# Patient Record
Sex: FEMALE | Race: WHITE | NOT HISPANIC OR LATINO | Employment: OTHER | URBAN - METROPOLITAN AREA
[De-identification: names, ages, dates, MRNs, and addresses within clinical notes are randomized per-mention and may not be internally consistent; named-entity substitution may affect disease eponyms.]

---

## 2017-09-14 ENCOUNTER — APPOINTMENT (OUTPATIENT)
Dept: WOMENS IMAGING | Facility: HOSPITAL | Age: 72
End: 2017-09-14

## 2017-09-14 PROCEDURE — G0202 SCR MAMMO BI INCL CAD: HCPCS | Performed by: RADIOLOGY

## 2017-09-14 PROCEDURE — 77063 BREAST TOMOSYNTHESIS BI: CPT | Performed by: RADIOLOGY

## 2019-01-15 ENCOUNTER — OFFICE VISIT (OUTPATIENT)
Dept: GASTROENTEROLOGY | Facility: CLINIC | Age: 74
End: 2019-01-15

## 2019-01-15 VITALS
BODY MASS INDEX: 22.49 KG/M2 | SYSTOLIC BLOOD PRESSURE: 148 MMHG | DIASTOLIC BLOOD PRESSURE: 82 MMHG | HEIGHT: 65 IN | WEIGHT: 135 LBS

## 2019-01-15 DIAGNOSIS — K50.90 CROHN'S DISEASE WITHOUT COMPLICATION, UNSPECIFIED GASTROINTESTINAL TRACT LOCATION (HCC): Primary | ICD-10-CM

## 2019-01-15 DIAGNOSIS — K50.018 CROHN'S DISEASE OF SMALL INTESTINE WITH OTHER COMPLICATION (HCC): Primary | ICD-10-CM

## 2019-01-15 PROCEDURE — 99204 OFFICE O/P NEW MOD 45 MIN: CPT | Performed by: INTERNAL MEDICINE

## 2019-01-15 RX ORDER — PREDNISONE 20 MG/1
60 TABLET ORAL DAILY
Qty: 90 TABLET | Refills: 1 | Status: SHIPPED | OUTPATIENT
Start: 2019-01-15 | End: 2019-04-02

## 2019-01-15 RX ORDER — UBIDECARENONE 75 MG
50 CAPSULE ORAL DAILY
Status: ON HOLD | COMMUNITY
End: 2020-01-27

## 2019-01-15 RX ORDER — UBIDECARENONE 100 MG
100 CAPSULE ORAL DAILY
Status: ON HOLD | COMMUNITY
End: 2020-01-27

## 2019-01-15 RX ORDER — PREDNISONE 10 MG/1
TABLET ORAL
Refills: 0 | COMMUNITY
Start: 2018-12-19 | End: 2019-04-02

## 2019-01-15 RX ORDER — SIMVASTATIN 20 MG
20 TABLET ORAL DAILY
Refills: 2 | COMMUNITY
Start: 2018-12-18 | End: 2020-02-27 | Stop reason: HOSPADM

## 2019-01-15 NOTE — PROGRESS NOTES
PATIENT INFORMATION  She López       - 1945    CHIEF COMPLAINT  Chief Complaint   Patient presents with   • GI Problem     Chron's dx   small bowel obstruction. See media. CT done.    HISTORY OF PRESENT ILLNESS  HPI    72 yo with history of Crohns disease diagnosed in . She had ileocolecty in . She has been previously on mesalamine based products with some intolerance. She was also on imuran with intolerance mentioned in   Previous GI notes. She had been on entocort. Humira and Remicade discussed but could not afford this. She has been off all medications since around .   She has had increasing issues with abdominal pain nausea and intermittent vomiting and diarrhea sincearo.  She was seen by her primary care doctor who did put her on prednisone with fast taper.  She notes a bulge in the right lower quadrant of her abdomen.  She has been following a fairly low fiber low residue diet but is not able to tolerate much more than liquids.  She is having a formed bowel movement daily.  She denies any fevers or chills.  She did have a CT for abdomen pelvis done at [facility] in December.    1. CT abdomen and pelvis without intravenous contrast.    DATE: 2018        HISTORY: Crohn's disease. Abdominal pain and nausea.     TECHNIQUE: Multislice helical abdomen and pelvis protocol without intravenous contrast per referring physician request. There are no prior studies.      Dose reduction techniques were employed per ALARA protocol.      FINDINGS: There has been a previous right hemicolectomy with ileocolic anastomosis. There is abnormal wall thickening involving the poorly visualized distal few centimeters of the neoterminal ileum with stranding of the surrounding fat. There is   associated dilation of mid and distal ileum. There is increased fluid within the remaining colon. There is no pneumatosis or free intraperitoneal air.    Liver, spleen, pancreas, and adrenal  glands are unremarkable. The kidneys and ureters are normal in appearance as is the bladder. The uterus and ovaries are unremarkable. The gallbladder is absent.    There is no adenopathy. There is trace ascites. The aorta is normal caliber. The lung bases are clear. There are no acute bone abnormalities. There is degenerative change at the thoracolumbar junction.    IMPRESSION:  1. Suspected active Crohn's disease at the junction of the neoterminal ileum and remaining colon. No abscess or fistula.  2. Dilation of small bowel to the level of the abnormal segment suggesting partial small bowel obstruction.  3. Increased fluid in the remnant colon suggesting the presence of diarrhea.  4. Trace ascites.  5. Retained contrast in the esophagus may be evidence of gastroesophageal reflux or decreased motility.      REVIEW OF SYSTEMS  Review of Systems   Constitutional: Positive for activity change and unexpected weight change.   Gastrointestinal: Positive for abdominal distention, abdominal pain, diarrhea, nausea and vomiting.   All other systems reviewed and are negative.        ACTIVE PROBLEMS  There are no active problems to display for this patient.        PAST MEDICAL HISTORY  Past Medical History:   Diagnosis Date   • Crohn's disease (CMS/HCC)    • Hyperlipidemia          SURGICAL HISTORY  Past Surgical History:   Procedure Laterality Date   • CHOLECYSTECTOMY  04/2018   • COLON SURGERY     • SPINE SURGERY           FAMILY HISTORY  Family History   Problem Relation Age of Onset   • Breast cancer Sister    • Breast cancer Maternal Grandmother    • Colon cancer Neg Hx    • Colon polyps Neg Hx          SOCIAL HISTORY  Social History     Occupational History   • Not on file   Tobacco Use   • Smoking status: Never Smoker   • Smokeless tobacco: Never Used   Substance and Sexual Activity   • Alcohol use: Yes   • Drug use: Not on file   • Sexual activity: Not on file       Debilities/Disabilities Identified: None    Emotional  "Behavior: Appropriate    CURRENT MEDICATIONS    Current Outpatient Medications:   •  Cholecalciferol (VITAMIN D3) 5000 units capsule capsule, Take 5,000 Units by mouth Daily., Disp: , Rfl:   •  coenzyme Q10 100 MG capsule, Take 100 mg by mouth Daily., Disp: , Rfl:   •  Multiple Vitamin (MULTI VITAMIN DAILY PO), Take  by mouth., Disp: , Rfl:   •  vitamin B-12 (CYANOCOBALAMIN) 100 MCG tablet, Take 50 mcg by mouth Daily., Disp: , Rfl:   •  predniSONE (DELTASONE) 10 MG tablet, , Disp: , Rfl: 0  •  predniSONE (DELTASONE) 20 MG tablet, Take 3 tablets by mouth Daily., Disp: 90 tablet, Rfl: 1  •  simvastatin (ZOCOR) 20 MG tablet, Take  by mouth Daily., Disp: , Rfl: 2    ALLERGIES  Asacol [mesalamine] and Keflex [cephalexin]    VITALS  Vitals:    01/15/19 1305   BP: 148/82   Weight: 61.2 kg (135 lb)   Height: 165.1 cm (65\")       LAST RESULTS   No results found for any previous visit.     No results found.    PHYSICAL EXAM  Physical Exam   Constitutional: She is oriented to person, place, and time. She appears well-developed and well-nourished. No distress.   HENT:   Head: Normocephalic and atraumatic.   Mouth/Throat: Oropharynx is clear and moist.   Eyes: EOM are normal. Pupils are equal, round, and reactive to light.   Neck: Normal range of motion. No tracheal deviation present.   Cardiovascular: Normal rate, regular rhythm, normal heart sounds and intact distal pulses. Exam reveals no gallop and no friction rub.   No murmur heard.  Pulmonary/Chest: Effort normal and breath sounds normal. No stridor. No respiratory distress. She has no wheezes. She has no rales. She exhibits no tenderness.   Abdominal: Soft. Bowel sounds are normal. She exhibits no distension. There is no tenderness. There is no rebound and no guarding.   Bulge noted in the right mid quadrant, periumbilical area. No pain. Bowel sounds noted.   Musculoskeletal: She exhibits no edema.   Lymphadenopathy:     She has no cervical adenopathy.   Neurological: She " is alert and oriented to person, place, and time.   Skin: Skin is warm. She is not diaphoretic.   Psychiatric: She has a normal mood and affect. Her behavior is normal. Judgment and thought content normal.   Nursing note and vitals reviewed.      ASSESSMENT  Diagnoses and all orders for this visit:    Crohn's disease of small intestine with other complication (CMS/HCC)    Other orders  -     predniSONE (DELTASONE) 10 MG tablet;   -     simvastatin (ZOCOR) 20 MG tablet; Take  by mouth Daily.  -     vitamin B-12 (CYANOCOBALAMIN) 100 MCG tablet; Take 50 mcg by mouth Daily.  -     coenzyme Q10 100 MG capsule; Take 100 mg by mouth Daily.  -     Cholecalciferol (VITAMIN D3) 5000 units capsule capsule; Take 5,000 Units by mouth Daily.  -     Multiple Vitamin (MULTI VITAMIN DAILY PO); Take  by mouth.  -     predniSONE (DELTASONE) 20 MG tablet; Take 3 tablets by mouth Daily.          PLAN  Return in about 4 weeks (around 2/12/2019).  Start high dose prednisone now. Taper discussed.  Risk related to prednisone discussed with patient.  CT Enterography  Low residue diet  Will figure out cost again for remicade/humira /entivyo    Records from previous gi reviewed and ct from [place] reviewed.

## 2019-01-24 ENCOUNTER — HOSPITAL ENCOUNTER (OUTPATIENT)
Dept: CT IMAGING | Facility: HOSPITAL | Age: 74
Discharge: HOME | End: 2019-01-24
Attending: INTERNAL MEDICINE | Admitting: INTERNAL MEDICINE

## 2019-01-24 PROCEDURE — 0 IOPAMIDOL PER 1 ML: Performed by: INTERNAL MEDICINE

## 2019-01-24 PROCEDURE — 0 DIATRIZOATE MEGLUMINE & SODIUM PER 1 ML: Performed by: INTERNAL MEDICINE

## 2019-01-24 PROCEDURE — 74177 CT ABD & PELVIS W/CONTRAST: CPT

## 2019-01-24 RX ADMIN — IOPAMIDOL 100 ML: 755 INJECTION, SOLUTION INTRAVENOUS at 09:46

## 2019-01-24 NOTE — PROGRESS NOTES
Let her know ct shows improving inflammation. She should be down to prednisone 40mg and continue here. Let her know we are working on remicade infusions

## 2019-01-28 NOTE — PROGRESS NOTES
Talk to patient about results. She would like to know if she could be put on Entocort instead of Remicade. thanks

## 2019-01-30 PROBLEM — K50.018 CROHN'S DISEASE OF SMALL INTESTINE WITH OTHER COMPLICATION (HCC): Status: ACTIVE | Noted: 2019-01-30

## 2019-02-06 ENCOUNTER — TELEPHONE (OUTPATIENT)
Dept: SURGERY | Facility: CLINIC | Age: 74
End: 2019-02-06

## 2019-02-06 ENCOUNTER — TRANSCRIBE ORDERS (OUTPATIENT)
Dept: ADMINISTRATIVE | Facility: HOSPITAL | Age: 74
End: 2019-02-06

## 2019-02-06 DIAGNOSIS — M79.601 RIGHT ARM PAIN: Primary | ICD-10-CM

## 2019-02-06 NOTE — TELEPHONE ENCOUNTER
PATIENT CALLED IN STATING THAT THAT SHE WAS HAVING A WEAKNESS & SWELLING IN HER RIGHT ARM. I EXPLAINED THAT SHE NEEDED HAVE THAT LOOKED AT ASAP. SHE WAS GOING TO CALL PCP TO IF SHE CAN GET IN WITH THEM, IF NOT I TOLD HER TO GO TO THE ER.

## 2019-02-07 DIAGNOSIS — K50.012 CROHN'S DISEASE OF SMALL INTESTINE WITH INTESTINAL OBSTRUCTION (HCC): Primary | ICD-10-CM

## 2019-02-07 DIAGNOSIS — Z11.59 ENCOUNTER FOR SCREENING FOR OTHER VIRAL DISEASES: ICD-10-CM

## 2019-02-07 NOTE — TELEPHONE ENCOUNTER
Called patient, no answer, left message. If she calls back please ask how much prednisone she is tapered down to

## 2019-02-07 NOTE — TELEPHONE ENCOUNTER
Labs put in, have her come down to prednisone 40mg now, 30mg next week and 20mg following week. If her symptoms worsen let us know

## 2019-02-08 ENCOUNTER — APPOINTMENT (OUTPATIENT)
Dept: LAB | Facility: HOSPITAL | Age: 74
End: 2019-02-08

## 2019-02-08 LAB — HBV SURFACE AG SERPL QL IA: NORMAL

## 2019-02-08 PROCEDURE — 86481 TB AG RESPONSE T-CELL SUSP: CPT | Performed by: INTERNAL MEDICINE

## 2019-02-08 PROCEDURE — 87340 HEPATITIS B SURFACE AG IA: CPT | Performed by: INTERNAL MEDICINE

## 2019-02-08 PROCEDURE — 86704 HEP B CORE ANTIBODY TOTAL: CPT | Performed by: INTERNAL MEDICINE

## 2019-02-08 PROCEDURE — 36415 COLL VENOUS BLD VENIPUNCTURE: CPT | Performed by: INTERNAL MEDICINE

## 2019-02-09 LAB — HBV CORE AB SER DONR QL IA: NEGATIVE

## 2019-02-10 LAB
TSPOT INTERPRETATION: NEGATIVE
TSPOT NIL CONTROL INTERPRETATION: NORMAL
TSPOT PANEL A: 0
TSPOT PANEL B: 0
TSPOT POS CONTROL INTERPRETATION: NORMAL

## 2019-02-11 ENCOUNTER — TELEPHONE (OUTPATIENT)
Dept: GASTROENTEROLOGY | Facility: CLINIC | Age: 74
End: 2019-02-11

## 2019-02-12 ENCOUNTER — TELEPHONE (OUTPATIENT)
Dept: SURGERY | Facility: CLINIC | Age: 74
End: 2019-02-12

## 2019-02-21 ENCOUNTER — OFFICE VISIT (OUTPATIENT)
Dept: GASTROENTEROLOGY | Facility: CLINIC | Age: 74
End: 2019-02-21

## 2019-02-21 DIAGNOSIS — K50.018 CROHN'S DISEASE OF SMALL INTESTINE WITH OTHER COMPLICATION (HCC): Primary | ICD-10-CM

## 2019-02-21 PROCEDURE — 99213 OFFICE O/P EST LOW 20 MIN: CPT | Performed by: INTERNAL MEDICINE

## 2019-02-21 RX ORDER — ADALIMUMAB 80MG/0.8ML
KIT SUBCUTANEOUS
COMMUNITY
Start: 2019-02-14 | End: 2019-07-23

## 2019-02-21 NOTE — PROGRESS NOTES
PATIENT INFORMATION  She López       - 1945    CHIEF COMPLAINT  Chief Complaint   Patient presents with   • Follow-up     4 week follow up on crohn's       HISTORY OF PRESENT ILLNESS  HPI    She is here today in follow up and is doing well. She is down to prednisone 20 mg. Her arm pain is better. She states that she has underlying  Neck pain and nerve issues.  bm are combination firm to liquid. She is eating more, adding back more protein.    REVIEW OF SYSTEMS  Review of Systems   All other systems reviewed and are negative.        ACTIVE PROBLEMS  Patient Active Problem List    Diagnosis   • Crohn's disease of small intestine with other complication (CMS/HCC) [K50.018]         PAST MEDICAL HISTORY  Past Medical History:   Diagnosis Date   • Cancer (CMS/HCC)     kerato acanthona   • Crohn's disease (CMS/HCC)    • Hyperlipidemia          SURGICAL HISTORY  Past Surgical History:   Procedure Laterality Date   • CHOLECYSTECTOMY  2018   • COLON SURGERY     • SPINE SURGERY           FAMILY HISTORY  Family History   Problem Relation Age of Onset   • Breast cancer Sister    • Breast cancer Maternal Grandmother    • Colon cancer Neg Hx    • Colon polyps Neg Hx          SOCIAL HISTORY  Social History     Occupational History   • Not on file   Tobacco Use   • Smoking status: Never Smoker   • Smokeless tobacco: Never Used   Substance and Sexual Activity   • Alcohol use: Yes   • Drug use: Not on file   • Sexual activity: Not on file       Debilities/Disabilities Identified: None    Emotional Behavior: Appropriate    CURRENT MEDICATIONS    Current Outpatient Medications:   •  Cholecalciferol (VITAMIN D3) 5000 units capsule capsule, Take 5,000 Units by mouth Daily., Disp: , Rfl:   •  coenzyme Q10 100 MG capsule, Take 100 mg by mouth Daily., Disp: , Rfl:   •  HUMIRA PEN-CD/UC/HS STARTER 80 MG/0.8ML Pen-injector Kit, , Disp: , Rfl:   •  Multiple Vitamin (MULTI VITAMIN DAILY PO), Take  by mouth., Disp: , Rfl:    •  predniSONE (DELTASONE) 10 MG tablet, , Disp: , Rfl: 0  •  predniSONE (DELTASONE) 20 MG tablet, Take 3 tablets by mouth Daily., Disp: 90 tablet, Rfl: 1  •  simvastatin (ZOCOR) 20 MG tablet, Take  by mouth Daily., Disp: , Rfl: 2  •  vitamin B-12 (CYANOCOBALAMIN) 100 MCG tablet, Take 50 mcg by mouth Daily., Disp: , Rfl:     ALLERGIES  Asacol [mesalamine]; Azathioprine; Cephalosporins; Ciprofloxacin; Gabapentin; Influenza vaccines; Keflex [cephalexin]; Other; and Potassium    VITALS  There were no vitals filed for this visit.    LAST RESULTS   Orders Only on 02/07/2019   Component Date Value Ref Range Status   • TSPOTTB 02/08/2019 Negative  Negative Final   • T-SPOT Panel A 02/08/2019 0   Final   • T-SPOT Panel B 02/08/2019 0   Final   • TSPOT NIL CONTROL INTERP 02/08/2019 Passed   Final   • TSPOT POS CONTROL INTERP 02/08/2019 Passed   Final   • Hep B Core Total Ab 02/08/2019 Negative  Negative Final   • Hepatitis B Surface Ag 02/08/2019 Non-Reactive  Non-Reactive Final     Ct Abdomen Pelvis With Contrast Enterography    Result Date: 1/24/2019  Narrative: CT OF THE ABDOMEN AND PELVIS WITH CONTRAST  HISTORY: Long history of Crohn's disease. Several week history of nausea and mild abdominal pain  COMPARISON: 12/18/2018  TECHNIQUE: CT of the abdomen and pelvis. Radiation dose reduction techniques included automated exposure control or exposure modulation based on body size. Radiation audit for CT and nuclear cardiology exams in the last 12 months: 1.  ABDOMEN FINDINGS: Today's exam redemonstrates evidence of a prior right hemicolectomy with an ileocolic anastomosis. The previously described inflammatory changes involving the neoterminal ileum appear to have improved on the current study. The small bowel is fluid-filled but is less distended than the prior exam. There is no evidence of pneumatosis. No free air is identified. No free fluid is seen.  The liver shows normal enhancement. No focal hepatic lesions. The  gallbladder is surgically absent. Both kidneys show normal enhancement. No hydronephrosis. No focal renal masses. The adrenal glands and pancreas are within normal limits. The spleen appears unremarkable.  PELVIS FINDINGS: The bladder outline is smooth. No abnormal pelvic masses. No threshold of abdominal or pelvic adenopathy. No inguinal adenopathy is identified.  Regional osseous structures show no acute or aggressive bone lesions.      Impression: 1.  Prior right hemicolectomy with ileocolic anastomosis. The previously described inflammatory changes involving the surgical anastomosis appeared to have improved on the current exam. The small bowel is fluid-filled but less distended on the current study. 2.  No evidence of pneumatosis or free air.  This report was finalized on 1/24/2019 1:52 PM by Dr. Jose De Jesus Bae MD.        PHYSICAL EXAM  Physical Exam   Constitutional: She is oriented to person, place, and time. She appears well-developed and well-nourished. No distress.   HENT:   Head: Normocephalic and atraumatic.   Mouth/Throat: Oropharynx is clear and moist.   Eyes: EOM are normal. Pupils are equal, round, and reactive to light.   Neck: Normal range of motion. No tracheal deviation present.   Cardiovascular: Normal rate, regular rhythm, normal heart sounds and intact distal pulses. Exam reveals no gallop and no friction rub.   No murmur heard.  Pulmonary/Chest: Effort normal and breath sounds normal. No stridor. No respiratory distress. She has no wheezes. She has no rales. She exhibits no tenderness.   Abdominal: Soft. Bowel sounds are normal. She exhibits no distension. There is no tenderness. There is no rebound and no guarding.   Non tender, bs noted,    Musculoskeletal: She exhibits no edema.   Lymphadenopathy:     She has no cervical adenopathy.   Neurological: She is alert and oriented to person, place, and time.   Skin: Skin is warm. She is not diaphoretic.   Psychiatric: She has a normal mood and  affect. Her behavior is normal. Judgment and thought content normal.   Nursing note and vitals reviewed.      ASSESSMENT  Diagnoses and all orders for this visit:    Crohn's disease of small intestine with other complication (CMS/HCC)    Other orders  -     HUMIRA PEN-CD/UC/HS STARTER 80 MG/0.8ML Pen-injector Kit;           PLAN  Return in about 8 weeks (around 4/18/2019).    Get labs from pcp office baseline  Liver and cbc  To start humira today      humira reaction discussed, risks, side effects  Risk of malignancy and infections.

## 2019-04-01 ENCOUNTER — LAB (OUTPATIENT)
Dept: LAB | Facility: HOSPITAL | Age: 74
End: 2019-04-01

## 2019-04-01 ENCOUNTER — HOSPITAL ENCOUNTER (OUTPATIENT)
Dept: GENERAL RADIOLOGY | Facility: HOSPITAL | Age: 74
Discharge: HOME | End: 2019-04-01
Admitting: INTERNAL MEDICINE

## 2019-04-01 ENCOUNTER — TELEPHONE (OUTPATIENT)
Dept: GASTROENTEROLOGY | Facility: CLINIC | Age: 74
End: 2019-04-01

## 2019-04-01 DIAGNOSIS — D84.9 IMMUNODEFICIENCY (HCC): ICD-10-CM

## 2019-04-01 DIAGNOSIS — R10.10 PAIN OF UPPER ABDOMEN: ICD-10-CM

## 2019-04-01 DIAGNOSIS — K50.018 CROHN'S DISEASE OF SMALL INTESTINE WITH OTHER COMPLICATION (HCC): Primary | ICD-10-CM

## 2019-04-01 DIAGNOSIS — R19.7 DIARRHEA, UNSPECIFIED TYPE: ICD-10-CM

## 2019-04-01 DIAGNOSIS — K50.018 CROHN'S DISEASE OF SMALL INTESTINE WITH OTHER COMPLICATION (HCC): ICD-10-CM

## 2019-04-01 LAB
ADV 40+41 DNA STL QL NAA+NON-PROBE: NOT DETECTED
ASTRO TYP 1-8 RNA STL QL NAA+NON-PROBE: NOT DETECTED
C CAYETANENSIS DNA STL QL NAA+NON-PROBE: NOT DETECTED
CAMPY SP DNA.DIARRHEA STL QL NAA+PROBE: NOT DETECTED
CRYPTOSP STL CULT: NOT DETECTED
E COLI DNA SPEC QL NAA+PROBE: NOT DETECTED
E HISTOLYT AG STL-ACNC: NOT DETECTED
EAEC PAA PLAS AGGR+AATA ST NAA+NON-PRB: NOT DETECTED
EC STX1 + STX2 GENES STL NAA+PROBE: NOT DETECTED
EPEC EAE GENE STL QL NAA+NON-PROBE: NOT DETECTED
ERYTHROCYTE [SEDIMENTATION RATE] IN BLOOD: 43 MM/HR (ref 0–30)
ETEC LTA+ST1A+ST1B TOX ST NAA+NON-PROBE: NOT DETECTED
G LAMBLIA DNA SPEC QL NAA+PROBE: NOT DETECTED
NOROVIRUS GI+II RNA STL QL NAA+NON-PROBE: NOT DETECTED
P SHIGELLOIDES DNA STL QL NAA+NON-PROBE: NOT DETECTED
RV RNA STL NAA+PROBE: NOT DETECTED
SALMONELLA DNA SPEC QL NAA+PROBE: NOT DETECTED
SAPO I+II+IV+V RNA STL QL NAA+NON-PROBE: NOT DETECTED
SHIGELLA SP+EIEC IPAH STL QL NAA+PROBE: NOT DETECTED
V CHOLERAE DNA SPEC QL NAA+PROBE: NOT DETECTED
VIBRIO DNA SPEC NAA+PROBE: NOT DETECTED
YERSINIA STL CULT: NOT DETECTED

## 2019-04-01 PROCEDURE — 36415 COLL VENOUS BLD VENIPUNCTURE: CPT | Performed by: INTERNAL MEDICINE

## 2019-04-01 PROCEDURE — 74019 RADEX ABDOMEN 2 VIEWS: CPT

## 2019-04-01 PROCEDURE — 85652 RBC SED RATE AUTOMATED: CPT | Performed by: INTERNAL MEDICINE

## 2019-04-01 PROCEDURE — 87999 UNLISTED MICROBIOLOGY PX: CPT

## 2019-04-01 NOTE — TELEPHONE ENCOUNTER
If it is that bad, she needs to go to the ed. Otherwise, will get abd xray, labs, and stool studies and she can follow up in office

## 2019-04-02 RX ORDER — BUDESONIDE 3 MG/1
9 CAPSULE, COATED PELLETS ORAL DAILY
Qty: 90 CAPSULE | Refills: 3 | Status: SHIPPED | OUTPATIENT
Start: 2019-04-02 | End: 2019-07-01

## 2019-04-02 NOTE — PROGRESS NOTES
Spoke to patient about x-ray results and stool studies.  Also discussed sed rate results.  She states that her symptoms are a little bit better today.  She denies any fevers or chills.  No blood in her stool.  Will check labs for Humira antibodies.  I have put in the order for Entocort for her to start.  She will see me back in the office in follow-up.  She is advised to go to the ER if her symptoms worsen.  Can you get her labs ready for the Humira antibodies.  She is going to pick them up this week.

## 2019-04-25 ENCOUNTER — OFFICE VISIT (OUTPATIENT)
Dept: GASTROENTEROLOGY | Facility: CLINIC | Age: 74
End: 2019-04-25

## 2019-04-25 VITALS
SYSTOLIC BLOOD PRESSURE: 146 MMHG | HEIGHT: 65 IN | BODY MASS INDEX: 23.22 KG/M2 | DIASTOLIC BLOOD PRESSURE: 80 MMHG | WEIGHT: 139.4 LBS

## 2019-04-25 DIAGNOSIS — K50.018 CROHN'S DISEASE OF SMALL INTESTINE WITH OTHER COMPLICATION (HCC): Primary | ICD-10-CM

## 2019-04-25 PROCEDURE — 99212 OFFICE O/P EST SF 10 MIN: CPT | Performed by: INTERNAL MEDICINE

## 2019-04-25 NOTE — PROGRESS NOTES
PATIENT INFORMATION  She López       - 1945    CHIEF COMPLAINT  Chief Complaint   Patient presents with   • Follow-up     2 mo follow up Crohn's       HISTORY OF PRESENT ILLNESS  HPI    She is here in follow up. We had to take her off Humira due to antibody levels. She is currently maintained on entocort. She is about the same. bm are about 3 times daily with some firmness.  Weight has been stable or up slightly.  No nausea or vomiting. Eating regular food.    REVIEW OF SYSTEMS  Review of Systems   Constitutional: Positive for chills and diaphoresis.   Gastrointestinal: Positive for abdominal pain, diarrhea and nausea.   Musculoskeletal: Positive for arthralgias and back pain.   Neurological: Positive for light-headedness.   All other systems reviewed and are negative.        ACTIVE PROBLEMS  Patient Active Problem List    Diagnosis   • Crohn's disease of small intestine with other complication (CMS/HCC) [K50.018]         PAST MEDICAL HISTORY  Past Medical History:   Diagnosis Date   • Cancer (CMS/HCC)     kerato acanthona   • Crohn's disease (CMS/HCC)    • Hyperlipidemia          SURGICAL HISTORY  Past Surgical History:   Procedure Laterality Date   • CHOLECYSTECTOMY  2018   • COLON SURGERY     • SPINE SURGERY           FAMILY HISTORY  Family History   Problem Relation Age of Onset   • Breast cancer Sister    • Breast cancer Maternal Grandmother    • Colon cancer Neg Hx    • Colon polyps Neg Hx          SOCIAL HISTORY  Social History     Occupational History   • Not on file   Tobacco Use   • Smoking status: Never Smoker   • Smokeless tobacco: Never Used   Substance and Sexual Activity   • Alcohol use: Yes   • Drug use: Not on file   • Sexual activity: Not on file       Debilities/Disabilities Identified: None    Emotional Behavior: Appropriate    CURRENT MEDICATIONS    Current Outpatient Medications:   •  Budesonide (ENTOCORT EC) 3 MG 24 hr capsule, Take 3 capsules by mouth Daily for 90  "days., Disp: 90 capsule, Rfl: 3  •  Cholecalciferol (VITAMIN D3) 5000 units capsule capsule, Take 5,000 Units by mouth Daily., Disp: , Rfl:   •  coenzyme Q10 100 MG capsule, Take 100 mg by mouth Daily., Disp: , Rfl:   •  HUMIRA PEN-CD/UC/HS STARTER 80 MG/0.8ML Pen-injector Kit, , Disp: , Rfl:   •  Multiple Vitamin (MULTI VITAMIN DAILY PO), Take  by mouth., Disp: , Rfl:   •  simvastatin (ZOCOR) 20 MG tablet, Take  by mouth Daily., Disp: , Rfl: 2  •  vitamin B-12 (CYANOCOBALAMIN) 100 MCG tablet, Take 50 mcg by mouth Daily., Disp: , Rfl:     ALLERGIES  Asacol [mesalamine]; Azathioprine; Cephalosporins; Ciprofloxacin; Gabapentin; Influenza vaccines; Keflex [cephalexin]; Other; and Potassium    VITALS  Vitals:    04/25/19 1102   BP: 146/80   Weight: 63.2 kg (139 lb 6.4 oz)   Height: 165.1 cm (65\")       LAST RESULTS   Lab on 04/01/2019   Component Date Value Ref Range Status   • Campylobacter 04/01/2019 Not Detected  Not Detected, Invalid Final   • Plesiomonas shigelloides 04/01/2019 Not Detected  Not Detected Final   • Salmonella 04/01/2019 Not Detected  Not Detected Final   • Vibrio 04/01/2019 Not Detected  Not Detected Final   • Vibrio cholerae 04/01/2019 Not Detected  Not Detected Final   • Yersinia enterocolitica 04/01/2019 Not Detected  Not Detected Final   • Enteroaggregative E. coli (EAEC) 04/01/2019 Not Detected  Not Detected Final   • Enteropathogenic E. coli (EPEC) 04/01/2019 Not Detected  Not Detected Final   • Enterotoxigenic E. coli (ETEC) lt/* 04/01/2019 Not Detected  Not Detected Final   • Shiga-like toxin-producing E. coli* 04/01/2019 Not Detected  Not Detected Final   • E. coli O157 04/01/2019 Not Detected  Not Detected Final   • Shigella/Enteroinvasive E. coli (E* 04/01/2019 Not Detected  Not Detected Final   • Cryptosporidium 04/01/2019 Not Detected  Not Detected, Invalid Final   • Cyclospora cayetanensis 04/01/2019 Not Detected  Not Detected Final   • Entamoeba histolytica 04/01/2019 Not Detected  " Not Detected Final   • Giardia lamblia 04/01/2019 Not Detected  Not Detected Final   • Adenovirus F40/41 04/01/2019 Not Detected  Not Detected Final   • Astrovirus 04/01/2019 Not Detected  Not Detected Final   • Norovirus GI/GII 04/01/2019 Not Detected  Not Detected Final   • Rotavirus A 04/01/2019 Not Detected  Not Detected Final   • Sapovirus (I, II, IV or V) 04/01/2019 Not Detected  Not Detected Final     Xr Abdomen Flat & Upright    Result Date: 4/1/2019  Narrative: ABDOMEN 2 VIEWS 04/01/2019  HISTORY: Crohn's disease with Crohn's flareup including abdominal pain with nausea and diarrhea since 5 days ago.  FINDINGS: 2 views of the abdomen demonstrate mild gaseous distention of a few loops of small bowel in the midabdomen with associated air-fluid levels and a normal colonic gas pattern. Findings may reflect ileus or enteritis. No free air is identified. Surgical clips right upper quadrant suggest prior cholecystectomy. Surgical chain sutures right mid abdomen suggest prior bowel resection.      Impression: Mild gaseous distention of a few loops of small bowel in the mid abdomen with associated air-fluid levels possibly reflecting ileus or enteritis. No evidence of free air.  This report was finalized on 4/1/2019 1:37 PM by Dr. Fuad Skinner MD.        PHYSICAL EXAM  Physical Exam   Constitutional: She is oriented to person, place, and time. She appears well-developed and well-nourished. No distress.   HENT:   Head: Normocephalic and atraumatic.   Mouth/Throat: Oropharynx is clear and moist.   Eyes: EOM are normal. Pupils are equal, round, and reactive to light.   Neck: Normal range of motion. No tracheal deviation present.   Cardiovascular: Normal rate, regular rhythm, normal heart sounds and intact distal pulses. Exam reveals no gallop and no friction rub.   No murmur heard.  Pulmonary/Chest: Effort normal and breath sounds normal. No stridor. No respiratory distress. She has no wheezes. She has no rales. She  exhibits no tenderness.   Abdominal: Soft. Bowel sounds are normal. She exhibits no distension. There is no tenderness. There is no rebound and no guarding.   Musculoskeletal: She exhibits no edema.   Lymphadenopathy:     She has no cervical adenopathy.   Neurological: She is alert and oriented to person, place, and time.   Skin: Skin is warm. She is not diaphoretic.   Psychiatric: She has a normal mood and affect. Her behavior is normal. Judgment and thought content normal.   Nursing note and vitals reviewed.      ASSESSMENT  Diagnoses and all orders for this visit:    Crohn's disease of small intestine with other complication (CMS/[facility])          PLAN  No Follow-up on file.    Options discussed. She is concerned about remicade as she has had ab to Humira. Advised that this is a different medication.  Not interested in trying this. She is concerned of cost  Entyvio discussed. She is interested in looking into this. Will figure out if cost is feasible.  Continue on entocort for now.

## 2019-05-07 ENCOUNTER — TELEPHONE (OUTPATIENT)
Dept: GASTROENTEROLOGY | Facility: CLINIC | Age: 74
End: 2019-05-07

## 2019-05-15 RX ORDER — PREDNISONE 10 MG/1
20 TABLET ORAL DAILY
Qty: 30 TABLET | Refills: 1 | Status: SHIPPED | OUTPATIENT
Start: 2019-05-15 | End: 2019-12-28 | Stop reason: HOSPADM

## 2019-05-15 NOTE — TELEPHONE ENCOUNTER
PATIENT CALLED IN TODAY STATING THAT ENTOCORT IS NOT WORKING AND WOULD LIKE TO GO BACK ON PREDNISONE. THANKS

## 2019-05-15 NOTE — TELEPHONE ENCOUNTER
Called in 20 mg daily. Entocort is safer for her. Less systemic effects. She has been on prednisone a lot this year. Increase risk of fracture/hyperglycemia/etc

## 2019-05-24 ENCOUNTER — TELEPHONE (OUTPATIENT)
Dept: GASTROENTEROLOGY | Facility: CLINIC | Age: 74
End: 2019-05-24

## 2019-07-08 ENCOUNTER — TELEPHONE (OUTPATIENT)
Dept: GASTROENTEROLOGY | Facility: CLINIC | Age: 74
End: 2019-07-08

## 2019-07-16 ENCOUNTER — TELEPHONE (OUTPATIENT)
Dept: GASTROENTEROLOGY | Facility: CLINIC | Age: 74
End: 2019-07-16

## 2019-07-17 NOTE — TELEPHONE ENCOUNTER
Spoke with  yesterday. She had infusion on Monday and developed abdominal pain hours afterwards. No diarrhea, some vomiting. He stated this happened after first infusion and lasted 2 days. She is not wanting further infusions. I asked him to bring her back into the office to discuss. If symptoms don't improve or worsen, go to ed. He was agreeable.

## 2019-07-24 ENCOUNTER — OFFICE VISIT (OUTPATIENT)
Dept: GASTROENTEROLOGY | Facility: CLINIC | Age: 74
End: 2019-07-24

## 2019-07-24 VITALS — WEIGHT: 138.8 LBS | HEIGHT: 65 IN | BODY MASS INDEX: 23.13 KG/M2

## 2019-07-24 DIAGNOSIS — K50.018 CROHN'S DISEASE OF SMALL INTESTINE WITH OTHER COMPLICATION (HCC): Primary | ICD-10-CM

## 2019-07-24 PROCEDURE — 99214 OFFICE O/P EST MOD 30 MIN: CPT | Performed by: INTERNAL MEDICINE

## 2019-07-24 RX ORDER — SODIUM, POTASSIUM,MAG SULFATES 17.5-3.13G
1 SOLUTION, RECONSTITUTED, ORAL ORAL EVERY 12 HOURS
Qty: 2 BOTTLE | Refills: 0 | Status: ON HOLD | OUTPATIENT
Start: 2019-07-24 | End: 2019-08-16

## 2019-07-24 RX ORDER — PREDNISONE 1 MG/1
5 TABLET ORAL 3 TIMES DAILY
Qty: 90 TABLET | Refills: 2 | Status: SHIPPED | OUTPATIENT
Start: 2019-07-24 | End: 2019-12-03

## 2019-07-24 NOTE — PROGRESS NOTES
PATIENT INFORMATION  She López       - 1945    CHIEF COMPLAINT  Chief Complaint   Patient presents with   • Abdominal Pain   • Diarrhea       HISTORY OF PRESENT ILLNESS  HPI    She is here with increasing abdominal pain, nausea, vomiting, some diarrhea after each infusions. She is feeling better.      REVIEW OF SYSTEMS  Review of Systems   Gastrointestinal: Positive for abdominal distention, abdominal pain, diarrhea, nausea and vomiting.   Musculoskeletal: Positive for arthralgias and back pain.   Neurological: Positive for light-headedness.         ACTIVE PROBLEMS  Patient Active Problem List    Diagnosis   • Crohn's disease of small intestine with other complication (CMS/HCC) [K50.018]         PAST MEDICAL HISTORY  Past Medical History:   Diagnosis Date   • Cancer (CMS/HCC)     kerato acanthona   • Crohn's disease (CMS/HCC)    • Hyperlipidemia          SURGICAL HISTORY  Past Surgical History:   Procedure Laterality Date   • CHOLECYSTECTOMY  2018   • COLON SURGERY     • SPINE SURGERY           FAMILY HISTORY  Family History   Problem Relation Age of Onset   • Breast cancer Sister    • Breast cancer Maternal Grandmother    • Colon cancer Neg Hx    • Colon polyps Neg Hx          SOCIAL HISTORY  Social History     Occupational History   • Not on file   Tobacco Use   • Smoking status: Never Smoker   • Smokeless tobacco: Never Used   Substance and Sexual Activity   • Alcohol use: Yes   • Drug use: Not on file   • Sexual activity: Not on file       Debilities/Disabilities Identified: None    Emotional Behavior: Appropriate    CURRENT MEDICATIONS    Current Outpatient Medications:   •  Cholecalciferol (VITAMIN D3) 5000 units capsule capsule, Take 5,000 Units by mouth Daily., Disp: , Rfl:   •  coenzyme Q10 100 MG capsule, Take 100 mg by mouth Daily., Disp: , Rfl:   •  Multiple Vitamin (MULTI VITAMIN DAILY PO), Take  by mouth., Disp: , Rfl:   •  predniSONE (DELTASONE) 10 MG tablet, Take 2 tablets by  "mouth Daily., Disp: 30 tablet, Rfl: 1  •  simvastatin (ZOCOR) 20 MG tablet, Take  by mouth Daily., Disp: , Rfl: 2  •  vitamin B-12 (CYANOCOBALAMIN) 100 MCG tablet, Take 50 mcg by mouth Daily., Disp: , Rfl:     ALLERGIES  Asacol [mesalamine]; Azathioprine; Cephalosporins; Ciprofloxacin; Gabapentin; Influenza vaccines; Keflex [cephalexin]; Other; and Potassium    VITALS  Vitals:    07/24/19 1313   Weight: 63 kg (138 lb 12.8 oz)   Height: 165.1 cm (65\")       LAST RESULTS   Lab on 04/01/2019   Component Date Value Ref Range Status   • Campylobacter 04/01/2019 Not Detected  Not Detected, Invalid Final   • Plesiomonas shigelloides 04/01/2019 Not Detected  Not Detected Final   • Salmonella 04/01/2019 Not Detected  Not Detected Final   • Vibrio 04/01/2019 Not Detected  Not Detected Final   • Vibrio cholerae 04/01/2019 Not Detected  Not Detected Final   • Yersinia enterocolitica 04/01/2019 Not Detected  Not Detected Final   • Enteroaggregative E. coli (EAEC) 04/01/2019 Not Detected  Not Detected Final   • Enteropathogenic E. coli (EPEC) 04/01/2019 Not Detected  Not Detected Final   • Enterotoxigenic E. coli (ETEC) lt/* 04/01/2019 Not Detected  Not Detected Final   • Shiga-like toxin-producing E. coli* 04/01/2019 Not Detected  Not Detected Final   • E. coli O157 04/01/2019 Not Detected  Not Detected Final   • Shigella/Enteroinvasive E. coli (E* 04/01/2019 Not Detected  Not Detected Final   • Cryptosporidium 04/01/2019 Not Detected  Not Detected, Invalid Final   • Cyclospora cayetanensis 04/01/2019 Not Detected  Not Detected Final   • Entamoeba histolytica 04/01/2019 Not Detected  Not Detected Final   • Giardia lamblia 04/01/2019 Not Detected  Not Detected Final   • Adenovirus F40/41 04/01/2019 Not Detected  Not Detected Final   • Astrovirus 04/01/2019 Not Detected  Not Detected Final   • Norovirus GI/GII 04/01/2019 Not Detected  Not Detected Final   • Rotavirus A 04/01/2019 Not Detected  Not Detected Final   • Sapovirus (I, " II, IV or V) 04/01/2019 Not Detected  Not Detected Final     No results found.    PHYSICAL EXAM  Physical Exam   Constitutional: She is oriented to person, place, and time. She appears well-developed and well-nourished. No distress.   HENT:   Head: Normocephalic and atraumatic.   Mouth/Throat: Oropharynx is clear and moist.   Eyes: EOM are normal. Pupils are equal, round, and reactive to light.   Neck: Normal range of motion. No tracheal deviation present.   Cardiovascular: Normal rate, regular rhythm, normal heart sounds and intact distal pulses. Exam reveals no gallop and no friction rub.   No murmur heard.  Pulmonary/Chest: Effort normal and breath sounds normal. No stridor. No respiratory distress. She has no wheezes. She has no rales. She exhibits no tenderness.   Abdominal: Soft. Bowel sounds are normal. She exhibits no distension. There is no tenderness. There is no rebound and no guarding.   Bowel sounds noted. Some fullness on the right side, compared to the left   Musculoskeletal: She exhibits no edema.   Lymphadenopathy:     She has no cervical adenopathy.   Neurological: She is alert and oriented to person, place, and time.   Skin: Skin is warm. She is not diaphoretic.   Psychiatric: She has a normal mood and affect. Her behavior is normal. Judgment and thought content normal.   Nursing note and vitals reviewed.      ASSESSMENT  Diagnoses and all orders for this visit:    Crohn's disease of small intestine with other complication (CMS/[facility])  -     Case Request; Standing  -     Case Request    Other orders  -     Follow Anesthesia Guidelines / Standing Orders; Future  -     Implement Anesthesia orders day of procedure.; Standing  -     Obtain informed consent; Standing          PLAN  No Follow-up on file.    Indications, risks and procedure were discussed with the patient, including but not limited to, bleeding, infection, possibility of perforation and possible polypectomy. All of the patient's questions  were answered, and signed informed consent was obtained and placed on the chart.

## 2019-08-15 ENCOUNTER — ANESTHESIA EVENT (OUTPATIENT)
Dept: PERIOP | Facility: HOSPITAL | Age: 74
End: 2019-08-15

## 2019-08-16 ENCOUNTER — ANESTHESIA (OUTPATIENT)
Dept: PERIOP | Facility: HOSPITAL | Age: 74
End: 2019-08-16

## 2019-08-16 ENCOUNTER — HOSPITAL ENCOUNTER (OUTPATIENT)
Facility: HOSPITAL | Age: 74
Setting detail: HOSPITAL OUTPATIENT SURGERY
Discharge: HOME | End: 2019-08-16
Attending: INTERNAL MEDICINE | Admitting: INTERNAL MEDICINE

## 2019-08-16 VITALS
RESPIRATION RATE: 16 BRPM | OXYGEN SATURATION: 94 % | WEIGHT: 131.4 LBS | TEMPERATURE: 98.4 F | DIASTOLIC BLOOD PRESSURE: 75 MMHG | HEART RATE: 78 BPM | SYSTOLIC BLOOD PRESSURE: 114 MMHG | HEIGHT: 65 IN | BODY MASS INDEX: 21.89 KG/M2

## 2019-08-16 DIAGNOSIS — K50.018 CROHN'S DISEASE OF SMALL INTESTINE WITH OTHER COMPLICATION (HCC): ICD-10-CM

## 2019-08-16 PROCEDURE — 43239 EGD BIOPSY SINGLE/MULTIPLE: CPT | Performed by: INTERNAL MEDICINE

## 2019-08-16 PROCEDURE — 25010000002 PROPOFOL 10 MG/ML EMULSION: Performed by: NURSE ANESTHETIST, CERTIFIED REGISTERED

## 2019-08-16 PROCEDURE — 88305 TISSUE EXAM BY PATHOLOGIST: CPT | Performed by: INTERNAL MEDICINE

## 2019-08-16 PROCEDURE — 88313 SPECIAL STAINS GROUP 2: CPT | Performed by: INTERNAL MEDICINE

## 2019-08-16 PROCEDURE — 45331 SIGMOIDOSCOPY AND BIOPSY: CPT | Performed by: INTERNAL MEDICINE

## 2019-08-16 RX ORDER — MEPERIDINE HYDROCHLORIDE 25 MG/ML
12.5 INJECTION INTRAMUSCULAR; INTRAVENOUS; SUBCUTANEOUS
Status: DISCONTINUED | OUTPATIENT
Start: 2019-08-16 | End: 2019-08-16 | Stop reason: HOSPADM

## 2019-08-16 RX ORDER — PROPOFOL 10 MG/ML
VIAL (ML) INTRAVENOUS AS NEEDED
Status: DISCONTINUED | OUTPATIENT
Start: 2019-08-16 | End: 2019-08-16 | Stop reason: SURG

## 2019-08-16 RX ORDER — SODIUM CHLORIDE, SODIUM LACTATE, POTASSIUM CHLORIDE, CALCIUM CHLORIDE 600; 310; 30; 20 MG/100ML; MG/100ML; MG/100ML; MG/100ML
100 INJECTION, SOLUTION INTRAVENOUS CONTINUOUS
Status: DISCONTINUED | OUTPATIENT
Start: 2019-08-16 | End: 2019-08-16 | Stop reason: HOSPADM

## 2019-08-16 RX ORDER — SODIUM CHLORIDE 0.9 % (FLUSH) 0.9 %
1-10 SYRINGE (ML) INJECTION AS NEEDED
Status: DISCONTINUED | OUTPATIENT
Start: 2019-08-16 | End: 2019-08-16 | Stop reason: HOSPADM

## 2019-08-16 RX ORDER — SODIUM CHLORIDE 9 MG/ML
40 INJECTION, SOLUTION INTRAVENOUS AS NEEDED
Status: DISCONTINUED | OUTPATIENT
Start: 2019-08-16 | End: 2019-08-16 | Stop reason: HOSPADM

## 2019-08-16 RX ORDER — MAGNESIUM HYDROXIDE 1200 MG/15ML
LIQUID ORAL AS NEEDED
Status: DISCONTINUED | OUTPATIENT
Start: 2019-08-16 | End: 2019-08-16 | Stop reason: HOSPADM

## 2019-08-16 RX ORDER — LIDOCAINE HYDROCHLORIDE 10 MG/ML
0.5 INJECTION, SOLUTION EPIDURAL; INFILTRATION; INTRACAUDAL; PERINEURAL ONCE AS NEEDED
Status: COMPLETED | OUTPATIENT
Start: 2019-08-16 | End: 2019-08-16

## 2019-08-16 RX ORDER — LIDOCAINE HYDROCHLORIDE 20 MG/ML
INJECTION, SOLUTION INFILTRATION; PERINEURAL AS NEEDED
Status: DISCONTINUED | OUTPATIENT
Start: 2019-08-16 | End: 2019-08-16 | Stop reason: SURG

## 2019-08-16 RX ORDER — SODIUM CHLORIDE, SODIUM LACTATE, POTASSIUM CHLORIDE, CALCIUM CHLORIDE 600; 310; 30; 20 MG/100ML; MG/100ML; MG/100ML; MG/100ML
9 INJECTION, SOLUTION INTRAVENOUS CONTINUOUS
Status: DISCONTINUED | OUTPATIENT
Start: 2019-08-16 | End: 2019-08-16 | Stop reason: HOSPADM

## 2019-08-16 RX ORDER — ONDANSETRON 2 MG/ML
4 INJECTION INTRAMUSCULAR; INTRAVENOUS ONCE AS NEEDED
Status: DISCONTINUED | OUTPATIENT
Start: 2019-08-16 | End: 2019-08-16 | Stop reason: HOSPADM

## 2019-08-16 RX ADMIN — PROPOFOL 50 MG: 10 INJECTION, EMULSION INTRAVENOUS at 12:01

## 2019-08-16 RX ADMIN — PROPOFOL 50 MG: 10 INJECTION, EMULSION INTRAVENOUS at 11:35

## 2019-08-16 RX ADMIN — SODIUM CHLORIDE, POTASSIUM CHLORIDE, SODIUM LACTATE AND CALCIUM CHLORIDE 9 ML/HR: 600; 310; 30; 20 INJECTION, SOLUTION INTRAVENOUS at 10:47

## 2019-08-16 RX ADMIN — PROPOFOL 25 MG: 10 INJECTION, EMULSION INTRAVENOUS at 12:04

## 2019-08-16 RX ADMIN — PROPOFOL 50 MG: 10 INJECTION, EMULSION INTRAVENOUS at 11:56

## 2019-08-16 RX ADMIN — PROPOFOL 50 MG: 10 INJECTION, EMULSION INTRAVENOUS at 11:42

## 2019-08-16 RX ADMIN — LIDOCAINE HYDROCHLORIDE 0.1 ML: 10 INJECTION, SOLUTION EPIDURAL; INFILTRATION; INTRACAUDAL; PERINEURAL at 10:47

## 2019-08-16 RX ADMIN — PROPOFOL 50 MG: 10 INJECTION, EMULSION INTRAVENOUS at 11:50

## 2019-08-16 RX ADMIN — PROPOFOL 100 MG: 10 INJECTION, EMULSION INTRAVENOUS at 11:32

## 2019-08-16 RX ADMIN — PROPOFOL 50 MG: 10 INJECTION, EMULSION INTRAVENOUS at 11:38

## 2019-08-16 RX ADMIN — PROPOFOL 50 MG: 10 INJECTION, EMULSION INTRAVENOUS at 11:46

## 2019-08-16 RX ADMIN — LIDOCAINE HYDROCHLORIDE 100 MG: 20 INJECTION, SOLUTION INFILTRATION; PERINEURAL at 11:29

## 2019-08-16 NOTE — H&P
PATIENT INFORMATION  She López         - 1945     CHIEF COMPLAINT      Chief Complaint   Patient presents with   • Abdominal Pain   • Diarrhea         HISTORY OF PRESENT ILLNESS  HPI     She is here with increasing abdominal pain, nausea, vomiting, some diarrhea after each infusions. She is feeling better.        REVIEW OF SYSTEMS  Review of Systems   Gastrointestinal: Positive for abdominal distention, abdominal pain, diarrhea, nausea and vomiting.   Musculoskeletal: Positive for arthralgias and back pain.   Neurological: Positive for light-headedness.            ACTIVE PROBLEMS      Patient Active Problem List     Diagnosis   • Crohn's disease of small intestine with other complication (CMS/HCC) [K50.018]            PAST MEDICAL HISTORY  Medical History        Past Medical History:   Diagnosis Date   • Cancer (CMS/HCC)       kerato acanthona   • Crohn's disease (CMS/HCC)     • Hyperlipidemia                 SURGICAL HISTORY  Surgical History         Past Surgical History:   Procedure Laterality Date   • CHOLECYSTECTOMY   2018   • COLON SURGERY       • SPINE SURGERY                   FAMILY HISTORY        Family History   Problem Relation Age of Onset   • Breast cancer Sister     • Breast cancer Maternal Grandmother     • Colon cancer Neg Hx     • Colon polyps Neg Hx              SOCIAL HISTORY  Social History           Occupational History   • Not on file   Tobacco Use   • Smoking status: Never Smoker   • Smokeless tobacco: Never Used   Substance and Sexual Activity   • Alcohol use: Yes   • Drug use: Not on file   • Sexual activity: Not on file         Debilities/Disabilities Identified: None     Emotional Behavior: Appropriate     CURRENT MEDICATIONS     Current Outpatient Medications:   •  Cholecalciferol (VITAMIN D3) 5000 units capsule capsule, Take 5,000 Units by mouth Daily., Disp: , Rfl:   •  coenzyme Q10 100 MG capsule, Take 100 mg by mouth Daily., Disp: , Rfl:   •  Multiple Vitamin  "(MULTI VITAMIN DAILY PO), Take  by mouth., Disp: , Rfl:   •  predniSONE (DELTASONE) 10 MG tablet, Take 2 tablets by mouth Daily., Disp: 30 tablet, Rfl: 1  •  simvastatin (ZOCOR) 20 MG tablet, Take  by mouth Daily., Disp: , Rfl: 2  •  vitamin B-12 (CYANOCOBALAMIN) 100 MCG tablet, Take 50 mcg by mouth Daily., Disp: , Rfl:      ALLERGIES  Asacol [mesalamine]; Azathioprine; Cephalosporins; Ciprofloxacin; Gabapentin; Influenza vaccines; Keflex [cephalexin]; Other; and Potassium     VITALS  /87 (BP Location: Left arm, Patient Position: Lying)   Pulse 89   Temp 98.4 °F (36.9 °C) (Oral)   Resp 15   Ht 165.1 cm (65\")   Wt 59.6 kg (131 lb 6.4 oz)   SpO2 96%   BMI 21.87 kg/m²               LAST RESULTS                   Lab on 04/01/2019   Component Date Value Ref Range Status   • Campylobacter 04/01/2019 Not Detected  Not Detected, Invalid Final   • Plesiomonas shigelloides 04/01/2019 Not Detected  Not Detected Final   • Salmonella 04/01/2019 Not Detected  Not Detected Final   • Vibrio 04/01/2019 Not Detected  Not Detected Final   • Vibrio cholerae 04/01/2019 Not Detected  Not Detected Final   • Yersinia enterocolitica 04/01/2019 Not Detected  Not Detected Final   • Enteroaggregative E. coli (EAEC) 04/01/2019 Not Detected  Not Detected Final   • Enteropathogenic E. coli (EPEC) 04/01/2019 Not Detected  Not Detected Final   • Enterotoxigenic E. coli (ETEC) lt/* 04/01/2019 Not Detected  Not Detected Final   • Shiga-like toxin-producing E. coli* 04/01/2019 Not Detected  Not Detected Final   • E. coli O157 04/01/2019 Not Detected  Not Detected Final   • Shigella/Enteroinvasive E. coli (E* 04/01/2019 Not Detected  Not Detected Final   • Cryptosporidium 04/01/2019 Not Detected  Not Detected, Invalid Final   • Cyclospora cayetanensis 04/01/2019 Not Detected  Not Detected Final   • Entamoeba histolytica 04/01/2019 Not Detected  Not Detected Final   • Giardia lamblia 04/01/2019 Not Detected  Not Detected Final   • " Adenovirus F40/41 04/01/2019 Not Detected  Not Detected Final   • Astrovirus 04/01/2019 Not Detected  Not Detected Final   • Norovirus GI/GII 04/01/2019 Not Detected  Not Detected Final   • Rotavirus A 04/01/2019 Not Detected  Not Detected Final   • Sapovirus (I, II, IV or V) 04/01/2019 Not Detected  Not Detected Final      No results found.     PHYSICAL EXAM  Physical Exam   Constitutional: She is oriented to person, place, and time. She appears well-developed and well-nourished. No distress.   HENT:   Head: Normocephalic and atraumatic.   Mouth/Throat: Oropharynx is clear and moist.   Eyes: EOM are normal. Pupils are equal, round, and reactive to light.   Neck: Normal range of motion. No tracheal deviation present.   Cardiovascular: Normal rate, regular rhythm, normal heart sounds and intact distal pulses. Exam reveals no gallop and no friction rub.   No murmur heard.  Pulmonary/Chest: Effort normal and breath sounds normal. No stridor. No respiratory distress. She has no wheezes. She has no rales. She exhibits no tenderness.   Abdominal: Soft. Bowel sounds are normal. She exhibits no distension. There is no tenderness. There is no rebound and no guarding.   Bowel sounds noted. Some fullness on the right side, compared to the left   Musculoskeletal: She exhibits no edema.   Lymphadenopathy:     She has no cervical adenopathy.   Neurological: She is alert and oriented to person, place, and time.   Skin: Skin is warm. She is not diaphoretic.   Psychiatric: She has a normal mood and affect. Her behavior is normal. Judgment and thought content normal.   Nursing note and vitals reviewed.        ASSESSMENT  Diagnoses and all orders for this visit:     Crohn's disease of small intestine with other complication (CMS/[facility])  -     Case Request; Standing  -     Case Request     Other orders  -     Follow Anesthesia Guidelines / Standing Orders; Future  -     Implement Anesthesia orders day of procedure.; Standing  -      Obtain informed consent; Standing              PLAN  No Follow-up on file.     Indications, risks and procedure were discussed with the patient, including but not limited to, bleeding, infection, possibility of perforation and possible polypectomy. All of the patient's questions were answered, and signed informed consent was obtained and placed on the chart.

## 2019-08-16 NOTE — ANESTHESIA POSTPROCEDURE EVALUATION
Patient: She López    Procedure Summary     Date:  08/16/19 Room / Location:   [facility] /  [facility]    Anesthesia Start:  1127 Anesthesia Stop:  1214    Procedures:       ESOPHAGOGASTRODUODENOSCOPY w/ biopsies (N/A Esophagus)      COLONOSCOPY w/ biopsies (N/A ) Diagnosis:       Crohn's disease of small intestine with other complication (CMS/HCC)      (Crohn's disease of small intestine with other complication (CMS/HCC) [K50.018])    Surgeon:  Rita Goodman MD Provider:  Stella Paz CRNA    Anesthesia Type:  MAC ASA Status:  2          Anesthesia Type: MAC  Last vitals  BP   107/59 (08/16/19 1227)   Temp   98.4 °F (36.9 °C) (08/16/19 1022)   Pulse   78 (08/16/19 1237)   Resp   16 (08/16/19 1237)     SpO2   94 % (08/16/19 1237)     Post Anesthesia Care and Evaluation    Patient location during evaluation: bedside  Patient participation: complete - patient participated  Level of consciousness: awake  Pain score: 0  Pain management: adequate  Airway patency: patent  Anesthetic complications: No anesthetic complications  PONV Status: none  Cardiovascular status: acceptable  Respiratory status: acceptable  Hydration status: acceptable

## 2019-08-16 NOTE — ANESTHESIA PREPROCEDURE EVALUATION
Anesthesia Evaluation     Patient summary reviewed and Nursing notes reviewed   history of anesthetic complications: PONV  NPO Solid Status: > 8 hours  NPO Liquid Status: > 8 hours           Airway   Mallampati: II  TM distance: >3 FB  Neck ROM: limited  Possible difficult intubation  Dental    (+) partials    Pulmonary - negative pulmonary ROS    breath sounds clear to auscultation  Cardiovascular   Exercise tolerance: good (4-7 METS)    Rhythm: regular  Rate: normal    (+) hyperlipidemia,       Neuro/Psych- negative ROS  GI/Hepatic/Renal/Endo - negative ROS     Musculoskeletal     (+) neck pain (  C5-6-7 fusion. very limited rotation and extension), neck stiffness,   Abdominal    Substance History   (+) alcohol use,      OB/GYN          Other            Phys Exam Other: Top partial      Very limited neck extension and rotation due to cervical fusion                Anesthesia Plan    ASA 2     MAC     intravenous induction   Anesthetic plan, all risks, benefits, and alternatives have been provided, discussed and informed consent has been obtained with: patient.  Use of blood products discussed with patient  Consented to blood products.

## 2019-08-16 NOTE — OP NOTE
Patient Name:  She López  YOB: 1945    Date of Procedure:  8/16/2019    Procedure Performed:  EGD and Colonoscopy     Indications: Crohn's disease    Pre-op Diagnosis:   Crohn's disease of small intestine with other complication (CMS/HCC) [K50.018]    Post-Op Diagnosis Codes:     * Crohn's disease of small intestine with other complication (CMS/HCC) [K50.018]         Staff:  Surgeon(s):  Rita Goodman MD         Consent: Procedure EGD and colonoscopy indications, risks and procedure were discussed with the patient, including but not limited to, bleeding, infection, possibility of perforation and possible polypectomy. All of the patient's questions were answered, and signed informed consent was obtained and placed on the chart.      Sedation: Sedation was provided by anesthesia.      Estimated Blood Loss: minimal    Specimens:   ID Type Source Tests Collected by Time   A : gastric biopsy Tissue Stomach TISSUE PATHOLOGY EXAM Rita Goodman MD 8/16/2019 1138   B : distal esophagus biopsy Tissue Esophagus, Distal TISSUE PATHOLOGY EXAM Rita Goodman MD 8/16/2019 1139   C : biopsy of anastamosis Tissue Large Intestine TISSUE PATHOLOGY EXAM Rita Goodman MD 8/16/2019 1155   D : random colon biopsies Tissue Large Intestine TISSUE PATHOLOGY EXAM Rita Goodman MD 8/16/2019 1200         Description of Procedure:   After excellent sedation a flexible endoscope was passed into the oropharynx into the distal esophagus.  There is evidence of grade a esophagitis and a sliding hiatal hernia noted.  Z line was irregular biopsies are obtained using forceps.  The scope was easily traversed of the stomach.  There is evidence of erosive gastritis and some bile noted in the stomach.  Much time was used to wash and suction out these areas.  Gastric biopsies are obtained using forceps.  The scope was retroflexed here straightened and passed into the duodenal bulb although into the second portion with ease.   The entire examined small bowel mucosa overall appeared normal.  The scope was then slowly withdrawn outpatient with no immediate complications.  She was then turned around to the appropriate position and a digital rectal examination was performed.  There is some evidence of anal stenosis.  She does have external skin tags noted.  The scope was easily passed into the rectum and carefully to the level of the anastomosis.  The anastomosis was ulcerated indurated and nodular.  Could not traverse the scope into the small bowel mucosa.  Multiple biopsies of this area are obtained using forceps.  The colon just distal to this had some luminal narrowing also.  The scope was then slowly withdrawn out of the patient with no immediate complications.  Random biopsies are obtained using forceps.  The scope was easily traversed and the rectum and retroflexed were internal hemorrhoids are noted.  The scope was straightened and withdrawn out the patient with no immediate complications and she tolerated the procedure well.     Withdrawal time:20    Quality of bowel preparation: Good    Findings:   Stricturing of the anastomosis unable to traverse this area.  Stricture of the colon just distal to the anastomosis.  Anal stenosis  Esophagitis  Irregular Z line  Hiatal hernia  Erosive gastritis  We will await biopsy results.    Complications: None        Rita Goodman MD     Date: 8/16/2019  Time: 12:14 PM

## 2019-08-21 LAB
LAB AP CASE REPORT: NORMAL
LAB AP DIAGNOSIS COMMENT: NORMAL
PATH REPORT.ADDENDUM SPEC: NORMAL
PATH REPORT.FINAL DX SPEC: NORMAL
PATH REPORT.GROSS SPEC: NORMAL

## 2019-08-22 DIAGNOSIS — K50.018 CROHN'S DISEASE OF SMALL INTESTINE WITH OTHER COMPLICATION (HCC): Primary | ICD-10-CM

## 2019-08-22 DIAGNOSIS — K50.012 CROHN'S DISEASE OF SMALL INTESTINE WITH INTESTINAL OBSTRUCTION (HCC): ICD-10-CM

## 2019-08-22 RX ORDER — OMEPRAZOLE 20 MG/1
20 CAPSULE, DELAYED RELEASE ORAL DAILY
Qty: 30 CAPSULE | Refills: 5 | Status: SHIPPED | OUTPATIENT
Start: 2019-08-22 | End: 2020-09-22

## 2019-08-22 NOTE — PROGRESS NOTES
Path with gastritis. Esophageal biopsies with barretts esophagus. No dysplasia. Have her start ppi therapy.   Colon biopsies with active inflammation, crypt distortion. Random biopsies are normal. Have her start stelara

## 2019-08-27 ENCOUNTER — HOSPITAL ENCOUNTER (OUTPATIENT)
Dept: CT IMAGING | Facility: HOSPITAL | Age: 74
Discharge: HOME | End: 2019-08-27
Admitting: INTERNAL MEDICINE

## 2019-08-27 DIAGNOSIS — K50.012 CROHN'S DISEASE OF SMALL INTESTINE WITH INTESTINAL OBSTRUCTION (HCC): ICD-10-CM

## 2019-08-27 LAB — CREAT BLDA-MCNC: 0.8 MG/DL (ref 0.6–1.3)

## 2019-08-27 PROCEDURE — 74177 CT ABD & PELVIS W/CONTRAST: CPT

## 2019-08-27 PROCEDURE — 0 IOPAMIDOL PER 1 ML: Performed by: INTERNAL MEDICINE

## 2019-08-27 PROCEDURE — 82565 ASSAY OF CREATININE: CPT

## 2019-08-27 PROCEDURE — 0 DIATRIZOATE MEGLUMINE & SODIUM PER 1 ML: Performed by: INTERNAL MEDICINE

## 2019-08-27 RX ORDER — DIPHENHYDRAMINE HCL 25 MG
25 CAPSULE ORAL ONCE
OUTPATIENT
Start: 2019-09-02

## 2019-08-27 RX ORDER — ACETAMINOPHEN 325 MG/1
650 TABLET ORAL ONCE
OUTPATIENT
Start: 2019-09-02

## 2019-08-27 RX ADMIN — DIATRIZOATE MEGLUMINE AND DIATRIZOATE SODIUM 30 ML: 600; 100 SOLUTION ORAL; RECTAL at 10:00

## 2019-08-27 RX ADMIN — IOPAMIDOL 100 ML: 755 INJECTION, SOLUTION INTRAVENOUS at 11:24

## 2019-11-07 ENCOUNTER — OFFICE VISIT (OUTPATIENT)
Dept: GASTROENTEROLOGY | Facility: CLINIC | Age: 74
End: 2019-11-07

## 2019-11-07 VITALS — HEIGHT: 65 IN | BODY MASS INDEX: 21.89 KG/M2 | WEIGHT: 131.39 LBS

## 2019-11-07 DIAGNOSIS — K50.018 CROHN'S DISEASE OF SMALL INTESTINE WITH OTHER COMPLICATION (HCC): Primary | ICD-10-CM

## 2019-11-07 PROCEDURE — 99213 OFFICE O/P EST LOW 20 MIN: CPT | Performed by: INTERNAL MEDICINE

## 2019-11-07 NOTE — PROGRESS NOTES
PATIENT INFORMATION  She López       - 1945    CHIEF COMPLAINT  Chief Complaint   Patient presents with   • Follow-up     4 mo follow up on Crohn's       HISTORY OF PRESENT ILLNESS  HPI  She is here in follow up for Crohns. She took first dose of Stelara and was notified by her insurance that her maintenance dose would be over one thousand dollars. She is maintained on prednisone 10mg. She has ongoing intermittent episodes of constipation, abdominal pain, which leads to vomiting and then diarrhea. Weight has been stable but she is eating very minimal  Last episode was 2019    REVIEW OF SYSTEMS  Review of Systems   All other systems reviewed and are negative.        ACTIVE PROBLEMS  Patient Active Problem List    Diagnosis   • Crohn's disease of small intestine with other complication (CMS/HCC) [K50.018]         PAST MEDICAL HISTORY  Past Medical History:   Diagnosis Date   • Cancer (CMS/HCC)     kerato acanthona   • Crohn's disease (CMS/HCC)    • Hyperlipidemia    • PONV (postoperative nausea and vomiting)          SURGICAL HISTORY  Past Surgical History:   Procedure Laterality Date   • CARPAL TUNNEL RELEASE     • CHOLECYSTECTOMY  2018   • COLON SURGERY     • COLONOSCOPY N/A 2019    Procedure: COLONOSCOPY w/ biopsies;  Surgeon: Rita Goodman MD;  Location: [facility] OR;  Service: Gastroenterology   • ENDOSCOPY N/A 2019    Procedure: ESOPHAGOGASTRODUODENOSCOPY w/ biopsies;  Surgeon: Rita Goodman MD;  Location: [facility] OR;  Service: Gastroenterology   • EYE SURGERY     • SPINE SURGERY      C- 5,6,7 fused         FAMILY HISTORY  Family History   Problem Relation Age of Onset   • Breast cancer Sister    • Breast cancer Maternal Grandmother    • Colon cancer Neg Hx    • Colon polyps Neg Hx          SOCIAL HISTORY  Social History     Occupational History   • Not on file   Tobacco Use   • Smoking status: Never Smoker   • Smokeless tobacco: Never Used   Substance and Sexual  "Activity   • Alcohol use: Yes   • Drug use: No   • Sexual activity: Defer       Debilities/Disabilities Identified: None    Emotional Behavior: Appropriate    CURRENT MEDICATIONS    Current Outpatient Medications:   •  Cholecalciferol (VITAMIN D3) 5000 units capsule capsule, Take 5,000 Units by mouth Daily., Disp: , Rfl:   •  coenzyme Q10 100 MG capsule, Take 100 mg by mouth Daily., Disp: , Rfl:   •  Multiple Vitamin (MULTI VITAMIN DAILY PO), Take  by mouth., Disp: , Rfl:   •  omeprazole (priLOSEC) 20 MG capsule, Take 1 capsule by mouth Daily., Disp: 30 capsule, Rfl: 5  •  predniSONE (DELTASONE) 10 MG tablet, Take 2 tablets by mouth Daily., Disp: 30 tablet, Rfl: 1  •  predniSONE (DELTASONE) 5 MG tablet, Take 1 tablet by mouth 3 (Three) Times a Day., Disp: 90 tablet, Rfl: 2  •  simvastatin (ZOCOR) 20 MG tablet, Take  by mouth Daily., Disp: , Rfl: 2  •  Ustekinumab (STELARA) 90 MG/ML solution prefilled syringe Injection, Inject 90 mg under the skin into the appropriate area as directed Every 2 (Two) Months. EVERY 8 WEEKS, Disp: 1 syringe, Rfl: 6  •  vitamin B-12 (CYANOCOBALAMIN) 100 MCG tablet, Take 50 mcg by mouth Daily., Disp: , Rfl:     ALLERGIES  Asacol [mesalamine]; Azathioprine; Cephalosporins; Ciprofloxacin; Gabapentin; Influenza vaccines; Keflex [cephalexin]; and Other    VITALS  Vitals:    11/07/19 1128   Weight: 59.6 kg (131 lb 6.3 oz)   Height: 165.1 cm (65\")       LAST RESULTS   Hospital Outpatient Visit on 08/27/2019   Component Date Value Ref Range Status   • Creatinine 08/27/2019 0.80  0.60 - 1.30 mg/dL Final    Serial Number: [id number]:  [id number]     No results found.    PHYSICAL EXAM  Physical Exam   Constitutional: She is oriented to person, place, and time. She appears well-developed and well-nourished. No distress.   HENT:   Head: Normocephalic and atraumatic.   Mouth/Throat: Oropharynx is clear and moist.   Eyes: EOM are normal. Pupils are equal, round, and reactive to light.   Neck: Normal " range of motion. No tracheal deviation present.   Cardiovascular: Normal rate, regular rhythm, normal heart sounds and intact distal pulses. Exam reveals no gallop and no friction rub.   No murmur heard.  Pulmonary/Chest: Effort normal and breath sounds normal. No stridor. No respiratory distress. She has no wheezes. She has no rales. She exhibits no tenderness.   Abdominal: Soft. Bowel sounds are normal. She exhibits no distension. There is no tenderness. There is no rebound and no guarding.   bs noted, mild periumbilical pain, no rebound or guarding   Musculoskeletal: She exhibits no edema.   Lymphadenopathy:     She has no cervical adenopathy.   Neurological: She is alert and oriented to person, place, and time.   Skin: Skin is warm. She is not diaphoretic.   Psychiatric: She has a normal mood and affect. Her behavior is normal. Judgment and thought content normal.   Nursing note and vitals reviewed.      ASSESSMENT  Diagnoses and all orders for this visit:    Crohn's disease of small intestine with other complication (CMS/[facility])          PLAN  No Follow-up on file.    Will call Stelara and see if there are any other options for her. She does not qualify for patient assistance. She is on prednisone. She is having what sounds like intermittent obstructive symptoms. I am concerned that she is going to end up in surgery.  Will get IBD crohns renan/stelara labs  Will reach out to Dr. Albrecht and review her case and possible need for consultation

## 2019-11-12 ENCOUNTER — TELEPHONE (OUTPATIENT)
Dept: GASTROENTEROLOGY | Facility: CLINIC | Age: 74
End: 2019-11-12

## 2019-11-27 PROBLEM — E78.5 HYPERLIPIDEMIA: Status: ACTIVE | Noted: 2018-09-18

## 2019-11-27 PROBLEM — E53.8 VITAMIN B 12 DEFICIENCY: Status: ACTIVE | Noted: 2018-09-18

## 2019-11-27 PROBLEM — E11.9 TYPE 2 DIABETES MELLITUS WITHOUT COMPLICATION (HCC): Status: ACTIVE | Noted: 2018-09-18

## 2019-12-03 ENCOUNTER — OFFICE VISIT (OUTPATIENT)
Dept: SURGERY | Facility: CLINIC | Age: 74
End: 2019-12-03

## 2019-12-03 VITALS
HEART RATE: 77 BPM | DIASTOLIC BLOOD PRESSURE: 82 MMHG | OXYGEN SATURATION: 96 % | BODY MASS INDEX: 23.91 KG/M2 | HEIGHT: 65 IN | SYSTOLIC BLOOD PRESSURE: 156 MMHG | TEMPERATURE: 97.9 F | WEIGHT: 143.5 LBS

## 2019-12-03 DIAGNOSIS — K50.812 CROHN'S DISEASE OF BOTH SMALL AND LARGE INTESTINE WITH INTESTINAL OBSTRUCTION (HCC): ICD-10-CM

## 2019-12-03 DIAGNOSIS — K56.699 SMALL BOWEL STRICTURE (HCC): Primary | ICD-10-CM

## 2019-12-03 PROBLEM — K91.30 SMALL BOWEL ANASTOMOTIC STRICTURE: Status: ACTIVE | Noted: 2019-12-03

## 2019-12-03 PROBLEM — K91.89 SMALL BOWEL ANASTOMOTIC STRICTURE: Status: ACTIVE | Noted: 2019-12-03

## 2019-12-03 PROCEDURE — 99204 OFFICE O/P NEW MOD 45 MIN: CPT | Performed by: COLON & RECTAL SURGERY

## 2019-12-03 RX ORDER — METRONIDAZOLE 500 MG/1
TABLET ORAL
Qty: 3 TABLET | Refills: 0 | Status: SHIPPED | OUTPATIENT
Start: 2019-12-03 | End: 2019-12-04

## 2019-12-03 RX ORDER — ALVIMOPAN 12 MG/1
12 CAPSULE ORAL ONCE
Status: CANCELLED | OUTPATIENT
Start: 2019-12-09 | End: 2019-12-10

## 2019-12-03 RX ORDER — CLINDAMYCIN PHOSPHATE 900 MG/50ML
900 INJECTION INTRAVENOUS
Status: CANCELLED | OUTPATIENT
Start: 2019-12-09

## 2019-12-03 RX ORDER — ACETAMINOPHEN 500 MG
1000 TABLET ORAL EVERY 6 HOURS
Status: CANCELLED | OUTPATIENT
Start: 2019-12-09

## 2019-12-03 RX ORDER — CHLORHEXIDINE GLUCONATE 4 G/100ML
SOLUTION TOPICAL 2 TIMES DAILY
Qty: 236 ML | Refills: 0 | Status: SHIPPED | OUTPATIENT
Start: 2019-12-03 | End: 2019-12-04

## 2019-12-03 RX ORDER — CELECOXIB 100 MG/1
200 CAPSULE ORAL ONCE
Status: CANCELLED | OUTPATIENT
Start: 2019-12-09

## 2019-12-03 RX ORDER — NEOMYCIN SULFATE 500 MG/1
TABLET ORAL
Qty: 6 TABLET | Refills: 0 | Status: SHIPPED | OUTPATIENT
Start: 2019-12-03 | End: 2019-12-04

## 2019-12-03 NOTE — PROGRESS NOTES
She López is a 74 y.o. female who is seen as a consult at the request of Dr. Yang Goodman for abdominal pain    HPI:    Pt diagnosed with Crohn's in 2000  S/p open R hemicolectomy 2001 by Dr. Mack at [place]    If she has a flare-up, she will go a day without a BM, and then she has nausea and vomiting.  If she does not keep to a low-residue diet, her symptoms are worse    Usually she has 2 BMs per day  No blood per rectum    She had 1 dose Stelara in September.  Made her vomit  Previously, Entyvio made her her sick  Entocort did not help  She had antibodies to Humira  She had an allergy to asacol    She has been on and off of Prednisone since February, which has been helpful.  Current episode for past 8 weeks, currently 10mg/day    Christina Delacruz April 2018    FamHx: no known hx colon polyps or colon cancer, or IBD    Past Medical History:   Diagnosis Date   • Arthritis    • Wynn esophagus    • Cancer (CMS/HCC)     KERATOACANTHOMA TYPE OF SCC   • Cervical myelopathy (CMS/HCC) 08/15/2014   • Crohn's disease (CMS/HCC) 2000    FOLLOWED BY DR. 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 MISAEL NORTH AT [place]   • CHOLECYSTECTOMY N/A 04/12/2018    LAPAROSCOPIC, DR. VENICE DELACRUZ AT [place]   • COLONOSCOPY N/A 8/16/2019    ANAL SKIN TAGS, ANASTAMOSIS ULCERATED, INDURATED, AND NODULAR, INTERNAL HEMORRHOIDS, ANASTAMOSIS STRICTURE, DR. YANG GOODMAN AT [facility]   • COLONOSCOPY N/A 04/20/2010    PROCTITIS, RECTAL STENOSIS, ACTIVE CROHNS DISEASE AT ANASTAMOSIS, RIGHT COLON,  DR.GERARD AGRAWAL AT [place]   • ENDOSCOPY N/A 8/16/2019    GRADE A ESOPHAGITIS, SLIDING HIATAL HERNIA, EROSIVE GASTRITIS, Z LINE IRREGULAR, DR. YANG BIRD AT [facility]   • EYE SURGERY     • SMALL INTESTINE SURGERY N/A 10/01/2001    ILEOCOLECTOMY       Social History:   reports that she has never smoked. She has never used smokeless tobacco. She reports that she drinks alcohol. She reports that she does not use drugs.      Marriage status: Unknown    Family History   Problem Relation Age of Onset   • Breast cancer Sister    • Cancer Sister    • Breast cancer Maternal Grandmother    • Parkinsonism Mother    • Heart disease Father    • Colon cancer Neg Hx    • Colon polyps Neg Hx    • Malig Hyperthermia Neg Hx          Current Outpatient Medications:   •  omeprazole (priLOSEC) 20 MG capsule, Take 1 capsule by mouth Daily., Disp: 30 capsule, Rfl: 5  •  predniSONE (DELTASONE) 10 MG tablet, Take 2 tablets by mouth Daily. (Patient taking differently: Take 5 mg by mouth Daily.), Disp: 30 tablet, Rfl: 1  •  simvastatin (ZOCOR) 20 MG tablet, Take 20 mg by mouth Daily., Disp: , Rfl: 2  •  Chlorhexidine Gluconate Cloth 2 % pads, Apply  topically Take As Directed., Disp: , Rfl:   •  Cholecalciferol (VITAMIN D3) 5000 units capsule capsule, Take 5,000 Units by mouth Daily., Disp: , Rfl:   •  coenzyme Q10 100 MG capsule, Take 100 mg by mouth Daily., Disp: , Rfl:   •  Multiple Vitamin (MULTI VITAMIN DAILY PO), Take 1 tablet by mouth Daily., Disp: , Rfl:   •  vitamin B-12 (CYANOCOBALAMIN) 100 MCG tablet, Take 50 mcg by mouth Daily., Disp: , Rfl:     Allergy  Asacol [mesalamine]; Influenza vaccines; Keflex [cephalexin]; and Other    Review of Systems   Constitution: Negative for decreased appetite, weakness and weight gain.   HENT: Negative for congestion, hearing loss and hoarse voice.    Eyes: Negative for blurred vision, discharge and visual disturbance.   Cardiovascular: Negative for chest pain, cyanosis and leg swelling.    Respiratory: Negative for cough, shortness of breath, sleep disturbances due to breathing and snoring.    Endocrine: Negative for cold intolerance and heat intolerance.   Hematologic/Lymphatic: Does not bruise/bleed easily.   Skin: Negative for itching, poor wound healing and skin cancer.   Musculoskeletal: Positive for arthritis and joint pain. Negative for back pain and joint swelling.   Gastrointestinal: Positive for abdominal pain and change in bowel habit. Negative for bowel incontinence and constipation.   Genitourinary: Negative for bladder incontinence, dysuria and hematuria.   Neurological: Negative for brief paralysis, excessive daytime sleepiness, dizziness, focal weakness, headaches and light-headedness.   Psychiatric/Behavioral: Negative for altered mental status and hallucinations. The patient does not have insomnia.    Allergic/Immunologic: Negative for HIV exposure and persistent infections.   All other systems reviewed and are negative.      Vitals:    12/03/19 1039   BP: 156/82   Pulse: 77   Temp: 97.9 °F (36.6 °C)   SpO2: 96%     Body mass index is 23.88 kg/m².    Physical Exam   Constitutional: She is oriented to person, place, and time. She appears well-developed and well-nourished. No distress.   HENT:   Head: Normocephalic and atraumatic.   Nose: Nose normal.   Mouth/Throat: Oropharynx is clear and moist.   Eyes: Conjunctivae and EOM are normal. Pupils are equal, round, and reactive to light.   Neck: No tracheal deviation present.   Pulmonary/Chest: Effort normal and breath sounds normal. No respiratory distress.   Abdominal:   -s/nt/nd  -well-healed midline vertical incision   Musculoskeletal: Normal range of motion. She exhibits no edema or deformity.   Neurological: She is alert and oriented to person, place, and time. No cranial nerve deficit. Coordination and gait normal.   Skin: Skin is warm and dry.   Psychiatric: She has a normal mood and affect. Her behavior is normal. Judgment  normal.       Review of Medical Record:  I reviewed records CT a/p Aug 2019:small bowel luminal stricture    I reviewed colonoscopy records Dr. Goodman Aug 2019: unable to traverse anastomosis.    Discussed case with Dr. Goodman    Assessment:  1. Small bowel stricture (CMS/HCC)    2. Crohn's disease of both small and large intestine with intestinal obstruction (CMS/HCC)        Plan:    I recommended to patient a laparoscopic, possible open right hemicolectomy.  I discussed with them typical surgical time, hospital recovery, and home recovery.   I described to the patient risks, benefits, and alternatives. I discussed risks of surgery being heart attack, stroke, exacerbation of chronic medical illness, pneumonia, DVT, PTE, infection, bleeding, and injury to other organs during surgery. Patient expresses understanding and wishes to proceed.    Discussed she may require stress-dose of steroids perioperatively.    In the meantime, discussed she can eat what she wants, as long as she takes miralax.  She should prioritize proteins preop to improve postop healing.    Pt states she does not have a hx adverse rxn to gabapentin.      Scribed for Dorcas Albrecht MD by Dorcas Albrecht MD  12/3/2019     This patient was evaluated by me, recommendations made, documentation reviewed, edited, and revised by me, Dorcas Albrecht MD

## 2019-12-04 ENCOUNTER — APPOINTMENT (OUTPATIENT)
Dept: PREADMISSION TESTING | Facility: HOSPITAL | Age: 74
End: 2019-12-04

## 2019-12-04 VITALS
BODY MASS INDEX: 23.66 KG/M2 | OXYGEN SATURATION: 96 % | SYSTOLIC BLOOD PRESSURE: 142 MMHG | HEIGHT: 65 IN | HEART RATE: 74 BPM | TEMPERATURE: 98.3 F | RESPIRATION RATE: 18 BRPM | DIASTOLIC BLOOD PRESSURE: 78 MMHG | WEIGHT: 142 LBS

## 2019-12-04 DIAGNOSIS — K91.89 SMALL BOWEL ANASTOMOTIC STRICTURE: ICD-10-CM

## 2019-12-04 DIAGNOSIS — K91.30 SMALL BOWEL ANASTOMOTIC STRICTURE: ICD-10-CM

## 2019-12-04 LAB
ABO GROUP BLD: NORMAL
ANION GAP SERPL CALCULATED.3IONS-SCNC: 14.2 MMOL/L (ref 5–15)
BLD GP AB SCN SERPL QL: NEGATIVE
BUN BLD-MCNC: 9 MG/DL (ref 8–23)
BUN/CREAT SERPL: 11.1 (ref 7–25)
CALCIUM SPEC-SCNC: 8.9 MG/DL (ref 8.6–10.5)
CHLORIDE SERPL-SCNC: 102 MMOL/L (ref 98–107)
CO2 SERPL-SCNC: 24.8 MMOL/L (ref 22–29)
CREAT BLD-MCNC: 0.81 MG/DL (ref 0.57–1)
DEPRECATED RDW RBC AUTO: 43 FL (ref 37–54)
ERYTHROCYTE [DISTWIDTH] IN BLOOD BY AUTOMATED COUNT: 13.5 % (ref 12.3–15.4)
GFR SERPL CREATININE-BSD FRML MDRD: 70 ML/MIN/1.73
GLUCOSE BLD-MCNC: 117 MG/DL (ref 65–99)
HCT VFR BLD AUTO: 35.2 % (ref 34–46.6)
HGB BLD-MCNC: 11.5 G/DL (ref 12–15.9)
MCH RBC QN AUTO: 28.4 PG (ref 26.6–33)
MCHC RBC AUTO-ENTMCNC: 32.7 G/DL (ref 31.5–35.7)
MCV RBC AUTO: 86.9 FL (ref 79–97)
PLATELET # BLD AUTO: 389 10*3/MM3 (ref 140–450)
PMV BLD AUTO: 9.1 FL (ref 6–12)
POTASSIUM BLD-SCNC: 3.9 MMOL/L (ref 3.5–5.2)
RBC # BLD AUTO: 4.05 10*6/MM3 (ref 3.77–5.28)
RH BLD: POSITIVE
SODIUM BLD-SCNC: 141 MMOL/L (ref 136–145)
T&S EXPIRATION DATE: NORMAL
WBC NRBC COR # BLD: 8.95 10*3/MM3 (ref 3.4–10.8)

## 2019-12-04 PROCEDURE — 80048 BASIC METABOLIC PNL TOTAL CA: CPT | Performed by: COLON & RECTAL SURGERY

## 2019-12-04 PROCEDURE — 85027 COMPLETE CBC AUTOMATED: CPT | Performed by: COLON & RECTAL SURGERY

## 2019-12-04 PROCEDURE — 36415 COLL VENOUS BLD VENIPUNCTURE: CPT

## 2019-12-04 PROCEDURE — 86923 COMPATIBILITY TEST ELECTRIC: CPT

## 2019-12-04 PROCEDURE — 93010 ELECTROCARDIOGRAM REPORT: CPT | Performed by: INTERNAL MEDICINE

## 2019-12-04 PROCEDURE — 86900 BLOOD TYPING SEROLOGIC ABO: CPT | Performed by: PHYSICIAN ASSISTANT

## 2019-12-04 PROCEDURE — 86901 BLOOD TYPING SEROLOGIC RH(D): CPT | Performed by: PHYSICIAN ASSISTANT

## 2019-12-04 PROCEDURE — 86850 RBC ANTIBODY SCREEN: CPT | Performed by: PHYSICIAN ASSISTANT

## 2019-12-04 PROCEDURE — 93005 ELECTROCARDIOGRAM TRACING: CPT

## 2019-12-04 RX ORDER — CHLORHEXIDINE GLUCONATE 500 MG/1
CLOTH TOPICAL TAKE AS DIRECTED
Status: ON HOLD | COMMUNITY
End: 2019-12-09

## 2019-12-04 NOTE — DISCHARGE INSTRUCTIONS
Take the following medications the morning of surgery with a small sip of water: PRILOSEC    ARRIVE AT 1230         General Instructions:  • Do not eat solid food after midnight the night before surgery.  • You may drink clear liquids day of surgery but must stop at least one hour before your hospital arrival time.  • It is beneficial for you to have a clear drink that contains carbohydrates the day of surgery.  We suggest a 12 to 20 ounce bottle of Gatorade or Powerade for non-diabetic patients or a 12 to 20 ounce bottle of G2 or Powerade Zero for diabetic patients. (Pediatric patients, are not advised to drink a 12 to 20 ounce carbohydrate drink)    Clear liquids are liquids you can see through.  Nothing red in color.     Plain water                               Sports drinks  Sodas                                   Gelatin (Jell-O)  Fruit juices without pulp such as white grape juice and apple juice  Popsicles that contain no fruit or yogurt  Tea or coffee (no cream or milk added)  Gatorade / Powerade  G2 / Powerade Zero    • Infants may have breast milk up to four hours before surgery.  • Infants drinking formula may drink formula up to six hours before surgery.   • Patients who avoid smoking, chewing tobacco and alcohol for 4 weeks prior to surgery have a reduced risk of post-operative complications.  Quit smoking as many days before surgery as you can.  • Do not smoke, use chewing tobacco or drink alcohol the day of surgery.   • If applicable bring your C-PAP/ BI-PAP machine.  • Bring any papers given to you in the doctor’s office.  • Wear clean comfortable clothes.  • Do not wear contact lenses, false eyelashes or make-up.  Bring a case for your glasses.   • Bring crutches or walker if applicable.  • Remove all piercings.  Leave jewelry and any other valuables at home.  • Hair extensions with metal clips must be removed prior to surgery.  • The Pre-Admission Testing nurse will instruct you to bring  medications if unable to obtain an accurate list in Pre-Admission Testing.        If you were given a blood bank ID arm band remember to bring it with you the day of surgery.    Preventing a Surgical Site Infection:  • For 2 to 3 days before surgery, avoid shaving with a razor because the razor can irritate skin and make it easier to develop an infection.    • Any areas of open skin can increase the risk of a post-operative wound infection by allowing bacteria to enter and travel throughout the body.  Notify your surgeon if you have any skin wounds / rashes even if it is not near the expected surgical site.  The area will need assessed to determine if surgery should be delayed until it is healed.  • The night prior to surgery sleep in a clean bed with clean clothing.  Do not allow pets to sleep with you.  • Shower on the morning of surgery using a fresh bar of anti-bacterial soap (such as Dial) and clean washcloth.  Dry with a clean towel and dress in clean clothing.  • Ask your surgeon if you will be receiving antibiotics prior to surgery.  • Make sure you, your family, and all healthcare providers clean their hands with soap and water or an alcohol based hand  before caring for you or your wound.    Day of surgery:  Your arrival time is approximately two hours before your scheduled surgery time.  Upon arrival, a Pre-op nurse and Anesthesiologist will review your health history, obtain vital signs, and answer questions you may have.  The only belongings needed at this time will be a list of your home medications and if applicable your C-PAP/BI-PAP machine.  If you are staying overnight your family can leave the rest of your belongings in the car and bring them to your room later.  A Pre-op nurse will start an IV and you may receive medication in preparation for surgery, including something to help you relax.  Your family will be able to see you in the Pre-op area.  Two visitors at a time will be allowed in  the Pre-op room.  While you are in surgery your family should notify the waiting room  if they leave the waiting room area and provide a contact phone number.    Please be aware that surgery does come with discomfort.  We want to make every effort to control your discomfort so please discuss any uncontrolled symptoms with your nurse.   Your doctor will most likely have prescribed pain medications.      If you are going home after surgery you will receive individualized written care instructions before being discharged.  A responsible adult must drive you to and from the hospital on the day of your surgery and stay with you for 24 hours.    If you are staying overnight following surgery, you will be transported to your hospital room following the recovery period.  [facility] has all private rooms.    You have received a list of surgical assistants for your reference.  If you have any questions please call Pre-Admission Testing at [phone].  Deductibles and co-payments are collected on the day of service. Please be prepared to pay the required co-pay, deductible or deposit on the day of service as defined by your plan.  2% CHLORAHEXIDINE GLUCONATE* CLOTH  Preparing or “prepping” skin before surgery can reduce the risk of infection at the surgical site. To make the process easier, [facility] has chosen disposable cloths moistened with a rinse-free, 2% Chlorhexidine Gluconate (CHG) antiseptic solution. The steps below outline the prepping process and should be carefully followed.        Use the prep cloth on the area that is circled in the diagram             Directions Night before Surgery  1) Shower using a fresh bar of anti-bacterial soap (such as Dial) and clean washcloth.  Use a clean towel to completely dry your skin.  2) Do not use any lotions, oils or creams on your skin.  3) Open the package and remove 1 cloth, wipe your skin for 30 seconds in a circular motion.  Allow  to dry for 3 minutes.  4) Repeat #3 with second cloth.  5) Do not touch your eyes, ears, or mouth with the prep cloth.  6) Allow the wet prep solution to air dry.  7) Discard the prep cloth and wash your hands with soap and water.   8) Dress in clean bed clothes and sleep on fresh clean bed sheets.   9) You may experience some temporary itching after the prep.    Directions Day of Surgery  1) Repeat steps 1,2,3,4,5,6,7, and 9.   2) Dress in clean clothes before coming to the hospital.

## 2019-12-09 ENCOUNTER — ANESTHESIA EVENT (OUTPATIENT)
Dept: PERIOP | Facility: HOSPITAL | Age: 74
End: 2019-12-09

## 2019-12-09 ENCOUNTER — HOSPITAL ENCOUNTER (INPATIENT)
Facility: HOSPITAL | Age: 74
LOS: 19 days | Discharge: HOME HEALTH | End: 2019-12-28
Attending: COLON & RECTAL SURGERY | Admitting: COLON & RECTAL SURGERY

## 2019-12-09 ENCOUNTER — ANESTHESIA (OUTPATIENT)
Dept: PERIOP | Facility: HOSPITAL | Age: 74
End: 2019-12-09

## 2019-12-09 DIAGNOSIS — R19.8: ICD-10-CM

## 2019-12-09 DIAGNOSIS — K56.699 SMALL BOWEL STRICTURE (HCC): ICD-10-CM

## 2019-12-09 DIAGNOSIS — K91.30 SMALL BOWEL ANASTOMOTIC STRICTURE: ICD-10-CM

## 2019-12-09 DIAGNOSIS — K91.89 SMALL BOWEL ANASTOMOTIC STRICTURE: ICD-10-CM

## 2019-12-09 PROCEDURE — P9041 ALBUMIN (HUMAN),5%, 50ML: HCPCS | Performed by: NURSE ANESTHETIST, CERTIFIED REGISTERED

## 2019-12-09 PROCEDURE — 25010000002 ALBUMIN HUMAN 5% PER 50 ML: Performed by: COLON & RECTAL SURGERY

## 2019-12-09 PROCEDURE — 25010000002 ONDANSETRON PER 1 MG: Performed by: NURSE ANESTHETIST, CERTIFIED REGISTERED

## 2019-12-09 PROCEDURE — P9041 ALBUMIN (HUMAN),5%, 50ML: HCPCS | Performed by: COLON & RECTAL SURGERY

## 2019-12-09 PROCEDURE — 44160 REMOVAL OF COLON: CPT | Performed by: PHYSICIAN ASSISTANT

## 2019-12-09 PROCEDURE — 25010000002 LIDOCAINE PER 10 MG: Performed by: REGISTERED NURSE

## 2019-12-09 PROCEDURE — 25010000002 ALBUMIN HUMAN 5% PER 50 ML: Performed by: NURSE ANESTHETIST, CERTIFIED REGISTERED

## 2019-12-09 PROCEDURE — 88307 TISSUE EXAM BY PATHOLOGIST: CPT | Performed by: COLON & RECTAL SURGERY

## 2019-12-09 PROCEDURE — 25010000002 PROPOFOL 10 MG/ML EMULSION: Performed by: REGISTERED NURSE

## 2019-12-09 PROCEDURE — 0DJD4ZZ INSPECTION OF LOWER INTESTINAL TRACT, PERCUTANEOUS ENDOSCOPIC APPROACH: ICD-10-PCS | Performed by: COLON & RECTAL SURGERY

## 2019-12-09 PROCEDURE — 44160 REMOVAL OF COLON: CPT | Performed by: COLON & RECTAL SURGERY

## 2019-12-09 PROCEDURE — 25010000002 PHENYLEPHRINE PER 1 ML: Performed by: ANESTHESIOLOGY

## 2019-12-09 PROCEDURE — 25010000002 DEXAMETHASONE PER 1 MG: Performed by: REGISTERED NURSE

## 2019-12-09 PROCEDURE — 25010000002 MAGNESIUM SULFATE PER 500 MG OF MAGNESIUM: Performed by: REGISTERED NURSE

## 2019-12-09 PROCEDURE — 25010000002 FENTANYL CITRATE (PF) 100 MCG/2ML SOLUTION: Performed by: ANESTHESIOLOGY

## 2019-12-09 PROCEDURE — 25010000002 NEOSTIGMINE PER 0.5 MG: Performed by: NURSE ANESTHETIST, CERTIFIED REGISTERED

## 2019-12-09 PROCEDURE — 0DN80ZZ RELEASE SMALL INTESTINE, OPEN APPROACH: ICD-10-PCS | Performed by: COLON & RECTAL SURGERY

## 2019-12-09 PROCEDURE — 25010000003 BUPIVACAINE LIPOSOME 1.3 % SUSPENSION: Performed by: ANESTHESIOLOGY

## 2019-12-09 PROCEDURE — C1765 ADHESION BARRIER: HCPCS | Performed by: COLON & RECTAL SURGERY

## 2019-12-09 PROCEDURE — C9290 INJ, BUPIVACAINE LIPOSOME: HCPCS | Performed by: ANESTHESIOLOGY

## 2019-12-09 PROCEDURE — 0DTF0ZZ RESECTION OF RIGHT LARGE INTESTINE, OPEN APPROACH: ICD-10-PCS | Performed by: COLON & RECTAL SURGERY

## 2019-12-09 DEVICE — PROXIMATE LINEAR CUTTER RELOADS (STANDARD)-100MM
Type: IMPLANTABLE DEVICE | Site: ABDOMEN | Status: FUNCTIONAL
Brand: PROXIMATE

## 2019-12-09 DEVICE — PROXIMATE RELOADABLE LINEAR CUTTER WITH SAFETY LOCK-OUT, 100MM
Type: IMPLANTABLE DEVICE | Site: ABDOMEN | Status: FUNCTIONAL
Brand: PROXIMATE

## 2019-12-09 RX ORDER — MAGNESIUM HYDROXIDE 1200 MG/15ML
LIQUID ORAL AS NEEDED
Status: DISCONTINUED | OUTPATIENT
Start: 2019-12-09 | End: 2019-12-09 | Stop reason: HOSPADM

## 2019-12-09 RX ORDER — FAMOTIDINE 10 MG/ML
20 INJECTION, SOLUTION INTRAVENOUS EVERY 12 HOURS SCHEDULED
Status: DISCONTINUED | OUTPATIENT
Start: 2019-12-10 | End: 2019-12-12

## 2019-12-09 RX ORDER — SODIUM CHLORIDE, SODIUM LACTATE, POTASSIUM CHLORIDE, CALCIUM CHLORIDE 600; 310; 30; 20 MG/100ML; MG/100ML; MG/100ML; MG/100ML
9 INJECTION, SOLUTION INTRAVENOUS CONTINUOUS
Status: DISCONTINUED | OUTPATIENT
Start: 2019-12-09 | End: 2019-12-11

## 2019-12-09 RX ORDER — DIPHENHYDRAMINE HYDROCHLORIDE 50 MG/ML
25 INJECTION INTRAMUSCULAR; INTRAVENOUS EVERY 4 HOURS PRN
Status: DISCONTINUED | OUTPATIENT
Start: 2019-12-09 | End: 2019-12-09 | Stop reason: HOSPADM

## 2019-12-09 RX ORDER — CELECOXIB 100 MG/1
200 CAPSULE ORAL ONCE
Status: COMPLETED | OUTPATIENT
Start: 2019-12-09 | End: 2019-12-09

## 2019-12-09 RX ORDER — ONDANSETRON 2 MG/ML
INJECTION INTRAMUSCULAR; INTRAVENOUS AS NEEDED
Status: DISCONTINUED | OUTPATIENT
Start: 2019-12-09 | End: 2019-12-09 | Stop reason: SURG

## 2019-12-09 RX ORDER — HYDROMORPHONE HYDROCHLORIDE 1 MG/ML
0.25 INJECTION, SOLUTION INTRAMUSCULAR; INTRAVENOUS; SUBCUTANEOUS
Status: DISCONTINUED | OUTPATIENT
Start: 2019-12-09 | End: 2019-12-12

## 2019-12-09 RX ORDER — MIDAZOLAM HYDROCHLORIDE 1 MG/ML
2 INJECTION INTRAMUSCULAR; INTRAVENOUS
Status: DISCONTINUED | OUTPATIENT
Start: 2019-12-09 | End: 2019-12-09 | Stop reason: HOSPADM

## 2019-12-09 RX ORDER — EPHEDRINE SULFATE 50 MG/ML
INJECTION, SOLUTION INTRAVENOUS AS NEEDED
Status: DISCONTINUED | OUTPATIENT
Start: 2019-12-09 | End: 2019-12-09 | Stop reason: SURG

## 2019-12-09 RX ORDER — SODIUM CHLORIDE 0.9 % (FLUSH) 0.9 %
3-10 SYRINGE (ML) INJECTION AS NEEDED
Status: DISCONTINUED | OUTPATIENT
Start: 2019-12-09 | End: 2019-12-09 | Stop reason: HOSPADM

## 2019-12-09 RX ORDER — ALBUMIN, HUMAN INJ 5% 5 %
250 SOLUTION INTRAVENOUS ONCE
Status: COMPLETED | OUTPATIENT
Start: 2019-12-09 | End: 2019-12-09

## 2019-12-09 RX ORDER — OXYCODONE HYDROCHLORIDE 5 MG/1
5 TABLET ORAL ONCE AS NEEDED
Status: DISCONTINUED | OUTPATIENT
Start: 2019-12-09 | End: 2019-12-09 | Stop reason: HOSPADM

## 2019-12-09 RX ORDER — LIDOCAINE HYDROCHLORIDE 20 MG/ML
INJECTION, SOLUTION INFILTRATION; PERINEURAL AS NEEDED
Status: DISCONTINUED | OUTPATIENT
Start: 2019-12-09 | End: 2019-12-09 | Stop reason: SURG

## 2019-12-09 RX ORDER — MAGNESIUM SULFATE HEPTAHYDRATE 500 MG/ML
INJECTION, SOLUTION INTRAMUSCULAR; INTRAVENOUS AS NEEDED
Status: DISCONTINUED | OUTPATIENT
Start: 2019-12-09 | End: 2019-12-09 | Stop reason: SURG

## 2019-12-09 RX ORDER — PREDNISONE 1 MG/1
5 TABLET ORAL
Status: DISCONTINUED | OUTPATIENT
Start: 2019-12-10 | End: 2019-12-12

## 2019-12-09 RX ORDER — BUPIVACAINE HYDROCHLORIDE AND EPINEPHRINE 2.5; 5 MG/ML; UG/ML
INJECTION, SOLUTION EPIDURAL; INFILTRATION; INTRACAUDAL; PERINEURAL
Status: COMPLETED | OUTPATIENT
Start: 2019-12-09 | End: 2019-12-09

## 2019-12-09 RX ORDER — KETAMINE HYDROCHLORIDE 50 MG/ML
INJECTION, SOLUTION, CONCENTRATE INTRAMUSCULAR; INTRAVENOUS AS NEEDED
Status: DISCONTINUED | OUTPATIENT
Start: 2019-12-09 | End: 2019-12-09 | Stop reason: SURG

## 2019-12-09 RX ORDER — FENTANYL CITRATE 50 UG/ML
50 INJECTION, SOLUTION INTRAMUSCULAR; INTRAVENOUS
Status: DISCONTINUED | OUTPATIENT
Start: 2019-12-09 | End: 2019-12-09 | Stop reason: HOSPADM

## 2019-12-09 RX ORDER — LIDOCAINE HYDROCHLORIDE 10 MG/ML
0.5 INJECTION, SOLUTION EPIDURAL; INFILTRATION; INTRACAUDAL; PERINEURAL ONCE AS NEEDED
Status: DISCONTINUED | OUTPATIENT
Start: 2019-12-09 | End: 2019-12-09 | Stop reason: HOSPADM

## 2019-12-09 RX ORDER — DIPHENHYDRAMINE HCL 25 MG
25 CAPSULE ORAL EVERY 4 HOURS PRN
Status: DISCONTINUED | OUTPATIENT
Start: 2019-12-09 | End: 2019-12-09 | Stop reason: HOSPADM

## 2019-12-09 RX ORDER — HYDROMORPHONE HYDROCHLORIDE 1 MG/ML
0.25 INJECTION, SOLUTION INTRAMUSCULAR; INTRAVENOUS; SUBCUTANEOUS
Status: DISCONTINUED | OUTPATIENT
Start: 2019-12-09 | End: 2019-12-09 | Stop reason: HOSPADM

## 2019-12-09 RX ORDER — GLYCOPYRROLATE 0.2 MG/ML
INJECTION INTRAMUSCULAR; INTRAVENOUS AS NEEDED
Status: DISCONTINUED | OUTPATIENT
Start: 2019-12-09 | End: 2019-12-09 | Stop reason: SURG

## 2019-12-09 RX ORDER — METOCLOPRAMIDE HYDROCHLORIDE 5 MG/ML
10 INJECTION INTRAMUSCULAR; INTRAVENOUS ONCE AS NEEDED
Status: DISCONTINUED | OUTPATIENT
Start: 2019-12-09 | End: 2019-12-09 | Stop reason: HOSPADM

## 2019-12-09 RX ORDER — NALOXONE HCL 0.4 MG/ML
0.4 VIAL (ML) INJECTION
Status: DISCONTINUED | OUTPATIENT
Start: 2019-12-09 | End: 2019-12-09 | Stop reason: HOSPADM

## 2019-12-09 RX ORDER — FAMOTIDINE 10 MG/ML
20 INJECTION, SOLUTION INTRAVENOUS ONCE
Status: COMPLETED | OUTPATIENT
Start: 2019-12-09 | End: 2019-12-09

## 2019-12-09 RX ORDER — NITROGLYCERIN 0.4 MG/1
0.4 TABLET SUBLINGUAL
Status: DISCONTINUED | OUTPATIENT
Start: 2019-12-09 | End: 2019-12-28 | Stop reason: HOSPADM

## 2019-12-09 RX ORDER — ACETAMINOPHEN 500 MG
1000 TABLET ORAL EVERY 6 HOURS
Status: DISCONTINUED | OUTPATIENT
Start: 2019-12-09 | End: 2019-12-09 | Stop reason: HOSPADM

## 2019-12-09 RX ORDER — ALVIMOPAN 12 MG/1
12 CAPSULE ORAL 2 TIMES DAILY
Status: DISCONTINUED | OUTPATIENT
Start: 2019-12-10 | End: 2019-12-11

## 2019-12-09 RX ORDER — NALOXONE HCL 0.4 MG/ML
0.4 VIAL (ML) INJECTION
Status: DISCONTINUED | OUTPATIENT
Start: 2019-12-09 | End: 2019-12-28 | Stop reason: HOSPADM

## 2019-12-09 RX ORDER — ACETAMINOPHEN 10 MG/ML
1000 INJECTION, SOLUTION INTRAVENOUS ONCE
Status: COMPLETED | OUTPATIENT
Start: 2019-12-09 | End: 2019-12-09

## 2019-12-09 RX ORDER — CLINDAMYCIN PHOSPHATE 900 MG/50ML
900 INJECTION INTRAVENOUS
Status: COMPLETED | OUTPATIENT
Start: 2019-12-09 | End: 2019-12-09

## 2019-12-09 RX ORDER — SODIUM CHLORIDE, SODIUM LACTATE, POTASSIUM CHLORIDE, CALCIUM CHLORIDE 600; 310; 30; 20 MG/100ML; MG/100ML; MG/100ML; MG/100ML
INJECTION, SOLUTION INTRAVENOUS CONTINUOUS PRN
Status: DISCONTINUED | OUTPATIENT
Start: 2019-12-09 | End: 2019-12-09 | Stop reason: SURG

## 2019-12-09 RX ORDER — ONDANSETRON 2 MG/ML
4 INJECTION INTRAMUSCULAR; INTRAVENOUS ONCE AS NEEDED
Status: DISCONTINUED | OUTPATIENT
Start: 2019-12-09 | End: 2019-12-09 | Stop reason: HOSPADM

## 2019-12-09 RX ORDER — ALVIMOPAN 12 MG/1
12 CAPSULE ORAL ONCE
Status: COMPLETED | OUTPATIENT
Start: 2019-12-09 | End: 2019-12-09

## 2019-12-09 RX ORDER — CELECOXIB 200 MG/1
200 CAPSULE ORAL EVERY 12 HOURS SCHEDULED
Status: DISCONTINUED | OUTPATIENT
Start: 2019-12-10 | End: 2019-12-12

## 2019-12-09 RX ORDER — ALBUMIN, HUMAN INJ 5% 5 %
SOLUTION INTRAVENOUS CONTINUOUS PRN
Status: DISCONTINUED | OUTPATIENT
Start: 2019-12-09 | End: 2019-12-09 | Stop reason: SURG

## 2019-12-09 RX ORDER — BUPIVACAINE HYDROCHLORIDE AND EPINEPHRINE 2.5; 5 MG/ML; UG/ML
INJECTION, SOLUTION INFILTRATION; PERINEURAL AS NEEDED
Status: DISCONTINUED | OUTPATIENT
Start: 2019-12-09 | End: 2019-12-09 | Stop reason: HOSPADM

## 2019-12-09 RX ORDER — LIDOCAINE HYDROCHLORIDE ANHYDROUS AND DEXTROSE MONOHYDRATE 5; 400 G/100ML; MG/100ML
INJECTION, SOLUTION INTRAVENOUS CONTINUOUS PRN
Status: DISCONTINUED | OUTPATIENT
Start: 2019-12-09 | End: 2019-12-09 | Stop reason: SURG

## 2019-12-09 RX ORDER — GABAPENTIN 300 MG/1
300 CAPSULE ORAL 2 TIMES DAILY
Status: COMPLETED | OUTPATIENT
Start: 2019-12-10 | End: 2019-12-12

## 2019-12-09 RX ORDER — PROPOFOL 10 MG/ML
VIAL (ML) INTRAVENOUS AS NEEDED
Status: DISCONTINUED | OUTPATIENT
Start: 2019-12-09 | End: 2019-12-09 | Stop reason: SURG

## 2019-12-09 RX ORDER — SCOLOPAMINE TRANSDERMAL SYSTEM 1 MG/1
1 PATCH, EXTENDED RELEASE TRANSDERMAL
Status: DISCONTINUED | OUTPATIENT
Start: 2019-12-09 | End: 2019-12-12

## 2019-12-09 RX ORDER — DEXAMETHASONE SODIUM PHOSPHATE 4 MG/ML
INJECTION, SOLUTION INTRA-ARTICULAR; INTRALESIONAL; INTRAMUSCULAR; INTRAVENOUS; SOFT TISSUE AS NEEDED
Status: DISCONTINUED | OUTPATIENT
Start: 2019-12-09 | End: 2019-12-09 | Stop reason: SURG

## 2019-12-09 RX ORDER — ROCURONIUM BROMIDE 10 MG/ML
INJECTION, SOLUTION INTRAVENOUS AS NEEDED
Status: DISCONTINUED | OUTPATIENT
Start: 2019-12-09 | End: 2019-12-09 | Stop reason: SURG

## 2019-12-09 RX ORDER — MIDAZOLAM HYDROCHLORIDE 1 MG/ML
1 INJECTION INTRAMUSCULAR; INTRAVENOUS
Status: DISCONTINUED | OUTPATIENT
Start: 2019-12-09 | End: 2019-12-09 | Stop reason: HOSPADM

## 2019-12-09 RX ORDER — ACETAMINOPHEN 500 MG
1000 TABLET ORAL EVERY 6 HOURS
Status: DISCONTINUED | OUTPATIENT
Start: 2019-12-10 | End: 2019-12-12

## 2019-12-09 RX ORDER — SODIUM CHLORIDE, SODIUM LACTATE, POTASSIUM CHLORIDE, CALCIUM CHLORIDE 600; 310; 30; 20 MG/100ML; MG/100ML; MG/100ML; MG/100ML
100 INJECTION, SOLUTION INTRAVENOUS CONTINUOUS
Status: DISCONTINUED | OUTPATIENT
Start: 2019-12-09 | End: 2019-12-10

## 2019-12-09 RX ORDER — SODIUM CHLORIDE 0.9 % (FLUSH) 0.9 %
3 SYRINGE (ML) INJECTION EVERY 12 HOURS SCHEDULED
Status: DISCONTINUED | OUTPATIENT
Start: 2019-12-09 | End: 2019-12-09 | Stop reason: HOSPADM

## 2019-12-09 RX ORDER — ONDANSETRON 2 MG/ML
4 INJECTION INTRAMUSCULAR; INTRAVENOUS EVERY 6 HOURS PRN
Status: DISCONTINUED | OUTPATIENT
Start: 2019-12-09 | End: 2019-12-14

## 2019-12-09 RX ORDER — GABAPENTIN 300 MG/1
300 CAPSULE ORAL ONCE
Status: COMPLETED | OUTPATIENT
Start: 2019-12-09 | End: 2019-12-09

## 2019-12-09 RX ADMIN — PROPOFOL 120 MG: 10 INJECTION, EMULSION INTRAVENOUS at 14:43

## 2019-12-09 RX ADMIN — EPHEDRINE SULFATE 10 MG: 50 INJECTION INTRAVENOUS at 17:24

## 2019-12-09 RX ADMIN — ALVIMOPAN 12 MG: 12 CAPSULE ORAL at 13:42

## 2019-12-09 RX ADMIN — AZTREONAM 2 G: 2 INJECTION, POWDER, LYOPHILIZED, FOR SOLUTION INTRAMUSCULAR; INTRAVENOUS at 14:29

## 2019-12-09 RX ADMIN — ALBUMIN HUMAN: 0.05 INJECTION, SOLUTION INTRAVENOUS at 17:29

## 2019-12-09 RX ADMIN — BUPIVACAINE 20 ML: 13.3 INJECTION, SUSPENSION, LIPOSOMAL INFILTRATION at 14:47

## 2019-12-09 RX ADMIN — LIDOCAINE HYDROCHLORIDE ANHYDROUS AND DEXTROSE MONOHYDRATE 2 MG/MIN: .4; 5 INJECTION, SOLUTION INTRAVENOUS at 14:50

## 2019-12-09 RX ADMIN — KETAMINE HYDROCHLORIDE 10 MG: 50 INJECTION, SOLUTION INTRAMUSCULAR; INTRAVENOUS at 15:52

## 2019-12-09 RX ADMIN — CELECOXIB 200 MG: 200 CAPSULE ORAL at 14:12

## 2019-12-09 RX ADMIN — NEOSTIGMINE METHYLSULFATE 3 MG: 1 INJECTION INTRAMUSCULAR; INTRAVENOUS; SUBCUTANEOUS at 18:20

## 2019-12-09 RX ADMIN — SODIUM CHLORIDE, POTASSIUM CHLORIDE, SODIUM LACTATE AND CALCIUM CHLORIDE 50 ML/HR: 600; 310; 30; 20 INJECTION, SOLUTION INTRAVENOUS at 20:54

## 2019-12-09 RX ADMIN — PHENYLEPHRINE HYDROCHLORIDE 100 MCG: 10 INJECTION INTRAVENOUS at 17:43

## 2019-12-09 RX ADMIN — ACETAMINOPHEN 1000 MG: 500 TABLET, FILM COATED ORAL at 13:42

## 2019-12-09 RX ADMIN — PHENYLEPHRINE HYDROCHLORIDE 100 MCG: 10 INJECTION INTRAVENOUS at 15:18

## 2019-12-09 RX ADMIN — PHENYLEPHRINE HYDROCHLORIDE 100 MCG: 10 INJECTION INTRAVENOUS at 16:54

## 2019-12-09 RX ADMIN — ALBUMIN HUMAN 250 ML: 0.05 INJECTION, SOLUTION INTRAVENOUS at 19:04

## 2019-12-09 RX ADMIN — ACETAMINOPHEN 1000 MG: 10 INJECTION, SOLUTION INTRAVENOUS at 19:24

## 2019-12-09 RX ADMIN — SODIUM CHLORIDE, PRESERVATIVE FREE 10 ML: 5 INJECTION INTRAVENOUS at 14:22

## 2019-12-09 RX ADMIN — FENTANYL CITRATE 50 MCG: 50 INJECTION INTRAMUSCULAR; INTRAVENOUS at 18:22

## 2019-12-09 RX ADMIN — ROCURONIUM BROMIDE 50 MG: 10 INJECTION INTRAVENOUS at 14:43

## 2019-12-09 RX ADMIN — SODIUM CHLORIDE, POTASSIUM CHLORIDE, SODIUM LACTATE AND CALCIUM CHLORIDE 9 ML/HR: 600; 310; 30; 20 INJECTION, SOLUTION INTRAVENOUS at 14:24

## 2019-12-09 RX ADMIN — PHENYLEPHRINE HYDROCHLORIDE 100 MCG: 10 INJECTION INTRAVENOUS at 16:28

## 2019-12-09 RX ADMIN — LIDOCAINE HYDROCHLORIDE 60 MG: 20 INJECTION, SOLUTION INFILTRATION; PERINEURAL at 14:43

## 2019-12-09 RX ADMIN — PHENYLEPHRINE HYDROCHLORIDE 100 MCG: 10 INJECTION INTRAVENOUS at 15:25

## 2019-12-09 RX ADMIN — ROCURONIUM BROMIDE 10 MG: 10 INJECTION INTRAVENOUS at 16:00

## 2019-12-09 RX ADMIN — KETAMINE HYDROCHLORIDE 10 MG: 50 INJECTION, SOLUTION INTRAMUSCULAR; INTRAVENOUS at 17:43

## 2019-12-09 RX ADMIN — CLINDAMYCIN PHOSPHATE 900 MG: 18 INJECTION, SOLUTION INTRAMUSCULAR; INTRAVENOUS at 14:45

## 2019-12-09 RX ADMIN — PHENYLEPHRINE HYDROCHLORIDE 100 MCG: 10 INJECTION INTRAVENOUS at 16:18

## 2019-12-09 RX ADMIN — PHENYLEPHRINE HYDROCHLORIDE 100 MCG: 10 INJECTION INTRAVENOUS at 15:07

## 2019-12-09 RX ADMIN — PHENYLEPHRINE HYDROCHLORIDE 100 MCG: 10 INJECTION INTRAVENOUS at 15:39

## 2019-12-09 RX ADMIN — PHENYLEPHRINE HYDROCHLORIDE 100 MCG: 10 INJECTION INTRAVENOUS at 15:14

## 2019-12-09 RX ADMIN — SODIUM CHLORIDE, POTASSIUM CHLORIDE, SODIUM LACTATE AND CALCIUM CHLORIDE 9 ML/HR: 600; 310; 30; 20 INJECTION, SOLUTION INTRAVENOUS at 20:07

## 2019-12-09 RX ADMIN — KETAMINE HYDROCHLORIDE 30 MG: 50 INJECTION, SOLUTION INTRAMUSCULAR; INTRAVENOUS at 14:50

## 2019-12-09 RX ADMIN — FENTANYL CITRATE 50 MCG: 50 INJECTION INTRAMUSCULAR; INTRAVENOUS at 14:43

## 2019-12-09 RX ADMIN — PHENYLEPHRINE HYDROCHLORIDE 100 MCG: 10 INJECTION INTRAVENOUS at 17:24

## 2019-12-09 RX ADMIN — BUPIVACAINE HYDROCHLORIDE AND EPINEPHRINE 10 ML: 2.5; 5 INJECTION, SOLUTION EPIDURAL; INFILTRATION; INTRACAUDAL; PERINEURAL at 14:47

## 2019-12-09 RX ADMIN — KETAMINE HYDROCHLORIDE 10 MG: 50 INJECTION, SOLUTION INTRAMUSCULAR; INTRAVENOUS at 16:48

## 2019-12-09 RX ADMIN — PHENYLEPHRINE HYDROCHLORIDE 100 MCG: 10 INJECTION INTRAVENOUS at 16:04

## 2019-12-09 RX ADMIN — ONDANSETRON HYDROCHLORIDE 4 MG: 2 SOLUTION INTRAMUSCULAR; INTRAVENOUS at 18:17

## 2019-12-09 RX ADMIN — PHENYLEPHRINE HYDROCHLORIDE 100 MCG: 10 INJECTION INTRAVENOUS at 17:07

## 2019-12-09 RX ADMIN — FAMOTIDINE 20 MG: 10 INJECTION INTRAVENOUS at 14:23

## 2019-12-09 RX ADMIN — MAGNESIUM SULFATE HEPTAHYDRATE 2 G: 500 INJECTION, SOLUTION INTRAMUSCULAR; INTRAVENOUS at 14:58

## 2019-12-09 RX ADMIN — DEXAMETHASONE SODIUM PHOSPHATE 8 MG: 4 INJECTION INTRA-ARTICULAR; INTRALESIONAL; INTRAMUSCULAR; INTRAVENOUS; SOFT TISSUE at 14:58

## 2019-12-09 RX ADMIN — PHENYLEPHRINE HYDROCHLORIDE 100 MCG: 10 INJECTION INTRAVENOUS at 15:02

## 2019-12-09 RX ADMIN — GLYCOPYRROLATE 0.6 MG: 0.2 INJECTION INTRAMUSCULAR; INTRAVENOUS at 18:20

## 2019-12-09 RX ADMIN — GABAPENTIN 300 MG: 300 CAPSULE ORAL at 13:42

## 2019-12-09 RX ADMIN — EPHEDRINE SULFATE 10 MG: 50 INJECTION INTRAVENOUS at 16:28

## 2019-12-09 RX ADMIN — ROCURONIUM BROMIDE 10 MG: 10 INJECTION INTRAVENOUS at 17:43

## 2019-12-09 RX ADMIN — SODIUM CHLORIDE, POTASSIUM CHLORIDE, SODIUM LACTATE AND CALCIUM CHLORIDE: 600; 310; 30; 20 INJECTION, SOLUTION INTRAVENOUS at 14:43

## 2019-12-09 RX ADMIN — EPHEDRINE SULFATE 10 MG: 50 INJECTION INTRAVENOUS at 16:04

## 2019-12-09 RX ADMIN — PHENYLEPHRINE HYDROCHLORIDE 100 MCG: 10 INJECTION INTRAVENOUS at 16:44

## 2019-12-09 NOTE — ANESTHESIA PREPROCEDURE EVALUATION
Anesthesia Evaluation     Patient summary reviewed and Nursing notes reviewed   history of anesthetic complications: PONV               Airway   Mallampati: II  Neck ROM: limited  Dental      Pulmonary - negative pulmonary ROS   Cardiovascular     ECG reviewed  Rhythm: regular  Rate: normal    (+) hyperlipidemia,       Neuro/Psych  (+) numbness,     GI/Hepatic/Renal/Endo    (+)  hiatal hernia,  diabetes mellitus,     Musculoskeletal     Abdominal    Substance History - negative use     OB/GYN negative ob/gyn ROS         Other   arthritis,    history of cancer                    Anesthesia Plan    ASA 3     general with block   (Bilateral TAP blocks PSR for POPC with exparel    Patient requests extra cervical support secondary to chronic neck discomfort)  intravenous induction     Anesthetic plan, all risks, benefits, and alternatives have been provided, discussed and informed consent has been obtained with: patient.

## 2019-12-09 NOTE — ANESTHESIA PROCEDURE NOTES
Airway  Urgency: elective    Date/Time: 12/9/2019 2:45 PM  Airway not difficult    General Information and Staff    Patient location during procedure: OR  CRNA: Aicha Baires CRNA    Indications and Patient Condition  Indications for airway management: airway protection    Preoxygenated: yes  MILS maintained throughout  Mask difficulty assessment: 1 - vent by mask    Final Airway Details  Final airway type: endotracheal airway      Successful airway: ETT  Cuffed: yes   Successful intubation technique: direct laryngoscopy  Endotracheal tube insertion site: oral  Blade: Mcallister  Blade size: 2  ETT size (mm): 7.0  Cormack-Lehane Classification: grade I - full view of glottis  Placement verified by: chest auscultation and capnometry   Measured from: lips  ETT/EBT  to lips (cm): 20  Number of attempts at approach: 1  Assessment: lips, teeth, and gum same as pre-op and atraumatic intubation    Additional Comments  Atraumatic insertion

## 2019-12-09 NOTE — ANESTHESIA PROCEDURE NOTES
Peripheral Block    Pre-sedation assessment completed: 12/9/2019 2:47 PM    Patient reassessed immediately prior to procedure    Patient location during procedure: OR  Start time: 12/9/2019 2:47 PM  Stop time: 12/9/2019 2:53 PM  Reason for block: at surgeon's request and post-op pain management  Performed by  Anesthesiologist: Luan Mcdaniel MD  Preanesthetic Checklist  Completed: patient identified, site marked, surgical consent, pre-op evaluation, timeout performed, IV checked, risks and benefits discussed and monitors and equipment checked  Prep:  Sterile barriers:cap, gloves, gown, mask and sterile barriers  Prep: ChloraPrep  Patient monitoring: blood pressure monitoring, continuous pulse oximetry and EKG  Procedure  Sedation:yes    Guidance:ultrasound guided  ULTRASOUND INTERPRETATION. Using ultrasound guidance a 21 G gauge needle was placed in close proximity to the nerve, at which point, under ultrasound guidance anesthetic was injected in the area of the nerve and spread of the anesthesia was seen on ultrasound in close proximity thereto.  There were no abnormalities seen on ultrasound; a digital image was taken; and the patient tolerated the procedure with no complications. Images:still images obtained    Laterality:Bilateral  Block Type:TAP  Injection Technique:single-shot  Needle Type:echogenic  Needle Gauge:21 G      Medications Used: bupivacaine liposome (EXPAREL) 1.3 % injection, 20 mL  bupivacaine-EPINEPHrine PF (MARCAINE w/EPI) 0.25% -[id number] injection, 10 mL      Post Assessment  Injection Assessment: negative aspiration for heme, no paresthesia on injection and incremental injection  Patient Tolerance:comfortable throughout block  Complications:no

## 2019-12-10 LAB
ANION GAP SERPL CALCULATED.3IONS-SCNC: 13.5 MMOL/L (ref 5–15)
APTT PPP: 29.1 SECONDS (ref 22.7–35.4)
BASOPHILS # BLD AUTO: 0.02 10*3/MM3 (ref 0–0.2)
BASOPHILS NFR BLD AUTO: 0.3 % (ref 0–1.5)
BUN BLD-MCNC: 7 MG/DL (ref 8–23)
BUN/CREAT SERPL: 9 (ref 7–25)
CALCIUM SPEC-SCNC: 7.9 MG/DL (ref 8.6–10.5)
CHLORIDE SERPL-SCNC: 101 MMOL/L (ref 98–107)
CO2 SERPL-SCNC: 22.5 MMOL/L (ref 22–29)
CREAT BLD-MCNC: 0.78 MG/DL (ref 0.57–1)
DEPRECATED RDW RBC AUTO: 42.9 FL (ref 37–54)
EOSINOPHIL # BLD AUTO: 0 10*3/MM3 (ref 0–0.4)
EOSINOPHIL NFR BLD AUTO: 0 % (ref 0.3–6.2)
ERYTHROCYTE [DISTWIDTH] IN BLOOD BY AUTOMATED COUNT: 13.2 % (ref 12.3–15.4)
GFR SERPL CREATININE-BSD FRML MDRD: 73 ML/MIN/1.73
GLUCOSE BLD-MCNC: 167 MG/DL (ref 65–99)
HCT VFR BLD AUTO: 29 % (ref 34–46.6)
HGB BLD-MCNC: 9.4 G/DL (ref 12–15.9)
IMM GRANULOCYTES # BLD AUTO: 0.01 10*3/MM3 (ref 0–0.05)
IMM GRANULOCYTES NFR BLD AUTO: 0.2 % (ref 0–0.5)
INR PPP: 1.07 (ref 0.9–1.1)
LYMPHOCYTES # BLD AUTO: 0.27 10*3/MM3 (ref 0.7–3.1)
LYMPHOCYTES NFR BLD AUTO: 4.7 % (ref 19.6–45.3)
MAGNESIUM SERPL-MCNC: 1.8 MG/DL (ref 1.6–2.4)
MCH RBC QN AUTO: 28.7 PG (ref 26.6–33)
MCHC RBC AUTO-ENTMCNC: 32.4 G/DL (ref 31.5–35.7)
MCV RBC AUTO: 88.7 FL (ref 79–97)
MONOCYTES # BLD AUTO: 0.28 10*3/MM3 (ref 0.1–0.9)
MONOCYTES NFR BLD AUTO: 4.9 % (ref 5–12)
NEUTROPHILS # BLD AUTO: 5.18 10*3/MM3 (ref 1.7–7)
NEUTROPHILS NFR BLD AUTO: 89.9 % (ref 42.7–76)
NRBC BLD AUTO-RTO: 0 /100 WBC (ref 0–0.2)
PLATELET # BLD AUTO: 271 10*3/MM3 (ref 140–450)
PMV BLD AUTO: 9.3 FL (ref 6–12)
POTASSIUM BLD-SCNC: 4.2 MMOL/L (ref 3.5–5.2)
PROTHROMBIN TIME: 13.7 SECONDS (ref 11.7–14.2)
RBC # BLD AUTO: 3.27 10*6/MM3 (ref 3.77–5.28)
SODIUM BLD-SCNC: 137 MMOL/L (ref 136–145)
WBC NRBC COR # BLD: 5.76 10*3/MM3 (ref 3.4–10.8)

## 2019-12-10 PROCEDURE — 83735 ASSAY OF MAGNESIUM: CPT | Performed by: COLON & RECTAL SURGERY

## 2019-12-10 PROCEDURE — 97162 PT EVAL MOD COMPLEX 30 MIN: CPT

## 2019-12-10 PROCEDURE — 36430 TRANSFUSION BLD/BLD COMPNT: CPT

## 2019-12-10 PROCEDURE — 86900 BLOOD TYPING SEROLOGIC ABO: CPT

## 2019-12-10 PROCEDURE — P9016 RBC LEUKOCYTES REDUCED: HCPCS

## 2019-12-10 PROCEDURE — 63710000001 PREDNISONE PER 5 MG: Performed by: COLON & RECTAL SURGERY

## 2019-12-10 PROCEDURE — 85730 THROMBOPLASTIN TIME PARTIAL: CPT | Performed by: COLON & RECTAL SURGERY

## 2019-12-10 PROCEDURE — 25010000002 HYDROMORPHONE PER 4 MG: Performed by: COLON & RECTAL SURGERY

## 2019-12-10 PROCEDURE — 85610 PROTHROMBIN TIME: CPT | Performed by: COLON & RECTAL SURGERY

## 2019-12-10 PROCEDURE — 25010000002 ALBUMIN HUMAN 5% PER 50 ML: Performed by: COLON & RECTAL SURGERY

## 2019-12-10 PROCEDURE — 25010000002 ONDANSETRON PER 1 MG: Performed by: COLON & RECTAL SURGERY

## 2019-12-10 PROCEDURE — 80048 BASIC METABOLIC PNL TOTAL CA: CPT | Performed by: COLON & RECTAL SURGERY

## 2019-12-10 PROCEDURE — 97110 THERAPEUTIC EXERCISES: CPT

## 2019-12-10 PROCEDURE — P9041 ALBUMIN (HUMAN),5%, 50ML: HCPCS | Performed by: COLON & RECTAL SURGERY

## 2019-12-10 PROCEDURE — 86901 BLOOD TYPING SEROLOGIC RH(D): CPT

## 2019-12-10 PROCEDURE — 85025 COMPLETE CBC W/AUTO DIFF WBC: CPT | Performed by: COLON & RECTAL SURGERY

## 2019-12-10 RX ORDER — MAGNESIUM SULFATE HEPTAHYDRATE 40 MG/ML
2 INJECTION, SOLUTION INTRAVENOUS AS NEEDED
Status: DISCONTINUED | OUTPATIENT
Start: 2019-12-10 | End: 2019-12-28 | Stop reason: HOSPADM

## 2019-12-10 RX ORDER — MAGNESIUM SULFATE HEPTAHYDRATE 40 MG/ML
4 INJECTION, SOLUTION INTRAVENOUS AS NEEDED
Status: DISCONTINUED | OUTPATIENT
Start: 2019-12-10 | End: 2019-12-28 | Stop reason: HOSPADM

## 2019-12-10 RX ORDER — ALBUMIN, HUMAN INJ 5% 5 %
500 SOLUTION INTRAVENOUS ONCE
Status: DISCONTINUED | OUTPATIENT
Start: 2019-12-10 | End: 2019-12-10

## 2019-12-10 RX ORDER — SODIUM CHLORIDE, SODIUM LACTATE, POTASSIUM CHLORIDE, CALCIUM CHLORIDE 600; 310; 30; 20 MG/100ML; MG/100ML; MG/100ML; MG/100ML
50 INJECTION, SOLUTION INTRAVENOUS CONTINUOUS
Status: DISCONTINUED | OUTPATIENT
Start: 2019-12-10 | End: 2019-12-11

## 2019-12-10 RX ORDER — ALBUMIN, HUMAN INJ 5% 5 %
500 SOLUTION INTRAVENOUS ONCE
Status: COMPLETED | OUTPATIENT
Start: 2019-12-10 | End: 2019-12-10

## 2019-12-10 RX ADMIN — FAMOTIDINE 20 MG: 10 INJECTION INTRAVENOUS at 01:06

## 2019-12-10 RX ADMIN — ACETAMINOPHEN 1000 MG: 500 TABLET, FILM COATED ORAL at 19:28

## 2019-12-10 RX ADMIN — ALBUMIN HUMAN 500 ML: 0.05 INJECTION, SOLUTION INTRAVENOUS at 09:12

## 2019-12-10 RX ADMIN — ALVIMOPAN 12 MG: 12 CAPSULE ORAL at 20:30

## 2019-12-10 RX ADMIN — FAMOTIDINE 20 MG: 10 INJECTION INTRAVENOUS at 16:03

## 2019-12-10 RX ADMIN — ACETAMINOPHEN 1000 MG: 500 TABLET, FILM COATED ORAL at 01:05

## 2019-12-10 RX ADMIN — SODIUM CHLORIDE, POTASSIUM CHLORIDE, SODIUM LACTATE AND CALCIUM CHLORIDE 50 ML/HR: 600; 310; 30; 20 INJECTION, SOLUTION INTRAVENOUS at 21:56

## 2019-12-10 RX ADMIN — ALVIMOPAN 12 MG: 12 CAPSULE ORAL at 09:12

## 2019-12-10 RX ADMIN — HYDROMORPHONE HYDROCHLORIDE 0.25 MG: 1 INJECTION, SOLUTION INTRAMUSCULAR; INTRAVENOUS; SUBCUTANEOUS at 09:40

## 2019-12-10 RX ADMIN — CELECOXIB 200 MG: 200 CAPSULE ORAL at 20:30

## 2019-12-10 RX ADMIN — MAGNESIUM SULFATE 4 G: 4 INJECTION INTRAVENOUS at 09:26

## 2019-12-10 RX ADMIN — CELECOXIB 200 MG: 200 CAPSULE ORAL at 09:12

## 2019-12-10 RX ADMIN — GABAPENTIN 300 MG: 300 CAPSULE ORAL at 01:06

## 2019-12-10 RX ADMIN — ACETAMINOPHEN 1000 MG: 500 TABLET, FILM COATED ORAL at 12:21

## 2019-12-10 RX ADMIN — ACETAMINOPHEN 1000 MG: 500 TABLET, FILM COATED ORAL at 06:36

## 2019-12-10 RX ADMIN — ONDANSETRON 4 MG: 2 INJECTION INTRAMUSCULAR; INTRAVENOUS at 06:41

## 2019-12-10 RX ADMIN — PREDNISONE 5 MG: 5 TABLET ORAL at 09:12

## 2019-12-10 RX ADMIN — GABAPENTIN 300 MG: 300 CAPSULE ORAL at 20:30

## 2019-12-10 RX ADMIN — GABAPENTIN 300 MG: 300 CAPSULE ORAL at 09:54

## 2019-12-10 NOTE — ANESTHESIA POSTPROCEDURE EVALUATION
"Patient: She López    Procedure Summary     Date:  12/09/19 Room / Location:  [facility] OR  / [facility] MAIN OR    Anesthesia Start:  1435 Anesthesia Stop:  1837    Procedure:  laparoscopic to open right hemicolectomy (N/A Abdomen) Diagnosis:       Small bowel anastomotic stricture      (Small bowel anastomotic stricture [K91.89])    Surgeon:  Dorcas Albrecht MD Provider:  Jersey Dalal MD    Anesthesia Type:  general with block ASA Status:  3          Anesthesia Type: general with block    Vitals  Vitals Value Taken Time   /53 12/9/2019  8:00 PM   Temp 36.6 °C (97.9 °F) 12/9/2019  7:30 PM   Pulse 66 12/9/2019  8:02 PM   Resp 20 12/9/2019  7:30 PM   SpO2 98 % 12/9/2019  8:02 PM   Vitals shown include unvalidated device data.        Post Anesthesia Care and Evaluation    Patient location during evaluation: PACU  Patient participation: complete - patient participated  Level of consciousness: awake  Pain management: adequate  Airway patency: patent  Anesthetic complications: No anesthetic complications    Cardiovascular status: acceptable  Respiratory status: acceptable  Hydration status: acceptable    Comments: BP (!) 87/53   Pulse 67   Temp 36.6 °C (97.9 °F) (Oral)   Resp 20   Ht 165.1 cm (65\")   Wt 62.6 kg (138 lb)   LMP  (LMP Unknown)   SpO2 98%   BMI 22.96 kg/m²         "

## 2019-12-11 LAB
ABO + RH BLD: NORMAL
ALBUMIN SERPL-MCNC: 2.7 G/DL (ref 3.5–5.2)
ANION GAP SERPL CALCULATED.3IONS-SCNC: 9.2 MMOL/L (ref 5–15)
BASOPHILS # BLD MANUAL: 0.12 10*3/MM3 (ref 0–0.2)
BASOPHILS NFR BLD AUTO: 1 % (ref 0–1.5)
BH BB BLOOD EXPIRATION DATE: NORMAL
BH BB BLOOD TYPE BARCODE: 6200
BH BB DISPENSE STATUS: NORMAL
BH BB PRODUCT CODE: NORMAL
BH BB UNIT NUMBER: NORMAL
BUN BLD-MCNC: 8 MG/DL (ref 8–23)
BUN/CREAT SERPL: 10.5 (ref 7–25)
CALCIUM SPEC-SCNC: 8.1 MG/DL (ref 8.6–10.5)
CHLORIDE SERPL-SCNC: 100 MMOL/L (ref 98–107)
CO2 SERPL-SCNC: 24.8 MMOL/L (ref 22–29)
CREAT BLD-MCNC: 0.76 MG/DL (ref 0.57–1)
CYTO UR: NORMAL
DEPRECATED RDW RBC AUTO: 42.8 FL (ref 37–54)
ERYTHROCYTE [DISTWIDTH] IN BLOOD BY AUTOMATED COUNT: 13.6 % (ref 12.3–15.4)
GFR SERPL CREATININE-BSD FRML MDRD: 75 ML/MIN/1.73
GLUCOSE BLD-MCNC: 117 MG/DL (ref 65–99)
HCT VFR BLD AUTO: 27.9 % (ref 34–46.6)
HGB BLD-MCNC: 9.3 G/DL (ref 12–15.9)
LAB AP CASE REPORT: NORMAL
LYMPHOCYTES # BLD MANUAL: 0.5 10*3/MM3 (ref 0.7–3.1)
LYMPHOCYTES NFR BLD MANUAL: 2 % (ref 5–12)
LYMPHOCYTES NFR BLD MANUAL: 4 % (ref 19.6–45.3)
MAGNESIUM SERPL-MCNC: 2.4 MG/DL (ref 1.6–2.4)
MCH RBC QN AUTO: 28.7 PG (ref 26.6–33)
MCHC RBC AUTO-ENTMCNC: 33.3 G/DL (ref 31.5–35.7)
MCV RBC AUTO: 86.1 FL (ref 79–97)
MONOCYTES # BLD AUTO: 0.25 10*3/MM3 (ref 0.1–0.9)
NEUTROPHILS # BLD AUTO: 11.53 10*3/MM3 (ref 1.7–7)
NEUTROPHILS NFR BLD MANUAL: 93 % (ref 42.7–76)
PATH REPORT.FINAL DX SPEC: NORMAL
PATH REPORT.GROSS SPEC: NORMAL
PHOSPHATE SERPL-MCNC: 2.4 MG/DL (ref 2.5–4.5)
PLAT MORPH BLD: NORMAL
PLATELET # BLD AUTO: 284 10*3/MM3 (ref 140–450)
PMV BLD AUTO: 9.3 FL (ref 6–12)
POTASSIUM BLD-SCNC: 4.1 MMOL/L (ref 3.5–5.2)
RBC # BLD AUTO: 3.24 10*6/MM3 (ref 3.77–5.28)
RBC MORPH BLD: NORMAL
SODIUM BLD-SCNC: 134 MMOL/L (ref 136–145)
UNIT  ABO: NORMAL
UNIT  RH: NORMAL
WBC MORPH BLD: NORMAL
WBC NRBC COR # BLD: 12.4 10*3/MM3 (ref 3.4–10.8)

## 2019-12-11 PROCEDURE — 97110 THERAPEUTIC EXERCISES: CPT

## 2019-12-11 PROCEDURE — 63710000001 PREDNISONE PER 5 MG: Performed by: COLON & RECTAL SURGERY

## 2019-12-11 PROCEDURE — 83735 ASSAY OF MAGNESIUM: CPT | Performed by: PHYSICIAN ASSISTANT

## 2019-12-11 PROCEDURE — 85025 COMPLETE CBC W/AUTO DIFF WBC: CPT | Performed by: PHYSICIAN ASSISTANT

## 2019-12-11 PROCEDURE — 80069 RENAL FUNCTION PANEL: CPT | Performed by: PHYSICIAN ASSISTANT

## 2019-12-11 PROCEDURE — 85007 BL SMEAR W/DIFF WBC COUNT: CPT | Performed by: PHYSICIAN ASSISTANT

## 2019-12-11 PROCEDURE — 25010000002 ONDANSETRON PER 1 MG: Performed by: COLON & RECTAL SURGERY

## 2019-12-11 RX ORDER — DEXTROSE, SODIUM CHLORIDE, AND POTASSIUM CHLORIDE 5; .45; .15 G/100ML; G/100ML; G/100ML
50 INJECTION INTRAVENOUS CONTINUOUS
Status: DISCONTINUED | OUTPATIENT
Start: 2019-12-11 | End: 2019-12-12

## 2019-12-11 RX ADMIN — SODIUM PHOSPHATE, MONOBASIC, MONOHYDRATE 20 MMOL: 276; 142 INJECTION, SOLUTION INTRAVENOUS at 10:44

## 2019-12-11 RX ADMIN — GABAPENTIN 300 MG: 300 CAPSULE ORAL at 20:23

## 2019-12-11 RX ADMIN — ALVIMOPAN 12 MG: 12 CAPSULE ORAL at 08:56

## 2019-12-11 RX ADMIN — GABAPENTIN 300 MG: 300 CAPSULE ORAL at 08:56

## 2019-12-11 RX ADMIN — ACETAMINOPHEN 1000 MG: 500 TABLET, FILM COATED ORAL at 12:13

## 2019-12-11 RX ADMIN — FAMOTIDINE 20 MG: 10 INJECTION INTRAVENOUS at 14:51

## 2019-12-11 RX ADMIN — ONDANSETRON 4 MG: 2 INJECTION INTRAMUSCULAR; INTRAVENOUS at 08:49

## 2019-12-11 RX ADMIN — PREDNISONE 5 MG: 5 TABLET ORAL at 08:56

## 2019-12-11 RX ADMIN — ACETAMINOPHEN 1000 MG: 500 TABLET, FILM COATED ORAL at 01:33

## 2019-12-11 RX ADMIN — ACETAMINOPHEN 1000 MG: 500 TABLET, FILM COATED ORAL at 08:55

## 2019-12-11 RX ADMIN — CELECOXIB 200 MG: 200 CAPSULE ORAL at 20:23

## 2019-12-11 RX ADMIN — CELECOXIB 200 MG: 200 CAPSULE ORAL at 08:56

## 2019-12-11 RX ADMIN — POTASSIUM CHLORIDE, DEXTROSE MONOHYDRATE AND SODIUM CHLORIDE 50 ML/HR: 150; 5; 450 INJECTION, SOLUTION INTRAVENOUS at 09:01

## 2019-12-11 RX ADMIN — ACETAMINOPHEN 1000 MG: 500 TABLET, FILM COATED ORAL at 18:41

## 2019-12-11 RX ADMIN — FAMOTIDINE 20 MG: 10 INJECTION INTRAVENOUS at 01:33

## 2019-12-12 LAB
ALBUMIN SERPL-MCNC: 3 G/DL (ref 3.5–5.2)
ANION GAP SERPL CALCULATED.3IONS-SCNC: 10.6 MMOL/L (ref 5–15)
BASOPHILS # BLD MANUAL: 0.14 10*3/MM3 (ref 0–0.2)
BASOPHILS NFR BLD AUTO: 2 % (ref 0–1.5)
BUN BLD-MCNC: 10 MG/DL (ref 8–23)
BUN/CREAT SERPL: 13.9 (ref 7–25)
CALCIUM SPEC-SCNC: 9 MG/DL (ref 8.6–10.5)
CHLORIDE SERPL-SCNC: 100 MMOL/L (ref 98–107)
CO2 SERPL-SCNC: 23.4 MMOL/L (ref 22–29)
CREAT BLD-MCNC: 0.72 MG/DL (ref 0.57–1)
DEPRECATED RDW RBC AUTO: 41.6 FL (ref 37–54)
EOSINOPHIL # BLD MANUAL: 0.07 10*3/MM3 (ref 0–0.4)
EOSINOPHIL NFR BLD MANUAL: 1 % (ref 0.3–6.2)
ERYTHROCYTE [DISTWIDTH] IN BLOOD BY AUTOMATED COUNT: 13.3 % (ref 12.3–15.4)
GFR SERPL CREATININE-BSD FRML MDRD: 80 ML/MIN/1.73
GLUCOSE BLD-MCNC: 140 MG/DL (ref 65–99)
HCT VFR BLD AUTO: 28.3 % (ref 34–46.6)
HGB BLD-MCNC: 9.4 G/DL (ref 12–15.9)
LYMPHOCYTES # BLD MANUAL: 0.42 10*3/MM3 (ref 0.7–3.1)
LYMPHOCYTES NFR BLD MANUAL: 6 % (ref 19.6–45.3)
MCH RBC QN AUTO: 28.9 PG (ref 26.6–33)
MCHC RBC AUTO-ENTMCNC: 33.2 G/DL (ref 31.5–35.7)
MCV RBC AUTO: 87.1 FL (ref 79–97)
NEUTROPHILS # BLD AUTO: 6.33 10*3/MM3 (ref 1.7–7)
NEUTROPHILS NFR BLD MANUAL: 91 % (ref 42.7–76)
PHOSPHATE SERPL-MCNC: 2.3 MG/DL (ref 2.5–4.5)
PLAT MORPH BLD: NORMAL
PLATELET # BLD AUTO: 287 10*3/MM3 (ref 140–450)
PMV BLD AUTO: 9.3 FL (ref 6–12)
POTASSIUM BLD-SCNC: 3.8 MMOL/L (ref 3.5–5.2)
RBC # BLD AUTO: 3.25 10*6/MM3 (ref 3.77–5.28)
RBC MORPH BLD: NORMAL
SODIUM BLD-SCNC: 134 MMOL/L (ref 136–145)
WBC MORPH BLD: NORMAL
WBC NRBC COR # BLD: 6.96 10*3/MM3 (ref 3.4–10.8)

## 2019-12-12 PROCEDURE — 85007 BL SMEAR W/DIFF WBC COUNT: CPT | Performed by: PHYSICIAN ASSISTANT

## 2019-12-12 PROCEDURE — 25010000002 ONDANSETRON PER 1 MG: Performed by: COLON & RECTAL SURGERY

## 2019-12-12 PROCEDURE — 25010000002 HYDROMORPHONE PER 4 MG: Performed by: COLON & RECTAL SURGERY

## 2019-12-12 PROCEDURE — 80069 RENAL FUNCTION PANEL: CPT | Performed by: PHYSICIAN ASSISTANT

## 2019-12-12 PROCEDURE — 63710000001 PREDNISONE PER 5 MG: Performed by: COLON & RECTAL SURGERY

## 2019-12-12 PROCEDURE — 85025 COMPLETE CBC W/AUTO DIFF WBC: CPT | Performed by: PHYSICIAN ASSISTANT

## 2019-12-12 PROCEDURE — 93010 ELECTROCARDIOGRAM REPORT: CPT | Performed by: INTERNAL MEDICINE

## 2019-12-12 PROCEDURE — 93005 ELECTROCARDIOGRAM TRACING: CPT | Performed by: PHYSICIAN ASSISTANT

## 2019-12-12 RX ORDER — OXYCODONE HYDROCHLORIDE AND ACETAMINOPHEN 5; 325 MG/1; MG/1
1 TABLET ORAL EVERY 6 HOURS PRN
Status: DISCONTINUED | OUTPATIENT
Start: 2019-12-12 | End: 2019-12-12

## 2019-12-12 RX ORDER — HYDROMORPHONE HYDROCHLORIDE 1 MG/ML
0.2 INJECTION, SOLUTION INTRAMUSCULAR; INTRAVENOUS; SUBCUTANEOUS EVERY 4 HOURS PRN
Status: DISCONTINUED | OUTPATIENT
Start: 2019-12-12 | End: 2019-12-15

## 2019-12-12 RX ORDER — ACETAMINOPHEN 10 MG/ML
1000 INJECTION, SOLUTION INTRAVENOUS EVERY 6 HOURS SCHEDULED
Status: DISCONTINUED | OUTPATIENT
Start: 2019-12-12 | End: 2019-12-27

## 2019-12-12 RX ORDER — PANTOPRAZOLE SODIUM 40 MG/1
40 TABLET, DELAYED RELEASE ORAL EVERY MORNING
Status: DISCONTINUED | OUTPATIENT
Start: 2019-12-12 | End: 2019-12-12

## 2019-12-12 RX ORDER — OXYCODONE HYDROCHLORIDE AND ACETAMINOPHEN 5; 325 MG/1; MG/1
2 TABLET ORAL EVERY 6 HOURS PRN
Status: DISCONTINUED | OUTPATIENT
Start: 2019-12-12 | End: 2019-12-12

## 2019-12-12 RX ORDER — FAMOTIDINE 10 MG/ML
20 INJECTION, SOLUTION INTRAVENOUS 2 TIMES DAILY
Status: DISCONTINUED | OUTPATIENT
Start: 2019-12-12 | End: 2019-12-16 | Stop reason: ALTCHOICE

## 2019-12-12 RX ORDER — GABAPENTIN 300 MG/1
300 CAPSULE ORAL 2 TIMES DAILY
Status: DISCONTINUED | OUTPATIENT
Start: 2019-12-12 | End: 2019-12-12

## 2019-12-12 RX ORDER — ACETAMINOPHEN 500 MG
1000 TABLET ORAL EVERY 6 HOURS SCHEDULED
Status: DISCONTINUED | OUTPATIENT
Start: 2019-12-12 | End: 2019-12-12

## 2019-12-12 RX ORDER — LIDOCAINE HYDROCHLORIDE 20 MG/ML
10 SOLUTION OROPHARYNGEAL ONCE
Status: COMPLETED | OUTPATIENT
Start: 2019-12-12 | End: 2019-12-12

## 2019-12-12 RX ORDER — ACETAMINOPHEN 325 MG/1
650 TABLET ORAL EVERY 6 HOURS PRN
Status: DISCONTINUED | OUTPATIENT
Start: 2019-12-12 | End: 2019-12-12

## 2019-12-12 RX ORDER — DEXAMETHASONE SODIUM PHOSPHATE 4 MG/ML
1 INJECTION, SOLUTION INTRA-ARTICULAR; INTRALESIONAL; INTRAMUSCULAR; INTRAVENOUS; SOFT TISSUE
Status: DISCONTINUED | OUTPATIENT
Start: 2019-12-13 | End: 2019-12-15

## 2019-12-12 RX ORDER — OXYCODONE HCL 5 MG/5 ML
5 SOLUTION, ORAL ORAL EVERY 6 HOURS PRN
Status: DISCONTINUED | OUTPATIENT
Start: 2019-12-12 | End: 2019-12-12

## 2019-12-12 RX ORDER — DEXTROSE, SODIUM CHLORIDE, AND POTASSIUM CHLORIDE 5; .45; .15 G/100ML; G/100ML; G/100ML
125 INJECTION INTRAVENOUS CONTINUOUS
Status: DISCONTINUED | OUTPATIENT
Start: 2019-12-12 | End: 2019-12-15

## 2019-12-12 RX ADMIN — PANTOPRAZOLE SODIUM 40 MG: 40 TABLET, DELAYED RELEASE ORAL at 11:58

## 2019-12-12 RX ADMIN — LIDOCAINE HYDROCHLORIDE 10 ML: 20 SOLUTION ORAL; TOPICAL at 13:34

## 2019-12-12 RX ADMIN — ONDANSETRON 4 MG: 2 INJECTION INTRAMUSCULAR; INTRAVENOUS at 10:25

## 2019-12-12 RX ADMIN — HYDROMORPHONE HYDROCHLORIDE 0.25 MG: 1 INJECTION, SOLUTION INTRAMUSCULAR; INTRAVENOUS; SUBCUTANEOUS at 03:44

## 2019-12-12 RX ADMIN — PREDNISONE 5 MG: 5 TABLET ORAL at 08:33

## 2019-12-12 RX ADMIN — ACETAMINOPHEN 1000 MG: 10 INJECTION, SOLUTION INTRAVENOUS at 17:14

## 2019-12-12 RX ADMIN — FAMOTIDINE 20 MG: 10 INJECTION INTRAVENOUS at 20:12

## 2019-12-12 RX ADMIN — CELECOXIB 200 MG: 200 CAPSULE ORAL at 08:33

## 2019-12-12 RX ADMIN — FAMOTIDINE 20 MG: 10 INJECTION INTRAVENOUS at 01:09

## 2019-12-12 RX ADMIN — POTASSIUM CHLORIDE, DEXTROSE MONOHYDRATE AND SODIUM CHLORIDE 125 ML/HR: 150; 5; 450 INJECTION, SOLUTION INTRAVENOUS at 17:17

## 2019-12-12 RX ADMIN — POTASSIUM PHOSPHATE, MONOBASIC AND POTASSIUM PHOSPHATE, DIBASIC 15 MMOL: 224; 236 INJECTION, SOLUTION INTRAVENOUS at 11:58

## 2019-12-12 RX ADMIN — OXYCODONE HYDROCHLORIDE 5 MG: 5 SOLUTION ORAL at 12:37

## 2019-12-12 RX ADMIN — POTASSIUM CHLORIDE, DEXTROSE MONOHYDRATE AND SODIUM CHLORIDE 50 ML/HR: 150; 5; 450 INJECTION, SOLUTION INTRAVENOUS at 03:44

## 2019-12-12 RX ADMIN — GABAPENTIN 300 MG: 300 CAPSULE ORAL at 08:33

## 2019-12-12 RX ADMIN — ACETAMINOPHEN 1000 MG: 500 TABLET, FILM COATED ORAL at 01:09

## 2019-12-12 RX ADMIN — ACETAMINOPHEN 1000 MG: 500 TABLET, FILM COATED ORAL at 11:58

## 2019-12-12 RX ADMIN — ACETAMINOPHEN 1000 MG: 500 TABLET, FILM COATED ORAL at 06:32

## 2019-12-13 LAB
ALBUMIN SERPL-MCNC: 2.1 G/DL (ref 3.5–5.2)
ANION GAP SERPL CALCULATED.3IONS-SCNC: 9.9 MMOL/L (ref 5–15)
BASOPHILS # BLD MANUAL: 0.04 10*3/MM3 (ref 0–0.2)
BASOPHILS NFR BLD AUTO: 1 % (ref 0–1.5)
BUN BLD-MCNC: 19 MG/DL (ref 8–23)
BUN/CREAT SERPL: 14.4 (ref 7–25)
CALCIUM SPEC-SCNC: 8.4 MG/DL (ref 8.6–10.5)
CHLORIDE SERPL-SCNC: 101 MMOL/L (ref 98–107)
CO2 SERPL-SCNC: 19.1 MMOL/L (ref 22–29)
CREAT BLD-MCNC: 1.32 MG/DL (ref 0.57–1)
DEPRECATED RDW RBC AUTO: 38.5 FL (ref 37–54)
EOSINOPHIL # BLD MANUAL: 0.12 10*3/MM3 (ref 0–0.4)
EOSINOPHIL NFR BLD MANUAL: 3 % (ref 0.3–6.2)
ERYTHROCYTE [DISTWIDTH] IN BLOOD BY AUTOMATED COUNT: 12.8 % (ref 12.3–15.4)
GFR SERPL CREATININE-BSD FRML MDRD: 40 ML/MIN/1.73
GLUCOSE BLD-MCNC: 161 MG/DL (ref 65–99)
HCT VFR BLD AUTO: 28.8 % (ref 34–46.6)
HGB BLD-MCNC: 10 G/DL (ref 12–15.9)
LYMPHOCYTES # BLD MANUAL: 0.33 10*3/MM3 (ref 0.7–3.1)
LYMPHOCYTES NFR BLD MANUAL: 4 % (ref 5–12)
LYMPHOCYTES NFR BLD MANUAL: 8 % (ref 19.6–45.3)
MAGNESIUM SERPL-MCNC: 1.8 MG/DL (ref 1.6–2.4)
MCH RBC QN AUTO: 28.5 PG (ref 26.6–33)
MCHC RBC AUTO-ENTMCNC: 34.7 G/DL (ref 31.5–35.7)
MCV RBC AUTO: 82.1 FL (ref 79–97)
MONOCYTES # BLD AUTO: 0.17 10*3/MM3 (ref 0.1–0.9)
NEUTROPHILS # BLD AUTO: 3.49 10*3/MM3 (ref 1.7–7)
NEUTROPHILS NFR BLD MANUAL: 84 % (ref 42.7–76)
PHOSPHATE SERPL-MCNC: 3.5 MG/DL (ref 2.5–4.5)
PLAT MORPH BLD: NORMAL
PLATELET # BLD AUTO: 339 10*3/MM3 (ref 140–450)
PMV BLD AUTO: 9.3 FL (ref 6–12)
POTASSIUM BLD-SCNC: 4.7 MMOL/L (ref 3.5–5.2)
RBC # BLD AUTO: 3.51 10*6/MM3 (ref 3.77–5.28)
RBC MORPH BLD: NORMAL
SODIUM BLD-SCNC: 130 MMOL/L (ref 136–145)
WBC MORPH BLD: NORMAL
WBC NRBC COR # BLD: 4.15 10*3/MM3 (ref 3.4–10.8)

## 2019-12-13 PROCEDURE — 25010000002 DEXAMETHASONE PER 1 MG: Performed by: PHYSICIAN ASSISTANT

## 2019-12-13 PROCEDURE — 85025 COMPLETE CBC W/AUTO DIFF WBC: CPT | Performed by: PHYSICIAN ASSISTANT

## 2019-12-13 PROCEDURE — 25010000002 ONDANSETRON PER 1 MG: Performed by: COLON & RECTAL SURGERY

## 2019-12-13 PROCEDURE — 80069 RENAL FUNCTION PANEL: CPT | Performed by: PHYSICIAN ASSISTANT

## 2019-12-13 PROCEDURE — 83735 ASSAY OF MAGNESIUM: CPT | Performed by: PHYSICIAN ASSISTANT

## 2019-12-13 PROCEDURE — 85007 BL SMEAR W/DIFF WBC COUNT: CPT | Performed by: PHYSICIAN ASSISTANT

## 2019-12-13 RX ADMIN — SODIUM CHLORIDE, POTASSIUM CHLORIDE, SODIUM LACTATE AND CALCIUM CHLORIDE 500 ML: 600; 310; 30; 20 INJECTION, SOLUTION INTRAVENOUS at 10:00

## 2019-12-13 RX ADMIN — SODIUM CHLORIDE, POTASSIUM CHLORIDE, SODIUM LACTATE AND CALCIUM CHLORIDE 500 ML: 600; 310; 30; 20 INJECTION, SOLUTION INTRAVENOUS at 17:58

## 2019-12-13 RX ADMIN — ONDANSETRON 4 MG: 2 INJECTION INTRAMUSCULAR; INTRAVENOUS at 13:57

## 2019-12-13 RX ADMIN — DEXAMETHASONE SODIUM PHOSPHATE 1 MG: 4 INJECTION, SOLUTION INTRAMUSCULAR; INTRAVENOUS at 07:57

## 2019-12-13 RX ADMIN — ACETAMINOPHEN 1000 MG: 10 INJECTION, SOLUTION INTRAVENOUS at 13:57

## 2019-12-13 RX ADMIN — POTASSIUM CHLORIDE, DEXTROSE MONOHYDRATE AND SODIUM CHLORIDE 125 ML/HR: 150; 5; 450 INJECTION, SOLUTION INTRAVENOUS at 15:16

## 2019-12-13 RX ADMIN — ACETAMINOPHEN 1000 MG: 10 INJECTION, SOLUTION INTRAVENOUS at 07:57

## 2019-12-13 RX ADMIN — POTASSIUM CHLORIDE, DEXTROSE MONOHYDRATE AND SODIUM CHLORIDE 125 ML/HR: 150; 5; 450 INJECTION, SOLUTION INTRAVENOUS at 05:58

## 2019-12-13 RX ADMIN — FAMOTIDINE 20 MG: 10 INJECTION INTRAVENOUS at 20:21

## 2019-12-13 RX ADMIN — ACETAMINOPHEN 1000 MG: 10 INJECTION, SOLUTION INTRAVENOUS at 00:02

## 2019-12-13 RX ADMIN — ONDANSETRON 4 MG: 2 INJECTION INTRAMUSCULAR; INTRAVENOUS at 08:06

## 2019-12-13 RX ADMIN — ACETAMINOPHEN 1000 MG: 10 INJECTION, SOLUTION INTRAVENOUS at 20:07

## 2019-12-13 RX ADMIN — FAMOTIDINE 20 MG: 10 INJECTION INTRAVENOUS at 08:08

## 2019-12-13 RX ADMIN — POTASSIUM CHLORIDE, DEXTROSE MONOHYDRATE AND SODIUM CHLORIDE 125 ML/HR: 150; 5; 450 INJECTION, SOLUTION INTRAVENOUS at 23:43

## 2019-12-13 RX ADMIN — MAGNESIUM SULFATE 4 G: 4 INJECTION INTRAVENOUS at 15:17

## 2019-12-13 RX ADMIN — POTASSIUM CHLORIDE, DEXTROSE MONOHYDRATE AND SODIUM CHLORIDE 125 ML/HR: 150; 5; 450 INJECTION, SOLUTION INTRAVENOUS at 00:02

## 2019-12-14 LAB
ACANTHOCYTES BLD QL SMEAR: ABNORMAL
ALBUMIN SERPL-MCNC: 2 G/DL (ref 3.5–5.2)
ANION GAP SERPL CALCULATED.3IONS-SCNC: 8.5 MMOL/L (ref 5–15)
BUN BLD-MCNC: 19 MG/DL (ref 8–23)
BUN/CREAT SERPL: 18.8 (ref 7–25)
CALCIUM SPEC-SCNC: 8.6 MG/DL (ref 8.6–10.5)
CHLORIDE SERPL-SCNC: 105 MMOL/L (ref 98–107)
CO2 SERPL-SCNC: 19.5 MMOL/L (ref 22–29)
CREAT BLD-MCNC: 1.01 MG/DL (ref 0.57–1)
DEPRECATED RDW RBC AUTO: 42 FL (ref 37–54)
EOSINOPHIL # BLD MANUAL: 0.45 10*3/MM3 (ref 0–0.4)
EOSINOPHIL NFR BLD MANUAL: 6 % (ref 0.3–6.2)
ERYTHROCYTE [DISTWIDTH] IN BLOOD BY AUTOMATED COUNT: 13.5 % (ref 12.3–15.4)
GFR SERPL CREATININE-BSD FRML MDRD: 54 ML/MIN/1.73
GLUCOSE BLD-MCNC: 135 MG/DL (ref 65–99)
HCT VFR BLD AUTO: 27.8 % (ref 34–46.6)
HGB BLD-MCNC: 9.4 G/DL (ref 12–15.9)
LYMPHOCYTES # BLD MANUAL: 0.74 10*3/MM3 (ref 0.7–3.1)
LYMPHOCYTES NFR BLD MANUAL: 10 % (ref 19.6–45.3)
LYMPHOCYTES NFR BLD MANUAL: 2 % (ref 5–12)
MAGNESIUM SERPL-MCNC: 2.7 MG/DL (ref 1.6–2.4)
MCH RBC QN AUTO: 29.2 PG (ref 26.6–33)
MCHC RBC AUTO-ENTMCNC: 33.8 G/DL (ref 31.5–35.7)
MCV RBC AUTO: 86.3 FL (ref 79–97)
METAMYELOCYTES NFR BLD MANUAL: 1 % (ref 0–0)
MONOCYTES # BLD AUTO: 0.15 10*3/MM3 (ref 0.1–0.9)
NEUTROPHILS # BLD AUTO: 6.01 10*3/MM3 (ref 1.7–7)
NEUTROPHILS NFR BLD MANUAL: 81 % (ref 42.7–76)
NRBC SPEC MANUAL: 2 /100 WBC (ref 0–0.2)
PHOSPHATE SERPL-MCNC: 2.8 MG/DL (ref 2.5–4.5)
PLAT MORPH BLD: NORMAL
PLATELET # BLD AUTO: 386 10*3/MM3 (ref 140–450)
PMV BLD AUTO: 9.3 FL (ref 6–12)
POIKILOCYTOSIS BLD QL SMEAR: ABNORMAL
POLYCHROMASIA BLD QL SMEAR: ABNORMAL
POTASSIUM BLD-SCNC: 5.1 MMOL/L (ref 3.5–5.2)
RBC # BLD AUTO: 3.22 10*6/MM3 (ref 3.77–5.28)
SODIUM BLD-SCNC: 133 MMOL/L (ref 136–145)
WBC MORPH BLD: NORMAL
WBC NRBC COR # BLD: 7.42 10*3/MM3 (ref 3.4–10.8)

## 2019-12-14 PROCEDURE — 83735 ASSAY OF MAGNESIUM: CPT | Performed by: COLON & RECTAL SURGERY

## 2019-12-14 PROCEDURE — 80069 RENAL FUNCTION PANEL: CPT | Performed by: PHYSICIAN ASSISTANT

## 2019-12-14 PROCEDURE — 25010000002 METHOCARBAMOL 1000 MG/10ML SOLUTION: Performed by: COLON & RECTAL SURGERY

## 2019-12-14 PROCEDURE — 25010000002 METOCLOPRAMIDE PER 10 MG: Performed by: COLON & RECTAL SURGERY

## 2019-12-14 PROCEDURE — 85007 BL SMEAR W/DIFF WBC COUNT: CPT | Performed by: PHYSICIAN ASSISTANT

## 2019-12-14 PROCEDURE — 85025 COMPLETE CBC W/AUTO DIFF WBC: CPT | Performed by: PHYSICIAN ASSISTANT

## 2019-12-14 PROCEDURE — 25010000002 ONDANSETRON PER 1 MG: Performed by: COLON & RECTAL SURGERY

## 2019-12-14 PROCEDURE — 25010000002 DEXAMETHASONE PER 1 MG: Performed by: PHYSICIAN ASSISTANT

## 2019-12-14 RX ORDER — METOCLOPRAMIDE HYDROCHLORIDE 5 MG/ML
5 INJECTION INTRAMUSCULAR; INTRAVENOUS
Status: DISCONTINUED | OUTPATIENT
Start: 2019-12-14 | End: 2019-12-15

## 2019-12-14 RX ORDER — METHOCARBAMOL 100 MG/ML
500 INJECTION, SOLUTION INTRAMUSCULAR; INTRAVENOUS EVERY 8 HOURS PRN
Status: DISCONTINUED | OUTPATIENT
Start: 2019-12-14 | End: 2019-12-27

## 2019-12-14 RX ADMIN — ONDANSETRON 8 MG: 2 INJECTION INTRAMUSCULAR; INTRAVENOUS at 16:29

## 2019-12-14 RX ADMIN — ONDANSETRON 4 MG: 2 INJECTION INTRAMUSCULAR; INTRAVENOUS at 01:50

## 2019-12-14 RX ADMIN — ACETAMINOPHEN 1000 MG: 10 INJECTION, SOLUTION INTRAVENOUS at 10:32

## 2019-12-14 RX ADMIN — METHOCARBAMOL 500 MG: 100 INJECTION, SOLUTION INTRAMUSCULAR; INTRAVENOUS at 18:14

## 2019-12-14 RX ADMIN — ONDANSETRON 8 MG: 2 INJECTION INTRAMUSCULAR; INTRAVENOUS at 22:24

## 2019-12-14 RX ADMIN — FAMOTIDINE 20 MG: 10 INJECTION INTRAVENOUS at 20:03

## 2019-12-14 RX ADMIN — ONDANSETRON 4 MG: 2 INJECTION INTRAMUSCULAR; INTRAVENOUS at 10:03

## 2019-12-14 RX ADMIN — ACETAMINOPHEN 1000 MG: 10 INJECTION, SOLUTION INTRAVENOUS at 20:02

## 2019-12-14 RX ADMIN — METOCLOPRAMIDE 5 MG: 5 INJECTION, SOLUTION INTRAMUSCULAR; INTRAVENOUS at 18:02

## 2019-12-14 RX ADMIN — METOCLOPRAMIDE 5 MG: 5 INJECTION, SOLUTION INTRAMUSCULAR; INTRAVENOUS at 11:49

## 2019-12-14 RX ADMIN — ACETAMINOPHEN 1000 MG: 10 INJECTION, SOLUTION INTRAVENOUS at 01:46

## 2019-12-14 RX ADMIN — FAMOTIDINE 20 MG: 10 INJECTION INTRAVENOUS at 10:02

## 2019-12-14 RX ADMIN — ACETAMINOPHEN 1000 MG: 10 INJECTION, SOLUTION INTRAVENOUS at 15:08

## 2019-12-14 RX ADMIN — POTASSIUM CHLORIDE, DEXTROSE MONOHYDRATE AND SODIUM CHLORIDE 125 ML/HR: 150; 5; 450 INJECTION, SOLUTION INTRAVENOUS at 20:03

## 2019-12-14 RX ADMIN — SODIUM CHLORIDE, POTASSIUM CHLORIDE, SODIUM LACTATE AND CALCIUM CHLORIDE 500 ML: 600; 310; 30; 20 INJECTION, SOLUTION INTRAVENOUS at 10:03

## 2019-12-14 RX ADMIN — POTASSIUM CHLORIDE, DEXTROSE MONOHYDRATE AND SODIUM CHLORIDE 125 ML/HR: 150; 5; 450 INJECTION, SOLUTION INTRAVENOUS at 11:50

## 2019-12-14 RX ADMIN — DEXAMETHASONE SODIUM PHOSPHATE 1 MG: 4 INJECTION, SOLUTION INTRAMUSCULAR; INTRAVENOUS at 10:03

## 2019-12-15 ENCOUNTER — ANESTHESIA (OUTPATIENT)
Dept: PERIOP | Facility: HOSPITAL | Age: 74
End: 2019-12-15

## 2019-12-15 ENCOUNTER — ANESTHESIA EVENT (OUTPATIENT)
Dept: PERIOP | Facility: HOSPITAL | Age: 74
End: 2019-12-15

## 2019-12-15 LAB
ABO GROUP BLD: NORMAL
ALBUMIN SERPL-MCNC: 2.2 G/DL (ref 3.5–5.2)
ANION GAP SERPL CALCULATED.3IONS-SCNC: 9.9 MMOL/L (ref 5–15)
BASOPHILS # BLD AUTO: 0.04 10*3/MM3 (ref 0–0.2)
BASOPHILS NFR BLD AUTO: 0.5 % (ref 0–1.5)
BLD GP AB SCN SERPL QL: NEGATIVE
BUN BLD-MCNC: 15 MG/DL (ref 8–23)
BUN/CREAT SERPL: 19 (ref 7–25)
CALCIUM SPEC-SCNC: 8.5 MG/DL (ref 8.6–10.5)
CHLORIDE SERPL-SCNC: 107 MMOL/L (ref 98–107)
CO2 SERPL-SCNC: 20.1 MMOL/L (ref 22–29)
CREAT BLD-MCNC: 0.79 MG/DL (ref 0.57–1)
DEPRECATED RDW RBC AUTO: 44.3 FL (ref 37–54)
EOSINOPHIL # BLD AUTO: 0.06 10*3/MM3 (ref 0–0.4)
EOSINOPHIL NFR BLD AUTO: 0.7 % (ref 0.3–6.2)
ERYTHROCYTE [DISTWIDTH] IN BLOOD BY AUTOMATED COUNT: 13.9 % (ref 12.3–15.4)
GFR SERPL CREATININE-BSD FRML MDRD: 72 ML/MIN/1.73
GLUCOSE BLD-MCNC: 125 MG/DL (ref 65–99)
GLUCOSE BLDC GLUCOMTR-MCNC: 146 MG/DL (ref 70–130)
GLUCOSE BLDC GLUCOMTR-MCNC: 174 MG/DL (ref 70–130)
HCT VFR BLD AUTO: 27.5 % (ref 34–46.6)
HGB BLD-MCNC: 9.2 G/DL (ref 12–15.9)
LYMPHOCYTES # BLD AUTO: 0.49 10*3/MM3 (ref 0.7–3.1)
LYMPHOCYTES NFR BLD AUTO: 5.7 % (ref 19.6–45.3)
MCH RBC QN AUTO: 28.9 PG (ref 26.6–33)
MCHC RBC AUTO-ENTMCNC: 33.5 G/DL (ref 31.5–35.7)
MCV RBC AUTO: 86.5 FL (ref 79–97)
MONOCYTES # BLD AUTO: 0.45 10*3/MM3 (ref 0.1–0.9)
MONOCYTES NFR BLD AUTO: 5.2 % (ref 5–12)
NEUTROPHILS # BLD AUTO: 7.2 10*3/MM3 (ref 1.7–7)
NEUTROPHILS NFR BLD AUTO: 83.2 % (ref 42.7–76)
PHOSPHATE SERPL-MCNC: 3.1 MG/DL (ref 2.5–4.5)
PLATELET # BLD AUTO: 387 10*3/MM3 (ref 140–450)
PMV BLD AUTO: 9.4 FL (ref 6–12)
POTASSIUM BLD-SCNC: 5.4 MMOL/L (ref 3.5–5.2)
RBC # BLD AUTO: 3.18 10*6/MM3 (ref 3.77–5.28)
RH BLD: POSITIVE
SODIUM BLD-SCNC: 137 MMOL/L (ref 136–145)
T&S EXPIRATION DATE: NORMAL
WBC NRBC COR # BLD: 8.65 10*3/MM3 (ref 3.4–10.8)

## 2019-12-15 PROCEDURE — 44310 ILEOSTOMY/JEJUNOSTOMY: CPT | Performed by: COLON & RECTAL SURGERY

## 2019-12-15 PROCEDURE — 87070 CULTURE OTHR SPECIMN AEROBIC: CPT | Performed by: COLON & RECTAL SURGERY

## 2019-12-15 PROCEDURE — 85025 COMPLETE CBC W/AUTO DIFF WBC: CPT | Performed by: COLON & RECTAL SURGERY

## 2019-12-15 PROCEDURE — 82962 GLUCOSE BLOOD TEST: CPT

## 2019-12-15 PROCEDURE — 87075 CULTR BACTERIA EXCEPT BLOOD: CPT | Performed by: COLON & RECTAL SURGERY

## 2019-12-15 PROCEDURE — 87077 CULTURE AEROBIC IDENTIFY: CPT | Performed by: COLON & RECTAL SURGERY

## 2019-12-15 PROCEDURE — 44140 PARTIAL REMOVAL OF COLON: CPT | Performed by: COLON & RECTAL SURGERY

## 2019-12-15 PROCEDURE — 80069 RENAL FUNCTION PANEL: CPT | Performed by: COLON & RECTAL SURGERY

## 2019-12-15 PROCEDURE — 0 DIATRIZOATE MEGLUMINE & SODIUM PER 1 ML: Performed by: COLON & RECTAL SURGERY

## 2019-12-15 PROCEDURE — 25010000002 KETOROLAC TROMETHAMINE PER 15 MG: Performed by: COLON & RECTAL SURGERY

## 2019-12-15 PROCEDURE — 87076 CULTURE ANAEROBE IDENT EACH: CPT | Performed by: COLON & RECTAL SURGERY

## 2019-12-15 PROCEDURE — 99222 1ST HOSP IP/OBS MODERATE 55: CPT | Performed by: SURGERY

## 2019-12-15 PROCEDURE — 25010000002 HYDROMORPHONE PER 4 MG: Performed by: COLON & RECTAL SURGERY

## 2019-12-15 PROCEDURE — 44310 ILEOSTOMY/JEJUNOSTOMY: CPT | Performed by: PHYSICIAN ASSISTANT

## 2019-12-15 PROCEDURE — 25010000002 PIPERACILLIN SOD-TAZOBACTAM PER 1 G: Performed by: COLON & RECTAL SURGERY

## 2019-12-15 PROCEDURE — 0DBE0ZZ EXCISION OF LARGE INTESTINE, OPEN APPROACH: ICD-10-PCS | Performed by: COLON & RECTAL SURGERY

## 2019-12-15 PROCEDURE — C9290 INJ, BUPIVACAINE LIPOSOME: HCPCS | Performed by: ANESTHESIOLOGY

## 2019-12-15 PROCEDURE — 86900 BLOOD TYPING SEROLOGIC ABO: CPT | Performed by: COLON & RECTAL SURGERY

## 2019-12-15 PROCEDURE — 0DB80ZZ EXCISION OF SMALL INTESTINE, OPEN APPROACH: ICD-10-PCS | Performed by: COLON & RECTAL SURGERY

## 2019-12-15 PROCEDURE — 25010000002 ONDANSETRON PER 1 MG: Performed by: COLON & RECTAL SURGERY

## 2019-12-15 PROCEDURE — 25010000002 DEXAMETHASONE PER 1 MG: Performed by: PHYSICIAN ASSISTANT

## 2019-12-15 PROCEDURE — 97607 NEG PRS WND THR NDME<=50SQCM: CPT | Performed by: COLON & RECTAL SURGERY

## 2019-12-15 PROCEDURE — 25010000002 METOCLOPRAMIDE PER 10 MG: Performed by: COLON & RECTAL SURGERY

## 2019-12-15 PROCEDURE — 88307 TISSUE EXAM BY PATHOLOGIST: CPT | Performed by: COLON & RECTAL SURGERY

## 2019-12-15 PROCEDURE — 25010000002 FENTANYL CITRATE (PF) 100 MCG/2ML SOLUTION: Performed by: ANESTHESIOLOGY

## 2019-12-15 PROCEDURE — 0DBU0ZZ EXCISION OF OMENTUM, OPEN APPROACH: ICD-10-PCS | Performed by: COLON & RECTAL SURGERY

## 2019-12-15 PROCEDURE — 25010000003 MEPIVACAINE PER 10 ML: Performed by: ANESTHESIOLOGY

## 2019-12-15 PROCEDURE — 86901 BLOOD TYPING SEROLOGIC RH(D): CPT | Performed by: COLON & RECTAL SURGERY

## 2019-12-15 PROCEDURE — 25010000003 BUPIVACAINE LIPOSOME 1.3 % SUSPENSION: Performed by: ANESTHESIOLOGY

## 2019-12-15 PROCEDURE — 0D1B0Z4 BYPASS ILEUM TO CUTANEOUS, OPEN APPROACH: ICD-10-PCS | Performed by: COLON & RECTAL SURGERY

## 2019-12-15 PROCEDURE — 87186 SC STD MICRODIL/AGAR DIL: CPT | Performed by: COLON & RECTAL SURGERY

## 2019-12-15 PROCEDURE — 86850 RBC ANTIBODY SCREEN: CPT | Performed by: COLON & RECTAL SURGERY

## 2019-12-15 PROCEDURE — 44140 PARTIAL REMOVAL OF COLON: CPT | Performed by: PHYSICIAN ASSISTANT

## 2019-12-15 PROCEDURE — 86923 COMPATIBILITY TEST ELECTRIC: CPT

## 2019-12-15 PROCEDURE — 87205 SMEAR GRAM STAIN: CPT | Performed by: COLON & RECTAL SURGERY

## 2019-12-15 PROCEDURE — 25010000002 PROPOFOL 10 MG/ML EMULSION: Performed by: ANESTHESIOLOGY

## 2019-12-15 RX ORDER — PROMETHAZINE HYDROCHLORIDE 25 MG/ML
12.5 INJECTION, SOLUTION INTRAMUSCULAR; INTRAVENOUS ONCE AS NEEDED
Status: DISCONTINUED | OUTPATIENT
Start: 2019-12-15 | End: 2019-12-15 | Stop reason: HOSPADM

## 2019-12-15 RX ORDER — LIDOCAINE HYDROCHLORIDE 20 MG/ML
INJECTION, SOLUTION INFILTRATION; PERINEURAL AS NEEDED
Status: DISCONTINUED | OUTPATIENT
Start: 2019-12-15 | End: 2019-12-15 | Stop reason: SURG

## 2019-12-15 RX ORDER — HYDROMORPHONE HYDROCHLORIDE 1 MG/ML
0.5 INJECTION, SOLUTION INTRAMUSCULAR; INTRAVENOUS; SUBCUTANEOUS
Status: DISCONTINUED | OUTPATIENT
Start: 2019-12-15 | End: 2019-12-15 | Stop reason: HOSPADM

## 2019-12-15 RX ORDER — KETAMINE HYDROCHLORIDE 10 MG/ML
INJECTION INTRAMUSCULAR; INTRAVENOUS AS NEEDED
Status: DISCONTINUED | OUTPATIENT
Start: 2019-12-15 | End: 2019-12-15 | Stop reason: SURG

## 2019-12-15 RX ORDER — NALOXONE HCL 0.4 MG/ML
0.2 VIAL (ML) INJECTION AS NEEDED
Status: DISCONTINUED | OUTPATIENT
Start: 2019-12-15 | End: 2019-12-15 | Stop reason: HOSPADM

## 2019-12-15 RX ORDER — FAMOTIDINE 10 MG/ML
20 INJECTION, SOLUTION INTRAVENOUS ONCE
Status: DISCONTINUED | OUTPATIENT
Start: 2019-12-15 | End: 2019-12-15

## 2019-12-15 RX ORDER — ROCURONIUM BROMIDE 10 MG/ML
INJECTION, SOLUTION INTRAVENOUS AS NEEDED
Status: DISCONTINUED | OUTPATIENT
Start: 2019-12-15 | End: 2019-12-15 | Stop reason: SURG

## 2019-12-15 RX ORDER — PROPOFOL 10 MG/ML
VIAL (ML) INTRAVENOUS AS NEEDED
Status: DISCONTINUED | OUTPATIENT
Start: 2019-12-15 | End: 2019-12-15 | Stop reason: SURG

## 2019-12-15 RX ORDER — FLUMAZENIL 0.1 MG/ML
0.2 INJECTION INTRAVENOUS AS NEEDED
Status: DISCONTINUED | OUTPATIENT
Start: 2019-12-15 | End: 2019-12-15 | Stop reason: HOSPADM

## 2019-12-15 RX ORDER — SODIUM CHLORIDE 0.9 % (FLUSH) 0.9 %
3 SYRINGE (ML) INJECTION EVERY 12 HOURS SCHEDULED
Status: DISCONTINUED | OUTPATIENT
Start: 2019-12-15 | End: 2019-12-15 | Stop reason: HOSPADM

## 2019-12-15 RX ORDER — PROMETHAZINE HYDROCHLORIDE 25 MG/1
25 TABLET ORAL ONCE AS NEEDED
Status: DISCONTINUED | OUTPATIENT
Start: 2019-12-15 | End: 2019-12-15 | Stop reason: HOSPADM

## 2019-12-15 RX ORDER — HYDROMORPHONE HYDROCHLORIDE 1 MG/ML
0.2 INJECTION, SOLUTION INTRAMUSCULAR; INTRAVENOUS; SUBCUTANEOUS
Status: DISCONTINUED | OUTPATIENT
Start: 2019-12-15 | End: 2019-12-15 | Stop reason: HOSPADM

## 2019-12-15 RX ORDER — SODIUM CHLORIDE 0.9 % (FLUSH) 0.9 %
3-10 SYRINGE (ML) INJECTION AS NEEDED
Status: DISCONTINUED | OUTPATIENT
Start: 2019-12-15 | End: 2019-12-15 | Stop reason: HOSPADM

## 2019-12-15 RX ORDER — FENTANYL CITRATE 50 UG/ML
50 INJECTION, SOLUTION INTRAMUSCULAR; INTRAVENOUS
Status: DISCONTINUED | OUTPATIENT
Start: 2019-12-15 | End: 2019-12-15

## 2019-12-15 RX ORDER — HYDROMORPHONE HYDROCHLORIDE 1 MG/ML
0.25 INJECTION, SOLUTION INTRAMUSCULAR; INTRAVENOUS; SUBCUTANEOUS
Status: DISCONTINUED | OUTPATIENT
Start: 2019-12-15 | End: 2019-12-15 | Stop reason: HOSPADM

## 2019-12-15 RX ORDER — SODIUM CHLORIDE 9 MG/ML
100 INJECTION, SOLUTION INTRAVENOUS CONTINUOUS
Status: DISCONTINUED | OUTPATIENT
Start: 2019-12-15 | End: 2019-12-16

## 2019-12-15 RX ORDER — EPHEDRINE SULFATE 50 MG/ML
5 INJECTION, SOLUTION INTRAVENOUS ONCE AS NEEDED
Status: DISCONTINUED | OUTPATIENT
Start: 2019-12-15 | End: 2019-12-15 | Stop reason: HOSPADM

## 2019-12-15 RX ORDER — KETOROLAC TROMETHAMINE 30 MG/ML
15 INJECTION, SOLUTION INTRAMUSCULAR; INTRAVENOUS EVERY 8 HOURS PRN
Status: DISPENSED | OUTPATIENT
Start: 2019-12-15 | End: 2019-12-20

## 2019-12-15 RX ORDER — PROMETHAZINE HYDROCHLORIDE 25 MG/ML
6.25 INJECTION, SOLUTION INTRAMUSCULAR; INTRAVENOUS
Status: DISCONTINUED | OUTPATIENT
Start: 2019-12-15 | End: 2019-12-15 | Stop reason: HOSPADM

## 2019-12-15 RX ORDER — KETAMINE HYDROCHLORIDE 50 MG/ML
INJECTION, SOLUTION, CONCENTRATE INTRAMUSCULAR; INTRAVENOUS
Status: DISPENSED
Start: 2019-12-15 | End: 2019-12-16

## 2019-12-15 RX ORDER — ACETAMINOPHEN 325 MG/1
650 TABLET ORAL ONCE AS NEEDED
Status: DISCONTINUED | OUTPATIENT
Start: 2019-12-15 | End: 2019-12-15

## 2019-12-15 RX ORDER — ACETAMINOPHEN 650 MG
TABLET, EXTENDED RELEASE ORAL AS NEEDED
Status: DISCONTINUED | OUTPATIENT
Start: 2019-12-15 | End: 2019-12-15 | Stop reason: HOSPADM

## 2019-12-15 RX ORDER — DIPHENHYDRAMINE HCL 25 MG
25 CAPSULE ORAL
Status: DISCONTINUED | OUTPATIENT
Start: 2019-12-15 | End: 2019-12-15

## 2019-12-15 RX ORDER — SODIUM CHLORIDE 0.9 % (FLUSH) 0.9 %
3 SYRINGE (ML) INJECTION EVERY 12 HOURS SCHEDULED
Status: DISCONTINUED | OUTPATIENT
Start: 2019-12-15 | End: 2019-12-28 | Stop reason: HOSPADM

## 2019-12-15 RX ORDER — HYDROCODONE BITARTRATE AND ACETAMINOPHEN 7.5; 325 MG/1; MG/1
1 TABLET ORAL ONCE AS NEEDED
Status: DISCONTINUED | OUTPATIENT
Start: 2019-12-15 | End: 2019-12-15 | Stop reason: HOSPADM

## 2019-12-15 RX ORDER — HYDRALAZINE HYDROCHLORIDE 20 MG/ML
5 INJECTION INTRAMUSCULAR; INTRAVENOUS
Status: DISCONTINUED | OUTPATIENT
Start: 2019-12-15 | End: 2019-12-15 | Stop reason: HOSPADM

## 2019-12-15 RX ORDER — DIPHENHYDRAMINE HYDROCHLORIDE 50 MG/ML
12.5 INJECTION INTRAMUSCULAR; INTRAVENOUS
Status: DISCONTINUED | OUTPATIENT
Start: 2019-12-15 | End: 2019-12-15

## 2019-12-15 RX ORDER — ONDANSETRON 2 MG/ML
4 INJECTION INTRAMUSCULAR; INTRAVENOUS ONCE AS NEEDED
Status: DISCONTINUED | OUTPATIENT
Start: 2019-12-15 | End: 2019-12-15 | Stop reason: HOSPADM

## 2019-12-15 RX ORDER — SODIUM CHLORIDE 9 MG/ML
INJECTION, SOLUTION INTRAVENOUS CONTINUOUS PRN
Status: DISCONTINUED | OUTPATIENT
Start: 2019-12-15 | End: 2019-12-15 | Stop reason: SURG

## 2019-12-15 RX ORDER — LABETALOL HYDROCHLORIDE 5 MG/ML
5 INJECTION, SOLUTION INTRAVENOUS
Status: DISCONTINUED | OUTPATIENT
Start: 2019-12-15 | End: 2019-12-15 | Stop reason: HOSPADM

## 2019-12-15 RX ORDER — ALBUMIN, HUMAN INJ 5% 5 %
SOLUTION INTRAVENOUS
Status: DISPENSED
Start: 2019-12-15 | End: 2019-12-16

## 2019-12-15 RX ORDER — SODIUM CHLORIDE, SODIUM LACTATE, POTASSIUM CHLORIDE, CALCIUM CHLORIDE 600; 310; 30; 20 MG/100ML; MG/100ML; MG/100ML; MG/100ML
9 INJECTION, SOLUTION INTRAVENOUS CONTINUOUS
Status: DISCONTINUED | OUTPATIENT
Start: 2019-12-15 | End: 2019-12-15

## 2019-12-15 RX ORDER — LIDOCAINE HYDROCHLORIDE 10 MG/ML
0.5 INJECTION, SOLUTION EPIDURAL; INFILTRATION; INTRACAUDAL; PERINEURAL ONCE AS NEEDED
Status: DISCONTINUED | OUTPATIENT
Start: 2019-12-15 | End: 2019-12-15 | Stop reason: HOSPADM

## 2019-12-15 RX ORDER — FENTANYL CITRATE 50 UG/ML
50 INJECTION, SOLUTION INTRAMUSCULAR; INTRAVENOUS
Status: COMPLETED | OUTPATIENT
Start: 2019-12-15 | End: 2019-12-15

## 2019-12-15 RX ORDER — FENTANYL CITRATE 50 UG/ML
INJECTION, SOLUTION INTRAMUSCULAR; INTRAVENOUS
Status: COMPLETED
Start: 2019-12-15 | End: 2019-12-15

## 2019-12-15 RX ORDER — PROMETHAZINE HYDROCHLORIDE 25 MG/1
25 SUPPOSITORY RECTAL ONCE AS NEEDED
Status: DISCONTINUED | OUTPATIENT
Start: 2019-12-15 | End: 2019-12-15 | Stop reason: HOSPADM

## 2019-12-15 RX ORDER — SODIUM CHLORIDE 0.9 % (FLUSH) 0.9 %
3-10 SYRINGE (ML) INJECTION AS NEEDED
Status: DISCONTINUED | OUTPATIENT
Start: 2019-12-15 | End: 2019-12-28 | Stop reason: HOSPADM

## 2019-12-15 RX ORDER — OXYCODONE AND ACETAMINOPHEN 7.5; 325 MG/1; MG/1
1 TABLET ORAL ONCE AS NEEDED
Status: DISCONTINUED | OUTPATIENT
Start: 2019-12-15 | End: 2019-12-15 | Stop reason: HOSPADM

## 2019-12-15 RX ORDER — MAGNESIUM HYDROXIDE 1200 MG/15ML
LIQUID ORAL AS NEEDED
Status: DISCONTINUED | OUTPATIENT
Start: 2019-12-15 | End: 2019-12-15 | Stop reason: HOSPADM

## 2019-12-15 RX ADMIN — MEPIVACAINE HYDROCHLORIDE 20 ML: 15 INJECTION, SOLUTION EPIDURAL; INFILTRATION at 14:35

## 2019-12-15 RX ADMIN — KETOROLAC TROMETHAMINE 15 MG: 30 INJECTION, SOLUTION INTRAMUSCULAR at 23:20

## 2019-12-15 RX ADMIN — ACETAMINOPHEN 1000 MG: 10 INJECTION, SOLUTION INTRAVENOUS at 20:17

## 2019-12-15 RX ADMIN — HYDROMORPHONE HYDROCHLORIDE 0.2 MG: 1 INJECTION, SOLUTION INTRAMUSCULAR; INTRAVENOUS; SUBCUTANEOUS at 08:15

## 2019-12-15 RX ADMIN — FENTANYL CITRATE 50 MCG: 50 INJECTION INTRAMUSCULAR; INTRAVENOUS at 12:12

## 2019-12-15 RX ADMIN — LIDOCAINE HYDROCHLORIDE 100 MG: 20 INJECTION, SOLUTION INFILTRATION; PERINEURAL at 11:50

## 2019-12-15 RX ADMIN — POTASSIUM CHLORIDE, DEXTROSE MONOHYDRATE AND SODIUM CHLORIDE 125 ML/HR: 150; 5; 450 INJECTION, SOLUTION INTRAVENOUS at 04:53

## 2019-12-15 RX ADMIN — SODIUM CHLORIDE, PRESERVATIVE FREE 3 ML: 5 INJECTION INTRAVENOUS at 20:20

## 2019-12-15 RX ADMIN — ACETAMINOPHEN 1000 MG: 10 INJECTION, SOLUTION INTRAVENOUS at 08:06

## 2019-12-15 RX ADMIN — ONDANSETRON 8 MG: 2 INJECTION INTRAMUSCULAR; INTRAVENOUS at 10:06

## 2019-12-15 RX ADMIN — ROCURONIUM BROMIDE 50 MG: 10 INJECTION INTRAVENOUS at 11:50

## 2019-12-15 RX ADMIN — FENTANYL CITRATE 50 MCG: 50 INJECTION INTRAMUSCULAR; INTRAVENOUS at 11:50

## 2019-12-15 RX ADMIN — ACETAMINOPHEN 1000 MG: 10 INJECTION, SOLUTION INTRAVENOUS at 15:22

## 2019-12-15 RX ADMIN — PROPOFOL 160 MG: 10 INJECTION, EMULSION INTRAVENOUS at 11:50

## 2019-12-15 RX ADMIN — METOCLOPRAMIDE 5 MG: 5 INJECTION, SOLUTION INTRAMUSCULAR; INTRAVENOUS at 08:16

## 2019-12-15 RX ADMIN — TAZOBACTAM SODIUM AND PIPERACILLIN SODIUM 3.38 G: 375; 3 INJECTION, SOLUTION INTRAVENOUS at 21:54

## 2019-12-15 RX ADMIN — DEXAMETHASONE SODIUM PHOSPHATE 1 MG: 4 INJECTION, SOLUTION INTRAMUSCULAR; INTRAVENOUS at 08:16

## 2019-12-15 RX ADMIN — SUGAMMADEX 200 MG: 100 INJECTION, SOLUTION INTRAVENOUS at 14:37

## 2019-12-15 RX ADMIN — TAZOBACTAM SODIUM AND PIPERACILLIN SODIUM 3.38 G: 375; 3 INJECTION, SOLUTION INTRAVENOUS at 11:43

## 2019-12-15 RX ADMIN — HYDROMORPHONE HYDROCHLORIDE 0.25 MG: 1 INJECTION, SOLUTION INTRAMUSCULAR; INTRAVENOUS; SUBCUTANEOUS at 15:17

## 2019-12-15 RX ADMIN — FAMOTIDINE 20 MG: 10 INJECTION INTRAVENOUS at 08:16

## 2019-12-15 RX ADMIN — KETOROLAC TROMETHAMINE 15 MG: 30 INJECTION, SOLUTION INTRAMUSCULAR at 15:17

## 2019-12-15 RX ADMIN — HYDROMORPHONE HYDROCHLORIDE 0.25 MG: 1 INJECTION, SOLUTION INTRAMUSCULAR; INTRAVENOUS; SUBCUTANEOUS at 15:44

## 2019-12-15 RX ADMIN — FAMOTIDINE 20 MG: 10 INJECTION INTRAVENOUS at 20:20

## 2019-12-15 RX ADMIN — SODIUM CHLORIDE: 9 INJECTION, SOLUTION INTRAVENOUS at 12:31

## 2019-12-15 RX ADMIN — KETAMINE HYDROCHLORIDE 25 MG: 10 INJECTION INTRAMUSCULAR; INTRAVENOUS at 12:24

## 2019-12-15 RX ADMIN — BUPIVACAINE 20 ML: 13.3 INJECTION, SUSPENSION, LIPOSOMAL INFILTRATION at 14:35

## 2019-12-15 RX ADMIN — ACETAMINOPHEN 1000 MG: 10 INJECTION, SOLUTION INTRAVENOUS at 02:00

## 2019-12-15 RX ADMIN — KETAMINE HYDROCHLORIDE 10 MG: 10 INJECTION INTRAMUSCULAR; INTRAVENOUS at 13:37

## 2019-12-15 NOTE — ANESTHESIA PREPROCEDURE EVALUATION
Anesthesia Evaluation     Patient summary reviewed and Nursing notes reviewed   NPO Solid Status: > 8 hours  NPO Liquid Status: > 8 hours           Airway   Mallampati: II  TM distance: >3 FB  Neck ROM: full  No difficulty expected  Dental - normal exam     Pulmonary - negative pulmonary ROS and normal exam   Cardiovascular - normal exam    ECG reviewed    (+) hyperlipidemia,       Neuro/Psych- negative ROS  GI/Hepatic/Renal/Endo    (+)  hiatal hernia,  diabetes mellitus type 2,     Musculoskeletal     Abdominal    Substance History - negative use     OB/GYN          Other   arthritis,    history of cancer                    Anesthesia Plan    ASA 3 - emergent     general     intravenous induction     Anesthetic plan, all risks, benefits, and alternatives have been provided, discussed and informed consent has been obtained with: patient.    Plan discussed with attending and CRNA.

## 2019-12-15 NOTE — ANESTHESIA PROCEDURE NOTES
Peripheral Block      Start time: 12/15/2019 2:25 PM  Stop time: 12/15/2019 2:35 PM  Performed by  Anesthesiologist: Sunday Bhatia MD  Assisted by: Adolfo Santiago MD  Preanesthetic Checklist  Completed: patient identified, site marked, surgical consent, pre-op evaluation, timeout performed, IV checked, risks and benefits discussed and monitors and equipment checked  Prep:  Sterile barriers:cap, gloves and sterile barriers  Prep: ChloraPrep  Patient monitoring: blood pressure monitoring, continuous pulse oximetry and EKG  Procedure  Sedation:yes  Performed under: general  Guidance:ultrasound guided  Images:still images obtained, printed/placed on chart    Laterality:Bilateral  Block Type:TAP  Injection Technique:single-shotNeedle Gauge:20 G  Resistance on Injection: none    Medications Used: bupivacaine liposome (EXPAREL) 1.3 % injection, 20 mL  mepivacaine (CARBOCAINE) 1.5 % injection, 20 mL  Med admintered at 12/15/2019 2:35 PM      Post Assessment  Patient Tolerance:comfortable throughout block  Complications:no

## 2019-12-15 NOTE — ANESTHESIA PROCEDURE NOTES
Airway  Urgency: elective    Date/Time: 12/15/2019 11:53 AM  End Time:12/15/2019 11:53 AM  Airway not difficult    General Information and Staff    Patient location during procedure: OR  Anesthesiologist: Adolfo Santiago MD    Indications and Patient Condition  Indications for airway management: airway protection    Preoxygenated: yes  MILS maintained throughout  Mask difficulty assessment: 1 - vent by mask    Final Airway Details  Final airway type: endotracheal airway      Successful airway: ETT  Cuffed: yes   Successful intubation technique: direct laryngoscopy  Facilitating devices/methods: cricoid pressure  Endotracheal tube insertion site: oral  Blade: Delma  Blade size: 3  ETT size (mm): 7.5  Cormack-Lehane Classification: grade IIa - partial view of glottis  Placement verified by: chest auscultation and capnometry   Inital cuff pressure (cm H2O): 5  Cuff volume (mL): 5  Measured from: gums  ETT/EBT to gums (cm): 22  Number of attempts at approach: 1

## 2019-12-15 NOTE — ANESTHESIA POSTPROCEDURE EVALUATION
"Patient: She López    Procedure Summary     Date:  12/15/19 Room / Location:  [facility] OR  / [facility] MAIN OR    Anesthesia Start:  1143 Anesthesia Stop:  1454    Procedure:  LAPAROTOMY EXPLORATORY AND WASHOUT, RESECTION OF ANASTOMOSIS WITH END ILEOSTOMY (N/A Abdomen) Diagnosis:      Surgeon:  Dorcas Albrecht MD Provider:  Adolfo Santiago MD    Anesthesia Type:  general ASA Status:  3 - Emergent          Anesthesia Type: general    Vitals  Vitals Value Taken Time   /75 12/15/2019  4:59 PM   Temp 36.4 °C (97.6 °F) 12/15/2019  4:50 PM   Pulse 99 12/15/2019  4:57 PM   Resp 16 12/15/2019  4:50 PM   SpO2 97 % 12/15/2019  4:56 PM   Vitals shown include unvalidated device data.        Post Anesthesia Care and Evaluation    Patient location during evaluation: bedside  Patient participation: complete - patient participated  Level of consciousness: awake and alert  Pain score: 1  Pain management: adequate  Airway patency: patent  Anesthetic complications: No anesthetic complications  PONV Status: none  Cardiovascular status: acceptable and hemodynamically stable  Respiratory status: acceptable, nasal cannula and spontaneous ventilation  Hydration status: acceptable    Comments: /70 (BP Location: Right arm, Patient Position: Lying)   Pulse 98   Temp 36.4 °C (97.6 °F) (Oral)   Resp 16   Ht 165.1 cm (65\")   Wt 63 kg (138 lb 14.2 oz)   LMP  (LMP Unknown)   SpO2 98%   BMI 23.11 kg/m²         "

## 2019-12-16 LAB
ALBUMIN SERPL-MCNC: 1.9 G/DL (ref 3.5–5.2)
ANION GAP SERPL CALCULATED.3IONS-SCNC: 11.8 MMOL/L (ref 5–15)
BUN BLD-MCNC: 20 MG/DL (ref 8–23)
BUN/CREAT SERPL: 20.8 (ref 7–25)
BURR CELLS BLD QL SMEAR: ABNORMAL
CALCIUM SPEC-SCNC: 7.8 MG/DL (ref 8.6–10.5)
CHLORIDE SERPL-SCNC: 110 MMOL/L (ref 98–107)
CO2 SERPL-SCNC: 16.2 MMOL/L (ref 22–29)
CREAT BLD-MCNC: 0.96 MG/DL (ref 0.57–1)
DEPRECATED RDW RBC AUTO: 42.3 FL (ref 37–54)
ERYTHROCYTE [DISTWIDTH] IN BLOOD BY AUTOMATED COUNT: 13.6 % (ref 12.3–15.4)
GFR SERPL CREATININE-BSD FRML MDRD: 57 ML/MIN/1.73
GLUCOSE BLD-MCNC: 102 MG/DL (ref 65–99)
HCT VFR BLD AUTO: 23.2 % (ref 34–46.6)
HGB BLD-MCNC: 7.9 G/DL (ref 12–15.9)
HYPOCHROMIA BLD QL: ABNORMAL
LYMPHOCYTES # BLD MANUAL: 0.98 10*3/MM3 (ref 0.7–3.1)
LYMPHOCYTES NFR BLD MANUAL: 1 % (ref 5–12)
LYMPHOCYTES NFR BLD MANUAL: 7 % (ref 19.6–45.3)
MAGNESIUM SERPL-MCNC: 1.9 MG/DL (ref 1.6–2.4)
MCH RBC QN AUTO: 28.9 PG (ref 26.6–33)
MCHC RBC AUTO-ENTMCNC: 34.1 G/DL (ref 31.5–35.7)
MCV RBC AUTO: 85 FL (ref 79–97)
MONOCYTES # BLD AUTO: 0.14 10*3/MM3 (ref 0.1–0.9)
MYELOCYTES NFR BLD MANUAL: 1 % (ref 0–0)
NEUTROPHILS # BLD AUTO: 12.77 10*3/MM3 (ref 1.7–7)
NEUTROPHILS NFR BLD MANUAL: 91 % (ref 42.7–76)
NRBC SPEC MANUAL: 1 /100 WBC (ref 0–0.2)
PHOSPHATE SERPL-MCNC: 5.5 MG/DL (ref 2.5–4.5)
PLAT MORPH BLD: NORMAL
PLATELET # BLD AUTO: 398 10*3/MM3 (ref 140–450)
PMV BLD AUTO: 9.3 FL (ref 6–12)
POTASSIUM BLD-SCNC: 5.4 MMOL/L (ref 3.5–5.2)
RBC # BLD AUTO: 2.73 10*6/MM3 (ref 3.77–5.28)
SODIUM BLD-SCNC: 138 MMOL/L (ref 136–145)
WBC MORPH BLD: NORMAL
WBC NRBC COR # BLD: 14.03 10*3/MM3 (ref 3.4–10.8)

## 2019-12-16 PROCEDURE — C1751 CATH, INF, PER/CENT/MIDLINE: HCPCS

## 2019-12-16 PROCEDURE — 25010000002 HYDROMORPHONE PER 4 MG: Performed by: COLON & RECTAL SURGERY

## 2019-12-16 PROCEDURE — 85007 BL SMEAR W/DIFF WBC COUNT: CPT | Performed by: COLON & RECTAL SURGERY

## 2019-12-16 PROCEDURE — 83735 ASSAY OF MAGNESIUM: CPT | Performed by: COLON & RECTAL SURGERY

## 2019-12-16 PROCEDURE — 25010000002 METHYLPREDNISOLONE PER 40 MG: Performed by: COLON & RECTAL SURGERY

## 2019-12-16 PROCEDURE — 25010000002 CALCIUM GLUCONATE PER 10 ML: Performed by: COLON & RECTAL SURGERY

## 2019-12-16 PROCEDURE — 80069 RENAL FUNCTION PANEL: CPT | Performed by: COLON & RECTAL SURGERY

## 2019-12-16 PROCEDURE — 02HV33Z INSERTION OF INFUSION DEVICE INTO SUPERIOR VENA CAVA, PERCUTANEOUS APPROACH: ICD-10-PCS | Performed by: COLON & RECTAL SURGERY

## 2019-12-16 PROCEDURE — 25010000002 KETOROLAC TROMETHAMINE PER 15 MG: Performed by: COLON & RECTAL SURGERY

## 2019-12-16 PROCEDURE — 25010000002 MAGNESIUM SULFATE PER 500 MG OF MAGNESIUM: Performed by: COLON & RECTAL SURGERY

## 2019-12-16 PROCEDURE — 85025 COMPLETE CBC W/AUTO DIFF WBC: CPT | Performed by: COLON & RECTAL SURGERY

## 2019-12-16 PROCEDURE — 25010000002 PIPERACILLIN SOD-TAZOBACTAM PER 1 G: Performed by: COLON & RECTAL SURGERY

## 2019-12-16 RX ORDER — HYDROMORPHONE HYDROCHLORIDE 1 MG/ML
0.25 INJECTION, SOLUTION INTRAMUSCULAR; INTRAVENOUS; SUBCUTANEOUS
Status: DISCONTINUED | OUTPATIENT
Start: 2019-12-16 | End: 2019-12-25

## 2019-12-16 RX ORDER — METHYLPREDNISOLONE SODIUM SUCCINATE 40 MG/ML
20 INJECTION, POWDER, LYOPHILIZED, FOR SOLUTION INTRAMUSCULAR; INTRAVENOUS EVERY 12 HOURS
Status: DISCONTINUED | OUTPATIENT
Start: 2019-12-16 | End: 2019-12-17

## 2019-12-16 RX ADMIN — HYDROMORPHONE HYDROCHLORIDE 0.25 MG: 1 INJECTION, SOLUTION INTRAMUSCULAR; INTRAVENOUS; SUBCUTANEOUS at 22:31

## 2019-12-16 RX ADMIN — METHYLPREDNISOLONE SODIUM SUCCINATE 20 MG: 40 INJECTION, POWDER, FOR SOLUTION INTRAMUSCULAR; INTRAVENOUS at 08:03

## 2019-12-16 RX ADMIN — SODIUM CHLORIDE, PRESERVATIVE FREE 3 ML: 5 INJECTION INTRAVENOUS at 08:04

## 2019-12-16 RX ADMIN — HYDROMORPHONE HYDROCHLORIDE 0.25 MG: 1 INJECTION, SOLUTION INTRAMUSCULAR; INTRAVENOUS; SUBCUTANEOUS at 17:02

## 2019-12-16 RX ADMIN — SODIUM CHLORIDE, PRESERVATIVE FREE 10 ML: 5 INJECTION INTRAVENOUS at 22:33

## 2019-12-16 RX ADMIN — TAZOBACTAM SODIUM AND PIPERACILLIN SODIUM 3.38 G: 375; 3 INJECTION, SOLUTION INTRAVENOUS at 06:47

## 2019-12-16 RX ADMIN — KETOROLAC TROMETHAMINE 15 MG: 30 INJECTION, SOLUTION INTRAMUSCULAR at 12:02

## 2019-12-16 RX ADMIN — TAZOBACTAM SODIUM AND PIPERACILLIN SODIUM 3.38 G: 375; 3 INJECTION, SOLUTION INTRAVENOUS at 21:36

## 2019-12-16 RX ADMIN — METHYLPREDNISOLONE SODIUM SUCCINATE 20 MG: 40 INJECTION, POWDER, FOR SOLUTION INTRAMUSCULAR; INTRAVENOUS at 21:36

## 2019-12-16 RX ADMIN — SODIUM CHLORIDE 100 ML/HR: 9 INJECTION, SOLUTION INTRAVENOUS at 05:20

## 2019-12-16 RX ADMIN — ACETAMINOPHEN 1000 MG: 10 INJECTION, SOLUTION INTRAVENOUS at 02:03

## 2019-12-16 RX ADMIN — ACETAMINOPHEN 1000 MG: 10 INJECTION, SOLUTION INTRAVENOUS at 14:44

## 2019-12-16 RX ADMIN — FAMOTIDINE 20 MG: 10 INJECTION INTRAVENOUS at 08:04

## 2019-12-16 RX ADMIN — ACETAMINOPHEN 1000 MG: 10 INJECTION, SOLUTION INTRAVENOUS at 08:04

## 2019-12-16 RX ADMIN — ACETAMINOPHEN 1000 MG: 10 INJECTION, SOLUTION INTRAVENOUS at 22:33

## 2019-12-16 RX ADMIN — TAZOBACTAM SODIUM AND PIPERACILLIN SODIUM 3.38 G: 375; 3 INJECTION, SOLUTION INTRAVENOUS at 14:43

## 2019-12-16 RX ADMIN — FAMOTIDINE: 10 INJECTION, SOLUTION INTRAVENOUS at 17:47

## 2019-12-17 LAB
ALBUMIN SERPL-MCNC: 1.8 G/DL (ref 3.5–5.2)
ANION GAP SERPL CALCULATED.3IONS-SCNC: 12.2 MMOL/L (ref 5–15)
BACTERIA SPEC AEROBE CULT: ABNORMAL
BACTERIA SPEC AEROBE CULT: ABNORMAL
BASOPHILS # BLD AUTO: 0.02 10*3/MM3 (ref 0–0.2)
BASOPHILS NFR BLD AUTO: 0.1 % (ref 0–1.5)
BUN BLD-MCNC: 33 MG/DL (ref 8–23)
BUN/CREAT SERPL: 34.7 (ref 7–25)
CALCIUM SPEC-SCNC: 8 MG/DL (ref 8.6–10.5)
CHLORIDE SERPL-SCNC: 106 MMOL/L (ref 98–107)
CO2 SERPL-SCNC: 17.8 MMOL/L (ref 22–29)
CREAT BLD-MCNC: 0.95 MG/DL (ref 0.57–1)
CYTO UR: NORMAL
DEPRECATED RDW RBC AUTO: 39.2 FL (ref 37–54)
EOSINOPHIL # BLD AUTO: 0 10*3/MM3 (ref 0–0.4)
EOSINOPHIL NFR BLD AUTO: 0 % (ref 0.3–6.2)
ERYTHROCYTE [DISTWIDTH] IN BLOOD BY AUTOMATED COUNT: 12.8 % (ref 12.3–15.4)
GFR SERPL CREATININE-BSD FRML MDRD: 58 ML/MIN/1.73
GLUCOSE BLD-MCNC: 258 MG/DL (ref 65–99)
GLUCOSE BLDC GLUCOMTR-MCNC: 192 MG/DL (ref 70–130)
GLUCOSE BLDC GLUCOMTR-MCNC: 199 MG/DL (ref 70–130)
GLUCOSE BLDC GLUCOMTR-MCNC: 229 MG/DL (ref 70–130)
GLUCOSE BLDC GLUCOMTR-MCNC: 254 MG/DL (ref 70–130)
GRAM STN SPEC: ABNORMAL
HCT VFR BLD AUTO: 21.1 % (ref 34–46.6)
HGB BLD-MCNC: 7.2 G/DL (ref 12–15.9)
LAB AP CASE REPORT: NORMAL
LAB AP DIAGNOSIS COMMENT: NORMAL
LYMPHOCYTES # BLD AUTO: 0.46 10*3/MM3 (ref 0.7–3.1)
LYMPHOCYTES NFR BLD AUTO: 3 % (ref 19.6–45.3)
MAGNESIUM SERPL-MCNC: 2.3 MG/DL (ref 1.6–2.4)
MCH RBC QN AUTO: 28.7 PG (ref 26.6–33)
MCHC RBC AUTO-ENTMCNC: 34.1 G/DL (ref 31.5–35.7)
MCV RBC AUTO: 84.1 FL (ref 79–97)
MONOCYTES # BLD AUTO: 0.36 10*3/MM3 (ref 0.1–0.9)
MONOCYTES NFR BLD AUTO: 2.4 % (ref 5–12)
NEUTROPHILS # BLD AUTO: 13.52 10*3/MM3 (ref 1.7–7)
NEUTROPHILS NFR BLD AUTO: 89.5 % (ref 42.7–76)
PATH REPORT.FINAL DX SPEC: NORMAL
PATH REPORT.GROSS SPEC: NORMAL
PHOSPHATE SERPL-MCNC: 3.6 MG/DL (ref 2.5–4.5)
PLATELET # BLD AUTO: 445 10*3/MM3 (ref 140–450)
PMV BLD AUTO: 9.4 FL (ref 6–12)
POTASSIUM BLD-SCNC: 5.2 MMOL/L (ref 3.5–5.2)
RBC # BLD AUTO: 2.51 10*6/MM3 (ref 3.77–5.28)
SODIUM BLD-SCNC: 136 MMOL/L (ref 136–145)
TRIGL SERPL-MCNC: 92 MG/DL (ref 0–150)
WBC NRBC COR # BLD: 15.11 10*3/MM3 (ref 3.4–10.8)

## 2019-12-17 PROCEDURE — 85025 COMPLETE CBC W/AUTO DIFF WBC: CPT | Performed by: PHYSICIAN ASSISTANT

## 2019-12-17 PROCEDURE — P9016 RBC LEUKOCYTES REDUCED: HCPCS

## 2019-12-17 PROCEDURE — 86900 BLOOD TYPING SEROLOGIC ABO: CPT

## 2019-12-17 PROCEDURE — 82962 GLUCOSE BLOOD TEST: CPT

## 2019-12-17 PROCEDURE — 80069 RENAL FUNCTION PANEL: CPT | Performed by: PHYSICIAN ASSISTANT

## 2019-12-17 PROCEDURE — 63710000001 INSULIN LISPRO (HUMAN) PER 5 UNITS: Performed by: COLON & RECTAL SURGERY

## 2019-12-17 PROCEDURE — 25010000002 PIPERACILLIN SOD-TAZOBACTAM PER 1 G: Performed by: COLON & RECTAL SURGERY

## 2019-12-17 PROCEDURE — 25010000002 HYDROMORPHONE PER 4 MG: Performed by: COLON & RECTAL SURGERY

## 2019-12-17 PROCEDURE — 25010000002 CALCIUM GLUCONATE PER 10 ML: Performed by: COLON & RECTAL SURGERY

## 2019-12-17 PROCEDURE — 25010000002 KETOROLAC TROMETHAMINE PER 15 MG: Performed by: COLON & RECTAL SURGERY

## 2019-12-17 PROCEDURE — 83735 ASSAY OF MAGNESIUM: CPT | Performed by: PHYSICIAN ASSISTANT

## 2019-12-17 PROCEDURE — 84478 ASSAY OF TRIGLYCERIDES: CPT | Performed by: COLON & RECTAL SURGERY

## 2019-12-17 PROCEDURE — 25010000002 MAGNESIUM SULFATE PER 500 MG OF MAGNESIUM: Performed by: COLON & RECTAL SURGERY

## 2019-12-17 PROCEDURE — 36430 TRANSFUSION BLD/BLD COMPNT: CPT

## 2019-12-17 RX ORDER — DEXTROSE MONOHYDRATE 25 G/50ML
25 INJECTION, SOLUTION INTRAVENOUS
Status: DISCONTINUED | OUTPATIENT
Start: 2019-12-17 | End: 2019-12-28 | Stop reason: HOSPADM

## 2019-12-17 RX ORDER — NICOTINE POLACRILEX 4 MG
15 LOZENGE BUCCAL
Status: DISCONTINUED | OUTPATIENT
Start: 2019-12-17 | End: 2019-12-28 | Stop reason: HOSPADM

## 2019-12-17 RX ORDER — METHYLPREDNISOLONE SODIUM SUCCINATE 40 MG/ML
20 INJECTION, POWDER, LYOPHILIZED, FOR SOLUTION INTRAMUSCULAR; INTRAVENOUS EVERY 24 HOURS
Status: DISCONTINUED | OUTPATIENT
Start: 2019-12-18 | End: 2019-12-25

## 2019-12-17 RX ADMIN — INSULIN LISPRO 3 UNITS: 100 INJECTION, SOLUTION INTRAVENOUS; SUBCUTANEOUS at 12:44

## 2019-12-17 RX ADMIN — ACETAMINOPHEN 1000 MG: 10 INJECTION, SOLUTION INTRAVENOUS at 14:54

## 2019-12-17 RX ADMIN — ACETAMINOPHEN 1000 MG: 10 INJECTION, SOLUTION INTRAVENOUS at 19:57

## 2019-12-17 RX ADMIN — KETOROLAC TROMETHAMINE 15 MG: 30 INJECTION, SOLUTION INTRAMUSCULAR at 18:05

## 2019-12-17 RX ADMIN — INSULIN LISPRO 2 UNITS: 100 INJECTION, SOLUTION INTRAVENOUS; SUBCUTANEOUS at 21:33

## 2019-12-17 RX ADMIN — I.V. FAT EMULSION 20 G: 20 EMULSION INTRAVENOUS at 18:06

## 2019-12-17 RX ADMIN — SODIUM CHLORIDE, PRESERVATIVE FREE 3 ML: 5 INJECTION INTRAVENOUS at 20:01

## 2019-12-17 RX ADMIN — FAMOTIDINE: 10 INJECTION, SOLUTION INTRAVENOUS at 18:06

## 2019-12-17 RX ADMIN — ACETAMINOPHEN 1000 MG: 10 INJECTION, SOLUTION INTRAVENOUS at 08:48

## 2019-12-17 RX ADMIN — TAZOBACTAM SODIUM AND PIPERACILLIN SODIUM 3.38 G: 375; 3 INJECTION, SOLUTION INTRAVENOUS at 21:33

## 2019-12-17 RX ADMIN — HYDROMORPHONE HYDROCHLORIDE 0.25 MG: 1 INJECTION, SOLUTION INTRAMUSCULAR; INTRAVENOUS; SUBCUTANEOUS at 05:27

## 2019-12-17 RX ADMIN — INSULIN LISPRO 2 UNITS: 100 INJECTION, SOLUTION INTRAVENOUS; SUBCUTANEOUS at 18:05

## 2019-12-17 RX ADMIN — SODIUM CHLORIDE, PRESERVATIVE FREE 3 ML: 5 INJECTION INTRAVENOUS at 08:48

## 2019-12-17 RX ADMIN — TAZOBACTAM SODIUM AND PIPERACILLIN SODIUM 3.38 G: 375; 3 INJECTION, SOLUTION INTRAVENOUS at 05:15

## 2019-12-17 RX ADMIN — KETOROLAC TROMETHAMINE 15 MG: 30 INJECTION, SOLUTION INTRAMUSCULAR at 09:57

## 2019-12-17 RX ADMIN — KETOROLAC TROMETHAMINE 15 MG: 30 INJECTION, SOLUTION INTRAMUSCULAR at 00:33

## 2019-12-17 RX ADMIN — TAZOBACTAM SODIUM AND PIPERACILLIN SODIUM 3.38 G: 375; 3 INJECTION, SOLUTION INTRAVENOUS at 15:21

## 2019-12-17 RX ADMIN — ACETAMINOPHEN 1000 MG: 10 INJECTION, SOLUTION INTRAVENOUS at 01:44

## 2019-12-17 RX ADMIN — INSULIN LISPRO 4 UNITS: 100 INJECTION, SOLUTION INTRAVENOUS; SUBCUTANEOUS at 09:56

## 2019-12-17 RX ADMIN — HYDROMORPHONE HYDROCHLORIDE 0.25 MG: 1 INJECTION, SOLUTION INTRAMUSCULAR; INTRAVENOUS; SUBCUTANEOUS at 11:17

## 2019-12-18 LAB
ABO + RH BLD: NORMAL
ABO + RH BLD: NORMAL
ALBUMIN SERPL-MCNC: 1.5 G/DL (ref 3.5–5.2)
ANION GAP SERPL CALCULATED.3IONS-SCNC: 9.6 MMOL/L (ref 5–15)
ANISOCYTOSIS BLD QL: ABNORMAL
BH BB BLOOD EXPIRATION DATE: NORMAL
BH BB BLOOD EXPIRATION DATE: NORMAL
BH BB BLOOD TYPE BARCODE: 6200
BH BB BLOOD TYPE BARCODE: 6200
BH BB DISPENSE STATUS: NORMAL
BH BB DISPENSE STATUS: NORMAL
BH BB PRODUCT CODE: NORMAL
BH BB PRODUCT CODE: NORMAL
BH BB UNIT NUMBER: NORMAL
BH BB UNIT NUMBER: NORMAL
BUN BLD-MCNC: 27 MG/DL (ref 8–23)
BUN/CREAT SERPL: 44.3 (ref 7–25)
BURR CELLS BLD QL SMEAR: ABNORMAL
CALCIUM SPEC-SCNC: 8.1 MG/DL (ref 8.6–10.5)
CHLORIDE SERPL-SCNC: 107 MMOL/L (ref 98–107)
CO2 SERPL-SCNC: 19.4 MMOL/L (ref 22–29)
CREAT BLD-MCNC: 0.61 MG/DL (ref 0.57–1)
DEPRECATED RDW RBC AUTO: 43 FL (ref 37–54)
EOSINOPHIL # BLD MANUAL: 0.12 10*3/MM3 (ref 0–0.4)
EOSINOPHIL NFR BLD MANUAL: 1 % (ref 0.3–6.2)
ERYTHROCYTE [DISTWIDTH] IN BLOOD BY AUTOMATED COUNT: 13.3 % (ref 12.3–15.4)
GFR SERPL CREATININE-BSD FRML MDRD: 96 ML/MIN/1.73
GLUCOSE BLD-MCNC: 185 MG/DL (ref 65–99)
GLUCOSE BLDC GLUCOMTR-MCNC: 160 MG/DL (ref 70–130)
GLUCOSE BLDC GLUCOMTR-MCNC: 182 MG/DL (ref 70–130)
GLUCOSE BLDC GLUCOMTR-MCNC: 195 MG/DL (ref 70–130)
GLUCOSE BLDC GLUCOMTR-MCNC: 247 MG/DL (ref 70–130)
HCT VFR BLD AUTO: 29.2 % (ref 34–46.6)
HGB BLD-MCNC: 9.8 G/DL (ref 12–15.9)
LYMPHOCYTES # BLD MANUAL: 0.35 10*3/MM3 (ref 0.7–3.1)
LYMPHOCYTES NFR BLD MANUAL: 3 % (ref 19.6–45.3)
LYMPHOCYTES NFR BLD MANUAL: 4 % (ref 5–12)
MAGNESIUM SERPL-MCNC: 2 MG/DL (ref 1.6–2.4)
MCH RBC QN AUTO: 29.5 PG (ref 26.6–33)
MCHC RBC AUTO-ENTMCNC: 33.6 G/DL (ref 31.5–35.7)
MCV RBC AUTO: 88 FL (ref 79–97)
MICROCYTES BLD QL: ABNORMAL
MONOCYTES # BLD AUTO: 0.47 10*3/MM3 (ref 0.1–0.9)
NEUTROPHILS # BLD AUTO: 10.72 10*3/MM3 (ref 1.7–7)
NEUTROPHILS NFR BLD MANUAL: 90.9 % (ref 42.7–76)
OVALOCYTES BLD QL SMEAR: ABNORMAL
PHOSPHATE SERPL-MCNC: 2 MG/DL (ref 2.5–4.5)
PLAT MORPH BLD: NORMAL
PLATELET # BLD AUTO: 430 10*3/MM3 (ref 140–450)
PMV BLD AUTO: 9.3 FL (ref 6–12)
POIKILOCYTOSIS BLD QL SMEAR: ABNORMAL
POLYCHROMASIA BLD QL SMEAR: ABNORMAL
POTASSIUM BLD-SCNC: 3.7 MMOL/L (ref 3.5–5.2)
RBC # BLD AUTO: 3.32 10*6/MM3 (ref 3.77–5.28)
SODIUM BLD-SCNC: 136 MMOL/L (ref 136–145)
SPHEROCYTES BLD QL SMEAR: ABNORMAL
UNIT  ABO: NORMAL
UNIT  ABO: NORMAL
UNIT  RH: NORMAL
UNIT  RH: NORMAL
VARIANT LYMPHS NFR BLD MANUAL: 1 % (ref 0–5)
WBC MORPH BLD: NORMAL
WBC NRBC COR # BLD: 11.79 10*3/MM3 (ref 3.4–10.8)

## 2019-12-18 PROCEDURE — 82962 GLUCOSE BLOOD TEST: CPT

## 2019-12-18 PROCEDURE — 25010000002 POTASSIUM CHLORIDE PER 2 MEQ OF POTASSIUM: Performed by: COLON & RECTAL SURGERY

## 2019-12-18 PROCEDURE — 85007 BL SMEAR W/DIFF WBC COUNT: CPT | Performed by: PHYSICIAN ASSISTANT

## 2019-12-18 PROCEDURE — 25010000002 PIPERACILLIN SOD-TAZOBACTAM PER 1 G: Performed by: COLON & RECTAL SURGERY

## 2019-12-18 PROCEDURE — 25010000002 CALCIUM GLUCONATE PER 10 ML: Performed by: COLON & RECTAL SURGERY

## 2019-12-18 PROCEDURE — 25010000002 HYDROMORPHONE PER 4 MG: Performed by: COLON & RECTAL SURGERY

## 2019-12-18 PROCEDURE — 25010000002 METHYLPREDNISOLONE PER 40 MG: Performed by: COLON & RECTAL SURGERY

## 2019-12-18 PROCEDURE — 80069 RENAL FUNCTION PANEL: CPT | Performed by: PHYSICIAN ASSISTANT

## 2019-12-18 PROCEDURE — 85025 COMPLETE CBC W/AUTO DIFF WBC: CPT | Performed by: PHYSICIAN ASSISTANT

## 2019-12-18 PROCEDURE — 63710000001 INSULIN LISPRO (HUMAN) PER 5 UNITS: Performed by: COLON & RECTAL SURGERY

## 2019-12-18 PROCEDURE — 25010000002 KETOROLAC TROMETHAMINE PER 15 MG: Performed by: COLON & RECTAL SURGERY

## 2019-12-18 PROCEDURE — 25010000002 METHOCARBAMOL 1000 MG/10ML SOLUTION: Performed by: COLON & RECTAL SURGERY

## 2019-12-18 PROCEDURE — 83735 ASSAY OF MAGNESIUM: CPT | Performed by: PHYSICIAN ASSISTANT

## 2019-12-18 PROCEDURE — 25010000002 MAGNESIUM SULFATE PER 500 MG OF MAGNESIUM: Performed by: COLON & RECTAL SURGERY

## 2019-12-18 RX ADMIN — KETOROLAC TROMETHAMINE 15 MG: 30 INJECTION, SOLUTION INTRAMUSCULAR at 04:00

## 2019-12-18 RX ADMIN — METHOCARBAMOL 500 MG: 100 INJECTION, SOLUTION INTRAMUSCULAR; INTRAVENOUS at 10:16

## 2019-12-18 RX ADMIN — INSULIN LISPRO 2 UNITS: 100 INJECTION, SOLUTION INTRAVENOUS; SUBCUTANEOUS at 08:50

## 2019-12-18 RX ADMIN — KETOROLAC TROMETHAMINE 15 MG: 30 INJECTION, SOLUTION INTRAMUSCULAR at 22:48

## 2019-12-18 RX ADMIN — ACETAMINOPHEN 1000 MG: 10 INJECTION, SOLUTION INTRAVENOUS at 14:54

## 2019-12-18 RX ADMIN — ACETAMINOPHEN 1000 MG: 10 INJECTION, SOLUTION INTRAVENOUS at 01:52

## 2019-12-18 RX ADMIN — SODIUM CHLORIDE, PRESERVATIVE FREE 3 ML: 5 INJECTION INTRAVENOUS at 08:51

## 2019-12-18 RX ADMIN — INSULIN LISPRO 2 UNITS: 100 INJECTION, SOLUTION INTRAVENOUS; SUBCUTANEOUS at 21:15

## 2019-12-18 RX ADMIN — HYDROMORPHONE HYDROCHLORIDE 0.25 MG: 1 INJECTION, SOLUTION INTRAMUSCULAR; INTRAVENOUS; SUBCUTANEOUS at 11:31

## 2019-12-18 RX ADMIN — TAZOBACTAM SODIUM AND PIPERACILLIN SODIUM 3.38 G: 375; 3 INJECTION, SOLUTION INTRAVENOUS at 21:15

## 2019-12-18 RX ADMIN — INSULIN LISPRO 3 UNITS: 100 INJECTION, SOLUTION INTRAVENOUS; SUBCUTANEOUS at 18:07

## 2019-12-18 RX ADMIN — TAZOBACTAM SODIUM AND PIPERACILLIN SODIUM 3.38 G: 375; 3 INJECTION, SOLUTION INTRAVENOUS at 05:56

## 2019-12-18 RX ADMIN — METHOCARBAMOL 500 MG: 100 INJECTION, SOLUTION INTRAMUSCULAR; INTRAVENOUS at 18:23

## 2019-12-18 RX ADMIN — POTASSIUM CHLORIDE: 2 INJECTION, SOLUTION, CONCENTRATE INTRAVENOUS at 18:07

## 2019-12-18 RX ADMIN — INSULIN LISPRO 2 UNITS: 100 INJECTION, SOLUTION INTRAVENOUS; SUBCUTANEOUS at 11:32

## 2019-12-18 RX ADMIN — KETOROLAC TROMETHAMINE 15 MG: 30 INJECTION, SOLUTION INTRAMUSCULAR at 14:41

## 2019-12-18 RX ADMIN — I.V. FAT EMULSION 20 G: 20 EMULSION INTRAVENOUS at 18:07

## 2019-12-18 RX ADMIN — ACETAMINOPHEN 1000 MG: 10 INJECTION, SOLUTION INTRAVENOUS at 08:50

## 2019-12-18 RX ADMIN — TAZOBACTAM SODIUM AND PIPERACILLIN SODIUM 3.38 G: 375; 3 INJECTION, SOLUTION INTRAVENOUS at 14:41

## 2019-12-18 RX ADMIN — ACETAMINOPHEN 1000 MG: 10 INJECTION, SOLUTION INTRAVENOUS at 19:59

## 2019-12-18 RX ADMIN — SODIUM CHLORIDE, PRESERVATIVE FREE 3 ML: 5 INJECTION INTRAVENOUS at 20:03

## 2019-12-18 RX ADMIN — METHYLPREDNISOLONE SODIUM SUCCINATE 20 MG: 40 INJECTION, POWDER, FOR SOLUTION INTRAMUSCULAR; INTRAVENOUS at 08:51

## 2019-12-19 LAB
ALBUMIN SERPL-MCNC: 1.8 G/DL (ref 3.5–5.2)
ANION GAP SERPL CALCULATED.3IONS-SCNC: 11.2 MMOL/L (ref 5–15)
BACTERIA UR QL AUTO: ABNORMAL /HPF
BASOPHILS # BLD AUTO: 0.02 10*3/MM3 (ref 0–0.2)
BASOPHILS NFR BLD AUTO: 0.2 % (ref 0–1.5)
BILIRUB UR QL STRIP: NEGATIVE
BUN BLD-MCNC: 19 MG/DL (ref 8–23)
BUN/CREAT SERPL: 32.2 (ref 7–25)
CALCIUM SPEC-SCNC: 8.3 MG/DL (ref 8.6–10.5)
CHLORIDE SERPL-SCNC: 107 MMOL/L (ref 98–107)
CLARITY UR: ABNORMAL
CO2 SERPL-SCNC: 19.8 MMOL/L (ref 22–29)
COLOR UR: YELLOW
CREAT BLD-MCNC: 0.59 MG/DL (ref 0.57–1)
DEPRECATED RDW RBC AUTO: 42.7 FL (ref 37–54)
EOSINOPHIL # BLD AUTO: 0.06 10*3/MM3 (ref 0–0.4)
EOSINOPHIL NFR BLD AUTO: 0.5 % (ref 0.3–6.2)
ERYTHROCYTE [DISTWIDTH] IN BLOOD BY AUTOMATED COUNT: 13.2 % (ref 12.3–15.4)
GFR SERPL CREATININE-BSD FRML MDRD: 100 ML/MIN/1.73
GLUCOSE BLD-MCNC: 164 MG/DL (ref 65–99)
GLUCOSE BLDC GLUCOMTR-MCNC: 141 MG/DL (ref 70–130)
GLUCOSE BLDC GLUCOMTR-MCNC: 152 MG/DL (ref 70–130)
GLUCOSE BLDC GLUCOMTR-MCNC: 176 MG/DL (ref 70–130)
GLUCOSE BLDC GLUCOMTR-MCNC: 217 MG/DL (ref 70–130)
GLUCOSE UR STRIP-MCNC: ABNORMAL MG/DL
HCT VFR BLD AUTO: 28.8 % (ref 34–46.6)
HGB BLD-MCNC: 9.4 G/DL (ref 12–15.9)
HGB UR QL STRIP.AUTO: ABNORMAL
HYALINE CASTS UR QL AUTO: ABNORMAL /LPF
IMM GRANULOCYTES # BLD AUTO: 0.28 10*3/MM3 (ref 0–0.05)
IMM GRANULOCYTES NFR BLD AUTO: 2.4 % (ref 0–0.5)
KETONES UR QL STRIP: NEGATIVE
LEUKOCYTE ESTERASE UR QL STRIP.AUTO: ABNORMAL
LYMPHOCYTES # BLD AUTO: 0.64 10*3/MM3 (ref 0.7–3.1)
LYMPHOCYTES NFR BLD AUTO: 5.5 % (ref 19.6–45.3)
MAGNESIUM SERPL-MCNC: 1.9 MG/DL (ref 1.6–2.4)
MCH RBC QN AUTO: 29 PG (ref 26.6–33)
MCHC RBC AUTO-ENTMCNC: 32.6 G/DL (ref 31.5–35.7)
MCV RBC AUTO: 88.9 FL (ref 79–97)
MONOCYTES # BLD AUTO: 0.6 10*3/MM3 (ref 0.1–0.9)
MONOCYTES NFR BLD AUTO: 5.2 % (ref 5–12)
NEUTROPHILS # BLD AUTO: 10.02 10*3/MM3 (ref 1.7–7)
NEUTROPHILS NFR BLD AUTO: 86.2 % (ref 42.7–76)
NITRITE UR QL STRIP: NEGATIVE
NRBC BLD AUTO-RTO: 0 /100 WBC (ref 0–0.2)
PH UR STRIP.AUTO: <=5 [PH] (ref 5–8)
PHOSPHATE SERPL-MCNC: 2.1 MG/DL (ref 2.5–4.5)
PLATELET # BLD AUTO: 491 10*3/MM3 (ref 140–450)
PMV BLD AUTO: 9.2 FL (ref 6–12)
POTASSIUM BLD-SCNC: 4 MMOL/L (ref 3.5–5.2)
PROT UR QL STRIP: ABNORMAL
RBC # BLD AUTO: 3.24 10*6/MM3 (ref 3.77–5.28)
RBC # UR: ABNORMAL /HPF
REF LAB TEST METHOD: ABNORMAL
SODIUM BLD-SCNC: 138 MMOL/L (ref 136–145)
SP GR UR STRIP: >=1.03 (ref 1–1.03)
SQUAMOUS #/AREA URNS HPF: ABNORMAL /HPF
UROBILINOGEN UR QL STRIP: ABNORMAL
WBC NRBC COR # BLD: 11.62 10*3/MM3 (ref 3.4–10.8)
WBC UR QL AUTO: ABNORMAL /HPF
YEAST URNS QL MICRO: ABNORMAL /HPF

## 2019-12-19 PROCEDURE — 25010000002 HYDROMORPHONE PER 4 MG: Performed by: COLON & RECTAL SURGERY

## 2019-12-19 PROCEDURE — 63710000001 INSULIN LISPRO (HUMAN) PER 5 UNITS: Performed by: COLON & RECTAL SURGERY

## 2019-12-19 PROCEDURE — 25010000002 MAGNESIUM SULFATE PER 500 MG OF MAGNESIUM: Performed by: COLON & RECTAL SURGERY

## 2019-12-19 PROCEDURE — 25010000002 METHOCARBAMOL 1000 MG/10ML SOLUTION: Performed by: COLON & RECTAL SURGERY

## 2019-12-19 PROCEDURE — 81001 URINALYSIS AUTO W/SCOPE: CPT | Performed by: PHYSICIAN ASSISTANT

## 2019-12-19 PROCEDURE — 25010000002 PIPERACILLIN SOD-TAZOBACTAM PER 1 G: Performed by: COLON & RECTAL SURGERY

## 2019-12-19 PROCEDURE — 25010000002 METHYLPREDNISOLONE PER 40 MG: Performed by: COLON & RECTAL SURGERY

## 2019-12-19 PROCEDURE — 82962 GLUCOSE BLOOD TEST: CPT

## 2019-12-19 PROCEDURE — 25010000002 POTASSIUM CHLORIDE PER 2 MEQ OF POTASSIUM: Performed by: COLON & RECTAL SURGERY

## 2019-12-19 PROCEDURE — 80069 RENAL FUNCTION PANEL: CPT | Performed by: PHYSICIAN ASSISTANT

## 2019-12-19 PROCEDURE — 25010000002 KETOROLAC TROMETHAMINE PER 15 MG: Performed by: COLON & RECTAL SURGERY

## 2019-12-19 PROCEDURE — 85025 COMPLETE CBC W/AUTO DIFF WBC: CPT | Performed by: PHYSICIAN ASSISTANT

## 2019-12-19 PROCEDURE — 25010000002 CALCIUM GLUCONATE PER 10 ML: Performed by: COLON & RECTAL SURGERY

## 2019-12-19 PROCEDURE — 83735 ASSAY OF MAGNESIUM: CPT | Performed by: PHYSICIAN ASSISTANT

## 2019-12-19 RX ADMIN — METHOCARBAMOL 500 MG: 100 INJECTION, SOLUTION INTRAMUSCULAR; INTRAVENOUS at 06:43

## 2019-12-19 RX ADMIN — HYDROMORPHONE HYDROCHLORIDE 0.25 MG: 1 INJECTION, SOLUTION INTRAMUSCULAR; INTRAVENOUS; SUBCUTANEOUS at 10:10

## 2019-12-19 RX ADMIN — METHOCARBAMOL 500 MG: 100 INJECTION, SOLUTION INTRAMUSCULAR; INTRAVENOUS at 14:29

## 2019-12-19 RX ADMIN — SODIUM CHLORIDE, PRESERVATIVE FREE 3 ML: 5 INJECTION INTRAVENOUS at 09:19

## 2019-12-19 RX ADMIN — TAZOBACTAM SODIUM AND PIPERACILLIN SODIUM 3.38 G: 375; 3 INJECTION, SOLUTION INTRAVENOUS at 13:21

## 2019-12-19 RX ADMIN — INSULIN LISPRO 2 UNITS: 100 INJECTION, SOLUTION INTRAVENOUS; SUBCUTANEOUS at 12:26

## 2019-12-19 RX ADMIN — KETOROLAC TROMETHAMINE 15 MG: 30 INJECTION, SOLUTION INTRAMUSCULAR at 17:29

## 2019-12-19 RX ADMIN — KETOROLAC TROMETHAMINE 15 MG: 30 INJECTION, SOLUTION INTRAMUSCULAR at 09:19

## 2019-12-19 RX ADMIN — INSULIN LISPRO 2 UNITS: 100 INJECTION, SOLUTION INTRAVENOUS; SUBCUTANEOUS at 09:19

## 2019-12-19 RX ADMIN — POTASSIUM CHLORIDE: 2 INJECTION, SOLUTION, CONCENTRATE INTRAVENOUS at 17:34

## 2019-12-19 RX ADMIN — I.V. FAT EMULSION 20 G: 20 EMULSION INTRAVENOUS at 17:33

## 2019-12-19 RX ADMIN — ACETAMINOPHEN 1000 MG: 10 INJECTION, SOLUTION INTRAVENOUS at 13:21

## 2019-12-19 RX ADMIN — TAZOBACTAM SODIUM AND PIPERACILLIN SODIUM 3.38 G: 375; 3 INJECTION, SOLUTION INTRAVENOUS at 05:54

## 2019-12-19 RX ADMIN — ACETAMINOPHEN 1000 MG: 10 INJECTION, SOLUTION INTRAVENOUS at 09:18

## 2019-12-19 RX ADMIN — TAZOBACTAM SODIUM AND PIPERACILLIN SODIUM 3.38 G: 375; 3 INJECTION, SOLUTION INTRAVENOUS at 21:59

## 2019-12-19 RX ADMIN — ACETAMINOPHEN 1000 MG: 10 INJECTION, SOLUTION INTRAVENOUS at 02:05

## 2019-12-19 RX ADMIN — INSULIN LISPRO 3 UNITS: 100 INJECTION, SOLUTION INTRAVENOUS; SUBCUTANEOUS at 17:30

## 2019-12-19 RX ADMIN — METHOCARBAMOL 500 MG: 100 INJECTION, SOLUTION INTRAMUSCULAR; INTRAVENOUS at 22:30

## 2019-12-19 RX ADMIN — ACETAMINOPHEN 1000 MG: 10 INJECTION, SOLUTION INTRAVENOUS at 19:13

## 2019-12-19 RX ADMIN — METHYLPREDNISOLONE SODIUM SUCCINATE 20 MG: 40 INJECTION, POWDER, FOR SOLUTION INTRAMUSCULAR; INTRAVENOUS at 09:19

## 2019-12-19 RX ADMIN — SODIUM CHLORIDE, PRESERVATIVE FREE 3 ML: 5 INJECTION INTRAVENOUS at 20:13

## 2019-12-20 ENCOUNTER — APPOINTMENT (OUTPATIENT)
Dept: CT IMAGING | Facility: HOSPITAL | Age: 74
End: 2019-12-20

## 2019-12-20 ENCOUNTER — ANESTHESIA EVENT (OUTPATIENT)
Dept: PERIOP | Facility: HOSPITAL | Age: 74
End: 2019-12-20

## 2019-12-20 ENCOUNTER — ANESTHESIA (OUTPATIENT)
Dept: PERIOP | Facility: HOSPITAL | Age: 74
End: 2019-12-20

## 2019-12-20 LAB
ABO GROUP BLD: NORMAL
ALBUMIN SERPL-MCNC: 2.2 G/DL (ref 3.5–5.2)
ANION GAP SERPL CALCULATED.3IONS-SCNC: 9.6 MMOL/L (ref 5–15)
ANISOCYTOSIS BLD QL: ABNORMAL
BACTERIA SPEC ANAEROBE CULT: ABNORMAL
BLD GP AB SCN SERPL QL: NEGATIVE
BUN BLD-MCNC: 18 MG/DL (ref 8–23)
BUN/CREAT SERPL: 32.1 (ref 7–25)
CALCIUM SPEC-SCNC: 8.6 MG/DL (ref 8.6–10.5)
CHLORIDE SERPL-SCNC: 106 MMOL/L (ref 98–107)
CO2 SERPL-SCNC: 17.4 MMOL/L (ref 22–29)
CREAT BLD-MCNC: 0.56 MG/DL (ref 0.57–1)
DEPRECATED RDW RBC AUTO: 59.2 FL (ref 37–54)
ERYTHROCYTE [DISTWIDTH] IN BLOOD BY AUTOMATED COUNT: 14.2 % (ref 12.3–15.4)
GFR SERPL CREATININE-BSD FRML MDRD: 106 ML/MIN/1.73
GLUCOSE BLD-MCNC: 192 MG/DL (ref 65–99)
GLUCOSE BLDC GLUCOMTR-MCNC: 164 MG/DL (ref 70–130)
GLUCOSE BLDC GLUCOMTR-MCNC: 195 MG/DL (ref 70–130)
GLUCOSE BLDC GLUCOMTR-MCNC: 213 MG/DL (ref 70–130)
GLUCOSE BLDC GLUCOMTR-MCNC: 282 MG/DL (ref 70–130)
HCT VFR BLD AUTO: 28.3 % (ref 34–46.6)
HGB BLD-MCNC: 7.5 G/DL (ref 12–15.9)
HYPOCHROMIA BLD QL: ABNORMAL
LYMPHOCYTES # BLD MANUAL: 0.34 10*3/MM3 (ref 0.7–3.1)
LYMPHOCYTES NFR BLD MANUAL: 1 % (ref 5–12)
LYMPHOCYTES NFR BLD MANUAL: 3 % (ref 19.6–45.3)
MACROCYTES BLD QL SMEAR: ABNORMAL
MAGNESIUM SERPL-MCNC: 2.6 MG/DL (ref 1.6–2.4)
MCH RBC QN AUTO: 29.8 PG (ref 26.6–33)
MCHC RBC AUTO-ENTMCNC: 26.5 G/DL (ref 31.5–35.7)
MCV RBC AUTO: 112.3 FL (ref 79–97)
MONOCYTES # BLD AUTO: 0.11 10*3/MM3 (ref 0.1–0.9)
NEUTROPHILS # BLD AUTO: 11.03 10*3/MM3 (ref 1.7–7)
NEUTROPHILS NFR BLD MANUAL: 96 % (ref 42.7–76)
PHOSPHATE SERPL-MCNC: 2.8 MG/DL (ref 2.5–4.5)
PLAT MORPH BLD: NORMAL
PLATELET # BLD AUTO: 504 10*3/MM3 (ref 140–450)
PMV BLD AUTO: 9.2 FL (ref 6–12)
POLYCHROMASIA BLD QL SMEAR: ABNORMAL
POTASSIUM BLD-SCNC: 4.6 MMOL/L (ref 3.5–5.2)
RBC # BLD AUTO: 2.52 10*6/MM3 (ref 3.77–5.28)
RH BLD: POSITIVE
SODIUM BLD-SCNC: 133 MMOL/L (ref 136–145)
T&S EXPIRATION DATE: NORMAL
WBC MORPH BLD: NORMAL
WBC NRBC COR # BLD: 11.49 10*3/MM3 (ref 3.4–10.8)

## 2019-12-20 PROCEDURE — 86923 COMPATIBILITY TEST ELECTRIC: CPT

## 2019-12-20 PROCEDURE — 44120 REMOVAL OF SMALL INTESTINE: CPT | Performed by: COLON & RECTAL SURGERY

## 2019-12-20 PROCEDURE — 25010000002 METHYLPREDNISOLONE PER 40 MG: Performed by: COLON & RECTAL SURGERY

## 2019-12-20 PROCEDURE — 25010000002 POTASSIUM CHLORIDE PER 2 MEQ OF POTASSIUM: Performed by: COLON & RECTAL SURGERY

## 2019-12-20 PROCEDURE — 25010000002 PIPERACILLIN SOD-TAZOBACTAM PER 1 G: Performed by: COLON & RECTAL SURGERY

## 2019-12-20 PROCEDURE — 25010000002 FENTANYL CITRATE (PF) 100 MCG/2ML SOLUTION: Performed by: ANESTHESIOLOGY

## 2019-12-20 PROCEDURE — 25010000002 KETOROLAC TROMETHAMINE PER 15 MG: Performed by: COLON & RECTAL SURGERY

## 2019-12-20 PROCEDURE — 25010000002 METHOCARBAMOL 1000 MG/10ML SOLUTION: Performed by: COLON & RECTAL SURGERY

## 2019-12-20 PROCEDURE — 25010000002 PROPOFOL 10 MG/ML EMULSION: Performed by: ANESTHESIOLOGY

## 2019-12-20 PROCEDURE — 74177 CT ABD & PELVIS W/CONTRAST: CPT

## 2019-12-20 PROCEDURE — 63710000001 INSULIN LISPRO (HUMAN) PER 5 UNITS: Performed by: COLON & RECTAL SURGERY

## 2019-12-20 PROCEDURE — 85007 BL SMEAR W/DIFF WBC COUNT: CPT | Performed by: PHYSICIAN ASSISTANT

## 2019-12-20 PROCEDURE — 80069 RENAL FUNCTION PANEL: CPT | Performed by: COLON & RECTAL SURGERY

## 2019-12-20 PROCEDURE — 25010000002 LIDOCAINE PER 10 MG: Performed by: ANESTHESIOLOGY

## 2019-12-20 PROCEDURE — 25010000002 DEXAMETHASONE PER 1 MG: Performed by: ANESTHESIOLOGY

## 2019-12-20 PROCEDURE — 0 DIATRIZOATE MEGLUMINE & SODIUM PER 1 ML: Performed by: COLON & RECTAL SURGERY

## 2019-12-20 PROCEDURE — 25010000002 MAGNESIUM SULFATE PER 500 MG OF MAGNESIUM: Performed by: ANESTHESIOLOGY

## 2019-12-20 PROCEDURE — 83735 ASSAY OF MAGNESIUM: CPT | Performed by: COLON & RECTAL SURGERY

## 2019-12-20 PROCEDURE — 0DB80ZZ EXCISION OF SMALL INTESTINE, OPEN APPROACH: ICD-10-PCS | Performed by: COLON & RECTAL SURGERY

## 2019-12-20 PROCEDURE — 88307 TISSUE EXAM BY PATHOLOGIST: CPT | Performed by: COLON & RECTAL SURGERY

## 2019-12-20 PROCEDURE — 86900 BLOOD TYPING SEROLOGIC ABO: CPT | Performed by: COLON & RECTAL SURGERY

## 2019-12-20 PROCEDURE — 25010000002 IOPAMIDOL 61 % SOLUTION: Performed by: COLON & RECTAL SURGERY

## 2019-12-20 PROCEDURE — 25010000002 MAGNESIUM SULFATE PER 500 MG OF MAGNESIUM: Performed by: COLON & RECTAL SURGERY

## 2019-12-20 PROCEDURE — 25010000002 MIDAZOLAM PER 1 MG: Performed by: ANESTHESIOLOGY

## 2019-12-20 PROCEDURE — 25010000002 CALCIUM GLUCONATE PER 10 ML: Performed by: COLON & RECTAL SURGERY

## 2019-12-20 PROCEDURE — 44120 REMOVAL OF SMALL INTESTINE: CPT | Performed by: SURGERY

## 2019-12-20 PROCEDURE — 86901 BLOOD TYPING SEROLOGIC RH(D): CPT | Performed by: COLON & RECTAL SURGERY

## 2019-12-20 PROCEDURE — 25010000002 NEOSTIGMINE PER 0.5 MG: Performed by: ANESTHESIOLOGY

## 2019-12-20 PROCEDURE — 25010000002 ONDANSETRON PER 1 MG: Performed by: ANESTHESIOLOGY

## 2019-12-20 PROCEDURE — C9290 INJ, BUPIVACAINE LIPOSOME: HCPCS | Performed by: ANESTHESIOLOGY

## 2019-12-20 PROCEDURE — 82962 GLUCOSE BLOOD TEST: CPT

## 2019-12-20 PROCEDURE — 86850 RBC ANTIBODY SCREEN: CPT | Performed by: COLON & RECTAL SURGERY

## 2019-12-20 PROCEDURE — 85025 COMPLETE CBC W/AUTO DIFF WBC: CPT | Performed by: PHYSICIAN ASSISTANT

## 2019-12-20 PROCEDURE — 25010000003 BUPIVACAINE LIPOSOME 1.3 % SUSPENSION: Performed by: ANESTHESIOLOGY

## 2019-12-20 DEVICE — PROXIMATE LINEAR CUTTER RELOAD, BLUE, 75MM
Type: IMPLANTABLE DEVICE | Site: ABDOMEN | Status: FUNCTIONAL
Brand: PROXIMATE

## 2019-12-20 DEVICE — PROXIMATE RELOADABLE LINEAR STAPLER
Type: IMPLANTABLE DEVICE | Site: ABDOMEN | Status: FUNCTIONAL
Brand: PROXIMATE

## 2019-12-20 DEVICE — SEALANT WND FIBRIN TISSEEL VAPOR/HEAT/PREFIL/SYR 10ML: Type: IMPLANTABLE DEVICE | Site: ABDOMEN | Status: FUNCTIONAL

## 2019-12-20 DEVICE — PROXIMATE RELOADABLE LINEAR CUTTER WITH SAFETY LOCK-OUT, 75MM
Type: IMPLANTABLE DEVICE | Site: ABDOMEN | Status: FUNCTIONAL
Brand: PROXIMATE

## 2019-12-20 RX ORDER — SODIUM CHLORIDE 0.9 % (FLUSH) 0.9 %
3-10 SYRINGE (ML) INJECTION AS NEEDED
Status: DISCONTINUED | OUTPATIENT
Start: 2019-12-20 | End: 2019-12-20 | Stop reason: HOSPADM

## 2019-12-20 RX ORDER — ONDANSETRON 2 MG/ML
INJECTION INTRAMUSCULAR; INTRAVENOUS AS NEEDED
Status: DISCONTINUED | OUTPATIENT
Start: 2019-12-20 | End: 2019-12-20 | Stop reason: SURG

## 2019-12-20 RX ORDER — HYDROMORPHONE HYDROCHLORIDE 1 MG/ML
0.25 INJECTION, SOLUTION INTRAMUSCULAR; INTRAVENOUS; SUBCUTANEOUS
Status: DISCONTINUED | OUTPATIENT
Start: 2019-12-20 | End: 2019-12-20 | Stop reason: HOSPADM

## 2019-12-20 RX ORDER — MAGNESIUM SULFATE HEPTAHYDRATE 500 MG/ML
INJECTION, SOLUTION INTRAMUSCULAR; INTRAVENOUS AS NEEDED
Status: DISCONTINUED | OUTPATIENT
Start: 2019-12-20 | End: 2019-12-20 | Stop reason: SURG

## 2019-12-20 RX ORDER — BUPIVACAINE HYDROCHLORIDE AND EPINEPHRINE 2.5; 5 MG/ML; UG/ML
INJECTION, SOLUTION INFILTRATION; PERINEURAL AS NEEDED
Status: DISCONTINUED | OUTPATIENT
Start: 2019-12-20 | End: 2019-12-20 | Stop reason: HOSPADM

## 2019-12-20 RX ORDER — NALOXONE HCL 0.4 MG/ML
0.4 VIAL (ML) INJECTION
Status: DISCONTINUED | OUTPATIENT
Start: 2019-12-20 | End: 2019-12-20 | Stop reason: HOSPADM

## 2019-12-20 RX ORDER — FAMOTIDINE 10 MG/ML
20 INJECTION, SOLUTION INTRAVENOUS ONCE
Status: COMPLETED | OUTPATIENT
Start: 2019-12-20 | End: 2019-12-20

## 2019-12-20 RX ORDER — SODIUM CHLORIDE 0.9 % (FLUSH) 0.9 %
3 SYRINGE (ML) INJECTION EVERY 12 HOURS SCHEDULED
Status: DISCONTINUED | OUTPATIENT
Start: 2019-12-20 | End: 2019-12-20 | Stop reason: HOSPADM

## 2019-12-20 RX ORDER — KETAMINE HYDROCHLORIDE 50 MG/ML
INJECTION, SOLUTION, CONCENTRATE INTRAMUSCULAR; INTRAVENOUS AS NEEDED
Status: DISCONTINUED | OUTPATIENT
Start: 2019-12-20 | End: 2019-12-20 | Stop reason: SURG

## 2019-12-20 RX ORDER — SODIUM CHLORIDE 9 MG/ML
50 INJECTION, SOLUTION INTRAVENOUS CONTINUOUS
Status: DISCONTINUED | OUTPATIENT
Start: 2019-12-20 | End: 2019-12-25

## 2019-12-20 RX ORDER — MAGNESIUM HYDROXIDE 1200 MG/15ML
LIQUID ORAL AS NEEDED
Status: DISCONTINUED | OUTPATIENT
Start: 2019-12-20 | End: 2019-12-20 | Stop reason: HOSPADM

## 2019-12-20 RX ORDER — ROCURONIUM BROMIDE 10 MG/ML
INJECTION, SOLUTION INTRAVENOUS AS NEEDED
Status: DISCONTINUED | OUTPATIENT
Start: 2019-12-20 | End: 2019-12-20 | Stop reason: SURG

## 2019-12-20 RX ORDER — LIDOCAINE HYDROCHLORIDE ANHYDROUS AND DEXTROSE MONOHYDRATE 5; 400 G/100ML; MG/100ML
INJECTION, SOLUTION INTRAVENOUS CONTINUOUS PRN
Status: DISCONTINUED | OUTPATIENT
Start: 2019-12-20 | End: 2019-12-20 | Stop reason: SURG

## 2019-12-20 RX ORDER — SODIUM HYPOCHLORITE 1.25 MG/ML
SOLUTION TOPICAL DAILY
Status: DISCONTINUED | OUTPATIENT
Start: 2019-12-21 | End: 2019-12-21

## 2019-12-20 RX ORDER — SODIUM CHLORIDE, SODIUM LACTATE, POTASSIUM CHLORIDE, CALCIUM CHLORIDE 600; 310; 30; 20 MG/100ML; MG/100ML; MG/100ML; MG/100ML
9 INJECTION, SOLUTION INTRAVENOUS CONTINUOUS
Status: DISCONTINUED | OUTPATIENT
Start: 2019-12-20 | End: 2019-12-20

## 2019-12-20 RX ORDER — DEXAMETHASONE SODIUM PHOSPHATE 4 MG/ML
INJECTION, SOLUTION INTRA-ARTICULAR; INTRALESIONAL; INTRAMUSCULAR; INTRAVENOUS; SOFT TISSUE AS NEEDED
Status: DISCONTINUED | OUTPATIENT
Start: 2019-12-20 | End: 2019-12-20 | Stop reason: SURG

## 2019-12-20 RX ORDER — LIDOCAINE HYDROCHLORIDE 10 MG/ML
0.5 INJECTION, SOLUTION EPIDURAL; INFILTRATION; INTRACAUDAL; PERINEURAL ONCE AS NEEDED
Status: DISCONTINUED | OUTPATIENT
Start: 2019-12-20 | End: 2019-12-20 | Stop reason: HOSPADM

## 2019-12-20 RX ORDER — FENTANYL CITRATE 50 UG/ML
50 INJECTION, SOLUTION INTRAMUSCULAR; INTRAVENOUS
Status: DISCONTINUED | OUTPATIENT
Start: 2019-12-20 | End: 2019-12-20 | Stop reason: HOSPADM

## 2019-12-20 RX ORDER — GLYCOPYRROLATE 0.2 MG/ML
INJECTION INTRAMUSCULAR; INTRAVENOUS AS NEEDED
Status: DISCONTINUED | OUTPATIENT
Start: 2019-12-20 | End: 2019-12-20 | Stop reason: SURG

## 2019-12-20 RX ORDER — METOCLOPRAMIDE HYDROCHLORIDE 5 MG/ML
10 INJECTION INTRAMUSCULAR; INTRAVENOUS ONCE AS NEEDED
Status: DISCONTINUED | OUTPATIENT
Start: 2019-12-20 | End: 2019-12-20 | Stop reason: HOSPADM

## 2019-12-20 RX ORDER — MIDAZOLAM HYDROCHLORIDE 1 MG/ML
1 INJECTION INTRAMUSCULAR; INTRAVENOUS
Status: DISCONTINUED | OUTPATIENT
Start: 2019-12-20 | End: 2019-12-20 | Stop reason: HOSPADM

## 2019-12-20 RX ORDER — MIDAZOLAM HYDROCHLORIDE 1 MG/ML
2 INJECTION INTRAMUSCULAR; INTRAVENOUS
Status: DISCONTINUED | OUTPATIENT
Start: 2019-12-20 | End: 2019-12-20 | Stop reason: HOSPADM

## 2019-12-20 RX ORDER — LIDOCAINE HYDROCHLORIDE 20 MG/ML
INJECTION, SOLUTION INFILTRATION; PERINEURAL AS NEEDED
Status: DISCONTINUED | OUTPATIENT
Start: 2019-12-20 | End: 2019-12-20 | Stop reason: SURG

## 2019-12-20 RX ORDER — ONDANSETRON 2 MG/ML
4 INJECTION INTRAMUSCULAR; INTRAVENOUS ONCE AS NEEDED
Status: DISCONTINUED | OUTPATIENT
Start: 2019-12-20 | End: 2019-12-20 | Stop reason: HOSPADM

## 2019-12-20 RX ORDER — PROPOFOL 10 MG/ML
VIAL (ML) INTRAVENOUS AS NEEDED
Status: DISCONTINUED | OUTPATIENT
Start: 2019-12-20 | End: 2019-12-20 | Stop reason: SURG

## 2019-12-20 RX ORDER — KETOROLAC TROMETHAMINE 15 MG/ML
15 INJECTION, SOLUTION INTRAMUSCULAR; INTRAVENOUS EVERY 8 HOURS
Status: COMPLETED | OUTPATIENT
Start: 2019-12-20 | End: 2019-12-25

## 2019-12-20 RX ORDER — BUPIVACAINE HYDROCHLORIDE 2.5 MG/ML
INJECTION, SOLUTION EPIDURAL; INFILTRATION; INTRACAUDAL
Status: DISCONTINUED | OUTPATIENT
Start: 2019-12-20 | End: 2019-12-20 | Stop reason: SURG

## 2019-12-20 RX ADMIN — SODIUM CHLORIDE, POTASSIUM CHLORIDE, SODIUM LACTATE AND CALCIUM CHLORIDE: 600; 310; 30; 20 INJECTION, SOLUTION INTRAVENOUS at 13:02

## 2019-12-20 RX ADMIN — MIDAZOLAM 1 MG: 1 INJECTION INTRAMUSCULAR; INTRAVENOUS at 12:39

## 2019-12-20 RX ADMIN — SODIUM CHLORIDE, POTASSIUM CHLORIDE, SODIUM LACTATE AND CALCIUM CHLORIDE 9 ML/HR: 600; 310; 30; 20 INJECTION, SOLUTION INTRAVENOUS at 12:39

## 2019-12-20 RX ADMIN — POTASSIUM CHLORIDE: 2 INJECTION, SOLUTION, CONCENTRATE INTRAVENOUS at 17:39

## 2019-12-20 RX ADMIN — ACETAMINOPHEN 1000 MG: 10 INJECTION, SOLUTION INTRAVENOUS at 20:19

## 2019-12-20 RX ADMIN — GLYCOPYRROLATE 0.4 MG: 0.2 INJECTION INTRAMUSCULAR; INTRAVENOUS at 14:49

## 2019-12-20 RX ADMIN — METHOCARBAMOL 500 MG: 100 INJECTION, SOLUTION INTRAMUSCULAR; INTRAVENOUS at 21:50

## 2019-12-20 RX ADMIN — DEXAMETHASONE SODIUM PHOSPHATE 8 MG: 4 INJECTION INTRA-ARTICULAR; INTRALESIONAL; INTRAMUSCULAR; INTRAVENOUS; SOFT TISSUE at 13:34

## 2019-12-20 RX ADMIN — LIDOCAINE HYDROCHLORIDE 80 MG: 20 INJECTION, SOLUTION INFILTRATION; PERINEURAL at 13:11

## 2019-12-20 RX ADMIN — IOPAMIDOL 85 ML: 612 INJECTION, SOLUTION INTRAVENOUS at 10:21

## 2019-12-20 RX ADMIN — KETAMINE HYDROCHLORIDE 20 MG: 50 INJECTION, SOLUTION INTRAMUSCULAR; INTRAVENOUS at 13:21

## 2019-12-20 RX ADMIN — FENTANYL CITRATE 100 MCG: 50 INJECTION INTRAMUSCULAR; INTRAVENOUS at 13:10

## 2019-12-20 RX ADMIN — LIDOCAINE HYDROCHLORIDE ANHYDROUS AND DEXTROSE MONOHYDRATE 2 MG/MIN: .4; 5 INJECTION, SOLUTION INTRAVENOUS at 13:12

## 2019-12-20 RX ADMIN — I.V. FAT EMULSION 20 G: 20 EMULSION INTRAVENOUS at 17:39

## 2019-12-20 RX ADMIN — TAZOBACTAM SODIUM AND PIPERACILLIN SODIUM 3.38 G: 375; 3 INJECTION, SOLUTION INTRAVENOUS at 14:12

## 2019-12-20 RX ADMIN — METHYLPREDNISOLONE SODIUM SUCCINATE 20 MG: 40 INJECTION, POWDER, FOR SOLUTION INTRAMUSCULAR; INTRAVENOUS at 08:26

## 2019-12-20 RX ADMIN — ONDANSETRON HYDROCHLORIDE 4 MG: 2 SOLUTION INTRAMUSCULAR; INTRAVENOUS at 14:27

## 2019-12-20 RX ADMIN — FAMOTIDINE 20 MG: 10 INJECTION INTRAVENOUS at 12:39

## 2019-12-20 RX ADMIN — SODIUM CHLORIDE, PRESERVATIVE FREE 3 ML: 5 INJECTION INTRAVENOUS at 20:20

## 2019-12-20 RX ADMIN — SODIUM CHLORIDE 50 ML/HR: 9 INJECTION, SOLUTION INTRAVENOUS at 17:32

## 2019-12-20 RX ADMIN — PROPOFOL 150 MG: 10 INJECTION, EMULSION INTRAVENOUS at 13:11

## 2019-12-20 RX ADMIN — TAZOBACTAM SODIUM AND PIPERACILLIN SODIUM 3.38 G: 375; 3 INJECTION, SOLUTION INTRAVENOUS at 21:05

## 2019-12-20 RX ADMIN — INSULIN LISPRO 2 UNITS: 100 INJECTION, SOLUTION INTRAVENOUS; SUBCUTANEOUS at 08:15

## 2019-12-20 RX ADMIN — ACETAMINOPHEN 1000 MG: 10 INJECTION, SOLUTION INTRAVENOUS at 01:59

## 2019-12-20 RX ADMIN — METHOCARBAMOL 500 MG: 100 INJECTION, SOLUTION INTRAMUSCULAR; INTRAVENOUS at 06:40

## 2019-12-20 RX ADMIN — NEOSTIGMINE METHYLSULFATE 2.5 MG: 1 INJECTION INTRAMUSCULAR; INTRAVENOUS; SUBCUTANEOUS at 14:49

## 2019-12-20 RX ADMIN — TAZOBACTAM SODIUM AND PIPERACILLIN SODIUM 3.38 G: 375; 3 INJECTION, SOLUTION INTRAVENOUS at 06:00

## 2019-12-20 RX ADMIN — DIATRIZOATE MEGLUMINE AND DIATRIZOATE SODIUM 30 ML: 600; 100 SOLUTION ORAL; RECTAL at 08:15

## 2019-12-20 RX ADMIN — ACETAMINOPHEN 1000 MG: 10 INJECTION, SOLUTION INTRAVENOUS at 08:15

## 2019-12-20 RX ADMIN — SODIUM CHLORIDE, PRESERVATIVE FREE 3 ML: 5 INJECTION INTRAVENOUS at 08:31

## 2019-12-20 RX ADMIN — ROCURONIUM BROMIDE 40 MG: 10 INJECTION INTRAVENOUS at 13:11

## 2019-12-20 RX ADMIN — INSULIN LISPRO 4 UNITS: 100 INJECTION, SOLUTION INTRAVENOUS; SUBCUTANEOUS at 17:33

## 2019-12-20 RX ADMIN — KETAMINE HYDROCHLORIDE 10 MG: 50 INJECTION, SOLUTION INTRAMUSCULAR; INTRAVENOUS at 14:20

## 2019-12-20 RX ADMIN — BUPIVACAINE HYDROCHLORIDE 20 ML: 2.5 INJECTION, SOLUTION EPIDURAL; INFILTRATION; INTRACAUDAL; PERINEURAL at 15:08

## 2019-12-20 RX ADMIN — BUPIVACAINE 20 ML: 13.3 INJECTION, SUSPENSION, LIPOSOMAL INFILTRATION at 15:08

## 2019-12-20 RX ADMIN — INSULIN LISPRO 3 UNITS: 100 INJECTION, SOLUTION INTRAVENOUS; SUBCUTANEOUS at 21:05

## 2019-12-20 RX ADMIN — MAGNESIUM SULFATE HEPTAHYDRATE 2 G: 500 INJECTION, SOLUTION INTRAMUSCULAR; INTRAVENOUS at 13:25

## 2019-12-20 RX ADMIN — KETOROLAC TROMETHAMINE 15 MG: 30 INJECTION, SOLUTION INTRAMUSCULAR at 04:53

## 2019-12-20 RX ADMIN — KETOROLAC TROMETHAMINE 15 MG: 15 INJECTION, SOLUTION INTRAMUSCULAR; INTRAVENOUS at 17:34

## 2019-12-20 RX ADMIN — FENTANYL CITRATE 50 MCG: 50 INJECTION INTRAMUSCULAR; INTRAVENOUS at 12:40

## 2019-12-20 NOTE — ANESTHESIA PREPROCEDURE EVALUATION
Anesthesia Evaluation     Patient summary reviewed and Nursing notes reviewed   history of anesthetic complications: PONV  NPO Solid Status: > 8 hours  NPO Liquid Status: > 8 hours           Airway   Mallampati: II  TM distance: >3 FB  Neck ROM: full  No difficulty expected  Dental - normal exam     Pulmonary - negative pulmonary ROS and normal exam   Cardiovascular - normal exam    ECG reviewed    (+) hyperlipidemia,       Neuro/Psych  (+) numbness,     GI/Hepatic/Renal/Endo    (+)  hiatal hernia,  diabetes mellitus type 2,     Musculoskeletal     Abdominal    Substance History - negative use     OB/GYN          Other   arthritis,    history of cancer                      Anesthesia Plan    ASA 3 - emergent     general     intravenous induction     Anesthetic plan, all risks, benefits, and alternatives have been provided, discussed and informed consent has been obtained with: patient.    Plan discussed with attending and CRNA.

## 2019-12-20 NOTE — ANESTHESIA PROCEDURE NOTES
Peripheral Block      Patient reassessed immediately prior to procedure    Patient location during procedure: OR  Start time: 12/20/2019 2:57 PM  Stop time: 12/20/2019 3:08 PM  Reason for block: at surgeon's request, post-op pain management and secondary anesthetic  Performed by  Anesthesiologist: Ernie Dinh MD  Preanesthetic Checklist  Completed: patient identified, surgical consent, pre-op evaluation, timeout performed, IV checked, risks and benefits discussed and monitors and equipment checked  Prep:  Pt Position: supine  Sterile barriers:gloves, mask and sterile barriers  Prep: ChloraPrep  Patient monitoring: blood pressure monitoring, continuous pulse oximetry and EKG  Procedure  Sedation:yes  Performed under: general  Guidance:ultrasound guided  ULTRASOUND INTERPRETATION. Using ultrasound guidance a 22 G gauge needle was placed in close proximity to the nerve, at which point, under ultrasound guidance anesthetic was injected in the area of the nerve and spread of the anesthesia was seen on ultrasound in close proximity thereto.  There were no abnormalities seen on ultrasound; a digital image was taken; and the patient tolerated the procedure with no complications. Images:still images obtained, printed/placed on chart    Laterality:Bilateral  Anesthesia block type: Rectus Sheath Block.  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

## 2019-12-20 NOTE — ADDENDUM NOTE
Addendum  created 12/20/19 1758 by Ernie Dinh MD    Child order released for a procedure order, Intraprocedure Blocks edited, Sign clinical note

## 2019-12-20 NOTE — ANESTHESIA PROCEDURE NOTES
Airway  Urgency: elective    Date/Time: 12/20/2019 1:14 PM    General Information and Staff    Patient location during procedure: OR  Anesthesiologist: Yordy Nunez MD    Indications and Patient Condition  Indications for airway management: airway protection    Preoxygenated: yes  Mask difficulty assessment: 1 - vent by mask    Final Airway Details  Final airway type: endotracheal airway      Successful airway: ETT  Cuffed: yes   Successful intubation technique: direct laryngoscopy  Facilitating devices/methods: cricoid pressure  Endotracheal tube insertion site: oral  Blade: Delma  ETT size (mm): 7.0  Cormack-Lehane Classification: grade I - full view of glottis  Placement verified by: chest auscultation and capnometry   Measured from: lips  ETT/EBT  to lips (cm): 21  Number of attempts at approach: 1  Assessment: lips, teeth, and gum same as pre-op and atraumatic intubation    Additional Comments  IV induction as noted, atraumatic intubation

## 2019-12-20 NOTE — ANESTHESIA POSTPROCEDURE EVALUATION
Patient: She López    Procedure Summary     Date:  12/20/19 Room / Location:  [facility] OR  / [facility] OR    Anesthesia Start:  1302 Anesthesia Stop:  1528    Procedure:  EXPLORATORY LAPAROTOMY WITH SMALL BOWEL RESECTION (N/A Abdomen) Diagnosis:       Small bowel anastomotic stricture      (Small bowel anastomotic stricture [K91.89])    Surgeon:  Dorcas Albrecht MD Provider:  Yordy Nunez MD    Anesthesia Type:  general ASA Status:  3 - Emergent          Anesthesia Type: general    Vitals  Vitals Value Taken Time   /73 12/20/2019  3:45 PM   Temp 36.4 °C (97.6 °F) 12/20/2019  3:21 PM   Pulse 80 12/20/2019  3:59 PM   Resp 16 12/20/2019  3:30 PM   SpO2 98 % 12/20/2019  3:59 PM   Vitals shown include unvalidated device data.        Post Anesthesia Care and Evaluation    Patient location during evaluation: PACU  Patient participation: complete - patient participated  Level of consciousness: awake and alert  Pain management: adequate  Airway patency: patent  Anesthetic complications: No anesthetic complications  PONV Status: none  Cardiovascular status: acceptable  Respiratory status: acceptable  Hydration status: acceptable

## 2019-12-21 LAB
ALBUMIN SERPL-MCNC: 1.8 G/DL (ref 3.5–5.2)
ANION GAP SERPL CALCULATED.3IONS-SCNC: 13.1 MMOL/L (ref 5–15)
BASOPHILS # BLD AUTO: 0.03 10*3/MM3 (ref 0–0.2)
BASOPHILS NFR BLD AUTO: 0.2 % (ref 0–1.5)
BUN BLD-MCNC: 21 MG/DL (ref 8–23)
BUN/CREAT SERPL: 35 (ref 7–25)
CALCIUM SPEC-SCNC: 7.9 MG/DL (ref 8.6–10.5)
CHLORIDE SERPL-SCNC: 105 MMOL/L (ref 98–107)
CO2 SERPL-SCNC: 16.9 MMOL/L (ref 22–29)
CREAT BLD-MCNC: 0.6 MG/DL (ref 0.57–1)
DEPRECATED RDW RBC AUTO: 42 FL (ref 37–54)
EOSINOPHIL # BLD AUTO: 0 10*3/MM3 (ref 0–0.4)
EOSINOPHIL NFR BLD AUTO: 0 % (ref 0.3–6.2)
ERYTHROCYTE [DISTWIDTH] IN BLOOD BY AUTOMATED COUNT: 12.9 % (ref 12.3–15.4)
GFR SERPL CREATININE-BSD FRML MDRD: 98 ML/MIN/1.73
GLUCOSE BLD-MCNC: 187 MG/DL (ref 65–99)
GLUCOSE BLDC GLUCOMTR-MCNC: 145 MG/DL (ref 70–130)
GLUCOSE BLDC GLUCOMTR-MCNC: 170 MG/DL (ref 70–130)
GLUCOSE BLDC GLUCOMTR-MCNC: 213 MG/DL (ref 70–130)
HCT VFR BLD AUTO: 26.6 % (ref 34–46.6)
HGB BLD-MCNC: 8.7 G/DL (ref 12–15.9)
IMM GRANULOCYTES # BLD AUTO: 0.41 10*3/MM3 (ref 0–0.05)
IMM GRANULOCYTES NFR BLD AUTO: 2.7 % (ref 0–0.5)
LYMPHOCYTES # BLD AUTO: 0.59 10*3/MM3 (ref 0.7–3.1)
LYMPHOCYTES NFR BLD AUTO: 3.8 % (ref 19.6–45.3)
MAGNESIUM SERPL-MCNC: 2.2 MG/DL (ref 1.6–2.4)
MCH RBC QN AUTO: 29.3 PG (ref 26.6–33)
MCHC RBC AUTO-ENTMCNC: 32.7 G/DL (ref 31.5–35.7)
MCV RBC AUTO: 89.6 FL (ref 79–97)
MONOCYTES # BLD AUTO: 1.04 10*3/MM3 (ref 0.1–0.9)
MONOCYTES NFR BLD AUTO: 6.7 % (ref 5–12)
NEUTROPHILS # BLD AUTO: 13.4 10*3/MM3 (ref 1.7–7)
NEUTROPHILS NFR BLD AUTO: 86.6 % (ref 42.7–76)
NRBC BLD AUTO-RTO: 0 /100 WBC (ref 0–0.2)
PHOSPHATE SERPL-MCNC: 3.3 MG/DL (ref 2.5–4.5)
PLATELET # BLD AUTO: 683 10*3/MM3 (ref 140–450)
PMV BLD AUTO: 9.5 FL (ref 6–12)
POTASSIUM BLD-SCNC: 5 MMOL/L (ref 3.5–5.2)
RBC # BLD AUTO: 2.97 10*6/MM3 (ref 3.77–5.28)
SODIUM BLD-SCNC: 135 MMOL/L (ref 136–145)
WBC NRBC COR # BLD: 15.47 10*3/MM3 (ref 3.4–10.8)

## 2019-12-21 PROCEDURE — 25010000002 PROMETHAZINE PER 50 MG: Performed by: SURGERY

## 2019-12-21 PROCEDURE — 25010000002 MAGNESIUM SULFATE PER 500 MG OF MAGNESIUM: Performed by: COLON & RECTAL SURGERY

## 2019-12-21 PROCEDURE — 25010000002 HYDROMORPHONE PER 4 MG: Performed by: COLON & RECTAL SURGERY

## 2019-12-21 PROCEDURE — 25010000002 METHOCARBAMOL 1000 MG/10ML SOLUTION: Performed by: COLON & RECTAL SURGERY

## 2019-12-21 PROCEDURE — 25010000002 PIPERACILLIN SOD-TAZOBACTAM PER 1 G: Performed by: COLON & RECTAL SURGERY

## 2019-12-21 PROCEDURE — 83735 ASSAY OF MAGNESIUM: CPT | Performed by: COLON & RECTAL SURGERY

## 2019-12-21 PROCEDURE — 25010000002 CALCIUM GLUCONATE PER 10 ML: Performed by: COLON & RECTAL SURGERY

## 2019-12-21 PROCEDURE — 99024 POSTOP FOLLOW-UP VISIT: CPT | Performed by: SURGERY

## 2019-12-21 PROCEDURE — 25010000002 KETOROLAC TROMETHAMINE PER 15 MG: Performed by: COLON & RECTAL SURGERY

## 2019-12-21 PROCEDURE — 82962 GLUCOSE BLOOD TEST: CPT

## 2019-12-21 PROCEDURE — 80069 RENAL FUNCTION PANEL: CPT | Performed by: COLON & RECTAL SURGERY

## 2019-12-21 PROCEDURE — 25010000002 ONDANSETRON PER 1 MG: Performed by: COLON & RECTAL SURGERY

## 2019-12-21 PROCEDURE — 25010000002 METHYLPREDNISOLONE PER 40 MG: Performed by: COLON & RECTAL SURGERY

## 2019-12-21 PROCEDURE — 85025 COMPLETE CBC W/AUTO DIFF WBC: CPT | Performed by: COLON & RECTAL SURGERY

## 2019-12-21 PROCEDURE — 63710000001 INSULIN LISPRO (HUMAN) PER 5 UNITS: Performed by: COLON & RECTAL SURGERY

## 2019-12-21 RX ORDER — PROMETHAZINE HYDROCHLORIDE 25 MG/ML
12.5 INJECTION, SOLUTION INTRAMUSCULAR; INTRAVENOUS EVERY 6 HOURS PRN
Status: DISCONTINUED | OUTPATIENT
Start: 2019-12-21 | End: 2019-12-27

## 2019-12-21 RX ORDER — FAMOTIDINE 10 MG/ML
20 INJECTION, SOLUTION INTRAVENOUS EVERY 12 HOURS SCHEDULED
Status: DISCONTINUED | OUTPATIENT
Start: 2019-12-21 | End: 2019-12-28 | Stop reason: HOSPADM

## 2019-12-21 RX ADMIN — FAMOTIDINE 20 MG: 10 INJECTION INTRAVENOUS at 09:46

## 2019-12-21 RX ADMIN — KETOROLAC TROMETHAMINE 15 MG: 15 INJECTION, SOLUTION INTRAMUSCULAR; INTRAVENOUS at 02:02

## 2019-12-21 RX ADMIN — INSULIN LISPRO 3 UNITS: 100 INJECTION, SOLUTION INTRAVENOUS; SUBCUTANEOUS at 11:59

## 2019-12-21 RX ADMIN — I.V. FAT EMULSION 20 G: 20 EMULSION INTRAVENOUS at 17:44

## 2019-12-21 RX ADMIN — SODIUM CHLORIDE, PRESERVATIVE FREE 3 ML: 5 INJECTION INTRAVENOUS at 08:38

## 2019-12-21 RX ADMIN — ACETAMINOPHEN 1000 MG: 10 INJECTION, SOLUTION INTRAVENOUS at 02:02

## 2019-12-21 RX ADMIN — HYDROMORPHONE HYDROCHLORIDE 0.25 MG: 1 INJECTION, SOLUTION INTRAMUSCULAR; INTRAVENOUS; SUBCUTANEOUS at 21:50

## 2019-12-21 RX ADMIN — TAZOBACTAM SODIUM AND PIPERACILLIN SODIUM 3.38 G: 375; 3 INJECTION, SOLUTION INTRAVENOUS at 14:07

## 2019-12-21 RX ADMIN — FAMOTIDINE 20 MG: 10 INJECTION INTRAVENOUS at 20:03

## 2019-12-21 RX ADMIN — INSULIN LISPRO 3 UNITS: 100 INJECTION, SOLUTION INTRAVENOUS; SUBCUTANEOUS at 17:44

## 2019-12-21 RX ADMIN — PROMETHAZINE HYDROCHLORIDE 12.5 MG: 25 INJECTION INTRAMUSCULAR; INTRAVENOUS at 15:35

## 2019-12-21 RX ADMIN — CALCIUM GLUCONATE: 94 INJECTION, SOLUTION INTRAVENOUS at 17:44

## 2019-12-21 RX ADMIN — KETOROLAC TROMETHAMINE 15 MG: 15 INJECTION, SOLUTION INTRAMUSCULAR; INTRAVENOUS at 08:30

## 2019-12-21 RX ADMIN — METHOCARBAMOL 500 MG: 100 INJECTION, SOLUTION INTRAMUSCULAR; INTRAVENOUS at 05:46

## 2019-12-21 RX ADMIN — TAZOBACTAM SODIUM AND PIPERACILLIN SODIUM 3.38 G: 375; 3 INJECTION, SOLUTION INTRAVENOUS at 21:50

## 2019-12-21 RX ADMIN — ACETAMINOPHEN 1000 MG: 10 INJECTION, SOLUTION INTRAVENOUS at 14:07

## 2019-12-21 RX ADMIN — SODIUM CHLORIDE, PRESERVATIVE FREE 3 ML: 5 INJECTION INTRAVENOUS at 20:03

## 2019-12-21 RX ADMIN — INSULIN LISPRO 2 UNITS: 100 INJECTION, SOLUTION INTRAVENOUS; SUBCUTANEOUS at 08:28

## 2019-12-21 RX ADMIN — KETOROLAC TROMETHAMINE 15 MG: 15 INJECTION, SOLUTION INTRAMUSCULAR; INTRAVENOUS at 16:47

## 2019-12-21 RX ADMIN — TAZOBACTAM SODIUM AND PIPERACILLIN SODIUM 3.38 G: 375; 3 INJECTION, SOLUTION INTRAVENOUS at 05:46

## 2019-12-21 RX ADMIN — METHOCARBAMOL 500 MG: 100 INJECTION, SOLUTION INTRAMUSCULAR; INTRAVENOUS at 14:25

## 2019-12-21 RX ADMIN — PROMETHAZINE HYDROCHLORIDE 12.5 MG: 25 INJECTION INTRAMUSCULAR; INTRAVENOUS at 21:50

## 2019-12-21 RX ADMIN — ACETAMINOPHEN 1000 MG: 10 INJECTION, SOLUTION INTRAVENOUS at 08:29

## 2019-12-21 RX ADMIN — ONDANSETRON 8 MG: 2 INJECTION INTRAMUSCULAR; INTRAVENOUS at 13:21

## 2019-12-21 RX ADMIN — SODIUM CHLORIDE 50 ML/HR: 9 INJECTION, SOLUTION INTRAVENOUS at 10:32

## 2019-12-21 RX ADMIN — METHYLPREDNISOLONE SODIUM SUCCINATE 20 MG: 40 INJECTION, POWDER, FOR SOLUTION INTRAMUSCULAR; INTRAVENOUS at 08:28

## 2019-12-21 RX ADMIN — HYDROMORPHONE HYDROCHLORIDE 0.25 MG: 1 INJECTION, SOLUTION INTRAMUSCULAR; INTRAVENOUS; SUBCUTANEOUS at 10:32

## 2019-12-21 RX ADMIN — ACETAMINOPHEN 1000 MG: 10 INJECTION, SOLUTION INTRAVENOUS at 20:03

## 2019-12-22 ENCOUNTER — APPOINTMENT (OUTPATIENT)
Dept: GENERAL RADIOLOGY | Facility: HOSPITAL | Age: 74
End: 2019-12-22

## 2019-12-22 LAB
ALBUMIN SERPL-MCNC: 1.8 G/DL (ref 3.5–5.2)
ALBUMIN/GLOB SERPL: 0.6 G/DL
ALP SERPL-CCNC: 213 U/L (ref 39–117)
ALT SERPL W P-5'-P-CCNC: 261 U/L (ref 1–33)
ANION GAP SERPL CALCULATED.3IONS-SCNC: 9.1 MMOL/L (ref 5–15)
AST SERPL-CCNC: 262 U/L (ref 1–32)
BASOPHILS # BLD AUTO: 0.04 10*3/MM3 (ref 0–0.2)
BASOPHILS NFR BLD AUTO: 0.4 % (ref 0–1.5)
BILIRUB SERPL-MCNC: 0.3 MG/DL (ref 0.2–1.2)
BUN BLD-MCNC: 16 MG/DL (ref 8–23)
BUN/CREAT SERPL: 29.1 (ref 7–25)
CALCIUM SPEC-SCNC: 8 MG/DL (ref 8.6–10.5)
CHLORIDE SERPL-SCNC: 108 MMOL/L (ref 98–107)
CO2 SERPL-SCNC: 19.9 MMOL/L (ref 22–29)
CREAT BLD-MCNC: 0.55 MG/DL (ref 0.57–1)
DEPRECATED RDW RBC AUTO: 42.3 FL (ref 37–54)
EOSINOPHIL # BLD AUTO: 0.07 10*3/MM3 (ref 0–0.4)
EOSINOPHIL NFR BLD AUTO: 0.6 % (ref 0.3–6.2)
ERYTHROCYTE [DISTWIDTH] IN BLOOD BY AUTOMATED COUNT: 13 % (ref 12.3–15.4)
GFR SERPL CREATININE-BSD FRML MDRD: 109 ML/MIN/1.73
GLOBULIN UR ELPH-MCNC: 3.1 GM/DL
GLUCOSE BLD-MCNC: 143 MG/DL (ref 65–99)
GLUCOSE BLDC GLUCOMTR-MCNC: 154 MG/DL (ref 70–130)
GLUCOSE BLDC GLUCOMTR-MCNC: 160 MG/DL (ref 70–130)
GLUCOSE BLDC GLUCOMTR-MCNC: 168 MG/DL (ref 70–130)
GLUCOSE BLDC GLUCOMTR-MCNC: 227 MG/DL (ref 70–130)
HCT VFR BLD AUTO: 23.4 % (ref 34–46.6)
HGB BLD-MCNC: 7.5 G/DL (ref 12–15.9)
IMM GRANULOCYTES # BLD AUTO: 0.55 10*3/MM3 (ref 0–0.05)
IMM GRANULOCYTES NFR BLD AUTO: 5 % (ref 0–0.5)
LYMPHOCYTES # BLD AUTO: 0.65 10*3/MM3 (ref 0.7–3.1)
LYMPHOCYTES NFR BLD AUTO: 5.9 % (ref 19.6–45.3)
MAGNESIUM SERPL-MCNC: 1.8 MG/DL (ref 1.6–2.4)
MCH RBC QN AUTO: 29.1 PG (ref 26.6–33)
MCHC RBC AUTO-ENTMCNC: 32.1 G/DL (ref 31.5–35.7)
MCV RBC AUTO: 90.7 FL (ref 79–97)
MONOCYTES # BLD AUTO: 0.73 10*3/MM3 (ref 0.1–0.9)
MONOCYTES NFR BLD AUTO: 6.6 % (ref 5–12)
NEUTROPHILS # BLD AUTO: 8.94 10*3/MM3 (ref 1.7–7)
NEUTROPHILS NFR BLD AUTO: 81.5 % (ref 42.7–76)
NRBC BLD AUTO-RTO: 0.1 /100 WBC (ref 0–0.2)
PHOSPHATE SERPL-MCNC: 3.4 MG/DL (ref 2.5–4.5)
PLATELET # BLD AUTO: 653 10*3/MM3 (ref 140–450)
PMV BLD AUTO: 9.1 FL (ref 6–12)
POTASSIUM BLD-SCNC: 4.3 MMOL/L (ref 3.5–5.2)
PROT SERPL-MCNC: 4.9 G/DL (ref 6–8.5)
RBC # BLD AUTO: 2.58 10*6/MM3 (ref 3.77–5.28)
SODIUM BLD-SCNC: 137 MMOL/L (ref 136–145)
WBC NRBC COR # BLD: 10.98 10*3/MM3 (ref 3.4–10.8)

## 2019-12-22 PROCEDURE — 25010000002 PROMETHAZINE PER 50 MG: Performed by: SURGERY

## 2019-12-22 PROCEDURE — 25010000002 PIPERACILLIN SOD-TAZOBACTAM PER 1 G: Performed by: COLON & RECTAL SURGERY

## 2019-12-22 PROCEDURE — 25010000002 METHYLPREDNISOLONE PER 40 MG: Performed by: COLON & RECTAL SURGERY

## 2019-12-22 PROCEDURE — 25010000002 MAGNESIUM SULFATE PER 500 MG OF MAGNESIUM: Performed by: COLON & RECTAL SURGERY

## 2019-12-22 PROCEDURE — 25010000002 ONDANSETRON PER 1 MG: Performed by: COLON & RECTAL SURGERY

## 2019-12-22 PROCEDURE — 25010000002 METHOCARBAMOL 1000 MG/10ML SOLUTION: Performed by: COLON & RECTAL SURGERY

## 2019-12-22 PROCEDURE — 63710000001 INSULIN LISPRO (HUMAN) PER 5 UNITS: Performed by: COLON & RECTAL SURGERY

## 2019-12-22 PROCEDURE — 85025 COMPLETE CBC W/AUTO DIFF WBC: CPT | Performed by: SURGERY

## 2019-12-22 PROCEDURE — 83735 ASSAY OF MAGNESIUM: CPT | Performed by: COLON & RECTAL SURGERY

## 2019-12-22 PROCEDURE — 82962 GLUCOSE BLOOD TEST: CPT

## 2019-12-22 PROCEDURE — 25010000002 HYDROMORPHONE PER 4 MG: Performed by: COLON & RECTAL SURGERY

## 2019-12-22 PROCEDURE — 74018 RADEX ABDOMEN 1 VIEW: CPT

## 2019-12-22 PROCEDURE — 99024 POSTOP FOLLOW-UP VISIT: CPT | Performed by: SURGERY

## 2019-12-22 PROCEDURE — 25010000002 KETOROLAC TROMETHAMINE PER 15 MG: Performed by: COLON & RECTAL SURGERY

## 2019-12-22 PROCEDURE — 84100 ASSAY OF PHOSPHORUS: CPT | Performed by: COLON & RECTAL SURGERY

## 2019-12-22 PROCEDURE — 25010000002 CALCIUM GLUCONATE PER 10 ML: Performed by: COLON & RECTAL SURGERY

## 2019-12-22 PROCEDURE — 80053 COMPREHEN METABOLIC PANEL: CPT | Performed by: COLON & RECTAL SURGERY

## 2019-12-22 RX ADMIN — INSULIN LISPRO 2 UNITS: 100 INJECTION, SOLUTION INTRAVENOUS; SUBCUTANEOUS at 08:18

## 2019-12-22 RX ADMIN — HYDROMORPHONE HYDROCHLORIDE 0.25 MG: 1 INJECTION, SOLUTION INTRAMUSCULAR; INTRAVENOUS; SUBCUTANEOUS at 08:18

## 2019-12-22 RX ADMIN — ACETAMINOPHEN 1000 MG: 10 INJECTION, SOLUTION INTRAVENOUS at 01:06

## 2019-12-22 RX ADMIN — HYDROMORPHONE HYDROCHLORIDE 0.25 MG: 1 INJECTION, SOLUTION INTRAMUSCULAR; INTRAVENOUS; SUBCUTANEOUS at 21:04

## 2019-12-22 RX ADMIN — SODIUM CHLORIDE, PRESERVATIVE FREE 3 ML: 5 INJECTION INTRAVENOUS at 20:01

## 2019-12-22 RX ADMIN — FAMOTIDINE 20 MG: 10 INJECTION INTRAVENOUS at 08:18

## 2019-12-22 RX ADMIN — SODIUM CHLORIDE, PRESERVATIVE FREE 3 ML: 5 INJECTION INTRAVENOUS at 09:58

## 2019-12-22 RX ADMIN — MAGNESIUM SULFATE 4 G: 4 INJECTION INTRAVENOUS at 11:57

## 2019-12-22 RX ADMIN — KETOROLAC TROMETHAMINE 15 MG: 15 INJECTION, SOLUTION INTRAMUSCULAR; INTRAVENOUS at 01:06

## 2019-12-22 RX ADMIN — INSULIN LISPRO 2 UNITS: 100 INJECTION, SOLUTION INTRAVENOUS; SUBCUTANEOUS at 17:10

## 2019-12-22 RX ADMIN — METHYLPREDNISOLONE SODIUM SUCCINATE 20 MG: 40 INJECTION, POWDER, FOR SOLUTION INTRAMUSCULAR; INTRAVENOUS at 08:18

## 2019-12-22 RX ADMIN — FAMOTIDINE 20 MG: 10 INJECTION INTRAVENOUS at 20:01

## 2019-12-22 RX ADMIN — CALCIUM GLUCONATE: 94 INJECTION, SOLUTION INTRAVENOUS at 17:09

## 2019-12-22 RX ADMIN — ONDANSETRON 8 MG: 2 INJECTION INTRAMUSCULAR; INTRAVENOUS at 07:27

## 2019-12-22 RX ADMIN — TAZOBACTAM SODIUM AND PIPERACILLIN SODIUM 3.38 G: 375; 3 INJECTION, SOLUTION INTRAVENOUS at 21:04

## 2019-12-22 RX ADMIN — KETOROLAC TROMETHAMINE 15 MG: 15 INJECTION, SOLUTION INTRAMUSCULAR; INTRAVENOUS at 17:09

## 2019-12-22 RX ADMIN — METHOCARBAMOL 500 MG: 100 INJECTION, SOLUTION INTRAMUSCULAR; INTRAVENOUS at 22:18

## 2019-12-22 RX ADMIN — METHOCARBAMOL 500 MG: 100 INJECTION, SOLUTION INTRAMUSCULAR; INTRAVENOUS at 12:05

## 2019-12-22 RX ADMIN — ACETAMINOPHEN 1000 MG: 10 INJECTION, SOLUTION INTRAVENOUS at 08:19

## 2019-12-22 RX ADMIN — ACETAMINOPHEN 1000 MG: 10 INJECTION, SOLUTION INTRAVENOUS at 14:11

## 2019-12-22 RX ADMIN — PROMETHAZINE HYDROCHLORIDE 12.5 MG: 25 INJECTION INTRAMUSCULAR; INTRAVENOUS at 09:57

## 2019-12-22 RX ADMIN — TAZOBACTAM SODIUM AND PIPERACILLIN SODIUM 3.38 G: 375; 3 INJECTION, SOLUTION INTRAVENOUS at 14:11

## 2019-12-22 RX ADMIN — SODIUM CHLORIDE 50 ML/HR: 9 INJECTION, SOLUTION INTRAVENOUS at 05:16

## 2019-12-22 RX ADMIN — ACETAMINOPHEN 1000 MG: 10 INJECTION, SOLUTION INTRAVENOUS at 20:02

## 2019-12-22 RX ADMIN — INSULIN LISPRO 2 UNITS: 100 INJECTION, SOLUTION INTRAVENOUS; SUBCUTANEOUS at 21:04

## 2019-12-22 RX ADMIN — KETOROLAC TROMETHAMINE 15 MG: 15 INJECTION, SOLUTION INTRAMUSCULAR; INTRAVENOUS at 09:57

## 2019-12-22 RX ADMIN — INSULIN LISPRO 2 UNITS: 100 INJECTION, SOLUTION INTRAVENOUS; SUBCUTANEOUS at 11:57

## 2019-12-22 RX ADMIN — TAZOBACTAM SODIUM AND PIPERACILLIN SODIUM 3.38 G: 375; 3 INJECTION, SOLUTION INTRAVENOUS at 05:05

## 2019-12-23 LAB
ALBUMIN SERPL-MCNC: 1.6 G/DL (ref 3.5–5.2)
ALBUMIN/GLOB SERPL: 0.5 G/DL
ALP SERPL-CCNC: 221 U/L (ref 39–117)
ALT SERPL W P-5'-P-CCNC: 211 U/L (ref 1–33)
ANION GAP SERPL CALCULATED.3IONS-SCNC: 10.5 MMOL/L (ref 5–15)
AST SERPL-CCNC: 80 U/L (ref 1–32)
BASOPHILS # BLD AUTO: 0.03 10*3/MM3 (ref 0–0.2)
BASOPHILS NFR BLD AUTO: 0.3 % (ref 0–1.5)
BILIRUB SERPL-MCNC: 0.2 MG/DL (ref 0.2–1.2)
BUN BLD-MCNC: 15 MG/DL (ref 8–23)
BUN/CREAT SERPL: 27.8 (ref 7–25)
CALCIUM SPEC-SCNC: 7.6 MG/DL (ref 8.6–10.5)
CHLORIDE SERPL-SCNC: 111 MMOL/L (ref 98–107)
CO2 SERPL-SCNC: 20.5 MMOL/L (ref 22–29)
CREAT BLD-MCNC: 0.54 MG/DL (ref 0.57–1)
DEPRECATED RDW RBC AUTO: 41.4 FL (ref 37–54)
EOSINOPHIL # BLD AUTO: 0.11 10*3/MM3 (ref 0–0.4)
EOSINOPHIL NFR BLD AUTO: 1.2 % (ref 0.3–6.2)
ERYTHROCYTE [DISTWIDTH] IN BLOOD BY AUTOMATED COUNT: 12.7 % (ref 12.3–15.4)
GFR SERPL CREATININE-BSD FRML MDRD: 111 ML/MIN/1.73
GLOBULIN UR ELPH-MCNC: 3.3 GM/DL
GLUCOSE BLD-MCNC: 148 MG/DL (ref 65–99)
GLUCOSE BLDC GLUCOMTR-MCNC: 129 MG/DL (ref 70–130)
GLUCOSE BLDC GLUCOMTR-MCNC: 156 MG/DL (ref 70–130)
GLUCOSE BLDC GLUCOMTR-MCNC: 190 MG/DL (ref 70–130)
HCT VFR BLD AUTO: 22.1 % (ref 34–46.6)
HGB BLD-MCNC: 7.1 G/DL (ref 12–15.9)
IMM GRANULOCYTES # BLD AUTO: 0.48 10*3/MM3 (ref 0–0.05)
IMM GRANULOCYTES NFR BLD AUTO: 5 % (ref 0–0.5)
LYMPHOCYTES # BLD AUTO: 0.63 10*3/MM3 (ref 0.7–3.1)
LYMPHOCYTES NFR BLD AUTO: 6.6 % (ref 19.6–45.3)
MAGNESIUM SERPL-MCNC: 2.3 MG/DL (ref 1.6–2.4)
MCH RBC QN AUTO: 28.9 PG (ref 26.6–33)
MCHC RBC AUTO-ENTMCNC: 32.1 G/DL (ref 31.5–35.7)
MCV RBC AUTO: 89.8 FL (ref 79–97)
MONOCYTES # BLD AUTO: 0.62 10*3/MM3 (ref 0.1–0.9)
MONOCYTES NFR BLD AUTO: 6.5 % (ref 5–12)
NEUTROPHILS # BLD AUTO: 7.67 10*3/MM3 (ref 1.7–7)
NEUTROPHILS NFR BLD AUTO: 80.4 % (ref 42.7–76)
NRBC BLD AUTO-RTO: 0 /100 WBC (ref 0–0.2)
PHOSPHATE SERPL-MCNC: 3.4 MG/DL (ref 2.5–4.5)
PLATELET # BLD AUTO: 831 10*3/MM3 (ref 140–450)
PMV BLD AUTO: 9.1 FL (ref 6–12)
POTASSIUM BLD-SCNC: 4.1 MMOL/L (ref 3.5–5.2)
PROT SERPL-MCNC: 4.9 G/DL (ref 6–8.5)
RBC # BLD AUTO: 2.46 10*6/MM3 (ref 3.77–5.28)
SODIUM BLD-SCNC: 142 MMOL/L (ref 136–145)
TRIGL SERPL-MCNC: 175 MG/DL (ref 0–150)
WBC NRBC COR # BLD: 9.54 10*3/MM3 (ref 3.4–10.8)

## 2019-12-23 PROCEDURE — 25010000002 HYDROMORPHONE PER 4 MG: Performed by: COLON & RECTAL SURGERY

## 2019-12-23 PROCEDURE — 80053 COMPREHEN METABOLIC PANEL: CPT | Performed by: COLON & RECTAL SURGERY

## 2019-12-23 PROCEDURE — 25010000002 KETOROLAC TROMETHAMINE PER 15 MG: Performed by: COLON & RECTAL SURGERY

## 2019-12-23 PROCEDURE — 25010000002 MAGNESIUM SULFATE PER 500 MG OF MAGNESIUM: Performed by: COLON & RECTAL SURGERY

## 2019-12-23 PROCEDURE — 25010000002 METHYLPREDNISOLONE PER 40 MG: Performed by: COLON & RECTAL SURGERY

## 2019-12-23 PROCEDURE — 25010000002 PROMETHAZINE PER 50 MG: Performed by: SURGERY

## 2019-12-23 PROCEDURE — 25010000002 ENOXAPARIN PER 10 MG: Performed by: COLON & RECTAL SURGERY

## 2019-12-23 PROCEDURE — 83735 ASSAY OF MAGNESIUM: CPT | Performed by: COLON & RECTAL SURGERY

## 2019-12-23 PROCEDURE — 84100 ASSAY OF PHOSPHORUS: CPT | Performed by: COLON & RECTAL SURGERY

## 2019-12-23 PROCEDURE — 82962 GLUCOSE BLOOD TEST: CPT

## 2019-12-23 PROCEDURE — 25010000002 PIPERACILLIN SOD-TAZOBACTAM PER 1 G: Performed by: COLON & RECTAL SURGERY

## 2019-12-23 PROCEDURE — 25010000002 CALCIUM GLUCONATE PER 10 ML: Performed by: COLON & RECTAL SURGERY

## 2019-12-23 PROCEDURE — 63710000001 INSULIN LISPRO (HUMAN) PER 5 UNITS: Performed by: COLON & RECTAL SURGERY

## 2019-12-23 PROCEDURE — 84478 ASSAY OF TRIGLYCERIDES: CPT | Performed by: COLON & RECTAL SURGERY

## 2019-12-23 PROCEDURE — 25010000002 METHOCARBAMOL 1000 MG/10ML SOLUTION: Performed by: COLON & RECTAL SURGERY

## 2019-12-23 PROCEDURE — 85025 COMPLETE CBC W/AUTO DIFF WBC: CPT | Performed by: SURGERY

## 2019-12-23 RX ADMIN — SODIUM CHLORIDE 50 ML/HR: 9 INJECTION, SOLUTION INTRAVENOUS at 02:02

## 2019-12-23 RX ADMIN — HYDROMORPHONE HYDROCHLORIDE 0.25 MG: 1 INJECTION, SOLUTION INTRAMUSCULAR; INTRAVENOUS; SUBCUTANEOUS at 16:11

## 2019-12-23 RX ADMIN — HYDROMORPHONE HYDROCHLORIDE 0.25 MG: 1 INJECTION, SOLUTION INTRAMUSCULAR; INTRAVENOUS; SUBCUTANEOUS at 21:11

## 2019-12-23 RX ADMIN — METHOCARBAMOL 500 MG: 100 INJECTION, SOLUTION INTRAMUSCULAR; INTRAVENOUS at 06:37

## 2019-12-23 RX ADMIN — KETOROLAC TROMETHAMINE 15 MG: 15 INJECTION, SOLUTION INTRAMUSCULAR; INTRAVENOUS at 01:59

## 2019-12-23 RX ADMIN — FAMOTIDINE 20 MG: 10 INJECTION INTRAVENOUS at 21:11

## 2019-12-23 RX ADMIN — SODIUM CHLORIDE, PRESERVATIVE FREE 3 ML: 5 INJECTION INTRAVENOUS at 08:22

## 2019-12-23 RX ADMIN — KETOROLAC TROMETHAMINE 15 MG: 15 INJECTION, SOLUTION INTRAMUSCULAR; INTRAVENOUS at 08:22

## 2019-12-23 RX ADMIN — ACETAMINOPHEN 1000 MG: 10 INJECTION, SOLUTION INTRAVENOUS at 13:44

## 2019-12-23 RX ADMIN — SODIUM CHLORIDE, PRESERVATIVE FREE 3 ML: 5 INJECTION INTRAVENOUS at 21:11

## 2019-12-23 RX ADMIN — KETOROLAC TROMETHAMINE 15 MG: 15 INJECTION, SOLUTION INTRAMUSCULAR; INTRAVENOUS at 17:06

## 2019-12-23 RX ADMIN — ACETAMINOPHEN 1000 MG: 10 INJECTION, SOLUTION INTRAVENOUS at 01:59

## 2019-12-23 RX ADMIN — TAZOBACTAM SODIUM AND PIPERACILLIN SODIUM 3.38 G: 375; 3 INJECTION, SOLUTION INTRAVENOUS at 21:11

## 2019-12-23 RX ADMIN — CALCIUM GLUCONATE: 94 INJECTION, SOLUTION INTRAVENOUS at 17:52

## 2019-12-23 RX ADMIN — PROMETHAZINE HYDROCHLORIDE 12.5 MG: 25 INJECTION INTRAMUSCULAR; INTRAVENOUS at 13:40

## 2019-12-23 RX ADMIN — ACETAMINOPHEN 1000 MG: 10 INJECTION, SOLUTION INTRAVENOUS at 20:03

## 2019-12-23 RX ADMIN — TAZOBACTAM SODIUM AND PIPERACILLIN SODIUM 3.38 G: 375; 3 INJECTION, SOLUTION INTRAVENOUS at 06:01

## 2019-12-23 RX ADMIN — HYDROMORPHONE HYDROCHLORIDE 0.25 MG: 1 INJECTION, SOLUTION INTRAMUSCULAR; INTRAVENOUS; SUBCUTANEOUS at 09:50

## 2019-12-23 RX ADMIN — TAZOBACTAM SODIUM AND PIPERACILLIN SODIUM 3.38 G: 375; 3 INJECTION, SOLUTION INTRAVENOUS at 14:59

## 2019-12-23 RX ADMIN — ENOXAPARIN SODIUM 30 MG: 30 INJECTION SUBCUTANEOUS at 20:03

## 2019-12-23 RX ADMIN — INSULIN LISPRO 2 UNITS: 100 INJECTION, SOLUTION INTRAVENOUS; SUBCUTANEOUS at 17:52

## 2019-12-23 RX ADMIN — ACETAMINOPHEN 1000 MG: 10 INJECTION, SOLUTION INTRAVENOUS at 08:22

## 2019-12-23 RX ADMIN — METHYLPREDNISOLONE SODIUM SUCCINATE 20 MG: 40 INJECTION, POWDER, FOR SOLUTION INTRAMUSCULAR; INTRAVENOUS at 08:22

## 2019-12-23 RX ADMIN — FAMOTIDINE 20 MG: 10 INJECTION INTRAVENOUS at 08:22

## 2019-12-24 LAB
ABO + RH BLD: NORMAL
ABO + RH BLD: NORMAL
ALBUMIN SERPL-MCNC: 1.4 G/DL (ref 3.5–5.2)
ALBUMIN/GLOB SERPL: 0.4 G/DL
ALP SERPL-CCNC: 223 U/L (ref 39–117)
ALT SERPL W P-5'-P-CCNC: 146 U/L (ref 1–33)
ANION GAP SERPL CALCULATED.3IONS-SCNC: 9.4 MMOL/L (ref 5–15)
AST SERPL-CCNC: 40 U/L (ref 1–32)
BH BB BLOOD EXPIRATION DATE: NORMAL
BH BB BLOOD EXPIRATION DATE: NORMAL
BH BB BLOOD TYPE BARCODE: 6200
BH BB BLOOD TYPE BARCODE: 6200
BH BB DISPENSE STATUS: NORMAL
BH BB DISPENSE STATUS: NORMAL
BH BB PRODUCT CODE: NORMAL
BH BB PRODUCT CODE: NORMAL
BH BB UNIT NUMBER: NORMAL
BH BB UNIT NUMBER: NORMAL
BILIRUB SERPL-MCNC: 0.2 MG/DL (ref 0.2–1.2)
BUN BLD-MCNC: 15 MG/DL (ref 8–23)
BUN/CREAT SERPL: 28.3 (ref 7–25)
CALCIUM SPEC-SCNC: 7.9 MG/DL (ref 8.6–10.5)
CHLORIDE SERPL-SCNC: 105 MMOL/L (ref 98–107)
CO2 SERPL-SCNC: 24.6 MMOL/L (ref 22–29)
CREAT BLD-MCNC: 0.53 MG/DL (ref 0.57–1)
CYTO UR: NORMAL
GFR SERPL CREATININE-BSD FRML MDRD: 113 ML/MIN/1.73
GLOBULIN UR ELPH-MCNC: 3.7 GM/DL
GLUCOSE BLD-MCNC: 161 MG/DL (ref 65–99)
GLUCOSE BLDC GLUCOMTR-MCNC: 145 MG/DL (ref 70–130)
GLUCOSE BLDC GLUCOMTR-MCNC: 157 MG/DL (ref 70–130)
GLUCOSE BLDC GLUCOMTR-MCNC: 187 MG/DL (ref 70–130)
GLUCOSE BLDC GLUCOMTR-MCNC: 233 MG/DL (ref 70–130)
LAB AP CASE REPORT: NORMAL
MAGNESIUM SERPL-MCNC: 1.9 MG/DL (ref 1.6–2.4)
PATH REPORT.FINAL DX SPEC: NORMAL
PATH REPORT.GROSS SPEC: NORMAL
PHOSPHATE SERPL-MCNC: 3.2 MG/DL (ref 2.5–4.5)
POTASSIUM BLD-SCNC: 3.8 MMOL/L (ref 3.5–5.2)
PROT SERPL-MCNC: 5.1 G/DL (ref 6–8.5)
SODIUM BLD-SCNC: 139 MMOL/L (ref 136–145)
UNIT  ABO: NORMAL
UNIT  ABO: NORMAL
UNIT  RH: NORMAL
UNIT  RH: NORMAL

## 2019-12-24 PROCEDURE — 25010000002 HYDROMORPHONE PER 4 MG: Performed by: COLON & RECTAL SURGERY

## 2019-12-24 PROCEDURE — 84100 ASSAY OF PHOSPHORUS: CPT | Performed by: COLON & RECTAL SURGERY

## 2019-12-24 PROCEDURE — 63710000001 INSULIN LISPRO (HUMAN) PER 5 UNITS: Performed by: COLON & RECTAL SURGERY

## 2019-12-24 PROCEDURE — 25010000002 KETOROLAC TROMETHAMINE PER 15 MG: Performed by: COLON & RECTAL SURGERY

## 2019-12-24 PROCEDURE — 25010000002 PIPERACILLIN SOD-TAZOBACTAM PER 1 G: Performed by: COLON & RECTAL SURGERY

## 2019-12-24 PROCEDURE — 25010000002 PROMETHAZINE PER 50 MG: Performed by: SURGERY

## 2019-12-24 PROCEDURE — 80053 COMPREHEN METABOLIC PANEL: CPT | Performed by: COLON & RECTAL SURGERY

## 2019-12-24 PROCEDURE — 82962 GLUCOSE BLOOD TEST: CPT

## 2019-12-24 PROCEDURE — 25010000002 ENOXAPARIN PER 10 MG: Performed by: COLON & RECTAL SURGERY

## 2019-12-24 PROCEDURE — 25010000002 MAGNESIUM SULFATE PER 500 MG OF MAGNESIUM: Performed by: COLON & RECTAL SURGERY

## 2019-12-24 PROCEDURE — 25010000002 METHYLPREDNISOLONE PER 40 MG: Performed by: COLON & RECTAL SURGERY

## 2019-12-24 PROCEDURE — 83735 ASSAY OF MAGNESIUM: CPT | Performed by: COLON & RECTAL SURGERY

## 2019-12-24 PROCEDURE — 25010000002 CALCIUM GLUCONATE PER 10 ML: Performed by: COLON & RECTAL SURGERY

## 2019-12-24 PROCEDURE — 25010000002 METHOCARBAMOL 1000 MG/10ML SOLUTION: Performed by: COLON & RECTAL SURGERY

## 2019-12-24 RX ADMIN — CALCIUM GLUCONATE: 94 INJECTION, SOLUTION INTRAVENOUS at 18:09

## 2019-12-24 RX ADMIN — ACETAMINOPHEN 1000 MG: 10 INJECTION, SOLUTION INTRAVENOUS at 14:12

## 2019-12-24 RX ADMIN — FAMOTIDINE 20 MG: 10 INJECTION INTRAVENOUS at 20:03

## 2019-12-24 RX ADMIN — TAZOBACTAM SODIUM AND PIPERACILLIN SODIUM 3.38 G: 375; 3 INJECTION, SOLUTION INTRAVENOUS at 06:01

## 2019-12-24 RX ADMIN — SODIUM CHLORIDE, PRESERVATIVE FREE 3 ML: 5 INJECTION INTRAVENOUS at 09:00

## 2019-12-24 RX ADMIN — FAMOTIDINE 20 MG: 10 INJECTION INTRAVENOUS at 09:00

## 2019-12-24 RX ADMIN — SODIUM CHLORIDE, PRESERVATIVE FREE 3 ML: 5 INJECTION INTRAVENOUS at 21:34

## 2019-12-24 RX ADMIN — SMOFLIPID 100 ML: 6; 6; 5; 3 INJECTION, EMULSION INTRAVENOUS at 18:09

## 2019-12-24 RX ADMIN — METHYLPREDNISOLONE SODIUM SUCCINATE 20 MG: 40 INJECTION, POWDER, FOR SOLUTION INTRAMUSCULAR; INTRAVENOUS at 08:59

## 2019-12-24 RX ADMIN — TAZOBACTAM SODIUM AND PIPERACILLIN SODIUM 3.38 G: 375; 3 INJECTION, SOLUTION INTRAVENOUS at 21:34

## 2019-12-24 RX ADMIN — PROMETHAZINE HYDROCHLORIDE 12.5 MG: 25 INJECTION INTRAMUSCULAR; INTRAVENOUS at 10:31

## 2019-12-24 RX ADMIN — INSULIN LISPRO 2 UNITS: 100 INJECTION, SOLUTION INTRAVENOUS; SUBCUTANEOUS at 18:09

## 2019-12-24 RX ADMIN — ENOXAPARIN SODIUM 30 MG: 30 INJECTION SUBCUTANEOUS at 18:09

## 2019-12-24 RX ADMIN — INSULIN LISPRO 2 UNITS: 100 INJECTION, SOLUTION INTRAVENOUS; SUBCUTANEOUS at 21:34

## 2019-12-24 RX ADMIN — INSULIN LISPRO 3 UNITS: 100 INJECTION, SOLUTION INTRAVENOUS; SUBCUTANEOUS at 14:12

## 2019-12-24 RX ADMIN — KETOROLAC TROMETHAMINE 15 MG: 15 INJECTION, SOLUTION INTRAMUSCULAR; INTRAVENOUS at 18:09

## 2019-12-24 RX ADMIN — KETOROLAC TROMETHAMINE 15 MG: 15 INJECTION, SOLUTION INTRAMUSCULAR; INTRAVENOUS at 00:32

## 2019-12-24 RX ADMIN — TAZOBACTAM SODIUM AND PIPERACILLIN SODIUM 3.38 G: 375; 3 INJECTION, SOLUTION INTRAVENOUS at 14:12

## 2019-12-24 RX ADMIN — KETOROLAC TROMETHAMINE 15 MG: 15 INJECTION, SOLUTION INTRAMUSCULAR; INTRAVENOUS at 09:00

## 2019-12-24 RX ADMIN — HYDROMORPHONE HYDROCHLORIDE 0.25 MG: 1 INJECTION, SOLUTION INTRAMUSCULAR; INTRAVENOUS; SUBCUTANEOUS at 11:24

## 2019-12-24 RX ADMIN — METHOCARBAMOL 500 MG: 100 INJECTION, SOLUTION INTRAMUSCULAR; INTRAVENOUS at 15:12

## 2019-12-24 RX ADMIN — ACETAMINOPHEN 1000 MG: 10 INJECTION, SOLUTION INTRAVENOUS at 02:02

## 2019-12-24 RX ADMIN — ACETAMINOPHEN 1000 MG: 10 INJECTION, SOLUTION INTRAVENOUS at 20:03

## 2019-12-24 RX ADMIN — ACETAMINOPHEN 1000 MG: 10 INJECTION, SOLUTION INTRAVENOUS at 09:00

## 2019-12-25 LAB
ANION GAP SERPL CALCULATED.3IONS-SCNC: 10.9 MMOL/L (ref 5–15)
BUN BLD-MCNC: 15 MG/DL (ref 8–23)
BUN/CREAT SERPL: 26.8 (ref 7–25)
CALCIUM SPEC-SCNC: 8 MG/DL (ref 8.6–10.5)
CHLORIDE SERPL-SCNC: 104 MMOL/L (ref 98–107)
CO2 SERPL-SCNC: 26.1 MMOL/L (ref 22–29)
CREAT BLD-MCNC: 0.56 MG/DL (ref 0.57–1)
GFR SERPL CREATININE-BSD FRML MDRD: 106 ML/MIN/1.73
GLUCOSE BLD-MCNC: 152 MG/DL (ref 65–99)
GLUCOSE BLDC GLUCOMTR-MCNC: 124 MG/DL (ref 70–130)
GLUCOSE BLDC GLUCOMTR-MCNC: 154 MG/DL (ref 70–130)
GLUCOSE BLDC GLUCOMTR-MCNC: 190 MG/DL (ref 70–130)
GLUCOSE BLDC GLUCOMTR-MCNC: 215 MG/DL (ref 70–130)
MAGNESIUM SERPL-MCNC: 2 MG/DL (ref 1.6–2.4)
PHOSPHATE SERPL-MCNC: 3 MG/DL (ref 2.5–4.5)
POTASSIUM BLD-SCNC: 3.5 MMOL/L (ref 3.5–5.2)
SODIUM BLD-SCNC: 141 MMOL/L (ref 136–145)

## 2019-12-25 PROCEDURE — 25010000002 METHYLPREDNISOLONE PER 40 MG: Performed by: COLON & RECTAL SURGERY

## 2019-12-25 PROCEDURE — 83735 ASSAY OF MAGNESIUM: CPT | Performed by: COLON & RECTAL SURGERY

## 2019-12-25 PROCEDURE — 25010000002 KETOROLAC TROMETHAMINE PER 15 MG: Performed by: COLON & RECTAL SURGERY

## 2019-12-25 PROCEDURE — 25010000002 MAGNESIUM SULFATE PER 500 MG OF MAGNESIUM: Performed by: COLON & RECTAL SURGERY

## 2019-12-25 PROCEDURE — 25010000002 HYDROMORPHONE PER 4 MG: Performed by: COLON & RECTAL SURGERY

## 2019-12-25 PROCEDURE — 80048 BASIC METABOLIC PNL TOTAL CA: CPT | Performed by: COLON & RECTAL SURGERY

## 2019-12-25 PROCEDURE — 82962 GLUCOSE BLOOD TEST: CPT

## 2019-12-25 PROCEDURE — 25010000002 CALCIUM GLUCONATE PER 10 ML: Performed by: COLON & RECTAL SURGERY

## 2019-12-25 PROCEDURE — 63710000001 INSULIN LISPRO (HUMAN) PER 5 UNITS: Performed by: COLON & RECTAL SURGERY

## 2019-12-25 PROCEDURE — 25010000002 ENOXAPARIN PER 10 MG: Performed by: COLON & RECTAL SURGERY

## 2019-12-25 PROCEDURE — 84100 ASSAY OF PHOSPHORUS: CPT | Performed by: COLON & RECTAL SURGERY

## 2019-12-25 PROCEDURE — 25010000002 PIPERACILLIN SOD-TAZOBACTAM PER 1 G: Performed by: COLON & RECTAL SURGERY

## 2019-12-25 RX ORDER — METHYLPREDNISOLONE SODIUM SUCCINATE 40 MG/ML
15 INJECTION, POWDER, LYOPHILIZED, FOR SOLUTION INTRAMUSCULAR; INTRAVENOUS EVERY 24 HOURS
Status: DISCONTINUED | OUTPATIENT
Start: 2019-12-26 | End: 2019-12-27

## 2019-12-25 RX ORDER — HYDROMORPHONE HYDROCHLORIDE 1 MG/ML
0.25 INJECTION, SOLUTION INTRAMUSCULAR; INTRAVENOUS; SUBCUTANEOUS
Status: DISCONTINUED | OUTPATIENT
Start: 2019-12-25 | End: 2019-12-28 | Stop reason: HOSPADM

## 2019-12-25 RX ADMIN — KETOROLAC TROMETHAMINE 15 MG: 15 INJECTION, SOLUTION INTRAMUSCULAR; INTRAVENOUS at 01:46

## 2019-12-25 RX ADMIN — CALCIUM GLUCONATE: 94 INJECTION, SOLUTION INTRAVENOUS at 17:50

## 2019-12-25 RX ADMIN — INSULIN LISPRO 2 UNITS: 100 INJECTION, SOLUTION INTRAVENOUS; SUBCUTANEOUS at 09:08

## 2019-12-25 RX ADMIN — ACETAMINOPHEN 1000 MG: 10 INJECTION, SOLUTION INTRAVENOUS at 14:50

## 2019-12-25 RX ADMIN — ACETAMINOPHEN 1000 MG: 10 INJECTION, SOLUTION INTRAVENOUS at 07:56

## 2019-12-25 RX ADMIN — INSULIN LISPRO 2 UNITS: 100 INJECTION, SOLUTION INTRAVENOUS; SUBCUTANEOUS at 12:23

## 2019-12-25 RX ADMIN — ENOXAPARIN SODIUM 30 MG: 30 INJECTION SUBCUTANEOUS at 18:00

## 2019-12-25 RX ADMIN — HYDROMORPHONE HYDROCHLORIDE 0.25 MG: 1 INJECTION, SOLUTION INTRAMUSCULAR; INTRAVENOUS; SUBCUTANEOUS at 05:46

## 2019-12-25 RX ADMIN — SODIUM CHLORIDE, PRESERVATIVE FREE 3 ML: 5 INJECTION INTRAVENOUS at 21:04

## 2019-12-25 RX ADMIN — ACETAMINOPHEN 1000 MG: 10 INJECTION, SOLUTION INTRAVENOUS at 20:16

## 2019-12-25 RX ADMIN — TAZOBACTAM SODIUM AND PIPERACILLIN SODIUM 3.38 G: 375; 3 INJECTION, SOLUTION INTRAVENOUS at 05:46

## 2019-12-25 RX ADMIN — METHYLPREDNISOLONE SODIUM SUCCINATE 20 MG: 40 INJECTION, POWDER, FOR SOLUTION INTRAMUSCULAR; INTRAVENOUS at 07:57

## 2019-12-25 RX ADMIN — ACETAMINOPHEN 1000 MG: 10 INJECTION, SOLUTION INTRAVENOUS at 01:46

## 2019-12-25 RX ADMIN — SODIUM CHLORIDE, PRESERVATIVE FREE 3 ML: 5 INJECTION INTRAVENOUS at 08:01

## 2019-12-25 RX ADMIN — FAMOTIDINE 20 MG: 10 INJECTION INTRAVENOUS at 21:04

## 2019-12-25 RX ADMIN — KETOROLAC TROMETHAMINE 15 MG: 15 INJECTION, SOLUTION INTRAMUSCULAR; INTRAVENOUS at 10:00

## 2019-12-25 RX ADMIN — INSULIN LISPRO 3 UNITS: 100 INJECTION, SOLUTION INTRAVENOUS; SUBCUTANEOUS at 17:49

## 2019-12-25 RX ADMIN — FAMOTIDINE 20 MG: 10 INJECTION INTRAVENOUS at 08:01

## 2019-12-25 RX ADMIN — TAZOBACTAM SODIUM AND PIPERACILLIN SODIUM 3.38 G: 375; 3 INJECTION, SOLUTION INTRAVENOUS at 13:52

## 2019-12-25 RX ADMIN — TAZOBACTAM SODIUM AND PIPERACILLIN SODIUM 3.38 G: 375; 3 INJECTION, SOLUTION INTRAVENOUS at 23:20

## 2019-12-26 LAB
GLUCOSE BLDC GLUCOMTR-MCNC: 133 MG/DL (ref 70–130)
GLUCOSE BLDC GLUCOMTR-MCNC: 159 MG/DL (ref 70–130)
GLUCOSE BLDC GLUCOMTR-MCNC: 195 MG/DL (ref 70–130)
GLUCOSE BLDC GLUCOMTR-MCNC: 210 MG/DL (ref 70–130)

## 2019-12-26 PROCEDURE — 25010000002 MAGNESIUM SULFATE PER 500 MG OF MAGNESIUM: Performed by: COLON & RECTAL SURGERY

## 2019-12-26 PROCEDURE — 25010000002 ONDANSETRON PER 1 MG: Performed by: COLON & RECTAL SURGERY

## 2019-12-26 PROCEDURE — 25010000002 CALCIUM GLUCONATE PER 10 ML: Performed by: COLON & RECTAL SURGERY

## 2019-12-26 PROCEDURE — 82962 GLUCOSE BLOOD TEST: CPT

## 2019-12-26 PROCEDURE — 25010000002 ENOXAPARIN PER 10 MG: Performed by: COLON & RECTAL SURGERY

## 2019-12-26 PROCEDURE — 63710000001 INSULIN LISPRO (HUMAN) PER 5 UNITS: Performed by: COLON & RECTAL SURGERY

## 2019-12-26 PROCEDURE — 25010000002 PIPERACILLIN SOD-TAZOBACTAM PER 1 G: Performed by: COLON & RECTAL SURGERY

## 2019-12-26 PROCEDURE — 25010000002 METHYLPREDNISOLONE PER 40 MG: Performed by: COLON & RECTAL SURGERY

## 2019-12-26 RX ADMIN — FAMOTIDINE 20 MG: 10 INJECTION INTRAVENOUS at 20:09

## 2019-12-26 RX ADMIN — ONDANSETRON 8 MG: 2 INJECTION INTRAMUSCULAR; INTRAVENOUS at 12:36

## 2019-12-26 RX ADMIN — ENOXAPARIN SODIUM 30 MG: 30 INJECTION SUBCUTANEOUS at 18:15

## 2019-12-26 RX ADMIN — INSULIN LISPRO 2 UNITS: 100 INJECTION, SOLUTION INTRAVENOUS; SUBCUTANEOUS at 08:46

## 2019-12-26 RX ADMIN — INSULIN LISPRO 3 UNITS: 100 INJECTION, SOLUTION INTRAVENOUS; SUBCUTANEOUS at 12:33

## 2019-12-26 RX ADMIN — ACETAMINOPHEN 1000 MG: 10 INJECTION, SOLUTION INTRAVENOUS at 20:09

## 2019-12-26 RX ADMIN — SMOFLIPID 100 ML: 6; 6; 5; 3 INJECTION, EMULSION INTRAVENOUS at 18:15

## 2019-12-26 RX ADMIN — INSULIN LISPRO 2 UNITS: 100 INJECTION, SOLUTION INTRAVENOUS; SUBCUTANEOUS at 18:15

## 2019-12-26 RX ADMIN — TAZOBACTAM SODIUM AND PIPERACILLIN SODIUM 3.38 G: 375; 3 INJECTION, SOLUTION INTRAVENOUS at 14:32

## 2019-12-26 RX ADMIN — ACETAMINOPHEN 1000 MG: 10 INJECTION, SOLUTION INTRAVENOUS at 08:46

## 2019-12-26 RX ADMIN — TAZOBACTAM SODIUM AND PIPERACILLIN SODIUM 3.38 G: 375; 3 INJECTION, SOLUTION INTRAVENOUS at 22:03

## 2019-12-26 RX ADMIN — ACETAMINOPHEN 1000 MG: 10 INJECTION, SOLUTION INTRAVENOUS at 02:17

## 2019-12-26 RX ADMIN — SODIUM CHLORIDE, PRESERVATIVE FREE 3 ML: 5 INJECTION INTRAVENOUS at 20:09

## 2019-12-26 RX ADMIN — ACETAMINOPHEN 1000 MG: 10 INJECTION, SOLUTION INTRAVENOUS at 14:32

## 2019-12-26 RX ADMIN — SODIUM CHLORIDE, PRESERVATIVE FREE 3 ML: 5 INJECTION INTRAVENOUS at 08:46

## 2019-12-26 RX ADMIN — METHYLPREDNISOLONE SODIUM SUCCINATE 15.2 MG: 40 INJECTION, POWDER, FOR SOLUTION INTRAMUSCULAR; INTRAVENOUS at 08:46

## 2019-12-26 RX ADMIN — CALCIUM GLUCONATE: 94 INJECTION, SOLUTION INTRAVENOUS at 18:16

## 2019-12-26 RX ADMIN — FAMOTIDINE 20 MG: 10 INJECTION INTRAVENOUS at 08:46

## 2019-12-26 RX ADMIN — TAZOBACTAM SODIUM AND PIPERACILLIN SODIUM 3.38 G: 375; 3 INJECTION, SOLUTION INTRAVENOUS at 06:32

## 2019-12-27 LAB
ALBUMIN SERPL-MCNC: 2 G/DL (ref 3.5–5.2)
ALBUMIN/GLOB SERPL: 0.6 G/DL
ALP SERPL-CCNC: 244 U/L (ref 39–117)
ALT SERPL W P-5'-P-CCNC: 70 U/L (ref 1–33)
ANION GAP SERPL CALCULATED.3IONS-SCNC: 10.5 MMOL/L (ref 5–15)
AST SERPL-CCNC: 22 U/L (ref 1–32)
BASOPHILS # BLD AUTO: 0.05 10*3/MM3 (ref 0–0.2)
BASOPHILS NFR BLD AUTO: 0.5 % (ref 0–1.5)
BILIRUB SERPL-MCNC: <0.2 MG/DL (ref 0.2–1.2)
BUN BLD-MCNC: 12 MG/DL (ref 8–23)
BUN/CREAT SERPL: 19 (ref 7–25)
CALCIUM SPEC-SCNC: 8.3 MG/DL (ref 8.6–10.5)
CHLORIDE SERPL-SCNC: 101 MMOL/L (ref 98–107)
CO2 SERPL-SCNC: 28.5 MMOL/L (ref 22–29)
CREAT BLD-MCNC: 0.63 MG/DL (ref 0.57–1)
DEPRECATED RDW RBC AUTO: 40.4 FL (ref 37–54)
EOSINOPHIL # BLD AUTO: 0.1 10*3/MM3 (ref 0–0.4)
EOSINOPHIL NFR BLD AUTO: 1 % (ref 0.3–6.2)
ERYTHROCYTE [DISTWIDTH] IN BLOOD BY AUTOMATED COUNT: 12.8 % (ref 12.3–15.4)
GFR SERPL CREATININE-BSD FRML MDRD: 93 ML/MIN/1.73
GLOBULIN UR ELPH-MCNC: 3.6 GM/DL
GLUCOSE BLD-MCNC: 156 MG/DL (ref 65–99)
GLUCOSE BLDC GLUCOMTR-MCNC: 148 MG/DL (ref 70–130)
GLUCOSE BLDC GLUCOMTR-MCNC: 154 MG/DL (ref 70–130)
GLUCOSE BLDC GLUCOMTR-MCNC: 189 MG/DL (ref 70–130)
GLUCOSE BLDC GLUCOMTR-MCNC: 203 MG/DL (ref 70–130)
HCT VFR BLD AUTO: 22 % (ref 34–46.6)
HGB BLD-MCNC: 7.1 G/DL (ref 12–15.9)
IMM GRANULOCYTES # BLD AUTO: 0.35 10*3/MM3 (ref 0–0.05)
IMM GRANULOCYTES NFR BLD AUTO: 3.4 % (ref 0–0.5)
LYMPHOCYTES # BLD AUTO: 0.86 10*3/MM3 (ref 0.7–3.1)
LYMPHOCYTES NFR BLD AUTO: 8.3 % (ref 19.6–45.3)
MAGNESIUM SERPL-MCNC: 2 MG/DL (ref 1.6–2.4)
MCH RBC QN AUTO: 28.1 PG (ref 26.6–33)
MCHC RBC AUTO-ENTMCNC: 32.3 G/DL (ref 31.5–35.7)
MCV RBC AUTO: 87 FL (ref 79–97)
MONOCYTES # BLD AUTO: 0.79 10*3/MM3 (ref 0.1–0.9)
MONOCYTES NFR BLD AUTO: 7.6 % (ref 5–12)
NEUTROPHILS # BLD AUTO: 8.18 10*3/MM3 (ref 1.7–7)
NEUTROPHILS NFR BLD AUTO: 79.2 % (ref 42.7–76)
NRBC BLD AUTO-RTO: 0.1 /100 WBC (ref 0–0.2)
PHOSPHATE SERPL-MCNC: 3.7 MG/DL (ref 2.5–4.5)
PLATELET # BLD AUTO: 889 10*3/MM3 (ref 140–450)
PMV BLD AUTO: 8.5 FL (ref 6–12)
POTASSIUM BLD-SCNC: 3.7 MMOL/L (ref 3.5–5.2)
PROT SERPL-MCNC: 5.6 G/DL (ref 6–8.5)
RBC # BLD AUTO: 2.53 10*6/MM3 (ref 3.77–5.28)
SODIUM BLD-SCNC: 140 MMOL/L (ref 136–145)
WBC NRBC COR # BLD: 10.33 10*3/MM3 (ref 3.4–10.8)

## 2019-12-27 PROCEDURE — 80053 COMPREHEN METABOLIC PANEL: CPT | Performed by: COLON & RECTAL SURGERY

## 2019-12-27 PROCEDURE — 25010000002 PIPERACILLIN SOD-TAZOBACTAM PER 1 G: Performed by: COLON & RECTAL SURGERY

## 2019-12-27 PROCEDURE — 25010000002 CALCIUM GLUCONATE PER 10 ML: Performed by: COLON & RECTAL SURGERY

## 2019-12-27 PROCEDURE — 25010000002 METHYLPREDNISOLONE PER 40 MG: Performed by: COLON & RECTAL SURGERY

## 2019-12-27 PROCEDURE — 25010000002 ENOXAPARIN PER 10 MG: Performed by: COLON & RECTAL SURGERY

## 2019-12-27 PROCEDURE — 63710000001 INSULIN LISPRO (HUMAN) PER 5 UNITS: Performed by: COLON & RECTAL SURGERY

## 2019-12-27 PROCEDURE — 82962 GLUCOSE BLOOD TEST: CPT

## 2019-12-27 PROCEDURE — 83735 ASSAY OF MAGNESIUM: CPT | Performed by: COLON & RECTAL SURGERY

## 2019-12-27 PROCEDURE — 25010000002 MAGNESIUM SULFATE PER 500 MG OF MAGNESIUM: Performed by: COLON & RECTAL SURGERY

## 2019-12-27 PROCEDURE — 85025 COMPLETE CBC W/AUTO DIFF WBC: CPT | Performed by: COLON & RECTAL SURGERY

## 2019-12-27 PROCEDURE — 84100 ASSAY OF PHOSPHORUS: CPT | Performed by: COLON & RECTAL SURGERY

## 2019-12-27 RX ORDER — ONDANSETRON 4 MG/1
4 TABLET, FILM COATED ORAL DAILY PRN
Qty: 30 TABLET | Refills: 1 | Status: ON HOLD | OUTPATIENT
Start: 2019-12-27 | End: 2020-01-27

## 2019-12-27 RX ORDER — ACETAMINOPHEN 325 MG/1
650 TABLET ORAL EVERY 8 HOURS
Status: DISCONTINUED | OUTPATIENT
Start: 2019-12-27 | End: 2019-12-28 | Stop reason: HOSPADM

## 2019-12-27 RX ORDER — METHYLPREDNISOLONE SODIUM SUCCINATE 40 MG/ML
10 INJECTION, POWDER, LYOPHILIZED, FOR SOLUTION INTRAMUSCULAR; INTRAVENOUS EVERY 24 HOURS
Status: DISCONTINUED | OUTPATIENT
Start: 2019-12-28 | End: 2019-12-28 | Stop reason: HOSPADM

## 2019-12-27 RX ADMIN — TAZOBACTAM SODIUM AND PIPERACILLIN SODIUM 3.38 G: 375; 3 INJECTION, SOLUTION INTRAVENOUS at 14:20

## 2019-12-27 RX ADMIN — INSULIN LISPRO 2 UNITS: 100 INJECTION, SOLUTION INTRAVENOUS; SUBCUTANEOUS at 08:06

## 2019-12-27 RX ADMIN — CALCIUM GLUCONATE: 94 INJECTION, SOLUTION INTRAVENOUS at 17:53

## 2019-12-27 RX ADMIN — TAZOBACTAM SODIUM AND PIPERACILLIN SODIUM 3.38 G: 375; 3 INJECTION, SOLUTION INTRAVENOUS at 05:57

## 2019-12-27 RX ADMIN — FAMOTIDINE 20 MG: 10 INJECTION INTRAVENOUS at 08:06

## 2019-12-27 RX ADMIN — ACETAMINOPHEN 1000 MG: 10 INJECTION, SOLUTION INTRAVENOUS at 08:06

## 2019-12-27 RX ADMIN — SODIUM CHLORIDE, PRESERVATIVE FREE 3 ML: 5 INJECTION INTRAVENOUS at 08:00

## 2019-12-27 RX ADMIN — SODIUM CHLORIDE, PRESERVATIVE FREE 3 ML: 5 INJECTION INTRAVENOUS at 20:25

## 2019-12-27 RX ADMIN — FAMOTIDINE 20 MG: 10 INJECTION INTRAVENOUS at 21:46

## 2019-12-27 RX ADMIN — TAZOBACTAM SODIUM AND PIPERACILLIN SODIUM 3.38 G: 375; 3 INJECTION, SOLUTION INTRAVENOUS at 21:46

## 2019-12-27 RX ADMIN — INSULIN LISPRO 3 UNITS: 100 INJECTION, SOLUTION INTRAVENOUS; SUBCUTANEOUS at 12:10

## 2019-12-27 RX ADMIN — ENOXAPARIN SODIUM 40 MG: 40 INJECTION SUBCUTANEOUS at 20:23

## 2019-12-27 RX ADMIN — ACETAMINOPHEN 1000 MG: 10 INJECTION, SOLUTION INTRAVENOUS at 02:03

## 2019-12-27 RX ADMIN — INSULIN LISPRO 2 UNITS: 100 INJECTION, SOLUTION INTRAVENOUS; SUBCUTANEOUS at 17:54

## 2019-12-27 RX ADMIN — ACETAMINOPHEN 650 MG: 325 TABLET, FILM COATED ORAL at 20:23

## 2019-12-27 RX ADMIN — METHYLPREDNISOLONE SODIUM SUCCINATE 15.2 MG: 40 INJECTION, POWDER, FOR SOLUTION INTRAMUSCULAR; INTRAVENOUS at 08:06

## 2019-12-27 RX ADMIN — ACETAMINOPHEN 650 MG: 325 TABLET, FILM COATED ORAL at 14:20

## 2019-12-28 VITALS
RESPIRATION RATE: 20 BRPM | BODY MASS INDEX: 23.14 KG/M2 | OXYGEN SATURATION: 95 % | SYSTOLIC BLOOD PRESSURE: 139 MMHG | DIASTOLIC BLOOD PRESSURE: 75 MMHG | WEIGHT: 138.89 LBS | HEIGHT: 65 IN | HEART RATE: 89 BPM | TEMPERATURE: 99.1 F

## 2019-12-28 PROBLEM — K56.699 SMALL BOWEL STRICTURE (HCC): Status: RESOLVED | Noted: 2019-12-09 | Resolved: 2019-12-28

## 2019-12-28 PROBLEM — K91.30 SMALL BOWEL ANASTOMOTIC STRICTURE: Status: RESOLVED | Noted: 2019-12-03 | Resolved: 2019-12-28

## 2019-12-28 PROBLEM — K91.89 SMALL BOWEL ANASTOMOTIC STRICTURE: Status: RESOLVED | Noted: 2019-12-03 | Resolved: 2019-12-28

## 2019-12-28 LAB — GLUCOSE BLDC GLUCOMTR-MCNC: 157 MG/DL (ref 70–130)

## 2019-12-28 PROCEDURE — 99024 POSTOP FOLLOW-UP VISIT: CPT | Performed by: SURGERY

## 2019-12-28 PROCEDURE — 63710000001 INSULIN LISPRO (HUMAN) PER 5 UNITS: Performed by: COLON & RECTAL SURGERY

## 2019-12-28 PROCEDURE — 82962 GLUCOSE BLOOD TEST: CPT

## 2019-12-28 PROCEDURE — 25010000002 METHYLPREDNISOLONE PER 40 MG: Performed by: COLON & RECTAL SURGERY

## 2019-12-28 RX ORDER — TRAMADOL HYDROCHLORIDE 50 MG/1
50 TABLET ORAL 2 TIMES DAILY PRN
Qty: 20 TABLET | Refills: 0 | Status: SHIPPED | OUTPATIENT
Start: 2019-12-28 | End: 2020-02-27 | Stop reason: HOSPADM

## 2019-12-28 RX ADMIN — ACETAMINOPHEN 650 MG: 325 TABLET, FILM COATED ORAL at 05:42

## 2019-12-28 RX ADMIN — INSULIN LISPRO 2 UNITS: 100 INJECTION, SOLUTION INTRAVENOUS; SUBCUTANEOUS at 08:41

## 2019-12-28 RX ADMIN — FAMOTIDINE 20 MG: 10 INJECTION INTRAVENOUS at 08:41

## 2019-12-28 RX ADMIN — SODIUM CHLORIDE, PRESERVATIVE FREE 3 ML: 5 INJECTION INTRAVENOUS at 08:41

## 2019-12-28 RX ADMIN — METHYLPREDNISOLONE SODIUM SUCCINATE 10 MG: 40 INJECTION, POWDER, FOR SOLUTION INTRAMUSCULAR; INTRAVENOUS at 08:41

## 2019-12-29 ENCOUNTER — READMISSION MANAGEMENT (OUTPATIENT)
Dept: CALL CENTER | Facility: HOSPITAL | Age: 74
End: 2019-12-29

## 2019-12-29 NOTE — OUTREACH NOTE
Prep Survey      Responses   Facility patient discharged from?  [place]   Is patient eligible?  Yes   Discharge diagnosis  EXPLORATORY LAPAROTOMY WITH SMALL BOWEL RESECTION   Does the patient have one of the following disease processes/diagnoses(primary or secondary)?  General Surgery   Does the patient have Home health ordered?  Yes   What is the Home health agency?    [facility] wound [facility]   Is there a DME ordered?  No   Prep survey completed?  Yes          Chelsey Sawyer RN

## 2020-01-02 ENCOUNTER — READMISSION MANAGEMENT (OUTPATIENT)
Dept: CALL CENTER | Facility: HOSPITAL | Age: 75
End: 2020-01-02

## 2020-01-02 NOTE — OUTREACH NOTE
General Surgery Week 1 Survey      Responses   Facility patient discharged from?  [place]   Does the patient have one of the following disease processes/diagnoses(primary or secondary)?  General Surgery   Is there a successful TCM telephone encounter documented?  No   Week 1 attempt successful?  Yes   Call start time  1009   Call end time  1014   Discharge diagnosis  EXPLORATORY LAPAROTOMY WITH SMALL BOWEL RESECTION   Person spoke with today (if not patient) and relationship  Nathan-spouse    Meds reviewed with patient/caregiver?  Yes   Is the patient having any side effects they believe may be caused by any medication additions or changes?  No   Does the patient have all medications related to this admission filled (includes all antibiotics, pain medications, etc.)  Yes   Is the patient taking all medications as directed (includes completed medication regime)?  Yes   Does the patient have a follow up appointment scheduled with their surgeon?  Yes [1/14/20]   Has the patient kept scheduled appointments due by today?  N/A   What is the Home health agency?    [facility]   Has home health visited the patient within 72 hours of discharge?  Yes   Psychosocial issues?  No   Did the patient receive a copy of their discharge instructions?  Yes   Nursing interventions  Reviewed instructions with patient   What is the patient's perception of their health status since discharge?  Improving   Nursing interventions  Nurse provided patient education   Is the patient /caregiver able to teach back basic post-op care?  Continue use of incentive spirometry at least 1 week post discharge, Lifting as instructed by MD in discharge instructions, Keep incision areas clean,dry and protected, No tub bath, swimming, or hot tub until instructed by MD, Take showers only when approved by MD-sponge bathe until then   Is the patient/caregiver able to teach back signs and symptoms of incisional infection?  Fever, Increased drainage or  bleeding   Is the patient/caregiver able to teach back steps to recovery at home?  Set small, achievable goals for return to baseline health, Rest and rebuild strength, gradually increase activity, Eat a well-balance diet, Weigh daily, Make a list of questions for surgeon's appointment   Is the patient/caregiver able to teach back the hierarchy of who to call/visit for symptoms/problems? PCP, Specialist, Home health nurse, Urgent Care, ED, 911  Yes   Week 1 call completed?  Yes          Socorro Levine RN

## 2020-01-07 NOTE — DISCHARGE SUMMARY
Date of Admission: 12/9/2019  Date of Discharge: 12/28/2019    Presenting Problem/History of Present Illness  Small bowel anastomotic stricture [K91.89]  Small bowel stricture (CMS/HCC) [K56.699]     Discharge Diagnosis:   Past Medical History:   Diagnosis Date   • Arthritis    • Wynn esophagus    • Cancer (CMS/HCC)     KERATOACANTHOMA TYPE OF SCC   • Cervical myelopathy (CMS/HCC) 08/15/2014   • Crohn's disease (CMS/HCC) 2000    FOLLOWED BY DR. YANG BIRD   • Hemorrhoids    • Hiatal hernia    • Hiatal hernia    • History of carpal tunnel syndrome    • History of transfusion    • Hyperlipidemia    • Muscle weakness     RIGHT HAND   • Pelvic abscess in female 2001   • PONV (postoperative nausea and vomiting)    • RSD upper limb 08/2014   • Vitamin B 12 deficiency    • Vitamin D deficiency        Procedures Performed    -laparoscopic to open right hemicolectomy 12/9/2019  -exploratory laparotomy, resection of anastomosis, and ileostomy, washout of abdomen 12/15/2019  -exploratory laparotomy with small bowel resection 12/20/2019    Hospital Course  Patient is a 74 y.o. female presented for planned right hemicolectomy for anastomotic stricture after previous open right hemicolectomy for Crohn's disease in 2001.  Procedure performed 12/9/2019; patient tolerated well.    Pathology: bowel with transmural inflammation and ulceration; no dysplasia    Pt had prolonged postoperative admission, requiring reoperation x2.  POD6 she was noted to have succus from abdominal wound, and returned emergently to OR for exploratory laparotomy, resection of anastomosis, and ileostomy, and abdominal washout.  No definite source of enteric contents could be found intraoperatively, as both anastomosis and previously noted duodenal diverticulum appeared intact, and small bowel was run with no evidence of enterotomy or stool.  POD1 from second surgery, pt was started on TPN; pt was NPO with NG to suction.  Hgb was 7.2 POD2, ans she was  transfused with 2 units pRBC with appropriate improvement in Hgb.  POD5 from 2nd surgery, pt was noted to have a change in her wound vac output, and stat CT a/p showed extravasation of oral contrast with free fluid/succus.  Pt was taken emergently to OR for re-exploration, assisted by general surgery Dr. Larry.  While running the bowel, a small hole was noted mid-jejunum, and this area was resected.  Abdomen was washed out and new drain was placed.  From this point, pt had slow but steady convalescence.  TPN was continued, and she was kept NPO for several days, at which point her diet was slowly advanced.  As she was afebrile, with stable vital signs, weaned off of TPN, tolerating a soft diet, with appropriate ileostomy function, no issue with bladder function, ambulating well with assistance, pain adequately managed with by mouth pain medication, she was deemed stable for discharge to home POD19 with home health for wound VAC and new ileostomy.      Consults:   Consults     No orders found from 11/10/2019 to 12/10/2019.          Discharge Disposition  Home or Self Care    Discharge Medications     Discharge Medications      New Medications      Instructions Start Date   ondansetron 4 MG tablet  Commonly known as:  ZOFRAN   4 mg, Oral, Daily PRN      traMADol 50 MG tablet  Commonly known as:  ULTRAM   50 mg, Oral, 2 Times Daily PRN         Continue These Medications      Instructions Start Date   coenzyme Q10 100 MG capsule   100 mg, Oral, Daily      MULTI VITAMIN DAILY PO   1 tablet, Oral, Daily      omeprazole 20 MG capsule  Commonly known as:  priLOSEC   20 mg, Oral, Daily      simvastatin 20 MG tablet  Commonly known as:  ZOCOR   20 mg, Oral, Daily      vitamin B-12 100 MCG tablet  Commonly known as:  CYANOCOBALAMIN   50 mcg, Oral, Daily      vitamin D3 125 MCG (5000 UT) capsule capsule   5,000 Units, Oral, Daily         Stop These Medications    predniSONE 10 MG tablet  Commonly known as:  DELTASONE           Follow-up Appointments  Future Appointments   Date Time Provider Department Center   1/14/2020 10:10 AM Dorcas Prakash MD [facility]  [facility] None     Additional Instructions for the Follow-ups that You Need to Schedule     Ambulatory Referral to Home Health   As directed      new wound vac: needs WOCN x3/week    Order Comments:  new wound vac: needs WOCN x3/week     Face to Face Visit Date:  12/15/2019    Follow-up provider for Plan of Care?:  I will be treating the patient on an ongoing basis.  Please send me the Plan of Care for signature.    Follow-up provider:  DORCAS PRAKASH [[id number]]    Reason/Clinical Findings:  new wound vac: needs WOCN x3/week    Describe mobility limitations that make leaving home difficult:  s/p major abdominal surgery    Nursing/Therapeutic Services Requested:  Skilled Nursing    Skilled nursing orders:  Wound vac application and instruction    Frequency:  1 Week 1               Discharge Diet:   As tolerated  Activity at Discharge:   As tolerated  No lifting over 15 pounds  No driving  Follow-up Appointments  As scheduled with Dr. Prakash

## 2020-01-09 ENCOUNTER — READMISSION MANAGEMENT (OUTPATIENT)
Dept: CALL CENTER | Facility: HOSPITAL | Age: 75
End: 2020-01-09

## 2020-01-09 ENCOUNTER — DOCUMENTATION (OUTPATIENT)
Dept: SURGERY | Facility: CLINIC | Age: 75
End: 2020-01-09

## 2020-01-09 NOTE — PROGRESS NOTES
"Pt's  called, very upset. Said pt has had nausea for past couple of days, ostomy out decreased yesterday and no output today. Told pt that we had an opening this afternoon with Nancy Magallanes PA-C. Pt said \" Well are we going to have to wait forever like last time?\" Told pt that so far we were running on time today. Pt stated they would come in today. Pt's  called back about 5 minutes later and told Chelsie( ) that they were not coming in, pt refused to come in and wanted to \"wait it out\" to see if she got better.  "

## 2020-01-09 NOTE — OUTREACH NOTE
General Surgery Week 2 Survey      Responses   Facility patient discharged from?  [place]   Does the patient have one of the following disease processes/diagnoses(primary or secondary)?  General Surgery   Week 2 attempt successful?  Yes   Call start time  1241   Call end time  1248   Discharge diagnosis  EXPLORATORY LAPAROTOMY WITH SMALL BOWEL RESECTION   Meds reviewed with patient/caregiver?  Yes   Is the patient having any side effects they believe may be caused by any medication additions or changes?  No   Does the patient have all medications related to this admission filled (includes all antibiotics, pain medications, etc.)  Yes   Is the patient taking all medications as directed (includes completed medication regime)?  Yes   Does the patient have a follow up appointment scheduled with their surgeon?  Yes   Has the patient kept scheduled appointments due by today?  Yes   What is the Home health agency?    [facility]   Has home health visited the patient within 72 hours of discharge?  Yes   Psychosocial issues?  No   Did the patient receive a copy of their discharge instructions?  Yes   Nursing interventions  Reviewed instructions with patient   What is the patient's perception of their health status since discharge?  Same   Nursing interventions  Nurse provided patient education   Is the patient /caregiver able to teach back basic post-op care?  Continue use of incentive spirometry at least 1 week post discharge, Lifting as instructed by MD in discharge instructions, Keep incision areas clean,dry and protected, No tub bath, swimming, or hot tub until instructed by MD, Take showers only when approved by MD-sponge bathe until then   Is the patient/caregiver able to teach back signs and symptoms of incisional infection?  Fever, Increased drainage or bleeding, Incisional warmth, Pus or odor from incision, Increased redness, swelling or pain at the incisonal site   Is the patient/caregiver able to  teach back steps to recovery at home?  Set small, achievable goals for return to baseline health, Rest and rebuild strength, gradually increase activity, Eat a well-balance diet, Weigh daily, Make a list of questions for surgeon's appointment   Is the patient/caregiver able to teach back the hierarchy of who to call/visit for symptoms/problems? PCP, Specialist, Home health nurse, Urgent Care, ED, 911  Yes   Week 2 call completed?  Yes   Wrap up additional comments   stated output has decreased from ileostomy. told him to call doctor and f/u as soon as possible.          Hailee Gibson, RN

## 2020-01-13 ENCOUNTER — TELEPHONE (OUTPATIENT)
Dept: SURGERY | Facility: CLINIC | Age: 75
End: 2020-01-13

## 2020-01-13 NOTE — TELEPHONE ENCOUNTER
Мария from [facility] #[phone], called office asking like patient has an appt tomorrow 01/14/2020 to come in and see Dr. Albrecht, she visit's pt every Mon. Wed.&  Fridays to change dressing for wound vac, if she should leave off wound vac for tomorrow's appt?    I asked our Rn Hailee Souza and she suggested to change dressing today and tomorrow we will remove & change dressing (notify pt to bring her supplies) and have Jeremy Hsieh ([facility]) visit patient on Friday this week for dressing change and like that she will be on her regular schedule with [facility] visits Mon. Wed. & Fridays.    Thank you.

## 2020-01-14 ENCOUNTER — OFFICE VISIT (OUTPATIENT)
Dept: SURGERY | Facility: CLINIC | Age: 75
End: 2020-01-14

## 2020-01-14 VITALS
SYSTOLIC BLOOD PRESSURE: 110 MMHG | DIASTOLIC BLOOD PRESSURE: 84 MMHG | HEIGHT: 65 IN | HEART RATE: 88 BPM | TEMPERATURE: 97.7 F | OXYGEN SATURATION: 94 % | WEIGHT: 122 LBS | RESPIRATION RATE: 16 BRPM | BODY MASS INDEX: 20.33 KG/M2

## 2020-01-14 DIAGNOSIS — K50.812 CROHN'S DISEASE OF BOTH SMALL AND LARGE INTESTINE WITH INTESTINAL OBSTRUCTION (HCC): ICD-10-CM

## 2020-01-14 DIAGNOSIS — K56.699 SMALL BOWEL STRICTURE (HCC): Primary | ICD-10-CM

## 2020-01-14 PROCEDURE — 99024 POSTOP FOLLOW-UP VISIT: CPT | Performed by: COLON & RECTAL SURGERY

## 2020-01-14 RX ORDER — ONDANSETRON 4 MG/1
TABLET, ORALLY DISINTEGRATING ORAL
Status: ON HOLD | COMMUNITY
End: 2020-01-27

## 2020-01-14 RX ORDER — MECLIZINE HYDROCHLORIDE 25 MG/1
25 TABLET ORAL 3 TIMES DAILY
COMMUNITY
Start: 2020-01-13 | End: 2020-02-27 | Stop reason: HOSPADM

## 2020-01-14 RX ORDER — PROMETHAZINE HYDROCHLORIDE 12.5 MG/1
TABLET ORAL
Qty: 24 TABLET | Refills: 0 | Status: SHIPPED | OUTPATIENT
Start: 2020-01-14 | End: 2020-02-27 | Stop reason: HOSPADM

## 2020-01-14 RX ORDER — SULFAMETHOXAZOLE AND TRIMETHOPRIM 800; 160 MG/1; MG/1
TABLET ORAL
COMMUNITY
End: 2020-01-14 | Stop reason: SDUPTHER

## 2020-01-14 RX ORDER — OMEPRAZOLE 20 MG/1
CAPSULE, DELAYED RELEASE ORAL
Status: ON HOLD | COMMUNITY
End: 2020-01-27

## 2020-01-14 RX ORDER — METOCLOPRAMIDE 5 MG/1
5 TABLET ORAL
Qty: 90 TABLET | Refills: 0 | Status: SHIPPED | OUTPATIENT
Start: 2020-01-14 | End: 2020-02-27 | Stop reason: HOSPADM

## 2020-01-14 NOTE — PROGRESS NOTES
"She López is a 74 y.o. female in for follow up of Small bowel stricture (CMS/HCC)    Crohn's disease of both small and large intestine with intestinal obstruction (CMS/HCC)  s/p laparoscopic to open right hemicolectomy 12/9/2019  s/p exploratory laparotomy, resection of anastomosis, and ileostomy, washout of abdomen 12/15/2019  s/p exploratory laparotomy with small bowel resection 12/20/2019      Pt states she is not eating much, as she feels too nauseated.  Phenergan makes her sleepy.  She has vomited once.    Her pain has not been severe.  She is no longer taking tramadol.    She is having dizziness.  She is taking meclizine per PCP Dr. Valentine    /84 (BP Location: Left arm, Patient Position: Sitting, Cuff Size: Small Adult)   Pulse 88   Temp 97.7 °F (36.5 °C) (Oral)   Resp 16   Ht 165.1 cm (65\")   Wt 55.3 kg (122 lb)   LMP  (LMP Unknown)   SpO2 94%   BMI 20.30 kg/m²   Body mass index is 20.3 kg/m².      PE:  Physical Exam   Constitutional: She appears well-developed. No distress.   In wheelchair   HENT:   Head: Normocephalic and atraumatic.   Abdominal:   -s/nt/nd  -midline vertical wound: opened mid-incision tunneling.  Fascial suture snipped and AgNO3 applied  Replaced wound vac   Musculoskeletal: Normal range of motion.   Neurological: She is alert.   Psychiatric: Thought content normal.         Assessment:   1. Small bowel stricture (CMS/HCC)    2. Crohn's disease of both small and large intestine with intestinal obstruction (CMS/HCC)    s/p laparoscopic to open right hemicolectomy 12/9/2019  s/p exploratory laparotomy, resection of anastomosis, and ileostomy, washout of abdomen 12/15/2019  s/p exploratory laparotomy with small bowel resection 12/20/2019       Plan:    Will trial reglan for nausea.  She can continue to take phenergan for rescue.    Midline wound pocked opened and wound vac replaced.  Continue wound vac.    She may shower    Call or come in if any questions or " concerns    RTC 1 week      Scribed for Dorcas Albrecht MD by Nancy Magallanes PA-C  1/14/2020   This patient was evaluated by me, recommendations made, documentation reviewed, edited, and revised by me, Dorcas Albrecht MD

## 2020-01-16 ENCOUNTER — TELEPHONE (OUTPATIENT)
Dept: SURGERY | Facility: CLINIC | Age: 75
End: 2020-01-16

## 2020-01-16 NOTE — TELEPHONE ENCOUNTER
Family of patient calling to let you know she was admitted to [facility] for dehydration and is being given IV fluids. Physician may contact you for advise on liquid ostomy output per family.

## 2020-01-18 ENCOUNTER — READMISSION MANAGEMENT (OUTPATIENT)
Dept: CALL CENTER | Facility: HOSPITAL | Age: 75
End: 2020-01-18

## 2020-01-18 NOTE — OUTREACH NOTE
General Surgery Week 3 Survey      Responses   Facility patient discharged from?  [place]   Does the patient have one of the following disease processes/diagnoses(primary or secondary)?  General Surgery   Week 3 attempt successful?  No   Unsuccessful attempts  Attempt 1          Marlee Richter RN

## 2020-01-20 ENCOUNTER — READMISSION MANAGEMENT (OUTPATIENT)
Dept: CALL CENTER | Facility: HOSPITAL | Age: 75
End: 2020-01-20

## 2020-01-20 NOTE — OUTREACH NOTE
General Surgery Week 3 Survey      Responses   Facility patient discharged from?  [place]   Does the patient have one of the following disease processes/diagnoses(primary or secondary)?  General Surgery   Week 3 attempt successful?  No   Unsuccessful attempts  Attempt 2          Whitney Crawley RN

## 2020-01-24 ENCOUNTER — HOSPITAL ENCOUNTER (INPATIENT)
Facility: HOSPITAL | Age: 75
LOS: 34 days | Discharge: HOME | End: 2020-02-27
Attending: PHYSICAL MEDICINE & REHABILITATION | Admitting: PHYSICAL MEDICINE & REHABILITATION

## 2020-01-24 DIAGNOSIS — M79.2 NEUROPATHIC PAIN OF HAND, UNSPECIFIED LATERALITY: ICD-10-CM

## 2020-01-24 DIAGNOSIS — R53.1 GENERAL WEAKNESS: Primary | ICD-10-CM

## 2020-01-24 RX ORDER — GABAPENTIN 300 MG/1
300 CAPSULE ORAL 4 TIMES DAILY
Status: DISCONTINUED | OUTPATIENT
Start: 2020-01-24 | End: 2020-02-27 | Stop reason: HOSPADM

## 2020-01-24 RX ORDER — LOPERAMIDE HYDROCHLORIDE 2 MG/1
2 CAPSULE ORAL 4 TIMES DAILY PRN
Status: DISCONTINUED | OUTPATIENT
Start: 2020-01-24 | End: 2020-01-26

## 2020-01-24 RX ORDER — GABAPENTIN 300 MG/1
300 CAPSULE ORAL DAILY PRN
Status: DISCONTINUED | OUTPATIENT
Start: 2020-01-24 | End: 2020-02-26

## 2020-01-24 RX ORDER — CHOLECALCIFEROL (VITAMIN D3) 125 MCG
1000 CAPSULE ORAL DAILY
Status: DISCONTINUED | OUTPATIENT
Start: 2020-01-24 | End: 2020-01-27

## 2020-01-24 RX ORDER — ATORVASTATIN CALCIUM 10 MG/1
10 TABLET, FILM COATED ORAL NIGHTLY
Status: DISCONTINUED | OUTPATIENT
Start: 2020-01-24 | End: 2020-01-27

## 2020-01-24 RX ORDER — PANTOPRAZOLE SODIUM 40 MG/1
40 TABLET, DELAYED RELEASE ORAL
Status: DISCONTINUED | OUTPATIENT
Start: 2020-01-25 | End: 2020-01-24

## 2020-01-24 RX ORDER — ALUMINA, MAGNESIA, AND SIMETHICONE 2400; 2400; 240 MG/30ML; MG/30ML; MG/30ML
30 SUSPENSION ORAL EVERY 6 HOURS PRN
Status: DISCONTINUED | OUTPATIENT
Start: 2020-01-24 | End: 2020-01-24

## 2020-01-24 RX ORDER — MELATONIN
5000 DAILY
Status: DISCONTINUED | OUTPATIENT
Start: 2020-01-25 | End: 2020-02-01

## 2020-01-24 RX ORDER — SODIUM CHLORIDE 1000 MG
1 TABLET, SOLUBLE MISCELLANEOUS 2 TIMES DAILY WITH MEALS
Status: DISCONTINUED | OUTPATIENT
Start: 2020-01-24 | End: 2020-02-08

## 2020-01-24 RX ORDER — PANTOPRAZOLE SODIUM 40 MG/1
40 TABLET, DELAYED RELEASE ORAL
Status: DISCONTINUED | OUTPATIENT
Start: 2020-01-24 | End: 2020-02-27 | Stop reason: HOSPADM

## 2020-01-24 RX ADMIN — GABAPENTIN 300 MG: 300 CAPSULE ORAL at 20:37

## 2020-01-25 LAB
ANION GAP SERPL CALCULATED.3IONS-SCNC: 16.8 MMOL/L (ref 5–15)
BASOPHILS # BLD AUTO: 0.06 10*3/MM3 (ref 0–0.2)
BASOPHILS NFR BLD AUTO: 0.6 % (ref 0–1.5)
BUN BLD-MCNC: 20 MG/DL (ref 8–23)
BUN/CREAT SERPL: 20.2 (ref 7–25)
CALCIUM SPEC-SCNC: 9.4 MG/DL (ref 8.6–10.5)
CHLORIDE SERPL-SCNC: 97 MMOL/L (ref 98–107)
CO2 SERPL-SCNC: 19.2 MMOL/L (ref 22–29)
CREAT BLD-MCNC: 0.99 MG/DL (ref 0.57–1)
DEPRECATED RDW RBC AUTO: 43.4 FL (ref 37–54)
EOSINOPHIL # BLD AUTO: 0.19 10*3/MM3 (ref 0–0.4)
EOSINOPHIL NFR BLD AUTO: 2 % (ref 0.3–6.2)
ERYTHROCYTE [DISTWIDTH] IN BLOOD BY AUTOMATED COUNT: 13.8 % (ref 12.3–15.4)
GFR SERPL CREATININE-BSD FRML MDRD: 55 ML/MIN/1.73
GLUCOSE BLD-MCNC: 139 MG/DL (ref 65–99)
HCT VFR BLD AUTO: 24.3 % (ref 34–46.6)
HGB BLD-MCNC: 7.7 G/DL (ref 12–15.9)
IMM GRANULOCYTES # BLD AUTO: 0.12 10*3/MM3 (ref 0–0.05)
IMM GRANULOCYTES NFR BLD AUTO: 1.2 % (ref 0–0.5)
LYMPHOCYTES # BLD AUTO: 1.1 10*3/MM3 (ref 0.7–3.1)
LYMPHOCYTES NFR BLD AUTO: 11.4 % (ref 19.6–45.3)
MCH RBC QN AUTO: 27.9 PG (ref 26.6–33)
MCHC RBC AUTO-ENTMCNC: 31.7 G/DL (ref 31.5–35.7)
MCV RBC AUTO: 88 FL (ref 79–97)
MONOCYTES # BLD AUTO: 0.97 10*3/MM3 (ref 0.1–0.9)
MONOCYTES NFR BLD AUTO: 10 % (ref 5–12)
NEUTROPHILS # BLD AUTO: 7.22 10*3/MM3 (ref 1.7–7)
NEUTROPHILS NFR BLD AUTO: 74.8 % (ref 42.7–76)
NRBC BLD AUTO-RTO: 0 /100 WBC (ref 0–0.2)
OSMOLALITY SERPL: 279 MOSM/KG (ref 280–301)
PLATELET # BLD AUTO: 377 10*3/MM3 (ref 140–450)
PMV BLD AUTO: 9.1 FL (ref 6–12)
POTASSIUM BLD-SCNC: 4.4 MMOL/L (ref 3.5–5.2)
RBC # BLD AUTO: 2.76 10*6/MM3 (ref 3.77–5.28)
SODIUM BLD-SCNC: 133 MMOL/L (ref 136–145)
WBC NRBC COR # BLD: 9.66 10*3/MM3 (ref 3.4–10.8)

## 2020-01-25 PROCEDURE — 97163 PT EVAL HIGH COMPLEX 45 MIN: CPT

## 2020-01-25 PROCEDURE — 97110 THERAPEUTIC EXERCISES: CPT

## 2020-01-25 PROCEDURE — 85025 COMPLETE CBC W/AUTO DIFF WBC: CPT | Performed by: PHYSICAL MEDICINE & REHABILITATION

## 2020-01-25 PROCEDURE — 97112 NEUROMUSCULAR REEDUCATION: CPT | Performed by: OCCUPATIONAL THERAPIST

## 2020-01-25 PROCEDURE — 99222 1ST HOSP IP/OBS MODERATE 55: CPT | Performed by: COLON & RECTAL SURGERY

## 2020-01-25 PROCEDURE — 97167 OT EVAL HIGH COMPLEX 60 MIN: CPT | Performed by: OCCUPATIONAL THERAPIST

## 2020-01-25 PROCEDURE — 83930 ASSAY OF BLOOD OSMOLALITY: CPT | Performed by: PHYSICAL MEDICINE & REHABILITATION

## 2020-01-25 PROCEDURE — 80048 BASIC METABOLIC PNL TOTAL CA: CPT | Performed by: PHYSICAL MEDICINE & REHABILITATION

## 2020-01-25 PROCEDURE — 97535 SELF CARE MNGMENT TRAINING: CPT | Performed by: OCCUPATIONAL THERAPIST

## 2020-01-25 RX ORDER — HEPARIN SODIUM 5000 [USP'U]/.5ML
5000 INJECTION, SOLUTION INTRAVENOUS; SUBCUTANEOUS EVERY 12 HOURS SCHEDULED
Status: ON HOLD | COMMUNITY
End: 2020-01-27

## 2020-01-25 RX ORDER — GABAPENTIN 300 MG/1
300 CAPSULE ORAL 4 TIMES DAILY
Status: ON HOLD | COMMUNITY
End: 2020-01-27

## 2020-01-25 RX ADMIN — GABAPENTIN 300 MG: 300 CAPSULE ORAL at 17:17

## 2020-01-25 RX ADMIN — GABAPENTIN 300 MG: 300 CAPSULE ORAL at 21:22

## 2020-01-25 RX ADMIN — SODIUM CHLORIDE TAB 1 GM 1 G: 1 TAB at 08:18

## 2020-01-25 RX ADMIN — PANTOPRAZOLE SODIUM 40 MG: 40 TABLET, DELAYED RELEASE ORAL at 05:24

## 2020-01-25 RX ADMIN — GABAPENTIN 300 MG: 300 CAPSULE ORAL at 11:41

## 2020-01-25 RX ADMIN — ATORVASTATIN CALCIUM 10 MG: 10 TABLET, FILM COATED ORAL at 21:22

## 2020-01-25 RX ADMIN — SODIUM CHLORIDE TAB 1 GM 1 G: 1 TAB at 17:17

## 2020-01-25 RX ADMIN — GABAPENTIN 300 MG: 300 CAPSULE ORAL at 08:22

## 2020-01-26 LAB
ANION GAP SERPL CALCULATED.3IONS-SCNC: 13.7 MMOL/L (ref 5–15)
BASOPHILS # BLD AUTO: 0.07 10*3/MM3 (ref 0–0.2)
BASOPHILS NFR BLD AUTO: 0.8 % (ref 0–1.5)
BUN BLD-MCNC: 19 MG/DL (ref 8–23)
BUN/CREAT SERPL: 19.6 (ref 7–25)
CALCIUM SPEC-SCNC: 9.3 MG/DL (ref 8.6–10.5)
CHLORIDE SERPL-SCNC: 95 MMOL/L (ref 98–107)
CO2 SERPL-SCNC: 20.3 MMOL/L (ref 22–29)
CREAT BLD-MCNC: 0.97 MG/DL (ref 0.57–1)
DEPRECATED RDW RBC AUTO: 45.1 FL (ref 37–54)
EOSINOPHIL # BLD AUTO: 0.21 10*3/MM3 (ref 0–0.4)
EOSINOPHIL NFR BLD AUTO: 2.3 % (ref 0.3–6.2)
ERYTHROCYTE [DISTWIDTH] IN BLOOD BY AUTOMATED COUNT: 14 % (ref 12.3–15.4)
GFR SERPL CREATININE-BSD FRML MDRD: 56 ML/MIN/1.73
GLUCOSE BLD-MCNC: 116 MG/DL (ref 65–99)
HCT VFR BLD AUTO: 24.4 % (ref 34–46.6)
HGB BLD-MCNC: 7.6 G/DL (ref 12–15.9)
IMM GRANULOCYTES # BLD AUTO: 0.08 10*3/MM3 (ref 0–0.05)
IMM GRANULOCYTES NFR BLD AUTO: 0.9 % (ref 0–0.5)
LYMPHOCYTES # BLD AUTO: 1.13 10*3/MM3 (ref 0.7–3.1)
LYMPHOCYTES NFR BLD AUTO: 12.1 % (ref 19.6–45.3)
MCH RBC QN AUTO: 27.7 PG (ref 26.6–33)
MCHC RBC AUTO-ENTMCNC: 31.1 G/DL (ref 31.5–35.7)
MCV RBC AUTO: 89.1 FL (ref 79–97)
MONOCYTES # BLD AUTO: 0.78 10*3/MM3 (ref 0.1–0.9)
MONOCYTES NFR BLD AUTO: 8.4 % (ref 5–12)
NEUTROPHILS # BLD AUTO: 7.05 10*3/MM3 (ref 1.7–7)
NEUTROPHILS NFR BLD AUTO: 75.5 % (ref 42.7–76)
NRBC BLD AUTO-RTO: 0 /100 WBC (ref 0–0.2)
OSMOLALITY SERPL: 274 MOSM/KG (ref 280–301)
PLATELET # BLD AUTO: 440 10*3/MM3 (ref 140–450)
PMV BLD AUTO: 9.3 FL (ref 6–12)
POTASSIUM BLD-SCNC: 4.9 MMOL/L (ref 3.5–5.2)
RBC # BLD AUTO: 2.74 10*6/MM3 (ref 3.77–5.28)
SODIUM BLD-SCNC: 129 MMOL/L (ref 136–145)
WBC NRBC COR # BLD: 9.32 10*3/MM3 (ref 3.4–10.8)

## 2020-01-26 PROCEDURE — 80048 BASIC METABOLIC PNL TOTAL CA: CPT | Performed by: PHYSICAL MEDICINE & REHABILITATION

## 2020-01-26 PROCEDURE — 85025 COMPLETE CBC W/AUTO DIFF WBC: CPT | Performed by: PHYSICAL MEDICINE & REHABILITATION

## 2020-01-26 PROCEDURE — 83935 ASSAY OF URINE OSMOLALITY: CPT | Performed by: INTERNAL MEDICINE

## 2020-01-26 PROCEDURE — 83930 ASSAY OF BLOOD OSMOLALITY: CPT | Performed by: PHYSICAL MEDICINE & REHABILITATION

## 2020-01-26 RX ORDER — LOPERAMIDE HYDROCHLORIDE 2 MG/1
2 CAPSULE ORAL
Status: DISCONTINUED | OUTPATIENT
Start: 2020-01-26 | End: 2020-02-27 | Stop reason: HOSPADM

## 2020-01-26 RX ADMIN — SODIUM CHLORIDE TAB 1 GM 1 G: 1 TAB at 09:00

## 2020-01-26 RX ADMIN — LOPERAMIDE HYDROCHLORIDE 2 MG: 2 CAPSULE ORAL at 17:38

## 2020-01-26 RX ADMIN — PANTOPRAZOLE SODIUM 40 MG: 40 TABLET, DELAYED RELEASE ORAL at 04:19

## 2020-01-26 RX ADMIN — LOPERAMIDE HYDROCHLORIDE 2 MG: 2 CAPSULE ORAL at 12:43

## 2020-01-26 RX ADMIN — GABAPENTIN 300 MG: 300 CAPSULE ORAL at 09:00

## 2020-01-26 RX ADMIN — GABAPENTIN 300 MG: 300 CAPSULE ORAL at 23:25

## 2020-01-26 RX ADMIN — GABAPENTIN 300 MG: 300 CAPSULE ORAL at 17:38

## 2020-01-26 RX ADMIN — GABAPENTIN 300 MG: 300 CAPSULE ORAL at 12:43

## 2020-01-26 RX ADMIN — GABAPENTIN 300 MG: 300 CAPSULE ORAL at 04:18

## 2020-01-26 RX ADMIN — SODIUM CHLORIDE TAB 1 GM 1 G: 1 TAB at 17:38

## 2020-01-27 ENCOUNTER — TELEPHONE (OUTPATIENT)
Dept: SURGERY | Facility: CLINIC | Age: 75
End: 2020-01-27

## 2020-01-27 PROBLEM — G61.0 GUILLAIN BARRÉ SYNDROME (HCC): Status: ACTIVE | Noted: 2020-01-27

## 2020-01-27 LAB
ANION GAP SERPL CALCULATED.3IONS-SCNC: 14 MMOL/L (ref 5–15)
BASOPHILS # BLD AUTO: 0.07 10*3/MM3 (ref 0–0.2)
BASOPHILS NFR BLD AUTO: 0.9 % (ref 0–1.5)
BUN BLD-MCNC: 23 MG/DL (ref 8–23)
BUN/CREAT SERPL: 21.9 (ref 7–25)
CALCIUM SPEC-SCNC: 9.6 MG/DL (ref 8.6–10.5)
CHLORIDE SERPL-SCNC: 97 MMOL/L (ref 98–107)
CO2 SERPL-SCNC: 19 MMOL/L (ref 22–29)
CREAT BLD-MCNC: 1.05 MG/DL (ref 0.57–1)
DEPRECATED RDW RBC AUTO: 44.3 FL (ref 37–54)
EOSINOPHIL # BLD AUTO: 0.25 10*3/MM3 (ref 0–0.4)
EOSINOPHIL NFR BLD AUTO: 3.1 % (ref 0.3–6.2)
ERYTHROCYTE [DISTWIDTH] IN BLOOD BY AUTOMATED COUNT: 13.8 % (ref 12.3–15.4)
GFR SERPL CREATININE-BSD FRML MDRD: 52 ML/MIN/1.73
GLUCOSE BLD-MCNC: 128 MG/DL (ref 65–99)
HCT VFR BLD AUTO: 24.3 % (ref 34–46.6)
HEMOCCULT STL QL: POSITIVE
HGB BLD-MCNC: 7.6 G/DL (ref 12–15.9)
IMM GRANULOCYTES # BLD AUTO: 0.1 10*3/MM3 (ref 0–0.05)
IMM GRANULOCYTES NFR BLD AUTO: 1.2 % (ref 0–0.5)
LYMPHOCYTES # BLD AUTO: 1.28 10*3/MM3 (ref 0.7–3.1)
LYMPHOCYTES NFR BLD AUTO: 15.9 % (ref 19.6–45.3)
MCH RBC QN AUTO: 27.8 PG (ref 26.6–33)
MCHC RBC AUTO-ENTMCNC: 31.3 G/DL (ref 31.5–35.7)
MCV RBC AUTO: 89 FL (ref 79–97)
MONOCYTES # BLD AUTO: 0.7 10*3/MM3 (ref 0.1–0.9)
MONOCYTES NFR BLD AUTO: 8.7 % (ref 5–12)
NEUTROPHILS # BLD AUTO: 5.63 10*3/MM3 (ref 1.7–7)
NEUTROPHILS NFR BLD AUTO: 70.2 % (ref 42.7–76)
NRBC BLD AUTO-RTO: 0 /100 WBC (ref 0–0.2)
OSMOLALITY SERPL: 281 MOSM/KG (ref 280–301)
OSMOLALITY UR: 563 MOSM/KG
PLATELET # BLD AUTO: 499 10*3/MM3 (ref 140–450)
PMV BLD AUTO: 9.1 FL (ref 6–12)
POTASSIUM BLD-SCNC: 4.8 MMOL/L (ref 3.5–5.2)
RBC # BLD AUTO: 2.73 10*6/MM3 (ref 3.77–5.28)
RETICS # AUTO: 0.13 10*6/MM3 (ref 0.02–0.13)
RETICS/RBC NFR AUTO: 4.56 % (ref 0.7–1.9)
SODIUM BLD-SCNC: 130 MMOL/L (ref 136–145)
WBC NRBC COR # BLD: 8.03 10*3/MM3 (ref 3.4–10.8)

## 2020-01-27 PROCEDURE — 97535 SELF CARE MNGMENT TRAINING: CPT

## 2020-01-27 PROCEDURE — 97530 THERAPEUTIC ACTIVITIES: CPT

## 2020-01-27 PROCEDURE — 83930 ASSAY OF BLOOD OSMOLALITY: CPT | Performed by: PHYSICAL MEDICINE & REHABILITATION

## 2020-01-27 PROCEDURE — 80048 BASIC METABOLIC PNL TOTAL CA: CPT | Performed by: PHYSICAL MEDICINE & REHABILITATION

## 2020-01-27 PROCEDURE — 82272 OCCULT BLD FECES 1-3 TESTS: CPT | Performed by: PHYSICAL MEDICINE & REHABILITATION

## 2020-01-27 PROCEDURE — 97110 THERAPEUTIC EXERCISES: CPT

## 2020-01-27 PROCEDURE — 85045 AUTOMATED RETICULOCYTE COUNT: CPT | Performed by: PHYSICAL MEDICINE & REHABILITATION

## 2020-01-27 PROCEDURE — 85025 COMPLETE CBC W/AUTO DIFF WBC: CPT | Performed by: PHYSICAL MEDICINE & REHABILITATION

## 2020-01-27 RX ORDER — MAGNESIUM 200 MG
1000 TABLET ORAL DAILY
COMMUNITY
End: 2020-02-27 | Stop reason: HOSPADM

## 2020-01-27 RX ORDER — MAGNESIUM 200 MG
1000 TABLET ORAL DAILY
Status: DISCONTINUED | OUTPATIENT
Start: 2020-01-28 | End: 2020-01-29

## 2020-01-27 RX ORDER — ONDANSETRON 4 MG/1
4 TABLET, FILM COATED ORAL DAILY
COMMUNITY
End: 2020-02-27 | Stop reason: HOSPADM

## 2020-01-27 RX ADMIN — GABAPENTIN 300 MG: 300 CAPSULE ORAL at 10:41

## 2020-01-27 RX ADMIN — PANTOPRAZOLE SODIUM 40 MG: 40 TABLET, DELAYED RELEASE ORAL at 06:03

## 2020-01-27 RX ADMIN — LOPERAMIDE HYDROCHLORIDE 2 MG: 2 CAPSULE ORAL at 06:03

## 2020-01-27 RX ADMIN — LOPERAMIDE HYDROCHLORIDE 2 MG: 2 CAPSULE ORAL at 17:48

## 2020-01-27 RX ADMIN — SODIUM CHLORIDE TAB 1 GM 1 G: 1 TAB at 17:45

## 2020-01-27 RX ADMIN — GABAPENTIN 300 MG: 300 CAPSULE ORAL at 20:02

## 2020-01-27 RX ADMIN — LOPERAMIDE HYDROCHLORIDE 2 MG: 2 CAPSULE ORAL at 12:21

## 2020-01-27 RX ADMIN — GABAPENTIN 300 MG: 300 CAPSULE ORAL at 23:50

## 2020-01-27 RX ADMIN — Medication 1000 MCG: at 08:09

## 2020-01-27 RX ADMIN — VITAMIN D, TAB 1000IU (100/BT) 5000 UNITS: 25 TAB at 08:08

## 2020-01-27 RX ADMIN — GABAPENTIN 300 MG: 300 CAPSULE ORAL at 15:22

## 2020-01-27 RX ADMIN — GABAPENTIN 300 MG: 300 CAPSULE ORAL at 06:04

## 2020-01-27 RX ADMIN — SODIUM CHLORIDE TAB 1 GM 1 G: 1 TAB at 08:08

## 2020-01-27 NOTE — TELEPHONE ENCOUNTER
Pt's  called, said pt wasn't using wound vac. Wanted to know if ok to send back to wound vac company.

## 2020-01-28 LAB
ANION GAP SERPL CALCULATED.3IONS-SCNC: 15.1 MMOL/L (ref 5–15)
BUN BLD-MCNC: 25 MG/DL (ref 8–23)
BUN/CREAT SERPL: 25.5 (ref 7–25)
CALCIUM SPEC-SCNC: 9.4 MG/DL (ref 8.6–10.5)
CHLORIDE SERPL-SCNC: 99 MMOL/L (ref 98–107)
CO2 SERPL-SCNC: 17.9 MMOL/L (ref 22–29)
CREAT BLD-MCNC: 0.98 MG/DL (ref 0.57–1)
GFR SERPL CREATININE-BSD FRML MDRD: 56 ML/MIN/1.73
GLUCOSE BLD-MCNC: 122 MG/DL (ref 65–99)
OSMOLALITY SERPL: 281 MOSM/KG (ref 280–301)
POTASSIUM BLD-SCNC: 5.1 MMOL/L (ref 3.5–5.2)
SODIUM BLD-SCNC: 132 MMOL/L (ref 136–145)

## 2020-01-28 PROCEDURE — 97535 SELF CARE MNGMENT TRAINING: CPT | Performed by: OCCUPATIONAL THERAPIST

## 2020-01-28 PROCEDURE — 97112 NEUROMUSCULAR REEDUCATION: CPT | Performed by: OCCUPATIONAL THERAPIST

## 2020-01-28 PROCEDURE — 97112 NEUROMUSCULAR REEDUCATION: CPT

## 2020-01-28 PROCEDURE — 83930 ASSAY OF BLOOD OSMOLALITY: CPT | Performed by: PHYSICAL MEDICINE & REHABILITATION

## 2020-01-28 PROCEDURE — 97116 GAIT TRAINING THERAPY: CPT

## 2020-01-28 PROCEDURE — 97530 THERAPEUTIC ACTIVITIES: CPT | Performed by: OCCUPATIONAL THERAPIST

## 2020-01-28 PROCEDURE — 80048 BASIC METABOLIC PNL TOTAL CA: CPT | Performed by: PHYSICAL MEDICINE & REHABILITATION

## 2020-01-28 PROCEDURE — 97530 THERAPEUTIC ACTIVITIES: CPT

## 2020-01-28 PROCEDURE — 97110 THERAPEUTIC EXERCISES: CPT | Performed by: OCCUPATIONAL THERAPIST

## 2020-01-28 RX ORDER — SODIUM BICARBONATE 650 MG/1
650 TABLET ORAL 2 TIMES DAILY
Status: DISCONTINUED | OUTPATIENT
Start: 2020-01-28 | End: 2020-02-07

## 2020-01-28 RX ORDER — CALCIUM ACETATE MONOHYDRATE AND ALUMINUM SULFATE TETRADECAHYDRATE 952; 1347 MG/2299MG; MG/2299MG
1 POWDER, FOR SOLUTION TOPICAL AS NEEDED
Status: DISCONTINUED | OUTPATIENT
Start: 2020-01-28 | End: 2020-02-27 | Stop reason: HOSPADM

## 2020-01-28 RX ADMIN — LOPERAMIDE HYDROCHLORIDE 2 MG: 2 CAPSULE ORAL at 17:00

## 2020-01-28 RX ADMIN — GABAPENTIN 300 MG: 300 CAPSULE ORAL at 06:21

## 2020-01-28 RX ADMIN — LOPERAMIDE HYDROCHLORIDE 2 MG: 2 CAPSULE ORAL at 11:24

## 2020-01-28 RX ADMIN — SODIUM CHLORIDE TAB 1 GM 1 G: 1 TAB at 11:24

## 2020-01-28 RX ADMIN — GABAPENTIN 300 MG: 300 CAPSULE ORAL at 22:48

## 2020-01-28 RX ADMIN — SODIUM CHLORIDE TAB 1 GM 1 G: 1 TAB at 17:06

## 2020-01-28 RX ADMIN — SODIUM BICARBONATE 650 MG: 650 TABLET ORAL at 17:01

## 2020-01-28 RX ADMIN — GABAPENTIN 300 MG: 300 CAPSULE ORAL at 11:24

## 2020-01-28 RX ADMIN — GABAPENTIN 300 MG: 300 CAPSULE ORAL at 17:01

## 2020-01-28 RX ADMIN — SODIUM BICARBONATE 650 MG: 650 TABLET ORAL at 13:34

## 2020-01-28 RX ADMIN — LOPERAMIDE HYDROCHLORIDE 2 MG: 2 CAPSULE ORAL at 06:21

## 2020-01-28 RX ADMIN — PANTOPRAZOLE SODIUM 40 MG: 40 TABLET, DELAYED RELEASE ORAL at 06:21

## 2020-01-29 LAB
25(OH)D3 SERPL-MCNC: 22.4 NG/ML (ref 30–100)
ANION GAP SERPL CALCULATED.3IONS-SCNC: 15.5 MMOL/L (ref 5–15)
BASOPHILS # BLD AUTO: 0.07 10*3/MM3 (ref 0–0.2)
BASOPHILS NFR BLD AUTO: 1 % (ref 0–1.5)
BUN BLD-MCNC: 23 MG/DL (ref 8–23)
BUN/CREAT SERPL: 23.5 (ref 7–25)
CALCIUM SPEC-SCNC: 9.2 MG/DL (ref 8.6–10.5)
CHLORIDE SERPL-SCNC: 97 MMOL/L (ref 98–107)
CO2 SERPL-SCNC: 18.5 MMOL/L (ref 22–29)
CREAT BLD-MCNC: 0.98 MG/DL (ref 0.57–1)
DEPRECATED RDW RBC AUTO: 46.4 FL (ref 37–54)
EOSINOPHIL # BLD AUTO: 0.28 10*3/MM3 (ref 0–0.4)
EOSINOPHIL NFR BLD AUTO: 3.9 % (ref 0.3–6.2)
ERYTHROCYTE [DISTWIDTH] IN BLOOD BY AUTOMATED COUNT: 14.1 % (ref 12.3–15.4)
GFR SERPL CREATININE-BSD FRML MDRD: 56 ML/MIN/1.73
GLUCOSE BLD-MCNC: 114 MG/DL (ref 65–99)
HCT VFR BLD AUTO: 24.2 % (ref 34–46.6)
HGB BLD-MCNC: 7.5 G/DL (ref 12–15.9)
IMM GRANULOCYTES # BLD AUTO: 0.08 10*3/MM3 (ref 0–0.05)
IMM GRANULOCYTES NFR BLD AUTO: 1.1 % (ref 0–0.5)
LYMPHOCYTES # BLD AUTO: 0.98 10*3/MM3 (ref 0.7–3.1)
LYMPHOCYTES NFR BLD AUTO: 13.8 % (ref 19.6–45.3)
MCH RBC QN AUTO: 28 PG (ref 26.6–33)
MCHC RBC AUTO-ENTMCNC: 31 G/DL (ref 31.5–35.7)
MCV RBC AUTO: 90.3 FL (ref 79–97)
MONOCYTES # BLD AUTO: 0.59 10*3/MM3 (ref 0.1–0.9)
MONOCYTES NFR BLD AUTO: 8.3 % (ref 5–12)
NEUTROPHILS # BLD AUTO: 5.09 10*3/MM3 (ref 1.7–7)
NEUTROPHILS NFR BLD AUTO: 71.9 % (ref 42.7–76)
NRBC BLD AUTO-RTO: 0 /100 WBC (ref 0–0.2)
OSMOLALITY SERPL: 283 MOSM/KG (ref 280–301)
PLATELET # BLD AUTO: 593 10*3/MM3 (ref 140–450)
PMV BLD AUTO: 8.7 FL (ref 6–12)
POTASSIUM BLD-SCNC: 5 MMOL/L (ref 3.5–5.2)
RBC # BLD AUTO: 2.68 10*6/MM3 (ref 3.77–5.28)
SODIUM BLD-SCNC: 131 MMOL/L (ref 136–145)
WBC NRBC COR # BLD: 7.09 10*3/MM3 (ref 3.4–10.8)

## 2020-01-29 PROCEDURE — 85025 COMPLETE CBC W/AUTO DIFF WBC: CPT | Performed by: PHYSICAL MEDICINE & REHABILITATION

## 2020-01-29 PROCEDURE — 97110 THERAPEUTIC EXERCISES: CPT

## 2020-01-29 PROCEDURE — 97112 NEUROMUSCULAR REEDUCATION: CPT

## 2020-01-29 PROCEDURE — 82306 VITAMIN D 25 HYDROXY: CPT | Performed by: PHYSICAL MEDICINE & REHABILITATION

## 2020-01-29 PROCEDURE — 83930 ASSAY OF BLOOD OSMOLALITY: CPT | Performed by: PHYSICAL MEDICINE & REHABILITATION

## 2020-01-29 PROCEDURE — 97535 SELF CARE MNGMENT TRAINING: CPT

## 2020-01-29 PROCEDURE — 80048 BASIC METABOLIC PNL TOTAL CA: CPT | Performed by: PHYSICAL MEDICINE & REHABILITATION

## 2020-01-29 RX ORDER — ELECTROLYTES/DEXTROSE
1 SOLUTION, ORAL ORAL DAILY
Status: DISCONTINUED | OUTPATIENT
Start: 2020-01-30 | End: 2020-02-27 | Stop reason: HOSPADM

## 2020-01-29 RX ADMIN — SODIUM BICARBONATE 650 MG: 650 TABLET ORAL at 08:01

## 2020-01-29 RX ADMIN — CALCIUM ACETATE MONOHYDRATE AND ALUMINUM SULFATE TETRADECAHYDRATE 1 PACKET: 952; 1347 POWDER, FOR SOLUTION TOPICAL at 15:27

## 2020-01-29 RX ADMIN — SODIUM BICARBONATE 650 MG: 650 TABLET ORAL at 21:59

## 2020-01-29 RX ADMIN — GABAPENTIN 300 MG: 300 CAPSULE ORAL at 03:29

## 2020-01-29 RX ADMIN — Medication 1 TABLET: at 17:07

## 2020-01-29 RX ADMIN — SODIUM CHLORIDE TAB 1 GM 1 G: 1 TAB at 08:01

## 2020-01-29 RX ADMIN — GABAPENTIN 300 MG: 300 CAPSULE ORAL at 21:59

## 2020-01-29 RX ADMIN — GABAPENTIN 300 MG: 300 CAPSULE ORAL at 08:01

## 2020-01-29 RX ADMIN — LOPERAMIDE HYDROCHLORIDE 2 MG: 2 CAPSULE ORAL at 17:00

## 2020-01-29 RX ADMIN — SODIUM CHLORIDE TAB 1 GM 1 G: 1 TAB at 16:59

## 2020-01-29 RX ADMIN — GABAPENTIN 300 MG: 300 CAPSULE ORAL at 12:04

## 2020-01-29 RX ADMIN — Medication 2500 MCG: at 17:06

## 2020-01-29 RX ADMIN — PANTOPRAZOLE SODIUM 40 MG: 40 TABLET, DELAYED RELEASE ORAL at 06:09

## 2020-01-29 RX ADMIN — SODIUM BICARBONATE 650 MG: 650 TABLET ORAL at 00:35

## 2020-01-29 RX ADMIN — LOPERAMIDE HYDROCHLORIDE 2 MG: 2 CAPSULE ORAL at 06:10

## 2020-01-29 RX ADMIN — LOPERAMIDE HYDROCHLORIDE 2 MG: 2 CAPSULE ORAL at 12:04

## 2020-01-29 RX ADMIN — GABAPENTIN 300 MG: 300 CAPSULE ORAL at 16:59

## 2020-01-30 LAB
ANION GAP SERPL CALCULATED.3IONS-SCNC: 13.8 MMOL/L (ref 5–15)
BUN BLD-MCNC: 20 MG/DL (ref 8–23)
BUN/CREAT SERPL: 22.2 (ref 7–25)
CALCIUM SPEC-SCNC: 9 MG/DL (ref 8.6–10.5)
CHLORIDE SERPL-SCNC: 100 MMOL/L (ref 98–107)
CO2 SERPL-SCNC: 19.2 MMOL/L (ref 22–29)
CREAT BLD-MCNC: 0.9 MG/DL (ref 0.57–1)
GFR SERPL CREATININE-BSD FRML MDRD: 62 ML/MIN/1.73
GLUCOSE BLD-MCNC: 105 MG/DL (ref 65–99)
OSMOLALITY SERPL: 282 MOSM/KG (ref 280–301)
POTASSIUM BLD-SCNC: 4.5 MMOL/L (ref 3.5–5.2)
SODIUM BLD-SCNC: 133 MMOL/L (ref 136–145)

## 2020-01-30 PROCEDURE — 97110 THERAPEUTIC EXERCISES: CPT

## 2020-01-30 PROCEDURE — 97112 NEUROMUSCULAR REEDUCATION: CPT

## 2020-01-30 PROCEDURE — 80048 BASIC METABOLIC PNL TOTAL CA: CPT | Performed by: PHYSICAL MEDICINE & REHABILITATION

## 2020-01-30 PROCEDURE — 97116 GAIT TRAINING THERAPY: CPT

## 2020-01-30 PROCEDURE — 97535 SELF CARE MNGMENT TRAINING: CPT

## 2020-01-30 PROCEDURE — 97530 THERAPEUTIC ACTIVITIES: CPT

## 2020-01-30 PROCEDURE — 83930 ASSAY OF BLOOD OSMOLALITY: CPT | Performed by: PHYSICAL MEDICINE & REHABILITATION

## 2020-01-30 RX ADMIN — GABAPENTIN 300 MG: 300 CAPSULE ORAL at 17:04

## 2020-01-30 RX ADMIN — LOPERAMIDE HYDROCHLORIDE 2 MG: 2 CAPSULE ORAL at 17:04

## 2020-01-30 RX ADMIN — GABAPENTIN 300 MG: 300 CAPSULE ORAL at 08:28

## 2020-01-30 RX ADMIN — PANTOPRAZOLE SODIUM 40 MG: 40 TABLET, DELAYED RELEASE ORAL at 06:16

## 2020-01-30 RX ADMIN — GABAPENTIN 300 MG: 300 CAPSULE ORAL at 12:07

## 2020-01-30 RX ADMIN — Medication 1 TABLET: at 08:27

## 2020-01-30 RX ADMIN — SODIUM BICARBONATE 650 MG: 650 TABLET ORAL at 08:28

## 2020-01-30 RX ADMIN — LOPERAMIDE HYDROCHLORIDE 2 MG: 2 CAPSULE ORAL at 12:07

## 2020-01-30 RX ADMIN — SODIUM BICARBONATE 650 MG: 650 TABLET ORAL at 22:00

## 2020-01-30 RX ADMIN — Medication 2500 MCG: at 17:04

## 2020-01-30 RX ADMIN — SODIUM CHLORIDE TAB 1 GM 1 G: 1 TAB at 17:04

## 2020-01-30 RX ADMIN — LOPERAMIDE HYDROCHLORIDE 2 MG: 2 CAPSULE ORAL at 06:16

## 2020-01-30 RX ADMIN — GABAPENTIN 300 MG: 300 CAPSULE ORAL at 22:00

## 2020-01-30 RX ADMIN — SODIUM CHLORIDE TAB 1 GM 1 G: 1 TAB at 08:28

## 2020-01-30 RX ADMIN — GABAPENTIN 300 MG: 300 CAPSULE ORAL at 03:19

## 2020-01-31 LAB
ABO GROUP BLD: NORMAL
ANION GAP SERPL CALCULATED.3IONS-SCNC: 12.6 MMOL/L (ref 5–15)
BASOPHILS # BLD AUTO: 0.07 10*3/MM3 (ref 0–0.2)
BASOPHILS NFR BLD AUTO: 1.3 % (ref 0–1.5)
BLD GP AB SCN SERPL QL: NEGATIVE
BUN BLD-MCNC: 17 MG/DL (ref 8–23)
BUN/CREAT SERPL: 21 (ref 7–25)
CALCIUM SPEC-SCNC: 9 MG/DL (ref 8.6–10.5)
CHLORIDE SERPL-SCNC: 101 MMOL/L (ref 98–107)
CO2 SERPL-SCNC: 19.4 MMOL/L (ref 22–29)
CREAT BLD-MCNC: 0.81 MG/DL (ref 0.57–1)
DEPRECATED RDW RBC AUTO: 47.3 FL (ref 37–54)
EOSINOPHIL # BLD AUTO: 0.31 10*3/MM3 (ref 0–0.4)
EOSINOPHIL NFR BLD AUTO: 5.6 % (ref 0.3–6.2)
ERYTHROCYTE [DISTWIDTH] IN BLOOD BY AUTOMATED COUNT: 14.2 % (ref 12.3–15.4)
FERRITIN SERPL-MCNC: 142 NG/ML (ref 13–150)
FOLATE SERPL-MCNC: 12.4 NG/ML (ref 4.78–24.2)
GFR SERPL CREATININE-BSD FRML MDRD: 70 ML/MIN/1.73
GLUCOSE BLD-MCNC: 109 MG/DL (ref 65–99)
HCT VFR BLD AUTO: 23.1 % (ref 34–46.6)
HGB BLD-MCNC: 7.2 G/DL (ref 12–15.9)
IMM GRANULOCYTES # BLD AUTO: 0.07 10*3/MM3 (ref 0–0.05)
IMM GRANULOCYTES NFR BLD AUTO: 1.3 % (ref 0–0.5)
IRON 24H UR-MRATE: 26 MCG/DL (ref 37–145)
IRON SATN MFR SERPL: 9 % (ref 20–50)
LYMPHOCYTES # BLD AUTO: 1.01 10*3/MM3 (ref 0.7–3.1)
LYMPHOCYTES NFR BLD AUTO: 18.2 % (ref 19.6–45.3)
MCH RBC QN AUTO: 28.5 PG (ref 26.6–33)
MCHC RBC AUTO-ENTMCNC: 31.2 G/DL (ref 31.5–35.7)
MCV RBC AUTO: 91.3 FL (ref 79–97)
MONOCYTES # BLD AUTO: 0.49 10*3/MM3 (ref 0.1–0.9)
MONOCYTES NFR BLD AUTO: 8.8 % (ref 5–12)
NEUTROPHILS # BLD AUTO: 3.61 10*3/MM3 (ref 1.7–7)
NEUTROPHILS NFR BLD AUTO: 64.8 % (ref 42.7–76)
NRBC BLD AUTO-RTO: 0 /100 WBC (ref 0–0.2)
OSMOLALITY SERPL: 286 MOSM/KG (ref 280–301)
PLATELET # BLD AUTO: 613 10*3/MM3 (ref 140–450)
PMV BLD AUTO: 8.6 FL (ref 6–12)
POTASSIUM BLD-SCNC: 4.9 MMOL/L (ref 3.5–5.2)
RBC # BLD AUTO: 2.53 10*6/MM3 (ref 3.77–5.28)
RH BLD: POSITIVE
SODIUM BLD-SCNC: 133 MMOL/L (ref 136–145)
T&S EXPIRATION DATE: NORMAL
TIBC SERPL-MCNC: 299 MCG/DL (ref 298–536)
TRANSFERRIN SERPL-MCNC: 201 MG/DL (ref 200–360)
VIT B12 BLD-MCNC: 729 PG/ML (ref 211–946)
WBC NRBC COR # BLD: 5.56 10*3/MM3 (ref 3.4–10.8)

## 2020-01-31 PROCEDURE — 80048 BASIC METABOLIC PNL TOTAL CA: CPT | Performed by: PHYSICAL MEDICINE & REHABILITATION

## 2020-01-31 PROCEDURE — 97110 THERAPEUTIC EXERCISES: CPT

## 2020-01-31 PROCEDURE — P9016 RBC LEUKOCYTES REDUCED: HCPCS

## 2020-01-31 PROCEDURE — 84466 ASSAY OF TRANSFERRIN: CPT | Performed by: STUDENT IN AN ORGANIZED HEALTH CARE EDUCATION/TRAINING PROGRAM

## 2020-01-31 PROCEDURE — 97535 SELF CARE MNGMENT TRAINING: CPT

## 2020-01-31 PROCEDURE — 82746 ASSAY OF FOLIC ACID SERUM: CPT | Performed by: STUDENT IN AN ORGANIZED HEALTH CARE EDUCATION/TRAINING PROGRAM

## 2020-01-31 PROCEDURE — 82607 VITAMIN B-12: CPT | Performed by: STUDENT IN AN ORGANIZED HEALTH CARE EDUCATION/TRAINING PROGRAM

## 2020-01-31 PROCEDURE — 85025 COMPLETE CBC W/AUTO DIFF WBC: CPT | Performed by: PHYSICAL MEDICINE & REHABILITATION

## 2020-01-31 PROCEDURE — 97530 THERAPEUTIC ACTIVITIES: CPT

## 2020-01-31 PROCEDURE — 83930 ASSAY OF BLOOD OSMOLALITY: CPT | Performed by: PHYSICAL MEDICINE & REHABILITATION

## 2020-01-31 PROCEDURE — 86923 COMPATIBILITY TEST ELECTRIC: CPT

## 2020-01-31 PROCEDURE — 86900 BLOOD TYPING SEROLOGIC ABO: CPT

## 2020-01-31 PROCEDURE — 36430 TRANSFUSION BLD/BLD COMPNT: CPT

## 2020-01-31 PROCEDURE — 86900 BLOOD TYPING SEROLOGIC ABO: CPT | Performed by: PHYSICAL MEDICINE & REHABILITATION

## 2020-01-31 PROCEDURE — 63710000001 DIPHENHYDRAMINE PER 50 MG: Performed by: PHYSICAL MEDICINE & REHABILITATION

## 2020-01-31 PROCEDURE — 86850 RBC ANTIBODY SCREEN: CPT | Performed by: PHYSICAL MEDICINE & REHABILITATION

## 2020-01-31 PROCEDURE — 97116 GAIT TRAINING THERAPY: CPT

## 2020-01-31 PROCEDURE — 97112 NEUROMUSCULAR REEDUCATION: CPT

## 2020-01-31 PROCEDURE — 86901 BLOOD TYPING SEROLOGIC RH(D): CPT | Performed by: PHYSICAL MEDICINE & REHABILITATION

## 2020-01-31 PROCEDURE — 82728 ASSAY OF FERRITIN: CPT | Performed by: STUDENT IN AN ORGANIZED HEALTH CARE EDUCATION/TRAINING PROGRAM

## 2020-01-31 PROCEDURE — 83540 ASSAY OF IRON: CPT | Performed by: STUDENT IN AN ORGANIZED HEALTH CARE EDUCATION/TRAINING PROGRAM

## 2020-01-31 RX ORDER — ACETAMINOPHEN 325 MG/1
650 TABLET ORAL ONCE
Status: COMPLETED | OUTPATIENT
Start: 2020-01-31 | End: 2020-01-31

## 2020-01-31 RX ORDER — DIPHENHYDRAMINE HCL 25 MG
25 CAPSULE ORAL ONCE
Status: COMPLETED | OUTPATIENT
Start: 2020-01-31 | End: 2020-01-31

## 2020-01-31 RX ADMIN — LOPERAMIDE HYDROCHLORIDE 2 MG: 2 CAPSULE ORAL at 13:02

## 2020-01-31 RX ADMIN — LOPERAMIDE HYDROCHLORIDE 2 MG: 2 CAPSULE ORAL at 17:20

## 2020-01-31 RX ADMIN — SODIUM BICARBONATE 650 MG: 650 TABLET ORAL at 08:15

## 2020-01-31 RX ADMIN — ACETAMINOPHEN 650 MG: 325 TABLET, FILM COATED ORAL at 14:51

## 2020-01-31 RX ADMIN — SODIUM CHLORIDE TAB 1 GM 1 G: 1 TAB at 08:15

## 2020-01-31 RX ADMIN — GABAPENTIN 300 MG: 300 CAPSULE ORAL at 13:02

## 2020-01-31 RX ADMIN — GABAPENTIN 300 MG: 300 CAPSULE ORAL at 21:58

## 2020-01-31 RX ADMIN — GABAPENTIN 300 MG: 300 CAPSULE ORAL at 08:15

## 2020-01-31 RX ADMIN — SODIUM BICARBONATE 650 MG: 650 TABLET ORAL at 21:57

## 2020-01-31 RX ADMIN — GABAPENTIN 300 MG: 300 CAPSULE ORAL at 03:09

## 2020-01-31 RX ADMIN — GABAPENTIN 300 MG: 300 CAPSULE ORAL at 17:21

## 2020-01-31 RX ADMIN — Medication 2500 MCG: at 08:15

## 2020-01-31 RX ADMIN — SODIUM CHLORIDE TAB 1 GM 1 G: 1 TAB at 17:20

## 2020-01-31 RX ADMIN — LOPERAMIDE HYDROCHLORIDE 2 MG: 2 CAPSULE ORAL at 08:15

## 2020-01-31 RX ADMIN — DIPHENHYDRAMINE HYDROCHLORIDE 25 MG: 25 CAPSULE ORAL at 14:51

## 2020-01-31 RX ADMIN — PANTOPRAZOLE SODIUM 40 MG: 40 TABLET, DELAYED RELEASE ORAL at 05:20

## 2020-01-31 RX ADMIN — Medication 1 TABLET: at 08:15

## 2020-02-01 LAB
ABO + RH BLD: NORMAL
BASOPHILS # BLD AUTO: 0.11 10*3/MM3 (ref 0–0.2)
BASOPHILS NFR BLD AUTO: 2 % (ref 0–1.5)
BH BB BLOOD EXPIRATION DATE: NORMAL
BH BB BLOOD TYPE BARCODE: 6200
BH BB DISPENSE STATUS: NORMAL
BH BB PRODUCT CODE: NORMAL
BH BB UNIT NUMBER: NORMAL
DEPRECATED RDW RBC AUTO: 45.3 FL (ref 37–54)
EOSINOPHIL # BLD AUTO: 0.31 10*3/MM3 (ref 0–0.4)
EOSINOPHIL NFR BLD AUTO: 5.5 % (ref 0.3–6.2)
ERYTHROCYTE [DISTWIDTH] IN BLOOD BY AUTOMATED COUNT: 14.4 % (ref 12.3–15.4)
HCT VFR BLD AUTO: 27.5 % (ref 34–46.6)
HGB BLD-MCNC: 8.8 G/DL (ref 12–15.9)
IMM GRANULOCYTES # BLD AUTO: 0.06 10*3/MM3 (ref 0–0.05)
IMM GRANULOCYTES NFR BLD AUTO: 1.1 % (ref 0–0.5)
LYMPHOCYTES # BLD AUTO: 0.92 10*3/MM3 (ref 0.7–3.1)
LYMPHOCYTES NFR BLD AUTO: 16.3 % (ref 19.6–45.3)
MCH RBC QN AUTO: 28.2 PG (ref 26.6–33)
MCHC RBC AUTO-ENTMCNC: 32 G/DL (ref 31.5–35.7)
MCV RBC AUTO: 88.1 FL (ref 79–97)
MONOCYTES # BLD AUTO: 0.54 10*3/MM3 (ref 0.1–0.9)
MONOCYTES NFR BLD AUTO: 9.6 % (ref 5–12)
NEUTROPHILS # BLD AUTO: 3.7 10*3/MM3 (ref 1.7–7)
NEUTROPHILS NFR BLD AUTO: 65.5 % (ref 42.7–76)
NRBC BLD AUTO-RTO: 0 /100 WBC (ref 0–0.2)
PLATELET # BLD AUTO: 647 10*3/MM3 (ref 140–450)
PMV BLD AUTO: 8.5 FL (ref 6–12)
RBC # BLD AUTO: 3.12 10*6/MM3 (ref 3.77–5.28)
UNIT  ABO: NORMAL
UNIT  RH: NORMAL
WBC NRBC COR # BLD: 5.64 10*3/MM3 (ref 3.4–10.8)

## 2020-02-01 PROCEDURE — 25010000002 IRON SUCROSE PER 1 MG: Performed by: INTERNAL MEDICINE

## 2020-02-01 PROCEDURE — 85025 COMPLETE CBC W/AUTO DIFF WBC: CPT | Performed by: PHYSICAL MEDICINE & REHABILITATION

## 2020-02-01 PROCEDURE — 97110 THERAPEUTIC EXERCISES: CPT | Performed by: PHYSICAL THERAPIST

## 2020-02-01 RX ADMIN — SODIUM BICARBONATE 650 MG: 650 TABLET ORAL at 21:58

## 2020-02-01 RX ADMIN — LOPERAMIDE HYDROCHLORIDE 2 MG: 2 CAPSULE ORAL at 11:29

## 2020-02-01 RX ADMIN — SODIUM CHLORIDE TAB 1 GM 1 G: 1 TAB at 08:12

## 2020-02-01 RX ADMIN — GABAPENTIN 300 MG: 300 CAPSULE ORAL at 21:58

## 2020-02-01 RX ADMIN — Medication 1 TABLET: at 08:12

## 2020-02-01 RX ADMIN — GABAPENTIN 300 MG: 300 CAPSULE ORAL at 17:11

## 2020-02-01 RX ADMIN — PANTOPRAZOLE SODIUM 40 MG: 40 TABLET, DELAYED RELEASE ORAL at 05:20

## 2020-02-01 RX ADMIN — GABAPENTIN 300 MG: 300 CAPSULE ORAL at 03:03

## 2020-02-01 RX ADMIN — GABAPENTIN 300 MG: 300 CAPSULE ORAL at 11:59

## 2020-02-01 RX ADMIN — SODIUM CHLORIDE TAB 1 GM 1 G: 1 TAB at 17:10

## 2020-02-01 RX ADMIN — SODIUM BICARBONATE 650 MG: 650 TABLET ORAL at 08:12

## 2020-02-01 RX ADMIN — LOPERAMIDE HYDROCHLORIDE 2 MG: 2 CAPSULE ORAL at 07:21

## 2020-02-01 RX ADMIN — GABAPENTIN 300 MG: 300 CAPSULE ORAL at 08:15

## 2020-02-01 RX ADMIN — LOPERAMIDE HYDROCHLORIDE 2 MG: 2 CAPSULE ORAL at 16:42

## 2020-02-01 RX ADMIN — IRON SUCROSE 200 MG: 20 INJECTION, SOLUTION INTRAVENOUS at 20:07

## 2020-02-01 NOTE — PLAN OF CARE
Problem: Patient Care Overview  Goal: Plan of Care Review  Flowsheets  Taken 2/1/2020 1529 by Jyoti Parry, RN  Outcome Summary: Mrs. López is alert, flat affect. She has generall weakness with limited use of extremities. She transfers mod-max of 1. She is anxious at times. her ileostomy was emptied frequently liquid light brown stool with yellow/brown particles.  Plan of Care Reviewed With: patient;spouse  Taken 1/28/2020 1525 by Andree Casey, RN  Progress, Functional Goals: demonstrating adequate progress  IRF Plan of Care Review: progress ongoing, continue

## 2020-02-01 NOTE — PROGRESS NOTES
Inpatient Rehabilitation Plan of Care Note    Plan of Care  Care Plan Reviewed - No updates at this time.    Psychosocial    Performed Intervention(s)  Verbalize needs and concerns      Safety    Performed Intervention(s)  Bed alarm, wc alarm  Items within reach  Safety rounds      Sphincter Control    Performed Intervention(s)  Monitor intake and output  Encourage appropriate diet      Body Systems    Performed Intervention(s)  Daily skin inspection  Dressing change as ordered    Signed by: Grace Moe RN

## 2020-02-01 NOTE — PROGRESS NOTES
Inpatient Rehabilitation Plan of Care Note    Plan of Care  Care Plan Reviewed - No updates at this time.    Psychosocial    Performed Intervention(s)  Verbalize needs and concerns      Safety    Performed Intervention(s)  Bed alarm, wc alarm  Items within reach  Safety rounds      Sphincter Control    Performed Intervention(s)  Monitor intake and output  Encourage appropriate diet      Body Systems    Performed Intervention(s)  Daily skin inspection  Dressing change as ordered    Signed by: Jyoti Parry RN

## 2020-02-01 NOTE — PROGRESS NOTES
Occupational Therapy: Branch    Physical Therapy: Individual: 20 minutes.    Speech Language Pathology:  Branch    Signed by: Elizabeth Haas PT

## 2020-02-01 NOTE — PROGRESS NOTES
LOS: 8 days   Patient Care Team:  Armando Valentine MD as PCP - General (Internal Medicine)  Lisa Arriaza MD as Consulting Physician (Obstetrics and Gynecology)    Chief Complaint: GBS    Subjective     History of Present Illness     Patient refused to work on ambulation with physical therapy today.  She had the IV site at the right antecubital fossa and it was uncomfortable to bend her arm to utilize the walker.    Hemoglobin improved 8.8.  She was not up with ambulation today to assess for any improvement but at bedside she looks noticeably more comfortable, not short of air/fatigue.  Reviewed looking at IV iron infusion and nephrology has ordered.    History taken from: patient chart family    Objective     Vital Signs  Temp:  [97.9 °F (36.6 °C)-98.8 °F (37.1 °C)] 97.9 °F (36.6 °C)  Heart Rate:  [75-97] 90  Resp:  [18] 18  BP: ()/(52-68) 102/68    Physical Exam:  General: Awake, alert, NAD  HEENT: normocepahlic, atraumatic, EOMI bilaterally   Heart: RRR, no m/r/g  Lungs: CTAB, no wheezing, rales, or rhonchi  Abdomen: soft, non-tender, non-distended, colostomy , incision dressed  Strength: BUE: 4/5 with bilateral elbow flexion, elbow extension, wrist extension, and finger flexion, right greater than left weak hand intrinsics       BLE: 3+/5 HF, 4/5 with knee extension and ankle dorsiflexion    Results Review:     I reviewed the patient's new clinical results.  Results from last 7 days   Lab Units 02/01/20  0509 01/31/20  0618 01/29/20  0732   WBC 10*3/mm3 5.64 5.56 7.09   HEMOGLOBIN g/dL 8.8* 7.2* 7.5*   HEMATOCRIT % 27.5* 23.1* 24.2*   PLATELETS 10*3/mm3 647* 613* 593*     Results from last 7 days   Lab Units 01/31/20  0618 01/30/20  0554 01/29/20  0732   SODIUM mmol/L 133* 133* 131*   POTASSIUM mmol/L 4.9 4.5 5.0   CHLORIDE mmol/L 101 100 97*   CO2 mmol/L 19.4* 19.2* 18.5*   BUN mg/dL 17 20 23   CREATININE mg/dL 0.81 0.90 0.98   CALCIUM mg/dL 9.0 9.0 9.2   GLUCOSE mg/dL 109* 105* 114*        Medication Review:   Scheduled Meds:    cholecalciferol 5,000 Units Oral Daily   gabapentin 300 mg Oral 4x Daily   loperamide 2 mg Oral TID AC   MULTIVITAMIN ADULT 1 tablet Sublingual Daily   pantoprazole 40 mg Oral Q AM   sodium bicarbonate 650 mg Oral BID   sodium chloride 1 g Oral BID With Meals   Cyanocobalamin 2,500 mcg Sublingual Weekly     Continuous Infusions:   PRN Meds:.•  aluminum sulfate-calcium acetate  •  gabapentin **AND** gabapentin  •  miconazole    Assessment/Plan   75 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     GBS  -Completed her plasmapharesis and has gotten good return.   - Will begin PT/OT for ADLS, strength, mobility, txs, gait.   -January 27: On gabapentin 300mg QID. Will continue to monitor and will titrate up as needed.  -Jan 29 -  Feels strength is somewhat better.  Worked on gait with rolling walker yesterday 15 feet. Today utilized ankle weights to decrease ataxic movements with gait. Transfers mod max assist. Bed mobility CTG.    Crohn's  -s/p recent colostomy- wound nurse to see and education for home management.   -If more than 1 liter output a day, needs Immodium-per Dr. Albrecth colorectal surgeon.     Hyponatremia  -On salt tabs and fluid restriction. Renal following  -January 27: Na 130. Continue salt tabs and fluid restriction per renal. Will continue to monitor  -January 30: Na 133. Continue sodium bicarbonate and fluid restriction per renal.    Anemia  -January 27: Hgb/Hct 7.6/24.3- stable. No signs of active bleeding. Will order fecal occult and reticulocyte count. Will continue to monitor.  -Jan 28 - hemoccult positive  -Jan 29 - Stool heme +.  HGB stable at 7.5. She had hypotension and tachycardia yesterday 87/64 and 110) , better today (99/63 and 88).  Reviewed option of transfusion PRBCs. She wished to hold on transfusion unless absolutely necessary. Discussed if HGB < 7, would recommend definitely transfuse.  -January 30: Repeat CBC scheduled for  tomorrow. Will continue to monitor.    -January 31: Hgb 7.2. Will transfuse 1 unit of PRBCs today. Ordered anemia studies. Spoke with Dr. Albrecht and if iron supplementation is required she recommends IV iron for better absorption.  -February 1: Hemoglobin improved 8.8.  IV iron infusion ordered by nephrology    DVT prophylaxis  -SCDS for now.   -January 27: Heparin has been on hold due to HGB trending down. Following results of fecal occult and reticulocyte count will consider restarting Heparin for DVT prophylaxis.   -January 28 - hemoccult positive.     Vitamin D deficiency-vitamin D 22.4  on January 29.    Feb 1 - Patient does not wish to take vitamin D p.o. through the hospital supply.  Wishes to have her vitamin D brought in from home.    Vitamin B12 replacement-sublingual from home      TEAM CONF - JAN 28 - FLAT AFFECT. SUPINE SIT MIN ASSIST. TRANSFERS MAX 2. NONAMBULATORY. UBB MIN. LBB MAX. UBD MOD.  LBD DEP. ABD WOUND CARE. STOOL HEMOCCULT POSITIVE.  ELOS - 5 WEEKS.     Adolfo Valle MD  02/01/20  2:53 PM

## 2020-02-01 NOTE — PLAN OF CARE
Patient is cooperative. Anxious at times. Using the call light for assistance. Continent of bladder. Ileostomy bag is patent with liquid light brown bowel. Emptied bag frequently during the night. Abdominal dressing is clean, dry and intact. No safety issues observed.

## 2020-02-01 NOTE — THERAPY TREATMENT NOTE
Inpatient Rehabilitation - Physical Therapy Treatment Note  [facility]     Patient Name: She López  : 1945  MRN: [id number]    Today's Date: 2020  Onset of Illness/Injury or Date of Surgery: 20              Admit Date: 2020      Visit Dx:      ICD-10-CM ICD-9-CM   1. General weakness R53.1 780.79       Patient Active Problem List   Diagnosis   • Crohn's disease of small intestine with other complication (CMS/HCC)   • Type 2 diabetes mellitus without complication (CMS/HCC)   • RSD upper limb   • Neuropathic pain of hand   • Hyperlipidemia   • Cervical myelopathy (CMS/HCC)   • Central pain syndrome   • Carpal tunnel syndrome   • Vitamin B 12 deficiency   • Guillain Barré syndrome (CMS/HCC)       Therapy Treatment    IRF Treatment Summary     Row Name 20 1120             Evaluation/Treatment Time and Intent    Subjective Information  complains of IV needle in R arm for transfusion and doesnt want to use it  -JK      Existing Precautions/Restrictions  fall  -JK      Document Type  therapy note (daily note)  -JK      Mode of Treatment  physical therapy  -JK      Patient/Family Observations  pt seated in WC  -JK      Recorded by [JK] Elizabeth Haas PT      Row Name 20 1120             Cognition/Psychosocial- PT/OT    Affect/Mental Status (Cognitive)  flat/blunted affect  -JK      Orientation Status (Cognition)  oriented x 4  -JK      Follows Commands (Cognition)  WFL  -JK      Personal Safety Interventions  fall prevention program maintained;gait belt  -JK      Safety Deficit (Cognitive)  insight into deficits/self awareness  -JK      Recorded by [JK] Elizabeth Haas PT      Row Name 20 1120             Bed Mobility Assessment/Treatment    Sit-Supine [place] (Bed Mobility)  not tested  -JK      Recorded by [JK] Elizabeth Haas PT      Row Name 20 1120             Sit-Stand Transfer    Sit-Stand [place] (Transfers)  not tested pt refused  -JK       Recorded by [JK] Elizabeth Haas, PT      Row Name 02/01/20 1120             Gait/Stairs Assessment/Training    Comment (Gait/Stairs)  pt refused to attempt ambulation   -JK      Recorded by [JK] Elizabeth Haas, PT      Row Name 02/01/20 1120             Wheelchair Mobility/Management    Method of Wheelchair Locomotion (Mobility)  bipedal (lower extremity) propulsion  -JK      Mobility Activities (Wheelchair)  forward propulsion;steering  -JK      Forward Propulsion [place] (Wheelchair)  stand by assist  -JK      Steering [place] (Wheelchair)  stand by assist  -JK      Distance Propelled in Feet (Wheelchair)  60'; 20'  -JK      Recorded by [JK] Elizabeth Haas, PT      Row Name 02/01/20 1120             Safety Issues, Functional Mobility    Impairments Affecting Function (Mobility)  strength;endurance/activity tolerance  -JK      Recorded by [JK] Elizabeth Haas, PT      Row Name 02/01/20 1120             Pain Scale: Numbers Pre/Post-Treatment    Pain Scale: Numbers, Pretreatment  4/10  -JK      Pain Scale: Numbers, Post-Treatment  4/10  -JK      Pain Location - Side  Bilateral  -JK      Pain Location - Orientation  generalized  -JK      Pain Location  hand B feet; L buttocks  -JK      Pain Intervention(s)  Repositioned  -JK      Recorded by [JK] Elizabeth Haas, PT      Row Name 02/01/20 1120             Lower Extremity Seated Therapeutic Exercise    Performed, Seated Lower Extremity (Therapeutic Exercise)  hip flexion/extension;hip abduction/adduction;knee flexion/extension;ankle dorsiflexion/plantarflexion  -JK      Exercise Type, Seated Lower Extremity (Therapeutic Exercise)  AROM (active range of motion);resistive exercise yellow and red t band  -JK      Sets/Reps Detail, Seated Lower Extremity (Therapeutic Exercise)  2/10  -JK      Recorded by [JK] Elizabeth Haas, PT      Row Name 02/01/20 1120             Positioning and Restraints    Pre-Treatment Position  sitting in chair/recliner  -JK       Post Treatment Position  wheelchair  -JK      In Wheelchair  sitting;encouraged to call for assist;with family/caregiver;heels elevated  -JK      Recorded by [CHRISTOPHE] Elizabeth Haas PT        User Key  (r) = Recorded By, (t) = Taken By, (c) = Cosigned By    Initials Name Effective Dates    Elizabeth Moon PT 04/03/18 -         Wound 12/09/19 1510 abdomen Incision (Active)   Dressing Appearance dry;intact 2/1/2020  8:00 AM   Closure USJEY 2/1/2020  8:00 AM   Base dressing in place, unable to visualize 1/31/2020  7:40 PM   Dressing Care, Wound gauze 1/31/2020  7:40 PM           PT Recommendation and Plan                        Time Calculation:     PT Charges     Row Name 02/01/20 1221             Time Calculation    Start Time  1100  -JK      Stop Time  1120  -JK      Time Calculation (min)  20 min  -JK      PT Received On  02/01/20  -JK      PT - Next Appointment  02/03/20  -JK        User Key  (r) = Recorded By, (t) = Taken By, (c) = Cosigned By    Initials Name Provider Type    Elizabeth Moon, PT Physical Therapist          Therapy Charges for Today     Code Description Service Date Service Provider Modifiers Qty    95842577955 HC PT THER PROC EA 15 MIN 2/1/2020 Elizabeth Haas, PT GP 1                   Elizabeth Haas PT  2/1/2020

## 2020-02-01 NOTE — PROGRESS NOTES
"                                                                                        [facility] Follow up Note        PATIENT IDENTIFICATION     Name: She López  Age: 74 y.o.  Sex: female  :  1945  MRN: [id number]       CHIEF COMPLIANTS / REASON FOR FOLLOW UP          Hyponatremia,metabolic acidosis.      Subjective:          Pt is a 74 yrs old white female s/p colostomy previously,recent admission to [facility] for gbs treated with 5 sessions of plasmapheresis,our group followed for plasmaperesis treatment that was completed uneventfully.Pt also noted with hyponatremia,controlled with oral salt.we will follow for ongoing hyponatremia.    20:improving with pt.walked 4 steps today.  2020: Motor strength continue to improve.  20:seen in pt.       Review of Systems:          Constitutional: No fever, no chills, no lethargy, no weakness.  HEENT:  No headache, otalgia, itchy eyes, nasal discharge or sore throat.  Cardiac:  No chest pain, dyspnea, orthopnea or PND.  Chest:  No cough, phlegm or wheezing.  Abdomen:  No abdominal pain, nausea or vomiting.  Neuro:  No focal weakness, abnormal movements or seizure-like activity.  :   No hematuria, no pyuria, no dysuria, no flank pain.  Extremities:  No  joint pains.  ROS was otherwise negative except as mentioned in the [place].        OBJECTIVE                                                                        Exam:  /68 (BP Location: Left arm, Patient Position: Sitting)   Pulse 90   Temp 97.9 °F (36.6 °C) (Oral)   Resp 18   Ht 165.1 cm (65\")   Wt 58.1 kg (128 lb)   LMP  (LMP Unknown)   SpO2 97%   BMI 21.30 kg/m²   Intake/Output last 3 shifts:  I/O last 3 completed shifts:  In: 1377 [P.O.:1090; Blood:287]  Out: 0 [Urine:125; Stool:]  Intake/Output this shift:  I/O this shift:  In: 890 [P.O.:890]  Out: 1025 [Stool:1025]    General Appearance:  Alert, cooperative, no distress, appears stated " age  Head:  Normocephalic, without obvious abnormality, atraumatic  Eyes:  Sclerae anicteric, EOM's intact     Neck:  Supple,  no adenopathy;      Lungs:   Clear to auscultation bilaterally, respirations unlabored  Heart:  Regular rate and rhythm, S1 and S2 normal, no  rub   or gallop  Abdomen:  Soft, nontender,    no masses, no hepatomegaly, no splenomegaly  Extremities:  Extremities normal, trace edema  Neurologic:   Alert and oriented, no focal deficits    Scheduled Meds:    cholecalciferol 5,000 Units Oral Daily   gabapentin 300 mg Oral 4x Daily   loperamide 2 mg Oral TID AC   MULTIVITAMIN ADULT 1 tablet Sublingual Daily   pantoprazole 40 mg Oral Q AM   sodium bicarbonate 650 mg Oral BID   sodium chloride 1 g Oral BID With Meals   Cyanocobalamin 2,500 mcg Sublingual Weekly     Continuous Infusions:   PRN Meds:•  aluminum sulfate-calcium acetate  •  gabapentin **AND** gabapentin  •  miconazole         Data Review:                                                                           CBC:   Results from last 7 days   Lab Units 02/01/20  0509   WBC 10*3/mm3 5.64   RBC 10*6/mm3 3.12*     BMP:   Results from last 7 days   Lab Units 01/31/20  0618   GLUCOSE mg/dL 109*   CO2 mmol/L 19.4*   BUN mg/dL 17   CREATININE mg/dL 0.81   CALCIUM mg/dL 9.0     ABGs:       Invalid input(s): PO2       Imaging:                                                                                         ASSESSMENT:                                                                                Guillain Barré syndrome (CMS/HCC)       Hyponatremia, secondary to increased/high ADH state    Gillan barre syndrome.    crohns disease/s/p colostomy.    Hyperlipidemia.    Skin cancer.    Non aniongap metabolic acidosis:secondary to gi losses.    Iron deficiency    Anemia.    Vit d deficiency.  ·       PLAN:                                                                            ·   Sodium level acceptable.  Continue sodium bicarbonate for  sodium and acidosis.  Fluid restriction to 1200 ml/day.  No need for hypertonic saline.  No need for samsca currently.  Defer transfusion to medicine.  Fecal occult blood positive  Might need GI work-up   Will follow.           Kalen Mao MD  [facility]  2/1/2020  5:31 PM

## 2020-02-02 PROCEDURE — 25010000002 IRON SUCROSE PER 1 MG: Performed by: INTERNAL MEDICINE

## 2020-02-02 RX ADMIN — GABAPENTIN 300 MG: 300 CAPSULE ORAL at 08:13

## 2020-02-02 RX ADMIN — LOPERAMIDE HYDROCHLORIDE 2 MG: 2 CAPSULE ORAL at 06:01

## 2020-02-02 RX ADMIN — IRON SUCROSE 200 MG: 20 INJECTION, SOLUTION INTRAVENOUS at 17:38

## 2020-02-02 RX ADMIN — SODIUM CHLORIDE TAB 1 GM 1 G: 1 TAB at 17:05

## 2020-02-02 RX ADMIN — LOPERAMIDE HYDROCHLORIDE 2 MG: 2 CAPSULE ORAL at 11:14

## 2020-02-02 RX ADMIN — SODIUM CHLORIDE TAB 1 GM 1 G: 1 TAB at 08:13

## 2020-02-02 RX ADMIN — Medication 1 TABLET: at 08:13

## 2020-02-02 RX ADMIN — GABAPENTIN 300 MG: 300 CAPSULE ORAL at 22:14

## 2020-02-02 RX ADMIN — SODIUM BICARBONATE 650 MG: 650 TABLET ORAL at 22:14

## 2020-02-02 RX ADMIN — GABAPENTIN 300 MG: 300 CAPSULE ORAL at 12:06

## 2020-02-02 RX ADMIN — GABAPENTIN 300 MG: 300 CAPSULE ORAL at 02:58

## 2020-02-02 RX ADMIN — GABAPENTIN 300 MG: 300 CAPSULE ORAL at 17:05

## 2020-02-02 RX ADMIN — PANTOPRAZOLE SODIUM 40 MG: 40 TABLET, DELAYED RELEASE ORAL at 06:01

## 2020-02-02 RX ADMIN — LOPERAMIDE HYDROCHLORIDE 2 MG: 2 CAPSULE ORAL at 16:30

## 2020-02-02 RX ADMIN — SODIUM BICARBONATE 650 MG: 650 TABLET ORAL at 08:13

## 2020-02-02 NOTE — PLAN OF CARE
Problem: Patient Care Overview  Goal: Plan of Care Review  Flowsheets  Taken 2/2/2020 1555 by Jyoti Parry, RN  Outcome Summary:  is alert, flat affect, anxious at times. She has weakness of all extremities and no awareness of where her feet are when transferring. Mod-max of 1 with transfer. Less liquid stool from ileostomy today compared to 2/1, there is more consistency compared to yesterday.No unsafe behavior.  Taken 2/2/2020 0329 by Jenn Anna RN  Progress, Functional Goals: demonstrating adequate progress  IRF Plan of Care Review: progress ongoing, continue  Taken 2/2/2020 0800 by Jyoti Parry, RN  Plan of Care Reviewed With: patient;spouse

## 2020-02-02 NOTE — PLAN OF CARE
Problem: Patient Care Overview  Goal: Plan of Care Review  Outcome: Ongoing (interventions implemented as appropriate)  Flowsheets (Taken 2/2/2020 0329)  Outcome Summary: Patient sleeping quite well tonight. Ileostomy bag intact and working well no leakage so far. Patient requested PRN neurontin to be given at 3AM. Continent of bladder. Swallowing her pills whole with water. Requires assistance with meals.  Progress, Functional Goals: demonstrating adequate progress  Plan of Care Reviewed With: patient  IRF Plan of Care Review: progress ongoing, continue  Goal: Individualization and Mutuality  Outcome: Ongoing (interventions implemented as appropriate)  Goal: Discharge Needs Assessment  Outcome: Ongoing (interventions implemented as appropriate)  Goal: Home Safety Plan  Outcome: Ongoing (interventions implemented as appropriate)  Goal: Coping Plan  Outcome: Ongoing (interventions implemented as appropriate)  Goal: Community Reintegration Plan  Outcome: Ongoing (interventions implemented as appropriate)     Problem: Pain, Chronic (Adult)  Goal: Acceptable Pain/Comfort Level and Functional Ability  Description  Patient will demonstrate the desired outcomes by discharge/transition of care.  Outcome: Ongoing (interventions implemented as appropriate)  Flowsheets (Taken 2/2/2020 0329)  Acceptable Pain/Comfort Level and Functional Ability: making progress toward outcome     Problem: Functional Mobility Impairment (IRF) (Adult)  Goal: Optimal/Safe Level of [place] with Mobility  Outcome: Ongoing (interventions implemented as appropriate)  Flowsheets (Taken 2/2/2020 0329)  Optimal/Safe Level of [place] with Mobility: demonstrating adequate progress     Problem: Fall Risk (Adult)  Goal: Absence of Fall  Description  Patient will demonstrate the desired outcomes by discharge/transition of care.  Outcome: Ongoing (interventions implemented as appropriate)  Flowsheets (Taken 2/2/2020 0329)  Absence of Fall: making  progress toward outcome

## 2020-02-02 NOTE — PROGRESS NOTES
LOS: 9 days   Patient Care Team:  Armando Valentine MD as PCP - General (Internal Medicine)  Lisa Arriaza MD as Consulting Physician (Obstetrics and Gynecology)    Chief Complaint: GBS    Subjective     History of Present Illness     Still numbness in the lower extremities and burning in the upper extremities distally.  Weakness in the hands about the same.  Tolerating iron infusion.   feels she is doing better emotionally today.      History taken from: patient chart family    Objective     Vital Signs  Temp:  [97.5 °F (36.4 °C)-98.6 °F (37 °C)] 97.5 °F (36.4 °C)  Heart Rate:  [80-91] 91  Resp:  [18] 18  BP: ()/(57-66) 101/64    Physical Exam:  General: Awake, alert, NAD  HEENT: normocepahlic, atraumatic, EOMI bilaterally   Heart: RRR, no m/r/g  Lungs: CTAB, no wheezing, rales, or rhonchi  Abdomen: soft, non-tender, non-distended, colostomy , incision dressed  Strength: BUE: 4/5 with bilateral elbow flexion, elbow extension, wrist extension, and finger flexion, right greater than left weak hand intrinsics       BLE: 3+/5 HF, 4/5 with knee extension and ankle dorsiflexion    Results Review:     I reviewed the patient's new clinical results.  Results from last 7 days   Lab Units 02/01/20  0509 01/31/20  0618 01/29/20  0732   WBC 10*3/mm3 5.64 5.56 7.09   HEMOGLOBIN g/dL 8.8* 7.2* 7.5*   HEMATOCRIT % 27.5* 23.1* 24.2*   PLATELETS 10*3/mm3 647* 613* 593*     Results from last 7 days   Lab Units 01/31/20  0618 01/30/20  0554 01/29/20  0732   SODIUM mmol/L 133* 133* 131*   POTASSIUM mmol/L 4.9 4.5 5.0   CHLORIDE mmol/L 101 100 97*   CO2 mmol/L 19.4* 19.2* 18.5*   BUN mg/dL 17 20 23   CREATININE mg/dL 0.81 0.90 0.98   CALCIUM mg/dL 9.0 9.0 9.2   GLUCOSE mg/dL 109* 105* 114*       Medication Review:   Scheduled Meds:    gabapentin 300 mg Oral 4x Daily   iron sucrose 200 mg Intravenous Q24H   loperamide 2 mg Oral TID AC   MULTIVITAMIN ADULT 1 tablet Sublingual Daily   pantoprazole 40 mg Oral Q AM    sodium bicarbonate 650 mg Oral BID   sodium chloride 1 g Oral BID With Meals   Cyanocobalamin 2,500 mcg Sublingual Weekly     Continuous Infusions:   PRN Meds:.•  aluminum sulfate-calcium acetate  •  gabapentin **AND** gabapentin  •  miconazole    Assessment/Plan   75 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     GBS  -Completed her plasmapharesis and has gotten good return.   - Will begin PT/OT for ADLS, strength, mobility, txs, gait.   -January 27: On gabapentin 300mg QID. Will continue to monitor and will titrate up as needed.  -Jan 29 -  Feels strength is somewhat better.  Worked on gait with rolling walker yesterday 15 feet. Today utilized ankle weights to decrease ataxic movements with gait. Transfers mod max assist. Bed mobility CTG.    Crohn's  -s/p recent colostomy- wound nurse to see and education for home management.   -If more than 1 liter output a day, needs Immodium-per Dr. Albrecht colorectal surgeon.     Hyponatremia  -On salt tabs and fluid restriction. Renal following  -January 27: Na 130. Continue salt tabs and fluid restriction per renal. Will continue to monitor  -January 30: Na 133. Continue sodium bicarbonate and fluid restriction per renal.    Anemia  -January 27: Hgb/Hct 7.6/24.3- stable. No signs of active bleeding. Will order fecal occult and reticulocyte count. Will continue to monitor.  -Jan 28 - hemoccult positive  -Jan 29 - Stool heme +.  HGB stable at 7.5. She had hypotension and tachycardia yesterday 87/64 and 110) , better today (99/63 and 88).  Reviewed option of transfusion PRBCs. She wished to hold on transfusion unless absolutely necessary. Discussed if HGB < 7, would recommend definitely transfuse.  -January 30: Repeat CBC scheduled for tomorrow. Will continue to monitor.    -January 31: Hgb 7.2. Will transfuse 1 unit of PRBCs today. Ordered anemia studies. Spoke with Dr. Albrecht and if iron supplementation is required she recommends IV iron for better  absorption.  -February 1: Hemoglobin improved 8.8.  IV iron infusion ordered by nephrology    DVT prophylaxis  -SCDS for now.   -January 27: Heparin has been on hold due to HGB trending down. Following results of fecal occult and reticulocyte count will consider restarting Heparin for DVT prophylaxis.   -January 28 - hemoccult positive.     Vitamin D deficiency-vitamin D 22.4  on January 29.    Feb 1 - Patient does not wish to take vitamin D p.o. through the hospital supply.  Wishes to have her vitamin D brought in from home.    Vitamin B12 replacement-sublingual from home      TEAM CONF - JAN 28 - FLAT AFFECT. SUPINE SIT MIN ASSIST. TRANSFERS MAX 2. NONAMBULATORY. UBB MIN. LBB MAX. UBD MOD.  LBD DEP. ABD WOUND CARE. STOOL HEMOCCULT POSITIVE.  ELOS - 5 WEEKS.     Adolfo Valle MD  02/02/20  3:07 PM

## 2020-02-02 NOTE — PROGRESS NOTES
Inpatient Rehabilitation Plan of Care Note    Plan of Care  Care Plan Reviewed - No updates at this time.    Psychosocial    Performed Intervention(s)  Verbalize needs and concerns      Safety    Performed Intervention(s)  Bed alarm, wc alarm  Items within reach  Safety rounds      Sphincter Control    Performed Intervention(s)  Monitor intake and output  Encourage appropriate diet      Body Systems    Performed Intervention(s)  Daily skin inspection  Dressing change as ordered    Signed by: Jenn Anna RN

## 2020-02-03 LAB
ANION GAP SERPL CALCULATED.3IONS-SCNC: 14.8 MMOL/L (ref 5–15)
BASOPHILS # BLD AUTO: 0.1 10*3/MM3 (ref 0–0.2)
BASOPHILS NFR BLD AUTO: 1.4 % (ref 0–1.5)
BUN BLD-MCNC: 12 MG/DL (ref 8–23)
BUN/CREAT SERPL: 13.5 (ref 7–25)
CALCIUM SPEC-SCNC: 8.9 MG/DL (ref 8.6–10.5)
CHLORIDE SERPL-SCNC: 105 MMOL/L (ref 98–107)
CO2 SERPL-SCNC: 18.2 MMOL/L (ref 22–29)
CREAT BLD-MCNC: 0.89 MG/DL (ref 0.57–1)
DEPRECATED RDW RBC AUTO: 45.8 FL (ref 37–54)
EOSINOPHIL # BLD AUTO: 0.26 10*3/MM3 (ref 0–0.4)
EOSINOPHIL NFR BLD AUTO: 3.7 % (ref 0.3–6.2)
ERYTHROCYTE [DISTWIDTH] IN BLOOD BY AUTOMATED COUNT: 14.3 % (ref 12.3–15.4)
GFR SERPL CREATININE-BSD FRML MDRD: 62 ML/MIN/1.73
GLUCOSE BLD-MCNC: 109 MG/DL (ref 65–99)
HCT VFR BLD AUTO: 29.1 % (ref 34–46.6)
HGB BLD-MCNC: 9.5 G/DL (ref 12–15.9)
IMM GRANULOCYTES # BLD AUTO: 0.07 10*3/MM3 (ref 0–0.05)
IMM GRANULOCYTES NFR BLD AUTO: 1 % (ref 0–0.5)
LYMPHOCYTES # BLD AUTO: 1.07 10*3/MM3 (ref 0.7–3.1)
LYMPHOCYTES NFR BLD AUTO: 15.2 % (ref 19.6–45.3)
MCH RBC QN AUTO: 29.1 PG (ref 26.6–33)
MCHC RBC AUTO-ENTMCNC: 32.6 G/DL (ref 31.5–35.7)
MCV RBC AUTO: 89.3 FL (ref 79–97)
MONOCYTES # BLD AUTO: 0.66 10*3/MM3 (ref 0.1–0.9)
MONOCYTES NFR BLD AUTO: 9.4 % (ref 5–12)
NEUTROPHILS # BLD AUTO: 4.89 10*3/MM3 (ref 1.7–7)
NEUTROPHILS NFR BLD AUTO: 69.3 % (ref 42.7–76)
NRBC BLD AUTO-RTO: 0 /100 WBC (ref 0–0.2)
PLATELET # BLD AUTO: 602 10*3/MM3 (ref 140–450)
PMV BLD AUTO: 8.3 FL (ref 6–12)
POTASSIUM BLD-SCNC: 4.4 MMOL/L (ref 3.5–5.2)
RBC # BLD AUTO: 3.26 10*6/MM3 (ref 3.77–5.28)
SODIUM BLD-SCNC: 138 MMOL/L (ref 136–145)
WBC NRBC COR # BLD: 7.05 10*3/MM3 (ref 3.4–10.8)

## 2020-02-03 PROCEDURE — 97112 NEUROMUSCULAR REEDUCATION: CPT

## 2020-02-03 PROCEDURE — 85025 COMPLETE CBC W/AUTO DIFF WBC: CPT | Performed by: PHYSICAL MEDICINE & REHABILITATION

## 2020-02-03 PROCEDURE — 97110 THERAPEUTIC EXERCISES: CPT

## 2020-02-03 PROCEDURE — 97535 SELF CARE MNGMENT TRAINING: CPT

## 2020-02-03 PROCEDURE — 97530 THERAPEUTIC ACTIVITIES: CPT

## 2020-02-03 PROCEDURE — 97116 GAIT TRAINING THERAPY: CPT

## 2020-02-03 PROCEDURE — 80048 BASIC METABOLIC PNL TOTAL CA: CPT | Performed by: PHYSICAL MEDICINE & REHABILITATION

## 2020-02-03 RX ADMIN — LOPERAMIDE HYDROCHLORIDE 2 MG: 2 CAPSULE ORAL at 05:58

## 2020-02-03 RX ADMIN — GABAPENTIN 300 MG: 300 CAPSULE ORAL at 17:27

## 2020-02-03 RX ADMIN — GABAPENTIN 300 MG: 300 CAPSULE ORAL at 12:23

## 2020-02-03 RX ADMIN — SODIUM BICARBONATE 650 MG: 650 TABLET ORAL at 21:59

## 2020-02-03 RX ADMIN — SODIUM CHLORIDE TAB 1 GM 1 G: 1 TAB at 08:40

## 2020-02-03 RX ADMIN — LOPERAMIDE HYDROCHLORIDE 2 MG: 2 CAPSULE ORAL at 17:27

## 2020-02-03 RX ADMIN — CHOLECALCIFEROL TAB 125 MCG (5000 UNIT) 5000 UNITS: 125 TAB at 08:41

## 2020-02-03 RX ADMIN — SODIUM BICARBONATE 650 MG: 650 TABLET ORAL at 08:40

## 2020-02-03 RX ADMIN — Medication 1 TABLET: at 08:40

## 2020-02-03 RX ADMIN — GABAPENTIN 300 MG: 300 CAPSULE ORAL at 03:02

## 2020-02-03 RX ADMIN — SODIUM CHLORIDE TAB 1 GM 1 G: 1 TAB at 17:27

## 2020-02-03 RX ADMIN — LOPERAMIDE HYDROCHLORIDE 2 MG: 2 CAPSULE ORAL at 12:23

## 2020-02-03 RX ADMIN — GABAPENTIN 300 MG: 300 CAPSULE ORAL at 22:00

## 2020-02-03 RX ADMIN — PANTOPRAZOLE SODIUM 40 MG: 40 TABLET, DELAYED RELEASE ORAL at 05:57

## 2020-02-03 RX ADMIN — GABAPENTIN 300 MG: 300 CAPSULE ORAL at 08:40

## 2020-02-03 NOTE — THERAPY TREATMENT NOTE
Inpatient Rehabilitation - Occupational Therapy Treatment Note    [facility]     Patient Name: She López  : 1945  MRN: [id number]    Today's Date: 2/3/2020  Onset of Illness/Injury or Date of Surgery: 20              Admit Date: 2020      Visit Dx:    ICD-10-CM ICD-9-CM   1. General weakness R53.1 780.79       Patient Active Problem List   Diagnosis   • Crohn's disease of small intestine with other complication (CMS/HCC)   • Type 2 diabetes mellitus without complication (CMS/HCC)   • RSD upper limb   • Neuropathic pain of hand   • Hyperlipidemia   • Cervical myelopathy (CMS/HCC)   • Central pain syndrome   • Carpal tunnel syndrome   • Vitamin B 12 deficiency   • Guillain Barré syndrome (CMS/HCC)         Therapy Treatment    IRF Treatment Summary     Row Name 20 15320 0800          Evaluation/Treatment Time and Intent    Subjective Information  complains of;fatigue burning/tingling/numbness in hands and feets  -AF  complains of;weakness numbness feet; pain hands   -LH,NM,LH2     Existing Precautions/Restrictions  fall  -AF  fall  -LH,NM,LH2     Document Type  therapy note (daily note)  -AF  therapy note (daily note)  -LH,NM,LH2     Mode of Treatment  occupational therapy  -AF  physical therapy  -LH,NM,LH2     Patient/Family Observations  sitting up in w/c in AM, supine in bed in PM  -AF  pt in bed,  present; no acute distress   -LH,NM,LH2     Recorded by [AF] Reina Perera, OTR [LH,NM,LH2] Chelsie Cline, PT (r) Jaxon Atkinson PT Student (t) Chelsie Cline, PT (c)     Row Name 20 1532 20 0800          Cognition/Psychosocial- PT/OT    Affect/Mental Status (Cognitive)  flat/blunted affect  -AF  --     Orientation Status (Cognition)  oriented x 4  -AF  oriented x 4  -LHNM,LH2     Follows Commands (Cognition)  WFL  -AF  WFL  -LH,NM,LH2     Personal Safety Interventions  fall prevention program maintained;gait belt;nonskid shoes/slippers when out of bed   -AF  fall prevention program maintained;gait belt;supervised activity;nonskid shoes/slippers when out of bed  -LH,NM,LH2     Safety Deficit (Cognitive)  insight into deficits/self awareness  -AF  insight into deficits/self awareness  -LH,NM,LH2     Recorded by [AF] Reina Perera, NILAMR [LH,NM,LH2] Chelsie Cline, PT (r) Jaxon Atkinson, PT Student (t) Chelsie Cline, PT (c)     Row Name 02/03/20 0800             Mobility    Additional Documentation  Wheelchair Mobility/Management (Group)  -LH,NM,LH2      Recorded by [LH,NM,LH2] Chelsie Cline, PT (r) Jaxon Atkinson, PT Student (t) Chelsie Cline, PT (c)      Row Name 02/03/20 1532 02/03/20 0800          Bed Mobility Assessment/Treatment    Rolling Left [place] (Bed Mobility)  --  supervision;verbal cues  -LH,NM,LH2     Rolling Right [place] (Bed Mobility)  --  supervision;verbal cues  -LH,NM,LH2     Supine-Sit [place] (Bed Mobility)  verbal cues;supervision  -AF  supervision;verbal cues  -LH,NM,LH2     Sit-Supine [place] (Bed Mobility)  verbal cues;supervision  -AF  supervision;verbal cues  -LH,NM,LH2     Bed Mobility, Safety Issues  --  decreased use of arms for pushing/pulling;decreased use of legs for bridging/pushing;impaired trunk control for bed mobility  -LH,NM,LH2     Assistive Device (Bed Mobility)  bed rails  -AF  bed rails  -LH,NM,LH2     Recorded by [AF] Reina Perera, NILAMR [LH,NM,LH2] Chelsie Cline, PT (r) Jaxon Atkinson, PT Student (t) Chelsie Cline, PT (c)     Row Name 02/03/20 0800             Functional Mobility    Functional Mobility- Ind. Level  minimum assist (75% patient effort);moderate assist (50% patient effort);2 person assist required  -LH,NM,LH2      Functional Mobility- Device  rolling platform walker  -LH,NM,LH2      Functional Mobility-Distance (Feet)  15  -LH,NM,LH2      Functional Mobility- Safety Issues  weight-shifting ability decreased;sequencing ability decreased;step length decreased forward flexion; L  circumduction    -LH,NM,LH2      Functional Mobility- Comment  in the PM pt required increased assist c gait due to fatigue aircasts to B ankles   -LH,NM,LH2      Recorded by [LH,NM,LH2] Chelsie Cline, PT (r) Jaxon Atkinson, PT Student (t) Chelsie Cline, PT (c)      Row Name 02/03/20 1532 02/03/20 0800          Transfer Assessment/Treatment    Comment (Transfers)  sit to stand at sink with LBD tasks MOD to come to stand   -AF  vc to have pt check foot placement prior to standing; vc and manual guidance to reach for wc during stand to sit   -LH,NM,LH2     Recorded by [AF] Reina Perera, OTR [LH,NM,LH2] Chelsie Cline, PT (r) Jaxon Atkinson, PT Student (t) Chelsie Cline, PT (c)     Row Name 02/03/20 1532 02/03/20 0800          Bed-Chair Transfer    Bed-Chair [place] (Transfers)  moderate assist (50% patient effort);verbal cues  -AF  moderate assist (50% patient effort);nonverbal cues (demo/gesture);verbal cues  -LH,NM,LH2     Assistive Device (Bed-Chair Transfers)  wheelchair  -AF  wheelchair stand pivot   -LH,NM,LH2     Recorded by [AF] Reina Perera, OTR [LH,NM,LH2] Chelsie Cline, PT (r) Jaxon Atkinson, PT Student (t) Chelsie Cline, PT (c)     Row Name 02/03/20 1532 02/03/20 0800          Chair-Bed Transfer    Chair-Bed [place] (Transfers)  minimum assist (75% patient effort);moderate assist (50% patient effort);verbal cues  -AF  moderate assist (50% patient effort);verbal cues;nonverbal cues (demo/gesture)  -LH,NM,LH2     Assistive Device (Chair-Bed Transfers)  wheelchair  -AF  wheelchair  -LH,NM,LH2     Recorded by [AF] Reina Perera, OTR [LH,NM,LH2] Chelsie Cline, PT (r) Jaxon Atkinson, PT Student (t) Chelsie Cline, PT (c)     Row Name 02/03/20 0800             Sit-Stand Transfer    Sit-Stand [place] (Transfers)  minimum assist (75% patient effort);verbal cues;nonverbal cues (demo/gesture);2 person assist  -LH,NM,LH2      Assistive Device (Sit-Stand Transfers)  wheelchair;walker,  rolling platform  -LH,NM,LH2      Recorded by [LH,NM,LH2] Chelsie Cline, PT (r) Jaxon Atkinson, PT Student (t) Chelsie Cline, PT (c)      Row Name 02/03/20 0800             Stand-Sit Transfer    Stand-Sit [place] (Transfers)  minimum assist (75% patient effort);verbal cues;nonverbal cues (demo/gesture);2 person assist  -LH,NM,LH2      Assistive Device (Stand-Sit Transfers)  wheelchair  -LH,NM,LH2      Recorded by [LH,NM,LH2] Chelsie Cline, PT (r) Jaxon Atkinson, PT Student (t) Chelsie Cline, PT (c)      Row Name 02/03/20 0800             Stand Pivot/Stand Step Transfer    Stand Pivot/Stand Step [place]  moderate assist (50% patient effort);verbal cues;nonverbal cues (demo/gesture)  -LH,NM,LH2      Assistive Device (Stand Pivot Stand Step Transfer)  wheelchair from bed to wc   -LH,NM,LH2      Recorded by [LH,NM,LH2] Chelsie Cline, PT (r) Jaxon Atkinson, PT Student (t) Chelsie Cline, PT (c)      Row Name 02/03/20 0800             Gait/Stairs Assessment/Training    [place] Level (Gait)  minimum assist (75% patient effort);2 person assist maxA once due to LOB   -LH,NM,LH2      Assistive Device (Gait)  walker, rolling platform  -LH,NM,LH2      Distance in Feet (Gait)  30x2; 15  -LH,NM,LH2      Pattern (Gait)  step-through  -LH,NM,LH2      Deviations/Abnormal Patterns (Gait)  bilateral deviations;ataxic;base of support, wide;stride length decreased  -LH,NM,LH2      Bilateral Gait Deviations  forward flexed posture;heel strike decreased;weight shift ability decreased;knee buckling, bilateral;knee hyperextension  -LH,NM,LH2      Recorded by [LH,NM,LH2] Chelsie Cline, PT (r) Jaxon Atkinson, PT Student (t) Chelsie Cline, PT (c)      Row Name 02/03/20 0800             Wheelchair Mobility/Management    Method of Wheelchair Locomotion (Mobility)  bipedal (lower extremity) propulsion  -LH,NM,LH2      Mobility Activities (Wheelchair)  forward propulsion  -LH,NM,LH2      Forward Propulsion [place]  (Wheelchair)  stand by assist  -,NM,LH2      Steering [place] (Wheelchair)  stand by assist  -,NM,LH2      Distance Propelled in Feet (Wheelchair)  40'  -LH,NM,LH2      Recorded by [,NM,LH2] Chelsie Cline, PT (r) Jaxon Atkinson PT Student (t) Chelsie Cline, PT (c)      Row Name 02/03/20 1532             Bathing Assessment/Treatment    Bathing [place] Level  bathing skills;upper body;contact guard assist;lower body;moderate assist (50% patient effort);verbal cues  -AF      Bathing Position  sink side;supported sitting;supported standing  -AF      Bathing Setup Assistance  obtain supplies  -AF      Comment (Bathing)  assist with hair washing   -AF      Recorded by [AF] Reina Perera OTR      Row Name 02/03/20 1532             Upper Body Dressing Assessment/Treatment    Upper Body Dressing Task  upper body dressing skills;minimum assist (75% or more patient effort);verbal cues;moderate assist (50-74% patient effort)  -AF      Upper Body Dressing Position  supported sitting  -AF      Comment (Upper Body Dressing)  assist with jacket, increased assistance because of IV in R hand  -AF      Recorded by [AF] Reina Perera OTR      Row Name 02/03/20 1532             Lower Body Dressing Assessment/Treatment    Lower Body Dressing [place] Level  doff;don;pants/bottoms;shoes/slippers;socks;dependent (less than 25% patient effort);maximum assist (25% patient effort);verbal cues  -AF      Lower Body Dressing Position  supported standing;supported sitting  -AF      Lower Body Dressing Setup Assistance  obtain clothing  -AF      Comment (Lower Body Dressing)  at sink, assist x 1 to stand and one with clothing management   -AF      Recorded by [AF] Reina Perera OTR      Row Name 02/03/20 1532             Grooming Assessment/Treatment    Grooming [place] Level  grooming skills;minimum assist (75% patient effort);verbal cues  -AF      Assistive Device (Grooming)  built-up handle items  -AF       Grooming Position  sink side;supported sitting  -AF      Grooming Setup Assistance  obtain supplies  -AF      Recorded by [AF] Reina Perera OTR      Row Name 02/03/20 1532             Fine Motor Testing & Training    Comment, Fine Motor Coordination  3 point grasp with L hand to place in pegs into peg board with MOD difficulty and removal with R hand MOD/MAX diffiuclty. attempted to use tip to tip grasp with manipulating 1 inch blocks onto vertical dowel pa with L hand MOD diffiuclty   -AF      Recorded by [AF] Reina Perera OTR      Row Name 02/03/20 1532 02/03/20 0800          Pain Scale: Numbers Pre/Post-Treatment    Pain Scale: Numbers, Pretreatment  3/10  -AF  3/10  -LH,NM,LH2     Pain Scale: Numbers, Post-Treatment  3/10  -AF  3/10  -LH,NM,LH2     Pain Location - Side  Bilateral  -AF  Bilateral  -LH,NM,LH2     Pain Location - Orientation  --  generalized  -LH,NM,LH2     Pain Location  hand  -AF  hand  -LH,NM,LH2     Pre/Post Treatment Pain Comment  nerve pain  -AF  nerve pain   -LH,NM,LH2     Recorded by [AF] Reina Perera, NILAMR [LH,NM,LH2] Chelsie Cline, PT (r) Jaxon Atkinson, PT Student (t) Chelsie Cline, PT (c)     Row Name 02/03/20 0800             Standing Balance Activity    Comment (Standing, Balance Training)  inside // bars c BUE support, performed toe touches on a stool c visual feedback using a mirror; vc for upright posture and sequence; manual assist c hip ext and abd during stance; Doreen x 2   -LH,NM,LH2      Recorded by [LH,NM,LH2] Chelsie Cline, PT (r) Jaxon Atkinson, PT Student (t) Chelsie Cline, PT (c)      Row Name 02/03/20 0800             Lower Extremity Seated Therapeutic Exercise    Performed, Seated Lower Extremity (Therapeutic Exercise)  LAQ (long arc quad), knee extension;hip flexion/extension;hip abduction/adduction  -LH,NM,LH2      Exercise Type, Seated Lower Extremity (Therapeutic Exercise)  AROM (active range of motion)  -LH,NM,LH2      Sets/Reps Detail,  Seated Lower Extremity (Therapeutic Exercise)  1/20  -LH,NM,LH2      Recorded by [LH,NM,LH2] Chelsie Cline, PT (r) Jaxon Atkinson, PT Student (t) Chelsie Cline, PT (c)      Row Name 02/03/20 0800             Lower Extremity Supine Therapeutic Exercise    Performed, Supine Lower Extremity (Therapeutic Exercise)  SAQ (short arc quad) over bolster;hip abduction/adduction;bridging (bilateral w/bolster)  -LH,NM,LH2      Exercise Type, Supine Lower Extremity (Therapeutic Exercise)  AROM (active range of motion)  -LH,NM,LH2      Sets/Reps Detail, Supine Lower Extremity (Therapeutic Exercise)  2/10  -LH,NM,LH2      Recorded by [LH,NM,LH2] Chelsie Cline, PT (r) Jaxon Atkinson, PT Student (t) Chelsie Cline, PT (c)      Row Name 02/03/20 0800             Lower Extremity Sidelying Therapeutic Exercise    Performed, Sidelying Lower Extremity (Therapeutic Exercise)  hip abduction clamshells  -LH,NM,LH2      Device, Sidelying Lower Extremity (Therapeutic Exercise)  elastic bands/tubing yellow  -LH,NM,LH2      Exercise Type, Sidelying Lower Extremity (Therapeutic Exercise)  AROM (active range of motion)  -LH,NM,LH2      Sets/Reps Detail, Sidelying Lower Extremity (Therapeutic Exercise)  1/20  -LH,NM,LH2      Recorded by [LH,NM,LH2] Chelsie Cline, PT (r) Jaxon Atkinson, PT Student (t) Chelsie Cline, PT (c)      Row Name 02/03/20 1532             Neuromuscular Re-education    Comment (Neuromuscular Re-education)  with everyday containers manipulated lids on and off with increased time and MOD diffiuclty  -AF      Recorded by [AF] Reina Perera OTR      Row Name 02/03/20 0800             Transportation    Additional Documentation  --  -LH,NM,LH2      Recorded by [LH,NM,LH2] Chelsie Cline, PT (r) Jaxon Atkinson, PT Student (t) Chelsie Cline, PT (c)      Row Name 02/03/20 1532 02/03/20 0800          Positioning and Restraints    Pre-Treatment Position  sitting in chair/recliner  -AF  in bed  -LH,NM,LH2     Post Treatment  Position  wheelchair  -AF  wheelchair  -LH,NM,LH2     In Bed  supine;call light within reach;encouraged to call for assist;exit alarm on;with family/caregiver with  in AM  -AF  --     In Wheelchair  sitting;with family/caregiver;with PT with  in PM  -AF  call light within reach;encouraged to call for assist;exit alarm on;with family/caregiver  -LH,NM,LH2     Recorded by [AF] Reina Perera, OTR [LH,NM,LH2] Chelsie Cline, PT (r) Jaxon Atkinson, PT Student (t) Chelsie Cline, PT (c)       User Key  (r) = Recorded By, (t) = Taken By, (c) = Cosigned By    Initials Name Effective Dates     Chelsie Cline, PT 04/03/18 -     Reina Noe, OTR 04/03/18 -     Jaxon Arteaga, PT Student 01/03/20 -           Wound 12/09/19 1510 abdomen Incision (Active)   Dressing Appearance dry;intact 2/3/2020  8:40 AM   Closure None 2/3/2020  8:40 AM   Base dressing in place, unable to visualize 2/3/2020  8:40 AM   Dressing Care, Wound gauze 2/3/2020  8:40 AM         OT Recommendation and Plan                 OT IRF GOALS     Row Name 02/03/20 1540 01/25/20 1306          Transfer Goal 1 (OT-IRF)    Activity/Assistive Device (Transfer Goal 1, OT-IRF)  toilet;shower chair;walk-in shower  -AF  --     [place] Level (Transfer Goal 1, OT-IRF)  minimum assist (75% or more patient effort);moderate assist (50-74% patient effort);verbal cues required  -AF  --     Time Frame (Transfer Goal 1, OT-IRF)  short term goal (STG)  -AF  --     Progress/Outcomes (Transfer Goal 1, OT-IRF)  goal ongoing  -AF  --        Transfer Goal 2 (OT-IRF)    Activity/Assistive Device (Transfer Goal 2, OT-IRF)  toilet;shower chair;walk-in shower  -AF  --     [place] Level (Transfer Goal 2, OT-IRF)  verbal cues required;contact guard assist  -AF  --     Time Frame (Transfer Goal 2, OT-IRF)  long term goal (LTG)  -AF  --     Progress/Outcomes (Transfer Goal 2, OT-IRF)  goal ongoing  -AF  --        Bathing Goal 1 (OT-IRF)     Activity/Device (Bathing Goal 1, OT-IRF)  bathing skills, all;grab bar/tub rail;hand-held shower spray hose;shower chair  -AF  --     [place] Level (Bathing Goal 1, OT-IRF)  verbal cues required;moderate assist (50-74% patient effort)  -AF  --     Time Frame (Bathing Goal 1, OT-IRF)  short term goal (STG)  -AF  --     Progress/Outcomes (Bathing Goal 1, OT-IRF)  goal ongoing  -AF  --        Bathing Goal 2 (OT-IRF)    Activity/Device (Bathing Goal 2, OT-IRF)  bathing skills, all;grab bar/tub rail;hand-held shower spray hose;shower chair  -AF  --     [place] Level (Bathing Goal 2, OT-IRF)  verbal cues required;contact guard assist  -AF  --     Time Frame (Bathing Goal 2, OT-IRF)  long term goal (LTG)  -AF  --     Progress/Outcomes (Bathing Goal 2, OT-IRF)  goal ongoing  -AF  --        UB Dressing Goal 1 (OT-IRF)    Activity/Device (UB Dressing Goal 1, OT-IRF)  upper body dressing  -AF  --     [place] (UB Dress Goal 1, OT-IRF)  verbal cues required;set-up required  -AF  --     Time Frame (UB Dressing Goal 1, OT-IRF)  long term goal (LTG)  -AF  --     Progress/Outcomes (UB Dressing Goal 1, OT-IRF)  goal ongoing  -AF  --        LB Dressing Goal 1 (OT-IRF)    Activity/Device (LB Dressing Goal 1, OT-IRF)  lower body dressing  -AF  --     [place] (LB Dressing Goal 1, OT-IRF)  verbal cues required;moderate assist (50-74% patient effort)  -AF  --     Time Frame (LB Dressing Goal 1, OT-IRF)  short term goal (STG)  -AF  --     Progress/Outcomes (LB Dressing Goal 1, OT-IRF)  goal ongoing  -AF  --        LB Dressing Goal 2 (OT-IRF)    Activity/Device (LB Dressing Goal 2, OT-IRF)  lower body dressing  -AF  --     [place] (LB Dressing Goal 2, OT-IRF)  contact guard assist;verbal cues required  -AF  --     Time Frame (LB Dressing Goal 2, OT-IRF)  long term goal (LTG)  -AF  --     Progress/Outcomes (LB Dressing Goal 2, OT-IRF)  goal ongoing  -AF  --        Grooming Goal 1 (OT-IRF)    Activity/Device (Grooming  Goal 1, OT-IRF)  grooming skills, all  -AF  --     [place] (Grooming Goal 1, OT-IRF)  set-up required;verbal cues required  -AF  --     Time Frame (Grooming Goal 1, OT-IRF)  short term goal (STG);long term goal (LTG)  -AF  --     Progress/Outcomes (Grooming Goal 1, OT-IRF)  goal ongoing  -AF  --        Toileting Goal 1 (OT-IRF)    Activity/Device (Toileting Goal 1, OT-IRF)  toileting skills, all;grab bar/safety frame;raised toilet seat  -AF  --     [place] Level (Toileting Goal 1, OT-IRF)  maximum assist (25-49% patient effort);verbal cues required  -AF  --     Time Frame (Toileting Goal 1, OT-IRF)  short term goal (STG)  -AF  --     Progress/Outcomes (Toileting Goal 1, OT-IRF)  goal ongoing  -AF  --        Toileting Goal 2 (OT-IRF)    Activity/Device (Toileting Goal 2, OT-IRF)  toileting skills, all;grab bar/safety frame;raised toilet seat  -AF  --     [place] Level (Toileting Goal 2, OT-IRF)  minimum assist (75% or more patient effort)  -AF  --     Time Frame (Toileting Goal 2, OT-IRF)  long term goal (LTG)  -AF  --     Progress/Outcomes (Toileting Goal 2, OT-IRF)  goal ongoing  -AF  --        Balance Goal 1 (OT)    Activity/Assistive Device (Balance Goal 1, OT)  standing, static  -AF  standing, static  -KP     [place] Level/Cues Needed (Balance Goal 1, OT)  moderate assist (50-74% patient effort);verbal cues required  -AF  set-up required;maximum assist (25-49% patient effort)  -KP     Time Frame (Balance Goal 1, OT)  short term goal (STG)  -AF  short term goal (STG);1 week  -KP     Progress/Outcomes (Balance Goal 1, OT)  goal ongoing  -AF  goal ongoing  -KP        Balance Goal 2 (OT)    Activity/Assistive Device (Balance Goal 2, OT)  standing, static  -AF  standing, static  -KP     [place] Level (Balance Goal 2, OT)  minimum assist (75% or more patient effort);contact guard assist  -AF  set-up required;minimum assist (75% or more patient effort);moderate assist (50-74% patient effort)   -KP     Time Frame (Balance Goal 2, OT)  long term goal (LTG)  -AF  long term goal (LTG);by discharge  -KP     Progress/Outcome (Balance Goal 2, OT)  goal ongoing  -AF  goal ongoing  -KP        Caregiver Training Goal 1 (OT-IRF)    Caregiver Training Goal 1 (OT-IRF)  Pt and  will demo safe techniques with ADLs, trasnfer, HEP and AD prior to d/c home   -AF  --     Time Frame (Caregiver Training Goal 1, OT-IRF)  long term goal (LTG)  -AF  --     Progress/Outcomes (Caregiver Training Goal 1, OT-IRF)  goal ongoing  -AF  --       User Key  (r) = Recorded By, (t) = Taken By, (c) = Cosigned By    Initials Name Provider Type    Lay Maldonado, OTR Occupational Therapist    Reina Noe OTR Occupational Therapist                     Time Calculation:     Time Calculation- OT     Row Name 02/03/20 1546 02/03/20 1545          Time Calculation- OT    OT Start Time  1330  -AF  0930  -AF     OT Stop Time  1400  -AF  1030  -AF     OT Time Calculation (min)  30 min  -AF  60 min  -AF       User Key  (r) = Recorded By, (t) = Taken By, (c) = Cosigned By    Initials Name Provider Type    Reina Noe OTR Occupational Therapist          Therapy Charges for Today     Code Description Service Date Service Provider Modifiers Qty    83861219703 HC OT SELF CARE/MGMT/TRAIN EA 15 MIN 2/3/2020 Reina Perera OTR GO 4    00186191924 HC OT NEUROMUSC RE EDUCATION EA 15 MIN 2/3/2020 Reina Perera OTR GO 1    83110812614 HC OT THER PROC EA 15 MIN 2/3/2020 Reina Perera OTR GO 1                   ALPESH Dempsey  2/3/2020

## 2020-02-03 NOTE — PROGRESS NOTES
Occupational Therapy: Individual: 90 minutes.    Physical Therapy: [place]    Speech Language Pathology:  [place]    Signed by: Reina Perera OT

## 2020-02-03 NOTE — THERAPY TREATMENT NOTE
Inpatient Rehabilitation - Physical Therapy Treatment Note  [facility]     Patient Name: She López  : 1945  MRN: [id number]    Today's Date: 2/3/2020  Onset of Illness/Injury or Date of Surgery: 20              Admit Date: 2020      Visit Dx:      ICD-10-CM ICD-9-CM   1. General weakness R53.1 780.79       Patient Active Problem List   Diagnosis   • Crohn's disease of small intestine with other complication (CMS/HCC)   • Type 2 diabetes mellitus without complication (CMS/HCC)   • RSD upper limb   • Neuropathic pain of hand   • Hyperlipidemia   • Cervical myelopathy (CMS/HCC)   • Central pain syndrome   • Carpal tunnel syndrome   • Vitamin B 12 deficiency   • Guillain Barré syndrome (CMS/HCC)       Therapy Treatment    IRF Treatment Summary     Row Name 20 0800             Evaluation/Treatment Time and Intent    Subjective Information  complains of;weakness numbness feet; pain hands   -LH,NM,LH2      Existing Precautions/Restrictions  fall  -LH,NM,LH2      Document Type  therapy note (daily note)  -LH,NM,LH2      Mode of Treatment  physical therapy  -LH,NM,LH2      Patient/Family Observations  pt in bed,  present; no acute distress   -LH,NM,LH2      Recorded by [LH,NM,LH2] Chelsie Cline, PT (r) Jaxon Atkinson, PT Student (t) Chelsie Cline, PT (c)      Row Name 20 0800             Cognition/Psychosocial- PT/OT    Orientation Status (Cognition)  oriented x 4  -LH,NM,LH2      Follows Commands (Cognition)  WFL  -LH,NM,LH2      Personal Safety Interventions  fall prevention program maintained;gait belt;supervised activity;nonskid shoes/slippers when out of bed  -LH,NM,LH2      Safety Deficit (Cognitive)  insight into deficits/self awareness  -LH,NM,LH2      Recorded by [LH,NM,LH2] Chelsie Cline, PT (r) Jaxon Atkinson, PT Student (t) Chelsie Cline, PT (c)      Row Name 20 0800             Mobility    Additional Documentation  Wheelchair Mobility/Management (Group)   -LH,NM,LH2      Recorded by [LH,NM,LH2] Chelsie Cline, PT (r) Jaxon Atkinson, PT Student (t) Chelsie Cline, PT (c)      Row Name 02/03/20 0800             Bed Mobility Assessment/Treatment    Rolling Left [place] (Bed Mobility)  supervision;verbal cues  -LH,NM,LH2      Rolling Right [place] (Bed Mobility)  supervision;verbal cues  -LH,NM,LH2      Supine-Sit [place] (Bed Mobility)  supervision;verbal cues  -LH,NM,LH2      Sit-Supine [place] (Bed Mobility)  supervision;verbal cues  -LH,NM,LH2      Bed Mobility, Safety Issues  decreased use of arms for pushing/pulling;decreased use of legs for bridging/pushing;impaired trunk control for bed mobility  -LH,NM,LH2      Assistive Device (Bed Mobility)  bed rails  -LH,NM,LH2      Recorded by [LH,NM,LH2] Chelsie Cline, PT (r) Jaxon Atkinson, PT Student (t) Chelsie Cline, PT (c)      Row Name 02/03/20 0800             Functional Mobility    Functional Mobility- Ind. Level  minimum assist (75% patient effort);moderate assist (50% patient effort);2 person assist required  -LH,NM,LH2      Functional Mobility- Device  rolling platform walker  -LH,NM,LH2      Functional Mobility-Distance (Feet)  15  -LH,NM,LH2      Functional Mobility- Safety Issues  weight-shifting ability decreased;sequencing ability decreased;step length decreased forward flexion; L circumduction    -LH,NM,LH2      Functional Mobility- Comment  in the PM pt required increased assist c gait due to fatigue aircasts to B ankles   -LH,NM,LH2      Recorded by [LH,NM,LH2] Chelsie Cline, PT (r) Jaxon Atkinson, PT Student (t) Chelsie Cline, PT (c)      Row Name 02/03/20 0800             Transfer Assessment/Treatment    Comment (Transfers)  vc to have pt check foot placement prior to standing; vc and manual guidance to reach for wc during stand to sit   -LH,NM,LH2      Recorded by [LH,NM,LH2] Chelsie Cline, PT (r) Jaxon Atkinson, PT Student (t) Chelsie Cline, PT (c)      Row Name 02/03/20  0800             Bed-Chair Transfer    Bed-Chair [place] (Transfers)  moderate assist (50% patient effort);nonverbal cues (demo/gesture);verbal cues  -LH,NM,LH2      Assistive Device (Bed-Chair Transfers)  wheelchair stand pivot   -LH,NM,LH2      Recorded by [LH,NM,LH2] Chelsie Cline, PT (r) Jaxon Atkinson, PT Student (t) Chelsie Cline, PT (c)      Row Name 02/03/20 0800             Chair-Bed Transfer    Chair-Bed [place] (Transfers)  moderate assist (50% patient effort);verbal cues;nonverbal cues (demo/gesture)  -LH,NM,LH2      Assistive Device (Chair-Bed Transfers)  wheelchair  -LH,NM,LH2      Recorded by [LH,NM,LH2] Chelsie Cline, PT (r) Jaxon Atkinson, PT Student (t) Chelsie Cline, PT (c)      Row Name 02/03/20 0800             Sit-Stand Transfer    Sit-Stand [place] (Transfers)  minimum assist (75% patient effort);verbal cues;nonverbal cues (demo/gesture);2 person assist  -LH,NM,LH2      Assistive Device (Sit-Stand Transfers)  wheelchair;walker, rolling platform  -LH,NM,LH2      Recorded by [LH,NM,LH2] Chelsie Cline, PT (r) Jaxon Atkinson, PT Student (t) Chelsie Cline, PT (c)      Row Name 02/03/20 0800             Stand-Sit Transfer    Stand-Sit [place] (Transfers)  minimum assist (75% patient effort);verbal cues;nonverbal cues (demo/gesture);2 person assist  -LH,NM,LH2      Assistive Device (Stand-Sit Transfers)  wheelchair  -LH,NM,LH2      Recorded by [LH,NM,LH2] Chelsie Cline, PT (r) Jaxon Atkinson, PT Student (t) Chelsie Cline, PT (c)      Row Name 02/03/20 0800             Stand Pivot/Stand Step Transfer    Stand Pivot/Stand Step [place]  moderate assist (50% patient effort);verbal cues;nonverbal cues (demo/gesture)  -LH,NM,LH2      Assistive Device (Stand Pivot Stand Step Transfer)  wheelchair from bed to    -LH,NM,LH2      Recorded by [LH,NM,LH2] Chelsie Cline, PT (r) Jaxon Atkinson PT Student (t) Chelsie Cline, PT (c)      Row Name 02/03/20 0800              Gait/Stairs Assessment/Training    [place] Level (Gait)  minimum assist (75% patient effort);2 person assist maxA once due to LOB   -LH,NM,LH2      Assistive Device (Gait)  walker, rolling platform  -LH,NM,LH2      Distance in Feet (Gait)  30x2; 15  -LH,NM,LH2      Pattern (Gait)  step-through  -LH,NM,LH2      Deviations/Abnormal Patterns (Gait)  bilateral deviations;ataxic;base of support, wide;stride length decreased  -LH,NM,LH2      Bilateral Gait Deviations  forward flexed posture;heel strike decreased;weight shift ability decreased;knee buckling, bilateral;knee hyperextension  -LH,NM,LH2      Recorded by [LH,NM,LH2] Chelsie Cline, PT (r) Jaxon Atkinson, PT Student (t) Chelsie Cline, PT (c)      Row Name 02/03/20 0800             Wheelchair Mobility/Management    Method of Wheelchair Locomotion (Mobility)  bipedal (lower extremity) propulsion  -LH,NM,LH2      Mobility Activities (Wheelchair)  forward propulsion  -LH,NM,LH2      Forward Propulsion [place] (Wheelchair)  stand by assist  -LH,NM,LH2      Steering [place] (Wheelchair)  stand by assist  -LH,NM,LH2      Distance Propelled in Feet (Wheelchair)  40'  -LH,NM,LH2      Recorded by [LH,NM,LH2] Chelsie Cline, PT (r) Jaxon Atkinson, PT Student (t) Chelsie Cline, PT (c)      Row Name 02/03/20 0800             Pain Scale: Numbers Pre/Post-Treatment    Pain Scale: Numbers, Pretreatment  3/10  -LH,NM,LH2      Pain Scale: Numbers, Post-Treatment  3/10  -LH,NM,LH2      Pain Location - Side  Bilateral  -LH,NM,LH2      Pain Location - Orientation  generalized  -LH,NM,LH2      Pain Location  hand  -LH,NM,LH2      Pre/Post Treatment Pain Comment  nerve pain   -LH,NM,LH2      Recorded by [LH,NM,LH2] Chelsie Cline, PT (r) Jaxon Atkinson, PT Student (t) Chelsie Cline, PT (c)      Row Name 02/03/20 0800             Standing Balance Activity    Comment (Standing, Balance Training)  inside // bars c BUE support, performed toe touches on a stool c visual  feedback using a mirror; vc for upright posture and sequence; manual assist c hip ext and abd during stance; Doreen x 2   -LH,NM,LH2      Recorded by [LH,NM,LH2] Chelsie Cline, PT (r) Jaxon Atkinson, PT Student (t) Chelsie Cline, PT (c)      Row Name 02/03/20 0800             Lower Extremity Seated Therapeutic Exercise    Performed, Seated Lower Extremity (Therapeutic Exercise)  LAQ (long arc quad), knee extension;hip flexion/extension;hip abduction/adduction  -LH,NM,LH2      Exercise Type, Seated Lower Extremity (Therapeutic Exercise)  AROM (active range of motion)  -LH,NM,LH2      Sets/Reps Detail, Seated Lower Extremity (Therapeutic Exercise)  1/20  -LH,NM,LH2      Recorded by [LH,NM,LH2] Chelsie Cline, PT (r) Jaxon Atkinson, PT Student (t) Chelsie Cline, PT (c)      Row Name 02/03/20 0800             Lower Extremity Supine Therapeutic Exercise    Performed, Supine Lower Extremity (Therapeutic Exercise)  SAQ (short arc quad) over bolster;hip abduction/adduction;bridging (bilateral w/bolster)  -LH,NM,LH2      Exercise Type, Supine Lower Extremity (Therapeutic Exercise)  AROM (active range of motion)  -LH,NM,LH2      Sets/Reps Detail, Supine Lower Extremity (Therapeutic Exercise)  2/10  -LH,NM,LH2      Recorded by [LH,NM,LH2] Chelsie Cline, PT (r) Jaxon Atkinson, PT Student (t) Chelsie Cline, PT (c)      Row Name 02/03/20 0800             Lower Extremity Sidelying Therapeutic Exercise    Performed, Sidelying Lower Extremity (Therapeutic Exercise)  hip abduction clamshells  -LH,NM,LH2      Device, Sidelying Lower Extremity (Therapeutic Exercise)  elastic bands/tubing yellow  -LH,NM,LH2      Exercise Type, Sidelying Lower Extremity (Therapeutic Exercise)  AROM (active range of motion)  -LH,NM,LH2      Sets/Reps Detail, Sidelying Lower Extremity (Therapeutic Exercise)  1/20  -LH,NM,LH2      Recorded by [LH,NM,LH2] Chelsie Cline, PT (r) Jaxon Atkinson, PT Student (t) Chelsie Cline, PT (c)      Row Name 02/03/20  0800             Transportation    Additional Documentation  --  -LH,NM,LH2      Recorded by [LH,NM,LH2] Chelsie Cline, PT (r) Jaxon Atkinson, PT Student (t) Chelsie Cline, PT (c)      Row Name 02/03/20 0800             Positioning and Restraints    Pre-Treatment Position  in bed  -LH,NM,LH2      Post Treatment Position  wheelchair  -LH,NM,LH2      In Wheelchair  call light within reach;encouraged to call for assist;exit alarm on;with family/caregiver  -LH,NM,LH2      Recorded by [LH,NM,LH2] Chelsie Cline, PT (r) Jaxon Atkinson, PT Student (t) Chelsie Cline, PT (c)        User Key  (r) = Recorded By, (t) = Taken By, (c) = Cosigned By    Initials Name Effective Dates    Chelsie Guillen, PT 04/03/18 -     Jaxon Arteaga, PT Student 01/03/20 -         Wound 12/09/19 1510 abdomen Incision (Active)   Dressing Appearance dry;intact 2/3/2020  8:40 AM   Closure None 2/3/2020  8:40 AM   Base dressing in place, unable to visualize 2/3/2020  8:40 AM   Dressing Care, Wound gauze 2/3/2020  8:40 AM           PT Recommendation and Plan                        Time Calculation:     PT Charges     Row Name 02/03/20 1442 02/03/20 1120          Time Calculation    Start Time  1400  -LH (r) NM (t) LH (c)  0830  -LH (r) NM (t)  (c)     Stop Time  1430  -LH (r) NM (t) LH (c)  0930  -LH (r) NM (t)  (c)     Time Calculation (min)  30 min  -LH (r) NM (t)  60 min  -LH (r) NM (t)     PT Received On  02/03/20  -LH (r) NM (t) LH (c)  02/03/20  -LH (r) NM (t)  (c)     PT - Next Appointment  --  02/04/20  -LH (r) NM (t)  (c)       User Key  (r) = Recorded By, (t) = Taken By, (c) = Cosigned By    Initials Name Provider Type    Chelsie Guillen, PT Physical Therapist    NM Jaxon Atkinson, PT Student PT Student          Therapy Charges for Today     Code Description Service Date Service Provider Modifiers Qty    13375643170 HC GAIT TRAINING EA 15 MIN 2/3/2020 Jaxon Atkinson, PT Student GP 2    98394623839 HC PT NEUROMUSC RE  EDUCATION EA 15 MIN 2/3/2020 Jaxon Atkinson, PT Student GP 2    [id number]  PT THERAPEUTIC ACT EA 15 MIN 2/3/2020 Jaxon Atkinson, PT Student GP 2                   Jaxon Atkinson, PT Student  2/3/2020

## 2020-02-03 NOTE — PROGRESS NOTES
Case Management  Inpatient Rehabilitation Plan of Care and Discharge Plan Note    Rehabilitation Diagnosis:  Branch  Date of Onset:  Branch    Medical Summary:  Branch  Past Medical History: Branch    Plan of Care  Updated Problems/Interventions  Field    Expected Intensity:  Branch  Interdisciplinary Team:  Jose  Estimated Length of Stay/Anticipated Discharge Date: Branch  Anticipated Discharge Destination:  Anticipated discharge destination from inpatient rehabilitation is community  discharge with assistance. Pt lives with her  in a one story home with 3  steps to enter. Pt's  is retired and can provide 24 hour assist if  needed.      Based on the patient's medical and functional status, their prognosis and  expected level of functional improvement is:  Jose    Signed by: VANESA Lozano

## 2020-02-03 NOTE — PROGRESS NOTES
Inpatient Rehabilitation Plan of Care Note    Plan of Care  Care Plan Reviewed - Updates as Follows    Body Systems    [RN] Integumentary(Active)  Current Status(02/03/2020): Abdominal incision with dressing. intact ileostomy  bag in place. both changed per WOCN  Weekly Goal(02/04/2020): No S&S infection noted  Discharge Goal: No S&S infection noted    Performed Intervention(s)  Daily skin inspection  Dressing change as ordered      Psychosocial    [RN] Coping/Adjustment(Active)  Current Status(02/03/2020): Supportive family,  very helpful and assists  patient with care.  Weekly Goal(02/04/2020): Identify progress in functional status  Discharge Goal: Demonstrate healthy coping strategies    Performed Intervention(s)  Verbalize needs and concerns      Safety    [RN] Potential for Injury(Active)  Current Status(02/03/2020): No unsafe behaviors  Weekly Goal(02/04/2020): Use call light 100%  Discharge Goal: Pt/family aware of risk of fall and safety in the home setting    Performed Intervention(s)  Bed alarm, wc alarm  Items within reach  Safety rounds      Sphincter Control    [RN] Bladder Management(Active)  Current Status(02/03/2020): pt has been continent, using bedpan. does wear  brief.  Weekly Goal(02/04/2020): Continent bladder 100%  Discharge Goal: Continent bladder 100%    [RN] Bowel Management(Active)  Current Status(02/03/2020): Has ileostomy since 12/20/19.  aware of care  for ileostomy.  Weekly Goal(02/04/2020): maintain education of ileostomy with pt and family  Discharge Goal: Independent with ileostomy care    Performed Intervention(s)  Monitor intake and output  Encourage appropriate diet    Signed by: Andree Casey RN

## 2020-02-03 NOTE — PROGRESS NOTES
Inpatient Rehabilitation Plan of Care Note    Plan of Care  Care Plan Reviewed - No updates at this time.    Psychosocial    Performed Intervention(s)  Verbalize needs and concerns      Safety    Performed Intervention(s)  Bed alarm, wc alarm  Items within reach  Safety rounds      Sphincter Control    Performed Intervention(s)  Monitor intake and output  Encourage appropriate diet      Body Systems    Performed Intervention(s)  Daily skin inspection  Dressing change as ordered    Signed by: Brianne Horton RN

## 2020-02-03 NOTE — PROGRESS NOTES
LOS: 10 days   Patient Care Team:  Armando Valentine MD as PCP - General (Internal Medicine)  Lisa Arriaza MD as Consulting Physician (Obstetrics and Gynecology)    Chief Complaint: GBS    Subjective     History of Present Illness   Strength and endurance about same.  Still weak in hands and legs.   Ostomy output 1200 mL overnight.  She had burning from the iron infusion and does not wish to continue with the, even if different IV site placed more proximally.      History taken from: patient chart family    Objective     Vital Signs  Temp:  [97.6 °F (36.4 °C)-98.2 °F (36.8 °C)] 97.6 °F (36.4 °C)  Heart Rate:  [78-91] 78  Resp:  [18] 18  BP: (100-107)/(52-64) 100/59    Physical Exam:  General: Awake, alert, NAD  HEENT: normocepahlic, atraumatic, EOMI bilaterally   Heart: RRR, no m/r/g  Lungs: CTAB, no wheezing, rales, or rhonchi  Abdomen: soft, non-tender, non-distended, colostomy , incision dressed  Strength: BUE: 4/5 with bilateral elbow flexion, elbow extension, wrist extension, and finger flexion, right greater than left weak hand intrinsics R 3-/5, L 4-/5       BLE: 3+/5 HF, 4/5 with knee extension and ankle dorsiflexion    Results Review:     I reviewed the patient's new clinical results.  Results from last 7 days   Lab Units 02/03/20  0623 02/01/20  0509 01/31/20  0618   WBC 10*3/mm3 7.05 5.64 5.56   HEMOGLOBIN g/dL 9.5* 8.8* 7.2*   HEMATOCRIT % 29.1* 27.5* 23.1*   PLATELETS 10*3/mm3 602* 647* 613*     Results from last 7 days   Lab Units 02/03/20  0623 01/31/20  0618 01/30/20  0554   SODIUM mmol/L 138 133* 133*   POTASSIUM mmol/L 4.4 4.9 4.5   CHLORIDE mmol/L 105 101 100   CO2 mmol/L 18.2* 19.4* 19.2*   BUN mg/dL 12 17 20   CREATININE mg/dL 0.89 0.81 0.90   CALCIUM mg/dL 8.9 9.0 9.0   GLUCOSE mg/dL 109* 109* 105*       Medication Review:   Scheduled Meds:    gabapentin 300 mg Oral 4x Daily   iron sucrose 200 mg Intravenous Q24H   loperamide 2 mg Oral TID AC   MULTIVITAMIN ADULT 1 tablet  Sublingual Daily   pantoprazole 40 mg Oral Q AM   sodium bicarbonate 650 mg Oral BID   sodium chloride 1 g Oral BID With Meals   Cyanocobalamin 2,500 mcg Sublingual Weekly   Vitamin D-3 5,000 Units Sublingual Daily     Continuous Infusions:   PRN Meds:.•  aluminum sulfate-calcium acetate  •  gabapentin **AND** gabapentin  •  miconazole    Assessment/Plan   73 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     GBS  -Completed her plasmapharesis and has gotten good return.   - Will begin PT/OT for ADLS, strength, mobility, txs, gait.   -January 27: On gabapentin 300mg QID. Will continue to monitor and will titrate up as needed.  -Jan 29 -  Feels strength is somewhat better.  Worked on gait with rolling walker yesterday 15 feet. Today utilized ankle weights to decrease ataxic movements with gait. Transfers mod max assist. Bed mobility CTG.    Crohn's  -s/p recent colostomy- wound nurse to see and education for home management.   -If more than 1 liter output a day, needs Immodium-per Dr. Albrecht colorectal surgeon.     Hyponatremia  -On salt tabs and fluid restriction. Renal following  -January 27: Na 130. Continue salt tabs and fluid restriction per renal. Will continue to monitor  -January 30: Na 133. Continue sodium bicarbonate and fluid restriction per renal.  -Feb 33- Na improved to 138    Anemia  -January 27: Hgb/Hct 7.6/24.3- stable. No signs of active bleeding. Will order fecal occult and reticulocyte count. Will continue to monitor.  -Jan 28 - hemoccult positive  -Jan 29 - Stool heme +.  HGB stable at 7.5. She had hypotension and tachycardia yesterday 87/64 and 110) , better today (99/63 and 88).  Reviewed option of transfusion PRBCs. She wished to hold on transfusion unless absolutely necessary. Discussed if HGB < 7, would recommend definitely transfuse.  -January 30: Repeat CBC scheduled for tomorrow. Will continue to monitor.    -January 31: Hgb 7.2. Will transfuse 1 unit of PRBCs today. Ordered anemia  studies. Spoke with Dr. Albrecht and if iron supplementation is required she recommends IV iron for better absorption.  -February 1: Hemoglobin improved 8.8.  IV iron infusion ordered by nephrology  -Feb 3 - HGB improved 9.5. Not tolerate IV iron infusion due to burning in vein, does not wish to have placed a more proximal IV. She had tow infusions. She will get EZ Melt Iron from outside to take by mouth; on discussion with colorectal surgery last week she should be able to absorb PO iron.    DVT prophylaxis  -SCDS for now.   -January 27: Heparin has been on hold due to HGB trending down. Following results of fecal occult and reticulocyte count will consider restarting Heparin for DVT prophylaxis.   -January 28 - hemoccult positive.     Vitamin D deficiency-vitamin D 22.4  on January 29.    Feb 1 - Patient does not wish to take vitamin D p.o. through the hospital supply.  Wishes to have her vitamin D brought in from home.    Vitamin B12 replacement-sublingual from home    RUE antecubital fossa phlebitis - no sign of infection. K-PAD.       TEAM CONF - JAN 28 - FLAT AFFECT. SUPINE SIT MIN ASSIST. TRANSFERS MAX 2. NONAMBULATORY. UBB MIN. LBB MAX. UBD MOD.  LBD DEP. ABD WOUND CARE. STOOL HEMOCCULT POSITIVE.  ELOS - 5 WEEKS.     Adolfo Valle MD  02/03/20  10:46 AM

## 2020-02-03 NOTE — PLAN OF CARE
Problem: Patient Care Overview  Goal: Plan of Care Review  Outcome: Ongoing (interventions implemented as appropriate)  Flowsheets (Taken 2/3/2020 4494)  Outcome Summary: pt alert and oriented. c/o n/t, burning bilat hands. numbness to BLE. ileostomy with moderate output. continent bladder. no safety issues. worked with therapies. refusing current dose of IV iron.  to bring in iron supplement. MD aware. IV SL remains in. heating pad ordered for R AC phlebitis. will continue to monitor.  Progress, Functional Goals: demonstrating adequate progress  Plan of Care Reviewed With: patient  IRF Plan of Care Review: progress ongoing, continue

## 2020-02-03 NOTE — PROGRESS NOTES
Inpatient Rehabilitation Functional Measures Assessment and Plan of Care    Plan of Care  Updated Problems/Interventions  Mobility    [OT] Toilet Transfers(Active)  Current Status(02/03/2020): MOD  Weekly Goal(02/10/2020): MIN  Discharge Goal: MIN/CGA    [OT] Tub/Shower Transfers(Active)  Current Status(02/03/2020): TBA  Weekly Goal(02/10/2020): MOD  Discharge Goal: MIN        Self Care    [OT] Bathing(Active)  Current Status(02/03/2020): CGA UB, MOD LB  Weekly Goal(02/10/2020): set up UB, MOD/MIN LB  Discharge Goal: MIN    [OT] Dressing (Lower)(Active)  Current Status(02/03/2020): dep  Weekly Goal(02/10/2020): Max  Discharge Goal: min/mod    [OT] Dressing (Upper)(Active)  Current Status(02/03/2020): Mod  Weekly Goal(02/10/2020): Min  Discharge Goal: SBA    [OT] Grooming(Active)  Current Status(02/03/2020): MIN  Weekly Goal(02/10/2020): SBA  Discharge Goal: SBA    [OT] Toileting(Active)  Current Status(02/03/2020): dep  Weekly Goal(02/10/2020): Max  Discharge Goal: Min/Mod    [OT] Eating(Active)  Current Status(02/03/2020): min /set up has built up utensils  Weekly Goal(02/10/2020): setup  Discharge Goal: set up    Functional Measures  ANKUR Eating:  Branch  ANKUR Grooming: Branch  ANKUR Bathing:  Branch  ANKUR Upper Body Dressing:  Branch  ANKUR Lower Body Dressing:  Branch  ANKUR Toileting:  Branch    ANKUR Bladder Management  Level of Assistance:  Branch  Frequency/Number of Accidents this Shift:  Branch    ANKUR Bowel Management  Level of Assistance: Branch  Frequency/Number of Accidents this Shift: Branch    ANKUR Bed/Chair/Wheelchair Transfer:  Branch  ANKUR Toilet Transfer:  Branch  ANKUR Tub/Shower Transfer:  Branch    Previously Documented Mode of Locomotion at Discharge: Field  ANKUR Expected Mode of Locomotion at Discharge: Branch  ANKUR Walk/Wheelchair:  Branch  ANKUR Stairs:  Branch    ANKUR Comprehension:  Branch  ANKUR Expression:  Branch  ANKUR Social Interaction:  Branch  ANKUR Problem Solving:  Branch  ANKUR Memory:  Branch    Therapy  Mode Minutes  Occupational Therapy: Branch  Physical Therapy: Branch  Speech Language Pathology:  Branch    Signed by: Reina Perera OT

## 2020-02-03 NOTE — NURSING NOTE
02/03/20 1528   Ileostomy RUQ   Placement Date/Time: 12/15/19 1407   Inserted by: DR PRAKASH  Location: RUQ   Stomal Appliance 1 piece;Intact;Changed   Stoma Appearance round;moist;red   Peristomal Assessment Intact   Accessories/Skin Care convex wafer;cleansed with water;skin barrier ring   Stoma Function flatus;stool   Stool Color brown, light   Stool Consistency loose   Treatment Bag change   Output (mL) 100 mL   CWOCN for ostomy pouch change. Brittani convex one piece working well.  Dressing changed to midline abdominal wound with wound gel.  Progressive healing.

## 2020-02-03 NOTE — PLAN OF CARE
Problem: Fall Risk (Adult)  Goal: Absence of Fall  Outcome: Ongoing (interventions implemented as appropriate)     Problem: Ileostomy (Adult)  Goal: Signs and Symptoms of Listed Potential Problems Will be Absent, Minimized or Managed (Ileostomy)  Outcome: Ongoing (interventions implemented as appropriate)     Problem: Patient Care Overview  Goal: Coping Plan  Outcome: Ongoing (interventions implemented as appropriate)     Problem: Pain, Chronic (Adult)  Goal: Acceptable Pain/Comfort Level and Functional Ability  Outcome: Ongoing (interventions implemented as appropriate)       Pt A/Ox4. Pt continent of urine overnight using bedpan. Ileostomy producing large amount (1200ml) of loose, light brown stool overnight. Appliance intact, stoma red, moist. Pt c/o tingling, burning pain in hands; scheduled and prn gabapentin given. Midline abd dressing c/d/I. Pt refused flushing of R hand peripheral IV. Meds given one at a time with water (needs asst holding water). No acute events overnight.

## 2020-02-03 NOTE — PROGRESS NOTES
Occupational Therapy: Branch    Physical Therapy: Individual: 90 minutes.    Speech Language Pathology:  Branch    Signed by: Jaxon Atkinson PT Student     - CoSigned By: Chelsie Cline PT 2/3/2020 3:23:32 PM

## 2020-02-03 NOTE — PROGRESS NOTES
"                                                                                        [facility] Follow up Note        PATIENT IDENTIFICATION     Name: She López  Age: 74 y.o.  Sex: female  :  1945  MRN: [id number]       CHIEF COMPLIANTS / REASON FOR FOLLOW UP          Hyponatremia,metabolic acidosis.      Subjective:          Pt is a 74 yrs old white female s/p colostomy previously,recent admission to [facility] for gbs treated with 5 sessions of plasmapheresis,our group followed for plasmaperesis treatment that was completed uneventfully.Pt also noted with hyponatremia,controlled with oral salt.we will follow for ongoing hyponatremia.    20:improving with pt.walked 4 steps today.  2020: Motor strength continue to improve.  20:seen in pt.  2/3/20:feels better.     Review of Systems:          Constitutional: No fever, no chills, no lethargy, no weakness.  HEENT:  No headache, otalgia, itchy eyes, nasal discharge or sore throat.  Cardiac:  No chest pain, dyspnea, orthopnea or PND.  Chest:  No cough, phlegm or wheezing.  Abdomen:  No abdominal pain, nausea or vomiting.  Neuro:  No focal weakness, abnormal movements or seizure-like activity.  :   No hematuria, no pyuria, no dysuria, no flank pain.  Extremities:  No  joint pains.  ROS was otherwise negative except as mentioned in the [place].        OBJECTIVE                                                                        Exam:  /59 (BP Location: Right arm, Patient Position: Lying)   Pulse 78   Temp 97.6 °F (36.4 °C) (Oral)   Resp 18   Ht 165.1 cm (65\")   Wt 58.1 kg (128 lb)   LMP  (LMP Unknown)   SpO2 100%   BMI 21.30 kg/m²   Intake/Output last 3 shifts:  I/O last 3 completed shifts:  In: 1240 [P.O.:1140; IV Piggyback:100]  Out: 3900 [Urine:975; Stool:2925]  Intake/Output this shift:  No intake/output data recorded.    General Appearance:  Alert, cooperative, no distress, appears stated " age  Head:  Normocephalic, without obvious abnormality, atraumatic  Eyes:  Sclerae anicteric, EOM's intact     Neck:  Supple,  no adenopathy;      Lungs:   Clear to auscultation bilaterally, respirations unlabored  Heart:  Regular rate and rhythm, S1 and S2 normal, no  rub   or gallop  Abdomen:  Soft, nontender,    no masses, no hepatomegaly, no splenomegaly  Extremities:  Extremities normal, trace edema  Neurologic:   Alert and oriented, no focal deficits    Scheduled Meds:    gabapentin 300 mg Oral 4x Daily   iron sucrose 200 mg Intravenous Q24H   loperamide 2 mg Oral TID AC   MULTIVITAMIN ADULT 1 tablet Sublingual Daily   pantoprazole 40 mg Oral Q AM   sodium bicarbonate 650 mg Oral BID   sodium chloride 1 g Oral BID With Meals   Cyanocobalamin 2,500 mcg Sublingual Weekly   Vitamin D-3 5,000 Units Sublingual Daily     Continuous Infusions:   PRN Meds:•  aluminum sulfate-calcium acetate  •  gabapentin **AND** gabapentin  •  miconazole         Data Review:                                                                           CBC:   Results from last 7 days   Lab Units 02/03/20  0623   WBC 10*3/mm3 7.05   RBC 10*6/mm3 3.26*     BMP:   Results from last 7 days   Lab Units 02/03/20  0623   GLUCOSE mg/dL 109*   CO2 mmol/L 18.2*   BUN mg/dL 12   CREATININE mg/dL 0.89   CALCIUM mg/dL 8.9     ABGs:       Invalid input(s): PO2       Imaging:                                                                                         ASSESSMENT:                                                                                Guillain Barré syndrome (CMS/HCC)       Hyponatremia, secondary to increased/high ADH state    Gillan barre syndrome.    crohns disease/s/p colostomy.    Hyperlipidemia.    Skin cancer.    Non aniongap metabolic acidosis:secondary to gi losses.    Iron deficiency    Anemia.    Vit d deficiency.  ·       PLAN:                                                                            ·   Sodium level  acceptable.  Continue sodium bicarbonate for sodium and acidosis.  Discontinue any fluid restriction .  Defer transfusion to medicine.  Fecal occult blood positive  Might need GI work-up   Will follow.           Kalen Mao MD  [facility]  2/3/2020  8:45 AM

## 2020-02-04 PROCEDURE — 97530 THERAPEUTIC ACTIVITIES: CPT

## 2020-02-04 PROCEDURE — 97110 THERAPEUTIC EXERCISES: CPT

## 2020-02-04 PROCEDURE — 97112 NEUROMUSCULAR REEDUCATION: CPT

## 2020-02-04 PROCEDURE — 97535 SELF CARE MNGMENT TRAINING: CPT

## 2020-02-04 PROCEDURE — 97116 GAIT TRAINING THERAPY: CPT

## 2020-02-04 RX ORDER — DOXYCYCLINE 100 MG/1
100 CAPSULE ORAL EVERY 12 HOURS SCHEDULED
Status: COMPLETED | OUTPATIENT
Start: 2020-02-04 | End: 2020-02-10

## 2020-02-04 RX ADMIN — GABAPENTIN 300 MG: 300 CAPSULE ORAL at 21:58

## 2020-02-04 RX ADMIN — SODIUM BICARBONATE 650 MG: 650 TABLET ORAL at 08:18

## 2020-02-04 RX ADMIN — Medication 1 TABLET: at 08:18

## 2020-02-04 RX ADMIN — LOPERAMIDE HYDROCHLORIDE 2 MG: 2 CAPSULE ORAL at 06:13

## 2020-02-04 RX ADMIN — LOPERAMIDE HYDROCHLORIDE 2 MG: 2 CAPSULE ORAL at 17:56

## 2020-02-04 RX ADMIN — GABAPENTIN 300 MG: 300 CAPSULE ORAL at 08:18

## 2020-02-04 RX ADMIN — GABAPENTIN 300 MG: 300 CAPSULE ORAL at 17:56

## 2020-02-04 RX ADMIN — CHOLECALCIFEROL TAB 125 MCG (5000 UNIT) 5000 UNITS: 125 TAB at 08:18

## 2020-02-04 RX ADMIN — PANTOPRAZOLE SODIUM 40 MG: 40 TABLET, DELAYED RELEASE ORAL at 06:13

## 2020-02-04 RX ADMIN — GABAPENTIN 300 MG: 300 CAPSULE ORAL at 12:22

## 2020-02-04 RX ADMIN — GABAPENTIN 300 MG: 300 CAPSULE ORAL at 03:02

## 2020-02-04 RX ADMIN — DOXYCYCLINE 100 MG: 100 CAPSULE ORAL at 21:58

## 2020-02-04 RX ADMIN — LOPERAMIDE HYDROCHLORIDE 2 MG: 2 CAPSULE ORAL at 12:22

## 2020-02-04 RX ADMIN — SODIUM CHLORIDE TAB 1 GM 1 G: 1 TAB at 08:18

## 2020-02-04 RX ADMIN — SODIUM BICARBONATE 650 MG: 650 TABLET ORAL at 21:58

## 2020-02-04 RX ADMIN — DOXYCYCLINE 100 MG: 100 CAPSULE ORAL at 15:02

## 2020-02-04 RX ADMIN — SODIUM CHLORIDE TAB 1 GM 1 G: 1 TAB at 17:56

## 2020-02-04 NOTE — PROGRESS NOTES
Inpatient Rehabilitation Plan of Care Note    Plan of Care  Care Plan Reviewed - Updates as Follows    Psychosocial    Performed Intervention(s)  Verbalize needs and concerns      Safety    Performed Intervention(s)  Bed alarm, wc alarm  Items within reach  Safety rounds      Sphincter Control    Performed Intervention(s)  Monitor intake and output  Encourage appropriate diet      Body Systems    Performed Intervention(s)  Daily skin inspection  Dressing change as ordered    Signed by: Andree Casey RN

## 2020-02-04 NOTE — PROGRESS NOTES
Right antecubital fossa phlebitis with cord palpable/tender with surrounding erythema consistent with cellulitis. Allergy to Keflex - throat swelled. Will start Doxycycline 100 mg bid x 7 days, continue K-pad.

## 2020-02-04 NOTE — PROGRESS NOTES
LOS: 11 days   Patient Care Team:  Armando Valentine MD as PCP - General (Internal Medicine)  Lisa Arriaza MD as Consulting Physician (Obstetrics and Gynecology)    Chief Complaint: GBS    Subjective     History of Present Illness     Distal weakness unchanged in the upper extremities and continues with impaired coordination in the hands.  She notes that coordination is variable, felt she did worse with her hand activities today but then did more aggressive activities with her mobility today and felt that that went better.  She complains of pain and swelling where she had an IV at the right anterior elbow.  She tried K pad but more red today.  Tenderness to palpation.      History taken from: patient chart family    Objective     Vital Signs  Temp:  [97.4 °F (36.3 °C)-98.4 °F (36.9 °C)] 98.1 °F (36.7 °C)  Heart Rate:  [87-90] 87  Resp:  [18] 18  BP: (105-110)/(61-69) 110/61    Physical Exam:  General: Awake, alert, NAD  HEENT: normocepahlic, atraumatic, EOMI bilaterally   Heart: RRR, no m/r/g  Lungs: CTAB, no wheezing, rales, or rhonchi  Abdomen: soft, non-tender, non-distended, colostomy , incision dressed  Strength: BUE: 4/5 with bilateral elbow flexion, elbow extension, wrist extension, and finger flexion, right greater than left weak hand intrinsics R 3-/5, L 4-/5       BLE: 3+/5 HF, 4/5 with knee extension and ankle dorsiflexion  Extremities: Right antecubital fossa with erythema and an area of induration/cord along the vein with surrounding erythema.  No drainage.    Results Review:     I reviewed the patient's new clinical results.  Results from last 7 days   Lab Units 02/03/20  0623 02/01/20  0509 01/31/20  0618   WBC 10*3/mm3 7.05 5.64 5.56   HEMOGLOBIN g/dL 9.5* 8.8* 7.2*   HEMATOCRIT % 29.1* 27.5* 23.1*   PLATELETS 10*3/mm3 602* 647* 613*     Results from last 7 days   Lab Units 02/03/20  0623 01/31/20  0618 01/30/20  0554   SODIUM mmol/L 138 133* 133*   POTASSIUM mmol/L 4.4 4.9 4.5    CHLORIDE mmol/L 105 101 100   CO2 mmol/L 18.2* 19.4* 19.2*   BUN mg/dL 12 17 20   CREATININE mg/dL 0.89 0.81 0.90   CALCIUM mg/dL 8.9 9.0 9.0   GLUCOSE mg/dL 109* 109* 105*       Medication Review:   Scheduled Meds:    gabapentin 300 mg Oral 4x Daily   iron sucrose 200 mg Intravenous Q24H   loperamide 2 mg Oral TID AC   MULTIVITAMIN ADULT 1 tablet Sublingual Daily   pantoprazole 40 mg Oral Q AM   sodium bicarbonate 650 mg Oral BID   sodium chloride 1 g Oral BID With Meals   Cyanocobalamin 2,500 mcg Sublingual Weekly   Vitamin D-3 5,000 Units Sublingual Daily     Continuous Infusions:   PRN Meds:.•  aluminum sulfate-calcium acetate  •  gabapentin **AND** gabapentin  •  miconazole    Assessment/Plan   75 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     GBS  -Completed her plasmapharesis and has gotten good return.   - Will begin PT/OT for ADLS, strength, mobility, txs, gait.   -January 27: On gabapentin 300mg QID. Will continue to monitor and will titrate up as needed.  -Jan 29 -  Feels strength is somewhat better.  Worked on gait with rolling walker yesterday 15 feet. Today utilized ankle weights to decrease ataxic movements with gait. Transfers mod max assist. Bed mobility CTG.    Crohn's  -s/p recent colostomy- wound nurse to see and education for home management.   -If more than 1 liter output a day, needs Immodium-per Dr. Albrecht colorectal surgeon.     Hyponatremia  -On salt tabs and fluid restriction. Renal following  -January 27: Na 130. Continue salt tabs and fluid restriction per renal. Will continue to monitor  -January 30: Na 133. Continue sodium bicarbonate and fluid restriction per renal.  -Feb 3- Na improved to 138  -February 4-fluid restriction discontinued.    Anemia  -January 27: Hgb/Hct 7.6/24.3- stable. No signs of active bleeding. Will order fecal occult and reticulocyte count. Will continue to monitor.  -Jan 28 - hemoccult positive  -Jan 29 - Stool heme +.  HGB stable at 7.5. She had  hypotension and tachycardia yesterday 87/64 and 110) , better today (99/63 and 88).  Reviewed option of transfusion PRBCs. She wished to hold on transfusion unless absolutely necessary. Discussed if HGB < 7, would recommend definitely transfuse.  -January 30: Repeat CBC scheduled for tomorrow. Will continue to monitor.    -January 31: Hgb 7.2. Will transfuse 1 unit of PRBCs today. Ordered anemia studies. Spoke with Dr. Albrecht and if iron supplementation is required she recommends IV iron for better absorption.  -February 1: Hemoglobin improved 8.8.  IV iron infusion ordered by nephrology  -Feb 3 - HGB improved 9.5. Not tolerate IV iron infusion due to burning in vein, does not wish to have placed a more proximal IV. She had tow infusions. She will get EZ Melt Iron from outside to take by mouth; on discussion with colorectal surgery last week she should be able to absorb PO iron.    DVT prophylaxis  -SCDS for now.   -January 27: Heparin has been on hold due to HGB trending down. Following results of fecal occult and reticulocyte count will consider restarting Heparin for DVT prophylaxis.   -January 28 - hemoccult positive.     Vitamin D deficiency-vitamin D 22.4  on January 29.    Feb 1 - Patient does not wish to take vitamin D p.o. through the hospital supply.  Wishes to have her vitamin D brought in from home.    Vitamin B12 replacement-sublingual from home    RUE antecubital fossa phlebitis/cellulitis-February 3-no sign of infection. K-PAD.   February 4-Right antecubital fossa phlebitis with cord palpable/tender with surrounding erythema consistent with cellulitis. Allergy to Keflex - throat swelled. Will start Doxycycline 100 mg bid x 7 days, continue K-pad.     TEAM CONF - JAN 28 - FLAT AFFECT. SUPINE SIT MIN ASSIST. TRANSFERS MAX 2. NONAMBULATORY. UBB MIN. LBB MAX. UBD MOD.  LBD DEP. ABD WOUND CARE. STOOL HEMOCCULT POSITIVE.  ELOS - 5 WEEKS.     TEAM CONF - FEB 4 - BED SBA. TRASNFERS MIN-MOD. GAIT 30 FEET MIN 2  FIDEL WALKER. UBD MOD. LBD DEP. ON 1200 CC FLUID RESTRICTION. EATING OKAY.  ABD WOUND CLOSING. OSTOMY DEVICE STAYING ON.  CONTINENT. NEEDS TO GET UP TO BSC.   ELOS- 4 WEEKS.     Adolfo Valle MD  02/04/20  7:35 AM

## 2020-02-04 NOTE — PROGRESS NOTES
Occupational Therapy: Branch    Physical Therapy: Individual: 90 minutes.    Speech Language Pathology:  Branch    Signed by: Jaxon Atkinson PT Student     - CoSigned By: Chelsie Cline PT 2/4/2020 3:23:01 PM

## 2020-02-04 NOTE — PROGRESS NOTES
Case Management  Inpatient Rehabilitation Team Conference    Conference Date/Time: 2/4/2020 7:32:07 AM    Team Conference Attendees:  Dr. Adolfo Johnson, Pharmacist  Kellee Delcid, MSSW  Chelsie Cline, PT  Reina Perera, OT  Isa Stoner, SLP  Isa Walter, CTRS  Miguel Angel Mcdowell, RN  Andree Casey, RN  Chaplain Thelma    Demographics            Age: 74Y            Gender: Female    Admission Date: 1/24/2020 5:58:20 PM  Rehabilitation Diagnosis:  GBS  Past Medical History: PMH  Crohn's s/p recent colostomy  Carpal tunnel  Hyperlipidemia  Cervical stenosis s/p ACDF  Barett's esophagus  Skin cancer  Hiatal hernia  RSD upper limb  Vit B-12 and D def  ?  ?  ?  Past Surgical History:  ProcedureLateralityDate  ?CARPAL TUNNEL [id number]/2012  ?CERVICAL FUSIONN/A01/25/2012  ?C- 5,6,7 fused, DR. MISAEL NORTH AT [place]  ?CHOLECYSTECTOMYN/A04/12/2018  ?LAPAROSCOPIC, DR. VENICE SALDAÑA AT [place]  ?COLON RESECTIONN/A12/9/2019  ?Procedure: laparoscopic to open right hemicolectomy; ?Surgeon: Dorcas Albrecht MD; ?Location: [facility] MAIN OR; ?Service: General  ?COLON RESECTIONN/A12/20/2019  ?Procedure: EXPLORATORY LAPAROTOMY WITH SMALL BOWEL RESECTION; ?Surgeon: Dorcas Albrecht MD; ?Location: [facility] MAIN OR; ?Service: General  ?COLONOSCOPYN/A8/16/2019  ?ANAL SKIN TAGS, ANASTAMOSIS ULCERATED, INDURATED, AND NODULAR, INTERNAL  HEMORRHOIDS, ANASTAMOSIS STRICTURE, DR. YANG BIRD AT [place]  ?COLONOSCOPYN/A04/20/2010  ?PROCTITIS, RECTAL STENOSIS, ACTIVE CROHNS DISEASE AT [facility], RIGHT COLON,  DR.GERARD AGRAWAL AT [place]  ?ENDOSCOPYN/A8/16/2019  ?GRADE A ESOPHAGITIS, SLIDING HIATAL HERNIA, EROSIVE GASTRITIS, Z LINE  IRREGULAR, DR. YANG BIRD AT [facility]  ?EXPLORATORY LAPAROTOMYN/A12/15/2019  ?Procedure: LAPAROTOMY EXPLORATORY AND WASHOUT, RESECTION OF ANASTOMOSIS WITH  END ILEOSTOMY; ?Surgeon: Dorcas Albrecht MD; ?Location: [facility] MAIN OR;  ?Service: General  ?EYE SURGERY??  ?SMALL INTESTINE  SURGERYN/A10/01/2001  ?ILEOCOLECTOMY      Plan of Care  Anticipated Discharge Date/Estimated Length of Stay: ELOS: 5 - 6 weeks  Anticipated Discharge Destination: Community discharge with assistance  Discharge Plan : Pt lives with her  in a one story home with 3 steps to  enter. Pt's  is retired and can provide 24 hour assist if needed.  Medical Necessity Expected Level Rationale: MIN/MOD with home health therapies  Intensity and Duration: an average of 3 hours/5 days per week  Medical Supervision and 24 Hour Rehab Nursing: x  Physical Therapy: x  PT Intensity/Duration: 90 minutes/day, 5 days/week for approximately 15 - 20  days  Occupational Therapy: x  OT Intensity/Duration: 90 minutes/day, 5 days/week for approximately 15 - 20  days  Social Work: x  Therapeutic Recreation: x  Updated (if changes indicated)    Anticipated Discharge Date/Estimated Length of Stay:   ELOS: 4 weeks    Based on the patient's medical and functional status, their prognosis and  expected level of functional improvement is: MIN/MOD with home health therapies      Interdisciplinary Problem/Goals/Status    All Rehab Problems:  Body Systems    [RN] Integumentary(Active)  Current Status(02/04/2020): Abdominal incision with dressing. intact ileostomy  bag in place. both changed per WOCN  Weekly Goal(02/04/2020): No S&S infection noted  Discharge Goal: No S&S infection noted        Mobility    [OT] Toilet Transfers(Active)  Current Status(02/03/2020): MOD  Weekly Goal(02/10/2020): MIN  Discharge Goal: MIN/CGA    [OT] Tub/Shower Transfers(Active)  Current Status(02/03/2020): TBA  Weekly Goal(02/10/2020): MOD  Discharge Goal: MIN    [PT] Bed Mobility(Active)  Current Status(02/03/2020): SBA  Weekly Goal(02/10/2020): SUP  Discharge Goal: SUP    [PT] Bed/Chair/Wheelchair(Active)  Current Status(02/03/2020): min squat pivot/ mod stand pivo t  Weekly Goal(02/10/2020): min  Discharge Goal: CGA    [PT] Walk(Active)  Current  Status(02/03/2020): 30', minx2, rolling platform wx; poor proprioception  BLE  Weekly Goal(02/11/2020): PT  Discharge Goal: 60, min, rwx        Psychosocial    [RN] Coping/Adjustment(Active)  Current Status(02/04/2020): Supportive family,  very helpful and assists  patient with care.  Weekly Goal(02/04/2020): Identify progress in functional status  Discharge Goal: Demonstrate healthy coping strategies        Safety    [RN] Potential for Injury(Active)  Current Status(02/04/2020): No unsafe behaviors  Weekly Goal(02/04/2020): Use call light 100%  Discharge Goal: Pt/family aware of risk of fall and safety in the home setting        Self Care    [OT] Bathing(Active)  Current Status(02/03/2020): CGA UB, MOD LB  Weekly Goal(02/10/2020): set up UB, MOD/MIN LB  Discharge Goal: MIN    [OT] Dressing (Lower)(Active)  Current Status(02/03/2020): dep  Weekly Goal(02/10/2020): Max  Discharge Goal: min/mod    [OT] Dressing (Upper)(Active)  Current Status(02/03/2020): Mod  Weekly Goal(02/10/2020): Min  Discharge Goal: SBA    [OT] Grooming(Active)  Current Status(02/03/2020): MIN  Weekly Goal(02/10/2020): SBA  Discharge Goal: SBA    [OT] Toileting(Active)  Current Status(02/03/2020): dep  Weekly Goal(02/10/2020): Max  Discharge Goal: Min/Mod    [OT] Eating(Active)  Current Status(02/03/2020): min /set up has built up utensils  Weekly Goal(02/10/2020): setup  Discharge Goal: set up        Sphincter Control    [RN] Bladder Management(Active)  Current Status(02/03/2020): pt has been continent, using bedpan. does wear  brief.  Weekly Goal(02/04/2020): Continent bladder 100%  Discharge Goal: Continent bladder 100%    [RN] Bowel Management(Active)  Current Status(02/03/2020): Has ileostomy since 12/20/19.  aware of care  for ileostomy.  Weekly Goal(02/04/2020): maintain education of ileostomy with pt and family  Discharge Goal: Independent with ileostomy care        Comments: 1/28: chronic anemia, heme + stool, GI aware;  anticipate assessing amb  today;    2/4: flat affect; ileostomy bag staying in place, may change schedule to Mon and  Thurs; NSG to reinforce use of bedside commode/toilet vs bedpan;    Signed by: Miguel Angel Mcdowell RN    Physician CoSigned By: Adolfo Valle 02/04/2020 09:07:09

## 2020-02-04 NOTE — THERAPY TREATMENT NOTE
Inpatient Rehabilitation - Occupational Therapy Treatment Note    [facility]     Patient Name: She López  : 1945  MRN: [id number]    Today's Date: 2020  Onset of Illness/Injury or Date of Surgery: 20              Admit Date: 2020      Visit Dx:    ICD-10-CM ICD-9-CM   1. General weakness R53.1 780.79       Patient Active Problem List   Diagnosis   • Crohn's disease of small intestine with other complication (CMS/HCC)   • Type 2 diabetes mellitus without complication (CMS/HCC)   • RSD upper limb   • Neuropathic pain of hand   • Hyperlipidemia   • Cervical myelopathy (CMS/HCC)   • Central pain syndrome   • Carpal tunnel syndrome   • Vitamin B 12 deficiency   • Guillain Barré syndrome (CMS/HCC)         Therapy Treatment    IRF Treatment Summary     Row Name 20 1500 20 09          Evaluation/Treatment Time and Intent    Subjective Information  complains of;fatigue;pain pain in R elbow  -AF  complains of  (Pended)   -NM     Existing Precautions/Restrictions  fall  -AF  fall  (Pended)   -NM     Document Type  therapy note (daily note)  -AF  therapy note (daily note)  (Pended)   -NM     Mode of Treatment  occupational therapy  -AF  physical therapy  (Pended)   -NM     Patient/Family Observations  sitting up in w/c in AM, supine in bed in PM  present in both session  -AF  supine in bed in AM,  present  (Pended)   -NM     Recorded by [AF] Reina Perera, OTR [NM] Jaxon Atkinson PT Student     Row Name 20 1500 20 0900          Cognition/Psychosocial- PT/OT    Affect/Mental Status (Cognitive)  flat/blunted affect  -AF  flat/blunted affect  (Pended)   -NM     Orientation Status (Cognition)  oriented x 4  -AF  oriented x 4  (Pended)   -NM     Follows Commands (Cognition)  WFL  -AF  WFL  (Pended)   -NM     Personal Safety Interventions  fall prevention program maintained;gait belt;nonskid shoes/slippers when out of bed  -AF  fall prevention program  maintained;gait belt;supervised activity;nonskid shoes/slippers when out of bed  (Pended)   -NM     Safety Deficit (Cognitive)  insight into deficits/self awareness  -AF  insight into deficits/self awareness  (Pended)   -NM     Recorded by [AF] Reina Perera OTR [NM] Jaxon Atkinson, PT Student     Row Name 02/04/20 0900             Mobility    Functional Position Change  scooting in long sitting  (Pended)   -NM      Additional Documentation  Functional Position Change (Row)  (Pended)   -NM      Recorded by [NM] Jaxon Atkinson, PT Student      Row Name 02/04/20 1500 02/04/20 0900          Bed Mobility Assessment/Treatment    Rolling Left [place] (Bed Mobility)  --  supervision;verbal cues;nonverbal cues (demo/gesture)  (Pended)   -NM     Rolling Right [place] (Bed Mobility)  --  supervision;verbal cues;nonverbal cues (demo/gesture)  (Pended)   -NM     Supine-Sit [place] (Bed Mobility)  supervision  -AF  supervision;verbal cues;nonverbal cues (demo/gesture)  (Pended)   -NM     Sit-Supine [place] (Bed Mobility)  supervision  -AF  supervision;verbal cues;nonverbal cues (demo/gesture)  (Pended)   -NM     Bed Mobility, Safety Issues  --  decreased use of arms for pushing/pulling;decreased use of legs for bridging/pushing;impaired trunk control for bed mobility  (Pended)   -NM     Assistive Device (Bed Mobility)  bed rails  -AF  bed rails  (Pended)   -NM     Recorded by [AF] Reina Perera, ALPESH [NM] Jaxon Atkinson, PT Student     Row Name 02/04/20 0900             Functional Mobility    Functional Mobility- Comment  deferred gait training in the PM due to increased need for assist c sit to stand and loss of balance when attmepting to take the first step this PM; platform rolling walker; modA x 2   (Pended)   -NM      Recorded by [NM] Jaxon Atkinson, PT Student      Row Name 02/04/20 1500             Transfer Assessment/Treatment    Comment (Transfers)  w/c <> EOM MOD A   -AF       Recorded by [AF] Reina Perera, OTR      Row Name 02/04/20 0900             Bed-Chair Transfer    Bed-Chair [place] (Transfers)  moderate assist (50% patient effort);verbal cues;nonverbal cues (demo/gesture)  (Pended)   -NM      Assistive Device (Bed-Chair Transfers)  wheelchair  (Pended)   -NM      Recorded by [NM] Jaxon Atkinson, PT Student      Row Name 02/04/20 1500 02/04/20 0900          Chair-Bed Transfer    Chair-Bed [place] (Transfers)  minimum assist (75% patient effort);moderate assist (50% patient effort);verbal cues  -AF  moderate assist (50% patient effort);verbal cues;nonverbal cues (demo/gesture)  (Pended)   -NM     Assistive Device (Chair-Bed Transfers)  wheelchair  -AF  wheelchair  (Pended)   -NM     Recorded by [AF] Reina Perera, OTR [NM] Jaxon Atkinson, PT Student     Row Name 02/04/20 1500 02/04/20 0900          Sit-Stand Transfer    Sit-Stand [place] (Transfers)  moderate assist (50% patient effort);minimum assist (75% patient effort);verbal cues  -AF  moderate assist (50% patient effort);2 person assist;verbal cues;nonverbal cues (demo/gesture)  (Pended)   -NM     Assistive Device (Sit-Stand Transfers)  wheelchair  -AF  wheelchair;walker, rolling platform  (Pended)   -NM     Recorded by [AF] Reina Perera, OTR [NM] Jaxon Atkinson, PT Student     Row Name 02/04/20 0900             Stand-Sit Transfer    Stand-Sit [place] (Transfers)  moderate assist (50% patient effort);2 person assist;verbal cues;nonverbal cues (demo/gesture)  (Pended)   -NM      Assistive Device (Stand-Sit Transfers)  wheelchair;walker, rolling platform  (Pended)   -NM      Recorded by [NM] Jaxon Atkinson, PT Student      Row Name 02/04/20 0900             Stand Pivot/Stand Step Transfer    Stand Pivot/Stand Step [place]  maximum assist (25% patient effort);verbal cues;nonverbal cues (demo/gesture)  (Pended)   -NM      Assistive Device (Stand Pivot Stand Step Transfer)  wheelchair   (Pended)   -NM      Recorded by [NM] Jaxon Atkinson, YUSUF Student      Row Name 02/04/20 1500             Toilet Transfer    Type (Toilet Transfer)  stand pivot/stand step;squat pivot  -AF      [place] Level (Toilet Transfer)  moderate assist (50% patient effort);verbal cues  -AF      Assistive Device (Toilet Transfer)  commode, bedside with drop arms;wheelchair red rail  -AF      Recorded by [AF] Reina Perera OTR      Row Name 02/04/20 0900             Gait/Stairs Assessment/Training    [place] Level (Gait)  moderate assist (50% patient effort);minimum assist (75% patient effort);2 person assist;verbal cues;nonverbal cues (demo/gesture)  (Pended)   -NM      Assistive Device (Gait)  walker, rolling platform  (Pended)  air casts B ankles   -NM      Distance in Feet (Gait)  30x2  (Pended)   -NM      Pattern (Gait)  step-through;step-to  (Pended)   -NM      Deviations/Abnormal Patterns (Gait)  bilateral deviations;ataxic;base of support, wide;gait speed decreased;stride length decreased  (Pended)   -NM      Bilateral Gait Deviations  forward flexed posture;heel strike decreased;weight shift ability decreased  (Pended)   -NM      Left Sided Gait Deviations  hip circumduction;weight shift ability decreased  (Pended)   -NM      Comment (Gait/Stairs)  req assist c controlling wx propulsion; L ankle inverting and supinating more this date durign swing and initial contact   (Pended)   -NM      Recorded by [NM] Jaxon Atkinson, YUSUF Student      Row Name 02/04/20 1500             Bathing Assessment/Treatment    Bathing [place] Level  upper body;contact guard assist;verbal cues  -AF      Bathing Position  sink side;supported sitting  -AF      Bathing Setup Assistance  obtain supplies  -AF      Recorded by [AF] Reina Perera OTR      Row Name 02/04/20 1500             Upper Body Dressing Assessment/Treatment    Upper Body Dressing Task  upper body dressing skills;minimum assist (75% or more patient  effort);pull over garment;moderate assist (50-74% patient effort);front opening garment  -AF      Upper Body Dressing Position  supported sitting  -AF      Set-up Assistance (Upper Body Dressing)  obtain clothing  -AF      Recorded by [AF] Reina Perera OTR      Row Name 02/04/20 1500             Lower Body Dressing Assessment/Treatment    Comment (Lower Body Dressing)  deferred  -AF      Recorded by [AF] Reina Perera OTR      Row Name 02/04/20 1500             Grooming Assessment/Treatment    Grooming [place] Level  grooming skills;set up;verbal cues  -AF      Assistive Device (Grooming)  built-up handle items  -AF      Grooming Position  sink side;supported sitting  -AF      Grooming Setup Assistance  obtain supplies  -AF      Recorded by [AF] Reina Perera OTR      Row Name 02/04/20 1500             Toileting Assessment/Treatment    Toileting [place] Level  toileting skills;dependent (less than 25% patient effort);verbal cues  -AF      Assistive Device Use (Toileting)  -- BSC  -AF      Toileting Position  unsupported sitting;supported standing  -AF      Comment (Toileting)  practiced with NSG  -AF      Recorded by [AF] Reina Perera OTR      Row Name 02/04/20 1500             Fine Motor Testing & Training    Comment, Fine Motor Coordination  FMC tasks with alternating B hands with placing large checkers on board, square wooden pegs remove/replace wtih MOD to MAX diffiuclty as task went on  -AF      Recorded by [AF] Reina Perera OTR      Row Name 02/04/20 1500 02/04/20 0900          Pain Scale: Numbers Pre/Post-Treatment    Pain Scale: Numbers, Pretreatment  0/10 - no pain  -AF  0/10 - no pain  (Pended)   -NM     Pain Scale: Numbers, Post-Treatment  0/10 - no pain  -AF  0/10 - no pain  (Pended)   -NM     Pre/Post Treatment Pain Comment  states that her R elbow is hurting because of the IV site   -AF  --     Recorded by [AF] Reina Perera, OTMENDEZ [NM] Jaxon Atkinson, PT  Student     Row Name 02/04/20 0900             Balance    Balance  --  (Pended)   -NM      Recorded by [NM] Jaxon Atkinson, PT Student      Row Name 02/04/20 1500             Static Sitting Balance    Level of [place] (Unsupported Sitting, Static Balance)  contact guard assist;standby assist  -AF      Sitting Position (Unsupported Sitting, Static Balance)  sitting edge of mat  -AF      Time Able to Maintain Position (Unsupported Sitting, Static Balance)  more than 5 minutes  -AF      Comment (Unsupported Sitting, Static Balance)  seated on EOM with UB exs and gross grasp BUE reaching tasks   -AF      Recorded by [AF] Reina Perera OTR      Row Name 02/04/20 0900             Sitting Balance Activity    Activities Performed (Sitting, Balance Training)  perturbations to movement in sitting;sitting on balance board, multi-directional weight shift  (Pended)  BUE diagonals, elevation, trunk rot c 1Kg medicine ball   -NM      Support Needed (Sitting, Balance Training)  SBA;CGA  (Pended)   -NM      Upper Extremity Activity with Device (Sitting, Balance Training)  small ball  (Pended)   -NM      Progressive Balance Activities (Sitting, Balance Training)  manual resistance applied during activity;upper extremity activities added during activity;base of support narrowed during activity  (Pended)   -NM      Comment (Sitting, Balance Training)  sitting on air disc at EOM, vc to narrow feet/SAMUEL; progressed to no UE supprt, narrow SAMUEL, removed foot contact c floor   (Pended)   -NM      Recorded by [NM] Jaxon Atkinson, PT Student      Row Name 02/04/20 0900             Standing Balance Activity    Support Needed for Balance (Standing, Balance Training)  minimal external support for balance, 75% patient effort;moderate external support for balance, 50-74% patient effort;2 person assist;uses both upper extremities for support  (Pended)   -NM      Comment (Standing, Balance Training)  standing inside // bars c BUE  support, 2 bouts x ~4-5 minutes. Assist needed to shift posteriorly to decrease forward leaning. manual assist c hand placement bilaterally. absent proprioception in B feet, used contact on post thighs against wc. performed 5 mini squats in // bars c assist blocking knees in flexion, ~20 degrees. Pt extended hips/knees c minx2 and tactile cues   (Pended)   present and assisting as needed/appropriate    -NM      Recorded by [NM] Jaxon Atkinson, PT Student      Row Name 02/04/20 0900             Therapeutic Exercise    Therapeutic Exercise  core strengthening  (Pended)   -NM      Recorded by [NM] Jaxon Atkinson, PT Student      Row Name 02/04/20 1500             Upper Extremity Seated Therapeutic Exercise    Performed, Seated Upper Extremity (Therapeutic Exercise)  forearm supination/pronation;scapular protraction/retraction;elbow flexion/extension  -AF      Device, Seated Upper Extremity (Therapeutic Exercise)  -- #2 free weights, #2 dowel rods   -AF      Exercise Type, Seated Upper Extremity (Therapeutic Exercise)  AROM (active range of motion);resistive exercise  -AF      Expected Outcomes, Seated Upper Extremity (Therapeutic Exercise)  improve functional tolerance, self-care activity;improve functional stability;improve motor control;improve performance, transfer skills  -AF      Sets/Reps Detail, Seated Upper Extremity (Therapeutic Exercise)  2/15  -AF      Transfers Skills, Training to Functional Activity, Seated Upper Extremity (Therapeutic Exercise)  beginning to transfer skills to functional activity  -AF      Recorded by [AF] Reina Perera OTR      Row Name 02/04/20 0900             Core Strengthening Therapeutic Exercise    Exercise Performed (Core Strengthening Therapeutic Exercise)  roll back using ball or bolster in quadruped;roll out using ball or bolster in quadruped;quadruped over ball with multidirectional rolling;quadruped single arm raise;quadruped single leg raise  (Pended)   -NM       Comment (Core Strengthening Therapeutic Exercise)  maxA to transition from long sitting to quadruped; began c UE support on elbows; came up on hands 4x; ant/post weight shifting; single UE lifitng and LE lifting 5x each   (Pended)   -NM      Recorded by [NM] Jaxon Atkinson, PT Student      Row Name 02/04/20 1500             Neuromuscular Re-education    Activities/Techniques Used (Neuromuscular Re-education)  -- gross motor movement s  -AF      Positions Used (Neuromuscular Re-education)  sitting;unsupported  -AF      Recorded by [AF] Reina Perera, NILAMR      Row Name 02/04/20 1500 02/04/20 0900          Positioning and Restraints    Pre-Treatment Position  sitting in chair/recliner  -AF  in bed  (Pended)   -NM     Post Treatment Position  bed  -AF  wheelchair  (Pended)   -NM     In Bed  supine;call light within reach;encouraged to call for assist;exit alarm on;with family/caregiver in AM  -AF  --     In Wheelchair  sitting;with PT;with family/caregiver with  in PM  -AF  with family/caregiver;exit alarm on  (Pended)   -NM     Recorded by [AF] Reina Perera, OTR [NM] Jaxon Atkinson, PT Student       User Key  (r) = Recorded By, (t) = Taken By, (c) = Cosigned By    Initials Name Effective Dates    AF Reina Perera, OTR 04/03/18 -     NM Jaxon Atkinson, PT Student 01/03/20 -           Wound 12/09/19 1510 abdomen Incision (Active)   Dressing Appearance dry;intact 2/4/2020  8:15 AM   Closure SUJEY 2/4/2020  8:15 AM   Base dressing in place, unable to visualize 2/4/2020  8:15 AM   Drainage Amount none 2/4/2020  8:15 AM   Dressing Care, Wound gauze 2/4/2020  8:15 AM         OT Recommendation and Plan                 OT IRF GOALS     Row Name 02/03/20 1540 01/25/20 1306          Transfer Goal 1 (OT-IRF)    Activity/Assistive Device (Transfer Goal 1, OT-IRF)  toilet;shower chair;walk-in shower  -AF  --     [place] Level (Transfer Goal 1, OT-IRF)  minimum assist (75% or more patient  effort);moderate assist (50-74% patient effort);verbal cues required  -AF  --     Time Frame (Transfer Goal 1, OT-IRF)  short term goal (STG)  -AF  --     Progress/Outcomes (Transfer Goal 1, OT-IRF)  goal ongoing  -AF  --        Transfer Goal 2 (OT-IRF)    Activity/Assistive Device (Transfer Goal 2, OT-IRF)  toilet;shower chair;walk-in shower  -AF  --     [place] Level (Transfer Goal 2, OT-IRF)  verbal cues required;contact guard assist  -AF  --     Time Frame (Transfer Goal 2, OT-IRF)  long term goal (LTG)  -AF  --     Progress/Outcomes (Transfer Goal 2, OT-IRF)  goal ongoing  -AF  --        Bathing Goal 1 (OT-IRF)    Activity/Device (Bathing Goal 1, OT-IRF)  bathing skills, all;grab bar/tub rail;hand-held shower spray hose;shower chair  -AF  --     [place] Level (Bathing Goal 1, OT-IRF)  verbal cues required;moderate assist (50-74% patient effort)  -AF  --     Time Frame (Bathing Goal 1, OT-IRF)  short term goal (STG)  -AF  --     Progress/Outcomes (Bathing Goal 1, OT-IRF)  goal ongoing  -AF  --        Bathing Goal 2 (OT-IRF)    Activity/Device (Bathing Goal 2, OT-IRF)  bathing skills, all;grab bar/tub rail;hand-held shower spray hose;shower chair  -AF  --     [place] Level (Bathing Goal 2, OT-IRF)  verbal cues required;contact guard assist  -AF  --     Time Frame (Bathing Goal 2, OT-IRF)  long term goal (LTG)  -AF  --     Progress/Outcomes (Bathing Goal 2, OT-IRF)  goal ongoing  -AF  --        UB Dressing Goal 1 (OT-IRF)    Activity/Device (UB Dressing Goal 1, OT-IRF)  upper body dressing  -AF  --     [place] (UB Dress Goal 1, OT-IRF)  verbal cues required;set-up required  -AF  --     Time Frame (UB Dressing Goal 1, OT-IRF)  long term goal (LTG)  -AF  --     Progress/Outcomes (UB Dressing Goal 1, OT-IRF)  goal ongoing  -AF  --        LB Dressing Goal 1 (OT-IRF)    Activity/Device (LB Dressing Goal 1, OT-IRF)  lower body dressing  -AF  --     [place] (LB Dressing Goal 1, OT-IRF)  verbal  cues required;moderate assist (50-74% patient effort)  -AF  --     Time Frame (LB Dressing Goal 1, OT-IRF)  short term goal (STG)  -AF  --     Progress/Outcomes (LB Dressing Goal 1, OT-IRF)  goal ongoing  -AF  --        LB Dressing Goal 2 (OT-IRF)    Activity/Device (LB Dressing Goal 2, OT-IRF)  lower body dressing  -AF  --     [place] (LB Dressing Goal 2, OT-IRF)  contact guard assist;verbal cues required  -AF  --     Time Frame (LB Dressing Goal 2, OT-IRF)  long term goal (LTG)  -AF  --     Progress/Outcomes (LB Dressing Goal 2, OT-IRF)  goal ongoing  -AF  --        Grooming Goal 1 (OT-IRF)    Activity/Device (Grooming Goal 1, OT-IRF)  grooming skills, all  -AF  --     [place] (Grooming Goal 1, OT-IRF)  set-up required;verbal cues required  -AF  --     Time Frame (Grooming Goal 1, OT-IRF)  short term goal (STG);long term goal (LTG)  -AF  --     Progress/Outcomes (Grooming Goal 1, OT-IRF)  goal ongoing  -AF  --        Toileting Goal 1 (OT-IRF)    Activity/Device (Toileting Goal 1, OT-IRF)  toileting skills, all;grab bar/safety frame;raised toilet seat  -AF  --     [place] Level (Toileting Goal 1, OT-IRF)  maximum assist (25-49% patient effort);verbal cues required  -AF  --     Time Frame (Toileting Goal 1, OT-IRF)  short term goal (STG)  -AF  --     Progress/Outcomes (Toileting Goal 1, OT-IRF)  goal ongoing  -AF  --        Toileting Goal 2 (OT-IRF)    Activity/Device (Toileting Goal 2, OT-IRF)  toileting skills, all;grab bar/safety frame;raised toilet seat  -AF  --     [place] Level (Toileting Goal 2, OT-IRF)  minimum assist (75% or more patient effort)  -AF  --     Time Frame (Toileting Goal 2, OT-IRF)  long term goal (LTG)  -AF  --     Progress/Outcomes (Toileting Goal 2, OT-IRF)  goal ongoing  -AF  --        Balance Goal 1 (OT)    Activity/Assistive Device (Balance Goal 1, OT)  standing, static  -AF  standing, static  -KP     [place] Level/Cues Needed (Balance Goal 1, OT)  moderate  assist (50-74% patient effort);verbal cues required  -AF  set-up required;maximum assist (25-49% patient effort)  -KP     Time Frame (Balance Goal 1, OT)  short term goal (STG)  -AF  short term goal (STG);1 week  -KP     Progress/Outcomes (Balance Goal 1, OT)  goal ongoing  -AF  goal ongoing  -KP        Balance Goal 2 (OT)    Activity/Assistive Device (Balance Goal 2, OT)  standing, static  -AF  standing, static  -KP     [place] Level (Balance Goal 2, OT)  minimum assist (75% or more patient effort);contact guard assist  -AF  set-up required;minimum assist (75% or more patient effort);moderate assist (50-74% patient effort)  -KP     Time Frame (Balance Goal 2, OT)  long term goal (LTG)  -AF  long term goal (LTG);by discharge  -KP     Progress/Outcome (Balance Goal 2, OT)  goal ongoing  -AF  goal ongoing  -KP        Caregiver Training Goal 1 (OT-IRF)    Caregiver Training Goal 1 (OT-IRF)  Pt and  will demo safe techniques with ADLs, trasnfer, HEP and AD prior to d/c home   -AF  --     Time Frame (Caregiver Training Goal 1, OT-IRF)  long term goal (LTG)  -AF  --     Progress/Outcomes (Caregiver Training Goal 1, OT-IRF)  goal ongoing  -AF  --       User Key  (r) = Recorded By, (t) = Taken By, (c) = Cosigned By    Initials Name Provider Type     Lay Scott, OTR Occupational Therapist    Reina Noe OTR Occupational Therapist                     Time Calculation:     Time Calculation- OT     Row Name 02/04/20 1517 02/04/20 1515          Time Calculation- OT    OT Start Time  1300  -AF  0930  -AF     OT Stop Time  1400  -AF  1000  -AF     OT Time Calculation (min)  60 min  -AF  30 min  -AF       User Key  (r) = Recorded By, (t) = Taken By, (c) = Cosigned By    Initials Name Provider Type    Reina Noe OTMENDEZ Occupational Therapist          Therapy Charges for Today     Code Description Service Date Service Provider Modifiers Qty    [id number]  OT SELF CARE/MGMT/TRAIN EA 15  MIN 2/3/2020 Reina Perera, OTR GO 4    29706739741 HC OT NEUROMUSC RE EDUCATION EA 15 MIN 2/3/2020 Reina Perera, OTR GO 1    32540780188 HC OT THER PROC EA 15 MIN 2/3/2020 Reina Perera, OTR GO 1    30484184518 HC OT SELF CARE/MGMT/TRAIN EA 15 MIN 2/4/2020 Reina Perera OTR GO 3    65061417643 HC OT THER PROC EA 15 MIN 2/4/2020 Reina Perera, OTR GO 1    76980116822 HC OT NEUROMUSC RE EDUCATION EA 15 MIN 2/4/2020 Reina Perera, OTR GO 2                   ALPESH Dempsey  2/4/2020

## 2020-02-04 NOTE — PROGRESS NOTES
Inpatient Rehabilitation Plan of Care Note    Plan of Care  Care Plan Reviewed - No updates at this time.    Body Systems    [RN] Integumentary(Active)  Current Status(02/04/2020): Abdominal incision with dressing. intact ileostomy  bag in place. both changed per WOCN  Weekly Goal(02/04/2020): No S&S infection noted  Discharge Goal: No S&S infection noted    Performed Intervention(s)  Daily skin inspection  Dressing change as ordered      Psychosocial    [RN] Coping/Adjustment(Active)  Current Status(02/04/2020): Supportive family,  very helpful and assists  patient with care.  Weekly Goal(02/04/2020): Identify progress in functional status  Discharge Goal: Demonstrate healthy coping strategies    Performed Intervention(s)  Verbalize needs and concerns      Safety    [RN] Potential for Injury(Active)  Current Status(02/04/2020): No unsafe behaviors  Weekly Goal(02/04/2020): Use call light 100%  Discharge Goal: Pt/family aware of risk of fall and safety in the home setting    Performed Intervention(s)  Bed alarm, wc alarm  Items within reach  Safety rounds      Sphincter Control    [RN] Bladder Management(Active)  Current Status(02/03/2020): pt has been continent, using bedpan. does wear  brief.  Weekly Goal(02/04/2020): Continent bladder 100%  Discharge Goal: Continent bladder 100%    [RN] Bowel Management(Active)  Current Status(02/03/2020): Has ileostomy since 12/20/19.  aware of care  for ileostomy.  Weekly Goal(02/04/2020): maintain education of ileostomy with pt and family  Discharge Goal: Independent with ileostomy care    Performed Intervention(s)  Monitor intake and output  Encourage appropriate diet    Signed by: Jenn Anna RN

## 2020-02-04 NOTE — THERAPY TREATMENT NOTE
Inpatient Rehabilitation - Physical Therapy Treatment Note  [facility]     Patient Name: She López  : 1945  MRN: [id number]    Today's Date: 2020  Onset of Illness/Injury or Date of Surgery: 20              Admit Date: 2020      Visit Dx:      ICD-10-CM ICD-9-CM   1. General weakness R53.1 780.79       Patient Active Problem List   Diagnosis   • Crohn's disease of small intestine with other complication (CMS/HCC)   • Type 2 diabetes mellitus without complication (CMS/HCC)   • RSD upper limb   • Neuropathic pain of hand   • Hyperlipidemia   • Cervical myelopathy (CMS/HCC)   • Central pain syndrome   • Carpal tunnel syndrome   • Vitamin B 12 deficiency   • Guillain Barré syndrome (CMS/HCC)       Therapy Treatment    IRF Treatment Summary     Row Name 20 1500 20 09          Evaluation/Treatment Time and Intent    Subjective Information  complains of;fatigue;pain pain in R elbow  -AF  complains of  -LH,NM,LH2     Existing Precautions/Restrictions  fall  -AF  fall  -LH,NM,LH2     Document Type  therapy note (daily note)  -AF  therapy note (daily note)  -LH,NM,LH2     Mode of Treatment  occupational therapy  -AF  physical therapy  -LH,NM,LH2     Patient/Family Observations  sitting up in w/c in AM, supine in bed in PM  present in both session  -AF  supine in bed in AM,  present  -LH,NM,LH2     Recorded by [AF] Reina Perera, OTR [LH,NM,LH2] Chelsie Cline, PT (r) Jaxon Atkinson PT Student (t) Chelsie Cline, PT (c)     Row Name 20 1500 20 0900          Cognition/Psychosocial- PT/OT    Affect/Mental Status (Cognitive)  flat/blunted affect  -AF  flat/blunted affect  -LH,NM,LH2     Orientation Status (Cognition)  oriented x 4  -AF  oriented x 4  -LHNM,LH2     Follows Commands (Cognition)  WFL  -AF  WFL  -LH,NM,LH2     Personal Safety Interventions  fall prevention program maintained;gait belt;nonskid shoes/slippers when out of bed  -AF  fall  prevention program maintained;gait belt;supervised activity;nonskid shoes/slippers when out of bed  -LH,NM,LH2     Safety Deficit (Cognitive)  insight into deficits/self awareness  -AF  insight into deficits/self awareness  -LH,NM,LH2     Recorded by [AF] eRina Perera, OTR [LH,NM,LH2] Chelsie Cline, PT (r) Jaxon Atkinson, PT Student (t) Chelsie Cline, PT (c)     Row Name 02/04/20 0900             Mobility    Functional Position Change  scooting in long sitting  -LH,NM,LH2      Additional Documentation  Functional Position Change (Row)  -LH,NM,LH2      Recorded by [LH,NM,LH2] Chelsie Cline, PT (r) Jaxon Atkinson, PT Student (t) Chelsie Cline, PT (c)      Row Name 02/04/20 1500 02/04/20 0900          Bed Mobility Assessment/Treatment    Rolling Left [place] (Bed Mobility)  --  supervision;verbal cues;nonverbal cues (demo/gesture)  -LH,NM,LH2     Rolling Right [place] (Bed Mobility)  --  supervision;verbal cues;nonverbal cues (demo/gesture)  -LH,NM,LH2     Supine-Sit [place] (Bed Mobility)  supervision  -AF  supervision;verbal cues;nonverbal cues (demo/gesture)  -LH,NM,LH2     Sit-Supine [place] (Bed Mobility)  supervision  -AF  supervision;verbal cues;nonverbal cues (demo/gesture)  -LH,NM,LH2     Bed Mobility, Safety Issues  --  decreased use of arms for pushing/pulling;decreased use of legs for bridging/pushing;impaired trunk control for bed mobility  -LH,NM,LH2     Assistive Device (Bed Mobility)  bed rails  -AF  bed rails  -LH,NM,LH2     Recorded by [AF] Reina Perera, OTR [LH,NM,LH2] Chelsie Cline, PT (r) Jaxon Atkinson, PT Student (t) Chelsie Cline, PT (c)     Row Name 02/04/20 0900             Functional Mobility    Functional Mobility- Comment  deferred gait training in the PM due to increased need for assist c sit to stand and loss of balance when attmepting to take the first step this PM; platform rolling walker; modA x 2   -LH,NM,LH2      Recorded by [LH,NM,LH2] Chelsie Cline  SEAN, PT (r) Jaxon Atkinson, PT Student (t) Chelsie Cline, PT (c)      Row Name 02/04/20 1500             Transfer Assessment/Treatment    Comment (Transfers)  w/c <> EOM MOD A   -AF      Recorded by [AF] Reina Perera, OTR      Row Name 02/04/20 0900             Bed-Chair Transfer    Bed-Chair [place] (Transfers)  moderate assist (50% patient effort);verbal cues;nonverbal cues (demo/gesture)  -LH,NM,LH2      Assistive Device (Bed-Chair Transfers)  wheelchair  -LH,NM,LH2      Recorded by [LH,NM,LH2] Chelsie Cline, PT (r) Jaxon Atkinson, PT Student (t) Chelsie Cline, PT (c)      Row Name 02/04/20 1500 02/04/20 0900          Chair-Bed Transfer    Chair-Bed [place] (Transfers)  minimum assist (75% patient effort);moderate assist (50% patient effort);verbal cues  -AF  moderate assist (50% patient effort);verbal cues;nonverbal cues (demo/gesture)  -LH,NM,LH2     Assistive Device (Chair-Bed Transfers)  wheelchair  -AF  wheelchair  -LH,NM,LH2     Recorded by [AF] Reina Perera, OTR [LH,NM,LH2] Chelsie Cline, PT (r) Jaxon Atkinson, PT Student (t) Chelsie Cline, PT (c)     Row Name 02/04/20 1500 02/04/20 0900          Sit-Stand Transfer    Sit-Stand [place] (Transfers)  moderate assist (50% patient effort);minimum assist (75% patient effort);verbal cues  -AF  moderate assist (50% patient effort);2 person assist;verbal cues;nonverbal cues (demo/gesture)  -LH,NM,LH2     Assistive Device (Sit-Stand Transfers)  wheelchair  -AF  wheelchair;walker, rolling platform  -LH,NM,LH2     Recorded by [AF] Reina Perera, OTR [LH,NM,LH2] Chelsie Cline, PT (r) Jaxon Atkinson, PT Student (t) Chelsie Cline, PT (c)     Row Name 02/04/20 0900             Stand-Sit Transfer    Stand-Sit [place] (Transfers)  moderate assist (50% patient effort);2 person assist;verbal cues;nonverbal cues (demo/gesture)  -LH,NM,LH2      Assistive Device (Stand-Sit Transfers)  wheelchair;walker, rolling platform  -LH,NM,LH2       Recorded by [LH,NM,LH2] Chelsie Cline, PT (r) Jaxon Atkinson, PT Student (t) Chelsie Cline, PT (c)      Row Name 02/04/20 0900             Stand Pivot/Stand Step Transfer    Stand Pivot/Stand Step [place]  maximum assist (25% patient effort);verbal cues;nonverbal cues (demo/gesture)  -LH,NM,LH2      Assistive Device (Stand Pivot Stand Step Transfer)  wheelchair  -LH,NM,LH2      Recorded by [LH,NM,LH2] Chelsie Cline, PT (r) Jaxon Atkinson, PT Student (t) Chelsie Cline, PT (c)      Row Name 02/04/20 1500             Toilet Transfer    Type (Toilet Transfer)  stand pivot/stand step;squat pivot  -AF      [place] Level (Toilet Transfer)  moderate assist (50% patient effort);verbal cues  -AF      Assistive Device (Toilet Transfer)  commode, bedside with drop arms;wheelchair red rail  -AF      Recorded by [AF] Reina Perera OTR      Row Name 02/04/20 0900             Gait/Stairs Assessment/Training    [place] Level (Gait)  moderate assist (50% patient effort);minimum assist (75% patient effort);2 person assist;verbal cues;nonverbal cues (demo/gesture)  -LH,NM,LH2      Assistive Device (Gait)  walker, rolling platform air casts B ankles   -LH,NM,LH2      Distance in Feet (Gait)  30x2  -LH,NM,LH2      Pattern (Gait)  step-through;step-to  -LH,NM,LH2      Deviations/Abnormal Patterns (Gait)  bilateral deviations;ataxic;base of support, wide;gait speed decreased;stride length decreased  -LH,NM,LH2      Bilateral Gait Deviations  forward flexed posture;heel strike decreased;weight shift ability decreased  -LH,NM,LH2      Left Sided Gait Deviations  hip circumduction;weight shift ability decreased  -LH,NM,LH2      Comment (Gait/Stairs)  req assist c controlling wx propulsion; L ankle inverting and supinating more this date durign swing and initial contact   -LH,NM,LH2      Recorded by [LH,NM,LH2] Chelsie Cline, PT (r) Jaxon Atkinson, PT Student (t) Chelsie Cline, PT (c)      Row Name 02/04/20 1500              Bathing Assessment/Treatment    Bathing [place] Level  upper body;contact guard assist;verbal cues  -AF      Bathing Position  sink side;supported sitting  -AF      Bathing Setup Assistance  obtain supplies  -AF      Recorded by [AF] Reina Perera OTR      Row Name 02/04/20 1500             Upper Body Dressing Assessment/Treatment    Upper Body Dressing Task  upper body dressing skills;minimum assist (75% or more patient effort);pull over garment;moderate assist (50-74% patient effort);front opening garment  -AF      Upper Body Dressing Position  supported sitting  -AF      Set-up Assistance (Upper Body Dressing)  obtain clothing  -AF      Recorded by [AF] Riena Perera OTR      Row Name 02/04/20 1500             Lower Body Dressing Assessment/Treatment    Comment (Lower Body Dressing)  deferred  -AF      Recorded by [AF] Reina Perera OTR Row Name 02/04/20 1500             Grooming Assessment/Treatment    Grooming [place] Level  grooming skills;set up;verbal cues  -AF      Assistive Device (Grooming)  built-up handle items  -AF      Grooming Position  sink side;supported sitting  -AF      Grooming Setup Assistance  obtain supplies  -AF      Recorded by [AF] Reina Perera, ALPESH Ochoa Name 02/04/20 1500             Toileting Assessment/Treatment    Toileting [place] Level  toileting skills;dependent (less than 25% patient effort);verbal cues  -AF      Assistive Device Use (Toileting)  -- BSC  -AF      Toileting Position  unsupported sitting;supported standing  -AF      Comment (Toileting)  practiced with NSG  -AF      Recorded by [AF] Reina Perera OTR      Row Name 02/04/20 1500             Fine Motor Testing & Training    Comment, Fine Motor Coordination  FMC tasks with alternating B hands with placing large checkers on board, square wooden pegs remove/replace wtih MOD to MAX diffiuclty as task went on  -AF      Recorded by [AF] Reina Perera MENDEZ Ochoa  Name 02/04/20 1500 02/04/20 0900          Pain Scale: Numbers Pre/Post-Treatment    Pain Scale: Numbers, Pretreatment  0/10 - no pain  -AF  0/10 - no pain  -LH,NM,LH2     Pain Scale: Numbers, Post-Treatment  0/10 - no pain  -AF  0/10 - no pain  -LH,NM,LH2     Pre/Post Treatment Pain Comment  states that her R elbow is hurting because of the IV site   -AF  --     Recorded by [AF] Reina Perera, ALPESH [LH,NM,LH2] Chelsie Cline, PT (r) Jaxon Atkinson, PT Student (t) Chelsie Cline, PT (c)     Row Name 02/04/20 0900             Balance    Balance  --  -LH,NM,LH2      Recorded by [LH,NM,LH2] Chelsie Cline, PT (r) Jaxon Atkinson, PT Student (t) Chelsie Cline, PT (c)      Row Name 02/04/20 1500             Static Sitting Balance    Level of [place] (Unsupported Sitting, Static Balance)  contact guard assist;standby assist  -AF      Sitting Position (Unsupported Sitting, Static Balance)  sitting edge of mat  -AF      Time Able to Maintain Position (Unsupported Sitting, Static Balance)  more than 5 minutes  -AF      Comment (Unsupported Sitting, Static Balance)  seated on EOM with UB exs and gross grasp BUE reaching tasks   -AF      Recorded by [AF] Reina Perera OTR      Row Name 02/04/20 0900             Sitting Balance Activity    Activities Performed (Sitting, Balance Training)  perturbations to movement in sitting;sitting on balance board, multi-directional weight shift BUE diagonals, elevation, trunk rot c 1Kg medicine ball   -LH,NM,LH2      Support Needed (Sitting, Balance Training)  SBA;CGA  -LH,NM,LH2      Upper Extremity Activity with Device (Sitting, Balance Training)  small ball  -LH,NM,LH2      Progressive Balance Activities (Sitting, Balance Training)  manual resistance applied during activity;upper extremity activities added during activity;base of support narrowed during activity;eyes closed during activity  -LH,NM,LH2      Comment (Sitting, Balance Training)  sitting on air disc at EOM, vc to  narrow feet/SAMUEL; progressed to no UE supprt, narrow SAMUEL, removed foot contact c floor   -LH,NM,LH2      Recorded by [LH,NM,LH2] Chelsie Cline, PT (r) Jaxon Atkinson, PT Student (t) Chelsie Cline, PT (c)      Row Name 02/04/20 0900             Standing Balance Activity    Support Needed for Balance (Standing, Balance Training)  minimal external support for balance, 75% patient effort;moderate external support for balance, 50-74% patient effort;2 person assist;uses both upper extremities for support  -LH,NM,LH2      Comment (Standing, Balance Training)  standing inside // bars c BUE support, 2 bouts x ~4-5 minutes. Assist needed to shift posteriorly to decrease forward leaning. manual assist c hand placement bilaterally. absent proprioception in B feet, used contact on post thighs against wc. performed 5 mini squats in // bars c assist blocking knees in flexion, ~20 degrees. Pt extended hips/knees c minx2 and tactile cues   present and assisting as needed/appropriate    -LH,NM,LH2      Recorded by [LH,NM,LH2] Chelsie Cline, PT (r) Jaxon Atkinson, PT Student (t) Chelsie Cline, PT (c)      Row Name 02/04/20 0900             Therapeutic Exercise    Therapeutic Exercise  core strengthening  -LH,NM,LH2      Recorded by [LH,NM,LH2] Chelsie Cline, PT (r) Jaxon Atkinson, PT Student (t) Chelsie Cline, PT (c)      Row Name 02/04/20 1500             Upper Extremity Seated Therapeutic Exercise    Performed, Seated Upper Extremity (Therapeutic Exercise)  forearm supination/pronation;scapular protraction/retraction;elbow flexion/extension  -AF      Device, Seated Upper Extremity (Therapeutic Exercise)  -- #2 free weights, #2 dowel rods   -AF      Exercise Type, Seated Upper Extremity (Therapeutic Exercise)  AROM (active range of motion);resistive exercise  -AF      Expected Outcomes, Seated Upper Extremity (Therapeutic Exercise)  improve functional tolerance, self-care activity;improve functional stability;improve  motor control;improve performance, transfer skills  -AF      Sets/Reps Detail, Seated Upper Extremity (Therapeutic Exercise)  2/15  -AF      Transfers Skills, Training to Functional Activity, Seated Upper Extremity (Therapeutic Exercise)  beginning to transfer skills to functional activity  -AF      Recorded by [AF] Reina Perera OTR      Row Name 02/04/20 0900             Core Strengthening Therapeutic Exercise    Exercise Performed (Core Strengthening Therapeutic Exercise)  roll back using ball or bolster in quadruped;roll out using ball or bolster in quadruped;quadruped over ball with multidirectional rolling;quadruped single arm raise;quadruped single leg raise  -LH,NM,LH2      Restrictions (Core Strengthening Therapeutic Exercise)  needed PT manual contact assist to maintain quadraped safely   -LH3      Comment (Core Strengthening Therapeutic Exercise)  maxA to transition from long sitting to quadruped; began c UE support on elbows; came up on hands 4x; ant/post weight shifting; single UE lifitng and LE lifting 5x each   -LH,NM,LH2      Recorded by [LH,NM,LH2] Chelsie Cline, PT (r) Jaxon Atkinson, PT Student (t) Chelsie Cline, PT (c)  [LH3] Chelsie Cline, PT      Row Name 02/04/20 1500             Neuromuscular Re-education    Activities/Techniques Used (Neuromuscular Re-education)  -- gross motor movement s  -AF      Positions Used (Neuromuscular Re-education)  sitting;unsupported  -AF      Recorded by [AF] Reina Perera OTR      Row Name 02/04/20 1500 02/04/20 0900          Positioning and Restraints    Pre-Treatment Position  sitting in chair/recliner  -AF  in bed  -,NM,LH2     Post Treatment Position  bed  -AF  wheelchair  -,NM,LH2     In Bed  supine;call light within reach;encouraged to call for assist;exit alarm on;with family/caregiver in AM  -AF  --     In Wheelchair  sitting;with PT;with family/caregiver with  in PM  -AF  with family/caregiver;exit alarm on  -LH,NM,LH2      Recorded by [AF] Reina Perera, OTR [LH,NM,LH2] Chelsie Cline, PT (r) Jaxon Atkinson, PT Student (t) Chelsie Cline, PT (c)       User Key  (r) = Recorded By, (t) = Taken By, (c) = Cosigned By    Initials Name Effective Dates     Chelsie Cline, PT 04/03/18 -     AF Reina Perera, OTR 04/03/18 -     NM Jaxon Atkinson, PT Student 01/03/20 -         Wound 12/09/19 1510 abdomen Incision (Active)   Dressing Appearance dry;intact 2/4/2020  8:15 AM   Closure SUJEY 2/4/2020  8:15 AM   Base dressing in place, unable to visualize 2/4/2020  8:15 AM   Drainage Amount none 2/4/2020  8:15 AM   Dressing Care, Wound gauze 2/4/2020  8:15 AM           PT Recommendation and Plan                        Time Calculation:     PT Charges     Row Name 02/04/20 1457 02/04/20 1121          Time Calculation    Start Time  1400  -LH (r) NM (t) LH (c)  0830  -LH (r) NM (t) LH (c)     Stop Time  1430  -LH (r) NM (t) LH (c)  0930  -LH (r) NM (t) LH (c)     Time Calculation (min)  30 min  -LH (r) NM (t)  60 min  -LH (r) NM (t)     PT Received On  02/04/20  -LH (r) NM (t) LH (c)  02/04/20  -LH (r) NM (t) LH (c)     PT - Next Appointment  --  02/05/20  -LH (r) NM (t) LH (c)       User Key  (r) = Recorded By, (t) = Taken By, (c) = Cosigned By    Initials Name Provider Type     Chelsie Cline, PT Physical Therapist    NM Jaxon Atkinson, PT Student PT Student          Therapy Charges for Today     Code Description Service Date Service Provider Modifiers Qty    00843421108 HC GAIT TRAINING EA 15 MIN 2/3/2020 Jaxon Atkinson, PT Student GP 2    [id number] HC PT NEUROMUSC RE EDUCATION EA 15 MIN 2/3/2020 Jaxon Atkinson, PT Student GP 2    [id number]  PT THERAPEUTIC ACT EA 15 MIN 2/3/2020 Jaxon Atkinson, PT Student GP 2    [id number]  GAIT TRAINING EA 15 MIN 2/4/2020 Jaxon Atkinson, PT Student GP 1    [id number]  PT THERAPEUTIC ACT EA 15 MIN 2/4/2020 Jaxon Atkinson, PT Student GP 3    [id number]  PT NEUROMUSC RE EDUCATION  EA 15 MIN 2/4/2020 Jaxon Atkinson, PT Student GP 2                   Jaxon Atkinson, PT Student  2/4/2020

## 2020-02-04 NOTE — PLAN OF CARE
Problem: Patient Care Overview  Goal: Plan of Care Review  Outcome: Ongoing (interventions implemented as appropriate)  Flowsheets (Taken 2/4/2020 0447)  Outcome Summary: Patient sleeping well tonight. Ileostomy working well and intact. Reports numbness and burning feeling to sekou hands and feet which is not new. Refused R hand IV site to be flushed. Continent of bladder. Pain managed with gabapentin. Flat but pleasant and cooperative. No new issues tonight.  Progress, Functional Goals: demonstrating adequate progress  Plan of Care Reviewed With: patient  IRF Plan of Care Review: progress ongoing, continue     Problem: Pain, Chronic (Adult)  Goal: Acceptable Pain/Comfort Level and Functional Ability  Description  Patient will demonstrate the desired outcomes by discharge/transition of care.  Outcome: Ongoing (interventions implemented as appropriate)  Flowsheets (Taken 2/4/2020 0447)  Acceptable Pain/Comfort Level and Functional Ability: making progress toward outcome     Problem: Functional Mobility Impairment (IRF) (Adult)  Goal: Optimal/Safe Level of [place] with Mobility  Outcome: Ongoing (interventions implemented as appropriate)  Flowsheets (Taken 2/4/2020 0447)  Optimal/Safe Level of [place] with Mobility: demonstrating adequate progress

## 2020-02-05 LAB
ANION GAP SERPL CALCULATED.3IONS-SCNC: 13 MMOL/L (ref 5–15)
BASOPHILS # BLD AUTO: 0.09 10*3/MM3 (ref 0–0.2)
BASOPHILS NFR BLD AUTO: 1.2 % (ref 0–1.5)
BUN BLD-MCNC: 12 MG/DL (ref 8–23)
BUN/CREAT SERPL: 14.6 (ref 7–25)
CALCIUM SPEC-SCNC: 8.5 MG/DL (ref 8.6–10.5)
CHLORIDE SERPL-SCNC: 104 MMOL/L (ref 98–107)
CO2 SERPL-SCNC: 20 MMOL/L (ref 22–29)
CREAT BLD-MCNC: 0.82 MG/DL (ref 0.57–1)
DEPRECATED RDW RBC AUTO: 45.5 FL (ref 37–54)
EOSINOPHIL # BLD AUTO: 0.33 10*3/MM3 (ref 0–0.4)
EOSINOPHIL NFR BLD AUTO: 4.6 % (ref 0.3–6.2)
ERYTHROCYTE [DISTWIDTH] IN BLOOD BY AUTOMATED COUNT: 14.3 % (ref 12.3–15.4)
GFR SERPL CREATININE-BSD FRML MDRD: 69 ML/MIN/1.73
GLUCOSE BLD-MCNC: 112 MG/DL (ref 65–99)
HCT VFR BLD AUTO: 28.1 % (ref 34–46.6)
HGB BLD-MCNC: 8.9 G/DL (ref 12–15.9)
IMM GRANULOCYTES # BLD AUTO: 0.09 10*3/MM3 (ref 0–0.05)
IMM GRANULOCYTES NFR BLD AUTO: 1.2 % (ref 0–0.5)
LYMPHOCYTES # BLD AUTO: 1.09 10*3/MM3 (ref 0.7–3.1)
LYMPHOCYTES NFR BLD AUTO: 15.1 % (ref 19.6–45.3)
MCH RBC QN AUTO: 28.3 PG (ref 26.6–33)
MCHC RBC AUTO-ENTMCNC: 31.7 G/DL (ref 31.5–35.7)
MCV RBC AUTO: 89.2 FL (ref 79–97)
MONOCYTES # BLD AUTO: 0.7 10*3/MM3 (ref 0.1–0.9)
MONOCYTES NFR BLD AUTO: 9.7 % (ref 5–12)
NEUTROPHILS # BLD AUTO: 4.92 10*3/MM3 (ref 1.7–7)
NEUTROPHILS NFR BLD AUTO: 68.2 % (ref 42.7–76)
NRBC BLD AUTO-RTO: 0 /100 WBC (ref 0–0.2)
PLATELET # BLD AUTO: 468 10*3/MM3 (ref 140–450)
PMV BLD AUTO: 8.3 FL (ref 6–12)
POTASSIUM BLD-SCNC: 4.2 MMOL/L (ref 3.5–5.2)
RBC # BLD AUTO: 3.15 10*6/MM3 (ref 3.77–5.28)
SODIUM BLD-SCNC: 137 MMOL/L (ref 136–145)
WBC NRBC COR # BLD: 7.22 10*3/MM3 (ref 3.4–10.8)

## 2020-02-05 PROCEDURE — 85025 COMPLETE CBC W/AUTO DIFF WBC: CPT | Performed by: PHYSICAL MEDICINE & REHABILITATION

## 2020-02-05 PROCEDURE — 97116 GAIT TRAINING THERAPY: CPT

## 2020-02-05 PROCEDURE — 97112 NEUROMUSCULAR REEDUCATION: CPT

## 2020-02-05 PROCEDURE — 97110 THERAPEUTIC EXERCISES: CPT

## 2020-02-05 PROCEDURE — 97530 THERAPEUTIC ACTIVITIES: CPT

## 2020-02-05 PROCEDURE — 80048 BASIC METABOLIC PNL TOTAL CA: CPT | Performed by: PHYSICAL MEDICINE & REHABILITATION

## 2020-02-05 PROCEDURE — 97535 SELF CARE MNGMENT TRAINING: CPT

## 2020-02-05 RX ORDER — PYRIDOXINE HCL (VITAMIN B6) 100 MG
18 TABLET ORAL 2 TIMES DAILY
Status: DISCONTINUED | OUTPATIENT
Start: 2020-02-05 | End: 2020-02-27 | Stop reason: HOSPADM

## 2020-02-05 RX ADMIN — SODIUM BICARBONATE 650 MG: 650 TABLET ORAL at 09:35

## 2020-02-05 RX ADMIN — GABAPENTIN 300 MG: 300 CAPSULE ORAL at 22:05

## 2020-02-05 RX ADMIN — Medication 18 MG: at 22:05

## 2020-02-05 RX ADMIN — DOXYCYCLINE 100 MG: 100 CAPSULE ORAL at 22:04

## 2020-02-05 RX ADMIN — LOPERAMIDE HYDROCHLORIDE 2 MG: 2 CAPSULE ORAL at 13:17

## 2020-02-05 RX ADMIN — SODIUM CHLORIDE TAB 1 GM 1 G: 1 TAB at 09:35

## 2020-02-05 RX ADMIN — SODIUM CHLORIDE TAB 1 GM 1 G: 1 TAB at 18:00

## 2020-02-05 RX ADMIN — CHOLECALCIFEROL TAB 125 MCG (5000 UNIT) 5000 UNITS: 125 TAB at 09:36

## 2020-02-05 RX ADMIN — GABAPENTIN 300 MG: 300 CAPSULE ORAL at 03:10

## 2020-02-05 RX ADMIN — GABAPENTIN 300 MG: 300 CAPSULE ORAL at 09:35

## 2020-02-05 RX ADMIN — GABAPENTIN 300 MG: 300 CAPSULE ORAL at 13:17

## 2020-02-05 RX ADMIN — LOPERAMIDE HYDROCHLORIDE 2 MG: 2 CAPSULE ORAL at 18:00

## 2020-02-05 RX ADMIN — PANTOPRAZOLE SODIUM 40 MG: 40 TABLET, DELAYED RELEASE ORAL at 06:09

## 2020-02-05 RX ADMIN — SODIUM BICARBONATE 650 MG: 650 TABLET ORAL at 22:05

## 2020-02-05 RX ADMIN — DOXYCYCLINE 100 MG: 100 CAPSULE ORAL at 09:35

## 2020-02-05 RX ADMIN — Medication 1 TABLET: at 09:36

## 2020-02-05 RX ADMIN — GABAPENTIN 300 MG: 300 CAPSULE ORAL at 18:00

## 2020-02-05 RX ADMIN — LOPERAMIDE HYDROCHLORIDE 2 MG: 2 CAPSULE ORAL at 09:39

## 2020-02-05 NOTE — PROGRESS NOTES
LOS: 12 days   Patient Care Team:  Armando Valentine MD as PCP - General (Internal Medicine)  Lisa Arriaza MD as Consulting Physician (Obstetrics and Gynecology)    Chief Complaint: GBS    Subjective     History of Present Illness   She continues with distal weakness in the right hand more than the left with impaired motor control in the right hand but feels that it is improving in the left hand.  She ambulated across the gym a couple of times today, has to stop as she feels the right knee starting to buckle.  She does not note any decline in her function today.  She continues to have pain and redness and swelling of the right antecubital fossa and distal upper arm.  Reviewed continue with doxycycline which was started yesterday but if there is no show improvement will look to adjust antibiotic tomorrow.  Recommend she continue with K pad.         History taken from: patient chart family    Objective     Vital Signs  Temp:  [98 °F (36.7 °C)-98.9 °F (37.2 °C)] 98.1 °F (36.7 °C)  Heart Rate:  [72-93] 93  Resp:  [18] 18  BP: ()/(57-63) 103/63    Physical Exam:  General: Awake, alert, NAD  HEENT: normocepahlic, atraumatic, EOMI bilaterally   Heart: RRR, no m/r/g  Lungs: CTAB, no wheezing, rales, or rhonchi  Abdomen: soft, non-tender, non-distended, colostomy , incision dressed  Strength: BUE: 4/5 with bilateral elbow flexion, elbow extension, wrist extension, and finger flexion, right greater than left weak hand intrinsics R 3-/5, L 4-/5       BLE: 3+/5 HF, 4/5 with knee extension and ankle dorsiflexion  Extremities: Right antecubital fossa with erythema that extends to the distal upper arm and an area of induration/cord along the vein with surrounding erythema.  No drainage.    Results Review:     I reviewed the patient's new clinical results.  Results from last 7 days   Lab Units 02/05/20  0648 02/03/20  0623 02/01/20  0509   WBC 10*3/mm3 7.22 7.05 5.64   HEMOGLOBIN g/dL 8.9* 9.5* 8.8*   HEMATOCRIT  % 28.1* 29.1* 27.5*   PLATELETS 10*3/mm3 468* 602* 647*     Results from last 7 days   Lab Units 02/05/20  0648 02/03/20  0623 01/31/20  0618   SODIUM mmol/L 137 138 133*   POTASSIUM mmol/L 4.2 4.4 4.9   CHLORIDE mmol/L 104 105 101   CO2 mmol/L 20.0* 18.2* 19.4*   BUN mg/dL 12 12 17   CREATININE mg/dL 0.82 0.89 0.81   CALCIUM mg/dL 8.5* 8.9 9.0   GLUCOSE mg/dL 112* 109* 109*       Medication Review:   Scheduled Meds:    doxycycline 100 mg Oral Q12H   gabapentin 300 mg Oral 4x Daily   loperamide 2 mg Oral TID AC   MULTIVITAMIN ADULT 1 tablet Sublingual Daily   pantoprazole 40 mg Oral Q AM   sodium bicarbonate 650 mg Oral BID   sodium chloride 1 g Oral BID With Meals   Cyanocobalamin 2,500 mcg Sublingual Weekly   Vitamin D-3 5,000 Units Sublingual Daily     Continuous Infusions:   PRN Meds:.•  aluminum sulfate-calcium acetate  •  gabapentin **AND** gabapentin  •  miconazole    Assessment/Plan   75 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     GBS  -Completed her plasmapharesis and has gotten good return.   - Will begin PT/OT for ADLS, strength, mobility, txs, gait.   -January 27: On gabapentin 300mg QID. Will continue to monitor and will titrate up as needed.  -Jan 29 -  Feels strength is somewhat better.  Worked on gait with rolling walker yesterday 15 feet. Today utilized ankle weights to decrease ataxic movements with gait. Transfers mod max assist. Bed mobility CTG.    Crohn's  -s/p recent colostomy- wound nurse to see and education for home management.   -If more than 1 liter output a day, needs Immodium-per Dr. Albrecht colorectal surgeon.     Hyponatremia  -On salt tabs and fluid restriction. Renal following  -January 27: Na 130. Continue salt tabs and fluid restriction per renal. Will continue to monitor  -January 30: Na 133. Continue sodium bicarbonate and fluid restriction per renal.  -Feb 3- Na improved to 138  -February 4-fluid restriction discontinued.  -February 5-sodium 137    Anemia  -January  27: Hgb/Hct 7.6/24.3- stable. No signs of active bleeding. Will order fecal occult and reticulocyte count. Will continue to monitor.  -Jan 28 - hemoccult positive  -Jan 29 - Stool heme +.  HGB stable at 7.5. She had hypotension and tachycardia yesterday 87/64 and 110) , better today (99/63 and 88).  Reviewed option of transfusion PRBCs. She wished to hold on transfusion unless absolutely necessary. Discussed if HGB < 7, would recommend definitely transfuse.  -January 30: Repeat CBC scheduled for tomorrow. Will continue to monitor.    -January 31: Hgb 7.2. Will transfuse 1 unit of PRBCs today. Ordered anemia studies. Spoke with Dr. Albrecht and if iron supplementation is required she recommends IV iron for better absorption.  -February 1: Hemoglobin improved 8.8.  IV iron infusion ordered by nephrology  -Feb 3 - HGB improved 9.5. Not tolerate IV iron infusion due to burning in vein, does not wish to have placed a more proximal IV. She had tow infusions. She will get EZ Melt Iron from outside to take by mouth; on discussion with colorectal surgery last week she should be able to absorb PO iron.  February 5-hemoglobin 8.5    DVT prophylaxis  -SCDS for now.   -January 27: Heparin has been on hold due to HGB trending down. Following results of fecal occult and reticulocyte count will consider restarting Heparin for DVT prophylaxis.   -January 28 - hemoccult positive.     Vitamin D deficiency-vitamin D 22.4  on January 29.    Feb 1 - Patient does not wish to take vitamin D p.o. through the hospital supply.  Wishes to have her vitamin D brought in from home.    Vitamin B12 replacement-sublingual from home    RUE antecubital fossa phlebitis/cellulitis-February 3-no sign of infection. K-PAD.   February 4-Right antecubital fossa phlebitis with cord palpable/tender with surrounding erythema consistent with cellulitis. Allergy to Keflex - throat swelled. Will start Doxycycline 100 mg bid x 7 days, continue K-pad.   February 5-She  continues to have pain and redness and swelling of the right antecubital fossa and distal upper arm.  Reviewed continue with doxycycline which was started yesterday but if there is no show improvement will look to adjust antibiotic tomorrow.  White blood cell count is 7.22 with normal differential.    TEAM CONF - JAN 28 - FLAT AFFECT. SUPINE SIT MIN ASSIST. TRANSFERS MAX 2. NONAMBULATORY. UBB MIN. LBB MAX. UBD MOD.  LBD DEP. ABD WOUND CARE. STOOL HEMOCCULT POSITIVE.  ELOS - 5 WEEKS.     TEAM CONF - FEB 4 - BED SBA. REANNASDAGMAR MIN-MOD. GAIT 30 FEET MIN 2 FIDEL WALKER. UBD MOD. LBD DEP. ON 1200 CC FLUID RESTRICTION. EATING OKAY.  ABD WOUND CLOSING. OSTOMY DEVICE STAYING ON.  CONTINENT. NEEDS TO GET UP TO BSC.   ELOS- 4 WEEKS.     Adolfo Valle MD  02/05/20  12:14 PM

## 2020-02-05 NOTE — THERAPY TREATMENT NOTE
Inpatient Rehabilitation - Physical Therapy Treatment Note  Psychiatric     Patient Name: She López  : 1945  MRN: [id number]    Today's Date: 2020  Onset of Illness/Injury or Date of Surgery: 20              Admit Date: 2020      Visit Dx:      ICD-10-CM ICD-9-CM   1. General weakness R53.1 780.79       Patient Active Problem List   Diagnosis   • Crohn's disease of small intestine with other complication (CMS/HCC)   • Type 2 diabetes mellitus without complication (CMS/HCC)   • RSD upper limb   • Neuropathic pain of hand   • Hyperlipidemia   • Cervical myelopathy (CMS/HCC)   • Central pain syndrome   • Carpal tunnel syndrome   • Vitamin B 12 deficiency   • Guillain Barré syndrome (CMS/HCC)       Therapy Treatment    IRF Treatment Summary     Row Name 20 1509 20 0900          Evaluation/Treatment Time and Intent    Subjective Information  complains of;weakness;fatigue;pain  -AF  complains of;weakness  (Pended)   -NM     Existing Precautions/Restrictions  fall  -AF  fall  (Pended)   -NM     Document Type  therapy note (daily note)  -AF  therapy note (daily note)  (Pended)   -NM     Mode of Treatment  occupational therapy  -AF  physical therapy  (Pended)   -NM     Patient/Family Observations  sitting up in w/c in AM, supine on treatment mat in PM withPT   -AF  supine in bed;  present: c/o weakness and inability to bridge in the PM; bridged on mat although decreased height; AROM in other planes comparable to prior days; no new signs/symptoms   (Pended)  will recheck next treatment and monitor   -NM     Recorded by [AF] Reina Perera, OTR [NM] Jaxon Atkinson, PT Student     Row Name 20 1509 20 0900          Cognition/Psychosocial- PT/OT    Affect/Mental Status (Cognitive)  flat/blunted affect  -AF  flat/blunted affect  (Pended)   -NM     Orientation Status (Cognition)  oriented x 4  -AF  oriented x 4  (Pended)   -NM     Follows Commands (Cognition)  WFL   -AF  WFL  (Pended)   -NM     Personal Safety Interventions  fall prevention program maintained;gait belt;nonskid shoes/slippers when out of bed  -AF  fall prevention program maintained;gait belt;supervised activity;nonskid shoes/slippers when out of bed  (Pended)   -NM     Safety Deficit (Cognitive)  insight into deficits/self awareness  -AF  insight into deficits/self awareness  (Pended)   -NM     Recorded by [AF] Reina Perera, ALPESH [NM] Jaxon Atkinson, PT Student     Row Name 02/05/20 1509 02/05/20 0900          Bed Mobility Assessment/Treatment    Rolling Left [place] (Bed Mobility)  --  supervision  (Pended)   -NM     Rolling Right [place] (Bed Mobility)  --  supervision  (Pended)   -NM     Supine-Sit [place] (Bed Mobility)  supervision  -AF  supervision;verbal cues  (Pended)   -NM     Sit-Supine [place] (Bed Mobility)  contact guard A with BLEs in PM secondary to fatigue   -AF  --     Assistive Device (Bed Mobility)  --  bed rails  (Pended)   -NM     Recorded by [AF] Reina Perera, ALPESH [NM] Jaxon Atkinson, PT Student     Row Name 02/05/20 0900             Functional Mobility    Functional Mobility- Ind. Level  moderate assist (50% patient effort);2 person assist required;verbal cues required;nonverbal cues required (demo/gesture)  (Pended)  third person following c w/c   -NM      Functional Mobility- Device  rolling platform walker  (Pended)  BLE AFOs  -NM      Functional Mobility-Distance (Feet)  25  (Pended)   -NM      Functional Mobility- Safety Issues  sequencing ability decreased;step length decreased;weight-shifting ability decreased;loses balance backward  (Pended)   -NM      Functional Mobility- Comment  decreased step length and speed in the PM; reports increased fatigue and weakness, especially in hip extensors and back.   (Pended)   -NM      Recorded by [NM] Jaxon Atkinson, PT Student      Row Name 02/05/20 1509 02/05/20 0900          Bed-Chair Transfer    Bed-Chair  Eads (Transfers)  moderate assist (50% patient effort);verbal cues;nonverbal cues (demo/gesture)  -AF  moderate assist (50% patient effort);verbal cues;nonverbal cues (demo/gesture)  (Pended)   -NM     Assistive Device (Bed-Chair Transfers)  wheelchair  -AF  wheelchair  (Pended)   -NM     Recorded by [AF] Reina Perera OTR [NM] Jaxon Atkinson, PT Student     Row Name 02/05/20 1509             Chair-Bed Transfer    Chair-Bed Eads (Transfers)  moderate assist (50% patient effort);minimum assist (75% patient effort);verbal cues  -AF      Assistive Device (Chair-Bed Transfers)  wheelchair  -AF      Recorded by [AF] Reina Perera, OTR      Row Name 02/05/20 0900             Sit-Stand Transfer    Sit-Stand Eads (Transfers)  moderate assist (50% patient effort);2 person assist;verbal cues;nonverbal cues (demo/gesture)  (Pended)   -NM      Assistive Device (Sit-Stand Transfers)  wheelchair;walker, rolling platform  (Pended)  // bars  -NM      Recorded by [NM] Jaxon Atkinson, PT Student      Row Name 02/05/20 0900             Stand-Sit Transfer    Stand-Sit Eads (Transfers)  moderate assist (50% patient effort);2 person assist;verbal cues;nonverbal cues (demo/gesture)  (Pended)   -NM      Assistive Device (Stand-Sit Transfers)  wheelchair;walker, rolling platform  (Pended)  // bars   -NM      Recorded by [NM] Jaxon Atkinson, PT Student      Row Name 02/05/20 0900             Gait/Stairs Assessment/Training    Eads Level (Gait)  moderate assist (50% patient effort);2 person assist;verbal cues;nonverbal cues (demo/gesture)  (Pended)   -NM      Assistive Device (Gait)  walker, rolling platform  (Pended)  AFO to BLE   -NM      Distance in Feet (Gait)  15', 25'  (Pended)   -NM      Pattern (Gait)  step-through;step-to  (Pended)   -NM      Deviations/Abnormal Patterns (Gait)  bilateral deviations;ataxic;base of support, wide;gait speed decreased;stride length decreased;jt  decreased  (Pended)   -NM      Bilateral Gait Deviations  forward flexed posture;heel strike decreased;weight shift ability decreased;knee buckling, bilateral  (Pended)   -NM      Comment (Gait/Stairs)  amb c very slow speed and shortened steps this date. wc following behind c 3rd person. Assist propelling walker. wide SAMUEL and B feet hit the sides/front of the walker due to proprioceptive and motor control deficits in BLE   (Pended)   -NM      Recorded by [NM] Jaxon Atkinson, PT Student      Row Name 02/05/20 1509             Bathing Assessment/Treatment    Bathing [place] Level  upper body;contact guard assist  -AF      Bathing Position  sink side;supported sitting  -AF      Bathing Setup Assistance  obtain supplies  -AF      Recorded by [AF] Reina Perera OTR      Row Name 02/05/20 1509             Upper Body Dressing Assessment/Treatment    Upper Body Dressing Task  upper body dressing skills;minimum assist (75% or more patient effort);verbal cues  -AF      Upper Body Dressing Position  supported sitting  -AF      Set-up Assistance (Upper Body Dressing)  obtain clothing  -AF      Comment (Upper Body Dressing)  jacket and pull over shirt  -AF      Recorded by [AF] Reina Perera OTR      Row Name 02/05/20 1509             Grooming Assessment/Treatment    Grooming [place] Level  grooming skills;verbal cues;set up  -AF      Assistive Device (Grooming)  built-up handle items  -AF      Grooming Position  sink side;supported sitting  -AF      Grooming Setup Assistance  obtain supplies  -AF      Recorded by [AF] Reina Perera OTR      Row Name 02/05/20 1509             Fine Motor Testing & Training    Comment, Fine Motor Coordination  FMC task with practicing tip to tip or lateral pinch instead of using radial grasp to mainpulate pegs increased ability with L hand > than R hand, blue hand gripper   -AF      Recorded by [AF] Reina Perera OTR      Row Name 02/05/20 1509 02/05/20 0900           Pain Scale: Numbers Pre/Post-Treatment    Pain Scale: Numbers, Pretreatment  0/10 - no pain  -AF  --     Pain Scale: Numbers, Post-Treatment  0/10 - no pain  -AF  --     Pain Location - Side  --  Bilateral  (Pended)   -NM     Pain Location - Orientation  --  generalized  (Pended)   -NM     Pain Location  --  hand  (Pended)   -NM     Pre/Post Treatment Pain Comment  --  constant nerve tingling/burning in hands   (Pended)   -NM     Recorded by [AF] Reina Perera, ALPESH [NM] Jaxon Atkinson, PT Student     Row Name 02/05/20 0900             Balance    Balance  --  (Pended)   -NM      Recorded by [NM] Jaxon Atkinson, PT Student      Row Name 02/05/20 1509             Static Sitting Balance    Level of [place] (Unsupported Sitting, Static Balance)  standby assist;contact guard assist  -AF      Sitting Position (Unsupported Sitting, Static Balance)  sitting edge of mat  -AF      Time Able to Maintain Position (Unsupported Sitting, Static Balance)  4 to 5 minutes  -AF      Comment (Unsupported Sitting, Static Balance)  during FMC task  -AF      Recorded by [AF] Reina Perera, OTR      Row Name 02/05/20 0900             Standing Balance Activity    Support Needed for Balance (Standing, Balance Training)  minimal external support for balance, 75% patient effort;2 person assist  (Pended)  maxAx2 c LOB due to LE fatigue; wc behind pt inside // bars   -NM      Restrictions (Standing, Balance Training)  Occasional LOB; requires assist x 2 c wc behind pt   (Pended)   -NM      Comment (Standing, Balance Training)  in // bars, began c static standing. min-modA x 2 for midline orientation to midline as pt demos anteior leaning. Performed ant/post and bilateral weight shifting. Alternating stepping to targets, beginning c floor discs and then a step stool. Manual cues and assist c hip ext/abd and trunk ext during single limb stance. Lost balance 3 times requring assist to return to wc.   (Pended)  mirror for visual  feedback; aircast B ankle   -NM      Recorded by [NM] Jaxon Atkinson, PT Student      Row Name 02/05/20 1509             Upper Extremity Seated Therapeutic Exercise    Performed, Seated Upper Extremity (Therapeutic Exercise)  wrist flexion/extension;forearm supination/pronation;elbow flexion/extension;shoulder flexion/extension;scapular protraction/retraction  -AF      Device, Seated Upper Extremity (Therapeutic Exercise)  -- #2 hand weight, #2 dowel pa  -AF      Exercise Type, Seated Upper Extremity (Therapeutic Exercise)  AROM (active range of motion);resistive exercise  -AF      Expected Outcomes, Seated Upper Extremity (Therapeutic Exercise)  improve functional tolerance, self-care activity;improve motor control;improve performance, transfer skills;improve performance, reaching for objects;improve performance, reaching/manipulating objects  -AF      Sets/Reps Detail, Seated Upper Extremity (Therapeutic Exercise)  2/15  -AF      Transfers Skills, Training to Functional Activity, Seated Upper Extremity (Therapeutic Exercise)  beginning to transfer skills to functional activity  -AF      Comment, Seated Upper Extremity (Therapeutic Exercise)  supine with dowel pa exs, sitting supported in AM session with Hand weight exs   -AF      Recorded by [AF] Reina Perera OTR      Row Name 02/05/20 0900             Lower Extremity Seated Therapeutic Exercise    Performed, Seated Lower Extremity (Therapeutic Exercise)  LAQ (long arc quad), knee extension;hip flexion/extension;ankle dorsiflexion/plantarflexion  (Pended)   -NM      Exercise Type, Seated Lower Extremity (Therapeutic Exercise)  AROM (active range of motion)  (Pended)   -NM      Sets/Reps Detail, Seated Lower Extremity (Therapeutic Exercise)  1/10  (Pended)   -NM      Recorded by [NM] Jaxon Atkinson, PT Student      Row Name 02/05/20 0900             Lower Extremity Supine Therapeutic Exercise    Performed, Supine Lower Extremity (Therapeutic Exercise)   bridging (bilateral w/bolster);hip flexion/extension  (Pended)  decreased height on bridge in PM; vc for breathing   -NM      Exercise Type, Supine Lower Extremity (Therapeutic Exercise)  AROM (active range of motion)  (Pended)  resisted LE extension   -NM      Sets/Reps Detail, Supine Lower Extremity (Therapeutic Exercise)  1/10  (Pended)   -NM      Recorded by [NM] Jaxon Atkinson, PT Student      Row Name 02/05/20 0900             Lower Extremity Sidelying Therapeutic Exercise    Performed, Sidelying Lower Extremity (Therapeutic Exercise)  hip abduction  (Pended)  clamshells  -NM      Exercise Type, Sidelying Lower Extremity (Therapeutic Exercise)  AROM (active range of motion)  (Pended)   -NM      Sets/Reps Detail, Sidelying Lower Extremity (Therapeutic Exercise)  1/10  (Pended)   -NM      Recorded by [NM] Jaxon Atkinson, PT Student      Row Name 02/05/20 1509 02/05/20 0900          Positioning and Restraints    Pre-Treatment Position  sitting in chair/recliner  -AF  in bed  (Pended)   -NM     Post Treatment Position  bed  -AF  wheelchair  (Pended)   -NM     In Bed  supine;call light within reach;encouraged to call for assist;exit alarm on;with family/caregiver  present in AM and PM sessions   -AF  --     In Wheelchair  --  call light within reach;encouraged to call for assist;exit alarm on;with family/caregiver  (Pended)   -NM     Recorded by [AF] Reina Perera OTR [NM] Jaxon Atkinson, PT Student       User Key  (r) = Recorded By, (t) = Taken By, (c) = Cosigned By    Initials Name Effective Dates    AF Reina Perera, OTR 04/03/18 -     NM Jaxon Atkinson, YUSUF Student 01/03/20 -         Wound 12/09/19 1510 abdomen Incision (Active)   Dressing Appearance dry;intact 2/4/2020  7:31 PM   Closure SUJEY 2/5/2020  7:23 AM   Base dressing in place, unable to visualize 2/4/2020  7:31 PM           PT Recommendation and Plan                        Time Calculation:     PT Charges     Row Name 02/05/20  1507 02/05/20 1154          Time Calculation    Start Time  1300  (Pended)   -NM  0830  (Pended)   -NM     Stop Time  1330  (Pended)   -NM  0930  (Pended)   -NM     Time Calculation (min)  30 min  (Pended)   -NM  60 min  (Pended)   -NM     PT Received On  02/05/20  (Pended)   -NM  02/05/20  (Pended)   -NM     PT - Next Appointment  --  02/06/20  (Pended)   -NM       User Key  (r) = Recorded By, (t) = Taken By, (c) = Cosigned By    Initials Name Provider Type    NM Jaxon Atkinson, PT Student PT Student          Therapy Charges for Today     Code Description Service Date Service Provider Modifiers Qty    05359746247 HC GAIT TRAINING EA 15 MIN 2/4/2020 Jaxon Atkinson, PT Student GP 1    98032658265 HC PT THERAPEUTIC ACT EA 15 MIN 2/4/2020 Jaxon Atkinson, PT Student GP 3    55636630478 HC PT NEUROMUSC RE EDUCATION EA 15 MIN 2/4/2020 Jaxon Atkinson, PT Student GP 2    [id number] HC PT NEUROMUSC RE EDUCATION EA 15 MIN 2/5/2020 Jaxon Atkinson, PT Student GP 2    49528611337 HC GAIT TRAINING EA 15 MIN 2/5/2020 Jaxon Atkinson, PT Student GP 1    [id number] HC PT THERAPEUTIC ACT EA 15 MIN 2/5/2020 Jaxon Atkinson, PT Student GP 1    60798913574 HC PT THER PROC EA 15 MIN 2/5/2020 Jaxon Atkinson, PT Student GP 2                   Jaxon Atkinson, PT Student  2/5/2020

## 2020-02-05 NOTE — THERAPY TREATMENT NOTE
Inpatient Rehabilitation - Occupational Therapy Treatment Note    [facility]     Patient Name: She López  : 1945  MRN: [id number]    Today's Date: 2020  Onset of Illness/Injury or Date of Surgery: 20              Admit Date: 2020      Visit Dx:    ICD-10-CM ICD-9-CM   1. General weakness R53.1 780.79       Patient Active Problem List   Diagnosis   • Crohn's disease of small intestine with other complication (CMS/HCC)   • Type 2 diabetes mellitus without complication (CMS/HCC)   • RSD upper limb   • Neuropathic pain of hand   • Hyperlipidemia   • Cervical myelopathy (CMS/HCC)   • Central pain syndrome   • Carpal tunnel syndrome   • Vitamin B 12 deficiency   • Guillain Barré syndrome (CMS/HCC)         Therapy Treatment    IRF Treatment Summary     Row Name 20 1509 20 0900          Evaluation/Treatment Time and Intent    Subjective Information  complains of;weakness;fatigue;pain  -AF  complains of;weakness  (Pended)   -NM     Existing Precautions/Restrictions  fall  -AF  fall  (Pended)   -NM     Document Type  therapy note (daily note)  -AF  therapy note (daily note)  (Pended)   -NM     Mode of Treatment  occupational therapy  -AF  physical therapy  (Pended)   -NM     Patient/Family Observations  sitting up in w/c in AM, supine on treatment mat in PM withPT   -AF  supine in bed;  present   (Pended)   -NM     Recorded by [AF] Reina Perera, OTR [NM] Jaxon Atkinson, PT Student     Row Name 20 1509 20 0900          Cognition/Psychosocial- PT/OT    Affect/Mental Status (Cognitive)  flat/blunted affect  -AF  flat/blunted affect  (Pended)   -NM     Orientation Status (Cognition)  oriented x 4  -AF  oriented x 4  (Pended)   -NM     Follows Commands (Cognition)  WFL  -AF  WFL  (Pended)   -NM     Personal Safety Interventions  fall prevention program maintained;gait belt;nonskid shoes/slippers when out of bed  -AF  fall prevention program maintained;gait  belt;supervised activity;nonskid shoes/slippers when out of bed  (Pended)   -NM     Safety Deficit (Cognitive)  insight into deficits/self awareness  -AF  insight into deficits/self awareness  (Pended)   -NM     Recorded by [AF] Reina Perera, OTR [NM] Jaxon Atkinson, PT Student     Row Name 02/05/20 1509 02/05/20 0900          Bed Mobility Assessment/Treatment    Rolling Left [place] (Bed Mobility)  --  supervision  (Pended)   -NM     Rolling Right [place] (Bed Mobility)  --  supervision  (Pended)   -NM     Supine-Sit [place] (Bed Mobility)  supervision  -AF  supervision;verbal cues  (Pended)   -NM     Sit-Supine [place] (Bed Mobility)  contact guard A with BLEs in PM secondary to fatigue   -AF  --     Assistive Device (Bed Mobility)  --  bed rails  (Pended)   -NM     Recorded by [AF] Reina Perera, ALPESH [NM] Jaxon Atkinson, PT Student     Row Name 02/05/20 1509 02/05/20 0900          Bed-Chair Transfer    Bed-Chair [place] (Transfers)  moderate assist (50% patient effort);verbal cues;nonverbal cues (demo/gesture)  -AF  moderate assist (50% patient effort);verbal cues;nonverbal cues (demo/gesture)  (Pended)   -NM     Assistive Device (Bed-Chair Transfers)  wheelchair  -AF  wheelchair  (Pended)   -NM     Recorded by [AF] Reina Perera, ALPESH [NM] Jaxon Atkinson, PT Student     Row Name 02/05/20 1509             Chair-Bed Transfer    Chair-Bed [place] (Transfers)  moderate assist (50% patient effort);minimum assist (75% patient effort);verbal cues  -AF      Assistive Device (Chair-Bed Transfers)  wheelchair  -AF      Recorded by [AF] Reina Perera OTR      Row Name 02/05/20 0900             Sit-Stand Transfer    Sit-Stand [place] (Transfers)  moderate assist (50% patient effort);2 person assist;verbal cues;nonverbal cues (demo/gesture)  (Pended)   -NM      Assistive Device (Sit-Stand Transfers)  wheelchair;walker, rolling platform  (Pended)  // bars  -NM       Recorded by [NM] Jaxon Atkinson, PT Student      Row Name 02/05/20 0900             Stand-Sit Transfer    Stand-Sit [place] (Transfers)  moderate assist (50% patient effort);2 person assist;verbal cues;nonverbal cues (demo/gesture)  (Pended)   -NM      Assistive Device (Stand-Sit Transfers)  wheelchair;walker, rolling platform  (Pended)  // bars   -NM      Recorded by [NM] Jaxon Atkinson, PT Student      Row Name 02/05/20 0900             Gait/Stairs Assessment/Training    [place] Level (Gait)  moderate assist (50% patient effort);2 person assist;verbal cues;nonverbal cues (demo/gesture)  (Pended)   -NM      Assistive Device (Gait)  walker, rolling platform  (Pended)  AFO to BLE   -NM      Distance in Feet (Gait)  15', 25'  (Pended)   -NM      Pattern (Gait)  step-through;step-to  (Pended)   -NM      Deviations/Abnormal Patterns (Gait)  bilateral deviations;ataxic;base of support, wide;gait speed decreased;stride length decreased;jt decreased  (Pended)   -NM      Bilateral Gait Deviations  forward flexed posture;heel strike decreased;weight shift ability decreased;knee buckling, bilateral  (Pended)   -NM      Comment (Gait/Stairs)  amb c very slow speed and shortened steps this date. wc following behind c 3rd person. Assist propelling walker. wide SAMUEL and B feet hit the sides/front of the walker due to proprioceptive and motor control deficits in BLE   (Pended)   -NM      Recorded by [NM] Jaxon Atkinson, PT Student      Row Name 02/05/20 1509             Bathing Assessment/Treatment    Bathing [place] Level  upper body;contact guard assist  -AF      Bathing Position  sink side;supported sitting  -AF      Bathing Setup Assistance  obtain supplies  -AF      Recorded by [AF] Reina Perera OTMENDEZ      Row Name 02/05/20 1509             Upper Body Dressing Assessment/Treatment    Upper Body Dressing Task  upper body dressing skills;minimum assist (75% or more patient effort);verbal cues  -AF       Upper Body Dressing Position  supported sitting  -AF      Set-up Assistance (Upper Body Dressing)  obtain clothing  -AF      Comment (Upper Body Dressing)  jacket and pull over shirt  -AF      Recorded by [AF] Reina Perera OTR      Row Name 02/05/20 1509             Grooming Assessment/Treatment    Grooming [place] Level  grooming skills;verbal cues;set up  -AF      Assistive Device (Grooming)  built-up handle items  -AF      Grooming Position  sink side;supported sitting  -AF      Grooming Setup Assistance  obtain supplies  -AF      Recorded by [AF] Reina Perera OTR      Row Name 02/05/20 1509             Fine Motor Testing & Training    Comment, Fine Motor Coordination  FMC task with practicing tip to tip or lateral pinch instead of using radial grasp to mainpulate pegs increased ability with L hand > than R hand, blue hand gripper   -AF      Recorded by [AF] Reina Perera OTR      Row Name 02/05/20 1509 02/05/20 0900          Pain Scale: Numbers Pre/Post-Treatment    Pain Scale: Numbers, Pretreatment  0/10 - no pain  -AF  --     Pain Scale: Numbers, Post-Treatment  0/10 - no pain  -AF  --     Pain Location - Side  --  Bilateral  (Pended)   -NM     Pain Location - Orientation  --  generalized  (Pended)   -NM     Pain Location  --  hand  (Pended)   -NM     Pre/Post Treatment Pain Comment  --  constant nerve tingling/burning in hands   (Pended)   -NM     Recorded by [AF] Reina Perera, OTR [NM] Jaxon Atkinson, PT Student     Row Name 02/05/20 0900             Balance    Balance  --  (Pended)   -NM      Recorded by [NM] Jaxon Atkinson, PT Student      Row Name 02/05/20 1509             Static Sitting Balance    Level of [place] (Unsupported Sitting, Static Balance)  standby assist;contact guard assist  -AF      Sitting Position (Unsupported Sitting, Static Balance)  sitting edge of mat  -AF      Time Able to Maintain Position (Unsupported Sitting, Static Balance)  4 to 5 minutes   -AF      Comment (Unsupported Sitting, Static Balance)  during FMC task  -AF      Recorded by [AF] Reina Perera OTR      Row Name 02/05/20 0900             Standing Balance Activity    Support Needed for Balance (Standing, Balance Training)  minimal external support for balance, 75% patient effort;2 person assist  (Pended)  maxAx2 c LOB due to LE fatigue; wc behind pt inside // bars   -NM      Restrictions (Standing, Balance Training)  Occasional LOB; requires assist x 2 c wc behind pt   (Pended)   -NM      Comment (Standing, Balance Training)  in // bars, began c static standing. min-modA x 2 for midline orientation to midline as pt demos anteior leaning. Performed ant/post and bilateral weight shifting. Alternating stepping to targets, beginning c floor discs and then a step stool. Manual cues and assist c hip ext/abd and trunk ext during single limb stance. Lost balance 3 times requring assist to return to wc.   (Pended)  mirror for visual feedback; aircast B ankle   -NM      Recorded by [NM] Jaxon Atkinson, PT Student      Row Name 02/05/20 1509             Upper Extremity Seated Therapeutic Exercise    Performed, Seated Upper Extremity (Therapeutic Exercise)  wrist flexion/extension;forearm supination/pronation;elbow flexion/extension;shoulder flexion/extension;scapular protraction/retraction  -AF      Device, Seated Upper Extremity (Therapeutic Exercise)  -- #2 hand weight, #2 dowel pa  -AF      Exercise Type, Seated Upper Extremity (Therapeutic Exercise)  AROM (active range of motion);resistive exercise  -AF      Expected Outcomes, Seated Upper Extremity (Therapeutic Exercise)  improve functional tolerance, self-care activity;improve motor control;improve performance, transfer skills;improve performance, reaching for objects;improve performance, reaching/manipulating objects  -AF      Sets/Reps Detail, Seated Upper Extremity (Therapeutic Exercise)  2/15  -AF      Transfers Skills, Training to  Functional Activity, Seated Upper Extremity (Therapeutic Exercise)  beginning to transfer skills to functional activity  -AF      Comment, Seated Upper Extremity (Therapeutic Exercise)  supine with dowel pa exs, sitting supported in AM session with Hand weight exs   -AF      Recorded by [AF] Reina Perera OTR      Row Name 02/05/20 0900             Lower Extremity Seated Therapeutic Exercise    Performed, Seated Lower Extremity (Therapeutic Exercise)  LAQ (long arc quad), knee extension;hip flexion/extension;ankle dorsiflexion/plantarflexion  (Pended)   -NM      Exercise Type, Seated Lower Extremity (Therapeutic Exercise)  AROM (active range of motion)  (Pended)   -NM      Sets/Reps Detail, Seated Lower Extremity (Therapeutic Exercise)  1/10  (Pended)   -NM      Recorded by [NM] Jaxon Atkinson, PT Student      Row Name 02/05/20 1509 02/05/20 0900          Positioning and Restraints    Pre-Treatment Position  sitting in chair/recliner  -AF  in bed  (Pended)   -NM     Post Treatment Position  bed  -AF  wheelchair  (Pended)   -NM     In Bed  supine;call light within reach;encouraged to call for assist;exit alarm on;with family/caregiver  present in AM and PM sessions   -AF  --     In Wheelchair  --  call light within reach;encouraged to call for assist;exit alarm on;with family/caregiver  (Pended)   -NM     Recorded by [AF] Reina Perera, ALPESH [NM] Jaxon Atkinson, PT Student       User Key  (r) = Recorded By, (t) = Taken By, (c) = Cosigned By    Initials Name Effective Dates    AF Reina Perera OTR 04/03/18 -     NM Jaxon Atkinson, PT Student 01/03/20 -           Wound 12/09/19 1510 abdomen Incision (Active)   Dressing Appearance dry;intact 2/4/2020  7:31 PM   Closure SUJEY 2/5/2020  7:23 AM   Base dressing in place, unable to visualize 2/4/2020  7:31 PM         OT Recommendation and Plan                 OT IRF GOALS     Row Name 02/03/20 1540 01/25/20 1306          Transfer Goal 1 (OT-IRF)     Activity/Assistive Device (Transfer Goal 1, OT-IRF)  toilet;shower chair;walk-in shower  -AF  --     [place] Level (Transfer Goal 1, OT-IRF)  minimum assist (75% or more patient effort);moderate assist (50-74% patient effort);verbal cues required  -AF  --     Time Frame (Transfer Goal 1, OT-IRF)  short term goal (STG)  -AF  --     Progress/Outcomes (Transfer Goal 1, OT-IRF)  goal ongoing  -AF  --        Transfer Goal 2 (OT-IRF)    Activity/Assistive Device (Transfer Goal 2, OT-IRF)  toilet;shower chair;walk-in shower  -AF  --     [place] Level (Transfer Goal 2, OT-IRF)  verbal cues required;contact guard assist  -AF  --     Time Frame (Transfer Goal 2, OT-IRF)  long term goal (LTG)  -AF  --     Progress/Outcomes (Transfer Goal 2, OT-IRF)  goal ongoing  -AF  --        Bathing Goal 1 (OT-IRF)    Activity/Device (Bathing Goal 1, OT-IRF)  bathing skills, all;grab bar/tub rail;hand-held shower spray hose;shower chair  -AF  --     [place] Level (Bathing Goal 1, OT-IRF)  verbal cues required;moderate assist (50-74% patient effort)  -AF  --     Time Frame (Bathing Goal 1, OT-IRF)  short term goal (STG)  -AF  --     Progress/Outcomes (Bathing Goal 1, OT-IRF)  goal ongoing  -AF  --        Bathing Goal 2 (OT-IRF)    Activity/Device (Bathing Goal 2, OT-IRF)  bathing skills, all;grab bar/tub rail;hand-held shower spray hose;shower chair  -AF  --     [place] Level (Bathing Goal 2, OT-IRF)  verbal cues required;contact guard assist  -AF  --     Time Frame (Bathing Goal 2, OT-IRF)  long term goal (LTG)  -AF  --     Progress/Outcomes (Bathing Goal 2, OT-IRF)  goal ongoing  -AF  --        UB Dressing Goal 1 (OT-IRF)    Activity/Device (UB Dressing Goal 1, OT-IRF)  upper body dressing  -AF  --     [place] (UB Dress Goal 1, OT-IRF)  verbal cues required;set-up required  -AF  --     Time Frame (UB Dressing Goal 1, OT-IRF)  long term goal (LTG)  -AF  --     Progress/Outcomes (UB Dressing Goal 1, OT-IRF)  goal  ongoing  -AF  --        LB Dressing Goal 1 (OT-IRF)    Activity/Device (LB Dressing Goal 1, OT-IRF)  lower body dressing  -AF  --     [place] (LB Dressing Goal 1, OT-IRF)  verbal cues required;moderate assist (50-74% patient effort)  -AF  --     Time Frame (LB Dressing Goal 1, OT-IRF)  short term goal (STG)  -AF  --     Progress/Outcomes (LB Dressing Goal 1, OT-IRF)  goal ongoing  -AF  --        LB Dressing Goal 2 (OT-IRF)    Activity/Device (LB Dressing Goal 2, OT-IRF)  lower body dressing  -AF  --     [place] (LB Dressing Goal 2, OT-IRF)  contact guard assist;verbal cues required  -AF  --     Time Frame (LB Dressing Goal 2, OT-IRF)  long term goal (LTG)  -AF  --     Progress/Outcomes (LB Dressing Goal 2, OT-IRF)  goal ongoing  -AF  --        Grooming Goal 1 (OT-IRF)    Activity/Device (Grooming Goal 1, OT-IRF)  grooming skills, all  -AF  --     [place] (Grooming Goal 1, OT-IRF)  set-up required;verbal cues required  -AF  --     Time Frame (Grooming Goal 1, OT-IRF)  short term goal (STG);long term goal (LTG)  -AF  --     Progress/Outcomes (Grooming Goal 1, OT-IRF)  goal ongoing  -AF  --        Toileting Goal 1 (OT-IRF)    Activity/Device (Toileting Goal 1, OT-IRF)  toileting skills, all;grab bar/safety frame;raised toilet seat  -AF  --     [place] Level (Toileting Goal 1, OT-IRF)  maximum assist (25-49% patient effort);verbal cues required  -AF  --     Time Frame (Toileting Goal 1, OT-IRF)  short term goal (STG)  -AF  --     Progress/Outcomes (Toileting Goal 1, OT-IRF)  goal ongoing  -AF  --        Toileting Goal 2 (OT-IRF)    Activity/Device (Toileting Goal 2, OT-IRF)  toileting skills, all;grab bar/safety frame;raised toilet seat  -AF  --     [place] Level (Toileting Goal 2, OT-IRF)  minimum assist (75% or more patient effort)  -AF  --     Time Frame (Toileting Goal 2, OT-IRF)  long term goal (LTG)  -AF  --     Progress/Outcomes (Toileting Goal 2, OT-IRF)  goal ongoing  -AF  --         Balance Goal 1 (OT)    Activity/Assistive Device (Balance Goal 1, OT)  standing, static  -AF  standing, static  -KP     [place] Level/Cues Needed (Balance Goal 1, OT)  moderate assist (50-74% patient effort);verbal cues required  -AF  set-up required;maximum assist (25-49% patient effort)  -KP     Time Frame (Balance Goal 1, OT)  short term goal (STG)  -AF  short term goal (STG);1 week  -KP     Progress/Outcomes (Balance Goal 1, OT)  goal ongoing  -AF  goal ongoing  -KP        Balance Goal 2 (OT)    Activity/Assistive Device (Balance Goal 2, OT)  standing, static  -AF  standing, static  -KP     [place] Level (Balance Goal 2, OT)  minimum assist (75% or more patient effort);contact guard assist  -AF  set-up required;minimum assist (75% or more patient effort);moderate assist (50-74% patient effort)  -KP     Time Frame (Balance Goal 2, OT)  long term goal (LTG)  -AF  long term goal (LTG);by discharge  -KP     Progress/Outcome (Balance Goal 2, OT)  goal ongoing  -AF  goal ongoing  -KP        Caregiver Training Goal 1 (OT-IRF)    Caregiver Training Goal 1 (OT-IRF)  Pt and  will demo safe techniques with ADLs, trasnfer, HEP and AD prior to d/c home   -AF  --     Time Frame (Caregiver Training Goal 1, OT-IRF)  long term goal (LTG)  -AF  --     Progress/Outcomes (Caregiver Training Goal 1, OT-IRF)  goal ongoing  -AF  --       User Key  (r) = Recorded By, (t) = Taken By, (c) = Cosigned By    Initials Name Provider Type    Lay Maldonado, OTR Occupational Therapist    Reina Noe, OTR Occupational Therapist                     Time Calculation:     Time Calculation- OT     Row Name 02/05/20 1516 02/05/20 1515          Time Calculation- OT    OT Start Time  1330  -AF  0930  -AF     OT Stop Time  1400  -AF  1030  -AF     OT Time Calculation (min)  30 min  -AF  60 min  -AF       User Key  (r) = Recorded By, (t) = Taken By, (c) = Cosigned By    Initials Name Provider Type    Reina Noe  Linda, OTR Occupational Therapist          Therapy Charges for Today     Code Description Service Date Service Provider Modifiers Qty    42751126382 HC OT SELF CARE/MGMT/TRAIN EA 15 MIN 2/4/2020 Reina Perera, OTR GO 3    48857720261 HC OT THER PROC EA 15 MIN 2/4/2020 Reina Perera, OTR GO 1    64102740629 HC OT NEUROMUSC RE EDUCATION EA 15 MIN 2/4/2020 Reina Perera, OTR GO 2    20262112404 HC OT SELF CARE/MGMT/TRAIN EA 15 MIN 2/5/2020 Reina Perera, OTR GO 3    55367244231 HC OT THER PROC EA 15 MIN 2/5/2020 Reina Perera, OTR GO 2    55049537581 HC OT NEUROMUSC RE EDUCATION EA 15 MIN 2/5/2020 Reina Perera, OTR GO 1                   Reina Perera OTMENDEZ  2/5/2020

## 2020-02-05 NOTE — PLAN OF CARE
Patient is calm and cooperative. Alert and oriented x 4. Using the call light for assistance. PRN Gabapentin given  at 0310 for generalized pain. Taking her medication with water without difficulty. Ileostomy bag is patent, bowel is light brown liquid. Abdominal dressing dry and intact.

## 2020-02-05 NOTE — PROGRESS NOTES
Occupational Therapy: Branch    Physical Therapy: Individual: 90 minutes.    Speech Language Pathology:  Branch    Signed by: Jaxon Atkinson PT Student     - CoSigned By: Chelsie Cilne PT 2/5/2020 3:35:17 PM

## 2020-02-05 NOTE — PROGRESS NOTES
"                                                                                        [facility] Follow up Note        PATIENT IDENTIFICATION     Name: She López  Age: 74 y.o.  Sex: female  :  1945  MRN: [id number]       CHIEF COMPLIANTS / REASON FOR FOLLOW UP          Hyponatremia,metabolic acidosis.      Subjective:          Pt is a 74 yrs old white female s/p colostomy previously,recent admission to [facility] for gbs treated with 5 sessions of plasmapheresis,our group followed for plasmaperesis treatment that was completed uneventfully.Pt also noted with hyponatremia,controlled with oral salt.we will follow for ongoing hyponatremia.    20:improving with pt.walked 4 steps today.  2020: Motor strength continue to improve.  20:seen in pt.  2/3/20:feels better.     Review of Systems:          Constitutional: No fever, no chills, no lethargy, no weakness.  HEENT:  No headache, otalgia, itchy eyes, nasal discharge or sore throat.  Cardiac:  No chest pain, dyspnea, orthopnea or PND.  Chest:  No cough, phlegm or wheezing.  Abdomen:  No abdominal pain, nausea or vomiting.  Neuro:  No focal weakness, abnormal movements or seizure-like activity.  :   No hematuria, no pyuria, no dysuria, no flank pain.  Extremities:  No  joint pains.  ROS was otherwise negative except as mentioned in the [facility].        OBJECTIVE                                                                        Exam:  /61 (BP Location: Left arm, Patient Position: Lying)   Pulse 72   Temp 98.4 °F (36.9 °C) (Oral)   Resp 18   Ht 165.1 cm (65\")   Wt 58.1 kg (128 lb)   LMP  (LMP Unknown)   SpO2 99%   BMI 21.30 kg/m²   Intake/Output last 3 shifts:  I/O last 3 completed shifts:  In: 1050 [P.O.:1050]  Out: 2575 [Urine:200; Stool:2375]  Intake/Output this shift:  No intake/output data recorded.    General Appearance:  Alert, cooperative, no distress, appears stated age  Head:  " Normocephalic, without obvious abnormality, atraumatic  Eyes:  Sclerae anicteric, EOM's intact     Neck:  Supple,  no adenopathy;      Lungs:   Clear to auscultation bilaterally, respirations unlabored  Heart:  Regular rate and rhythm, S1 and S2 normal, no  rub   or gallop  Abdomen:  Soft, nontender,    no masses, no hepatomegaly, no splenomegaly  Extremities:  Extremities normal, trace edema  Neurologic:   Alert and oriented, no focal deficits    Scheduled Meds:    doxycycline 100 mg Oral Q12H   gabapentin 300 mg Oral 4x Daily   loperamide 2 mg Oral TID AC   MULTIVITAMIN ADULT 1 tablet Sublingual Daily   pantoprazole 40 mg Oral Q AM   sodium bicarbonate 650 mg Oral BID   sodium chloride 1 g Oral BID With Meals   Cyanocobalamin 2,500 mcg Sublingual Weekly   Vitamin D-3 5,000 Units Sublingual Daily     Continuous Infusions:   PRN Meds:•  aluminum sulfate-calcium acetate  •  gabapentin **AND** gabapentin  •  miconazole         Data Review:                                                                           CBC:   Results from last 7 days   Lab Units 02/03/20  0623   WBC 10*3/mm3 7.05   RBC 10*6/mm3 3.26*     BMP:   Results from last 7 days   Lab Units 02/03/20  0623   GLUCOSE mg/dL 109*   CO2 mmol/L 18.2*   BUN mg/dL 12   CREATININE mg/dL 0.89   CALCIUM mg/dL 8.9     ABGs:       Invalid input(s): PO2       Imaging:                                                                                         ASSESSMENT:                                                                                Guillain Barré syndrome (CMS/HCC)       Hyponatremia, secondary to increased/high ADH state    Gillan barre syndrome.    crohns disease/s/p colostomy.    Hyperlipidemia.    Skin cancer.    Non aniongap metabolic acidosis:secondary to gi losses.    Iron deficiency    Anemia.    Vit d deficiency.  ·       PLAN:                                                                            ·   Sodium level acceptable.  Continue  sodium bicarbonate for sodium and acidosis.  Discontinue any fluid restriction .  Defer transfusion to medicine.  Fecal occult blood positive  Might need GI work-up   Will follow.           Kalen Mao MD  [facility]  2/5/2020  7:05 AM

## 2020-02-06 PROCEDURE — 99223 1ST HOSP IP/OBS HIGH 75: CPT | Performed by: PSYCHIATRY & NEUROLOGY

## 2020-02-06 PROCEDURE — 97112 NEUROMUSCULAR REEDUCATION: CPT

## 2020-02-06 PROCEDURE — 97535 SELF CARE MNGMENT TRAINING: CPT

## 2020-02-06 PROCEDURE — 97116 GAIT TRAINING THERAPY: CPT

## 2020-02-06 PROCEDURE — 97530 THERAPEUTIC ACTIVITIES: CPT

## 2020-02-06 PROCEDURE — 97110 THERAPEUTIC EXERCISES: CPT

## 2020-02-06 RX ADMIN — PANTOPRAZOLE SODIUM 40 MG: 40 TABLET, DELAYED RELEASE ORAL at 05:32

## 2020-02-06 RX ADMIN — Medication 1 TABLET: at 08:15

## 2020-02-06 RX ADMIN — SODIUM BICARBONATE 650 MG: 650 TABLET ORAL at 21:16

## 2020-02-06 RX ADMIN — SODIUM CHLORIDE TAB 1 GM 1 G: 1 TAB at 17:03

## 2020-02-06 RX ADMIN — LOPERAMIDE HYDROCHLORIDE 2 MG: 2 CAPSULE ORAL at 12:12

## 2020-02-06 RX ADMIN — SODIUM BICARBONATE 650 MG: 650 TABLET ORAL at 08:14

## 2020-02-06 RX ADMIN — GABAPENTIN 300 MG: 300 CAPSULE ORAL at 12:12

## 2020-02-06 RX ADMIN — LOPERAMIDE HYDROCHLORIDE 2 MG: 2 CAPSULE ORAL at 17:03

## 2020-02-06 RX ADMIN — Medication 18 MG: at 21:17

## 2020-02-06 RX ADMIN — GABAPENTIN 300 MG: 300 CAPSULE ORAL at 17:03

## 2020-02-06 RX ADMIN — GABAPENTIN 300 MG: 300 CAPSULE ORAL at 21:17

## 2020-02-06 RX ADMIN — GABAPENTIN 300 MG: 300 CAPSULE ORAL at 03:07

## 2020-02-06 RX ADMIN — GABAPENTIN 300 MG: 300 CAPSULE ORAL at 08:14

## 2020-02-06 RX ADMIN — Medication 18 MG: at 08:14

## 2020-02-06 RX ADMIN — DOXYCYCLINE 100 MG: 100 CAPSULE ORAL at 21:16

## 2020-02-06 RX ADMIN — LOPERAMIDE HYDROCHLORIDE 2 MG: 2 CAPSULE ORAL at 08:14

## 2020-02-06 RX ADMIN — SODIUM CHLORIDE TAB 1 GM 1 G: 1 TAB at 08:14

## 2020-02-06 RX ADMIN — CHOLECALCIFEROL TAB 125 MCG (5000 UNIT) 5000 UNITS: 125 TAB at 08:14

## 2020-02-06 RX ADMIN — DOXYCYCLINE 100 MG: 100 CAPSULE ORAL at 08:14

## 2020-02-06 NOTE — PROGRESS NOTES
LOS: 13 days   Patient Care Team:  Armando Valentine MD as PCP - General (Internal Medicine)  Lisa Arriaza MD as Consulting Physician (Obstetrics and Gynecology)    Chief Complaint: GBS    Subjective     History of Present Illness     Patient complains of increased numbness in her knees.  She is noted to have increased weakness with her hip extensors and possibly in the left quadricep.  Her ambulation distance is less and takes more effort with a shorter stride.  On examination she also appears weaker in her hands and ankles.  We discussed having neurology see her to assess for possibly IVIG or another round of plasmapheresis.    Warmth and erythema at the right antecubital fossa continues.  She was seen in the gym and it did not.  Is red but that may be due to different lighting as she thought it was redder in the room.  Not as warm to palpation as it was yesterday.     Transfers minimal-moderate assist yesterday.  Gait moderate assist of 2 platform rolling walker bilateral AFO, 15-25 feet    History taken from: patient chart family    Objective     Vital Signs  Temp:  [98 °F (36.7 °C)-98.3 °F (36.8 °C)] 98.3 °F (36.8 °C)  Heart Rate:  [80-93] 80  Resp:  [18] 18  BP: ()/(57-65) 107/65    Physical Exam:  General: Awake, alert, NAD  HEENT: normocepahlic, atraumatic   Heart: RRR, no m/r/g  Lungs: CTAB, no wheezing, rales, or rhonchi  Abdomen: soft, non-tender, non-distended, colostomy , incision dressed  Strength: BUE: 4/5 with bilateral elbow flexion, elbow extension, wrist extension, and finger flexion, right greater than left weak hand intrinsics R 2/5, L 3/5       BLE:  HF right 3-/5, left 2/5,  knee extension right 4/5, left 4- to 4/5, ankle dorsiflexion B 3/5  Extremities: Right antecubital fossa with erythema that extends to the distal upper arm and an area of induration/cord along the vein with surrounding erythema.  No drainage.    Results Review:     I reviewed the patient's new clinical  results.  Results from last 7 days   Lab Units 02/05/20  0648 02/03/20  0623 02/01/20  0509   WBC 10*3/mm3 7.22 7.05 5.64   HEMOGLOBIN g/dL 8.9* 9.5* 8.8*   HEMATOCRIT % 28.1* 29.1* 27.5*   PLATELETS 10*3/mm3 468* 602* 647*     Results from last 7 days   Lab Units 02/05/20  0648 02/03/20  0623 01/31/20  0618   SODIUM mmol/L 137 138 133*   POTASSIUM mmol/L 4.2 4.4 4.9   CHLORIDE mmol/L 104 105 101   CO2 mmol/L 20.0* 18.2* 19.4*   BUN mg/dL 12 12 17   CREATININE mg/dL 0.82 0.89 0.81   CALCIUM mg/dL 8.5* 8.9 9.0   GLUCOSE mg/dL 112* 109* 109*       Medication Review:   Scheduled Meds:    doxycycline 100 mg Oral Q12H   Iron 18 mg Sublingual BID   gabapentin 300 mg Oral 4x Daily   loperamide 2 mg Oral TID AC   MULTIVITAMIN ADULT 1 tablet Sublingual Daily   pantoprazole 40 mg Oral Q AM   sodium bicarbonate 650 mg Oral BID   sodium chloride 1 g Oral BID With Meals   Cyanocobalamin 2,500 mcg Sublingual Weekly   Vitamin D-3 5,000 Units Sublingual Daily     Continuous Infusions:   PRN Meds:.•  aluminum sulfate-calcium acetate  •  gabapentin **AND** gabapentin  •  miconazole    Assessment/Plan   73 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     GBS  -Completed her plasmapharesis and has gotten good return.   - Will begin PT/OT for ADLS, strength, mobility, txs, gait.   -January 27: On gabapentin 300mg QID. Will continue to monitor and will titrate up as needed.  -Jan 29 -  Feels strength is somewhat better.  Worked on gait with rolling walker yesterday 15 feet. Today utilized ankle weights to decrease ataxic movements with gait. Transfers mod max assist. Bed mobility CTG.  - Feb 6 - Patient complains of increased numbness in her knees.  She is noted to have increased weakness with her hip extensors and possibly in the left quadricep.  Her ambulation distance is less and takes more effort with a shorter stride.  On examination she also appears weaker in her hands and ankles.  Discussed having neurology see her to  assess for possibly IVIG or another round of plasmapheresis.    Crohn's  -s/p recent colostomy- wound nurse to see and education for home management.   -If more than 1 liter output a day, needs Immodium-per Dr. Albrecht colorectal surgeon.     Hyponatremia  -On salt tabs and fluid restriction. Renal following  -January 27: Na 130. Continue salt tabs and fluid restriction per renal. Will continue to monitor  -January 30: Na 133. Continue sodium bicarbonate and fluid restriction per renal.  -Feb 3- Na improved to 138  -February 4-fluid restriction discontinued.  -February 5-sodium 137    Anemia  -January 27: Hgb/Hct 7.6/24.3- stable. No signs of active bleeding. Will order fecal occult and reticulocyte count. Will continue to monitor.  -Jan 28 - hemoccult positive  -Jan 29 - Stool heme +.  HGB stable at 7.5. She had hypotension and tachycardia yesterday 87/64 and 110) , better today (99/63 and 88).  Reviewed option of transfusion PRBCs. She wished to hold on transfusion unless absolutely necessary. Discussed if HGB < 7, would recommend definitely transfuse.  -January 30: Repeat CBC scheduled for tomorrow. Will continue to monitor.    -January 31: Hgb 7.2. Will transfuse 1 unit of PRBCs today. Ordered anemia studies. Spoke with Dr. Albrecht and if iron supplementation is required she recommends IV iron for better absorption.  -February 1: Hemoglobin improved 8.8.  IV iron infusion ordered by nephrology  -Feb 3 - HGB improved 9.5. Not tolerate IV iron infusion due to burning in vein, does not wish to have placed a more proximal IV. She had tow infusions. She will get EZ Melt Iron from outside to take by mouth; on discussion with colorectal surgery last week she should be able to absorb PO iron.  February 5-hemoglobin 8.5    DVT prophylaxis  -SCDS for now.   -January 27: Heparin has been on hold due to HGB trending down. Following results of fecal occult and reticulocyte count will consider restarting Heparin for DVT  prophylaxis.   -January 28 - hemoccult positive.     Vitamin D deficiency-vitamin D 22.4  on January 29.    Feb 1 - Patient does not wish to take vitamin D p.o. through the hospital supply.  Wishes to have her vitamin D brought in from home.    Vitamin B12 replacement-sublingual from home    RUE antecubital fossa phlebitis/cellulitis-February 3-no sign of infection. K-PAD.   February 4-Right antecubital fossa phlebitis with cord palpable/tender with surrounding erythema consistent with cellulitis. Allergy to Keflex - throat swelled. Will start Doxycycline 100 mg bid x 7 days, continue K-pad.   February 5-She continues to have pain and redness and swelling of the right antecubital fossa and distal upper arm.  Reviewed continue with doxycycline which was started yesterday but if there is no show improvement will look to adjust antibiotic tomorrow.  White blood cell count is 7.22 with normal differential.    TEAM CONF - JAN 28 - FLAT AFFECT. SUPINE SIT MIN ASSIST. TRANSFERS MAX 2. NONAMBULATORY. UBB MIN. LBB MAX. UBD MOD.  LBD DEP. ABD WOUND CARE. STOOL HEMOCCULT POSITIVE.  ELOS - 5 WEEKS.     TEAM CONF - FEB 4 - BED SBA. TRANSFERS MIN-MOD. GAIT 30 FEET MIN 2 FIDEL WALKER. UBD MOD. LBD DEP. ON 1200 CC FLUID RESTRICTION. EATING OKAY.  ABD WOUND CLOSING. OSTOMY DEVICE STAYING ON.  CONTINENT. NEEDS TO GET UP TO BSC.   ELOS- 4 WEEKS.         Adolfo Valle MD  02/06/20  9:36 AM        >35 minutes with > 50% face-to-face / floor time / coordination of care

## 2020-02-06 NOTE — NURSING NOTE
02/06/20 1530   Wound 12/09/19 1510 abdomen Incision   Date first assessed/Time first assessed: 12/09/19 1510   Location: (c) abdomen  Primary Wound Type: Incision   Dressing Appearance dry;intact;no drainage   Periwound intact   Drainage Amount none   Care, Wound cleansed with;sterile normal saline   Dressing Care, Wound   (hydrogel, hydrocolloid)   Ileostomy RUQ   Placement Date/Time: 12/15/19 1407   Inserted by: DR PRAKASH  Location: RUQ   Stomal Appliance 1 piece;Intact;Changed   Stoma Appearance round;moist;pink   Peristomal Assessment Intact;Maceration  (slight maceration at top)   Accessories/Skin Care convex wafer;cleansed with water;skin barrier powder;skin barrier ring;skin barrier strip;skin sealant   Stoma Function stool   Stool Color brown   Stool Consistency loose   Treatment Bag change   Output (mL) 100 mL   CWOCN for pouch change.  Brittani one piece convex with barrier ring and edges working well for patient.   Midline abdominal wound cleaned and hydrogel placed into opening, dry dressing.  Progressive healing and should be able to discontinue soon.

## 2020-02-06 NOTE — PROGRESS NOTES
Occupational Therapy: Branch    Physical Therapy: Individual: 90 minutes.    Speech Language Pathology:  Branch    Signed by: Jaoxn Atkinson PT Student     - CoSigned By: Chelsie Cline PT 2/6/2020 4:04:20 PM

## 2020-02-06 NOTE — CONSULTS
Neurology Consult Note    Consult Date: 2/6/2020    Referring MD: Adolfo Valle, *    Reason for Consult I have been asked to see the patient in neurological consultation to render advice and opinion regarding weakness    She López is a 74 y.o. female with past medical history of Crohn's disease status post multiple bowel resections recently at [facility], cervical myelopathy and carpal tunnel syndrome with bilateral distal hand weakness at baseline, hyperlipidemia.  Following her bowel resections at [place] she later developed subacute onset of weakness and numbness.  She developed tingling of her distal extremities followed by proximal muscle weakness and difficulty walking.  She underwent a lumbar puncture at [place] which had normal cell count and normal protein but elevated IgG index and synthesis rate.  She was treated presumptively for acute inflammatory demyelinating polyradiculoneuropathy with plasma exchange daily for 5 cycles.  Her symptoms stabilized and she went to inpatient acute rehab at this facility where she improved for the first week or so but today felt weaker in her hips with increased tingling in the distal hands and feet.  We were consulted for early worsening of her acute neuropathy.    Past Medical/Surgical Hx:  Past Medical History:   Diagnosis Date   • Arthritis    • Wynn esophagus    • Cancer (CMS/[facility])     KERATOACANTHOMA TYPE OF SCC   • Cervical myelopathy (CMS/HCC) 08/15/2014   • Crohn's disease (CMS/HCC) 2000    FOLLOWED BY DR. YANG BIRD   • Hemorrhoids    • Hiatal hernia    • Hiatal hernia    • History of carpal tunnel syndrome    • History of transfusion    • Hyperlipidemia    • Muscle weakness     RIGHT HAND   • Pelvic abscess in female 2001   • PONV (postoperative nausea and vomiting)    • RSD upper limb 08/2014   • Vitamin B 12 deficiency    • Vitamin D deficiency      Past Surgical History:   Procedure Laterality Date   • CARPAL TUNNEL RELEASE  Right 09/2012   • CERVICAL FUSION N/A 01/25/2012    C- 5,6,7 fused, DR. MISAEL NORTH AT [place]   • CHOLECYSTECTOMY N/A 04/12/2018    LAPAROSCOPIC, DR. VENICE SALDAÑA AT [place]   • COLON RESECTION N/A 12/9/2019    Procedure: laparoscopic to open right hemicolectomy;  Surgeon: Dorcas Albrecht MD;  Location: [facility] OR;  Service: General   • COLON RESECTION N/A 12/20/2019    Procedure: EXPLORATORY LAPAROTOMY WITH SMALL BOWEL RESECTION;  Surgeon: Dorcas Albrecht MD;  Location: [facility] OR;  Service: General   • COLONOSCOPY N/A 8/16/2019    ANAL SKIN TAGS, ANASTAMOSIS ULCERATED, INDURATED, AND NODULAR, INTERNAL HEMORRHOIDS, ANASTAMOSIS STRICTURE, DR. YANG BIRD AT [facility]   • COLONOSCOPY N/A 04/20/2010    PROCTITIS, RECTAL STENOSIS, ACTIVE CROHNS DISEASE AT [facility], RIGHT COLON, DR.GERARD AGRAWAL AT [place] [facility]   • ENDOSCOPY N/A 8/16/2019    GRADE A ESOPHAGITIS, SLIDING HIATAL HERNIA, EROSIVE GASTRITIS, Z LINE IRREGULAR, DR. YANG BIRD AT [facility]   • EXPLORATORY LAPAROTOMY N/A 12/15/2019    Procedure: LAPAROTOMY EXPLORATORY AND WASHOUT, RESECTION OF ANASTOMOSIS WITH END ILEOSTOMY;  Surgeon: Dorcas Albrecht MD;  Location: [facility];  Service: General   • EYE SURGERY     • SMALL INTESTINE SURGERY N/A 10/01/2001    ILEOCOLECTOMY       Medications On Admission  Medications Prior to Admission   Medication Sig Dispense Refill Last Dose   • Cholecalciferol (VITAMIN D3) 125 MCG (5000 UT) capsule capsule Take 5,000 Units by mouth Daily.      • Cyanocobalamin (VITAMIN B-12) 1000 MCG sublingual tablet Place 1,000 mcg under the tongue Daily.      • meclizine (ANTIVERT) 25 MG tablet Take 25 mg by mouth 3 (Three) Times a Day.      • metoclopramide (REGLAN) 5 MG tablet Take 1 tablet by mouth 3 (Three) Times a Day Before Meals for 30 days. 90 tablet 0    • omeprazole (priLOSEC) 20 MG capsule Take 1 capsule by mouth Daily. 30 capsule 5    • ondansetron (ZOFRAN) 4 MG tablet Take 4 mg by mouth Daily.      •  "promethazine (PHENERGAN) 12.5 MG tablet 1-2 tabs po q 6 hrs prn nausea 24 tablet 0    • traMADol (ULTRAM) 50 MG tablet Take 1 tablet by mouth 2 (Two) Times a Day As Needed for Moderate Pain . 20 tablet 0    • simvastatin (ZOCOR) 20 MG tablet Take 20 mg by mouth Daily.  2 Unknown at Unknown time       Allergies:  Allergies   Allergen Reactions   • Asacol [Mesalamine] GI Intolerance     Abdominal pain, fever   • Influenza Vaccines Other (See Comments)     FEVER   • Keflex [Cephalexin] Swelling   • Other Swelling     DURING EYE SURGERY HAD REACTION TO THE CLEANSER USED DURING SURGERY AROUND LEFT EYE (EYE SWELLING)--NAME OF CLEANSING AGENT UNKNOWN       Social Hx:  Social History     Socioeconomic History   • Marital status:      Spouse name: Not on file   • Number of children: Not on file   • Years of education: Not on file   • Highest education level: Not on file   Tobacco Use   • Smoking status: Never Smoker   • Smokeless tobacco: Never Used   Substance and Sexual Activity   • Alcohol use: Yes     Comment: MODERATE AMT   • Drug use: No   • Sexual activity: Defer     Birth control/protection: Post-menopausal       Family Hx:  Family History   Problem Relation Age of Onset   • Breast cancer Sister    • Cancer Sister    • Breast cancer Maternal Grandmother    • Parkinsonism Mother    • Heart disease Father    • Colon cancer Neg Hx    • Colon polyps Neg Hx    • Malig Hyperthermia Neg Hx        Review of systems  Constitutional: [No fevers, chills]  Eye: [No recent visual problems, eye discharge]  Respiratory: [No shortness of breath, cough]  Cardiovascular: [No Chest pain, palpitations]  Neurologic: [+ weakness, numbness]  Psychiatric: [No anxiety, depression]    All other systems reviewed and are negative    Exam    /65 (BP Location: Right arm, Patient Position: Lying)   Pulse 80   Temp 98.3 °F (36.8 °C) (Oral)   Resp 18   Ht 165.1 cm (65\")   Wt 58.1 kg (128 lb)   LMP  (LMP Unknown)   SpO2 98%   BMI " 21.30 kg/m²   gen: NAD, vitals reviewed  Eyes: fundus sharp with no papilledema or retinal hemorrhages  HEENT: no nuchal rigidity  CVS: RRR, S1, S2  MS: oriented x3, recent/remote memory intact, normal attention/concentration, language intact, no neglect, normal fund of knowledge  CN: visual acuity grossly normal, visual fields full, PERRL, EOMI, facial sensation equal, no facial droop, hearing symmetric, palate elevates symmetrically, shoulder shrug equal, tongue midline  Motor: 4/5 bilateral shoulder abduction, 4+/5 arm flexion and extension, 4/5 handgrip bilaterally with obvious wasting of the FDIs.  Sensation: Moderately diminished vibratory sensation bilateral lower extremities and upper extremities  Reflexes: Absent throughout, mute plantars  Coordination: no dysmetria with finger to nose bilaterally  Gait: Unable to stand without assistance due to proximal muscle weakness    DATA:    Lab Results   Component Value Date    GLUCOSE 112 (H) 02/05/2020    CALCIUM 8.5 (L) 02/05/2020     02/05/2020    K 4.2 02/05/2020    CO2 20.0 (L) 02/05/2020     02/05/2020    BUN 12 02/05/2020    CREATININE 0.82 02/05/2020    EGFRIFNONA 69 02/05/2020    BCR 14.6 02/05/2020    ANIONGAP 13.0 02/05/2020     Lab Results   Component Value Date    WBC 7.22 02/05/2020    HGB 8.9 (L) 02/05/2020    HCT 28.1 (L) 02/05/2020    MCV 89.2 02/05/2020     (H) 02/05/2020     No results found for: LDL  No results found for: HGBA1C  Lab Results   Component Value Date    INR 1.1 01/20/2020    INR 1.1 01/16/2020    INR 1.07 12/10/2019    PROTIME 12.7 01/20/2020    PROTIME 11.9 01/16/2020    PROTIME 13.7 12/10/2019       Lab review: Hemoglobin 8.9, platelets 468    Reviewed discharge summary and lab results from [facility], summarized in HPI above    Diagnoses:  Acute inflammatory demyelinating polyradiculoneuropathy  Crohn's disease  Cervical myelopathy  Bilateral carpal tunnel syndrome    Comment: Her exam is difficult due  to baseline issues from cervical spine disease and carpal tunnel but certainly her history of autoimmune disease and presentation of subacute weakness with areflexia are consistent with some sort of acute inflammatory neuropathy.  Her CSF findings with elevated IgG index imply an autoimmune process affecting her current presentation.  She had some response to plasma exchange.  The question is whether her worsening today is an early treatment fluctuation or not.  I think if her weakness and sensory symptoms clearly worsen further tomorrow we need to consider distal treatment with IVIG or plasma exchange. If she stabilizes I would hold off on that if possible given the difficulty of vascular access on this patient.    PLAN:  -daily NIF/FVC  -repeat neuro exam tomorrow. If weakness worsens we'll consider IVIG or additional PLEX    Recommendations discussed with Dr. Valle. Will follow

## 2020-02-06 NOTE — THERAPY TREATMENT NOTE
Inpatient Rehabilitation - Physical Therapy Treatment Note  [facility]     Patient Name: She López  : 1945  MRN: [id number]    Today's Date: 2020  Onset of Illness/Injury or Date of Surgery: 20              Admit Date: 2020      Visit Dx:      ICD-10-CM ICD-9-CM   1. General weakness R53.1 780.79       Patient Active Problem List   Diagnosis   • Crohn's disease of small intestine with other complication (CMS/HCC)   • Type 2 diabetes mellitus without complication (CMS/HCC)   • RSD upper limb   • Neuropathic pain of hand   • Hyperlipidemia   • Cervical myelopathy (CMS/HCC)   • Central pain syndrome   • Carpal tunnel syndrome   • Vitamin B 12 deficiency   • Guillain Barré syndrome (CMS/HCC)       Therapy Treatment    IRF Treatment Summary     Row Name 20 1532 20 1100          Evaluation/Treatment Time and Intent    Subjective Information  complains of tingling in BLEs stated it started yesterday, hips felt weak  -AF  complains of tingling in BLE   -LH,NM,LH2     Existing Precautions/Restrictions  fall  -AF  fall  -LH,NM,LH2     Document Type  therapy note (daily note)  -AF  therapy note (daily note)  -LHNM,LH2     Mode of Treatment  occupational therapy  -AF  physical therapy  -LH,NM,LH2     Patient/Family Observations  sitting up in w/c in Am and PM sessions,  present for both sessions. neurology present to discussed IVIG treatment   -AF  supine in bed;  present  -[facility],LH2     Recorded by [AF] Reina Perera, OTR [LH,NM,LH2] Chelsie Cline, PT (r) Jaxon Atkinson PT Student (t) Chelsie Cline, PT (c)     Row Name 20 1532 20 1100          Cognition/Psychosocial- PT/OT    Affect/Mental Status (Cognitive)  flat/blunted affect;anxious anxious about getting worse and not better  -AF  flat/blunted affect  -LH,NM,LH2     Orientation Status (Cognition)  oriented x 4  -AF  oriented x 4  -ROCK COX,LH2     Follows Commands (Cognition)  WFL  -AF  WFL   -LH,NM,LH2     Personal Safety Interventions  fall prevention program maintained;gait belt;nonskid shoes/slippers when out of bed  -AF  fall prevention program maintained;gait belt;nonskid shoes/slippers when out of bed;supervised activity  -LH,NM,LH2     Recorded by [AF] Reina Perera, OTR [LH,NM,LH2] Chelsie Cline, PT (r) Jaxon Atkinson, PT Student (t) Chelsie Cline, PT (c)     Row Name 02/06/20 1532 02/06/20 1100          Bed Mobility Assessment/Treatment    Rolling Left [place] (Bed Mobility)  --  supervision  -LH,NM,LH2     Rolling Right [place] (Bed Mobility)  --  supervision  -LH,NM,LH2     Supine-Sit [place] (Bed Mobility)  --  minimum assist (75% patient effort);verbal cues  -LH,NM,LH2     Sit-Supine [place] (Bed Mobility)  minimum assist (75% patient effort);verbal cues in Am and PM sessions   -AF  minimum assist (75% patient effort);verbal cues  -LH,NM,LH2     Assistive Device (Bed Mobility)  --  bed rails;head of bed elevated  -LH,NM,LH2     Comment (Bed Mobility)  --  assist lifting LE in to bed; lifting trunk from supine   -LH,NM,LH2     Recorded by [AF] Renia Perera, OTR [LH,NM,LH2] Chelsie Cline, PT (r) Jaxon Atkinson, PT Student (t) Chelsie Cline, PT (c)     Row Name 02/06/20 1100             Transfer Assessment/Treatment    Comment (Transfers)  performed squat pivots between surfaces due to L knee buckling/weakness   -LH,NM,LH2      Recorded by [LH,NM,LH2] Chelsie Cline, PT (r) Jaxon Atkinson, PT Student (t) Chelsie Cline, PT (c)      Row Name 02/06/20 1100             Bed-Chair Transfer    Bed-Chair [place] (Transfers)  moderate assist (50% patient effort)  -LH,NM,LH2      Assistive Device (Bed-Chair Transfers)  wheelchair  -LH,NM,LH2      Recorded by [LH,NM,LH2] Chelsie Cline, PT (r) Jaxon Atkinson, PT Student (t) Chelsie Cline PT (c)      Row Name 02/06/20 1532 02/06/20 1100          Chair-Bed Transfer    Chair-Bed [place] (Transfers)  moderate  assist (50% patient effort);verbal cues  -AF  moderate assist (50% patient effort)  -LH,NM,LH2     Assistive Device (Chair-Bed Transfers)  wheelchair bed rail  -AF  wheelchair  -LH,NM,LH2     Recorded by [AF] Reina Perera OTR [LH,NM,LH2] Chelsie Cline, PT (r) Jaxon Atkinson, PT Student (t) Chelsie Cline, PT (c)     Row Name 02/06/20 1100             Sit-Stand Transfer    Sit-Stand [place] (Transfers)  moderate assist (50% patient effort);2 person assist  -LH,NM,LH2      Assistive Device (Sit-Stand Transfers)  wheelchair;walker, rolling platform arjo walker   -LH,NM,LH2      Recorded by [LH,NM,LH2] Chelsie Cline, PT (r) Jaxon Atkinson, PT Student (t) Chelsie Cline, PT (c)      Row Name 02/06/20 1100             Stand-Sit Transfer    Stand-Sit [place] (Transfers)  moderate assist (50% patient effort);maximum assist (25% patient effort);verbal cues;2 person assist  -LH,NM,LH2      Assistive Device (Stand-Sit Transfers)  wheelchair maxA x 2 c LOB during gait to return to wc; wc following   -LH,NM,LH2      Recorded by [LH,NM,LH2] Chelsie Cline, PT (r) Jaxon Atkinson, PT Student (t) Chelsie Cline, PT (c)      Row Name 02/06/20 1532             Toilet Transfer    Type (Toilet Transfer)  squat pivot  -AF      [place] Level (Toilet Transfer)  moderate assist (50% patient effort);verbal cues  -AF      Assistive Device (Toilet Transfer)  commode;grab bars/safety frame;wheelchair  -AF      Recorded by [AF] Reina Perera OTR      Row Name 02/06/20 1100             Gait/Stairs Assessment/Training    [place] Level (Gait)  moderate assist (50% patient effort);maximum assist (25% patient effort);verbal cues;nonverbal cues (demo/gesture);2 person assist LOB once due to L knee buckling; wc following   -LH,NM,LH2      Assistive Device (Gait)  walker, rolling platform aircasts B ankles; arjo platform walker   -LH,NM,LH2      Distance in Feet (Gait)  15'x2, 8'  -LH,NM,LH2      Pattern (Gait)  step-to   -LH,NM,LH2      Deviations/Abnormal Patterns (Gait)  bilateral deviations;ataxic;base of support, wide;gait speed decreased;stride length decreased;jt decreased  -LH,NM,LH2      Bilateral Gait Deviations  forward flexed posture;foot drop/toe drag;heel strike decreased;knee hyperextension;knee buckling, left side;weight shift ability decreased  -LH,NM,LH2      Left Sided Gait Deviations  knee buckling, left side  -LH3      Comment (Gait/Stairs)  trialed [employer] platform walker to allow more room for BLE to step through and more stability for trunk; pt demonstrated decreased jt, single limb stance and clearance c added support this date. close follow c wc required due to knee buckling   -LH,NM,LH2      Recorded by [LH,NM,LH2] Chelsie Cline, PT (r) Jaxon Atkinson PT Student (t) Chelsie Cline, PT (c)  [3] Chelsie Cline, PT      Row Name 02/06/20 1532             Bathing Assessment/Treatment    Bathing [place] Level  upper body;distal lower extremities/feet;minimum assist (75% patient effort);verbal cues  -AF      Bathing Position  sink side;supported sitting  -AF      Comment (Bathing)  assistance to wash hair   -AF      Recorded by [AF] Reina Perera OTR      Row Name 02/06/20 1532             Upper Body Dressing Assessment/Treatment    Upper Body Dressing Task  upper body dressing skills;minimum assist (75% or more patient effort);verbal cues;clothes fastener management;front opening garment  -AF      Upper Body Dressing Position  supported sitting;edge of bed sitting  -AF      Comment (Upper Body Dressing)  donned jacket seated on EOB  -AF      Recorded by [AF] Reina Perera OTR      Row Name 02/06/20 1532             Lower Body Dressing Assessment/Treatment    Lower Body Dressing [place] Level  don;doff;shoes/slippers;socks;maximum assist (25% patient effort);verbal cues  -AF      Lower Body Dressing Position  supported sitting  -AF      Lower Body Dressing Setup Assistance  obtain  clothing  -AF      Recorded by [AF] Reina Perera OTR      Row Name 02/06/20 1532             Grooming Assessment/Treatment    Grooming [place] Level  grooming skills;set up;minimum assist (75% patient effort)  -AF      Assistive Device (Grooming)  built-up handle items  -AF      Grooming Position  sink side;supported sitting  -AF      Grooming Setup Assistance  obtain supplies  -AF      Recorded by [AF] Reina Perera OTR      Row Name 02/06/20 1532             Toileting Assessment/Treatment    Toileting [place] Level  toileting skills;dependent (less than 25% patient effort)  -AF      Assistive Device Use (Toileting)  grab bar/safety frame;raised toilet seat  -AF      Toileting Position  unsupported sitting;supported standing  -AF      Comment (Toileting)  A x 2, one with balance and one with clothing management   -AF      Recorded by [AF] Reina Perera OTR      Row Name 02/06/20 1532             Fine Motor Testing & Training    Comment, Fine Motor Coordination  increased diffiuclty with FMC/GMC with R hand   -AF      Recorded by [AF] Reina Perera OTR      Row Name 02/06/20 1532 02/06/20 1100          Sensory    Sensory General Assessment  proprioception deficits identified  -AF  proprioception deficits identified;light touch sensation deficits identified absent light touch plantar foot B /intact to pressure B feet  -LH,NM,LH2     Recorded by [AF] Reina Perera OTR [LH,NM,LH2] Chelsie Cline, PT (r) Jaxon Atkinson PT Student (t) Chelsie Cline, PT (c)     Row Name 02/06/20 1532             Proprioception Assessment    Right Upper Extremity: Proprioception Assessment  severe impairment, less than 50% correct responses;absent sensation  -AF      Recorded by [AF] Reina Perera OTR      Row Name 02/06/20 1532 02/06/20 1100          Pain Scale: Numbers Pre/Post-Treatment    Pain Scale: Numbers, Pretreatment  2/10  -AF  --     Pain Scale: Numbers, Post-Treatment  2/10  -AF  --      Pain Location - Side  --  Bilateral  -LH,NM,LH2     Pain Location - Orientation  --  generalized  -LH,NM,LH2     Pain Location  --  hand  -LH,NM,LH2     Pre/Post Treatment Pain Comment  --  nerve pain B hands   -LH,NM,LH2     Recorded by [AF] Reina Perera OTR [LH,NM,LH2] Chelsie Cline, PT (r) Jaxon Atkinson, PT Student (t) Chelsie Cline, PT (c)     Row Name 02/06/20 1532 02/06/20 1100          Static Sitting Balance    Level of [place] (Unsupported Sitting, Static Balance)  supervision  -AF  supervision  -LH,NM,LH2     Sitting Position (Unsupported Sitting, Static Balance)  sitting on edge of bed  -AF  sitting on edge of bed;sitting edge of mat  -LH,NM,LH2     Time Able to Maintain Position (Unsupported Sitting, Static Balance)  1 to 2 minutes  -AF  2 to 3 minutes  -LH,NM,LH2     Comment (Unsupported Sitting, Static Balance)  --  UE support   -LH,NM,LH2     Recorded by [AF] Reina Perera OTR [LH,NM,LH2] Chelsie Cline, PT (r) Jaxon Atkinson, PT Student (t) Chelsie Cline, PT (c)     Row Name 02/06/20 1100             Static Standing Balance    Level of [place] (Supported Standing, Static Balance)  minimal assist, 75% patient effort;2 person assist second person assist c trunk ext/posture and UE placement   -LH,NM,LH2      Time Able to Maintain Position (Supported Standing, Static Balance)  3 to 4 minutes cues/assist for L quad control/ prevent hyperextension   -LH,NM,LH2      Assistive Device Utilized (Supported Standing, Static Balance)  parallel bars assist to midline; L knee blocking  -LH,NM,LH2      Recorded by [LH,NM,LH2] Chelsie Cline, PT (r) Jaxon Atkinson, PT Student (t) Chelise Cline, PT (c)      Row Name 02/06/20 1532             Upper Extremity Seated Therapeutic Exercise    Performed, Seated Upper Extremity (Therapeutic Exercise)  wrist flexion/extension;forearm supination/pronation;scapular protraction/retraction;shoulder flexion/extension  -AF      Exercise Type, Seated  Upper Extremity (Therapeutic Exercise)  AROM (active range of motion);resistive exercise  -AF      Expected Outcomes, Seated Upper Extremity (Therapeutic Exercise)  improve functional tolerance, self-care activity;improve motor control;improve performance, BADLs;improve performance, transfer skills  -AF      Sets/Reps Detail, Seated Upper Extremity (Therapeutic Exercise)  2/15  -AF      Comment, Seated Upper Extremity (Therapeutic Exercise)  unable to hold the #2 hand weight in the R hand, added #1 wrist weight on R UE for UB exs, decreased difficulty with shoulder flexion tasks and required increased rest breaks and completed sitting up w/c   -AF      Recorded by [AF] Reina Perera OTR      Row Name 02/06/20 1100             Lower Extremity Seated Therapeutic Exercise    Performed, Seated Lower Extremity (Therapeutic Exercise)  LAQ (long arc quad), knee extension;ankle dorsiflexion/plantarflexion;hip flexion/extension manually resisted LE extension/leg press    -LH,NM,LH2      Exercise Type, Seated Lower Extremity (Therapeutic Exercise)  AROM (active range of motion)  -LH,NM,LH2      Sets/Reps Detail, Seated Lower Extremity (Therapeutic Exercise)  1/10  -LH,NM,LH2      Recorded by [LH,NM,LH2] Chelsie Cline, PT (r) Jaxon Atkinson PT Student (t) Chelsie Cline, PT (c)      Row Name 02/06/20 1100             Lower Extremity Supine Therapeutic Exercise    Performed, Supine Lower Extremity (Therapeutic Exercise)  bridging (bilateral w/bolster);SLR (straight leg raise);SAQ (short arc quad) over bolster;ankle dorsiflexion/plantarflexion;knee flexion/extension;hip flexion/extension;bridging (unilateral) LE stabilized at knees during bridging   -LH,NM,LH2      Exercise Type, Supine Lower Extremity (Therapeutic Exercise)  AROM (active range of motion);AAROM (active assistive range of motion) AROM RLE; AAROM L SLR  -LH,NM,LH2      Sets/Reps Detail, Supine Lower Extremity (Therapeutic Exercise)  2/5  -LH,NM,LH2       Recorded by [,NM,LH2] Chelsie Cline, PT (r) Jaxon Atkinson, PT Student (t) Chelsie Cline, PT (c)      Row Name 02/06/20 1532 02/06/20 1100          Positioning and Restraints    Pre-Treatment Position  sitting in chair/recliner  -AF  in bed  -LH,NM,LH2     Post Treatment Position  bed  -AF  wheelchair  -LH,NM,LH2     In Bed  supine;call light within reach;encouraged to call for assist;exit alarm on;with family/caregiver  present in Am and PM sessions  -AF  --     In Wheelchair  --  exit alarm on;with family/caregiver with  following treatment   -LH,NM,LH2     Recorded by [AF] Reina Perera, OTR [,NM,LH2] Chelsie Cline, PT (r) Jaxon Atkinson, PT Student (t) Chelsie Cline, PT (c)     Row Name 02/06/20 1100             Daily Summary of Progress (PT)    Recommendations: Physical Therapy  discussed c MD pt's increased need for assist during bed mobs, transfers, gait and bridging over the past two treatment sessions. Per MD pt to have consult from neurologist   -LH,NM,LH2      Recorded by [,NM,LH2] Chelsie Cline, PT (r) Jaxon Atkinson, PT Student (t) Chelsie Cline, PT (c)        User Key  (r) = Recorded By, (t) = Taken By, (c) = Cosigned By    Initials Name Effective Dates     Chelsie Cline, PT 04/03/18 -     Reina Noe, OTR 04/03/18 -     Jaxon Arteaga, PT Student 01/03/20 -         Wound 12/09/19 1510 abdomen Incision (Active)   Dressing Appearance dry;intact;no drainage 2/6/2020  3:30 PM   Closure SUJEY 2/6/2020  8:15 AM   Base dressing in place, unable to visualize 2/6/2020  8:15 AM   Periwound intact 2/6/2020  3:30 PM   Drainage Amount none 2/6/2020  3:30 PM   Care, Wound cleansed with;sterile normal saline 2/6/2020  3:30 PM   Dressing Care, Wound gauze 2/5/2020  7:43 PM   Periwound Care, Wound dry periwound area maintained 2/6/2020  8:15 AM           PT Recommendation and Plan        Daily Summary of Progress (PT)  Recommendations: Physical Therapy: discussed c MD pt's  increased need for assist during bed mobs, transfers, gait and bridging over the past two treatment sessions. Per MD pt to have consult from neurologist                Time Calculation:     PT Charges     Row Name 02/06/20 1359 02/06/20 1137          Time Calculation    Start Time  1300  -LH (r) NM (t) LH (c)  0830  -LH (r) NM (t) LH (c)     Stop Time  1330  -LH (r) NM (t) LH (c)  0930  -LH (r) NM (t) LH (c)     Time Calculation (min)  30 min  -LH (r) NM (t)  60 min  -LH (r) NM (t)     PT Received On  02/06/20  -LH (r) NM (t) LH (c)  02/06/20  -LH (r) NM (t) LH (c)     PT - Next Appointment  --  02/07/20  -LH (r) NM (t) LH (c)       User Key  (r) = Recorded By, (t) = Taken By, (c) = Cosigned By    Initials Name Provider Type     Chelsie Cline, PT Physical Therapist    NM Jaxon Atkinson, PT Student PT Student          Therapy Charges for Today     Code Description Service Date Service Provider Modifiers Qty    68241800985 HC PT NEUROMUSC RE EDUCATION EA 15 MIN 2/5/2020 Jaxon Atkinson, PT Student GP 2    03931768610 HC GAIT TRAINING EA 15 MIN 2/5/2020 Jaxon Atkinson, PT Student GP 1    78635638901 HC PT THERAPEUTIC ACT EA 15 MIN 2/5/2020 Jaxon Atkinson, PT Student GP 1    60644538605 HC PT THER PROC EA 15 MIN 2/5/2020 Jaxon Atkinson, PT Student GP 2    [id number] HC GAIT TRAINING EA 15 MIN 2/6/2020 Jaxon Atkinson, PT Student GP 1    [id number]  PT NEUROMUSC RE EDUCATION EA 15 MIN 2/6/2020 Jaxon Atkinson, PT Student GP 3    32098175961 HC PT THERAPEUTIC ACT EA 15 MIN 2/6/2020 Jaxon Atkinson, PT Student GP 2                   YUSUF Fenton  2/6/2020

## 2020-02-06 NOTE — PLAN OF CARE
Problem: Patient Care Overview  Goal: Plan of Care Review  Outcome: Ongoing (interventions implemented as appropriate)  Flowsheets (Taken 2/5/2020 1940)  Outcome Summary: Ms López has been cooperative, alert and oriented, continent of urine and has ileostomy patent with good output. She takes medication whole with water, one at a time. 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 K-pad used today to right arm, and  at bedside, very involved in her care.  Progress, Functional Goals: demonstrating adequate progress  Plan of Care Reviewed With: patient; family  IRF Plan of Care Review: progress ongoing, continue

## 2020-02-06 NOTE — THERAPY TREATMENT NOTE
Inpatient Rehabilitation - Occupational Therapy Treatment Note    [facility]     Patient Name: She López  : 1945  MRN: [id number]    Today's Date: 2020  Onset of Illness/Injury or Date of Surgery: 20              Admit Date: 2020      Visit Dx:    ICD-10-CM ICD-9-CM   1. General weakness R53.1 780.79       Patient Active Problem List   Diagnosis   • Crohn's disease of small intestine with other complication (CMS/HCC)   • Type 2 diabetes mellitus without complication (CMS/HCC)   • RSD upper limb   • Neuropathic pain of hand   • Hyperlipidemia   • Cervical myelopathy (CMS/HCC)   • Central pain syndrome   • Carpal tunnel syndrome   • Vitamin B 12 deficiency   • Guillain Barré syndrome (CMS/HCC)         Therapy Treatment    IRF Treatment Summary     Row Name 20 1532 20 1100          Evaluation/Treatment Time and Intent    Subjective Information  complains of tingling in BLEs stated it started yesterday, hips felt weak  -AF  complains of tingling in BLE   -LH,NM,LH2     Existing Precautions/Restrictions  fall  -AF  fall  -[place],NM,LH2     Document Type  therapy note (daily note)  -AF  therapy note (daily note)  -LHNM,LH2     Mode of Treatment  occupational therapy  -AF  physical therapy  -LH,NM,LH2     Patient/Family Observations  sitting up in w/c in Am and PM sessions,  present for both sessions. neurology present to discussed IVIG treatment   -AF  supine in bed;  present  -ROCK COX,LH2     Recorded by [AF] Reina Perera, OTR [LH,NM,LH2] Chelsie Cline, PT (r) Jaxon Atkinson PT Student (t) Chelsie Cline, PT (c)     Row Name 20 1532 20 1100          Cognition/Psychosocial- PT/OT    Affect/Mental Status (Cognitive)  flat/blunted affect;anxious anxious about getting worse and not better  -AF  flat/blunted affect  -LH,NM,LH2     Orientation Status (Cognition)  oriented x 4  -AF  oriented x 4  -ROCK COX,LH2     Follows Commands (Cognition)  WFL  -AF  WFL   -LH,NM,LH2     Personal Safety Interventions  fall prevention program maintained;gait belt;nonskid shoes/slippers when out of bed  -AF  fall prevention program maintained;gait belt;nonskid shoes/slippers when out of bed;supervised activity  -LH,NM,LH2     Recorded by [AF] Reina Perera, OTR [LH,NM,LH2] Chelsie Cline, PT (r) Jaxon Atkinson, PT Student (t) Chelsie Cline, PT (c)     Row Name 02/06/20 1532 02/06/20 1100          Bed Mobility Assessment/Treatment    Rolling Left [place] (Bed Mobility)  --  supervision  -LH,NM,LH2     Rolling Right [place] (Bed Mobility)  --  supervision  -LH,NM,LH2     Supine-Sit [place] (Bed Mobility)  --  minimum assist (75% patient effort);verbal cues  -LH,NM,LH2     Sit-Supine [place] (Bed Mobility)  minimum assist (75% patient effort);verbal cues in Am and PM sessions   -AF  minimum assist (75% patient effort);verbal cues  -LH,NM,LH2     Assistive Device (Bed Mobility)  --  bed rails;head of bed elevated  -LH,NM,LH2     Comment (Bed Mobility)  --  assist lifting LE in to bed; lifting trunk from supine   -LH,NM,LH2     Recorded by [AF] Reina Perera, OTR [LH,NM,LH2] Chelsie Cline, PT (r) Jaxon Atkinson, PT Student (t) Chelsie Cline, PT (c)     Row Name 02/06/20 1100             Transfer Assessment/Treatment    Comment (Transfers)  performed squat pivots between surfaces due to L knee buckling/weakness   -LH,NM,LH2      Recorded by [LH,NM,LH2] Chelsie Cline, PT (r) Jaxon Atkinson, PT Student (t) Chelsie Cline, PT (c)      Row Name 02/06/20 1100             Bed-Chair Transfer    Bed-Chair [place] (Transfers)  moderate assist (50% patient effort)  -LH,NM,LH2      Assistive Device (Bed-Chair Transfers)  wheelchair  -LH,NM,LH2      Recorded by [LH,NM,LH2] Chelsie Cline, PT (r) Jaxon Atkinson, PT Student (t) Chelsie Cline PT (c)      Row Name 02/06/20 1532 02/06/20 1100          Chair-Bed Transfer    Chair-Bed [place] (Transfers)  moderate  assist (50% patient effort);verbal cues  -AF  moderate assist (50% patient effort)  -LH,NM,LH2     Assistive Device (Chair-Bed Transfers)  wheelchair bed rail  -AF  wheelchair  -LH,NM,LH2     Recorded by [AF] Reina Perera OTR [LH,NM,LH2] Chelsie Cline, PT (r) Jaxon Atkinson, PT Student (t) Chelsie Cline, PT (c)     Row Name 02/06/20 1100             Sit-Stand Transfer    Sit-Stand [place] (Transfers)  moderate assist (50% patient effort);2 person assist  -LH,NM,LH2      Assistive Device (Sit-Stand Transfers)  wheelchair;walker, rolling platform arjo walker   -LH,NM,LH2      Recorded by [LH,NM,LH2] Chelsie Cline, PT (r) Jaxon Atkinson, PT Student (t) Chelsie Cline, PT (c)      Row Name 02/06/20 1100             Stand-Sit Transfer    Stand-Sit [place] (Transfers)  moderate assist (50% patient effort);maximum assist (25% patient effort);verbal cues;2 person assist  -LH,NM,LH2      Assistive Device (Stand-Sit Transfers)  wheelchair maxA x 2 c LOB during gait to return to wc; wc following   -LH,NM,LH2      Recorded by [LH,NM,LH2] Chelsie Cline, PT (r) Jaxon Atkinson, PT Student (t) Chelsie Cline, PT (c)      Row Name 02/06/20 1532             Toilet Transfer    Type (Toilet Transfer)  squat pivot  -AF      [place] Level (Toilet Transfer)  moderate assist (50% patient effort);verbal cues  -AF      Assistive Device (Toilet Transfer)  commode;grab bars/safety frame;wheelchair  -AF      Recorded by [AF] Reina Perera OTR      Row Name 02/06/20 1100             Gait/Stairs Assessment/Training    [place] Level (Gait)  moderate assist (50% patient effort);maximum assist (25% patient effort);verbal cues;nonverbal cues (demo/gesture);2 person assist LOB once due to L knee buckling; wc following   -LH,NM,LH2      Assistive Device (Gait)  walker, rolling platform aircasts B ankles; arjo platform walker   -LH,NM,LH2      Distance in Feet (Gait)  15'x2, 8'  -LH,NM,LH2      Pattern (Gait)  step-to   -LH,NM,LH2      Deviations/Abnormal Patterns (Gait)  bilateral deviations;ataxic;base of support, wide;gait speed decreased;stride length decreased;jt decreased  -LH,NM,LH2      Bilateral Gait Deviations  forward flexed posture;foot drop/toe drag;heel strike decreased;knee hyperextension;knee buckling, left side;weight shift ability decreased  -LH,NM,LH2      Left Sided Gait Deviations  knee buckling, left side  -LH3      Comment (Gait/Stairs)  trialed arVenturesity platform walker to allow more room for BLE to step through and more stability for trunk; pt demonstrated decreased jt, single limb stance and clearance c added support this date. close follow c wc required due to knee buckling   -LH,NM,LH2      Recorded by [LH,NM,LH2] Chelsie Cline, PT (r) Jaxon Atkinson PT Student (t) Chelsie Cline, PT (c)  [3] Chelsie Cline, PT      Row Name 02/06/20 1532             Bathing Assessment/Treatment    Bathing [place] Level  upper body;distal lower extremities/feet;minimum assist (75% patient effort);verbal cues  -AF      Bathing Position  sink side;supported sitting  -AF      Comment (Bathing)  assistance to wash hair   -AF      Recorded by [AF] Reina Perera OTR      Row Name 02/06/20 1532             Upper Body Dressing Assessment/Treatment    Upper Body Dressing Task  upper body dressing skills;minimum assist (75% or more patient effort);verbal cues;clothes fastener management;front opening garment  -AF      Upper Body Dressing Position  supported sitting;edge of bed sitting  -AF      Comment (Upper Body Dressing)  donned jacket seated on EOB  -AF      Recorded by [AF] Reina Perera OTR      Row Name 02/06/20 1532             Lower Body Dressing Assessment/Treatment    Lower Body Dressing [place] Level  don;doff;shoes/slippers;socks;maximum assist (25% patient effort);verbal cues  -AF      Lower Body Dressing Position  supported sitting  -AF      Lower Body Dressing Setup Assistance  obtain  clothing  -AF      Recorded by [AF] Reina Perera OTR      Row Name 02/06/20 1532             Grooming Assessment/Treatment    Grooming [place] Level  grooming skills;set up;minimum assist (75% patient effort)  -AF      Assistive Device (Grooming)  built-up handle items  -AF      Grooming Position  sink side;supported sitting  -AF      Grooming Setup Assistance  obtain supplies  -AF      Recorded by [AF] Reina Perera OTR      Row Name 02/06/20 1532             Toileting Assessment/Treatment    Toileting [place] Level  toileting skills;dependent (less than 25% patient effort)  -AF      Assistive Device Use (Toileting)  grab bar/safety frame;raised toilet seat  -AF      Toileting Position  unsupported sitting;supported standing  -AF      Comment (Toileting)  A x 2, one with balance and one with clothing management   -AF      Recorded by [AF] Reina Perera OTR      Row Name 02/06/20 1532             Fine Motor Testing & Training    Comment, Fine Motor Coordination  increased diffiuclty with FMC/GMC with R hand   -AF      Recorded by [AF] Reina Perera OTR      Row Name 02/06/20 1532 02/06/20 1100          Sensory    Sensory General Assessment  proprioception deficits identified  -AF  proprioception deficits identified;light touch sensation deficits identified absent light touch plantar foot B /intact to pressure B feet  -LH,NM,LH2     Recorded by [AF] Reina Perera OTR [LH,NM,LH2] Chelsie Cline, PT (r) Jaxon Atkinson PT Student (t) Chelsie Cline, PT (c)     Row Name 02/06/20 1532             Proprioception Assessment    Right Upper Extremity: Proprioception Assessment  severe impairment, less than 50% correct responses;absent sensation  -AF      Recorded by [AF] Reina Perera OTR      Row Name 02/06/20 1532 02/06/20 1100          Pain Scale: Numbers Pre/Post-Treatment    Pain Scale: Numbers, Pretreatment  2/10  -AF  --     Pain Scale: Numbers, Post-Treatment  2/10  -AF  --      Pain Location - Side  --  Bilateral  -LH,NM,LH2     Pain Location - Orientation  --  generalized  -LH,NM,LH2     Pain Location  --  hand  -LH,NM,LH2     Pre/Post Treatment Pain Comment  --  nerve pain B hands   -LH,NM,LH2     Recorded by [AF] Reina Perera OTR [LH,NM,LH2] Chelsie Cline, PT (r) Jaxon Atkinson, PT Student (t) Chelsie Cline, PT (c)     Row Name 02/06/20 1532 02/06/20 1100          Static Sitting Balance    Level of [place] (Unsupported Sitting, Static Balance)  supervision  -AF  supervision  -LH,NM,LH2     Sitting Position (Unsupported Sitting, Static Balance)  sitting on edge of bed  -AF  sitting on edge of bed;sitting edge of mat  -LH,NM,LH2     Time Able to Maintain Position (Unsupported Sitting, Static Balance)  1 to 2 minutes  -AF  2 to 3 minutes  -LH,NM,LH2     Comment (Unsupported Sitting, Static Balance)  --  UE support   -LH,NM,LH2     Recorded by [AF] Reina Perera OTR [LH,NM,LH2] Chelsie Cline, PT (r) Jaxon Atkinson, PT Student (t) Chelsie Cline, PT (c)     Row Name 02/06/20 1100             Static Standing Balance    Level of [place] (Supported Standing, Static Balance)  minimal assist, 75% patient effort;2 person assist second person assist c trunk ext/posture and UE placement   -LH,NM,LH2      Time Able to Maintain Position (Supported Standing, Static Balance)  3 to 4 minutes cues/assist for L quad control/ prevent hyperextension   -LH,NM,LH2      Assistive Device Utilized (Supported Standing, Static Balance)  parallel bars assist to midline; L knee blocking  -LH,NM,LH2      Recorded by [LH,NM,LH2] Chelsie Cline, PT (r) Jaxon Atkinson, PT Student (t) Chelsie Cline, PT (c)      Row Name 02/06/20 1532             Upper Extremity Seated Therapeutic Exercise    Performed, Seated Upper Extremity (Therapeutic Exercise)  wrist flexion/extension;forearm supination/pronation;scapular protraction/retraction;shoulder flexion/extension  -AF      Exercise Type, Seated  Upper Extremity (Therapeutic Exercise)  AROM (active range of motion);resistive exercise  -AF      Expected Outcomes, Seated Upper Extremity (Therapeutic Exercise)  improve functional tolerance, self-care activity;improve motor control;improve performance, BADLs;improve performance, transfer skills  -AF      Sets/Reps Detail, Seated Upper Extremity (Therapeutic Exercise)  2/15  -AF      Comment, Seated Upper Extremity (Therapeutic Exercise)  unable to hold the #2 hand weight in the R hand, added #1 wrist weight on R UE for UB exs, decreased difficulty with shoulder flexion tasks and required increased rest breaks and completed sitting up w/c   -AF      Recorded by [AF] Reina Perera OTR      Row Name 02/06/20 1100             Lower Extremity Seated Therapeutic Exercise    Performed, Seated Lower Extremity (Therapeutic Exercise)  LAQ (long arc quad), knee extension;ankle dorsiflexion/plantarflexion;hip flexion/extension manually resisted LE extension/leg press    -LH,NM,LH2      Exercise Type, Seated Lower Extremity (Therapeutic Exercise)  AROM (active range of motion)  -LH,NM,LH2      Sets/Reps Detail, Seated Lower Extremity (Therapeutic Exercise)  1/10  -LH,NM,LH2      Recorded by [LH,NM,LH2] Chelsie Cline, PT (r) Jaxon Atkinson PT Student (t) Chelsie Cline, PT (c)      Row Name 02/06/20 1100             Lower Extremity Supine Therapeutic Exercise    Performed, Supine Lower Extremity (Therapeutic Exercise)  bridging (bilateral w/bolster);SLR (straight leg raise);SAQ (short arc quad) over bolster;ankle dorsiflexion/plantarflexion;knee flexion/extension;hip flexion/extension;bridging (unilateral) LE stabilized at knees during bridging   -LH,NM,LH2      Exercise Type, Supine Lower Extremity (Therapeutic Exercise)  AROM (active range of motion);AAROM (active assistive range of motion) AROM RLE; AAROM L SLR  -LH,NM,LH2      Sets/Reps Detail, Supine Lower Extremity (Therapeutic Exercise)  2/5  -LH,NM,LH2       Recorded by [,NM,LH2] Chelsie Cline, PT (r) Jaxon Atkinson, PT Student (t) Chelsie Cline, PT (c)      Row Name 02/06/20 1532 02/06/20 1100          Positioning and Restraints    Pre-Treatment Position  sitting in chair/recliner  -AF  in bed  -LH,NM,LH2     Post Treatment Position  bed  -AF  wheelchair  -LH,NM,LH2     In Bed  supine;call light within reach;encouraged to call for assist;exit alarm on;with family/caregiver  present in Am and PM sessions  -AF  --     In Wheelchair  --  exit alarm on;with family/caregiver with  following treatment   -LH,NM,LH2     Recorded by [AF] Reina Perera, OTR [,NM,LH2] Chelsie Cline, PT (r) Jaxon Atkinson, PT Student (t) Chelsie Cline, PT (c)     Row Name 02/06/20 1100             Daily Summary of Progress (PT)    Recommendations: Physical Therapy  discussed c MD pt's increased need for assist during bed mobs, transfers, gait and bridging over the past two treatment sessions. Per MD pt to have consult from neurologist   -LH,NM,LH2      Recorded by [,NM,LH2] Chelsie Cline, PT (r) Jaxon Atkinson, PT Student (t) Chelsie Cline, PT (c)        User Key  (r) = Recorded By, (t) = Taken By, (c) = Cosigned By    Initials Name Effective Dates     Chelsie Cline, PT 04/03/18 -     Reina Noe, OTR 04/03/18 -     Jaxon Arteaga, PT Student 01/03/20 -           Wound 12/09/19 1510 abdomen Incision (Active)   Dressing Appearance dry;intact;no drainage 2/6/2020  3:30 PM   Closure SUJEY 2/6/2020  8:15 AM   Base dressing in place, unable to visualize 2/6/2020  8:15 AM   Periwound intact 2/6/2020  3:30 PM   Drainage Amount none 2/6/2020  3:30 PM   Care, Wound cleansed with;sterile normal saline 2/6/2020  3:30 PM   Dressing Care, Wound gauze 2/5/2020  7:43 PM   Periwound Care, Wound dry periwound area maintained 2/6/2020  8:15 AM         OT Recommendation and Plan                 OT IRF GOALS     Row Name 02/03/20 1540 01/25/20 1306          Transfer Goal 1  (OT-IRF)    Activity/Assistive Device (Transfer Goal 1, OT-IRF)  toilet;shower chair;walk-in shower  -AF  --     [place] Level (Transfer Goal 1, OT-IRF)  minimum assist (75% or more patient effort);moderate assist (50-74% patient effort);verbal cues required  -AF  --     Time Frame (Transfer Goal 1, OT-IRF)  short term goal (STG)  -AF  --     Progress/Outcomes (Transfer Goal 1, OT-IRF)  goal ongoing  -AF  --        Transfer Goal 2 (OT-IRF)    Activity/Assistive Device (Transfer Goal 2, OT-IRF)  toilet;shower chair;walk-in shower  -AF  --     [place] Level (Transfer Goal 2, OT-IRF)  verbal cues required;contact guard assist  -AF  --     Time Frame (Transfer Goal 2, OT-IRF)  long term goal (LTG)  -AF  --     Progress/Outcomes (Transfer Goal 2, OT-IRF)  goal ongoing  -AF  --        Bathing Goal 1 (OT-IRF)    Activity/Device (Bathing Goal 1, OT-IRF)  bathing skills, all;grab bar/tub rail;hand-held shower spray hose;shower chair  -AF  --     [place] Level (Bathing Goal 1, OT-IRF)  verbal cues required;moderate assist (50-74% patient effort)  -AF  --     Time Frame (Bathing Goal 1, OT-IRF)  short term goal (STG)  -AF  --     Progress/Outcomes (Bathing Goal 1, OT-IRF)  goal ongoing  -AF  --        Bathing Goal 2 (OT-IRF)    Activity/Device (Bathing Goal 2, OT-IRF)  bathing skills, all;grab bar/tub rail;hand-held shower spray hose;shower chair  -AF  --     [place] Level (Bathing Goal 2, OT-IRF)  verbal cues required;contact guard assist  -AF  --     Time Frame (Bathing Goal 2, OT-IRF)  long term goal (LTG)  -AF  --     Progress/Outcomes (Bathing Goal 2, OT-IRF)  goal ongoing  -AF  --        UB Dressing Goal 1 (OT-IRF)    Activity/Device (UB Dressing Goal 1, OT-IRF)  upper body dressing  -AF  --     [place] (UB Dress Goal 1, OT-IRF)  verbal cues required;set-up required  -AF  --     Time Frame (UB Dressing Goal 1, OT-IRF)  long term goal (LTG)  -AF  --     Progress/Outcomes (UB Dressing Goal 1,  OT-IRF)  goal ongoing  -AF  --        LB Dressing Goal 1 (OT-IRF)    Activity/Device (LB Dressing Goal 1, OT-IRF)  lower body dressing  -AF  --     [place] (LB Dressing Goal 1, OT-IRF)  verbal cues required;moderate assist (50-74% patient effort)  -AF  --     Time Frame (LB Dressing Goal 1, OT-IRF)  short term goal (STG)  -AF  --     Progress/Outcomes (LB Dressing Goal 1, OT-IRF)  goal ongoing  -AF  --        LB Dressing Goal 2 (OT-IRF)    Activity/Device (LB Dressing Goal 2, OT-IRF)  lower body dressing  -AF  --     [place] (LB Dressing Goal 2, OT-IRF)  contact guard assist;verbal cues required  -AF  --     Time Frame (LB Dressing Goal 2, OT-IRF)  long term goal (LTG)  -AF  --     Progress/Outcomes (LB Dressing Goal 2, OT-IRF)  goal ongoing  -AF  --        Grooming Goal 1 (OT-IRF)    Activity/Device (Grooming Goal 1, OT-IRF)  grooming skills, all  -AF  --     [place] (Grooming Goal 1, OT-IRF)  set-up required;verbal cues required  -AF  --     Time Frame (Grooming Goal 1, OT-IRF)  short term goal (STG);long term goal (LTG)  -AF  --     Progress/Outcomes (Grooming Goal 1, OT-IRF)  goal ongoing  -AF  --        Toileting Goal 1 (OT-IRF)    Activity/Device (Toileting Goal 1, OT-IRF)  toileting skills, all;grab bar/safety frame;raised toilet seat  -AF  --     [place] Level (Toileting Goal 1, OT-IRF)  maximum assist (25-49% patient effort);verbal cues required  -AF  --     Time Frame (Toileting Goal 1, OT-IRF)  short term goal (STG)  -AF  --     Progress/Outcomes (Toileting Goal 1, OT-IRF)  goal ongoing  -AF  --        Toileting Goal 2 (OT-IRF)    Activity/Device (Toileting Goal 2, OT-IRF)  toileting skills, all;grab bar/safety frame;raised toilet seat  -AF  --     [place] Level (Toileting Goal 2, OT-IRF)  minimum assist (75% or more patient effort)  -AF  --     Time Frame (Toileting Goal 2, OT-IRF)  long term goal (LTG)  -AF  --     Progress/Outcomes (Toileting Goal 2, OT-IRF)  goal ongoing  -AF   --        Balance Goal 1 (OT)    Activity/Assistive Device (Balance Goal 1, OT)  standing, static  -AF  standing, static  -KP     [place] Level/Cues Needed (Balance Goal 1, OT)  moderate assist (50-74% patient effort);verbal cues required  -AF  set-up required;maximum assist (25-49% patient effort)  -KP     Time Frame (Balance Goal 1, OT)  short term goal (STG)  -AF  short term goal (STG);1 week  -KP     Progress/Outcomes (Balance Goal 1, OT)  goal ongoing  -AF  goal ongoing  -KP        Balance Goal 2 (OT)    Activity/Assistive Device (Balance Goal 2, OT)  standing, static  -AF  standing, static  -KP     [place] Level (Balance Goal 2, OT)  minimum assist (75% or more patient effort);contact guard assist  -AF  set-up required;minimum assist (75% or more patient effort);moderate assist (50-74% patient effort)  -KP     Time Frame (Balance Goal 2, OT)  long term goal (LTG)  -AF  long term goal (LTG);by discharge  -KP     Progress/Outcome (Balance Goal 2, OT)  goal ongoing  -AF  goal ongoing  -KP        Caregiver Training Goal 1 (OT-IRF)    Caregiver Training Goal 1 (OT-IRF)  Pt and  will demo safe techniques with ADLs, trasnfer, HEP and AD prior to d/c home   -AF  --     Time Frame (Caregiver Training Goal 1, OT-IRF)  long term goal (LTG)  -AF  --     Progress/Outcomes (Caregiver Training Goal 1, OT-IRF)  goal ongoing  -AF  --       User Key  (r) = Recorded By, (t) = Taken By, (c) = Cosigned By    Initials Name Provider Type    KP Lay Scott, OTR Occupational Therapist    AF Reina Perera, OTR Occupational Therapist                     Time Calculation:     Time Calculation- OT     Row Name 02/06/20 1538 02/06/20 1537          Time Calculation- OT    OT Start Time  1330  -AF  0930  -AF     OT Stop Time  1400  -AF  1030  -AF     OT Time Calculation (min)  30 min  -AF  60 min  -AF       User Key  (r) = Recorded By, (t) = Taken By, (c) = Cosigned By    Initials Name Provider Type    AF  Reina Perera, OTR Occupational Therapist          Therapy Charges for Today     Code Description Service Date Service Provider Modifiers Qty    74936643940 HC OT SELF CARE/MGMT/TRAIN EA 15 MIN 2/5/2020 Reina Perera, OTR GO 3    23565626471 HC OT THER PROC EA 15 MIN 2/5/2020 Reina Perera, OTR GO 2    58449396481 HC OT NEUROMUSC RE EDUCATION EA 15 MIN 2/5/2020 Reina Perera, OTR GO 1    59352139976 HC OT SELF CARE/MGMT/TRAIN EA 15 MIN 2/6/2020 Reina Perera, OTR GO 4    65356217191 HC OT THER PROC EA 15 MIN 2/6/2020 Reina Perera, OTR GO 2                   Reina Perera OTR  2/6/2020

## 2020-02-06 NOTE — PROGRESS NOTES
"Weekly progress report reviewed with pt and . Pt talked about experiencing increasing numbness and tingling and that she understood this could represent a \"relapse\" .    In spite of this change in her condition, she felt that she had made functional gains in her therapies this week.   Support and encouragement provided.  "

## 2020-02-06 NOTE — PLAN OF CARE
Problem: Patient Care Overview  Goal: Plan of Care Review  Outcome: Ongoing (interventions implemented as appropriate)  Flowsheets (Taken 2/6/2020 6715)  Outcome Summary: pt alert and oriented. x1 transfer to . needs encouragement to use BSC. is continent of urine. ileostomy bag intact and changed today along with midline incision. K pad to R AC cellulitis. c/o increased numb/tingling to bilat knees and gen weakness. neuro saw pt and is monitoring. C/o burning in hands. will monitor.  Progress, Functional Goals: demonstrating adequate progress  Plan of Care Reviewed With: patient  IRF Plan of Care Review: progress ongoing, continue

## 2020-02-06 NOTE — PLAN OF CARE
Patient is cooperative. Alert and oriented. RAC is red and tender. Heating pad applied with some relief. Continent of bladder using the bed pan during the night. Ileostomy bag frequently emptied. Abdominal dressing dry, clean and intact. No safety issues observed.

## 2020-02-06 NOTE — PROGRESS NOTES
Inpatient Rehabilitation Plan of Care Note    Plan of Care  Care Plan Reviewed - No updates at this time.    Body Systems    [RN] Integumentary(Active)  Current Status(02/05/2020): Abdominal incision with dressing. intact ileostomy  bag in place. both changed per WOCN  Weekly Goal(02/12/2020): No S&S infection noted  Discharge Goal: No S&S infection noted    Performed Intervention(s)  Daily skin inspection  Dressing change as ordered      Psychosocial    [RN] Coping/Adjustment(Active)  Current Status(02/04/2020): Supportive family,  very helpful and assists  patient with care.  Weekly Goal(02/12/2020): Identify progress in functional status  Discharge Goal: Demonstrate healthy coping strategies    Performed Intervention(s)  Verbalize needs and concerns      Safety    [RN] Potential for Injury(Active)  Current Status(02/04/2020): No unsafe behaviors  Weekly Goal(02/12/2020): Use call light 100%  Discharge Goal: Pt/family aware of risk of fall and safety in the home setting    Performed Intervention(s)  Bed alarm, wc alarm  Items within reach  Safety rounds      Sphincter Control    [RN] Bladder Management(Active)  Current Status(02/04/2020): pt has been continent, using bedpan. does wear  brief.  Weekly Goal(02/12/2020): Continent bladder 100%  Discharge Goal: Continent bladder 100%    [RN] Bowel Management(Active)  Current Status(02/04/2020): Has ileostomy since 12/20/19.  aware of care  for ileostomy.  Weekly Goal(02/12/2020): maintain education of ileostomy with pt and family  Discharge Goal: Independent with ileostomy care    Performed Intervention(s)  Monitor intake and output  Encourage appropriate diet    Signed by: Grace Moe RN

## 2020-02-07 LAB
ANION GAP SERPL CALCULATED.3IONS-SCNC: 12.9 MMOL/L (ref 5–15)
BASOPHILS # BLD AUTO: 0.09 10*3/MM3 (ref 0–0.2)
BASOPHILS NFR BLD AUTO: 1.6 % (ref 0–1.5)
BUN BLD-MCNC: 16 MG/DL (ref 8–23)
BUN/CREAT SERPL: 23.2 (ref 7–25)
CALCIUM SPEC-SCNC: 8.5 MG/DL (ref 8.6–10.5)
CHLORIDE SERPL-SCNC: 105 MMOL/L (ref 98–107)
CO2 SERPL-SCNC: 18.1 MMOL/L (ref 22–29)
CREAT BLD-MCNC: 0.69 MG/DL (ref 0.57–1)
DEPRECATED RDW RBC AUTO: 45.9 FL (ref 37–54)
EOSINOPHIL # BLD AUTO: 0.3 10*3/MM3 (ref 0–0.4)
EOSINOPHIL NFR BLD AUTO: 5.3 % (ref 0.3–6.2)
ERYTHROCYTE [DISTWIDTH] IN BLOOD BY AUTOMATED COUNT: 14.4 % (ref 12.3–15.4)
GFR SERPL CREATININE-BSD FRML MDRD: 84 ML/MIN/1.73
GLUCOSE BLD-MCNC: 109 MG/DL (ref 65–99)
HCT VFR BLD AUTO: 27.6 % (ref 34–46.6)
HGB BLD-MCNC: 9 G/DL (ref 12–15.9)
IMM GRANULOCYTES # BLD AUTO: 0.03 10*3/MM3 (ref 0–0.05)
IMM GRANULOCYTES NFR BLD AUTO: 0.5 % (ref 0–0.5)
LYMPHOCYTES # BLD AUTO: 0.99 10*3/MM3 (ref 0.7–3.1)
LYMPHOCYTES NFR BLD AUTO: 17.6 % (ref 19.6–45.3)
MCH RBC QN AUTO: 28.9 PG (ref 26.6–33)
MCHC RBC AUTO-ENTMCNC: 32.6 G/DL (ref 31.5–35.7)
MCV RBC AUTO: 88.7 FL (ref 79–97)
MONOCYTES # BLD AUTO: 0.44 10*3/MM3 (ref 0.1–0.9)
MONOCYTES NFR BLD AUTO: 7.8 % (ref 5–12)
NEUTROPHILS # BLD AUTO: 3.79 10*3/MM3 (ref 1.7–7)
NEUTROPHILS NFR BLD AUTO: 67.2 % (ref 42.7–76)
NRBC BLD AUTO-RTO: 0 /100 WBC (ref 0–0.2)
PLATELET # BLD AUTO: 485 10*3/MM3 (ref 140–450)
PMV BLD AUTO: 8.4 FL (ref 6–12)
POTASSIUM BLD-SCNC: 3.7 MMOL/L (ref 3.5–5.2)
RBC # BLD AUTO: 3.11 10*6/MM3 (ref 3.77–5.28)
SODIUM BLD-SCNC: 136 MMOL/L (ref 136–145)
WBC NRBC COR # BLD: 5.64 10*3/MM3 (ref 3.4–10.8)

## 2020-02-07 PROCEDURE — 97110 THERAPEUTIC EXERCISES: CPT

## 2020-02-07 PROCEDURE — 97530 THERAPEUTIC ACTIVITIES: CPT

## 2020-02-07 PROCEDURE — 97535 SELF CARE MNGMENT TRAINING: CPT

## 2020-02-07 PROCEDURE — C1751 CATH, INF, PER/CENT/MIDLINE: HCPCS

## 2020-02-07 PROCEDURE — 80048 BASIC METABOLIC PNL TOTAL CA: CPT | Performed by: INTERNAL MEDICINE

## 2020-02-07 PROCEDURE — 85025 COMPLETE CBC W/AUTO DIFF WBC: CPT | Performed by: PHYSICAL MEDICINE & REHABILITATION

## 2020-02-07 PROCEDURE — 25010000002 IMMUNE GLOBULIN (HUMAN) 20 GM/200ML SOLUTION: Performed by: PSYCHIATRY & NEUROLOGY

## 2020-02-07 PROCEDURE — 36410 VNPNXR 3YR/> PHY/QHP DX/THER: CPT

## 2020-02-07 PROCEDURE — 25010000002 METHYLPREDNISOLONE PER 125 MG: Performed by: PSYCHIATRY & NEUROLOGY

## 2020-02-07 PROCEDURE — 97112 NEUROMUSCULAR REEDUCATION: CPT

## 2020-02-07 PROCEDURE — 99231 SBSQ HOSP IP/OBS SF/LOW 25: CPT | Performed by: PSYCHIATRY & NEUROLOGY

## 2020-02-07 RX ORDER — ACETAMINOPHEN 500 MG
500 TABLET ORAL DAILY PRN
Status: DISCONTINUED | OUTPATIENT
Start: 2020-02-07 | End: 2020-02-07

## 2020-02-07 RX ORDER — METHYLPREDNISOLONE SODIUM SUCCINATE 125 MG/2ML
60 INJECTION, POWDER, LYOPHILIZED, FOR SOLUTION INTRAMUSCULAR; INTRAVENOUS DAILY PRN
Status: DISCONTINUED | OUTPATIENT
Start: 2020-02-07 | End: 2020-02-07

## 2020-02-07 RX ORDER — ACETAMINOPHEN 500 MG
500 TABLET ORAL DAILY
Status: COMPLETED | OUTPATIENT
Start: 2020-02-07 | End: 2020-02-11

## 2020-02-07 RX ORDER — SODIUM CHLORIDE 0.9 % (FLUSH) 0.9 %
10 SYRINGE (ML) INJECTION EVERY 12 HOURS SCHEDULED
Status: DISCONTINUED | OUTPATIENT
Start: 2020-02-07 | End: 2020-02-16

## 2020-02-07 RX ORDER — SODIUM BICARBONATE 650 MG/1
1300 TABLET ORAL 3 TIMES DAILY
Status: DISCONTINUED | OUTPATIENT
Start: 2020-02-07 | End: 2020-02-27 | Stop reason: HOSPADM

## 2020-02-07 RX ORDER — FAMOTIDINE 10 MG/ML
20 INJECTION, SOLUTION INTRAVENOUS ONCE AS NEEDED
Status: DISPENSED | OUTPATIENT
Start: 2020-02-07 | End: 2020-02-11

## 2020-02-07 RX ORDER — SODIUM CHLORIDE 0.9 % (FLUSH) 0.9 %
20 SYRINGE (ML) INJECTION AS NEEDED
Status: DISCONTINUED | OUTPATIENT
Start: 2020-02-07 | End: 2020-02-16

## 2020-02-07 RX ORDER — SODIUM CHLORIDE 0.9 % (FLUSH) 0.9 %
10 SYRINGE (ML) INJECTION AS NEEDED
Status: DISCONTINUED | OUTPATIENT
Start: 2020-02-07 | End: 2020-02-16

## 2020-02-07 RX ORDER — DIPHENHYDRAMINE HYDROCHLORIDE 50 MG/ML
50 INJECTION INTRAMUSCULAR; INTRAVENOUS ONCE AS NEEDED
Status: ACTIVE | OUTPATIENT
Start: 2020-02-07 | End: 2020-02-11

## 2020-02-07 RX ORDER — METHYLPREDNISOLONE SODIUM SUCCINATE 125 MG/2ML
60 INJECTION, POWDER, LYOPHILIZED, FOR SOLUTION INTRAMUSCULAR; INTRAVENOUS DAILY
Status: COMPLETED | OUTPATIENT
Start: 2020-02-07 | End: 2020-02-11

## 2020-02-07 RX ADMIN — GABAPENTIN 300 MG: 300 CAPSULE ORAL at 07:41

## 2020-02-07 RX ADMIN — Medication 18 MG: at 21:55

## 2020-02-07 RX ADMIN — METHYLPREDNISOLONE SODIUM SUCCINATE 60 MG: 125 INJECTION, POWDER, FOR SOLUTION INTRAMUSCULAR; INTRAVENOUS at 16:24

## 2020-02-07 RX ADMIN — SODIUM BICARBONATE 1300 MG: 650 TABLET ORAL at 16:24

## 2020-02-07 RX ADMIN — SODIUM CHLORIDE, PRESERVATIVE FREE 10 ML: 5 INJECTION INTRAVENOUS at 20:00

## 2020-02-07 RX ADMIN — PANTOPRAZOLE SODIUM 40 MG: 40 TABLET, DELAYED RELEASE ORAL at 05:51

## 2020-02-07 RX ADMIN — GABAPENTIN 300 MG: 300 CAPSULE ORAL at 12:01

## 2020-02-07 RX ADMIN — Medication 1 TABLET: at 07:43

## 2020-02-07 RX ADMIN — SODIUM BICARBONATE 650 MG: 650 TABLET ORAL at 07:41

## 2020-02-07 RX ADMIN — LOPERAMIDE HYDROCHLORIDE 2 MG: 2 CAPSULE ORAL at 16:24

## 2020-02-07 RX ADMIN — SODIUM CHLORIDE TAB 1 GM 1 G: 1 TAB at 07:41

## 2020-02-07 RX ADMIN — DOXYCYCLINE 100 MG: 100 CAPSULE ORAL at 07:41

## 2020-02-07 RX ADMIN — LOPERAMIDE HYDROCHLORIDE 2 MG: 2 CAPSULE ORAL at 05:52

## 2020-02-07 RX ADMIN — GABAPENTIN 300 MG: 300 CAPSULE ORAL at 21:55

## 2020-02-07 RX ADMIN — DOXYCYCLINE 100 MG: 100 CAPSULE ORAL at 21:55

## 2020-02-07 RX ADMIN — GABAPENTIN 300 MG: 300 CAPSULE ORAL at 02:20

## 2020-02-07 RX ADMIN — IMMUNE GLOBULIN (HUMAN) 20 G: 10 INJECTION INTRAVENOUS; SUBCUTANEOUS at 17:05

## 2020-02-07 RX ADMIN — ACETAMINOPHEN 500 MG: 500 TABLET, FILM COATED ORAL at 16:24

## 2020-02-07 RX ADMIN — SODIUM CHLORIDE TAB 1 GM 1 G: 1 TAB at 17:39

## 2020-02-07 RX ADMIN — LOPERAMIDE HYDROCHLORIDE 2 MG: 2 CAPSULE ORAL at 11:15

## 2020-02-07 RX ADMIN — Medication 2500 MCG: at 07:43

## 2020-02-07 RX ADMIN — Medication 18 MG: at 07:43

## 2020-02-07 RX ADMIN — CHOLECALCIFEROL TAB 125 MCG (5000 UNIT) 5000 UNITS: 125 TAB at 07:43

## 2020-02-07 RX ADMIN — GABAPENTIN 300 MG: 300 CAPSULE ORAL at 17:39

## 2020-02-07 RX ADMIN — SODIUM BICARBONATE 1300 MG: 650 TABLET ORAL at 21:55

## 2020-02-07 NOTE — PLAN OF CARE
Problem: Patient Care Overview  Goal: Plan of Care Review  Flowsheets  Taken 2/7/2020 1716 by Jyoti Parry, RN  Outcome Summary: Mrs López has had increased pain in hands, increased numness feet to knees, not doing as well in therapy today, weaker in legs. Seen by Dr. schulz who ordered IVIG daily x5 . She is receiving first dose now through midline lt arm.. Ileostomy output thicker and < compared to past weekend.  Progress, Functional Goals: progress more gradual than expected  Plan of Care Reviewed With: patient;spouse  Taken 2/6/2020 1728 by Andree Casey, RN  IRF Plan of Care Review: progress ongoing, continue

## 2020-02-07 NOTE — PLAN OF CARE
Problem: Functional Mobility Impairment (IRF) (Adult)  Goal: Optimal/Safe Level of [place] with Mobility  Outcome: Ongoing (interventions implemented as appropriate)     Problem: Fall Risk (Adult)  Goal: Absence of Fall  Outcome: Ongoing (interventions implemented as appropriate)     Problem: Ileostomy (Adult)  Goal: Signs and Symptoms of Listed Potential Problems Will be Absent, Minimized or Managed (Ileostomy)  Outcome: Ongoing (interventions implemented as appropriate)     Problem: Patient Care Overview  Goal: Plan of Care Review  Outcome: Ongoing (interventions implemented as appropriate)  Flowsheets (Taken 2/7/2020 0948)  Consent Given to Review Plan with: Patient slept well. Neurontin prn given for hand pain at 0220. Meds whole with water, then followed with soft food. Ileostomy bag intact with stools.  Wears SCD's. No unsafe behavior.

## 2020-02-07 NOTE — THERAPY TREATMENT NOTE
Inpatient Rehabilitation - Occupational Therapy Treatment Note    [facility]     Patient Name: She López  : 1945  MRN: [id number]    Today's Date: 2020  Onset of Illness/Injury or Date of Surgery: 20              Admit Date: 2020      Visit Dx:    ICD-10-CM ICD-9-CM   1. General weakness R53.1 780.79       Patient Active Problem List   Diagnosis   • Crohn's disease of small intestine with other complication (CMS/HCC)   • Type 2 diabetes mellitus without complication (CMS/HCC)   • RSD upper limb   • Neuropathic pain of hand   • Hyperlipidemia   • Cervical myelopathy (CMS/HCC)   • Central pain syndrome   • Carpal tunnel syndrome   • Vitamin B 12 deficiency   • Guillain Barré syndrome (CMS/HCC)         Therapy Treatment    IRF Treatment Summary     Row Name 20 1204 20 0900          Evaluation/Treatment Time and Intent    Subjective Information  complains of;fatigue;pain increased burning in her hands   -AF  complains of;weakness  (Pended)  weakness in hips; tingling in BLE, proximal to knees   -NM     Existing Precautions/Restrictions  fall  -AF  fall  (Pended)   -NM     Document Type  therapy note (daily note)  -AF  therapy note (daily note);progress note/recertification  (Pended)   -NM     Mode of Treatment  occupational therapy  -AF  physical therapy  (Pended)   -NM     Patient/Family Observations  sitting up in w/c after PT session in room with  present   -AF  supine in bed; no acute distress   (Pended)   -NM     Recorded by [AF] Reina Perera, OTR [NM] Jaxon Atkinson, PT Student     Row Name 20 1204 20 0900          Cognition/Psychosocial- PT/OT    Affect/Mental Status (Cognitive)  anxious;flat/blunted affect;sad/depressed affect  -AF  flat/blunted affect;anxious  (Pended)   -NM     Orientation Status (Cognition)  oriented x 4  -AF  oriented x 4  (Pended)   -NM     Follows Commands (Cognition)  WFL  -AF  WFL  (Pended)   -NM     Personal Safety  Interventions  fall prevention program maintained;gait belt;nonskid shoes/slippers when out of bed  -AF  fall prevention program maintained;gait belt;nonskid shoes/slippers when out of bed;supervised activity  (Pended)   -NM     Safety Deficit (Cognitive)  insight into deficits/self awareness  -AF  --     Recorded by [AF] Reina Perera OTR [NM] Jaxon Atkinson, PT Student     Row Name 02/07/20 1204 02/07/20 0900          Bed Mobility Assessment/Treatment    Rolling Left Belk (Bed Mobility)  --  contact guard  (Pended)   -NM     Rolling Right Belk (Bed Mobility)  --  contact guard  (Pended)   -NM     Supine-Sit Belk (Bed Mobility)  --  minimum assist (75% patient effort);verbal cues  (Pended)  flat on plinth; CGA from bed c HOB slightly elevated   -NM     Sit-Supine Belk (Bed Mobility)  minimum assist (75% patient effort);verbal cues  -AF  minimum assist (75% patient effort);verbal cues  (Pended)   -NM     Bed Mobility, Safety Issues  --  decreased use of arms for pushing/pulling;decreased use of legs for bridging/pushing;impaired trunk control for bed mobility  (Pended)   -NM     Assistive Device (Bed Mobility)  --  bed rails;head of bed elevated  (Pended)   -NM     Recorded by [AF] Reina Perera, ALPESH [NM] Jaxon Atkinson, PT Student     Row Name 02/07/20 0900             Bed-Chair Transfer    Bed-Chair Belk (Transfers)  moderate assist (50% patient effort);verbal cues  (Pended)  squat pivot  -NM      Assistive Device (Bed-Chair Transfers)  wheelchair  (Pended)   -NM      Recorded by [NM] Jaxon Atkinson, PT Student      Row Name 02/07/20 1204 02/07/20 0900          Chair-Bed Transfer    Chair-Bed Belk (Transfers)  moderate assist (50% patient effort);verbal cues  -AF  moderate assist (50% patient effort);verbal cues  (Pended)   -NM     Assistive Device (Chair-Bed Transfers)  wheelchair  -AF  wheelchair  (Pended)   -NM     Recorded by [AF] Reina Perera  ALPESH [place] [NM] Jaxon Atkinson, PT Student     Row Name 02/07/20 0900             Sit-Stand Transfer    Sit-Stand [place] (Transfers)  moderate assist (50% patient effort);2 person assist;verbal cues  (Pended)   -NM      Assistive Device (Sit-Stand Transfers)  wheelchair  (Pended)  // bars   -NM      Recorded by [NM] Jaxon Atkinson, PT Student      Row Name 02/07/20 0900             Stand-Sit Transfer    Stand-Sit [place] (Transfers)  moderate assist (50% patient effort);verbal cues;2 person assist  (Pended)   -NM      Assistive Device (Stand-Sit Transfers)  wheelchair  (Pended)  // bars  -NM      Recorded by [NM] Jaxon Atkinson, PT Student      Row Name 02/07/20 0900             Gait/Stairs Assessment/Training    [place] Level (Gait)  moderate assist (50% patient effort);maximum assist (25% patient effort);2 person assist;verbal cues;nonverbal cues (demo/gesture)  (Pended)   -NM      Assistive Device (Gait)  parallel bars  (Pended)   -NM      Distance in Feet (Gait)  5'  (Pended)   -NM      Pattern (Gait)  step-to  (Pended)   -NM      Deviations/Abnormal Patterns (Gait)  bilateral deviations;ataxic;base of support, wide;gait speed decreased;stride length decreased;jt decreased  (Pended)   -NM      Bilateral Gait Deviations  forward flexed posture;foot drop/toe drag;heel strike decreased;knee hyperextension;knee buckling, left side;weight shift ability decreased  (Pended)   -NM      Left Sided Gait Deviations  knee buckling, left side  (Pended)   -NM      Comment (Gait/Stairs)  L knee blocking during stance; L knee buckled twice   (Pended)   -NM      Recorded by [NM] Jaxon Atkinson, PT Student      Row Name 02/07/20 1204             Bathing Assessment/Treatment    Bathing [place] Level  upper body;distal lower extremities/feet;minimum assist (75% patient effort);dependent (less than 25% patient effort)  -AF      Bathing Position  sink side;supported sitting  -AF      Bathing Setup  Assistance  obtain supplies  -AF      Recorded by [AF] Reina Perera, NILAMR      Row Name 02/07/20 1204             Upper Body Dressing Assessment/Treatment    Upper Body Dressing Task  upper body dressing skills;minimum assist (75% or more patient effort)  -AF      Upper Body Dressing Position  supported sitting  -AF      Set-up Assistance (Upper Body Dressing)  obtain clothing  -AF      Recorded by [AF] Reina Perera, NILAMR      Row Name 02/07/20 1204             Lower Body Dressing Assessment/Treatment    Lower Body Dressing [place] Level  doff;don;shoes/slippers;socks;dependent (less than 25% patient effort);maximum assist (25% patient effort)  -AF      Lower Body Dressing Position  supported sitting  -AF      Lower Body Dressing Setup Assistance  obtain clothing  -AF      Recorded by [AF] Reina Perera ALPESH      Row Name 02/07/20 1204             Grooming Assessment/Treatment    Grooming [place] Level  grooming skills;set up;minimum assist (75% patient effort)  -AF      Assistive Device (Grooming)  built-up handle items  -AF      Grooming Position  sink side;supported sitting  -AF      Grooming Setup Assistance  obtain supplies  -AF      Recorded by [AF] Reina Perera, R      Row Name 02/07/20 1204             Hand  Strength Testing    Right Hand, Setting 1 (Dynamometer Testing)  2  -AF      Left Hand, Setting 1 (Dynamometer Testing)  5  -AF      Recorded by [AF] Reina Perera NILAMR      Row Name 02/07/20 1204             Fine Motor Testing & Training    Results, Box N Block Test-Right  20  -AF      Results, Box N Block Test-Left  13  -AF      Recorded by [AF] Reina Perera, R      Row Name 02/07/20 0900             Pain Scale: Numbers Pre/Post-Treatment    Pain Location - Side  Bilateral  (Pended)   -NM      Pain Location - Orientation  generalized  (Pended)   -NM      Pain Location  hand  (Pended)   -NM      Pre/Post Treatment Pain Comment  tingling B hands, feet, knees    (Pended)   -NM      Recorded by [NM] Jaxon Atkinson, PT Student      Row Name 02/07/20 1204             Pain Scale: Word Pre/Post-Treatment    Pain: Word Scale, Pretreatment  2 - mild pain  -AF      Pain: Word Scale, Post-Treatment  4 - moderate pain  -AF      Pre/Post Treatment Pain Comment  burning in hands increased with activity  -AF      Recorded by [AF] Reina Perera, OTR      Row Name 02/07/20 0900             Static Standing Balance    Level of [place] (Supported Standing, Static Balance)  moderate assist, 50 to 74% patient effort;2 person assist  (Pended)  assist c midline orientation; L knee blocking   -NM      Time Able to Maintain Position (Supported Standing, Static Balance)  2 to 3 minutes  (Pended)  initiated righting reactions but required modA to complete   -NM      Assistive Device Utilized (Supported Standing, Static Balance)  parallel bars  (Pended)   -NM      Recorded by [NM] Jaxon Atkinson, PT Student      Row Name 02/07/20 1204             Upper Extremity Seated Therapeutic Exercise    Performed, Seated Upper Extremity (Therapeutic Exercise)  elbow flexion/extension;scapular protraction/retraction;shoulder flexion/extension;forearm supination/pronation  -AF      Device, Seated Upper Extremity (Therapeutic Exercise)  -- #1 and #1.5 wrist weights  -AF      Exercise Type, Seated Upper Extremity (Therapeutic Exercise)  AROM (active range of motion);resistive exercise  -AF      Expected Outcomes, Seated Upper Extremity (Therapeutic Exercise)  improve functional tolerance, self-care activity;improve motor control  -AF      Restrictions, Seated Upper Extremity (Therapeutic Exercise)  unable to hold #2 weight which she had been able grasp  -AF      Comment, Seated Upper Extremity (Therapeutic Exercise)  gross graps alternating reaching task with MOD diffiuclty   -AF      Recorded by [AF] Reina Perera, OTR      Row Name 02/07/20 0900             Lower Extremity Seated Therapeutic  Exercise    Performed, Seated Lower Extremity (Therapeutic Exercise)  hip flexion/extension;hip abduction/adduction;knee flexion/extension  (Pended)   -NM      Exercise Type, Seated Lower Extremity (Therapeutic Exercise)  AROM (active range of motion)  (Pended)  decreased AROM B hip flex, ankle DF  -NM      Sets/Reps Detail, Seated Lower Extremity (Therapeutic Exercise)  1/5  (Pended)   -NM      Recorded by [NM] Jaxon Atkinson, PT Student      Row Name 02/07/20 0900             Lower Extremity Supine Therapeutic Exercise    Performed, Supine Lower Extremity (Therapeutic Exercise)  hip flexion/extension;hip abduction/adduction;knee flexion/extension;ankle dorsiflexion/plantarflexion;bridging (bilateral w/bolster);bridging (unilateral)  (Pended)  single LE bridge unable in figure 4   -NM      Exercise Type, Supine Lower Extremity (Therapeutic Exercise)  AAROM (active assistive range of motion);AROM (active range of motion)  (Pended)  completed 2-3 reps AROM then required AAROM   -NM      Sets/Reps Detail, Supine Lower Extremity (Therapeutic Exercise)  1/5-10  (Pended)   -NM      Recorded by [NM] Jaxon Atkinson, PT Student      Row Name 02/07/20 0900             Lower Extremity Sidelying Therapeutic Exercise    Performed, Sidelying Lower Extremity (Therapeutic Exercise)  hip abduction  (Pended)  clamshells  -NM      Device, Sidelying Lower Extremity (Therapeutic Exercise)  other (see comments)  (Pended)  no resistance   -NM      Exercise Type, Sidelying Lower Extremity (Therapeutic Exercise)  AROM (active range of motion);AAROM (active assistive range of motion)  (Pended)  AROM clamshell; AAROM hip abd straight leg   -NM      Sets/Reps Detail, Sidelying Lower Extremity (Therapeutic Exercise)  1/10  (Pended)   -NM      Recorded by [NM] Jaxon Atkinson, PT Student      Row Name 02/07/20 1204             Neuromuscular Re-education    Comment (Neuromuscular Re-education)  core strengthing/proproception with  alternating BUEs with reaching task  -AF      Recorded by [AF] Reina Perera, OTMENDEZ      Row Name 02/07/20 1204 02/07/20 0900          Positioning and Restraints    Pre-Treatment Position  sitting in chair/recliner  -AF  in bed  (Pended)   -NM     Post Treatment Position  bed  -AF  wheelchair  (Pended)   -NM     In Bed  supine;call light within reach;encouraged to call for assist;exit alarm on;with family/caregiver with  present   -AF  --     In Wheelchair  --  exit alarm on;with family/caregiver  (Pended)   -NM     Recorded by [AF] Reina Perera, OTR [NM] Jaxon Atkinson, PT Student     Row Name 02/07/20 0900             Daily Summary of Progress (PT)    Recommendations: Physical Therapy  discussed pt's status c MD this morning related to decline in functional mobility and strength in BLE. Per MD pt to start on IVIG   (Pended)   -NM      Recorded by [NM] Jaxon Atkinson, PT Student        User Key  (r) = Recorded By, (t) = Taken By, (c) = Cosigned By    Initials Name Effective Dates    AF Reina Perera, OTR 04/03/18 -     NM Jaxon Atkinson, PT Student 01/03/20 -           Wound 12/09/19 1510 abdomen Incision (Active)   Dressing Appearance open to air 2/7/2020  8:00 AM   Closure SUJEY 2/6/2020  9:17 PM   Base dressing in place, unable to visualize 2/6/2020  9:17 PM   Periwound intact 2/6/2020  3:30 PM   Drainage Amount none 2/6/2020  9:17 PM   Care, Wound cleansed with;sterile normal saline 2/6/2020  3:30 PM         OT Recommendation and Plan                 OT IRF GOALS     Row Name 02/03/20 1540 01/25/20 1306          Transfer Goal 1 (OT-IRF)    Activity/Assistive Device (Transfer Goal 1, OT-IRF)  toilet;shower chair;walk-in shower  -AF  --     [place] Level (Transfer Goal 1, OT-IRF)  minimum assist (75% or more patient effort);moderate assist (50-74% patient effort);verbal cues required  -AF  --     Time Frame (Transfer Goal 1, OT-IRF)  short term goal (STG)  -AF  --     Progress/Outcomes  (Transfer Goal 1, OT-IRF)  goal ongoing  -AF  --        Transfer Goal 2 (OT-IRF)    Activity/Assistive Device (Transfer Goal 2, OT-IRF)  toilet;shower chair;walk-in shower  -AF  --     [place] Level (Transfer Goal 2, OT-IRF)  verbal cues required;contact guard assist  -AF  --     Time Frame (Transfer Goal 2, OT-IRF)  long term goal (LTG)  -AF  --     Progress/Outcomes (Transfer Goal 2, OT-IRF)  goal ongoing  -AF  --        Bathing Goal 1 (OT-IRF)    Activity/Device (Bathing Goal 1, OT-IRF)  bathing skills, all;grab bar/tub rail;hand-held shower spray hose;shower chair  -AF  --     [place] Level (Bathing Goal 1, OT-IRF)  verbal cues required;moderate assist (50-74% patient effort)  -AF  --     Time Frame (Bathing Goal 1, OT-IRF)  short term goal (STG)  -AF  --     Progress/Outcomes (Bathing Goal 1, OT-IRF)  goal ongoing  -AF  --        Bathing Goal 2 (OT-IRF)    Activity/Device (Bathing Goal 2, OT-IRF)  bathing skills, all;grab bar/tub rail;hand-held shower spray hose;shower chair  -AF  --     [place] Level (Bathing Goal 2, OT-IRF)  verbal cues required;contact guard assist  -AF  --     Time Frame (Bathing Goal 2, OT-IRF)  long term goal (LTG)  -AF  --     Progress/Outcomes (Bathing Goal 2, OT-IRF)  goal ongoing  -AF  --        UB Dressing Goal 1 (OT-IRF)    Activity/Device (UB Dressing Goal 1, OT-IRF)  upper body dressing  -AF  --     [place] (UB Dress Goal 1, OT-IRF)  verbal cues required;set-up required  -AF  --     Time Frame (UB Dressing Goal 1, OT-IRF)  long term goal (LTG)  -AF  --     Progress/Outcomes (UB Dressing Goal 1, OT-IRF)  goal ongoing  -AF  --        LB Dressing Goal 1 (OT-IRF)    Activity/Device (LB Dressing Goal 1, OT-IRF)  lower body dressing  -AF  --     [place] (LB Dressing Goal 1, OT-IRF)  verbal cues required;moderate assist (50-74% patient effort)  -AF  --     Time Frame (LB Dressing Goal 1, OT-IRF)  short term goal (STG)  -AF  --     Progress/Outcomes (LB Dressing  Goal 1, OT-IRF)  goal ongoing  -AF  --        LB Dressing Goal 2 (OT-IRF)    Activity/Device (LB Dressing Goal 2, OT-IRF)  lower body dressing  -AF  --     [place] (LB Dressing Goal 2, OT-IRF)  contact guard assist;verbal cues required  -AF  --     Time Frame (LB Dressing Goal 2, OT-IRF)  long term goal (LTG)  -AF  --     Progress/Outcomes (LB Dressing Goal 2, OT-IRF)  goal ongoing  -AF  --        Grooming Goal 1 (OT-IRF)    Activity/Device (Grooming Goal 1, OT-IRF)  grooming skills, all  -AF  --     [place] (Grooming Goal 1, OT-IRF)  set-up required;verbal cues required  -AF  --     Time Frame (Grooming Goal 1, OT-IRF)  short term goal (STG);long term goal (LTG)  -AF  --     Progress/Outcomes (Grooming Goal 1, OT-IRF)  goal ongoing  -AF  --        Toileting Goal 1 (OT-IRF)    Activity/Device (Toileting Goal 1, OT-IRF)  toileting skills, all;grab bar/safety frame;raised toilet seat  -AF  --     [place] Level (Toileting Goal 1, OT-IRF)  maximum assist (25-49% patient effort);verbal cues required  -AF  --     Time Frame (Toileting Goal 1, OT-IRF)  short term goal (STG)  -AF  --     Progress/Outcomes (Toileting Goal 1, OT-IRF)  goal ongoing  -AF  --        Toileting Goal 2 (OT-IRF)    Activity/Device (Toileting Goal 2, OT-IRF)  toileting skills, all;grab bar/safety frame;raised toilet seat  -AF  --     [place] Level (Toileting Goal 2, OT-IRF)  minimum assist (75% or more patient effort)  -AF  --     Time Frame (Toileting Goal 2, OT-IRF)  long term goal (LTG)  -AF  --     Progress/Outcomes (Toileting Goal 2, OT-IRF)  goal ongoing  -AF  --        Balance Goal 1 (OT)    Activity/Assistive Device (Balance Goal 1, OT)  standing, static  -AF  standing, static  -KP     [place] Level/Cues Needed (Balance Goal 1, OT)  moderate assist (50-74% patient effort);verbal cues required  -AF  set-up required;maximum assist (25-49% patient effort)  -KP     Time Frame (Balance Goal 1, OT)  short term goal (STG)   -AF  short term goal (STG);1 week  -KP     Progress/Outcomes (Balance Goal 1, OT)  goal ongoing  -AF  goal ongoing  -KP        Balance Goal 2 (OT)    Activity/Assistive Device (Balance Goal 2, OT)  standing, static  -AF  standing, static  -KP     [place] Level (Balance Goal 2, OT)  minimum assist (75% or more patient effort);contact guard assist  -AF  set-up required;minimum assist (75% or more patient effort);moderate assist (50-74% patient effort)  -KP     Time Frame (Balance Goal 2, OT)  long term goal (LTG)  -AF  long term goal (LTG);by discharge  -KP     Progress/Outcome (Balance Goal 2, OT)  goal ongoing  -AF  goal ongoing  -KP        Caregiver Training Goal 1 (OT-IRF)    Caregiver Training Goal 1 (OT-IRF)  Pt and  will demo safe techniques with ADLs, trasnfer, HEP and AD prior to d/c home   -AF  --     Time Frame (Caregiver Training Goal 1, OT-IRF)  long term goal (LTG)  -AF  --     Progress/Outcomes (Caregiver Training Goal 1, OT-IRF)  goal ongoing  -AF  --       User Key  (r) = Recorded By, (t) = Taken By, (c) = Cosigned By    Initials Name Provider Type    Lay Maldonado, OTR Occupational Therapist    Reina Noe OTR Occupational Therapist                     Time Calculation:     Time Calculation- OT     Row Name 02/07/20 1216             Time Calculation- OT    OT Start Time  0930  -AF      OT Stop Time  1030  -AF      OT Time Calculation (min)  60 min  -AF        User Key  (r) = Recorded By, (t) = Taken By, (c) = Cosigned By    Initials Name Provider Type    Reina Noe OTR Occupational Therapist          Therapy Charges for Today     Code Description Service Date Service Provider Modifiers Qty    68319225032 HC OT SELF CARE/MGMT/TRAIN EA 15 MIN 2/6/2020 Reina Perera OTR GO 4    76995409494 HC OT THER PROC EA 15 MIN 2/6/2020 Reina Perera OTR GO 2    83684641878 HC OT SELF CARE/MGMT/TRAIN EA 15 MIN 2/7/2020 Reina Perera OTR GO 2    [id number]  HC OT NEUROMUSC RE EDUCATION EA 15 MIN 2/7/2020 Reina Perera, OTR GO 1    11739266229 HC OT THER PROC EA 15 MIN 2/7/2020 Reina Perera, OTR GO 1                   Reina Perera, OTR  2/7/2020

## 2020-02-07 NOTE — PROGRESS NOTES
"                                                                                        [facility] Follow up Note        PATIENT IDENTIFICATION     Name: She López  Age: 74 y.o.  Sex: female  :  1945  MRN: [id number]       CHIEF COMPLIANTS / REASON FOR FOLLOW UP          Hyponatremia,metabolic acidosis.      Subjective:          Pt is a 74 yrs old white female s/p colostomy previously,recent admission to [facility] for gbs treated with 5 sessions of plasmapheresis,our group followed for plasmaperesis treatment that was completed uneventfully.Pt also noted with hyponatremia,controlled with oral salt.we will follow for ongoing hyponatremia.    20:improving with pt.walked 4 steps today.  2020: Motor strength continue to improve.  20:seen in pt.  2/3/20:feels better.     Review of Systems:          Constitutional: No fever, no chills, no lethargy, no weakness.  HEENT:  No headache, otalgia, itchy eyes, nasal discharge or sore throat.  Cardiac:  No chest pain, dyspnea, orthopnea or PND.  Chest:  No cough, phlegm or wheezing.  Abdomen:  No abdominal pain, nausea or vomiting.  Neuro:  No focal weakness, abnormal movements or seizure-like activity.  :   No hematuria, no pyuria, no dysuria, no flank pain.  Extremities:  No  joint pains.  ROS was otherwise negative except as mentioned in the [place].        OBJECTIVE                                                                        Exam:  /65 (BP Location: Left arm, Patient Position: Lying)   Pulse 82   Temp 97.9 °F (36.6 °C) (Oral)   Resp 18   Ht 165.1 cm (65\")   Wt 58.1 kg (128 lb)   LMP  (LMP Unknown)   SpO2 99%   BMI 21.30 kg/m²   Intake/Output last 3 shifts:  I/O last 3 completed shifts:  In: 520 [P.O.:520]  Out: 2875 [Urine:900; Stool:1975]  Intake/Output this shift:  I/O this shift:  In: -   Out: 900 [Urine:400; Stool:500]    General Appearance:  Alert, cooperative, no distress, appears stated " age  Head:  Normocephalic, without obvious abnormality, atraumatic  Eyes:  Sclerae anicteric, EOM's intact     Neck:  Supple,  no adenopathy;      Lungs:   Clear to auscultation bilaterally, respirations unlabored  Heart:  Regular rate and rhythm, S1 and S2 normal, no  rub   or gallop  Abdomen:  Soft, nontender,    no masses, no hepatomegaly, no splenomegaly  Extremities:  Extremities normal, trace edema  Neurologic:   Alert and oriented, no focal deficits    Scheduled Meds:    doxycycline 100 mg Oral Q12H   Iron 18 mg Sublingual BID   gabapentin 300 mg Oral 4x Daily   loperamide 2 mg Oral TID AC   MULTIVITAMIN ADULT 1 tablet Sublingual Daily   pantoprazole 40 mg Oral Q AM   sodium bicarbonate 650 mg Oral BID   sodium chloride 1 g Oral BID With Meals   Cyanocobalamin 2,500 mcg Sublingual Weekly   Vitamin D-3 5,000 Units Sublingual Daily     Continuous Infusions:   PRN Meds:•  aluminum sulfate-calcium acetate  •  gabapentin **AND** gabapentin  •  miconazole         Data Review:                                                                           CBC:   Results from last 7 days   Lab Units 02/05/20  0648   WBC 10*3/mm3 7.22   RBC 10*6/mm3 3.15*     BMP:   Results from last 7 days   Lab Units 02/05/20  0648   GLUCOSE mg/dL 112*   CO2 mmol/L 20.0*   BUN mg/dL 12   CREATININE mg/dL 0.82   CALCIUM mg/dL 8.5*     ABGs:       Invalid input(s): PO2       Imaging:                                                                                         ASSESSMENT:                                                                                Guillain Barré syndrome (CMS/HCC)       Hyponatremia, secondary to increased/high ADH state    Gillan barre syndrome.    crohns disease/s/p colostomy.    Hyperlipidemia.    Skin cancer.    Non aniongap metabolic acidosis:secondary to gi losses.    Iron deficiency    Anemia.    Vit d deficiency.  ·       PLAN:                                                                             ·   Sodium level acceptable.  Continue sodium bicarbonate for sodium and acidosis.  Discontinue any fluid restriction .  Defer transfusion to medicine.  Fecal occult blood positive  Might need GI work-up   Will follow.           Kalen Mao MD  [facility]  2/7/2020  6:33 AM

## 2020-02-07 NOTE — PROGRESS NOTES
Occupational Therapy: Individual: 90 minutes.    Physical Therapy: Branch    Speech Language Pathology:  Branch    Signed by: Hetal Magallanes OT

## 2020-02-07 NOTE — NURSING NOTE
Mrs López is receiving her IVIG through midline left arm with out any complaints and no distress noted. Stayed with pt until at goal rate see VS, I/O sheets.

## 2020-02-07 NOTE — PROGRESS NOTES
Inpatient Rehabilitation Plan of Care Note    Plan of Care  Care Plan Reviewed - No updates at this time.    Psychosocial    Performed Intervention(s)  Verbalize needs and concerns      Safety    Performed Intervention(s)  Bed alarm, wc alarm  Items within reach  Safety rounds      Sphincter Control    Performed Intervention(s)  Monitor intake and output  Encourage appropriate diet      Body Systems    Performed Intervention(s)  Daily skin inspection  Dressing change as ordered    Signed by: Sybil Wellington RN

## 2020-02-07 NOTE — PROGRESS NOTES
Inpatient Rehabilitation Plan of Care Note    Plan of Care  Care Plan Reviewed - No updates at this time.    Body Systems    [RN] Integumentary(Active)  Current Status(02/05/2020): Abdominal incision healed.ileostomy bag in place.  both changed per WOCN  Weekly Goal(02/12/2020): No S&S infection noted  Discharge Goal: No S&S infection noted    Performed Intervention(s)  Daily skin inspection  Dressing change as ordered      Psychosocial    Performed Intervention(s)  Verbalize needs and concerns      Safety    Performed Intervention(s)  Bed alarm, wc alarm  Items within reach  Safety rounds      Sphincter Control    Performed Intervention(s)  Monitor intake and output  Encourage appropriate diet    Signed by: Jyoti Parry RN

## 2020-02-07 NOTE — PROGRESS NOTES
"DOS: 2020  NAME: She López   : 1945  PCP: Armando Valentine MD  No chief complaint on file.      Chief complaint: weakness, numbness  Subjective: Felt some increased paresthesias and pain in the hands and feet overnight. NAEON.    Objective:  Vital signs: /65 (BP Location: Left arm, Patient Position: Lying)   Pulse 82   Temp 97.9 °F (36.6 °C) (Oral)   Resp 18   Ht 165.1 cm (65\")   Wt 58.1 kg (128 lb)   LMP  (LMP Unknown)   SpO2 99%   BMI 21.30 kg/m²    Gen: NAD, vitals reviewed  MS: oriented x3, recent/remote memory intact, normal attention/concentration, language intact, no neglect.  CN: visual acuity grossly normal, PERRL, EOMI, no facial droop, no dysarthria  Motor: 4/5 bilateral shoulder abduction, 4+/5 arm flexion and extension, 4/5 handgrip bilaterally with obvious wasting of the FDIs.  Sensation: Moderately diminished vibratory sensation bilateral lower extremities and upper extremities  Reflexes: Absent throughout, mute plantars    Exam objectively stable today    ROS:  + weakness, numbness  No fevers, chills      Laboratory results:  Lab Results   Component Value Date    GLUCOSE 109 (H) 2020    CALCIUM 8.5 (L) 2020     2020    K 3.7 2020    CO2 18.1 (L) 2020     2020    BUN 16 2020    CREATININE 0.69 2020    EGFRIFNONA 84 2020    BCR 23.2 2020    ANIONGAP 12.9 2020     Lab Results   Component Value Date    WBC 5.64 2020    HGB 9.0 (L) 2020    HCT 27.6 (L) 2020    MCV 88.7 2020     (H) 2020     No results found for: LDL  @hgba1c@     Diagnoses:  Acute inflammatory demyelinating polyradiculoneuropathy  Crohn's disease  Cervical myelopathy  Bilateral carpal tunnel syndrome    Comment: objectively stable with some increased sensory symptoms and pain. I would prefer to avoid additional pheresis or IVIG unless she develops an objective increase in " weakness    Plan:  1. Daily NIF/FVC  2. Following exam  3. Plan to try IVIG if proximal weakness worsens    Addendum: discussed with Dr. Valle proximal muscle weakness continues to worsen today with PT. She has had a significant functional decline with her mobility. Will order IVIG.

## 2020-02-07 NOTE — THERAPY TREATMENT NOTE
Inpatient Rehabilitation - Occupational Therapy Treatment Note    [facility]     Patient Name: She López  : 1945  MRN: [id number]    Today's Date: 2020  Onset of Illness/Injury or Date of Surgery: 20              Admit Date: 2020      Visit Dx:    ICD-10-CM ICD-9-CM   1. General weakness R53.1 780.79       Patient Active Problem List   Diagnosis   • Crohn's disease of small intestine with other complication (CMS/HCC)   • Type 2 diabetes mellitus without complication (CMS/HCC)   • RSD upper limb   • Neuropathic pain of hand   • Hyperlipidemia   • Cervical myelopathy (CMS/HCC)   • Central pain syndrome   • Carpal tunnel syndrome   • Vitamin B 12 deficiency   • Guillain Barré syndrome (CMS/HCC)         Therapy Treatment    IRF Treatment Summary     Row Name 20 1459 20 1204 20 0900       Evaluation/Treatment Time and Intent    Subjective Information  complains of;fatigue;weakness agreeable to therapy bed level only  -AF  complains of;fatigue;pain increased burning in her hands   -AF  complains of;weakness  (Pended)  weakness in hips; tingling in BLE, proximal to knees   -NM    Existing Precautions/Restrictions  fall  -AF  fall  -AF  fall  (Pended)   -NM    Document Type  therapy note (daily note)  -AF  therapy note (daily note)  -AF  therapy note (daily note);progress note/recertification  (Pended)   -NM    Mode of Treatment  occupational therapy  -AF  occupational therapy  -AF  physical therapy  (Pended)   -NM    Patient/Family Observations  sitting up in bed with  present   -AF  sitting up in w/c after PT session in room with  present   -AF  supine in bed; no acute distress   (Pended)   -NM    Recorded by [AF] Reina Perera, OTR [AF] Reina Perera, OTR [NM] Jaxon Atkinson, PT Student    Row Name 20 1459 20 1204 20 0900       Cognition/Psychosocial- PT/OT    Affect/Mental Status (Cognitive)  anxious;flat/blunted affect  -AF   anxious;flat/blunted affect;sad/depressed affect  -AF  flat/blunted affect;anxious  (Pended)   -NM    Orientation Status (Cognition)  oriented x 4  -AF  oriented x 4  -AF  oriented x 4  (Pended)   -NM    Follows Commands (Cognition)  WFL  -AF  WFL  -AF  WFL  (Pended)   -NM    Personal Safety Interventions  fall prevention program maintained;gait belt;nonskid shoes/slippers when out of bed  -AF  fall prevention program maintained;gait belt;nonskid shoes/slippers when out of bed  -AF  fall prevention program maintained;gait belt;nonskid shoes/slippers when out of bed;supervised activity  (Pended)   -NM    Safety Deficit (Cognitive)  insight into deficits/self awareness;awareness of need for assistance  -AF  insight into deficits/self awareness  -AF  --    Recorded by [AF] Reina Perera OTR [AF] Reina Perera OTR [NM] Jaxon Atkinson, PT Student    Row Name 02/07/20 1459 02/07/20 1204 02/07/20 0900       Bed Mobility Assessment/Treatment    Rolling Left [employer] (Bed Mobility)  --  --  contact guard  (Pended)   -NM    Rolling Right [employer] (Bed Mobility)  --  --  contact guard  (Pended)   -NM    Supine-Sit [employer] (Bed Mobility)  --  --  minimum assist (75% patient effort);verbal cues  (Pended)  flat on plinth; CGA from bed c HOB slightly elevated   -NM    Sit-Supine [employer] (Bed Mobility)  --  minimum assist (75% patient effort);verbal cues  -AF  minimum assist (75% patient effort);verbal cues  (Pended)   -NM    Bed Mobility, Safety Issues  --  --  decreased use of arms for pushing/pulling;decreased use of legs for bridging/pushing;impaired trunk control for bed mobility  (Pended)   -NM    Assistive Device (Bed Mobility)  --  --  bed rails;head of bed elevated  (Pended)   -NM    Comment (Bed Mobility)  deferred  -AF  --  --    Recorded by [AF] Reina Perera, ALPESH [AF] Reina Perera OTR [NM] Jaxon Atkinson, PT Student    Row Name 02/07/20 0900             Bed-Chair Transfer     Bed-Chair [place] (Transfers)  moderate assist (50% patient effort);verbal cues  (Pended)  squat pivot  -NM      Assistive Device (Bed-Chair Transfers)  wheelchair  (Pended)   -NM      Recorded by [NM] Jaxon Atkinson, PT Student      Row Name 02/07/20 1204 02/07/20 0900          Chair-Bed Transfer    Chair-Bed [place] (Transfers)  moderate assist (50% patient effort);verbal cues  -AF  moderate assist (50% patient effort);verbal cues  (Pended)   -NM     Assistive Device (Chair-Bed Transfers)  wheelchair  -AF  wheelchair  (Pended)   -NM     Recorded by [AF] Reina Perera OTR [NM] Jaxon Atkinson, PT Student     Row Name 02/07/20 0900             Sit-Stand Transfer    Sit-Stand [place] (Transfers)  moderate assist (50% patient effort);2 person assist;verbal cues  (Pended)   -NM      Assistive Device (Sit-Stand Transfers)  wheelchair  (Pended)  // bars   -NM      Recorded by [NM] Jaxon Atkinson, PT Student      Row Name 02/07/20 0900             Stand-Sit Transfer    Stand-Sit [place] (Transfers)  moderate assist (50% patient effort);verbal cues;2 person assist  (Pended)   -NM      Assistive Device (Stand-Sit Transfers)  wheelchair  (Pended)  // bars  -NM      Recorded by [NM] Jaxon Atkinson, PT Student      Row Name 02/07/20 0900             Gait/Stairs Assessment/Training    [place] Level (Gait)  moderate assist (50% patient effort);maximum assist (25% patient effort);2 person assist;verbal cues;nonverbal cues (demo/gesture)  (Pended)   -NM      Assistive Device (Gait)  parallel bars  (Pended)   -NM      Distance in Feet (Gait)  5'  (Pended)   -NM      Pattern (Gait)  step-to  (Pended)   -NM      Deviations/Abnormal Patterns (Gait)  bilateral deviations;ataxic;base of support, wide;gait speed decreased;stride length decreased;jt decreased  (Pended)   -NM      Bilateral Gait Deviations  forward flexed posture;foot drop/toe drag;heel strike decreased;knee hyperextension;knee  buckling, left side;weight shift ability decreased  (Pended)   -NM      Left Sided Gait Deviations  knee buckling, left side  (Pended)   -NM      Comment (Gait/Stairs)  L knee blocking during stance; L knee buckled twice   (Pended)   -NM      Recorded by [NM] Jaxon Atkinson, PT Student      Row Name 02/07/20 1204             Bathing Assessment/Treatment    Bathing [place] Level  upper body;distal lower extremities/feet;minimum assist (75% patient effort);dependent (less than 25% patient effort)  -AF      Bathing Position  sink side;supported sitting  -AF      Bathing Setup Assistance  obtain supplies  -AF      Recorded by [AF] Reina Perera OTR      Row Name 02/07/20 1204             Upper Body Dressing Assessment/Treatment    Upper Body Dressing Task  upper body dressing skills;minimum assist (75% or more patient effort)  -AF      Upper Body Dressing Position  supported sitting  -AF      Set-up Assistance (Upper Body Dressing)  obtain clothing  -AF      Recorded by [AF] Reina Perera OTR      Row Name 02/07/20 1204             Lower Body Dressing Assessment/Treatment    Lower Body Dressing [place] Level  doff;don;shoes/slippers;socks;dependent (less than 25% patient effort);maximum assist (25% patient effort)  -AF      Lower Body Dressing Position  supported sitting  -AF      Lower Body Dressing Setup Assistance  obtain clothing  -AF      Recorded by [AF] Reina Perera OTR      Row Name 02/07/20 1204             Grooming Assessment/Treatment    Grooming [place] Level  grooming skills;set up;minimum assist (75% patient effort)  -AF      Assistive Device (Grooming)  built-up handle items  -AF      Grooming Position  sink side;supported sitting  -AF      Grooming Setup Assistance  obtain supplies  -AF      Recorded by [AF] Reina Perera, OTR      Row Name 02/07/20 1204             Hand  Strength Testing    Right Hand, Setting 1 (Dynamometer Testing)  2  -AF      Left Hand,  Setting 1 (Dynamometer Testing)  5  -AF      Recorded by [AF] Reina Perera, OTR      Row Name 02/07/20 1459 02/07/20 1204          Fine Motor Testing & Training    Results, Box N Block Test-Right  --  20  -AF     Results, Box N Block Test-Left  --  13  -AF     Comment, Fine Motor Coordination  FMC alternating hands with manipulating dominos into slotted container with MOD/MAX diffiuclty, unable to maintain tip to tip or lateral pinch, fatigue quickly  -AF  --     Recorded by [AF] Reina Perera, OTR [AF] Reina Perera, OTR     Row Name 02/07/20 0900             Pain Scale: Numbers Pre/Post-Treatment    Pain Location - Side  Bilateral  (Pended)   -NM      Pain Location - Orientation  generalized  (Pended)   -NM      Pain Location  hand  (Pended)   -NM      Pre/Post Treatment Pain Comment  tingling B hands, feet, knees   (Pended)   -NM      Recorded by [NM] Jaxon Atkinson, PT Student      Row Name 02/07/20 1459 02/07/20 1204          Pain Scale: Word Pre/Post-Treatment    Pain: Word Scale, Pretreatment  2 - mild pain  -AF  2 - mild pain  -AF     Pain: Word Scale, Post-Treatment  2 - mild pain  -AF  4 - moderate pain  -AF     Pre/Post Treatment Pain Comment  --  burning in hands increased with activity  -AF     Recorded by [AF] Reina Perera, OTR [AF] Reina Perera, OTR     Row Name 02/07/20 0900             Static Standing Balance    Level of [place] (Supported Standing, Static Balance)  moderate assist, 50 to 74% patient effort;2 person assist  (Pended)  assist c midline orientation; L knee blocking   -NM      Time Able to Maintain Position (Supported Standing, Static Balance)  2 to 3 minutes  (Pended)  initiated righting reactions but required modA to complete   -NM      Assistive Device Utilized (Supported Standing, Static Balance)  parallel bars  (Pended)   -NM      Recorded by [NM] Jaxon Atkinson, PT Student      Row Name 02/07/20 0900             Therapeutic Exercise    Therapeutic  Exercise  supine, lower extremities  (Pended)   -NM      Recorded by [NM] Jaxon Atkinson, PT Student      Row Name 02/07/20 1459 02/07/20 1204          Upper Extremity Seated Therapeutic Exercise    Performed, Seated Upper Extremity (Therapeutic Exercise)  digit flexion/extension  -AF  elbow flexion/extension;scapular protraction/retraction;shoulder flexion/extension;forearm supination/pronation  -AF     Device, Seated Upper Extremity (Therapeutic Exercise)  -- hand gripper  -AF  -- #1 and #1.5 wrist weights  -AF     Exercise Type, Seated Upper Extremity (Therapeutic Exercise)  AROM (active range of motion);resistive exercise  -AF  AROM (active range of motion);resistive exercise  -AF     Expected Outcomes, Seated Upper Extremity (Therapeutic Exercise)  --  improve functional tolerance, self-care activity;improve motor control  -AF     Restrictions, Seated Upper Extremity (Therapeutic Exercise)  --  unable to hold #2 weight which she had been able grasp  -AF     Comment, Seated Upper Extremity (Therapeutic Exercise)  blue hand gripper 2 x 20 reps, wrist exerciser 2 turns with MIN/MOD difficulty   -AF  gross graps alternating reaching task with MOD diffiuclty   -AF     Recorded by [AF] Reina Perera OTR [AF] Reina Perera, OTR     Row Name 02/07/20 0900             Lower Extremity Seated Therapeutic Exercise    Performed, Seated Lower Extremity (Therapeutic Exercise)  hip flexion/extension;hip abduction/adduction;knee flexion/extension  (Pended)   -NM      Exercise Type, Seated Lower Extremity (Therapeutic Exercise)  AROM (active range of motion)  (Pended)  decreased AROM B hip flex, ankle DF  -NM      Sets/Reps Detail, Seated Lower Extremity (Therapeutic Exercise)  1/5  (Pended)   -NM      Recorded by [NM] Jaxon Atkinson, PT Student      Row Name 02/07/20 0900             Lower Extremity Supine Therapeutic Exercise    Performed, Supine Lower Extremity (Therapeutic Exercise)  hip flexion/extension;hip  abduction/adduction;knee flexion/extension;ankle dorsiflexion/plantarflexion;bridging (bilateral w/bolster);bridging (unilateral)  (Pended)  single LE bridge unable in figure 4   -NM      Exercise Type, Supine Lower Extremity (Therapeutic Exercise)  AAROM (active assistive range of motion);AROM (active range of motion)  (Pended)  completed 2-3 reps AROM then required AAROM   -NM      Sets/Reps Detail, Supine Lower Extremity (Therapeutic Exercise)  1/5-10  (Pended)  1/20 AAROM in PM: pt wished to stay in bed   -NM      Comment, Supine Lower Extremity (Therapeutic Exercise)  in the PM, pt performed AAROM SAQ, ankle pumps, hip abd/add, heel slides , quad sets, glute sets. use of oard under feet for hip ad/add   (Pended)  hip IE/ER  -NM      Recorded by [NM] Jaxon Atkinson, PT Student      Row Name 02/07/20 0900             Lower Extremity Sidelying Therapeutic Exercise    Performed, Sidelying Lower Extremity (Therapeutic Exercise)  hip abduction  (Pended)  clamshells  -NM      Device, Sidelying Lower Extremity (Therapeutic Exercise)  other (see comments)  (Pended)  no resistance   -NM      Exercise Type, Sidelying Lower Extremity (Therapeutic Exercise)  AROM (active range of motion);AAROM (active assistive range of motion)  (Pended)  AROM clamshell; AAROM hip abd straight leg   -NM      Sets/Reps Detail, Sidelying Lower Extremity (Therapeutic Exercise)  1/10  (Pended)   -NM      Recorded by [NM] Jaxon Atkinson, PT Student      Row Name 02/07/20 1459 02/07/20 1204          Neuromuscular Re-education    Comment (Neuromuscular Re-education)  AROM sitting up in bed shoulder flexion, horizontal ab/adduction and PNF D1/D2  -AF  core strengthing/proproception with alternating BUEs with reaching task  -AF     Recorded by [AF] Reina Perera OTR [AF] Reina Perera OTR     Row Name 02/07/20 1459 02/07/20 1204 02/07/20 0900       Positioning and Restraints    Pre-Treatment Position  in bed  -AF  sitting in  chair/recliner  -AF  in bed  (Pended)   -NM    Post Treatment Position  bed  -AF  bed  -AF  wheelchair  (Pended)   -NM    In Bed  call light within reach;encouraged to call for assist;exit alarm on;with family/caregiver with  present   -AF  supine;call light within reach;encouraged to call for assist;exit alarm on;with family/caregiver with  present   -AF  --    In Wheelchair  --  --  exit alarm on;with family/caregiver  (Pended)   -NM    Recorded by [AF] Reina Perera, NILAMR [AF] Reina Perera OTR [NM] Jaxon Atkinson, PT Student    Row Name 02/07/20 0900             Daily Summary of Progress (PT)    Recommendations: Physical Therapy  discussed pt's status c MD this morning related to decline in functional mobility and strength in BLE. Per MD pt to start on IVIG   (Pended)   -NM      Recorded by [NM] Jaxon Atkinson PT Student      Row Name 02/07/20 0900             Weekly Summary of Progress (PT)    Weekly Outcome Summary: Physical Therapy  Pt's experienced decreased stength in BLE and a decline in gait, transfers and bed mobility. Treatment intensity dereased this week due to possible muscle fatigue and MD consulted on pt status. MD to order IVIG this date. To continue to monitor pt status and progress functional activities as tolerated to allow  to assist pt at home. All LTGs remain pending pt response to IVIG treatment and progression.   (Pended)   -NM      Recorded by [NM] Jaxon Atkinson, PT Student        User Key  (r) = Recorded By, (t) = Taken By, (c) = Cosigned By    Initials Name Effective Dates    AF Reina Perera OTR 04/03/18 -     NM Jaxon Atkinson, PT Student 01/03/20 -           Wound 12/09/19 1510 abdomen Incision (Active)   Dressing Appearance open to air 2/7/2020  8:00 AM   Closure SUJEY 2/6/2020  9:17 PM   Base dressing in place, unable to visualize 2/6/2020  9:17 PM   Periwound intact 2/6/2020  3:30 PM   Drainage Amount none 2/6/2020  9:17 PM   Care, Wound  cleansed with;sterile normal saline 2/6/2020  3:30 PM         OT Recommendation and Plan                 OT IRF GOALS     Row Name 02/03/20 1540 01/25/20 1306          Transfer Goal 1 (OT-IRF)    Activity/Assistive Device (Transfer Goal 1, OT-IRF)  toilet;shower chair;walk-in shower  -AF  --     [place] Level (Transfer Goal 1, OT-IRF)  minimum assist (75% or more patient effort);moderate assist (50-74% patient effort);verbal cues required  -AF  --     Time Frame (Transfer Goal 1, OT-IRF)  short term goal (STG)  -AF  --     Progress/Outcomes (Transfer Goal 1, OT-IRF)  goal ongoing  -AF  --        Transfer Goal 2 (OT-IRF)    Activity/Assistive Device (Transfer Goal 2, OT-IRF)  toilet;shower chair;walk-in shower  -AF  --     [place] Level (Transfer Goal 2, OT-IRF)  verbal cues required;contact guard assist  -AF  --     Time Frame (Transfer Goal 2, OT-IRF)  long term goal (LTG)  -AF  --     Progress/Outcomes (Transfer Goal 2, OT-IRF)  goal ongoing  -AF  --        Bathing Goal 1 (OT-IRF)    Activity/Device (Bathing Goal 1, OT-IRF)  bathing skills, all;grab bar/tub rail;hand-held shower spray hose;shower chair  -AF  --     [place] Level (Bathing Goal 1, OT-IRF)  verbal cues required;moderate assist (50-74% patient effort)  -AF  --     Time Frame (Bathing Goal 1, OT-IRF)  short term goal (STG)  -AF  --     Progress/Outcomes (Bathing Goal 1, OT-IRF)  goal ongoing  -AF  --        Bathing Goal 2 (OT-IRF)    Activity/Device (Bathing Goal 2, OT-IRF)  bathing skills, all;grab bar/tub rail;hand-held shower spray hose;shower chair  -AF  --     [place] Level (Bathing Goal 2, OT-IRF)  verbal cues required;contact guard assist  -AF  --     Time Frame (Bathing Goal 2, OT-IRF)  long term goal (LTG)  -AF  --     Progress/Outcomes (Bathing Goal 2, OT-IRF)  goal ongoing  -AF  --        UB Dressing Goal 1 (OT-IRF)    Activity/Device (UB Dressing Goal 1, OT-IRF)  upper body dressing  -AF  --     [place] (UB Dress  Goal 1, OT-IRF)  verbal cues required;set-up required  -AF  --     Time Frame (UB Dressing Goal 1, OT-IRF)  long term goal (LTG)  -AF  --     Progress/Outcomes (UB Dressing Goal 1, OT-IRF)  goal ongoing  -AF  --        LB Dressing Goal 1 (OT-IRF)    Activity/Device (LB Dressing Goal 1, OT-IRF)  lower body dressing  -AF  --     [place] (LB Dressing Goal 1, OT-IRF)  verbal cues required;moderate assist (50-74% patient effort)  -AF  --     Time Frame (LB Dressing Goal 1, OT-IRF)  short term goal (STG)  -AF  --     Progress/Outcomes (LB Dressing Goal 1, OT-IRF)  goal ongoing  -AF  --        LB Dressing Goal 2 (OT-IRF)    Activity/Device (LB Dressing Goal 2, OT-IRF)  lower body dressing  -AF  --     [place] (LB Dressing Goal 2, OT-IRF)  contact guard assist;verbal cues required  -AF  --     Time Frame (LB Dressing Goal 2, OT-IRF)  long term goal (LTG)  -AF  --     Progress/Outcomes (LB Dressing Goal 2, OT-IRF)  goal ongoing  -AF  --        Grooming Goal 1 (OT-IRF)    Activity/Device (Grooming Goal 1, OT-IRF)  grooming skills, all  -AF  --     [place] (Grooming Goal 1, OT-IRF)  set-up required;verbal cues required  -AF  --     Time Frame (Grooming Goal 1, OT-IRF)  short term goal (STG);long term goal (LTG)  -AF  --     Progress/Outcomes (Grooming Goal 1, OT-IRF)  goal ongoing  -AF  --        Toileting Goal 1 (OT-IRF)    Activity/Device (Toileting Goal 1, OT-IRF)  toileting skills, all;grab bar/safety frame;raised toilet seat  -AF  --     [place] Level (Toileting Goal 1, OT-IRF)  maximum assist (25-49% patient effort);verbal cues required  -AF  --     Time Frame (Toileting Goal 1, OT-IRF)  short term goal (STG)  -AF  --     Progress/Outcomes (Toileting Goal 1, OT-IRF)  goal ongoing  -AF  --        Toileting Goal 2 (OT-IRF)    Activity/Device (Toileting Goal 2, OT-IRF)  toileting skills, all;grab bar/safety frame;raised toilet seat  -AF  --     [place] Level (Toileting Goal 2, OT-IRF)  minimum  assist (75% or more patient effort)  -AF  --     Time Frame (Toileting Goal 2, OT-IRF)  long term goal (LTG)  -AF  --     Progress/Outcomes (Toileting Goal 2, OT-IRF)  goal ongoing  -AF  --        Balance Goal 1 (OT)    Activity/Assistive Device (Balance Goal 1, OT)  standing, static  -AF  standing, static  -KP     [place] Level/Cues Needed (Balance Goal 1, OT)  moderate assist (50-74% patient effort);verbal cues required  -AF  set-up required;maximum assist (25-49% patient effort)  -KP     Time Frame (Balance Goal 1, OT)  short term goal (STG)  -AF  short term goal (STG);1 week  -KP     Progress/Outcomes (Balance Goal 1, OT)  goal ongoing  -AF  goal ongoing  -KP        Balance Goal 2 (OT)    Activity/Assistive Device (Balance Goal 2, OT)  standing, static  -AF  standing, static  -KP     [place] Level (Balance Goal 2, OT)  minimum assist (75% or more patient effort);contact guard assist  -AF  set-up required;minimum assist (75% or more patient effort);moderate assist (50-74% patient effort)  -KP     Time Frame (Balance Goal 2, OT)  long term goal (LTG)  -AF  long term goal (LTG);by discharge  -KP     Progress/Outcome (Balance Goal 2, OT)  goal ongoing  -AF  goal ongoing  -KP        Caregiver Training Goal 1 (OT-IRF)    Caregiver Training Goal 1 (OT-IRF)  Pt and  will demo safe techniques with ADLs, trasnfer, HEP and AD prior to d/c home   -AF  --     Time Frame (Caregiver Training Goal 1, OT-IRF)  long term goal (LTG)  -AF  --     Progress/Outcomes (Caregiver Training Goal 1, OT-IRF)  goal ongoing  -AF  --       User Key  (r) = Recorded By, (t) = Taken By, (c) = Cosigned By    Initials Name Provider Type    Lay Maldonado, OTR Occupational Therapist    AF Reina Perera, OTR Occupational Therapist                     Time Calculation:     Time Calculation- OT     Row Name 02/07/20 1504 02/07/20 1216          Time Calculation- OT    OT Start Time  1330  -AF  0930  -AF     OT Stop Time   1400  -AF  1030  -AF     OT Time Calculation (min)  30 min  -AF  60 min  -AF       User Key  (r) = Recorded By, (t) = Taken By, (c) = Cosigned By    Initials Name Provider Type    Reina Noe OTR Occupational Therapist          Therapy Charges for Today     Code Description Service Date Service Provider Modifiers Qty    70865945263 HC OT SELF CARE/MGMT/TRAIN EA 15 MIN 2/6/2020 Reina Perera OTR GO 4    15848817012 HC OT THER PROC EA 15 MIN 2/6/2020 Reina Perera OTR GO 2    18341672617 HC OT SELF CARE/MGMT/TRAIN EA 15 MIN 2/7/2020 Reina Perera OTR GO 2    84865541358 HC OT NEUROMUSC RE EDUCATION EA 15 MIN 2/7/2020 Reina Perera OTR GO 1    39009804807 HC OT THER PROC EA 15 MIN 2/7/2020 Reina Perera OTR GO 1    82453828991 HC OT THER PROC EA 15 MIN 2/7/2020 Reina Perera OTR GO 1    64678342515 HC OT NEUROMUSC RE EDUCATION EA 15 MIN 2/7/2020 Reina Perera OTR GO 1                   ALPESH Dempsey  2/7/2020

## 2020-02-07 NOTE — THERAPY PROGRESS REPORT/RE-CERT
Inpatient Rehabilitation - Physical Therapy Treatment Note  [facility]     Patient Name: She López  : 1945  MRN: [id number]    Today's Date: 2020  Onset of Illness/Injury or Date of Surgery: 20              Admit Date: 2020      Visit Dx:      ICD-10-CM ICD-9-CM   1. General weakness R53.1 780.79       Patient Active Problem List   Diagnosis   • Crohn's disease of small intestine with other complication (CMS/HCC)   • Type 2 diabetes mellitus without complication (CMS/HCC)   • RSD upper limb   • Neuropathic pain of hand   • Hyperlipidemia   • Cervical myelopathy (CMS/HCC)   • Central pain syndrome   • Carpal tunnel syndrome   • Vitamin B 12 deficiency   • Guillain Barré syndrome (CMS/HCC)       Therapy Treatment    IRF Treatment Summary     Row Name 20 1459 20 1204 20 09       Evaluation/Treatment Time and Intent    Subjective Information  complains of;fatigue;weakness agreeable to therapy bed level only  -AF  complains of;fatigue;pain increased burning in her hands   -AF  complains of;weakness weakness in hips; tingling in BLE, proximal to knees   -LH,NM,LH2    Existing Precautions/Restrictions  fall  -AF  fall  -AF  fall  -LH,NM,LH2    Document Type  therapy note (daily note)  -AF  therapy note (daily note)  -AF  therapy note (daily note);progress note/recertification  -LH,NM,LH2    Mode of Treatment  occupational therapy  -AF  occupational therapy  -AF  physical therapy  -LH,NM,LH2    Patient/Family Observations  sitting up in bed with  present   -AF  sitting up in w/c after PT session in room with  present   -AF  supine in bed; no acute distress   -LH,NM,LH2    Recorded by [AF] Reina Perera, OTR [AF] Reina Perera, OTR [LH,NM,LH2] Chelsie Cline, PT (r) Jaxon Atkinson PT Student (t) Chelsie Cline, PT (c)    Row Name 20 1459 20 1204 20 0900       Cognition/Psychosocial- PT/OT    Affect/Mental Status (Cognitive)   anxious;flat/blunted affect  -AF  anxious;flat/blunted affect;sad/depressed affect  -AF  flat/blunted affect;anxious  -LH,NM,LH2    Orientation Status (Cognition)  oriented x 4  -AF  oriented x 4  -AF  oriented x 4  -LH,NM,LH2    Follows Commands (Cognition)  WFL  -AF  WFL  -AF  WFL  -LH,NM,LH2    Personal Safety Interventions  fall prevention program maintained;gait belt;nonskid shoes/slippers when out of bed  -AF  fall prevention program maintained;gait belt;nonskid shoes/slippers when out of bed  -AF  fall prevention program maintained;gait belt;nonskid shoes/slippers when out of bed;supervised activity  -LH,NM,LH2    Safety Deficit (Cognitive)  insight into deficits/self awareness;awareness of need for assistance  -AF  insight into deficits/self awareness  -AF  --    Recorded by [AF] Reina Perera, OTR [AF] Reina Perera, OTR [LH,NM,LH2] Chelsie Cline, PT (r) Jaxon Atkinson PT Student (t) Chelsie Cline, PT (c)    Row Name 02/07/20 1459 02/07/20 1204 02/07/20 0900       Bed Mobility Assessment/Treatment    Rolling Left [place] (Bed Mobility)  --  --  contact guard  -LH,NM,LH2    Rolling Right [place] (Bed Mobility)  --  --  contact guard  -LH,NM,LH2    Supine-Sit [place] (Bed Mobility)  --  --  minimum assist (75% patient effort);verbal cues flat on plinth; CGA from bed c HOB slightly elevated   -LH,NM,LH2    Sit-Supine [place] (Bed Mobility)  --  minimum assist (75% patient effort);verbal cues  -AF  minimum assist (75% patient effort);verbal cues  -LH,NM,LH2    Bed Mobility, Safety Issues  --  --  decreased use of arms for pushing/pulling;decreased use of legs for bridging/pushing;impaired trunk control for bed mobility  -LH,NM,LH2    Assistive Device (Bed Mobility)  --  --  bed rails;head of bed elevated  -LH,NM,LH2    Comment (Bed Mobility)  deferred  -AF  --  --    Recorded by [AF] Reina Perera, OTR [AF] Reina Perera, OTR [LH,NM,LH2] Chelsie Cline, PT (r) Demetrio,  Jaxon, PT Student (t) Chelsie Cline, PT (c)    Row Name 02/07/20 0900             Bed-Chair Transfer    Bed-Chair [place] (Transfers)  moderate assist (50% patient effort);verbal cues squat pivot  -LH,NM,LH2      Assistive Device (Bed-Chair Transfers)  wheelchair  -LH,NM,LH2      Recorded by [LH,NM,LH2] Chelsie Cline, PT (r) Jaxon Atkinson, PT Student (t) Chelsie Cline, PT (c)      Row Name 02/07/20 1204 02/07/20 0900          Chair-Bed Transfer    Chair-Bed [place] (Transfers)  moderate assist (50% patient effort);verbal cues  -AF  moderate assist (50% patient effort);verbal cues  -LH,NM,LH2     Assistive Device (Chair-Bed Transfers)  wheelchair  -AF  wheelchair  -LH,NM,LH2     Recorded by [AF] Reina Perera OTR [LH,NM,LH2] Chelsie Cline, PT (r) Jaxon Atkinson, PT Student (t) Chelsie Cline, PT (c)     Row Name 02/07/20 0900             Sit-Stand Transfer    Sit-Stand [place] (Transfers)  moderate assist (50% patient effort);2 person assist;verbal cues  -LH,NM,LH2      Assistive Device (Sit-Stand Transfers)  wheelchair // bars   -LH,NM,LH2      Recorded by [LH,NM,LH2] Chelsie Cline, PT (r) Jaxon Atkinson, PT Student (t) Chelsie Cline, PT (c)      Row Name 02/07/20 0900             Stand-Sit Transfer    Stand-Sit [place] (Transfers)  moderate assist (50% patient effort);verbal cues;2 person assist  -LH,NM,LH2      Assistive Device (Stand-Sit Transfers)  wheelchair // bars  -LH,NM,LH2      Recorded by [LH,NM,LH2] Chelsie Cline, PT (r) Jaxon Atknison, PT Student (t) Chelsie Cline, PT (c)      Row Name 02/07/20 0900             Gait/Stairs Assessment/Training    [place] Level (Gait)  moderate assist (50% patient effort);maximum assist (25% patient effort);2 person assist;verbal cues;nonverbal cues (demo/gesture)  -LH,NM,LH2      Assistive Device (Gait)  parallel bars  -LH,NM,LH2      Distance in Feet (Gait)  5'  -LH,NM,LH2      Pattern (Gait)  step-to  -LH,NM,LH2       Deviations/Abnormal Patterns (Gait)  bilateral deviations;ataxic;base of support, wide;gait speed decreased;stride length decreased;jt decreased  -LH,NM,LH2      Bilateral Gait Deviations  forward flexed posture;foot drop/toe drag;heel strike decreased;knee hyperextension;knee buckling, left side;weight shift ability decreased  -LH,NM,LH2      Left Sided Gait Deviations  knee buckling, left side  -LH,NM,LH2      Comment (Gait/Stairs)  L knee blocking during stance; L knee buckled twice  3rd person following closely w WC  -LH3      Recorded by [LH,NM,LH2] Chelsie Cline, PT (r) Jaxon Atkinson PT Student (t) Chelsie Cline, PT (c)  [LH3] Chelsie Cline, PT      Row Name 02/07/20 1204             Bathing Assessment/Treatment    Bathing [place] Level  upper body;distal lower extremities/feet;minimum assist (75% patient effort);dependent (less than 25% patient effort)  -AF      Bathing Position  sink side;supported sitting  -AF      Bathing Setup Assistance  obtain supplies  -AF      Recorded by [AF] Reina Perera OTR      Row Name 02/07/20 1204             Upper Body Dressing Assessment/Treatment    Upper Body Dressing Task  upper body dressing skills;minimum assist (75% or more patient effort)  -AF      Upper Body Dressing Position  supported sitting  -AF      Set-up Assistance (Upper Body Dressing)  obtain clothing  -AF      Recorded by [AF] Reina Perera OTR      Row Name 02/07/20 1204             Lower Body Dressing Assessment/Treatment    Lower Body Dressing [place] Level  doff;don;shoes/slippers;socks;dependent (less than 25% patient effort);maximum assist (25% patient effort)  -AF      Lower Body Dressing Position  supported sitting  -AF      Lower Body Dressing Setup Assistance  obtain clothing  -AF      Recorded by [AF] Reina Perera OTR      Row Name 02/07/20 1204             Grooming Assessment/Treatment    Grooming [place] Level  grooming skills;set up;minimum assist (75%  patient effort)  -AF      Assistive Device (Grooming)  built-up handle items  -AF      Grooming Position  sink side;supported sitting  -AF      Grooming Setup Assistance  obtain supplies  -AF      Recorded by [AF] Reina Perera OTR      Row Name 02/07/20 1204             Hand  Strength Testing    Right Hand, Setting 1 (Dynamometer Testing)  2  -AF      Left Hand, Setting 1 (Dynamometer Testing)  5  -AF      Recorded by [AF] Reina Perera OTR      Row Name 02/07/20 1459 02/07/20 1204          Fine Motor Testing & Training    Results, Box N Block Test-Right  --  20  -AF     Results, Box N Block Test-Left  --  13  -AF     Comment, Fine Motor Coordination  FMC alternating hands with manipulating dominos into slotted container with MOD/MAX diffiuclty, unable to maintain tip to tip or lateral pinch, fatigue quickly  -AF  --     Recorded by [AF] Reina Perera OTR [AF] Reina Perera OTR     Row Name 02/07/20 0900             Pain Scale: Numbers Pre/Post-Treatment    Pain Location - Side  Bilateral  -LH,NM,LH2      Pain Location - Orientation  generalized  -LH,NM,LH2      Pain Location  hand  -LH,NM,LH2      Pre/Post Treatment Pain Comment  tingling B hands, feet, knees   -LH,NM,LH2      Recorded by [LH,NM,LH2] Chelsie Cline, PT (r) Jaxon Atkinson PT Student (t) Chelsie Cline, PT (c)      Row Name 02/07/20 1459 02/07/20 1204          Pain Scale: Word Pre/Post-Treatment    Pain: Word Scale, Pretreatment  2 - mild pain  -AF  2 - mild pain  -AF     Pain: Word Scale, Post-Treatment  2 - mild pain  -AF  4 - moderate pain  -AF     Pre/Post Treatment Pain Comment  --  burning in hands increased with activity  -AF     Recorded by [AF] Reina Perera OTR [AF] Reina Perera OTR     Row Name 02/07/20 0900             Static Standing Balance    Level of [place] (Supported Standing, Static Balance)  moderate assist, 50 to 74% patient effort;2 person assist assist c midline orientation; L knee  blocking   -LH,NM,LH2      Time Able to Maintain Position (Supported Standing, Static Balance)  2 to 3 minutes initiated righting reactions but required modA to complete   -LH,NM,LH2      Assistive Device Utilized (Supported Standing, Static Balance)  parallel bars  -LH,NM,LH2      Recorded by [LH,NM,LH2] Chelsie Cline, PT (r) Jxaon Atkinson, PT Student (t) Chelsie Cline, PT (c)      Row Name 02/07/20 0900             Therapeutic Exercise    Therapeutic Exercise  supine, lower extremities  -LH,NM,LH2      Recorded by [LH,NM,LH2] Chelsie Cline, PT (r) Jaxon Atkinson, PT Student (t) Chelsie Cline, PT (c)      Row Name 02/07/20 1459 02/07/20 1204          Upper Extremity Seated Therapeutic Exercise    Performed, Seated Upper Extremity (Therapeutic Exercise)  digit flexion/extension  -AF  elbow flexion/extension;scapular protraction/retraction;shoulder flexion/extension;forearm supination/pronation  -AF     Device, Seated Upper Extremity (Therapeutic Exercise)  -- hand gripper  -AF  -- #1 and #1.5 wrist weights  -AF     Exercise Type, Seated Upper Extremity (Therapeutic Exercise)  AROM (active range of motion);resistive exercise  -AF  AROM (active range of motion);resistive exercise  -AF     Expected Outcomes, Seated Upper Extremity (Therapeutic Exercise)  --  improve functional tolerance, self-care activity;improve motor control  -AF     Restrictions, Seated Upper Extremity (Therapeutic Exercise)  --  unable to hold #2 weight which she had been able grasp  -AF     Comment, Seated Upper Extremity (Therapeutic Exercise)  blue hand gripper 2 x 20 reps, wrist exerciser 2 turns with MIN/MOD difficulty   -AF  gross graps alternating reaching task with MOD diffiuclty   -AF     Recorded by [AF] Reina Perera OTR [AF] Reina Perera OTR     Row Name 02/07/20 0900             Lower Extremity Seated Therapeutic Exercise    Performed, Seated Lower Extremity (Therapeutic Exercise)  hip flexion/extension;hip  abduction/adduction;knee flexion/extension  -LH,NM,LH2      Exercise Type, Seated Lower Extremity (Therapeutic Exercise)  AROM (active range of motion) decreased AROM B hip flex, ankle DF  -LH,NM,LH2      Sets/Reps Detail, Seated Lower Extremity (Therapeutic Exercise)  1/5  -LH,NM,LH2      Recorded by [LH,NM,LH2] Chelsie Cline, PT (r) Jaxon Atkinson, PT Student (t) Chelsie Cline, PT (c)      Row Name 02/07/20 0900             Lower Extremity Supine Therapeutic Exercise    Performed, Supine Lower Extremity (Therapeutic Exercise)  hip flexion/extension;hip abduction/adduction;knee flexion/extension;ankle dorsiflexion/plantarflexion;bridging (bilateral w/bolster);bridging (unilateral) single LE bridge unable in figure 4   -LH,NM,LH2      Exercise Type, Supine Lower Extremity (Therapeutic Exercise)  AAROM (active assistive range of motion);AROM (active range of motion) completed 2-3 reps AROM then required AAROM   -LH,NM,LH2      Sets/Reps Detail, Supine Lower Extremity (Therapeutic Exercise)  1/5-10 1/20 AAROM in PM: pt wished to stay in bed   -LH,NM,LH2      Comment, Supine Lower Extremity (Therapeutic Exercise)  in the PM, pt performed AAROM SAQ, ankle pumps, hip abd/add, heel slides , quad sets, glute sets. use of oard under feet for hip ad/add  hip IE/ER  -LH,NM,LH2      Recorded by [LH,NM,LH2] Chelsie Cline, PT (r) Jaxon Atkinson, PT Student (t) Chelsie Cline, PT (c)      Row Name 02/07/20 0900             Lower Extremity Sidelying Therapeutic Exercise    Performed, Sidelying Lower Extremity (Therapeutic Exercise)  hip abduction clamshells  -LH,NM,LH2      Device, Sidelying Lower Extremity (Therapeutic Exercise)  other (see comments) no resistance   -LH,NM,LH2      Exercise Type, Sidelying Lower Extremity (Therapeutic Exercise)  AROM (active range of motion);AAROM (active assistive range of motion) AROM clamshell; AAROM hip abd straight leg   -LH,NM,LH2      Sets/Reps Detail, Sidelying Lower Extremity  (Therapeutic Exercise)  1/10  -LH,NM,LH2      Recorded by [LH,NM,LH2] Chelsie Cline, PT (r) Jaxon Atkinson, PT Student (t) Chelsie Cline, PT (c)      Row Name 02/07/20 1459 02/07/20 1204          Neuromuscular Re-education    Comment (Neuromuscular Re-education)  AROM sitting up in bed shoulder flexion, horizontal ab/adduction and PNF D1/D2  -AF  core strengthing/proproception with alternating BUEs with reaching task  -AF     Recorded by [AF] Reina Perera, OTR [AF] Reina Perera, OTR     Row Name 02/07/20 1459 02/07/20 1204 02/07/20 0900       Positioning and Restraints    Pre-Treatment Position  in bed  -AF  sitting in chair/recliner  -AF  in bed  -LH,NM,LH2    Post Treatment Position  bed  -AF  bed  -AF  wheelchair  -LH,NM,LH2    In Bed  call light within reach;encouraged to call for assist;exit alarm on;with family/caregiver with  present   -AF  supine;call light within reach;encouraged to call for assist;exit alarm on;with family/caregiver with  present   -AF  --    In Wheelchair  --  --  exit alarm on;with family/caregiver  -LH,NM,LH2    Recorded by [AF] Reina Perera, NILAMR [AF] Reina Perera, OTR [LH,NM,LH2] Chelsie Cline, PT (r) Jaxon Atkinson, PT Student (t) Chelsie Cline, PT (c)    Row Name 02/07/20 0900             Daily Summary of Progress (PT)    Recommendations: Physical Therapy  discussed pt's status c MD this morning related to decline in functional mobility and strength in BLE. Per MD pt to start on IVIG   -LH,NM,LH2      Recorded by [LH,NM,LH2] Chelsie Cline, PT (r) Jaxon Atkinson, PT Student (t) Chelsie Cline, PT (c)      Row Name 02/07/20 0900             Weekly Summary of Progress (PT)    Weekly Outcome Summary: Physical Therapy  Pt's experienced decreased stength in BLE and a decline in gait, transfers and bed mobility. Treatment intensity dereased this week due to possible muscle fatigue and MD consulted on pt status. MD to order IVIG this date. To continue  to monitor pt status and progress functional activities as tolerated to allow  to assist pt at home. All LTGs remain pending pt response to IVIG treatment and progression.   -LH,NM,LH2      Recorded by [LH,NM,LH2] Chelsie Cline, PT (r) Jaxon Atkinson, PT Student (t) Chelsie Cline, PT (c)        User Key  (r) = Recorded By, (t) = Taken By, (c) = Cosigned By    Initials Name Effective Dates     Chelsie Cline, PT 04/03/18 -     AF Reina Perera, OTR 04/03/18 -     Jaxon Arteaga, PT Student 01/03/20 -         Wound 12/09/19 1510 abdomen Incision (Active)   Dressing Appearance open to air 2/7/2020  8:00 AM   Closure SUJEY 2/6/2020  9:17 PM   Base dressing in place, unable to visualize 2/6/2020  9:17 PM   Periwound intact 2/6/2020  3:30 PM   Drainage Amount none 2/6/2020  9:17 PM   Care, Wound cleansed with;sterile normal saline 2/6/2020  3:30 PM           PT Recommendation and Plan        Daily Summary of Progress (PT)  Recommendations: Physical Therapy: discussed pt's status c MD this morning related to decline in functional mobility and strength in BLE. Per MD pt to start on IVIG       PT IRF GOALS     Row Name 02/07/20 0700             Bed Mobility Goal 2 (PT-IRF)    Activity/Assistive Device (Bed Mobility Goal 2, PT-IRF)  bed mobility activities, all  -LH (r) NM (t) LH (c)      [place] Level (Bed Mobility Goal 2, PT-IRF)  contact guard assist  -LH (r) NM (t) LH (c)      Time Frame (Bed Mobility Goal 2, PT-IRF)  4 weeks  -LH (r) NM (t) LH (c)      Progress/Outcomes (Bed Mobility Goal 2, PT-IRF)  goal ongoing  -LH (r) NM (t) LH (c)         Transfer Goal 2 (PT-IRF)    Activity/Assistive Device (Transfer Goal 2, PT-IRF)  all transfers;walker, rolling  -LH (r) NM (t) LH (c)      [place] Level (Transfer Goal 2, PT-IRF)  minimum assist (75% or more patient effort)  -LH (r) NM (t) LH (c)      Time Frame (Transfer Goal 2, PT-IRF)  4 weeks  -LH (r) NM (t) LH (c)      Progress/Outcomes (Transfer  Goal 2, PT-IRF)  goal ongoing  -LH (r) NM (t) LH (c)         Gait/Walking Locomotion Goal 2 (PT-IRF)    Activity/Assistive Device (Gait/Walking Locomotion Goal 2, PT-IRF)  gait (walking locomotion);assistive device use;walker, rolling  -LH (r) NM (t) LH (c)      Gait/Walking Locomotion Distance Goal 2 (PT-IRF)  60  -LH (r) NM (t) LH (c)      [place] Level (Gait/Walking Locomotion Goal 2, PT-IRF)  minimum assist (75% or more patient effort)  -LH (r) NM (t) LH (c)      Time Frame (Gait/Walking Locomotion Goal 2, PT-IRF)  4 weeks  -LH (r) NM (t) LH (c)      Progress/Outcomes (Gait/Walking Locomotion Goal 2, PT-IRF)  goal ongoing  -LH (r) NM (t) LH (c)         Balance Goal 2 (PT)    Activity/Assistive Device (Balance Goal 2, PT)  standing, static  -LH (r) NM (t) LH (c)      [place] Level (Balance Goal 2, PT)  contact guard assist  -LH (r) NM (t) LH (c)      Time Frame (Balance Goal 2, PT)  by discharge  -LH (r) NM (t) LH (c)         Caregiver Training Goal 1 (PT-IRF)    Progress/Outcomes (Caregiver Training Goal 1, PT-IRF)  goal met  -LH (r) NM (t) LH (c)         Caregiver Training Goal 2 (PT-IRF)    Caregiver Training Goal 2 (PT-IRF)  Family/caregiver to assist pt safely as needed.  -LH (r) NM (t) LH (c)      Time Frame (Caregiver Training Goal 2, PT-IRF)  4 weeks  -LH (r) NM (t) LH (c)      Progress/Outcomes (Caregiver Training Goal 2, PT-IRF)  goal ongoing  -LH (r) NM (t) LH (c)        User Key  (r) = Recorded By, (t) = Taken By, (c) = Cosigned By    Initials Name Provider Type    LH Chelsie Cline, PT Physical Therapist    NM Jaxon Atkinson, PT Student PT Student               Time Calculation:     PT Charges     Row Name 02/07/20 6091 02/07/20 1133          Time Calculation    Start Time  1430  -LH (r) NM (t) LH (c)  0830  -LH (r) NM (t) LH (c)     Stop Time  1500  -LH (r) NM (t) LH (c)  0930  -LH (r) NM (t) LH (c)     Time Calculation (min)  30 min  -LH (r) NM (t)  60 min  -LH (r) NM (t)     PT Received  On  02/07/20  -LH (r) NM (t) LH (c)  02/07/20  -LH (r) NM (t) LH (c)     PT - Next Appointment  02/08/20  -LH (r) NM (t) LH (c)  02/08/20  -LH (r) NM (t) LH (c)     PT Goal Re-Cert Due Date  --  02/14/20  -LH (r) NM (t) LH (c)       User Key  (r) = Recorded By, (t) = Taken By, (c) = Cosigned By    Initials Name Provider Type     Chelsie Cline, PT Physical Therapist    NM Jaxon Atkinson, PT Student PT Student          Therapy Charges for Today     Code Description Service Date Service Provider Modifiers Qty    02289755878 HC GAIT TRAINING EA 15 MIN 2/6/2020 Jaxon Atkinson, PT Student GP 1    21064709353 HC PT NEUROMUSC RE EDUCATION EA 15 MIN 2/6/2020 Jaxon Atkinson, PT Student GP 3    [id number] HC PT THERAPEUTIC ACT EA 15 MIN 2/6/2020 Jaxon Atkinson, PT Student GP 2    [id number] HC PT THERAPEUTIC ACT EA 15 MIN 2/7/2020 Jaxon Atkinson, PT Student GP 3    [id number]  PT THER PROC EA 15 MIN 2/7/2020 Jaxon Atkinson, PT Student GP 2    [id number] HC PT NEUROMUSC RE EDUCATION EA 15 MIN 2/7/2020 Jaxon Atkinson, PT Student GP 1                   Jaxon Atkinson, PT Student  2/7/2020

## 2020-02-07 NOTE — PROGRESS NOTES
LOS: 14 days   Patient Care Team:  Armando Valentine MD as PCP - General (Internal Medicine)  Lisa Arriaza MD as Consulting Physician (Obstetrics and Gynecology)    Chief Complaint: GBS    Subjective     History of Present Illness     Patient continues with increased numbness in her knees.  She has continued uncomfortable tingling in hands and numbness in feet.   She is weaker in hands, cannot abduct fingers like before.    PT notes increased effort with ambulation. Was using rollator week ago and today had to do tasks in parallel bars. Also last week was able to do single leg bridge and now difficulty with bilateral leg bridges. On discussion with PT, she is noted to have increased weakness with her hip extensors and possibly in the left quadricep.     On examination she also appears weaker in her hands and ankles.  Reviewed with Neurology who is ordering course of IVIG.   Discussed with patient, reviewed potential side effects, will place Midline.      Warmth and erythema at the right antecubital fossa noticeably better.            History taken from: patient chart family    Objective     Vital Signs  Temp:  [97.9 °F (36.6 °C)-98.1 °F (36.7 °C)] 97.9 °F (36.6 °C)  Heart Rate:  [82-96] 96  Resp:  [18-20] 18  BP: ()/(56-71) 119/71    Physical Exam:  General: Awake, alert, NAD  HEENT: normocepahlic, atraumatic   Heart: RRR, no m/r/g  Lungs: CTAB, no wheezing, rales, or rhonchi  Abdomen: soft, non-tender, non-distended, colostomy , incision dressed  Strength: BUE: 4/5 with bilateral elbow flexion, elbow extension, wrist extension, and finger flexion, right greater than left weak hand intrinsics R 2-/5, L 2/5       BLE:  HF right 3-/5, left 2/5,  knee extension right 4/5, left 4- to 4/5, ankle dorsiflexion B 3/5  Extremities: Right antecubital fossa with decreased erythema     Results Review:     I reviewed the patient's new clinical results.  Results from last 7 days   Lab Units 02/07/20  0713  02/05/20  0648 02/03/20  0623   WBC 10*3/mm3 5.64 7.22 7.05   HEMOGLOBIN g/dL 9.0* 8.9* 9.5*   HEMATOCRIT % 27.6* 28.1* 29.1*   PLATELETS 10*3/mm3 485* 468* 602*     Results from last 7 days   Lab Units 02/07/20  0627 02/05/20  0648 02/03/20  0623   SODIUM mmol/L 136 137 138   POTASSIUM mmol/L 3.7 4.2 4.4   CHLORIDE mmol/L 105 104 105   CO2 mmol/L 18.1* 20.0* 18.2*   BUN mg/dL 16 12 12   CREATININE mg/dL 0.69 0.82 0.89   CALCIUM mg/dL 8.5* 8.5* 8.9   GLUCOSE mg/dL 109* 112* 109*       Medication Review:   Scheduled Meds:    doxycycline 100 mg Oral Q12H   Iron 18 mg Sublingual BID   gabapentin 300 mg Oral 4x Daily   loperamide 2 mg Oral TID AC   MULTIVITAMIN ADULT 1 tablet Sublingual Daily   pantoprazole 40 mg Oral Q AM   sodium bicarbonate 1,300 mg Oral TID   sodium chloride 1 g Oral BID With Meals   Cyanocobalamin 2,500 mcg Sublingual Weekly   Vitamin D-3 5,000 Units Sublingual Daily     Continuous Infusions:   PRN Meds:.•  acetaminophen  •  aluminum sulfate-calcium acetate  •  gabapentin **AND** gabapentin  •  methylPREDNISolone sodium succinate  •  miconazole    Assessment/Plan   75 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     GBS  -Completed her plasmapharesis and has gotten good return.   - Will begin PT/OT for ADLS, strength, mobility, txs, gait.   -January 27: On gabapentin 300mg QID. Will continue to monitor and will titrate up as needed.  -Jan 29 -  Feels strength is somewhat better.  Worked on gait with rolling walker yesterday 15 feet. Today utilized ankle weights to decrease ataxic movements with gait. Transfers mod max assist. Bed mobility CTG.  - Feb 6 - Patient complains of increased numbness in her knees.  She is noted to have increased weakness with her hip extensors and possibly in the left quadricep.  Her ambulation distance is less and takes more effort with a shorter stride.  On examination she also appears weaker in her hands and ankles.  Discussed having neurology see her to  assess for possibly IVIG or another round of plasmapheresis.  -Feb 7 - increased weakness noted in BUE and BLE and more difficulty with mobility - Neurology starting patient on course of IVIG    Crohn's  -s/p recent colostomy- wound nurse to see and education for home management.   -If more than 1 liter output a day, needs Immodium-per Dr. Albrecht colorectal surgeon.     Hyponatremia  -On salt tabs and fluid restriction. Renal following  -January 27: Na 130. Continue salt tabs and fluid restriction per renal. Will continue to monitor  -January 30: Na 133. Continue sodium bicarbonate and fluid restriction per renal.  -Feb 3- Na improved to 138  -February 4-fluid restriction discontinued.  -February 5-sodium 137    Anemia  -January 27: Hgb/Hct 7.6/24.3- stable. No signs of active bleeding. Will order fecal occult and reticulocyte count. Will continue to monitor.  -Jan 28 - hemoccult positive  -Jan 29 - Stool heme +.  HGB stable at 7.5. She had hypotension and tachycardia yesterday 87/64 and 110) , better today (99/63 and 88).  Reviewed option of transfusion PRBCs. She wished to hold on transfusion unless absolutely necessary. Discussed if HGB < 7, would recommend definitely transfuse.  -January 30: Repeat CBC scheduled for tomorrow. Will continue to monitor.    -January 31: Hgb 7.2. Will transfuse 1 unit of PRBCs today. Ordered anemia studies. Spoke with Dr. Albrecht and if iron supplementation is required she recommends IV iron for better absorption.  -February 1: Hemoglobin improved 8.8.  IV iron infusion ordered by nephrology  -Feb 3 - HGB improved 9.5. Not tolerate IV iron infusion due to burning in vein, does not wish to have placed a more proximal IV. She had tow infusions. She will get EZ Melt Iron from outside to take by mouth; on discussion with colorectal surgery last week she should be able to absorb PO iron.  February 5-hemoglobin 8.5    DVT prophylaxis  -SCDS for now.   -January 27: Heparin has been on hold  due to HGB trending down. Following results of fecal occult and reticulocyte count will consider restarting Heparin for DVT prophylaxis.   -January 28 - hemoccult positive.     Vitamin D deficiency-vitamin D 22.4  on January 29.    Feb 1 - Patient does not wish to take vitamin D p.o. through the hospital supply.  Wishes to have her vitamin D brought in from home.    Vitamin B12 replacement-sublingual from home    RUE antecubital fossa phlebitis/cellulitis-February 3-no sign of infection. K-PAD.   February 4-Right antecubital fossa phlebitis with cord palpable/tender with surrounding erythema consistent with cellulitis. Allergy to Keflex - throat swelled. Will start Doxycycline 100 mg bid x 7 days, continue K-pad.   February 5-She continues to have pain and redness and swelling of the right antecubital fossa and distal upper arm.  Reviewed continue with doxycycline which was started yesterday but if there is no show improvement will look to adjust antibiotic tomorrow.  White blood cell count is 7.22 with normal differential..  Feb 7 - area improved today on doxycycline.     TEAM CONF - JAN 28 - FLAT AFFECT. SUPINE SIT MIN ASSIST. TRANSFERS MAX 2. NONAMBULATORY. UBB MIN. LBB MAX. UBD MOD.  LBD DEP. ABD WOUND CARE. STOOL HEMOCCULT POSITIVE.  ELOS - 5 WEEKS.     TEAM CONF - FEB 4 - BED SBA. TRANSFERS MIN-MOD. GAIT 30 FEET MIN 2 FIDEL WALKER. UBD MOD. LBD DEP. ON 1200 CC FLUID RESTRICTION. EATING OKAY.  ABD WOUND CLOSING. OSTOMY DEVICE STAYING ON.  CONTINENT. NEEDS TO GET UP TO BSC.   ELOS- 4 WEEKS.         Adolfo Valle MD  02/07/20  12:17 PM        >35 minutes with > 50% face-to-face / floor time / coordination of care

## 2020-02-07 NOTE — PROGRESS NOTES
Occupational Therapy: Branch    Physical Therapy: Individual: 90 minutes.    Speech Language Pathology:  Branch    Signed by: Jaxon Atkinson PT Student     - CoSigned By: Chelsie Cline PT 2/7/2020 3:55:08 PM

## 2020-02-08 PROCEDURE — 99231 SBSQ HOSP IP/OBS SF/LOW 25: CPT | Performed by: PSYCHIATRY & NEUROLOGY

## 2020-02-08 PROCEDURE — 25010000002 IMMUNE GLOBULIN (HUMAN) 20 GM/200ML SOLUTION: Performed by: PSYCHIATRY & NEUROLOGY

## 2020-02-08 PROCEDURE — 25010000002 METHYLPREDNISOLONE PER 125 MG: Performed by: PSYCHIATRY & NEUROLOGY

## 2020-02-08 PROCEDURE — 97110 THERAPEUTIC EXERCISES: CPT

## 2020-02-08 RX ORDER — SODIUM CHLORIDE 1000 MG
1 TABLET, SOLUBLE MISCELLANEOUS DAILY
Status: DISCONTINUED | OUTPATIENT
Start: 2020-02-09 | End: 2020-02-08

## 2020-02-08 RX ADMIN — Medication 18 MG: at 08:27

## 2020-02-08 RX ADMIN — Medication 18 MG: at 21:18

## 2020-02-08 RX ADMIN — ACETAMINOPHEN 500 MG: 500 TABLET, FILM COATED ORAL at 14:44

## 2020-02-08 RX ADMIN — DOXYCYCLINE 100 MG: 100 CAPSULE ORAL at 08:25

## 2020-02-08 RX ADMIN — LOPERAMIDE HYDROCHLORIDE 2 MG: 2 CAPSULE ORAL at 05:54

## 2020-02-08 RX ADMIN — CHOLECALCIFEROL TAB 125 MCG (5000 UNIT) 5000 UNITS: 125 TAB at 08:28

## 2020-02-08 RX ADMIN — METHYLPREDNISOLONE SODIUM SUCCINATE 60 MG: 125 INJECTION, POWDER, FOR SOLUTION INTRAMUSCULAR; INTRAVENOUS at 14:44

## 2020-02-08 RX ADMIN — IMMUNE GLOBULIN (HUMAN) 20 G: 10 INJECTION INTRAVENOUS; SUBCUTANEOUS at 15:22

## 2020-02-08 RX ADMIN — GABAPENTIN 300 MG: 300 CAPSULE ORAL at 08:26

## 2020-02-08 RX ADMIN — SODIUM BICARBONATE 1300 MG: 650 TABLET ORAL at 08:25

## 2020-02-08 RX ADMIN — PANTOPRAZOLE SODIUM 40 MG: 40 TABLET, DELAYED RELEASE ORAL at 05:54

## 2020-02-08 RX ADMIN — GABAPENTIN 300 MG: 300 CAPSULE ORAL at 12:09

## 2020-02-08 RX ADMIN — LOPERAMIDE HYDROCHLORIDE 2 MG: 2 CAPSULE ORAL at 12:08

## 2020-02-08 RX ADMIN — DOXYCYCLINE 100 MG: 100 CAPSULE ORAL at 21:14

## 2020-02-08 RX ADMIN — SODIUM BICARBONATE 1300 MG: 650 TABLET ORAL at 16:54

## 2020-02-08 RX ADMIN — LOPERAMIDE HYDROCHLORIDE 2 MG: 2 CAPSULE ORAL at 16:54

## 2020-02-08 RX ADMIN — SODIUM CHLORIDE, PRESERVATIVE FREE 10 ML: 5 INJECTION INTRAVENOUS at 21:20

## 2020-02-08 RX ADMIN — GABAPENTIN 300 MG: 300 CAPSULE ORAL at 21:14

## 2020-02-08 RX ADMIN — SODIUM CHLORIDE TAB 1 GM 1 G: 1 TAB at 08:25

## 2020-02-08 RX ADMIN — SODIUM CHLORIDE, PRESERVATIVE FREE 10 ML: 5 INJECTION INTRAVENOUS at 08:32

## 2020-02-08 RX ADMIN — GABAPENTIN 300 MG: 300 CAPSULE ORAL at 02:59

## 2020-02-08 RX ADMIN — SODIUM BICARBONATE 1300 MG: 650 TABLET ORAL at 21:14

## 2020-02-08 RX ADMIN — SODIUM CHLORIDE, PRESERVATIVE FREE 10 ML: 5 INJECTION INTRAVENOUS at 08:31

## 2020-02-08 RX ADMIN — Medication 1 TABLET: at 08:28

## 2020-02-08 RX ADMIN — GABAPENTIN 300 MG: 300 CAPSULE ORAL at 18:07

## 2020-02-08 RX ADMIN — SODIUM CHLORIDE, PRESERVATIVE FREE 10 ML: 5 INJECTION INTRAVENOUS at 21:15

## 2020-02-08 NOTE — PROGRESS NOTES
Inpatient Rehabilitation Plan of Care Note    Plan of Care  Care Plan Reviewed - No updates at this time.    Psychosocial    Performed Intervention(s)  Verbalize needs and concerns      Safety    Performed Intervention(s)  Bed alarm, wc alarm  Items within reach  Safety rounds      Sphincter Control    Performed Intervention(s)  Monitor intake and output  Encourage appropriate diet      Body Systems    Performed Intervention(s)  Daily skin inspection  Dressing change as ordered    Signed by: Vicenta Cleary RN

## 2020-02-08 NOTE — PLAN OF CARE
Problem: Patient Care Overview  Goal: Plan of Care Review  Outcome: Ongoing (interventions implemented as appropriate)  Flowsheets (Taken 2/8/2020 0233)  Outcome Summary: Patient tolerated first IVIG last evening no issues. A&Ox4, calm, flat but cooperative. Swallowing pills whole fine with water. Notified Dr. Irwin of new numbness to cheeks, no SOA or any pain reported per patient around midnight. Vital signs WNL. No new orders as of this time, will continue to monitor.  Progress, Functional Goals: demonstrating adequate progress  Plan of Care Reviewed With: patient  IRF Plan of Care Review: progress ongoing, continue     Problem: Fall Risk (Adult)  Goal: Absence of Fall  Description  Patient will demonstrate the desired outcomes by discharge/transition of care.  Outcome: Ongoing (interventions implemented as appropriate)  Flowsheets (Taken 2/8/2020 0233)  Absence of Fall: making progress toward outcome     Problem: Pain, Chronic (Adult)  Goal: Acceptable Pain/Comfort Level and Functional Ability  Description  Patient will demonstrate the desired outcomes by discharge/transition of care.  Outcome: Ongoing (interventions implemented as appropriate)  Flowsheets (Taken 2/8/2020 0233)  Acceptable Pain/Comfort Level and Functional Ability: making progress toward outcome

## 2020-02-08 NOTE — THERAPY TREATMENT NOTE
Inpatient Rehabilitation - Physical Therapy Treatment Note  [facility]     Patient Name: She López  : 1945  MRN: [id number]    Today's Date: 2020  Onset of Illness/Injury or Date of Surgery: 20              Admit Date: 2020      Visit Dx:      ICD-10-CM ICD-9-CM   1. General weakness R53.1 780.79       Patient Active Problem List   Diagnosis   • Crohn's disease of small intestine with other complication (CMS/HCC)   • Type 2 diabetes mellitus without complication (CMS/HCC)   • RSD upper limb   • Neuropathic pain of hand   • Hyperlipidemia   • Cervical myelopathy (CMS/HCC)   • Central pain syndrome   • Carpal tunnel syndrome   • Vitamin B 12 deficiency   • Guillain Barré syndrome (CMS/HCC)       Therapy Treatment    IRF Treatment Summary     Row Name 20 1100             Evaluation/Treatment Time and Intent    Subjective Information  complains of;weakness;fatigue wanted PT bedside  -DANAE      Existing Precautions/Restrictions  fall  -DANAE      Document Type  therapy note (daily note)  -DANAE      Mode of Treatment  physical therapy  -DANAE      Patient/Family Observations  lying in bed, K pad around R arm due to issue from an IV  -DANAE      Recorded by [DANAE] Lorena Bennett, PT      Row Name 20 1100             Cognition/Psychosocial- PT/OT    Affect/Mental Status (Cognitive)  WFL  -DANAE      Orientation Status (Cognition)  oriented x 4  -DANAE      Follows Commands (Cognition)  WFL  -DANAE      Recorded by [DANAE] Lorena Bennett, PT      Row Name 20 1100             Bed Mobility Assessment/Treatment    Comment (Bed Mobility)  deferred pt wanted bedside ex only   -DANAE      Recorded by [DANAE] Lorena Bennett, PT      Row Name 20 1100             Gait/Stairs Assessment/Training    Comment (Gait/Stairs)  refused, agreed to bedside ex only   -DANAE      Recorded by [DANAE] Lorena Bennett, PT      Row Name 20 1100             Lower Extremity Supine Therapeutic Exercise    Performed,  Supine Lower Extremity (Therapeutic Exercise)  heel slides;hip flexion/extension;hip abduction/adduction;SAQ (short arc quad) over bolster;ankle pumps;quadriceps sets;hamstring sets B bridging   -DANAE      Exercise Type, Supine Lower Extremity (Therapeutic Exercise)  AROM (active range of motion);AAROM (active assistive range of motion)  -DANAE      Sets/Reps Detail, Supine Lower Extremity (Therapeutic Exercise)  2/10  -DANAE      Comment, Supine Lower Extremity (Therapeutic Exercise)  minimal resistance with exercise for PF and with SKTC and to extend leg from SKTC. Tired quickly from that.   -DANAE      Recorded by [DANAE] Lorena Bennett, PT      Row Name 02/08/20 1100             Positioning and Restraints    Pre-Treatment Position  in bed  -DANAE      Post Treatment Position  bed  -DANAE      In Bed  call light within reach;side rails up x2 with lunch tray  -DANAE      Recorded by [DANAE] Lorena Bennett, PT        User Key  (r) = Recorded By, (t) = Taken By, (c) = Cosigned By    Initials Name Effective Dates    Lorena Milian, PT 06/08/18 -         Wound 12/09/19 1510 abdomen Incision (Active)   Dressing Appearance dry;intact 2/7/2020  9:50 PM   Closure SUJEY 2/7/2020  9:50 PM   Base dressing in place, unable to visualize 2/7/2020  9:50 PM           PT Recommendation and Plan                        Time Calculation:     PT Charges     Row Name 02/08/20 1150             Time Calculation    Start Time  1104  -DANAE      Stop Time  1134  -DANAE      Time Calculation (min)  30 min  -DANAE      PT Received On  02/08/20  -DANAE      PT - Next Appointment  02/10/20  -         Time Calculation- PT    Total Timed Code Minutes- PT  30 minute(s)  -        User Key  (r) = Recorded By, (t) = Taken By, (c) = Cosigned By    Initials Name Provider Type    Lorena Milian, PT Physical Therapist          Therapy Charges for Today     Code Description Service Date Service Provider Modifiers Qty    [id number]  PT THER PROC EA 15 MIN 2/8/2020  Lorena Bennett, PT GP 2                   Lorena Bennett, PT  2/8/2020

## 2020-02-08 NOTE — PROGRESS NOTES
LOS: 15 days   Patient Care Team:  Armando Valentine MD as PCP - General (Internal Medicine)  Lisa Arriaza MD as Consulting Physician (Obstetrics and Gynecology)    Chief Complaint: GBS    Subjective     History of Present Illness     Seen and examined, no acute events overnight. Feels OK, denies chest pain, shortness of breath, f/c. Slept well. Reporting some tingling sensation in left lower lip area last night. Not painful. Also feels left anterior cuibital fossa pain/swelling is improving.             History taken from: patient chart family    Objective     Vital Signs  Temp:  [96.8 °F (36 °C)-98.1 °F (36.7 °C)] 98.1 °F (36.7 °C)  Heart Rate:  [75-90] 79  Resp:  [18] 18  BP: (108-131)/(50-69) 108/50    Physical Exam:  General: Awake, alert, NAD  HEENT: normocepahlic, atraumatic   Heart: RRR, no m/r/g  Lungs: CTAB, no wheezing, rales, or rhonchi  Abdomen: soft, non-tender, non-distended, colostomy , incision dressed  Strength: BUE: 4/5 with bilateral elbow flexion, elbow extension, wrist extension, and finger flexion, right greater than left weak hand intrinsics R 2-/5, L 2/5       BLE:  HF right 3-/5, left 2/5,  knee extension right 4/5, left 4- to 4/5, ankle dorsiflexion B 3/5  Extremities: Right antecubital fossa with decreased erythema     Results Review:     I reviewed the patient's new clinical results.  Results from last 7 days   Lab Units 02/07/20  0627 02/05/20  0648 02/03/20  0623   WBC 10*3/mm3 5.64 7.22 7.05   HEMOGLOBIN g/dL 9.0* 8.9* 9.5*   HEMATOCRIT % 27.6* 28.1* 29.1*   PLATELETS 10*3/mm3 485* 468* 602*     Results from last 7 days   Lab Units 02/07/20  0627 02/05/20  0648 02/03/20  0623   SODIUM mmol/L 136 137 138   POTASSIUM mmol/L 3.7 4.2 4.4   CHLORIDE mmol/L 105 104 105   CO2 mmol/L 18.1* 20.0* 18.2*   BUN mg/dL 16 12 12   CREATININE mg/dL 0.69 0.82 0.89   CALCIUM mg/dL 8.5* 8.5* 8.9   GLUCOSE mg/dL 109* 112* 109*       Medication Review:   Scheduled Meds:    acetaminophen 500  mg Oral Daily   doxycycline 100 mg Oral Q12H   Iron 18 mg Sublingual BID   gabapentin 300 mg Oral 4x Daily   [START ON 2/11/2020] immune globulin (human) 20 g Intravenous Once   And      [START ON 2/11/2020] immune globulin (human) 10 g Intravenous Once   immune globulin (human) 20 g Intravenous Daily   loperamide 2 mg Oral TID AC   methylPREDNISolone sodium succinate 60 mg Intravenous Daily   MULTIVITAMIN ADULT 1 tablet Sublingual Daily   pantoprazole 40 mg Oral Q AM   sodium bicarbonate 1,300 mg Oral TID   sodium chloride 10 mL Intravenous Q12H   sodium chloride 10 mL Intravenous Q12H   Cyanocobalamin 2,500 mcg Sublingual Weekly   Vitamin D-3 5,000 Units Sublingual Daily     Continuous Infusions:   PRN Meds:.aluminum sulfate-calcium acetate  •  diphenhydrAMINE  •  famotidine  •  gabapentin **AND** gabapentin  •  hydrocortisone sodium succinate  •  miconazole  •  sodium chloride  •  sodium chloride    Assessment/Plan   73 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     GBS  -Completed her plasmapharesis and has gotten good return.   - Will begin PT/OT for ADLS, strength, mobility, txs, gait.   -January 27: On gabapentin 300mg QID. Will continue to monitor and will titrate up as needed.  -Jan 29 -  Feels strength is somewhat better.  Worked on gait with rolling walker yesterday 15 feet. Today utilized ankle weights to decrease ataxic movements with gait. Transfers mod max assist. Bed mobility CTG.  - Feb 6 - Patient complains of increased numbness in her knees.  She is noted to have increased weakness with her hip extensors and possibly in the left quadricep.  Her ambulation distance is less and takes more effort with a shorter stride.  On examination she also appears weaker in her hands and ankles.  Discussed having neurology see her to assess for possibly IVIG or another round of plasmapheresis.  -Feb 7 - increased weakness noted in BUE and BLE and more difficulty with mobility - Neurology starting  patient on course of IVIG    Crohn's  -s/p recent colostomy- wound nurse to see and education for home management.   -If more than 1 liter output a day, needs Immodium-per Dr. Albrecht colorectal surgeon.     Hyponatremia  -On salt tabs and fluid restriction. Renal following  -January 27: Na 130. Continue salt tabs and fluid restriction per renal. Will continue to monitor  -January 30: Na 133. Continue sodium bicarbonate and fluid restriction per renal.  -Feb 3- Na improved to 138  -February 4-fluid restriction discontinued.  -February 5-sodium 137    Anemia  -January 27: Hgb/Hct 7.6/24.3- stable. No signs of active bleeding. Will order fecal occult and reticulocyte count. Will continue to monitor.  -Jan 28 - hemoccult positive  -Jan 29 - Stool heme +.  HGB stable at 7.5. She had hypotension and tachycardia yesterday 87/64 and 110) , better today (99/63 and 88).  Reviewed option of transfusion PRBCs. She wished to hold on transfusion unless absolutely necessary. Discussed if HGB < 7, would recommend definitely transfuse.  -January 30: Repeat CBC scheduled for tomorrow. Will continue to monitor.    -January 31: Hgb 7.2. Will transfuse 1 unit of PRBCs today. Ordered anemia studies. Spoke with Dr. Albrecht and if iron supplementation is required she recommends IV iron for better absorption.  -February 1: Hemoglobin improved 8.8.  IV iron infusion ordered by nephrology  -Feb 3 - HGB improved 9.5. Not tolerate IV iron infusion due to burning in vein, does not wish to have placed a more proximal IV. She had tow infusions. She will get EZ Melt Iron from outside to take by mouth; on discussion with colorectal surgery last week she should be able to absorb PO iron.  February 5-hemoglobin 8.5    DVT prophylaxis  -SCDS for now.   -January 27: Heparin has been on hold due to HGB trending down. Following results of fecal occult and reticulocyte count will consider restarting Heparin for DVT prophylaxis.   -January 28 - hemoccult  positive.     Vitamin D deficiency-vitamin D 22.4  on January 29.    Feb 1 - Patient does not wish to take vitamin D p.o. through the hospital supply.  Wishes to have her vitamin D brought in from home.    Vitamin B12 replacement-sublingual from home    RUE antecubital fossa phlebitis/cellulitis-February 3-no sign of infection. K-PAD.   February 4-Right antecubital fossa phlebitis with cord palpable/tender with surrounding erythema consistent with cellulitis. Allergy to Keflex - throat swelled. Will start Doxycycline 100 mg bid x 7 days, continue K-pad.   February 5-She continues to have pain and redness and swelling of the right antecubital fossa and distal upper arm.  Reviewed continue with doxycycline which was started yesterday but if there is no show improvement will look to adjust antibiotic tomorrow.  White blood cell count is 7.22 with normal differential..  Feb 7 - area improved today on doxycycline.     TEAM CONF - JAN 28 - FLAT AFFECT. SUPINE SIT MIN ASSIST. TRANSFERS MAX 2. NONAMBULATORY. UBB MIN. LBB MAX. UBD MOD.  LBD DEP. ABD WOUND CARE. STOOL HEMOCCULT POSITIVE.  ELOS - 5 WEEKS.     TEAM CONF - FEB 4 - BED SBA. TRANSFERS MIN-MOD. GAIT 30 FEET MIN 2 FIDEL WALKER. UBD MOD. LBD DEP. ON 1200 CC FLUID RESTRICTION. EATING OKAY.  ABD WOUND CLOSING. OSTOMY DEVICE STAYING ON.  CONTINENT. NEEDS TO GET UP TO BSC.   ELOS- 4 WEEKS.     2/8  - Continue comprehensive inpatient rehabilitation program  - IVIG per neurology  - Continue gabapentin, adjust dose as needed      Sunday Marinelli MD  02/08/20  3:39 PM        >35 minutes with > 50% face-to-face / floor time / coordination of care

## 2020-02-08 NOTE — PROGRESS NOTES
"                                                                                        [facility] Follow up Note        PATIENT IDENTIFICATION     Name: She López  Age: 74 y.o.  Sex: female  :  1945  MRN: [id number]       CHIEF COMPLIANTS / REASON FOR FOLLOW UP          Hyponatremia,metabolic acidosis.    [place]:    Pt is a 74 yrs old white female s/p colostomy previously,recent admission to [facility] for gbs treated with 5 sessions of plasmapheresis,our group followed for plasmaperesis treatment that was completed uneventfully.Pt also noted with hyponatremia,controlled with oral salt.we will follow for ongoing hyponatremia.    Subjective:          Feeling better  Mild nor al anion gap acidosis   Still has ileostomy     Review of Systems:          Constitutional: No fever, no chills, no lethargy, no weakness.  HEENT:  No headache, otalgia, itchy eyes, nasal discharge or sore throat.  Cardiac:  No chest pain, dyspnea, orthopnea or PND.  Chest:  No cough, phlegm or wheezing.  Abdomen:  No abdominal pain, nausea or vomiting.  Neuro:  No focal weakness, abnormal movements or seizure-like activity.  :   No hematuria, no pyuria, no dysuria, no flank pain.  Extremities:  No  joint pains.  ROS was otherwise negative except as mentioned in the [place].        OBJECTIVE                                                                        Exam:  /69 (BP Location: Right arm, Patient Position: Lying)   Pulse 75   Temp 96.8 °F (36 °C) (Oral)   Resp 18   Ht 165.1 cm (65\")   Wt 58.1 kg (128 lb)   LMP  (LMP Unknown)   SpO2 98%   BMI 21.30 kg/m²   Intake/Output last 3 shifts:  I/O last 3 completed shifts:  In: 680 [P.O.:480; IV Piggyback:200]  Out: 2325 [Urine:925; Stool:1400]  Intake/Output this shift:  No intake/output data recorded.    General Appearance:  Alert, cooperative, no distress, appears stated age  Head:  Normocephalic, without obvious abnormality, " atraumatic  Eyes:  Sclerae anicteric, EOM's intact     Neck:  Supple,  no adenopathy;      Lungs:   Clear to auscultation bilaterally, respirations unlabored  Heart:  Regular rate and rhythm, S1 and S2 normal, no  rub   or gallop  Abdomen:  Soft, nontender,    no masses, no hepatomegaly, no splenomegaly, ileostomy in place  Extremities:  Extremities normal, no edema  Neurologic:   Alert and oriented, no focal deficits    Scheduled Meds:    acetaminophen 500 mg Oral Daily   doxycycline 100 mg Oral Q12H   Iron 18 mg Sublingual BID   gabapentin 300 mg Oral 4x Daily   [START ON 2/11/2020] immune globulin (human) 20 g Intravenous Once   And      [START ON 2/11/2020] immune globulin (human) 10 g Intravenous Once   immune globulin (human) 20 g Intravenous Daily   loperamide 2 mg Oral TID AC   methylPREDNISolone sodium succinate 60 mg Intravenous Daily   MULTIVITAMIN ADULT 1 tablet Sublingual Daily   pantoprazole 40 mg Oral Q AM   sodium bicarbonate 1,300 mg Oral TID   sodium chloride 10 mL Intravenous Q12H   sodium chloride 10 mL Intravenous Q12H   [START ON 2/9/2020] sodium chloride 1 g Oral Daily   Cyanocobalamin 2,500 mcg Sublingual Weekly   Vitamin D-3 5,000 Units Sublingual Daily     Continuous Infusions:   PRN Meds:aluminum sulfate-calcium acetate  •  diphenhydrAMINE  •  famotidine  •  gabapentin **AND** gabapentin  •  hydrocortisone sodium succinate  •  miconazole  •  sodium chloride  •  sodium chloride         Data Review:                                                                           CBC:   Results from last 7 days   Lab Units 02/07/20  0627   WBC 10*3/mm3 5.64   RBC 10*6/mm3 3.11*     BMP:   Results from last 7 days   Lab Units 02/07/20  0627   GLUCOSE mg/dL 109*   CO2 mmol/L 18.1*   BUN mg/dL 16   CREATININE mg/dL 0.69   CALCIUM mg/dL 8.5*     ABGs:       Invalid input(s): PO2       Imaging:                                                                                         ASSESSMENT:                                                                                 Guillain Barré syndrome (CMS/HCC)       Hyponatremia, secondary to increased/high ADH state    Gillan barre syndrome.    crohns disease/s/p colostomy.    Hyperlipidemia.    Skin cancer.    Non aniongap metabolic acidosis:secondary to gi losses.    Iron deficiency    Anemia.    Vit d deficiency.  ·       PLAN:                                                                            ·   Sodium level acceptable.   Pt has normal anion gap acidosis  Will d/c sodium chloride tablets  Continue sodium bicarbonate for sodium and acidosis.  Discontinue any fluid restriction .  Defer transfusion to medicine.  Fecal occult blood positive  Might need GI work-up   Neurology plan noted  Will follow.           Amadou Nielsen MD  [facility]  2/8/2020  8:44 AM

## 2020-02-08 NOTE — PLAN OF CARE
Problem: Patient Care Overview  Goal: Plan of Care Review  Outcome: Ongoing (interventions implemented as appropriate)  Flowsheets (Taken 2/8/2020 1550)  Outcome Summary: Mrs. López has had a good day, takes mediacation with water, and has had no complaints of pain. She is assist x1, ileostomy draining properly, and infiltrated IV site to right AC- heat applired. IVIG given this afternoon, tolerating well so far, but will continue to monitor. VS stable and family at bedside most of the day.  Progress, Functional Goals: demonstrating adequate progress  Plan of Care Reviewed With: patient; spouse  IRF Plan of Care Review: progress ongoing, continue

## 2020-02-08 NOTE — PROGRESS NOTES
"DOS: 2020  NAME: She López   : 1945  PCP: Armando Valentine MD  No chief complaint on file.      Chief complaint: weakness  Subjective: Paresthesias slightly improved.  Weakness stable.  IVIG started yesterday, she tolerated the infusion.    Objective:  Vital signs: /69 (BP Location: Right arm, Patient Position: Lying)   Pulse 75   Temp 96.8 °F (36 °C) (Oral)   Resp 18   Ht 165.1 cm (65\")   Wt 58.1 kg (128 lb)   LMP  (LMP Unknown)   SpO2 98%   BMI 21.30 kg/m²    Gen: NAD, vitals reviewed  MS: oriented x3, recent/remote memory intact, normal attention/concentration, language intact, no neglect.  CN: visual acuity grossly normal, PERRL, EOMI, no facial droop, no dysarthria    ROS:  + weakness, numbness  No fevers, chills      Laboratory results:  Lab Results   Component Value Date    GLUCOSE 109 (H) 2020    CALCIUM 8.5 (L) 2020     2020    K 3.7 2020    CO2 18.1 (L) 2020     2020    BUN 16 2020    CREATININE 0.69 2020    EGFRIFNONA 84 2020    BCR 23.2 2020    ANIONGAP 12.9 2020     Lab Results   Component Value Date    WBC 5.64 2020    HGB 9.0 (L) 2020    HCT 27.6 (L) 2020    MCV 88.7 2020     (H) 2020     No results found for: LDL  @hgba1c@     Review of labs: Hemoglobin 9.0, sodium 136    Diagnoses:  Acute inflammatory demyelinating polyradiculoneuropathy  Crohn's disease  Cervical myelopathy  Bilateral carpal tunnel syndrome    Comment: Darted IVIG yesterday for early treatment fluctuation from a IDP.  She tolerated IVIG.  Day 2 today.    Plan:  1.  Continuing IVIG  2. Daily NIF/FVC    "

## 2020-02-08 NOTE — PROGRESS NOTES
Inpatient Rehabilitation Functional Measures Assessment    Functional Measures  [facility] Eating:  Branch  [facility] Grooming: Branch  [facility] Bathing:  Branch  [facility] Upper Body Dressing:  [place]  [facility] Lower Body Dressing:  [facility] Toileting:  [facility] Bladder Management  Level of Assistance:  [place]  Frequency/Number of Accidents this Shift:  [facility] Bowel Management  Level of Assistance: [place]  Frequency/Number of Accidents this Shift: [facility] Bed/Chair/Wheelchair Transfer:  [facility] Toilet Transfer:  [facility] Tub/Shower Transfer:  [place]    Previously Documented Mode of Locomotion at Discharge: Field  ANKUR Expected Mode of Locomotion at Discharge: [facility] Walk/Wheelchair:  [facility] Stairs:  [facility] Comprehension:  [facility] Expression:  [facility] Social Interaction:  [facility] Problem Solving:  [facility] Memory:  [place]    Therapy Mode Minutes  Occupational Therapy: [place]  Physical Therapy: Individual: 30 minutes.  Speech Language Pathology:  [place]    Signed by: Lorena Bennett PT

## 2020-02-08 NOTE — NURSING NOTE
Patient reported a new numbness and tingling around her cheeks. She denies any SOA and stated she feels fine. Vital signs checked and stable. Called Dr. Irwin to report and is aware, no new orders at this time. Educated patient to report any new symptoms that's worsening. Will continue to monitor.

## 2020-02-08 NOTE — NURSING NOTE
Patient stated the numbness in her cheeks is gone and better at this time. Denies any new symptoms. Will continue to monitor.

## 2020-02-09 PROCEDURE — 99231 SBSQ HOSP IP/OBS SF/LOW 25: CPT | Performed by: PSYCHIATRY & NEUROLOGY

## 2020-02-09 PROCEDURE — 25010000002 IMMUNE GLOBULIN (HUMAN) 20 GM/200ML SOLUTION: Performed by: PSYCHIATRY & NEUROLOGY

## 2020-02-09 PROCEDURE — 25010000002 METHYLPREDNISOLONE PER 125 MG: Performed by: PSYCHIATRY & NEUROLOGY

## 2020-02-09 RX ADMIN — LOPERAMIDE HYDROCHLORIDE 2 MG: 2 CAPSULE ORAL at 12:31

## 2020-02-09 RX ADMIN — GABAPENTIN 300 MG: 300 CAPSULE ORAL at 05:15

## 2020-02-09 RX ADMIN — SODIUM CHLORIDE, PRESERVATIVE FREE 10 ML: 5 INJECTION INTRAVENOUS at 09:32

## 2020-02-09 RX ADMIN — ACETAMINOPHEN 500 MG: 500 TABLET, FILM COATED ORAL at 14:34

## 2020-02-09 RX ADMIN — GABAPENTIN 300 MG: 300 CAPSULE ORAL at 23:33

## 2020-02-09 RX ADMIN — SODIUM BICARBONATE 1300 MG: 650 TABLET ORAL at 09:30

## 2020-02-09 RX ADMIN — GABAPENTIN 300 MG: 300 CAPSULE ORAL at 14:25

## 2020-02-09 RX ADMIN — METHYLPREDNISOLONE SODIUM SUCCINATE 60 MG: 125 INJECTION, POWDER, FOR SOLUTION INTRAMUSCULAR; INTRAVENOUS at 14:34

## 2020-02-09 RX ADMIN — Medication 1 TABLET: at 09:31

## 2020-02-09 RX ADMIN — CHOLECALCIFEROL TAB 125 MCG (5000 UNIT) 5000 UNITS: 125 TAB at 09:31

## 2020-02-09 RX ADMIN — LOPERAMIDE HYDROCHLORIDE 2 MG: 2 CAPSULE ORAL at 17:05

## 2020-02-09 RX ADMIN — SODIUM CHLORIDE, PRESERVATIVE FREE 10 ML: 5 INJECTION INTRAVENOUS at 14:34

## 2020-02-09 RX ADMIN — SODIUM CHLORIDE, PRESERVATIVE FREE 10 ML: 5 INJECTION INTRAVENOUS at 20:00

## 2020-02-09 RX ADMIN — SODIUM BICARBONATE 1300 MG: 650 TABLET ORAL at 23:32

## 2020-02-09 RX ADMIN — IMMUNE GLOBULIN (HUMAN) 20 G: 10 INJECTION INTRAVENOUS; SUBCUTANEOUS at 15:07

## 2020-02-09 RX ADMIN — Medication 18 MG: at 20:03

## 2020-02-09 RX ADMIN — GABAPENTIN 300 MG: 300 CAPSULE ORAL at 18:56

## 2020-02-09 RX ADMIN — GABAPENTIN 300 MG: 300 CAPSULE ORAL at 09:31

## 2020-02-09 RX ADMIN — Medication 18 MG: at 09:31

## 2020-02-09 RX ADMIN — LOPERAMIDE HYDROCHLORIDE 2 MG: 2 CAPSULE ORAL at 06:04

## 2020-02-09 RX ADMIN — PANTOPRAZOLE SODIUM 40 MG: 40 TABLET, DELAYED RELEASE ORAL at 05:15

## 2020-02-09 RX ADMIN — SODIUM BICARBONATE 1300 MG: 650 TABLET ORAL at 17:05

## 2020-02-09 RX ADMIN — DOXYCYCLINE 100 MG: 100 CAPSULE ORAL at 09:31

## 2020-02-09 RX ADMIN — DOXYCYCLINE 100 MG: 100 CAPSULE ORAL at 20:03

## 2020-02-09 NOTE — PROGRESS NOTES
Inpatient Rehabilitation Plan of Care Note    Plan of Care  Care Plan Reviewed - No updates at this time.    Psychosocial    Performed Intervention(s)  Verbalize needs and concerns      Safety    Performed Intervention(s)  Bed alarm, wc alarm  Items within reach  Safety rounds      Sphincter Control    Performed Intervention(s)  Monitor intake and output  Encourage appropriate diet      Body Systems    Performed Intervention(s)  Daily skin inspection  Dressing change as ordered    Signed by: James Sylvester RN

## 2020-02-09 NOTE — PLAN OF CARE
Problem: Functional Mobility Impairment (IRF) (Adult)  Goal: Optimal/Safe Level of [place] with Mobility  Outcome: Ongoing (interventions implemented as appropriate)  Note:   Pt A&Ox4, NAD noted, updated on plan of care, denies having any concerns at the moment, encouraged to get out of bed whenever possible, denies having any pain at the moment, bed in low position, call light within reach, will continue to monitor pt.

## 2020-02-09 NOTE — PROGRESS NOTES
LOS: 16 days   Patient Care Team:  Armando Valentine MD as PCP - General (Internal Medicine)  Lisa Arriaza MD as Consulting Physician (Obstetrics and Gynecology)    Chief Complaint: GBS    Subjective     History of Present Illness     Seen and examined, no acute events overnight. Feels OK, denies chest pain, shortness of breath, f/c. Slept well. Tolerating IVIG well, feels her strength is improving. Tingling in face improved, occurs during and after IVIG per her report            History taken from: patient chart family    Objective     Vital Signs  Temp:  [97.1 °F (36.2 °C)-99 °F (37.2 °C)] 98.1 °F (36.7 °C)  Heart Rate:  [76-85] 76  Resp:  [18] 18  BP: (102-140)/(50-68) 140/68    Physical Exam:  General: Awake, alert, NAD  HEENT: normocepahlic, atraumatic   Heart: RRR, no m/r/g  Lungs: CTAB, no wheezing, rales, or rhonchi  Abdomen: soft, non-tender, non-distended, colostomy , incision dressed  Strength: BUE: 4/5 with bilateral elbow flexion, elbow extension, wrist extension, and finger flexion, right greater than left weak hand intrinsics R 2-/5, L 2/5       BLE:  HF right 3-/5, left 3/5,  knee extension right 4/5, left 4- to 4/5, ankle dorsiflexion B 3/5  Extremities: Right antecubital fossa with decreased erythema     Results Review:     I reviewed the patient's new clinical results.  Results from last 7 days   Lab Units 02/07/20 0627 02/05/20 0648 02/03/20  0623   WBC 10*3/mm3 5.64 7.22 7.05   HEMOGLOBIN g/dL 9.0* 8.9* 9.5*   HEMATOCRIT % 27.6* 28.1* 29.1*   PLATELETS 10*3/mm3 485* 468* 602*     Results from last 7 days   Lab Units 02/07/20 0627 02/05/20  0648 02/03/20  0623   SODIUM mmol/L 136 137 138   POTASSIUM mmol/L 3.7 4.2 4.4   CHLORIDE mmol/L 105 104 105   CO2 mmol/L 18.1* 20.0* 18.2*   BUN mg/dL 16 12 12   CREATININE mg/dL 0.69 0.82 0.89   CALCIUM mg/dL 8.5* 8.5* 8.9   GLUCOSE mg/dL 109* 112* 109*       Medication Review:   Scheduled Meds:    acetaminophen 500 mg Oral Daily    doxycycline 100 mg Oral Q12H   Iron 18 mg Sublingual BID   gabapentin 300 mg Oral 4x Daily   [START ON 2/11/2020] immune globulin (human) 20 g Intravenous Once   And      [START ON 2/11/2020] immune globulin (human) 10 g Intravenous Once   immune globulin (human) 20 g Intravenous Daily   loperamide 2 mg Oral TID AC   methylPREDNISolone sodium succinate 60 mg Intravenous Daily   MULTIVITAMIN ADULT 1 tablet Sublingual Daily   pantoprazole 40 mg Oral Q AM   sodium bicarbonate 1,300 mg Oral TID   sodium chloride 10 mL Intravenous Q12H   sodium chloride 10 mL Intravenous Q12H   Cyanocobalamin 2,500 mcg Sublingual Weekly   Vitamin D-3 5,000 Units Sublingual Daily     Continuous Infusions:   PRN Meds:.aluminum sulfate-calcium acetate  •  diphenhydrAMINE  •  famotidine  •  gabapentin **AND** gabapentin  •  hydrocortisone sodium succinate  •  miconazole  •  sodium chloride  •  sodium chloride    Assessment/Plan   75 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     GBS  -Completed her plasmapharesis and has gotten good return.   - Will begin PT/OT for ADLS, strength, mobility, txs, gait.   -January 27: On gabapentin 300mg QID. Will continue to monitor and will titrate up as needed.  -Jan 29 -  Feels strength is somewhat better.  Worked on gait with rolling walker yesterday 15 feet. Today utilized ankle weights to decrease ataxic movements with gait. Transfers mod max assist. Bed mobility CTG.  - Feb 6 - Patient complains of increased numbness in her knees.  She is noted to have increased weakness with her hip extensors and possibly in the left quadricep.  Her ambulation distance is less and takes more effort with a shorter stride.  On examination she also appears weaker in her hands and ankles.  Discussed having neurology see her to assess for possibly IVIG or another round of plasmapheresis.  -Feb 7 - increased weakness noted in BUE and BLE and more difficulty with mobility - Neurology starting patient on course  of IVIG    Crohn's  -s/p recent colostomy- wound nurse to see and education for home management.   -If more than 1 liter output a day, needs Immodium-per Dr. Albrecht colorectal surgeon.     Hyponatremia  -On salt tabs and fluid restriction. Renal following  -January 27: Na 130. Continue salt tabs and fluid restriction per renal. Will continue to monitor  -January 30: Na 133. Continue sodium bicarbonate and fluid restriction per renal.  -Feb 3- Na improved to 138  -February 4-fluid restriction discontinued.  -February 5-sodium 137    Anemia  -January 27: Hgb/Hct 7.6/24.3- stable. No signs of active bleeding. Will order fecal occult and reticulocyte count. Will continue to monitor.  -Jan 28 - hemoccult positive  -Jan 29 - Stool heme +.  HGB stable at 7.5. She had hypotension and tachycardia yesterday 87/64 and 110) , better today (99/63 and 88).  Reviewed option of transfusion PRBCs. She wished to hold on transfusion unless absolutely necessary. Discussed if HGB < 7, would recommend definitely transfuse.  -January 30: Repeat CBC scheduled for tomorrow. Will continue to monitor.    -January 31: Hgb 7.2. Will transfuse 1 unit of PRBCs today. Ordered anemia studies. Spoke with Dr. Albrecht and if iron supplementation is required she recommends IV iron for better absorption.  -February 1: Hemoglobin improved 8.8.  IV iron infusion ordered by nephrology  -Feb 3 - HGB improved 9.5. Not tolerate IV iron infusion due to burning in vein, does not wish to have placed a more proximal IV. She had tow infusions. She will get EZ Melt Iron from outside to take by mouth; on discussion with colorectal surgery last week she should be able to absorb PO iron.  February 5-hemoglobin 8.5    DVT prophylaxis  -SCDS for now.   -January 27: Heparin has been on hold due to HGB trending down. Following results of fecal occult and reticulocyte count will consider restarting Heparin for DVT prophylaxis.   -January 28 - hemoccult positive.     Vitamin D  deficiency-vitamin D 22.4  on January 29.    Feb 1 - Patient does not wish to take vitamin D p.o. through the hospital supply.  Wishes to have her vitamin D brought in from home.    Vitamin B12 replacement-sublingual from home    RUE antecubital fossa phlebitis/cellulitis-February 3-no sign of infection. K-PAD.   February 4-Right antecubital fossa phlebitis with cord palpable/tender with surrounding erythema consistent with cellulitis. Allergy to Keflex - throat swelled. Will start Doxycycline 100 mg bid x 7 days, continue K-pad.   February 5-She continues to have pain and redness and swelling of the right antecubital fossa and distal upper arm.  Reviewed continue with doxycycline which was started yesterday but if there is no show improvement will look to adjust antibiotic tomorrow.  White blood cell count is 7.22 with normal differential..  Feb 7 - area improved today on doxycycline.     TEAM CONF - JAN 28 - FLAT AFFECT. SUPINE SIT MIN ASSIST. TRANSFERS MAX 2. NONAMBULATORY. UBB MIN. LBB MAX. UBD MOD.  LBD DEP. ABD WOUND CARE. STOOL HEMOCCULT POSITIVE.  ELOS - 5 WEEKS.     TEAM CONF - FEB 4 - BED SBA. TRANSFERS MIN-MOD. GAIT 30 FEET MIN 2 FIDEL WALKER. UBD MOD. LBD DEP. ON 1200 CC FLUID RESTRICTION. EATING OKAY.  ABD WOUND CLOSING. OSTOMY DEVICE STAYING ON.  CONTINENT. NEEDS TO GET UP TO BSC.   ELOS- 4 WEEKS.     2/8  - Continue comprehensive inpatient rehabilitation program  - IVIG per neurology  - Continue gabapentin, adjust dose as needed    2/9  - Continue comprehensive inpatient rehabilitation program  - IVIG per neurology  - Continue PPI        Sunday Marinelli MD  02/09/20  2:04 PM        >35 minutes with > 50% face-to-face / floor time / coordination of care

## 2020-02-09 NOTE — PROGRESS NOTES
"DOS: 2020  NAME: She López   : 1945  PCP: Armando Valentine MD  No chief complaint on file.      Chief complaint: weakness  Subjective: NAEON, she thinks her leg strength is better today    Objective:  Vital signs: /68 (BP Location: Right arm, Patient Position: Lying)   Pulse 76   Temp 98.1 °F (36.7 °C) (Oral)   Resp 18   Ht 165.1 cm (65\")   Wt 58.1 kg (128 lb)   LMP  (LMP Unknown)   SpO2 100%   BMI 21.30 kg/m²    Gen: NAD, vitals reviewed  MS: oriented x3, recent/remote memory intact, normal attention/concentration, language intact, no neglect.  CN: visual acuity grossly normal, PERRL, EOMI, no facial droop, no dysarthria    ROS:  + weakness, numbness  No fevers, chills      Laboratory results:  Lab Results   Component Value Date    GLUCOSE 109 (H) 2020    CALCIUM 8.5 (L) 2020     2020    K 3.7 2020    CO2 18.1 (L) 2020     2020    BUN 16 2020    CREATININE 0.69 2020    EGFRIFNONA 84 2020    BCR 23.2 2020    ANIONGAP 12.9 2020     Lab Results   Component Value Date    WBC 5.64 2020    HGB 9.0 (L) 2020    HCT 27.6 (L) 2020    MCV 88.7 2020     (H) 2020     No results found for: LDL  @hgba1c@     Review of labs: no new labs    Diagnoses:  Acute inflammatory demyelinating polyradiculoneuropathy  Crohn's disease  Cervical myelopathy  Bilateral carpal tunnel syndrome    Comment: early treatment fluctuation from AIDP, on day 3 IVIG, improving somewhat    Plan:  1. Finish IVIG, last day Tuesday  2. Has EMG with Rajeev west after discharge  3. Stopping daily NIFs given that she has stabilized    Will sign off for now. Please call us back if needed.    "

## 2020-02-09 NOTE — PROGRESS NOTES
"                                                                                        [facility] Follow up Note        PATIENT IDENTIFICATION     Name: She López  Age: 74 y.o.  Sex: female  :  1945  MRN: [id number]       CHIEF COMPLIANTS / REASON FOR FOLLOW UP          Hyponatremia,metabolic acidosis.    [place]:    Pt is a 74 yrs old white female s/p colostomy previously,recent admission to [facility] for gbs treated with 5 sessions of plasmapheresis,our group followed for plasmaperesis treatment that was completed uneventfully.Pt also noted with hyponatremia,controlled with oral salt.we will follow for ongoing hyponatremia.    Subjective:          Feeling better  Mild nor al anion gap acidosis , no labs today  Still has ileostomy     Review of Systems:          Constitutional: No fever, no chills, no lethargy, no weakness.  HEENT:  No headache, otalgia, itchy eyes, nasal discharge or sore throat.  Cardiac:  No chest pain, dyspnea, orthopnea or PND.  Chest:  No cough, phlegm or wheezing.  Abdomen:  No abdominal pain, nausea or vomiting.  Neuro:  No focal weakness, abnormal movements or seizure-like activity.  :   No hematuria, no pyuria, no dysuria, no flank pain.  Extremities:  No  joint pains.  ROS was otherwise negative except as mentioned in the [place].        OBJECTIVE                                                                        Exam:  /68 (BP Location: Right arm, Patient Position: Lying)   Pulse 76   Temp 98.1 °F (36.7 °C) (Oral)   Resp 18   Ht 165.1 cm (65\")   Wt 58.1 kg (128 lb)   LMP  (LMP Unknown)   SpO2 100%   BMI 21.30 kg/m²   Intake/Output last 3 shifts:  I/O last 3 completed shifts:  In: 840 [P.O.:840]  Out: 2325 [Stool:2325]  Intake/Output this shift:  I/O this shift:  In: -   Out: 250 [Urine:100; Stool:150]    General Appearance:  Alert, cooperative, no distress, appears stated age  Head:  Normocephalic, without obvious abnormality, " atraumatic  Eyes:  Sclerae anicteric, EOM's intact     Neck:  Supple,  no adenopathy;      Lungs:   Clear to auscultation bilaterally, respirations unlabored  Heart:  Regular rate and rhythm, S1 and S2 normal, no  rub   or gallop  Abdomen:  Soft, nontender,    no masses, no hepatomegaly, no splenomegaly, ileostomy in place  Extremities:  Extremities normal, no edema  Neurologic:   Alert and oriented, no focal deficits    Scheduled Meds:    acetaminophen 500 mg Oral Daily   doxycycline 100 mg Oral Q12H   Iron 18 mg Sublingual BID   gabapentin 300 mg Oral 4x Daily   [START ON 2/11/2020] immune globulin (human) 20 g Intravenous Once   And      [START ON 2/11/2020] immune globulin (human) 10 g Intravenous Once   immune globulin (human) 20 g Intravenous Daily   loperamide 2 mg Oral TID AC   methylPREDNISolone sodium succinate 60 mg Intravenous Daily   MULTIVITAMIN ADULT 1 tablet Sublingual Daily   pantoprazole 40 mg Oral Q AM   sodium bicarbonate 1,300 mg Oral TID   sodium chloride 10 mL Intravenous Q12H   sodium chloride 10 mL Intravenous Q12H   Cyanocobalamin 2,500 mcg Sublingual Weekly   Vitamin D-3 5,000 Units Sublingual Daily     Continuous Infusions:   PRN Meds:aluminum sulfate-calcium acetate  •  diphenhydrAMINE  •  famotidine  •  gabapentin **AND** gabapentin  •  hydrocortisone sodium succinate  •  miconazole  •  sodium chloride  •  sodium chloride         Data Review:                                                                           CBC:   Results from last 7 days   Lab Units 02/07/20  0627   WBC 10*3/mm3 5.64   RBC 10*6/mm3 3.11*     BMP:   Results from last 7 days   Lab Units 02/07/20  0627   GLUCOSE mg/dL 109*   CO2 mmol/L 18.1*   BUN mg/dL 16   CREATININE mg/dL 0.69   CALCIUM mg/dL 8.5*     ABGs:       Invalid input(s): PO2       Imaging:                                                                                         ASSESSMENT:                                                                                 Guillain Barré syndrome (CMS/HCC)       Hyponatremia, secondary to increased/high ADH state    Gillan barre syndrome.    crohns disease/s/p colostomy.    Hyperlipidemia.    Skin cancer.    Non aniongap metabolic acidosis:secondary to gi losses.    Iron deficiency    Anemia.    Vit d deficiency.  ·       PLAN:                                                                            ·   Sodium level acceptable.   Pt has normal anion gap acidosis   d/cd sodium chloride tablets  Continue sodium bicarbonate for sodium and acidosis.  Discontinue any fluid restriction .   Repeat bmp tomorrow  Defer transfusion to medicine.  Fecal occult blood positive  Might need GI work-up   Neurology plan noted  Will follow.           Amadou Nielsen MD  [facility]  2/9/2020  8:30 AM

## 2020-02-09 NOTE — PLAN OF CARE
Problem: Patient Care Overview  Goal: Plan of Care Review  Flowsheets (Taken 2/9/2020 170)  Outcome Summary: Ms López has had a good day, ileostomy draining properly, and continent of bladder. She is alert and oriented x4, takes medication with water, andVS stable. IVIG tolerated well today, no pain and no unsafe behavior- family at bedside most of the day.  Progress, Functional Goals: demonstrating adequate progress  Plan of Care Reviewed With: patient  IRF Plan of Care Review: progress ongoing, continue

## 2020-02-10 LAB
ANION GAP SERPL CALCULATED.3IONS-SCNC: 12.8 MMOL/L (ref 5–15)
BASOPHILS # BLD AUTO: 0.04 10*3/MM3 (ref 0–0.2)
BASOPHILS NFR BLD AUTO: 0.7 % (ref 0–1.5)
BUN BLD-MCNC: 22 MG/DL (ref 8–23)
BUN/CREAT SERPL: 31 (ref 7–25)
CALCIUM SPEC-SCNC: 8.8 MG/DL (ref 8.6–10.5)
CHLORIDE SERPL-SCNC: 106 MMOL/L (ref 98–107)
CO2 SERPL-SCNC: 21.2 MMOL/L (ref 22–29)
CREAT BLD-MCNC: 0.71 MG/DL (ref 0.57–1)
DEPRECATED RDW RBC AUTO: 47.3 FL (ref 37–54)
EOSINOPHIL # BLD AUTO: 0.01 10*3/MM3 (ref 0–0.4)
EOSINOPHIL NFR BLD AUTO: 0.2 % (ref 0.3–6.2)
ERYTHROCYTE [DISTWIDTH] IN BLOOD BY AUTOMATED COUNT: 14.7 % (ref 12.3–15.4)
GFR SERPL CREATININE-BSD FRML MDRD: 81 ML/MIN/1.73
GLUCOSE BLD-MCNC: 91 MG/DL (ref 65–99)
HCT VFR BLD AUTO: 26 % (ref 34–46.6)
HGB BLD-MCNC: 8.4 G/DL (ref 12–15.9)
IMM GRANULOCYTES # BLD AUTO: 0.03 10*3/MM3 (ref 0–0.05)
IMM GRANULOCYTES NFR BLD AUTO: 0.5 % (ref 0–0.5)
LYMPHOCYTES # BLD AUTO: 1.06 10*3/MM3 (ref 0.7–3.1)
LYMPHOCYTES NFR BLD AUTO: 18.8 % (ref 19.6–45.3)
MCH RBC QN AUTO: 29.1 PG (ref 26.6–33)
MCHC RBC AUTO-ENTMCNC: 32.3 G/DL (ref 31.5–35.7)
MCV RBC AUTO: 90 FL (ref 79–97)
MONOCYTES # BLD AUTO: 0.46 10*3/MM3 (ref 0.1–0.9)
MONOCYTES NFR BLD AUTO: 8.1 % (ref 5–12)
NEUTROPHILS # BLD AUTO: 4.05 10*3/MM3 (ref 1.7–7)
NEUTROPHILS NFR BLD AUTO: 71.7 % (ref 42.7–76)
NRBC BLD AUTO-RTO: 0 /100 WBC (ref 0–0.2)
PLATELET # BLD AUTO: 468 10*3/MM3 (ref 140–450)
PMV BLD AUTO: 8.6 FL (ref 6–12)
POTASSIUM BLD-SCNC: 3.8 MMOL/L (ref 3.5–5.2)
RBC # BLD AUTO: 2.89 10*6/MM3 (ref 3.77–5.28)
SODIUM BLD-SCNC: 140 MMOL/L (ref 136–145)
WBC NRBC COR # BLD: 5.65 10*3/MM3 (ref 3.4–10.8)

## 2020-02-10 PROCEDURE — 97530 THERAPEUTIC ACTIVITIES: CPT

## 2020-02-10 PROCEDURE — 97116 GAIT TRAINING THERAPY: CPT

## 2020-02-10 PROCEDURE — 97112 NEUROMUSCULAR REEDUCATION: CPT

## 2020-02-10 PROCEDURE — 97110 THERAPEUTIC EXERCISES: CPT

## 2020-02-10 PROCEDURE — 80048 BASIC METABOLIC PNL TOTAL CA: CPT | Performed by: INTERNAL MEDICINE

## 2020-02-10 PROCEDURE — 85025 COMPLETE CBC W/AUTO DIFF WBC: CPT | Performed by: PHYSICAL MEDICINE & REHABILITATION

## 2020-02-10 PROCEDURE — 25010000002 IMMUNE GLOBULIN (HUMAN) 20 GM/200ML SOLUTION: Performed by: PSYCHIATRY & NEUROLOGY

## 2020-02-10 PROCEDURE — 97535 SELF CARE MNGMENT TRAINING: CPT

## 2020-02-10 PROCEDURE — 25010000002 METHYLPREDNISOLONE PER 125 MG: Performed by: PSYCHIATRY & NEUROLOGY

## 2020-02-10 RX ADMIN — PANTOPRAZOLE SODIUM 40 MG: 40 TABLET, DELAYED RELEASE ORAL at 05:09

## 2020-02-10 RX ADMIN — DOXYCYCLINE 100 MG: 100 CAPSULE ORAL at 21:51

## 2020-02-10 RX ADMIN — ACETAMINOPHEN 500 MG: 500 TABLET, FILM COATED ORAL at 14:36

## 2020-02-10 RX ADMIN — SODIUM CHLORIDE, PRESERVATIVE FREE 10 ML: 5 INJECTION INTRAVENOUS at 08:47

## 2020-02-10 RX ADMIN — DOXYCYCLINE 100 MG: 100 CAPSULE ORAL at 08:45

## 2020-02-10 RX ADMIN — GABAPENTIN 300 MG: 300 CAPSULE ORAL at 09:19

## 2020-02-10 RX ADMIN — GABAPENTIN 300 MG: 300 CAPSULE ORAL at 17:39

## 2020-02-10 RX ADMIN — CHOLECALCIFEROL TAB 125 MCG (5000 UNIT) 5000 UNITS: 125 TAB at 08:47

## 2020-02-10 RX ADMIN — SODIUM CHLORIDE, PRESERVATIVE FREE 10 ML: 5 INJECTION INTRAVENOUS at 21:51

## 2020-02-10 RX ADMIN — SODIUM BICARBONATE 1300 MG: 650 TABLET ORAL at 08:45

## 2020-02-10 RX ADMIN — Medication 1 TABLET: at 08:48

## 2020-02-10 RX ADMIN — SODIUM BICARBONATE 1300 MG: 650 TABLET ORAL at 21:51

## 2020-02-10 RX ADMIN — SODIUM CHLORIDE, PRESERVATIVE FREE 10 ML: 5 INJECTION INTRAVENOUS at 08:48

## 2020-02-10 RX ADMIN — LOPERAMIDE HYDROCHLORIDE 2 MG: 2 CAPSULE ORAL at 05:08

## 2020-02-10 RX ADMIN — GABAPENTIN 300 MG: 300 CAPSULE ORAL at 13:32

## 2020-02-10 RX ADMIN — Medication 18 MG: at 08:48

## 2020-02-10 RX ADMIN — LOPERAMIDE HYDROCHLORIDE 2 MG: 2 CAPSULE ORAL at 16:13

## 2020-02-10 RX ADMIN — LOPERAMIDE HYDROCHLORIDE 2 MG: 2 CAPSULE ORAL at 11:14

## 2020-02-10 RX ADMIN — METHYLPREDNISOLONE SODIUM SUCCINATE 60 MG: 125 INJECTION, POWDER, FOR SOLUTION INTRAMUSCULAR; INTRAVENOUS at 14:37

## 2020-02-10 RX ADMIN — SODIUM BICARBONATE 1300 MG: 650 TABLET ORAL at 16:13

## 2020-02-10 RX ADMIN — IMMUNE GLOBULIN (HUMAN) 20 G: 10 INJECTION INTRAVENOUS; SUBCUTANEOUS at 15:08

## 2020-02-10 RX ADMIN — GABAPENTIN 300 MG: 300 CAPSULE ORAL at 05:09

## 2020-02-10 RX ADMIN — Medication 18 MG: at 21:52

## 2020-02-10 RX ADMIN — GABAPENTIN 300 MG: 300 CAPSULE ORAL at 21:52

## 2020-02-10 NOTE — NURSING NOTE
02/10/20 1519   Wound 12/09/19 1510 abdomen Incision   Date first assessed/Time first assessed: 12/09/19 1510   Location: (c) abdomen  Primary Wound Type: Incision   Wound Length (cm) 0.3 cm   Wound Width (cm) 0.3 cm   Wound Depth (cm) 0.8 cm   Drainage Characteristics/Odor tan   Drainage Amount scant   Care, Wound cleansed with;sterile normal saline   Dressing Care, Wound hydrogel  (pouching system covers)   Ileostomy RUQ   Placement Date/Time: 12/15/19 1407   Inserted by: DR PRAKASH  Location: RUQ   Stomal Appliance 1 piece;Intact;Changed   Stoma Appearance round;moist;red   Peristomal Assessment Intact;Pink   Accessories/Skin Care convex wafer;cleansed with water;skin barrier ring;skin barrier strip   Stoma Function stool   Stool Color brown   Stool Consistency loose   Treatment Bag change   Output (mL) 100 mL

## 2020-02-10 NOTE — PLAN OF CARE
Problem: Patient Care Overview  Goal: Plan of Care Review  Outcome: Ongoing (interventions implemented as appropriate)  Flowsheets (Taken 2/10/2020 0257)  Outcome Summary: Patient pleasant and cooperative, sleeping well tonight. Ileostomy bag working well. Midline to L arm; flushed well but no blood return on both lumens. Felt the tingling again around her lips around midnight which is not new occurence since she mentioned it happened too the previous 2 nights. Will continue to monitor.  Progress, Functional Goals: demonstrating adequate progress  Plan of Care Reviewed With: patient  IRF Plan of Care Review: progress ongoing, continue     Problem: Functional Mobility Impairment (IRF) (Adult)  Goal: Optimal/Safe Level of [place] with Mobility  Outcome: Ongoing (interventions implemented as appropriate)  Flowsheets (Taken 2/10/2020 0257)  Optimal/Safe Level of [place] with Mobility: demonstrating adequate progress     Problem: Fall Risk (Adult)  Goal: Absence of Fall  Description  Patient will demonstrate the desired outcomes by discharge/transition of care.  Outcome: Ongoing (interventions implemented as appropriate)  Flowsheets (Taken 2/10/2020 0257)  Absence of Fall: making progress toward outcome     Problem: Pain, Chronic (Adult)  Goal: Acceptable Pain/Comfort Level and Functional Ability  Description  Patient will demonstrate the desired outcomes by discharge/transition of care.  Outcome: Ongoing (interventions implemented as appropriate)  Flowsheets (Taken 2/10/2020 0257)  Acceptable Pain/Comfort Level and Functional Ability: making progress toward outcome

## 2020-02-10 NOTE — PROGRESS NOTES
Occupational Therapy: Branch    Physical Therapy: Individual: 90 minutes.    Speech Language Pathology:  Branch    Signed by: Jaxon Atkinson PT Student     - CoSigned By: Chelsie Cline PT 2/10/2020 4:08:22 PM

## 2020-02-10 NOTE — PROGRESS NOTES
Inpatient Rehabilitation Plan of Care Note    Plan of Care  Care Plan Reviewed - Updates as Follows    Body Systems    [RN] Integumentary(Active)  Current Status(02/10/2020): Abdominal incision healed.ileostomy bag in place.  both changed per WOCN  Weekly Goal(02/17/2020): No S&S infection noted. Skin is intact.  Discharge Goal: No S&S infection noted Skin is intact.    Performed Intervention(s)  Daily skin inspection      Psychosocial    [RN] Coping/Adjustment(Active)  Current Status(02/10/2020): Supportive family,  very helpful and assists  patient with care.  Weekly Goal(02/17/2020): Identify progress in functional status  Discharge Goal: Demonstrate healthy coping strategies    Performed Intervention(s)  Verbalize needs and concerns  calm enviroment      Safety    [RN] Potential for Injury(Active)  Current Status(02/10/2020): No unsafe behaviors. Weakness of lower extremities  Weekly Goal(02/17/2020): Use call light 100%  Discharge Goal: Pt/family aware of risk of fall and safety in the home setting    Performed Intervention(s)  Bed alarm, wc alarm  Items within reach  Safety rounds      Sphincter Control    [RN] Bladder Management(Active)  Current Status(02/10/2020): pt has been continent, using bedpan.She is reluctant  to use BSC. does wear brief.  Weekly Goal(02/17/2020): Continent bladder 100%  Discharge Goal: Continent bladder 100%    [RN] Bowel Management(Active)  Current Status(02/10/2020): Has ileostomy since 12/20/19.  aware of care  for ileostomy.  Weekly Goal(02/17/2020): maintain education of ileostomy with pt and family  Discharge Goal: Independent with ileostomy care    Performed Intervention(s)  Monitor intake and output  Encourage appropriate diet  wound ostomy nurse to continue to see pt for ostomy care.    Signed by: Jyoti Parry RN

## 2020-02-10 NOTE — PROGRESS NOTES
LOS: 17 days   Patient Care Team:  Armando Valentine MD as PCP - General (Internal Medicine)  Lisa Arriaza MD as Consulting Physician (Obstetrics and Gynecology)    Chief Complaint: GBS    Subjective     History of Present Illness     She feels IVIG has been helpful.  She notes improvement in the strength in her hands and in her legs.  Easier transfers.  Walk better.  She had had a decline in her ability to do to box and blocks but that improved today.  Tolerating IVIG.   describes her performance as 180 degree change from Friday.  In physical therapy and Occupational Therapy-toilet transfers moderate assist, shower transfer to be assessed, bathing minimum assist upper body and maximum assist lower body, dressing moderate assist upper body independent lower body.  Grooming minimum assist.  Toileting dependent.  Eating with minimal assist to set up with built up utensils.  Bed mobility contact-guard.  Ambulated 25 feet moderate assist of 2 with Aircast bilateral ankles.       History taken from: patient chart family    Objective     Vital Signs  Temp:  [97.3 °F (36.3 °C)-98.2 °F (36.8 °C)] 97.9 °F (36.6 °C)  Heart Rate:  [69-88] 88  Resp:  [16-18] 18  BP: (105-124)/(55-69) 124/59    Physical Exam:  General: Awake, alert, NAD  HEENT: normocepahlic, atraumatic   Heart: RRR, no m/r/g  Lungs: CTAB, no wheezing, rales, or rhonchi  Abdomen: soft, non-tender, non-distended, colostomy , incision dressed  Strength: BUE: 4/5 with bilateral elbow flexion, elbow extension, wrist extension, and finger flexion, right greater than left weak hand intrinsics R 3-/5, L 3/5       BLE:  HF right 3/5, left 2-/5,  knee extension right 4/5, left  4/5, ankle dorsiflexion B 3/5  Extremities: Right antecubital fossa with no erythema but residual cord.  No significant tenderness.    Results Review:     I reviewed the patient's new clinical results.  Results from last 7 days   Lab Units 02/10/20  0614 02/07/20  0629  02/05/20  0648   WBC 10*3/mm3 5.65 5.64 7.22   HEMOGLOBIN g/dL 8.4* 9.0* 8.9*   HEMATOCRIT % 26.0* 27.6* 28.1*   PLATELETS 10*3/mm3 468* 485* 468*     Results from last 7 days   Lab Units 02/10/20  0614 02/07/20  0627 02/05/20  0648   SODIUM mmol/L 140 136 137   POTASSIUM mmol/L 3.8 3.7 4.2   CHLORIDE mmol/L 106 105 104   CO2 mmol/L 21.2* 18.1* 20.0*   BUN mg/dL 22 16 12   CREATININE mg/dL 0.71 0.69 0.82   CALCIUM mg/dL 8.8 8.5* 8.5*   GLUCOSE mg/dL 91 109* 112*       Medication Review:   Scheduled Meds:    acetaminophen 500 mg Oral Daily   doxycycline 100 mg Oral Q12H   Iron 18 mg Sublingual BID   gabapentin 300 mg Oral 4x Daily   [START ON 2/11/2020] immune globulin (human) 20 g Intravenous Once   And      [START ON 2/11/2020] immune globulin (human) 10 g Intravenous Once   loperamide 2 mg Oral TID AC   methylPREDNISolone sodium succinate 60 mg Intravenous Daily   MULTIVITAMIN ADULT 1 tablet Sublingual Daily   pantoprazole 40 mg Oral Q AM   sodium bicarbonate 1,300 mg Oral TID   sodium chloride 10 mL Intravenous Q12H   sodium chloride 10 mL Intravenous Q12H   Cyanocobalamin 2,500 mcg Sublingual Weekly   Vitamin D-3 5,000 Units Sublingual Daily     Continuous Infusions:   PRN Meds:.aluminum sulfate-calcium acetate  •  diphenhydrAMINE  •  famotidine  •  gabapentin **AND** gabapentin  •  hydrocortisone sodium succinate  •  miconazole  •  sodium chloride  •  sodium chloride    Assessment/Plan   75 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     GBS  -Completed her plasmapharesis and has gotten good return.   - Will begin PT/OT for ADLS, strength, mobility, txs, gait.   -January 27: On gabapentin 300mg QID. Will continue to monitor and will titrate up as needed.  -Jan 29 -  Feels strength is somewhat better.  Worked on gait with rolling walker yesterday 15 feet. Today utilized ankle weights to decrease ataxic movements with gait. Transfers mod max assist. Bed mobility CTG.  - Feb 6 - Patient complains of  increased numbness in her knees.  She is noted to have increased weakness with her hip extensors and possibly in the left quadricep.  Her ambulation distance is less and takes more effort with a shorter stride.  On examination she also appears weaker in her hands and ankles.  Discussed having neurology see her to assess for possibly IVIG or another round of plasmapheresis.  -Feb 7 - increased weakness noted in BUE and BLE and more difficulty with mobility - Neurology starting patient on course of IVIG   -February 10-shows good response on IVIG-tolerating.  Notes improvement in her strength in the upper extremities and lower extremities as well as performance with mobility and self-care.    Crohn's  -s/p recent colostomy- wound nurse to see and education for home management.   -If more than 1 liter output a day, needs Immodium-per Dr. Albrecht colorectal surgeon.     Hyponatremia  -On salt tabs and fluid restriction. Renal following  -January 27: Na 130. Continue salt tabs and fluid restriction per renal. Will continue to monitor  -January 30: Na 133. Continue sodium bicarbonate and fluid restriction per renal.  -Feb 3- Na improved to 138  -February 4-fluid restriction discontinued.  -February 5-sodium 137  -February 10-sodium 140.  Sodium chloride tablets were discontinued on February 8.  Continues on sodium bicarb 1300 mg 3 times a day.    Anemia  -January 27: Hgb/Hct 7.6/24.3- stable. No signs of active bleeding. Will order fecal occult and reticulocyte count. Will continue to monitor.  -Jan 28 - hemoccult positive  -Jan 29 - Stool heme +.  HGB stable at 7.5. She had hypotension and tachycardia yesterday 87/64 and 110) , better today (99/63 and 88).  Reviewed option of transfusion PRBCs. She wished to hold on transfusion unless absolutely necessary. Discussed if HGB < 7, would recommend definitely transfuse.  -January 30: Repeat CBC scheduled for tomorrow. Will continue to monitor.    -January 31: Hgb 7.2. Will  transfuse 1 unit of PRBCs today. Ordered anemia studies. Spoke with Dr. Albrecht and if iron supplementation is required she recommends IV iron for better absorption.  -February 1: Hemoglobin improved 8.8.  IV iron infusion ordered by nephrology  -Feb 3 - HGB improved 9.5. Not tolerate IV iron infusion due to burning in vein, does not wish to have placed a more proximal IV. She had tow infusions. She will get EZ Melt Iron from outside to take by mouth; on discussion with colorectal surgery last week she should be able to absorb PO iron.  February 5-hemoglobin 8.5  February 10-hemoglobin 8.4    DVT prophylaxis  -SCDS for now.   -January 27: Heparin has been on hold due to HGB trending down. Following results of fecal occult and reticulocyte count will consider restarting Heparin for DVT prophylaxis.   -January 28 - hemoccult positive.     Vitamin D deficiency-vitamin D 22.4  on January 29.    Feb 1 - Patient does not wish to take vitamin D p.o. through the hospital supply.  Wishes to have her vitamin D brought in from home.    Vitamin B12 replacement-sublingual from home    RUE antecubital fossa phlebitis/cellulitis-February 3-no sign of infection. K-PAD.   February 4-Right antecubital fossa phlebitis with cord palpable/tender with surrounding erythema consistent with cellulitis. Allergy to Keflex - throat swelled. Will start Doxycycline 100 mg bid x 7 days, continue K-pad.   February 5-She continues to have pain and redness and swelling of the right antecubital fossa and distal upper arm.  Reviewed continue with doxycycline which was started yesterday but if there is no show improvement will look to adjust antibiotic tomorrow.  White blood cell count is 7.22 with normal differential..  Feb 7 - area improved today on doxycycline.  February 10-further improvement.    TEAM CONF - JAN 28 - FLAT AFFECT. SUPINE SIT MIN ASSIST. TRANSFERS MAX 2. NONAMBULATORY. UBB MIN. LBB MAX. UBD MOD.  LBD DEP. ABD WOUND CARE. STOOL  HEMOCCULT POSITIVE.  ELOS - 5 WEEKS.     TEAM CONF - FEB 4 - BED SBA. TRANSFERS MIN-MOD. GAIT 30 FEET MIN 2 FIDEL WALKER. UBD MOD. LBD DEP. ON 1200 CC FLUID RESTRICTION. EATING OKAY.  ABD WOUND CLOSING. OSTOMY DEVICE STAYING ON.  CONTINENT. NEEDS TO GET UP TO BSC.   ELOS- 4 WEEKS.     February 10-she had shown a decline in her mobility and self-care with flare of Guillain-Barré syndrome but has shown response on IVIG.  Improving.  Most recently in physical therapy and Occupational Therapy-toilet transfers moderate assist, shower transfer to be assessed, bathing minimum assist upper body and maximum assist lower body, dressing moderate assist upper body independent lower body.  Grooming minimum assist.  Toileting dependent.  Eating with minimal assist to set up with built up utensils.  Bed mobility contact-guard.  Ambulated 25 feet moderate assist of 2 with Aircast bilateral ankles.    Adolfo Valle MD  02/10/20  3:12 PM

## 2020-02-10 NOTE — PROGRESS NOTES
Adult Nutrition  Assessment/PES    Patient Name:  She López  YOB: 1945  MRN: [id number]  Admit Date:  1/24/2020    Assessment Date:  2/10/2020    Reason for Assessment     Row Name 02/10/20 1106          Reason for Assessment    Reason For Assessment  follow-up protocol         Nutrition/Diet History     Row Name 02/10/20 1106          Nutrition/Diet History    Typical Food/Fluid Intake  Intake good, >75%           Labs/Tests/Procedures/Meds     Row Name 02/10/20 1108          Labs/Procedures/Meds    Lab Results Reviewed  reviewed     Lab Results Comments  Na wnl (140)        Diagnostic Tests/Procedures    Diagnostic Test/Procedure Reviewed  reviewed     Diagnostic Test/Procedures Comments  IVIG due to ^ BLE & BUE weakness        Medications    Pertinent Medications Reviewed  reviewed         Physical Findings     Row Name 02/10/20 1112          Physical Findings    Gastrointestinal  ileostomy     Skin  surgical incision abdomen           Nutrition Prescription Ordered     Row Name 02/10/20 1112          Nutrition Prescription PO    Current PO Diet  Regular     Fluid Consistency  Thin         Evaluation of Received Nutrient/Fluid Intake     Row Name 02/10/20 1113          PO Evaluation    Number of Days PO Intake Evaluated  3 days     % PO Intake  %               Problem/Interventions:          Intervention Goal     Row Name 02/10/20 1113          Intervention Goal    General  Maintain nutrition     PO  Maintain intake;PO intake (%)     PO Intake %  75 %     Weight  Appropriate weight gain         Nutrition Intervention     Row Name 02/10/20 1115          Nutrition Intervention    RD/Tech Action  Follow Tx progress;Care plan reviewd;Encourage intake;Menu provided;Advise alternate selection;Interview for preference           Education/Evaluation     Row Name 02/10/20 1115          Monitor/Evaluation    Monitor  Per protocol           Electronically signed by:  Lisa Phoenix  JASMIN  02/10/20 11:15 AM

## 2020-02-10 NOTE — THERAPY TREATMENT NOTE
Inpatient Rehabilitation - Physical Therapy Treatment Note  [facility]     Patient Name: She López  : 1945  MRN: [id number]    Today's Date: 2/10/2020  Onset of Illness/Injury or Date of Surgery: 20              Admit Date: 2020      Visit Dx:      ICD-10-CM ICD-9-CM   1. General weakness R53.1 780.79       Patient Active Problem List   Diagnosis   • Crohn's disease of small intestine with other complication (CMS/HCC)   • Type 2 diabetes mellitus without complication (CMS/HCC)   • RSD upper limb   • Neuropathic pain of hand   • Hyperlipidemia   • Cervical myelopathy (CMS/HCC)   • Central pain syndrome   • Carpal tunnel syndrome   • Vitamin B 12 deficiency   • Guillain Barré syndrome (CMS/HCC)       Therapy Treatment    IRF Treatment Summary     Row Name 02/10/20 1506 02/10/20 0900          Evaluation/Treatment Time and Intent    Subjective Information  complains of;weakness;fatigue  -AF  no complaints  -LH,NM,LH2     Existing Precautions/Restrictions  fall  -AF  fall  -LH,NM,LH2     Document Type  therapy note (daily note)  -AF  therapy note (daily note)  -LH,NM,LH2     Mode of Treatment  occupational therapy  -AF  physical therapy  -LH,NM,LH2     Patient/Family Observations  lying in bed in AM, sitting up in w/c in PM in PT   -AF  supine in bed; reports having a better weekend after starting IVIG; LEs feel stronger per pt   -LH,NM,LH2     Recorded by [AF] Reina Perera, OTR [LH,NM,LH2] hCelsie Cline, PT (r) Jaxon Atkinson PT Student (t) Chelsie Cline, PT (c)     Row Name 02/10/20 1506 02/10/20 0900          Cognition/Psychosocial- PT/OT    Affect/Mental Status (Cognitive)  WFL  -AF  WFL  -LH,NM,LH2     Orientation Status (Cognition)  oriented x 4  -AF  oriented x 4  -LH,NM,LH2     Follows Commands (Cognition)  WFL  -AF  WFL  -LH,NM,LH2     Personal Safety Interventions  fall prevention program maintained;gait belt;nonskid shoes/slippers when out of bed  -AF  fall prevention  program maintained;gait belt;supervised activity;nonskid shoes/slippers when out of bed  -LH,NM,LH2     Safety Deficit (Cognitive)  insight into deficits/self awareness  -AF  --     Recorded by [AF] Reina Perera OTR [LH,NM,LH2] Chelsie Cline, PT (r) Jaxon Atkinson, PT Student (t) Chelsie Cline, PT (c)     Row Name 02/10/20 1506 02/10/20 0900          Bed Mobility Assessment/Treatment    Rolling Left [place] (Bed Mobility)  --  supervision  -LH,NM,LH2     Rolling Right [place] (Bed Mobility)  --  supervision  -LH,NM,LH2     Supine-Sit [place] (Bed Mobility)  contact guard;verbal cues  -AF  supervision;verbal cues  -LH,NM,LH2     Sit-Supine [place] (Bed Mobility)  contact guard;verbal cues  -AF  supervision;verbal cues  -LH,NM,LH2     Bed Mobility, Safety Issues  --  decreased use of arms for pushing/pulling;decreased use of legs for bridging/pushing;impaired trunk control for bed mobility  -LH,NM,LH2     Assistive Device (Bed Mobility)  bed rails;head of bed elevated  -AF  bed rails  -LH,NM,LH2     Comment (Bed Mobility)  --  pt jeanine to lift trunk from sup>sit and manage LEs on own this morning   -LH,NM,LH2     Recorded by [AF] Reina Perera, ALPESH [LH,NM,LH2] Chelsie Cline, PT (r) Jaxon Atkinson, PT Student (t) Chelsie Cline, PT (c)     Row Name 02/10/20 0900             Functional Mobility    Functional Mobility- Ind. Level  moderate assist (50% patient effort);2 person assist required;verbal cues required third person following c wc   -LH,NM,LH2      Functional Mobility- Device  -- heavy duty rollator; 1.5# aw then switched to aircasts BLE  -LH,NM,LH2      Functional Mobility-Distance (Feet)  25 x3  -LH,NM,LH2      Functional Mobility- Safety Issues  sequencing ability decreased;step length decreased;steps too close front assistive device;weight-shifting ability decreased  -LH,NM,LH2      Functional Mobility- Comment  able to ambulate in the PM c the drive rollator, demonstrating  improved ankle stability bilat c aircasts compared to ankle weights; L ankle previously rolling w/out aircasts. no LOB this date.   -LH,NM,LH2      Recorded by [LH,NM,LH2] Chelsie Cline, PT (r) Jaxon Atkinson, PT Student (t) Chelsie Cline, PT (c)      Row Name 02/10/20 1506 02/10/20 0900          Bed-Chair Transfer    Bed-Chair [place] (Transfers)  moderate assist (50% patient effort);verbal cues sit pivot   -AF  moderate assist (50% patient effort);verbal cues;nonverbal cues (demo/gesture)  -LH,NM,LH2     Assistive Device (Bed-Chair Transfers)  wheelchair  -AF  wheelchair  -LH,NM,LH2     Recorded by [AF] Reina Perera, OTR [LH,NM,LH2] Chelsie Cline, PT (r) Jaxon Atkinson, PT Student (t) Chelsie Cline, PT (c)     Row Name 02/10/20 1506 02/10/20 0900          Chair-Bed Transfer    Chair-Bed [place] (Transfers)  moderate assist (50% patient effort);verbal cues  -AF  moderate assist (50% patient effort);verbal cues;nonverbal cues (demo/gesture)  -LH,NM,LH2     Assistive Device (Chair-Bed Transfers)  wheelchair sit pivot   -AF  wheelchair  -LH,NM,LH2     Recorded by [AF] Reina Perera, OTR [LH,NM,LH2] Chelsie Cline, PT (r) Jaxon Atkinson, PT Student (t) Chelsie Cline, PT (c)     Row Name 02/10/20 0900             Sit-Stand Transfer    Sit-Stand [place] (Transfers)  moderate assist (50% patient effort);2 person assist;verbal cues;nonverbal cues (demo/gesture)  -LH,NM,LH2      Assistive Device (Sit-Stand Transfers)  wheelchair;walker, 4-wheeled // bars   -LH,NM,LH2      Recorded by [LH,NM,LH2] Chelsie Cline, PT (r) Jaxon Atkinson, PT Student (t) Chelsie Cline, PT (c)      Row Name 02/10/20 0900             Stand-Sit Transfer    Stand-Sit [place] (Transfers)  moderate assist (50% patient effort);2 person assist;verbal cues;nonverbal cues (demo/gesture)  -LH,NM,LH2      Assistive Device (Stand-Sit Transfers)  wheelchair;walker, 4-wheeled // bars; Drive heavy duty rollator; assist  eccentric control  -LH,NM,LH2      Recorded by [LH,NM,LH2] Chelsie Cline, PT (r) Jaxon Atkinson, PT Student (t) Chelsie Cline, PT (c)      Row Name 02/10/20 1506             Toilet Transfer    Type (Toilet Transfer)  stand pivot/stand step;squat pivot  -AF      [place] Level (Toilet Transfer)  moderate assist (50% patient effort);verbal cues  -AF      Assistive Device (Toilet Transfer)  commode;grab bars/safety frame;wheelchair  -AF      Recorded by [AF] Reina Perera OTR      Row Name 02/10/20 0900             Gait/Stairs Assessment/Training    [place] Level (Gait)  minimum assist (75% patient effort);moderate assist (50% patient effort);2 person assist;verbal cues;nonverbal cues (demo/gesture) third person following c wc  -LH,NM,LH2      Assistive Device (Gait)  parallel bars;walker, 4-wheeled Drive heavy duty rollator; 1# aw for proprioception BLE   -LH,NM,LH2      Distance in Feet (Gait)  6' // bars; 15' c heavy duty rollator   -LH,NM,LH2      Pattern (Gait)  step-to  -LH,NM,LH2      Deviations/Abnormal Patterns (Gait)  bilateral deviations;ataxic;base of support, wide;gait speed decreased;stride length decreased;jt decreased  -LH,NM,LH2      Bilateral Gait Deviations  forward flexed posture;foot drop/toe drag;heel strike decreased;knee hyperextension;knee buckling, left side;weight shift ability decreased  -LH,NM,LH2      Comment (Gait/Stairs)  improved quad control during stance this date. Slow jt but no LOB this date limited by fatigue   -LH,NM,LH2      Recorded by [LH,NM,LH2] Chelsie Cline, PT (r) Jaxon Atkinson, PT Student (t) Chelsie Cline, PT (c)      Row Name 02/10/20 1506             Bathing Assessment/Treatment    Bathing [place] Level  upper body;minimum assist (75% patient effort);verbal cues  -AF      Bathing Position  sink side;supported sitting  -AF      Bathing Setup Assistance  obtain supplies  -AF      Recorded by [AF] Reina Perera OTR      Row Name  02/10/20 1506             Upper Body Dressing Assessment/Treatment    Upper Body Dressing Task  upper body dressing skills;minimum assist (75% or more patient effort);moderate assist (50-74% patient effort)  -AF      Upper Body Dressing Position  supported sitting  -AF      Comment (Upper Body Dressing)  fearful of knocking the PICC line required assistance  -AF      Recorded by [AF] Reina Perera OTR      Row Name 02/10/20 1506             Lower Body Dressing Assessment/Treatment    Lower Body Dressing [place] Level  doff;don;socks;shoes/slippers;dependent (less than 25% patient effort)  -AF      Lower Body Dressing Position  supported sitting  -AF      Recorded by [AF] Reina Perera OTR      Row Name 02/10/20 1506             Grooming Assessment/Treatment    Grooming [place] Level  grooming skills;minimum assist (75% patient effort)  -AF      Assistive Device (Grooming)  built-up handle items  -AF      Grooming Position  sink side;supported sitting  -AF      Grooming Setup Assistance  obtain supplies  -AF      Recorded by [AF] Reina Perera OTR Row Name 02/10/20 1506             Toileting Assessment/Treatment    Toileting [place] Level  toileting skills;dependent (less than 25% patient effort)  -AF      Assistive Device Use (Toileting)  grab bar/safety frame;raised toilet seat  -AF      Toileting Position  unsupported sitting;supported standing  -AF      Comment (Toileting)  A x 2  -AF      Recorded by [AF] Reina Perera OTR      Row Name 02/10/20 1506             Fine Motor Testing & Training    Comment, Fine Motor Coordination  theraputty (yellow) with exs R and L hand to increase sensory awareness and strength. increased ability with grasping of objects B'ly hands, placing square pegs into board with MOD diffiuclty. able to manipulate wooden beads onto dowel pa wtih R hand with MIN difficulty fatigued quickly.   -AF      Recorded by [AF] Reina Perera OTR Row  Name 02/10/20 0900             Pain Scale: Numbers Pre/Post-Treatment    Pain Scale: Numbers, Pretreatment  0/10 - no pain  -LH,NM,LH2      Pain Scale: Numbers, Post-Treatment  0/10 - no pain  -LH,NM,LH2      Recorded by [LH,NM,LH2] Chelsie Cline, PT (r) Jaxon Atkinson, PT Student (t) Chelsie Cline, PT (c)      Row Name 02/10/20 1506             Pain Scale: Word Pre/Post-Treatment    Pain: Word Scale, Pretreatment  0 - no pain  -AF      Pain: Word Scale, Post-Treatment  2 - mild pain  -AF      Pre/Post Treatment Pain Comment  burning in hands   -AF      Recorded by [AF] Reina Perera OTR      Row Name 02/10/20 0900             Static Standing Balance    Level of [place] (Supported Standing, Static Balance)  minimal assist, 75% patient effort;2 person assist vc/Doreen to right to midline; knees did not buckle this date   -LH,NM,LH2      Time Able to Maintain Position (Supported Standing, Static Balance)  more than 5 minutes  -LH,NM,LH2      Assistive Device Utilized (Supported Standing, Static Balance)  parallel bars performed bilat and AP weight shift to prep for gait   -LH,NM,LH2      Recorded by [LH,NM,LH2] Chelsie Cline, PT (r) Jaxon Atkinson, PT Student (t) Chelsie Cline PT (c)      Row Name 02/10/20 1506             Upper Extremity Seated Therapeutic Exercise    Performed, Seated Upper Extremity (Therapeutic Exercise)  digit flexion/extension;scapular protraction/retraction;shoulder flexion/extension;shoulder abduction/adduction;elbow flexion/extension;forearm supination/pronation;wrist flexion/extension RUE only, pt fearful with PICC line for IVIG  -AF      Device, Seated Upper Extremity (Therapeutic Exercise)  -- #1 wrist weight  -AF      Exercise Type, Seated Upper Extremity (Therapeutic Exercise)  AROM (active range of motion);resistive exercise  -AF      Expected Outcomes, Seated Upper Extremity (Therapeutic Exercise)  improve functional tolerance, self-care activity;improve performance,  BADLs;improve motor control  -AF      Sets/Reps Detail, Seated Upper Extremity (Therapeutic Exercise)  2/20  -AF      Transfers Skills, Training to Functional Activity, Seated Upper Extremity (Therapeutic Exercise)  beginning to transfer skills to functional activity  -AF      Comment, Seated Upper Extremity (Therapeutic Exercise)  blue hand gripper   -AF      Recorded by [AF] Reina Perera OTR      Row Name 02/10/20 0900             Lower Extremity Seated Therapeutic Exercise    Performed, Seated Lower Extremity (Therapeutic Exercise)  LAQ (long arc quad), knee extension;ankle dorsiflexion/plantarflexion;knee flexion/extension;hip abduction/adduction;hip flexion/extension 1# aw for LAQ, yelow tband for hip abd, HS curl  -LH,NM,LH2      Exercise Type, Seated Lower Extremity (Therapeutic Exercise)  AROM (active range of motion)  -LH,NM,LH2      Sets/Reps Detail, Seated Lower Extremity (Therapeutic Exercise)  1/10  -LH,NM,LH2      Recorded by [LH,NM,LH2] Chelsie Cline, PT (r) Jaxon Atkinsno, PT Student (t) Chelsie Cline, PT (c)      Row Name 02/10/20 0900             Lower Extremity Supine Therapeutic Exercise    Performed, Supine Lower Extremity (Therapeutic Exercise)  heel slides;bridging (bilateral w/bolster);hip flexion/extension;hip abduction/adduction  -LH,NM,LH2      Exercise Type, Supine Lower Extremity (Therapeutic Exercise)  AROM (active range of motion)  -LH,NM,LH2      Sets/Reps Detail, Supine Lower Extremity (Therapeutic Exercise)  2/10  -LH,NM,LH2      Recorded by [LH,NM,LH2] Chelsie Cline, PT (r) Jaxon Atkinson, PT Student (t) Chelsie Cline, PT (c)      Row Name 02/10/20 0900             Lower Extremity Sidelying Therapeutic Exercise    Performed, Sidelying Lower Extremity (Therapeutic Exercise)  hip abduction clamshells  -LH,NM,LH2      Exercise Type, Sidelying Lower Extremity (Therapeutic Exercise)  AROM (active range of motion) AAROM L hip abd  -LH,NM,LH2      Sets/Reps Detail, Sidelying  Lower Extremity (Therapeutic Exercise)  1/10  -LH,NM,LH2      Recorded by [LH,NM,LH2] Chelsie Cline, PT (r) Jaxon Atkinson, PT Student (t) Chelsie Cline, PT (c)      Row Name 02/10/20 1506 02/10/20 0900          Positioning and Restraints    Pre-Treatment Position  in bed  -AF  in bed  -LH,NM,LH2     Post Treatment Position  bed  -AF  bed  -LH,NM,LH2     In Bed  supine;call light within reach;encouraged to call for assist;exit alarm on in PM  -AF  supine;call light within reach;encouraged to call for assist;exit alarm on;with family/caregiver in AM; husand present   -LH,NM,LH2     In Wheelchair  sitting;exit alarm on;with PT;with family/caregiver with  in AM  -AF  exit alarm on;with family/caregiver  -LH,NM,LH2     Recorded by [AF] Reina Perera, OTR [LH,NM,LH2] Chelsie Cline, PT (r) Jaxon Atkinson, PT Student (t) Chelsie Cline, PT (c)       User Key  (r) = Recorded By, (t) = Taken By, (c) = Cosigned By    Initials Name Effective Dates     Chelsie Cline, PT 04/03/18 -     AF Reina Perera, OTR 04/03/18 -     NM Jaxon Atkinson, PT Student 01/03/20 -         Wound 12/09/19 1510 abdomen Incision (Active)   Dressing Appearance open to air 2/10/2020  8:00 AM   Closure SUJEY 2/9/2020  8:00 PM   Base dressing in place, unable to visualize 2/9/2020  8:00 PM   Wound Length (cm) 0.3 cm 2/10/2020  3:19 PM   Wound Width (cm) 0.3 cm 2/10/2020  3:19 PM   Wound Depth (cm) 0.8 cm 2/10/2020  3:19 PM   Drainage Characteristics/Odor tan 2/10/2020  3:19 PM   Drainage Amount scant 2/10/2020  3:19 PM   Care, Wound cleansed with;sterile normal saline 2/10/2020  3:19 PM   Dressing Care, Wound hydrogel 2/10/2020  3:19 PM           PT Recommendation and Plan        Daily Summary of Progress (PT)  Recommendations: Physical Therapy: discussed pt's status c MD this morning related to decline in functional mobility and strength in BLE. Per MD pt to start on IVIG                Time Calculation:     PT Charges     Row Name  02/10/20 1518 02/10/20 1114 02/10/20 1113       Time Calculation    Start Time  1300  -LH (r) NM (t) LH (c)  1030  -LH (r) NM (t) LH (c)  0900  -LH (r) NM (t) LH (c)    Stop Time  1330  -LH (r) NM (t) LH (c)  1100  -LH (r) NM (t) LH (c)  0930  -LH (r) NM (t) LH (c)    Time Calculation (min)  30 min  -LH (r) NM (t)  30 min  -LH (r) NM (t)  30 min  -LH (r) NM (t)    PT Received On  02/10/20  -LH (r) NM (t) LH (c)  02/10/20  -LH (r) NM (t) LH (c)  02/10/20  -LH (r) NM (t) LH (c)    PT - Next Appointment  --  --  02/11/20  -LH (r) NM (t) LH (c)      User Key  (r) = Recorded By, (t) = Taken By, (c) = Cosigned By    Initials Name Provider Type     Chelsie Cline, PT Physical Therapist    NM Jaxon Atkinson, PT Student PT Student          Therapy Charges for Today     Code Description Service Date Service Provider Modifiers Qty    35509569196 HC PT NEUROMUSC RE EDUCATION EA 15 MIN 2/10/2020 Jaxon Atkinson, PT Student GP 2    85662160202 HC GAIT TRAINING EA 15 MIN 2/10/2020 Jaxon Atkinson, PT Student GP 2    [id number]  PT THERAPEUTIC ACT EA 15 MIN 2/10/2020 Jaxon Atkinson, PT Student GP 2                   Jaxon Atkinson, PT Student  2/10/2020

## 2020-02-10 NOTE — PLAN OF CARE
Problem: Patient Care Overview  Goal: Plan of Care Review  Flowsheets  Taken 2/10/2020 1637 by Jyoti Parry, RN  Outcome Summary: Mrs López particpated in therapies today and she reports better strength compared to Friday. No unsafe behavior. Ileostomy pouch changed by wound nurse today, it is functioning with dark brown liquid stool. She is currently receiving day 4 of IVIG without problem.  Taken 2/10/2020 0257 by Jenn Anna RN  Progress, Functional Goals: demonstrating adequate progress  IRF Plan of Care Review: progress ongoing, continue  Taken 2/10/2020 0800 by Jyoti Parry, RN  Plan of Care Reviewed With: patient

## 2020-02-10 NOTE — PROGRESS NOTES
Inpatient Rehabilitation Functional Measures Assessment and Plan of Care    Plan of Care  Updated Problems/Interventions  Mobility    [OT] Toilet Transfers(Active)  Current Status(02/10/2020): MOD  Weekly Goal(02/17/2020): MIN  Discharge Goal: MIN/CGA    [OT] Tub/Shower Transfers(Active)  Current Status(02/10/2020): TBA  Weekly Goal(02/17/2020): MOD  Discharge Goal: MIN        Self Care    [OT] Bathing(Active)  Current Status(02/10/2020): MIN UB, MAX LB  Weekly Goal(02/17/2020): MIN UB, MOD/MIN LB  Discharge Goal: MIN    [OT] Dressing (Lower)(Active)  Current Status(02/10/2020): dep  Weekly Goal(02/17/2020): Max  Discharge Goal: min/mod    [OT] Dressing (Upper)(Active)  Current Status(02/10/2020): Mod  Weekly Goal(02/17/2020): Min  Discharge Goal: SBA    [OT] Grooming(Active)  Current Status(02/10/2020): MIN  Weekly Goal(02/17/2020): SBA  Discharge Goal: SBA    [OT] Toileting(Active)  Current Status(02/10/2020): dep  Weekly Goal(02/17/2020): Max  Discharge Goal: Min/Mod    [OT] Eating(Active)  Current Status(02/10/2020): min /set up has built up utensils  Weekly Goal(02/17/2020): setup  Discharge Goal: set up    Functional Measures  ANKUR Eating:  Branch  ANKUR Grooming: Branch  ANKUR Bathing:  Branch  ANKUR Upper Body Dressing:  Branch  ANKUR Lower Body Dressing:  Branch  ANKUR Toileting:  Branch    ANKUR Bladder Management  Level of Assistance:  Branch  Frequency/Number of Accidents this Shift:  Branch    ANKUR Bowel Management  Level of Assistance: Branch  Frequency/Number of Accidents this Shift: Branch    ANKUR Bed/Chair/Wheelchair Transfer:  Branch  ANKUR Toilet Transfer:  Branch  ANKUR Tub/Shower Transfer:  Branch    Previously Documented Mode of Locomotion at Discharge: Field  ANKUR Expected Mode of Locomotion at Discharge: Branch  ANKUR Walk/Wheelchair:  Branch  ANKUR Stairs:  Branch    ANKUR Comprehension:  Branch  ANKUR Expression:  Branch  ANKUR Social Interaction:  Branch  ANKUR Problem Solving:  Branch  ANKUR Memory:  Branch    Therapy  Mode Minutes  Occupational Therapy: Individual: 90 minutes.  Physical Therapy: Branch  Speech Language Pathology:  Branch    Signed by: Reina Perera OT

## 2020-02-10 NOTE — THERAPY TREATMENT NOTE
Inpatient Rehabilitation - Occupational Therapy Treatment Note    [facility]     Patient Name: She López  : 1945  MRN: [id number]    Today's Date: 2/10/2020  Onset of Illness/Injury or Date of Surgery: 20              Admit Date: 2020      Visit Dx:    ICD-10-CM ICD-9-CM   1. General weakness R53.1 780.79       Patient Active Problem List   Diagnosis   • Crohn's disease of small intestine with other complication (CMS/HCC)   • Type 2 diabetes mellitus without complication (CMS/HCC)   • RSD upper limb   • Neuropathic pain of hand   • Hyperlipidemia   • Cervical myelopathy (CMS/HCC)   • Central pain syndrome   • Carpal tunnel syndrome   • Vitamin B 12 deficiency   • Guillain Barré syndrome (CMS/HCC)         Therapy Treatment    IRF Treatment Summary     Row Name 02/10/20 1506 02/10/20 09          Evaluation/Treatment Time and Intent    Subjective Information  complains of;weakness;fatigue  -AF  no complaints  (Pended)   -NM     Existing Precautions/Restrictions  fall  -AF  fall  (Pended)   -NM     Document Type  therapy note (daily note)  -AF  therapy note (daily note)  (Pended)   -NM     Mode of Treatment  occupational therapy  -AF  physical therapy  (Pended)   -NM     Patient/Family Observations  lying in bed in AM, sitting up in w/c in PM in PT   -AF  supine in bed; reports having a better weekend after starting IVIG; LEs feel stronger per pt   (Pended)   -NM     Recorded by [AF] Reina Perera, OTR [NM] Jaxon Atkinson, PT Student     Row Name 02/10/20 1506 02/10/20 0900          Cognition/Psychosocial- PT/OT    Affect/Mental Status (Cognitive)  WFL  -AF  WFL  (Pended)   -NM     Orientation Status (Cognition)  oriented x 4  -AF  oriented x 4  (Pended)   -NM     Follows Commands (Cognition)  WFL  -AF  WFL  (Pended)   -NM     Personal Safety Interventions  fall prevention program maintained;gait belt;nonskid shoes/slippers when out of bed  -AF  fall prevention program  maintained;gait belt;supervised activity;nonskid shoes/slippers when out of bed  (Pended)   -NM     Safety Deficit (Cognitive)  insight into deficits/self awareness  -AF  --     Recorded by [AF] Reina Perera OTR [NM] Jaxon Atkinson, PT Student     Row Name 02/10/20 1506 02/10/20 0900          Bed Mobility Assessment/Treatment    Rolling Left [place] (Bed Mobility)  --  supervision  (Pended)   -NM     Rolling Right [place] (Bed Mobility)  --  supervision  (Pended)   -NM     Supine-Sit [place] (Bed Mobility)  contact guard;verbal cues  -AF  supervision;verbal cues  (Pended)   -NM     Sit-Supine [place] (Bed Mobility)  contact guard;verbal cues  -AF  supervision;verbal cues  (Pended)   -NM     Bed Mobility, Safety Issues  --  decreased use of arms for pushing/pulling;decreased use of legs for bridging/pushing;impaired trunk control for bed mobility  (Pended)   -NM     Assistive Device (Bed Mobility)  bed rails;head of bed elevated  -AF  bed rails  (Pended)   -NM     Comment (Bed Mobility)  --  pt jeanine to lift trunk from sup>sit and manage LEs on own this morning   (Pended)   -NM     Recorded by [AF] Reina Perera OTR [NM] Jaxon Atkinson, PT Student     Row Name 02/10/20 1506             Bed-Chair Transfer    Bed-Chair [place] (Transfers)  moderate assist (50% patient effort);verbal cues sit pivot   -AF      Assistive Device (Bed-Chair Transfers)  wheelchair  -AF      Recorded by [AF] Reina Perera OTR      Row Name 02/10/20 1506             Chair-Bed Transfer    Chair-Bed [place] (Transfers)  moderate assist (50% patient effort);verbal cues  -AF      Assistive Device (Chair-Bed Transfers)  wheelchair sit pivot   -AF      Recorded by [AF] Reina Perera OTR      Row Name 02/10/20 0900             Sit-Stand Transfer    Sit-Stand [place] (Transfers)  moderate assist (50% patient effort);2 person assist;verbal cues;nonverbal cues (demo/gesture)  (Pended)   -NM       Assistive Device (Sit-Stand Transfers)  wheelchair;walker, 4-wheeled  (Pended)  // bars   -NM      Recorded by [NM] Jaxon Atkinson, YUSUF Student      Row Name 02/10/20 0900             Stand-Sit Transfer    Stand-Sit [place] (Transfers)  moderate assist (50% patient effort);2 person assist;verbal cues;nonverbal cues (demo/gesture)  (Pended)   -NM      Assistive Device (Stand-Sit Transfers)  wheelchair;walker, 4-wheeled  (Pended)  // bars; Drive heavy duty rollator   -NM      Recorded by [NM] Jaxon Atkinson, YUSUF Student      Row Name 02/10/20 1506             Toilet Transfer    Type (Toilet Transfer)  stand pivot/stand step;squat pivot  -AF      [place] Level (Toilet Transfer)  moderate assist (50% patient effort);verbal cues  -AF      Assistive Device (Toilet Transfer)  commode;grab bars/safety frame;wheelchair  -AF      Recorded by [AF] Reina Perera OTR      Row Name 02/10/20 0900             Gait/Stairs Assessment/Training    [place] Level (Gait)  minimum assist (75% patient effort);moderate assist (50% patient effort);2 person assist;verbal cues;nonverbal cues (demo/gesture)  (Pended)  third person following c wc  -NM      Assistive Device (Gait)  parallel bars;walker, 4-wheeled  (Pended)  Drive heavy duty rollator; 1# aw for proprioception BLE   -NM      Distance in Feet (Gait)  6' // bars; 15' c heavy duty rollator   (Pended)   -NM      Pattern (Gait)  step-to  (Pended)   -NM      Deviations/Abnormal Patterns (Gait)  bilateral deviations;ataxic;base of support, wide;gait speed decreased;stride length decreased;jt decreased  (Pended)   -NM      Bilateral Gait Deviations  forward flexed posture;foot drop/toe drag;heel strike decreased;knee hyperextension;knee buckling, left side;weight shift ability decreased  (Pended)   -NM      Comment (Gait/Stairs)  improved quad control during stance this date. Slow jt but no LOB this date  (Pended)  limited by fatigue   -NM      Recorded by  [NM] Jaxon Atkinson, PT Student      Row Name 02/10/20 1506             Bathing Assessment/Treatment    Bathing [place] Level  upper body;minimum assist (75% patient effort);verbal cues  -AF      Bathing Position  sink side;supported sitting  -AF      Bathing Setup Assistance  obtain supplies  -AF      Recorded by [AF] Reina Perera, OTR      Row Name 02/10/20 1506             Upper Body Dressing Assessment/Treatment    Upper Body Dressing Task  upper body dressing skills;minimum assist (75% or more patient effort);moderate assist (50-74% patient effort)  -AF      Upper Body Dressing Position  supported sitting  -AF      Comment (Upper Body Dressing)  fearful of knocking the PICC line required assistance  -AF      Recorded by [AF] Reina Perera, OTR      Row Name 02/10/20 1506             Lower Body Dressing Assessment/Treatment    Lower Body Dressing [place] Level  doff;don;socks;shoes/slippers;dependent (less than 25% patient effort)  -AF      Lower Body Dressing Position  supported sitting  -AF      Recorded by [AF] Reina Perera, OTR      Row Name 02/10/20 1506             Grooming Assessment/Treatment    Grooming [place] Level  grooming skills;minimum assist (75% patient effort)  -AF      Assistive Device (Grooming)  built-up handle items  -AF      Grooming Position  sink side;supported sitting  -AF      Grooming Setup Assistance  obtain supplies  -AF      Recorded by [AF] Reina Perera, OTR      Row Name 02/10/20 1506             Toileting Assessment/Treatment    Toileting [place] Level  toileting skills;dependent (less than 25% patient effort)  -AF      Assistive Device Use (Toileting)  grab bar/safety frame;raised toilet seat  -AF      Toileting Position  unsupported sitting;supported standing  -AF      Comment (Toileting)  A x 2  -AF      Recorded by [AF] Reina Perera, OTR      Row Name 02/10/20 1506             Fine Motor Testing & Training    Comment, Fine Motor  Coordination  theraputty (yellow) with exs R and L hand to increase sensory awareness and strength. increased ability with grasping of objects B'ly hands, placing square pegs into board with MOD diffiuclty. able to manipulate wooden beads onto dowel pa wtih R hand with MIN difficulty fatigued quickly.   -AF      Recorded by [AF] Reina Perera, OTR      Row Name 02/10/20 0900             Pain Scale: Numbers Pre/Post-Treatment    Pain Scale: Numbers, Pretreatment  0/10 - no pain  (Pended)   -NM      Pain Scale: Numbers, Post-Treatment  0/10 - no pain  (Pended)   -NM      Recorded by [NM] Jaxon Atkinson, PT Student      Row Name 02/10/20 1506             Pain Scale: Word Pre/Post-Treatment    Pain: Word Scale, Pretreatment  0 - no pain  -AF      Pain: Word Scale, Post-Treatment  2 - mild pain  -AF      Pre/Post Treatment Pain Comment  burning in hands   -AF      Recorded by [AF] Reina Perera, OTR      Row Name 02/10/20 0900             Static Standing Balance    Level of [place] (Supported Standing, Static Balance)  minimal assist, 75% patient effort;2 person assist  (Pended)  vc/Doreen to right to midline; knees did not buckle this date   -NM      Time Able to Maintain Position (Supported Standing, Static Balance)  more than 5 minutes  (Pended)   -NM      Assistive Device Utilized (Supported Standing, Static Balance)  parallel bars  (Pended)  performed bilat and AP weight shift to prep for gait   -NM      Recorded by [NM] Jaxon Atkinson, PT Student      Row Name 02/10/20 1506             Upper Extremity Seated Therapeutic Exercise    Performed, Seated Upper Extremity (Therapeutic Exercise)  digit flexion/extension;scapular protraction/retraction;shoulder flexion/extension;shoulder abduction/adduction;elbow flexion/extension;forearm supination/pronation;wrist flexion/extension RUE only, pt fearful with PICC line for IVIG  -AF      Device, Seated Upper Extremity (Therapeutic Exercise)  -- #1 wrist  weight  -AF      Exercise Type, Seated Upper Extremity (Therapeutic Exercise)  AROM (active range of motion);resistive exercise  -AF      Expected Outcomes, Seated Upper Extremity (Therapeutic Exercise)  improve functional tolerance, self-care activity;improve performance, BADLs;improve motor control  -AF      Sets/Reps Detail, Seated Upper Extremity (Therapeutic Exercise)  2/20  -AF      Transfers Skills, Training to Functional Activity, Seated Upper Extremity (Therapeutic Exercise)  beginning to transfer skills to functional activity  -AF      Comment, Seated Upper Extremity (Therapeutic Exercise)  blue hand gripper   -AF      Recorded by [AF] Reina Perera OTR      Row Name 02/10/20 0900             Lower Extremity Seated Therapeutic Exercise    Performed, Seated Lower Extremity (Therapeutic Exercise)  LAQ (long arc quad), knee extension;ankle dorsiflexion/plantarflexion;knee flexion/extension;hip abduction/adduction;hip flexion/extension  (Pended)  1# aw for LAQ, yelow tband for hip abd, HS curl  -NM      Exercise Type, Seated Lower Extremity (Therapeutic Exercise)  AROM (active range of motion)  (Pended)   -NM      Sets/Reps Detail, Seated Lower Extremity (Therapeutic Exercise)  1/10  (Pended)   -NM      Recorded by [NM] Jaxon Atkinson, PT Student      Row Name 02/10/20 0900             Lower Extremity Supine Therapeutic Exercise    Performed, Supine Lower Extremity (Therapeutic Exercise)  heel slides;bridging (bilateral w/bolster);hip flexion/extension;hip abduction/adduction  (Pended)   -NM      Exercise Type, Supine Lower Extremity (Therapeutic Exercise)  AROM (active range of motion)  (Pended)   -NM      Sets/Reps Detail, Supine Lower Extremity (Therapeutic Exercise)  2/10  (Pended)   -NM      Recorded by [NM] Jaxon Atkinson, PT Student      Row Name 02/10/20 0900             Lower Extremity Sidelying Therapeutic Exercise    Performed, Sidelying Lower Extremity (Therapeutic Exercise)  hip abduction   (Pended)  clamshells  -NM      Exercise Type, Sidelying Lower Extremity (Therapeutic Exercise)  AROM (active range of motion)  (Pended)  AAROM L hip abd  -NM      Sets/Reps Detail, Sidelying Lower Extremity (Therapeutic Exercise)  1/10  (Pended)   -NM      Recorded by [NM] Jaxon Atkinson, PT Student      Row Name 02/10/20 1506 02/10/20 0900          Positioning and Restraints    Pre-Treatment Position  in bed  -AF  in bed  (Pended)   -NM     Post Treatment Position  bed  -AF  bed  (Pended)   -NM     In Bed  supine;call light within reach;encouraged to call for assist;exit alarm on in PM  -AF  supine;call light within reach;encouraged to call for assist;exit alarm on;with family/caregiver  (Pended)  in AM; husand present   -NM     In Wheelchair  sitting;exit alarm on;with PT;with family/caregiver with  in AM  -AF  exit alarm on;with family/caregiver  (Pended)   -NM     Recorded by [AF] Reina Perera, NILAMR [NM] Jaxon Atkinson, PT Student       User Key  (r) = Recorded By, (t) = Taken By, (c) = Cosigned By    Initials Name Effective Dates    AF Reina Perera OTR 04/03/18 -     NM Jaxon Atkinson, YUSUF Student 01/03/20 -           Wound 12/09/19 1510 abdomen Incision (Active)   Dressing Appearance open to air 2/10/2020  8:00 AM   Closure SUJEY 2/9/2020  8:00 PM   Base dressing in place, unable to visualize 2/9/2020  8:00 PM         OT Recommendation and Plan                 OT IRF GOALS     Row Name 02/03/20 1540             Transfer Goal 1 (OT-IRF)    Activity/Assistive Device (Transfer Goal 1, OT-IRF)  toilet;shower chair;walk-in shower  -AF      [place] Level (Transfer Goal 1, OT-IRF)  minimum assist (75% or more patient effort);moderate assist (50-74% patient effort);verbal cues required  -AF      Time Frame (Transfer Goal 1, OT-IRF)  short term goal (STG)  -AF      Progress/Outcomes (Transfer Goal 1, OT-IRF)  goal ongoing  -AF         Transfer Goal 2 (OT-IRF)    Activity/Assistive Device  (Transfer Goal 2, OT-IRF)  toilet;shower chair;walk-in shower  -AF      [place] Level (Transfer Goal 2, OT-IRF)  verbal cues required;contact guard assist  -AF      Time Frame (Transfer Goal 2, OT-IRF)  long term goal (LTG)  -AF      Progress/Outcomes (Transfer Goal 2, OT-IRF)  goal ongoing  -AF         Bathing Goal 1 (OT-IRF)    Activity/Device (Bathing Goal 1, OT-IRF)  bathing skills, all;grab bar/tub rail;hand-held shower spray hose;shower chair  -AF      [place] Level (Bathing Goal 1, OT-IRF)  verbal cues required;moderate assist (50-74% patient effort)  -AF      Time Frame (Bathing Goal 1, OT-IRF)  short term goal (STG)  -AF      Progress/Outcomes (Bathing Goal 1, OT-IRF)  goal ongoing  -AF         Bathing Goal 2 (OT-IRF)    Activity/Device (Bathing Goal 2, OT-IRF)  bathing skills, all;grab bar/tub rail;hand-held shower spray hose;shower chair  -AF      [place] Level (Bathing Goal 2, OT-IRF)  verbal cues required;contact guard assist  -AF      Time Frame (Bathing Goal 2, OT-IRF)  long term goal (LTG)  -AF      Progress/Outcomes (Bathing Goal 2, OT-IRF)  goal ongoing  -AF         UB Dressing Goal 1 (OT-IRF)    Activity/Device (UB Dressing Goal 1, OT-IRF)  upper body dressing  -AF      [place] (UB Dress Goal 1, OT-IRF)  verbal cues required;set-up required  -AF      Time Frame (UB Dressing Goal 1, OT-IRF)  long term goal (LTG)  -AF      Progress/Outcomes (UB Dressing Goal 1, OT-IRF)  goal ongoing  -AF         LB Dressing Goal 1 (OT-IRF)    Activity/Device (LB Dressing Goal 1, OT-IRF)  lower body dressing  -AF      [place] (LB Dressing Goal 1, OT-IRF)  verbal cues required;moderate assist (50-74% patient effort)  -AF      Time Frame (LB Dressing Goal 1, OT-IRF)  short term goal (STG)  -AF      Progress/Outcomes (LB Dressing Goal 1, OT-IRF)  goal ongoing  -AF         LB Dressing Goal 2 (OT-IRF)    Activity/Device (LB Dressing Goal 2, OT-IRF)  lower body dressing  -AF      [place] (LB  Dressing Goal 2, OT-IRF)  contact guard assist;verbal cues required  -AF      Time Frame (LB Dressing Goal 2, OT-IRF)  long term goal (LTG)  -AF      Progress/Outcomes (LB Dressing Goal 2, OT-IRF)  goal ongoing  -AF         Grooming Goal 1 (OT-IRF)    Activity/Device (Grooming Goal 1, OT-IRF)  grooming skills, all  -AF      [place] (Grooming Goal 1, OT-IRF)  set-up required;verbal cues required  -AF      Time Frame (Grooming Goal 1, OT-IRF)  short term goal (STG);long term goal (LTG)  -AF      Progress/Outcomes (Grooming Goal 1, OT-IRF)  goal ongoing  -AF         Toileting Goal 1 (OT-IRF)    Activity/Device (Toileting Goal 1, OT-IRF)  toileting skills, all;grab bar/safety frame;raised toilet seat  -AF      [place] Level (Toileting Goal 1, OT-IRF)  maximum assist (25-49% patient effort);verbal cues required  -AF      Time Frame (Toileting Goal 1, OT-IRF)  short term goal (STG)  -AF      Progress/Outcomes (Toileting Goal 1, OT-IRF)  goal ongoing  -AF         Toileting Goal 2 (OT-IRF)    Activity/Device (Toileting Goal 2, OT-IRF)  toileting skills, all;grab bar/safety frame;raised toilet seat  -AF      [place] Level (Toileting Goal 2, OT-IRF)  minimum assist (75% or more patient effort)  -AF      Time Frame (Toileting Goal 2, OT-IRF)  long term goal (LTG)  -AF      Progress/Outcomes (Toileting Goal 2, OT-IRF)  goal ongoing  -AF         Balance Goal 1 (OT)    Activity/Assistive Device (Balance Goal 1, OT)  standing, static  -AF      [place] Level/Cues Needed (Balance Goal 1, OT)  moderate assist (50-74% patient effort);verbal cues required  -AF      Time Frame (Balance Goal 1, OT)  short term goal (STG)  -AF      Progress/Outcomes (Balance Goal 1, OT)  goal ongoing  -AF         Balance Goal 2 (OT)    Activity/Assistive Device (Balance Goal 2, OT)  standing, static  -AF      [place] Level (Balance Goal 2, OT)  minimum assist (75% or more patient effort);contact guard assist  -AF      Time Frame  (Balance Goal 2, OT)  long term goal (LTG)  -AF      Progress/Outcome (Balance Goal 2, OT)  goal ongoing  -AF         Caregiver Training Goal 1 (OT-IRF)    Caregiver Training Goal 1 (OT-IRF)  Pt and  will demo safe techniques with ADLs, trasnfer, HEP and AD prior to d/c home   -AF      Time Frame (Caregiver Training Goal 1, OT-IRF)  long term goal (LTG)  -AF      Progress/Outcomes (Caregiver Training Goal 1, OT-IRF)  goal ongoing  -AF        User Key  (r) = Recorded By, (t) = Taken By, (c) = Cosigned By    Initials Name Provider Type    AF Reina Perera OTR Occupational Therapist                     Time Calculation:     Time Calculation- OT     Row Name 02/10/20 1515 02/10/20 1514          Time Calculation- OT    OT Start Time  1330  -AF  0930  -AF     OT Stop Time  1400  -AF  1030  -AF     OT Time Calculation (min)  30 min  -AF  60 min  -AF       User Key  (r) = Recorded By, (t) = Taken By, (c) = Cosigned By    Initials Name Provider Type    Reina Noe OTR Occupational Therapist          Therapy Charges for Today     Code Description Service Date Service Provider Modifiers Qty    79510382222 HC OT SELF CARE/MGMT/TRAIN EA 15 MIN 2/10/2020 Reina Perera OTR GO 3    25643284967 HC OT THER PROC EA 15 MIN 2/10/2020 Reina Perera OTR GO 1    40103418336 HC OT NEUROMUSC RE EDUCATION EA 15 MIN 2/10/2020 Reina Perera OTR GO 2                   ALPESH Dempsey  2/10/2020

## 2020-02-11 PROCEDURE — 25010000002 IMMUNE GLOBULIN (HUMAN) 20 GM/200ML SOLUTION: Performed by: PSYCHIATRY & NEUROLOGY

## 2020-02-11 PROCEDURE — 97112 NEUROMUSCULAR REEDUCATION: CPT

## 2020-02-11 PROCEDURE — 97535 SELF CARE MNGMENT TRAINING: CPT

## 2020-02-11 PROCEDURE — 97110 THERAPEUTIC EXERCISES: CPT

## 2020-02-11 PROCEDURE — 97530 THERAPEUTIC ACTIVITIES: CPT

## 2020-02-11 PROCEDURE — 25010000002 IMMUNE GLOBULIN (HUMAN) 10 GM/100ML SOLUTION: Performed by: PSYCHIATRY & NEUROLOGY

## 2020-02-11 PROCEDURE — 25010000002 METHYLPREDNISOLONE PER 125 MG: Performed by: PSYCHIATRY & NEUROLOGY

## 2020-02-11 PROCEDURE — 97116 GAIT TRAINING THERAPY: CPT

## 2020-02-11 RX ADMIN — SODIUM BICARBONATE 1300 MG: 650 TABLET ORAL at 21:34

## 2020-02-11 RX ADMIN — CHOLECALCIFEROL TAB 125 MCG (5000 UNIT) 5000 UNITS: 125 TAB at 08:17

## 2020-02-11 RX ADMIN — GABAPENTIN 300 MG: 300 CAPSULE ORAL at 08:17

## 2020-02-11 RX ADMIN — Medication 18 MG: at 21:34

## 2020-02-11 RX ADMIN — SODIUM CHLORIDE, PRESERVATIVE FREE 10 ML: 5 INJECTION INTRAVENOUS at 08:18

## 2020-02-11 RX ADMIN — LOPERAMIDE HYDROCHLORIDE 2 MG: 2 CAPSULE ORAL at 21:37

## 2020-02-11 RX ADMIN — GABAPENTIN 300 MG: 300 CAPSULE ORAL at 21:38

## 2020-02-11 RX ADMIN — IMMUNE GLOBULIN (HUMAN) 10 G: 10 INJECTION INTRAVENOUS; SUBCUTANEOUS at 17:59

## 2020-02-11 RX ADMIN — PANTOPRAZOLE SODIUM 40 MG: 40 TABLET, DELAYED RELEASE ORAL at 06:06

## 2020-02-11 RX ADMIN — SODIUM CHLORIDE, PRESERVATIVE FREE 10 ML: 5 INJECTION INTRAVENOUS at 21:42

## 2020-02-11 RX ADMIN — Medication 18 MG: at 08:17

## 2020-02-11 RX ADMIN — LOPERAMIDE HYDROCHLORIDE 2 MG: 2 CAPSULE ORAL at 06:06

## 2020-02-11 RX ADMIN — GABAPENTIN 300 MG: 300 CAPSULE ORAL at 11:54

## 2020-02-11 RX ADMIN — SODIUM BICARBONATE 1300 MG: 650 TABLET ORAL at 17:11

## 2020-02-11 RX ADMIN — Medication 1 TABLET: at 08:17

## 2020-02-11 RX ADMIN — SODIUM BICARBONATE 1300 MG: 650 TABLET ORAL at 08:17

## 2020-02-11 RX ADMIN — GABAPENTIN 300 MG: 300 CAPSULE ORAL at 17:11

## 2020-02-11 RX ADMIN — LOPERAMIDE HYDROCHLORIDE 2 MG: 2 CAPSULE ORAL at 11:54

## 2020-02-11 RX ADMIN — METHYLPREDNISOLONE SODIUM SUCCINATE 60 MG: 125 INJECTION, POWDER, FOR SOLUTION INTRAMUSCULAR; INTRAVENOUS at 14:21

## 2020-02-11 RX ADMIN — ACETAMINOPHEN 500 MG: 500 TABLET, FILM COATED ORAL at 14:21

## 2020-02-11 RX ADMIN — IMMUNE GLOBULIN (HUMAN) 20 G: 10 INJECTION INTRAVENOUS; SUBCUTANEOUS at 14:51

## 2020-02-11 RX ADMIN — GABAPENTIN 300 MG: 300 CAPSULE ORAL at 02:59

## 2020-02-11 NOTE — THERAPY TREATMENT NOTE
Inpatient Rehabilitation - Physical Therapy Treatment Note  [facility]     Patient Name: She López  : 1945  MRN: [id number]    Today's Date: 2020  Onset of Illness/Injury or Date of Surgery: 20              Admit Date: 2020      Visit Dx:      ICD-10-CM ICD-9-CM   1. General weakness R53.1 780.79       Patient Active Problem List   Diagnosis   • Crohn's disease of small intestine with other complication (CMS/HCC)   • Type 2 diabetes mellitus without complication (CMS/HCC)   • RSD upper limb   • Neuropathic pain of hand   • Hyperlipidemia   • Cervical myelopathy (CMS/HCC)   • Central pain syndrome   • Carpal tunnel syndrome   • Vitamin B 12 deficiency   • Guillain Barré syndrome (CMS/HCC)       Therapy Treatment    IRF Treatment Summary     Row Name 20 1510 20 1100          Evaluation/Treatment Time and Intent    Subjective Information  no complaints  -AF  no complaints  -LH,NM,LH2     Existing Precautions/Restrictions  fall  -AF  fall  -LH,NM,LH2     Document Type  therapy note (daily note)  -AF  therapy note (daily note)  -LH,NM,LH2     Mode of Treatment  occupational therapy  -AF  physical therapy  -LH,NM,LH2     Patient/Family Observations  sitting up in w/c in room in AM, supine in bed in PM   -AF  supine in bed; agreeable to PT; reports continuing to feel better   -LH,NM,LH2     Recorded by [AF] Reina Perera, OTR [LH,NM,LH2] Chelsie Cline, PT (r) Jaxon Atkinson PT Student (t) Chelsie Cline, PT (c)     Row Name 20 1510 20 1100          Cognition/Psychosocial- PT/OT    Affect/Mental Status (Cognitive)  WFL  -AF  WFL  -LH,NM,LH2     Orientation Status (Cognition)  oriented x 4  -AF  oriented x 4  -LH,NM,LH2     Follows Commands (Cognition)  WFL  -AF  WFL  -LH,NM,LH2     Personal Safety Interventions  fall prevention program maintained;gait belt;nonskid shoes/slippers when out of bed  -AF  fall prevention program maintained;gait belt;nonskid  shoes/slippers when out of bed;supervised activity  -LH,NM,LH2     Safety Deficit (Cognitive)  insight into deficits/self awareness  -AF  --     Recorded by [AF] Reina Perera, OTR [LH,NM,LH2] Chelsie Cline, PT (r) Jaxon Atkinson, PT Student (t) Chelsie Cline, PT (c)     Row Name 02/11/20 1510 02/11/20 1100          Bed Mobility Assessment/Treatment    Rolling Left [place] (Bed Mobility)  --  supervision  -LH,NM,LH2     Rolling Right [place] (Bed Mobility)  --  supervision  -LH,NM,LH2     Supine-Sit [place] (Bed Mobility)  supervision  -AF  supervision;verbal cues  -LH,NM,LH2     Sit-Supine [place] (Bed Mobility)  --  supervision;verbal cues  -LH,NM,LH2     Bed Mobility, Safety Issues  --  decreased use of arms for pushing/pulling;decreased use of legs for bridging/pushing;impaired trunk control for bed mobility  -LH,NM,LH2     Assistive Device (Bed Mobility)  --  bed rails  -LH,NM,LH2     Comment (Bed Mobility)  --  performed sup<>sit c bed flat this date,  supervision   -LH,NM,LH2     Recorded by [AF] Reina Perera, OTR [LH,NM,LH2] Chelsie Cline, PT (r) Jaxon Atkinson, PT Student (t) Chelsie Cline, PT (c)     Row Name 02/11/20 1510             Transfer Assessment/Treatment    Comment (Transfers)  sit pivot EOM <> w/c MIN/MOD A, sit to stands with LBD and toielting tasks MOD A   -AF      Recorded by [AF] Reina Perera, NILAMR      Row Name 02/11/20 1100             Bed-Chair Transfer    Bed-Chair [place] (Transfers)  moderate assist (50% patient effort);verbal cues;nonverbal cues (demo/gesture)  -LH,NM,LH2      Assistive Device (Bed-Chair Transfers)  wheelchair  -LH,NM,LH2      Recorded by [LH,NM,LH2] Chelsie Cline, PT (r) Jaxon Atkinson, PT Student (t) Chelsie Cline, PT (c)      Row Name 02/11/20 1100             Chair-Bed Transfer    Chair-Bed [place] (Transfers)  moderate assist (50% patient effort);verbal cues  -LH,NM,LH2      Assistive Device (Chair-Bed Transfers)   wheelchair  -LH,NM,LH2      Recorded by [LH,NM,LH2] Chelsie Cline, PT (r) Jaxon Atkinson, PT Student (t) Chelsie Cline, PT (c)      Row Name 02/11/20 1100             Sit-Stand Transfer    Sit-Stand [place] (Transfers)  moderate assist (50% patient effort);minimum assist (75% patient effort);2 person assist;verbal cues;nonverbal cues (demo/gesture) max x 2 once due to poor foot positioning prior to stand   -LH,NM,LH2      Assistive Device (Sit-Stand Transfers)  wheelchair;walker, 4-wheeled Drive heavy duty rollator   -LH,NM,LH2      Recorded by [LH,NM,LH2] Chelsie Cline, PT (r) Jaxon Atkinson, PT Student (t) Chelsie Cline, PT (c)      Row Name 02/11/20 1100             Stand-Sit Transfer    Stand-Sit [place] (Transfers)  moderate assist (50% patient effort);minimum assist (75% patient effort);2 person assist;verbal cues;nonverbal cues (demo/gesture)  -LH,NM,LH2      Assistive Device (Stand-Sit Transfers)  wheelchair;walker, 4-wheeled Drive heavy duty rollator   -LH,NM,LH2      Recorded by [LH,NM,LH2] Chelsie Cline, PT (r) Jaxon Atkinson, PT Student (t) Chelsie Cline, PT (c)      Row Name 02/11/20 1510             Toilet Transfer    Type (Toilet Transfer)  stand pivot/stand step  -AF      [place] Level (Toilet Transfer)  moderate assist (50% patient effort)  -AF      Assistive Device (Toilet Transfer)  commode;grab bars/safety frame;wheelchair  -AF      Recorded by [AF] Reina Perera OTR      Row Name 02/11/20 1100             Gait/Stairs Assessment/Training    [place] Level (Gait)  moderate assist (50% patient effort);minimum assist (75% patient effort);2 person assist;verbal cues;nonverbal cues (demo/gesture) wc following   -LH,NM,LH2      Assistive Device (Gait)  walker, 4-wheeled Drive heavy duty rollator; aircasts B ankles   -LH,NM,LH2      Distance in Feet (Gait)  30' x 2 c wx; 8' in // bars  30'x2 in PM c heavy duty rollator   -LH,NM,LH2      Pattern (Gait)  step-to;step-through  improved continuity of steps in PM   -LH,NM,LH2      Deviations/Abnormal Patterns (Gait)  bilateral deviations;ataxic;base of support, wide;gait speed decreased;stride length decreased;jt decreased  -LH,NM,LH2      Bilateral Gait Deviations  forward flexed posture;foot drop/toe drag;heel strike decreased;knee hyperextension;knee buckling, left side;weight shift ability decreased  -LH,NM,LH2      Left Sided Gait Deviations  knee buckling, left side  -,NM,LH2      Comment (Gait/Stairs)  decreased step length on the L, decreased anterior weight shift throughout the cycle due to balance deficits. knees did not collapse this session.  ankles did not roll in // bars w/out aircasts   -,NM,LH2      Recorded by [,NM,LH2] Chelsie Cline, PT (r) Jaxon Atkinson PT Student (t) Chelsie Cline, PT (c)      Row Name 02/11/20 1510             Bathing Assessment/Treatment    Bathing [place] Level  upper body;contact guard assist;lower body;moderate assist (50% patient effort)  -AF      Bathing Position  sink side;supported sitting;supported standing  -AF      Bathing Setup Assistance  obtain supplies  -AF      Comment (Bathing)  assist with bottom/ayah area and feet   -AF      Recorded by [AF] Reina Perera OTR      Row Name 02/11/20 1510             Upper Body Dressing Assessment/Treatment    Upper Body Dressing Task  upper body dressing skills;minimum assist (75% or more patient effort);pull over garment  -AF      Upper Body Dressing Position  supported sitting  -AF      Set-up Assistance (Upper Body Dressing)  obtain clothing  -AF      Recorded by [AF] Reina Perera OTR      Row Name 02/11/20 1510             Lower Body Dressing Assessment/Treatment    Lower Body Dressing [place] Level  doff;don;shoes/slippers;pants/bottoms;socks;dependent (less than 25% patient effort)  -AF      Lower Body Dressing Position  supported standing;supported sitting  -AF      Comment (Lower Body Dressing)  worked on  crossing her legs to doff socks/shoes and thread pants   -AF      Recorded by [AF] Reina Perera OTR      Row Name 02/11/20 1510             Grooming Assessment/Treatment    Grooming [place] Level  grooming skills;verbal cues;set up;oral care regimen;hair care, combing/brushing;deodorant application  -AF      Assistive Device (Grooming)  built-up handle items  -AF      Grooming Position  sink side;supported sitting  -AF      Recorded by [AF] Reina Perera OTR      Row Name 02/11/20 1510             Toileting Assessment/Treatment    Toileting [place] Level  toileting skills;maximum assist (25% patient effort)  -AF      Assistive Device Use (Toileting)  grab bar/safety frame  -AF      Toileting Position  unsupported sitting;supported standing  -AF      Comment (Toileting)  pt able to complete hygiene, assist with clothing management   -AF      Recorded by [AF] Reina Perera OTR      Row Name 02/11/20 1510             Fine Motor Testing & Training    Comment, Fine Motor Coordination  FMC task with peg/ring task alternating hands with MOD difficulty.   -AF      Recorded by [AF] Reina Perera OTR      Row Name 02/11/20 1510 02/11/20 1100          Pain Scale: Numbers Pre/Post-Treatment    Pain Scale: Numbers, Pretreatment  0/10 - no pain  -AF  0/10 - no pain  -LH,NM,LH2     Pain Scale: Numbers, Post-Treatment  0/10 - no pain  -AF  0/10 - no pain  -LH,NM,LH2     Recorded by [AF] Reina Perera OTR [LH,NM,LH2] Chelsie Cline, PT (r) Jaxon Atkinson, YUSUF Student (t) Chelsie Cline, PT (c)     Row Name 02/11/20 1510             Static Sitting Balance    Level of [place] (Unsupported Sitting, Static Balance)  standby assist  -AF      Sitting Position (Unsupported Sitting, Static Balance)  sitting edge of mat  -AF      Time Able to Maintain Position (Unsupported Sitting, Static Balance)  4 to 5 minutes  -AF      Comment (Unsupported Sitting, Static Balance)  crossing midline with gross  grasp/release alternating hands   -AF      Recorded by [AF] Reina Perera OTR      Row Name 02/11/20 1100             Standing Balance Activity    Comment (Standing, Balance Training)  // bars c BUE support, CGA-Doreen x 2, performed AP, bilateral and circular weight shifting c pt returning to midline c CGA and occasional vc. Celest[place] improved self-correction this session  -LH,NM,LH2      Recorded by [LH,NM,LH2] Chelsie Cline, PT (r) Jaxon Atkinson, PT Student (t) Chelsie Cline, PT (c)      Row Name 02/11/20 1100             Therapeutic Exercise    Therapeutic Exercise  aerobic exercise  -LH,NM,LH2      Recorded by [LH,NM,LH2] Chelsie Cline, PT (r) Jaxon Atkinson, PT Student (t) Chelsie Cline, PT (c)      Row Name 02/11/20 1510             Upper Extremity Seated Therapeutic Exercise    Performed, Seated Upper Extremity (Therapeutic Exercise)  scapular stabilization;shoulder flexion/extension;forearm supination/pronation;wrist flexion/extension;elbow flexion/extension  -AF      Device, Seated Upper Extremity (Therapeutic Exercise)  -- hand weights #1 RUE, #2 LUE  -AF      Exercise Type, Seated Upper Extremity (Therapeutic Exercise)  AROM (active range of motion);resistive exercise  -AF      Expected Outcomes, Seated Upper Extremity (Therapeutic Exercise)  improve functional tolerance, self-care activity;improve motor control;improve performance, BADLs;improve performance, transfer skills  -AF      Sets/Reps Detail, Seated Upper Extremity (Therapeutic Exercise)  2/20  -AF      Transfers Skills, Training to Functional Activity, Seated Upper Extremity (Therapeutic Exercise)  beginning to transfer skills to functional activity  -AF      Recorded by [AF] Reina Perera OTR      Row Name 02/11/20 1100             Aerobic Exercise Activity    Exercise Performed (Aerobic, Therapeutic Exercise)  recumbent elliptical   -LH,NM,LH2      Expected Outcome (Aerobic, Therapeutic Exercise)  improve motor control   "-LH,NM,LH2      Restrictions (Aerobic, Therapeutic Exercise)  ace wraps for B feet   -LH,NM,LH2      Time (Aerobic, Therapeutic Exercise)  7  -LH,NM,LH2      Comment (Aerobic, Therapeutic Exercise)  level 2-4 using BLE and BUE; vc for BLE ext ROM, control and speed of movement  -LH,NM,LH2      Recorded by [LH,NM,LH2] Chelsie Cline, PT (r) Jaxon Atkinson, PT Student (t) Chelsie Cline, PT (c)      Row Name 02/11/20 1100             Lower Extremity Supine Therapeutic Exercise    Performed, Supine Lower Extremity (Therapeutic Exercise)  hip flexion/extension;bridging (bilateral w/bolster);SAQ (short arc quad) over bolster;SLR (straight leg raise);hip abduction/adduction  -LH,NM,LH2      Exercise Type, Supine Lower Extremity (Therapeutic Exercise)  AROM (active range of motion)  -LH,NM,LH2      Sets/Reps Detail, Supine Lower Extremity (Therapeutic Exercise)  1/15  -LH,NM,LH2      Comment, Supine Lower Extremity (Therapeutic Exercise)  SLR approx 3\" off bed   -LH,NM,LH2      Recorded by [LH,NM,LH2] Chelsie Cline, PT (r) Jaxon Atkinson, PT Student (t) Chelsie Cline, PT (c)      Row Name 02/11/20 1510 02/11/20 1100          Positioning and Restraints    Pre-Treatment Position  sitting in chair/recliner  -AF  in bed  -LH,NM,LH2     Post Treatment Position  wheelchair  -AF  wheelchair  -LH,NM,LH2     In Wheelchair  sitting;with PT;exit alarm on;with family/caregiver with  present in AM and PM   -AF  exit alarm on;with family/caregiver with   -LH,NM,LH2     Recorded by [AF] Reina Perera, OTR [LH,NM,LH2] Chelsie Cline, PT (r) Jaxon Atkinson, PT Student (t) Chelsie Cline, PT (c)       User Key  (r) = Recorded By, (t) = Taken By, (c) = Cosigned By    Initials Name Effective Dates     Chelsie Cline, PT 04/03/18 -     AF Reina Perera, OTR 04/03/18 -     NM Jaxon Atkinson, PT Student 01/03/20 -         Wound 12/09/19 1510 abdomen Incision (Active)   Dressing Appearance dry;intact 2/11/2020  7:20 AM "   Closure None 2/11/2020  7:20 AM   Base other (see comments) 2/11/2020  7:20 AM   Periwound intact 2/11/2020  7:20 AM   Periwound Care, Wound dry periwound area maintained 2/11/2020  7:20 AM           PT Recommendation and Plan        Daily Summary of Progress (PT)  Recommendations: Physical Therapy: discussed pt's status c MD this morning related to decline in functional mobility and strength in BLE. Per MD pt to start on IVIG                Time Calculation:     PT Charges     Row Name 02/11/20 1452 02/11/20 1133 02/11/20 1132       Time Calculation    Start Time  1400  -LH (r) NM (t) LH (c)  1030  -LH (r) NM (t) LH (c)  0900  -LH (r) NM (t) LH (c)    Stop Time  1430  -LH (r) NM (t) LH (c)  1100  -LH (r) NM (t) LH (c)  0930  -LH (r) NM (t) LH (c)    Time Calculation (min)  30 min  -LH (r) NM (t)  30 min  -LH (r) NM (t)  30 min  -LH (r) NM (t)    PT Received On  02/11/20  -LH (r) NM (t) LH (c)  02/11/20  -LH (r) NM (t) LH (c)  02/11/20  -LH (r) NM (t) LH (c)    PT - Next Appointment  02/12/20  -LH (r) NM (t) LH (c)  --  --      User Key  (r) = Recorded By, (t) = Taken By, (c) = Cosigned By    Initials Name Provider Type     Chelsie Cline, PT Physical Therapist    NM Jaxon Atkinson, PT Student PT Student          Therapy Charges for Today     Code Description Service Date Service Provider Modifiers Qty    [id number]  PT NEUROMUSC RE EDUCATION EA 15 MIN 2/10/2020 Jaxon Atkinson, PT Student GP 2    [id number] HC GAIT TRAINING EA 15 MIN 2/10/2020 Jaxon Atkinson, PT Student GP 2    [id number]  PT THERAPEUTIC ACT EA 15 MIN 2/10/2020 Jaxon Atkinson, PT Student GP 2    [id number]  PT THER PROC EA 15 MIN 2/11/2020 Jaxon Atkinson, PT Student GP 2    [id number]  GAIT TRAINING EA 15 MIN 2/11/2020 Jaxon Atkinson, PT Student GP 1    [id number]  PT THERAPEUTIC ACT EA 15 MIN 2/11/2020 Jaxon Atkinson, PT Student GP 1    [id number]  PT NEUROMUSC RE EDUCATION EA 15 MIN 2/11/2020 Jaxon Atkinson,  PT Student GP 2                   Jaxon Atkinson, PT Student  2/11/2020

## 2020-02-11 NOTE — PROGRESS NOTES
Case Management  Inpatient Rehabilitation Team Conference    Conference Date/Time: 2/11/2020 7:33:37 AM    Team Conference Attendees:  Dr. Adolfo Boo, Blayne Johnson, Pharmacist  Kellee Delcid, FUNMILAYOW  Chelsie Cline, PT  Reina Perera, OT  Isa Walter, CTRS  Lisa Phoenix RD, LD  Miguel Angel Mcdowell, RN  Andree Casey, RN  Carson Rivera, Chaplain Jaxon Atkinson, PT Student    Demographics            Age: 74Y            Gender: Female    Admission Date: 1/24/2020 5:58:20 PM  Rehabilitation Diagnosis:  GBS  Past Medical History: PMH  Crohn's s/p recent colostomy  Carpal tunnel  Hyperlipidemia  Cervical stenosis s/p ACDF  Barett's esophagus  Skin cancer  Hiatal hernia  RSD upper limb  Vit B-12 and D def  ?  ?  ?  Past Surgical History:  ProcedureLateralityDate  ?CARPAL TUNNEL KSRHLKWManqk46/2012  ?CERVICAL FUSIONN/A01/25/2012  ?C- 5,6,7 fused, DR. MISAEL NORTH AT [facility]  ?CHOLECYSTECTOMYN/A04/12/2018  ?LAPAROSCOPIC, DR. VENICE SALDAÑA AT [facility]  ?COLON RESECTIONN/A12/9/2019  ?Procedure: laparoscopic to open right hemicolectomy; ?Surgeon: Dorcas Albrecht MD; ?Location: [facility] MAIN OR; ?Service: General  ?COLON RESECTIONN/A12/20/2019  ?Procedure: EXPLORATORY LAPAROTOMY WITH SMALL BOWEL RESECTION; ?Surgeon: Dorcas Albrecht MD; ?Location: [facility] MAIN OR; ?Service: General  ?COLONOSCOPYN/A8/16/2019  ?ANAL SKIN TAGS, ANASTAMOSIS ULCERATED, INDURATED, AND NODULAR, INTERNAL  HEMORRHOIDS, ANASTAMOSIS STRICTURE, DR. YANG BIRD AT [facility]  ?COLONOSCOPYN/A04/20/2010  ?PROCTITIS, RECTAL STENOSIS, ACTIVE CROHNS DISEASE AT [facility], RIGHT COLON,  DR.GERARD AGRAWAL AT [facility]  ?ENDOSCOPYN/A8/16/2019  ?GRADE A ESOPHAGITIS, SLIDING HIATAL HERNIA, EROSIVE GASTRITIS, Z LINE  IRREGULAR, DR. YANG BIRD AT [facility]  ?EXPLORATORY LAPAROTOMYN/A12/15/2019  ?Procedure: LAPAROTOMY EXPLORATORY AND WASHOUT, RESECTION OF ANASTOMOSIS WITH  END ILEOSTOMY; ?Surgeon: Dorcas Albrecht MD; ?Location: [facility]  OR;  ?Service: General  ?EYE SURGERY??  ?SMALL INTESTINE SURGERYN/A10/01/2001  ?ILEOCOLECTOMY      Plan of Care  Anticipated Discharge Date/Estimated Length of Stay: ELOS: 4 weeks  Anticipated Discharge Destination: Community discharge with assistance  Discharge Plan : Pt lives with her  in a one story home with 3 steps to  enter. Pt's  is retired and can provide 24 hour assist if needed.  Medical Necessity Expected Level Rationale: MIN/MOD with home health therapies  Intensity and Duration: an average of 3 hours/5 days per week  Medical Supervision and 24 Hour Rehab Nursing: x  Physical Therapy: x  PT Intensity/Duration: 90 minutes/day, 5 days/week for approximately 15 - 20  days  Occupational Therapy: x  OT Intensity/Duration: 90 minutes/day, 5 days/week for approximately 15 - 20  days  Social Work: x  Therapeutic Recreation: x  Updated (if changes indicated)    Anticipated Discharge Date/Estimated Length of Stay:   ELOS: 3 weeks    Based on the patient's medical and functional status, their prognosis and  expected level of functional improvement is: MIN/MOD with home health therapies      Interdisciplinary Problem/Goals/Status    All Rehab Problems:  Body Systems    [RN] Integumentary(Active)  Current Status(02/11/2020): Abdominal incision healed.ileostomy bag in place.  both changed per WOCN  Weekly Goal(02/17/2020): No S&S infection noted. Skin is intact.  Discharge Goal: No S&S infection noted Skin is intact.        Mobility    [OT] Toilet Transfers(Active)  Current Status(02/10/2020): MOD  Weekly Goal(02/17/2020): MIN  Discharge Goal: MIN/CGA    [OT] Tub/Shower Transfers(Active)  Current Status(02/10/2020): TBA  Weekly Goal(02/17/2020): MOD  Discharge Goal: MIN    [PT] Bed Mobility(Active)  Current Status(02/10/2020): CGA/Doreen  Weekly Goal(02/17/2020): SBA  Discharge Goal: SUP    [PT] Bed/Chair/Wheelchair(Active)  Current Status(02/10/2020): mod-maxA  Weekly Goal(02/10/2020): min  Discharge  Goal: CGA    [PT] Walk(Active)  Current Status(02/10/2020): 25', mod/max x 2, heavy rollator, aircasts BLE  Weekly Goal(02/18/2020): PT  Discharge Goal: 60, min, rwx        Psychosocial    [RN] Coping/Adjustment(Active)  Current Status(02/11/2020): Supportive family,  very helpful and assists  patient with care.  Weekly Goal(02/17/2020): Identify progress in functional status  Discharge Goal: Demonstrate healthy coping strategies        Safety    [RN] Potential for Injury(Active)  Current Status(02/11/2020): No unsafe behaviors. Weakness of lower extremities  Weekly Goal(02/17/2020): Use call light 100%  Discharge Goal: Pt/family aware of risk of fall and safety in the home setting        Self Care    [OT] Bathing(Active)  Current Status(02/10/2020): MIN UB, MAX LB  Weekly Goal(02/17/2020): MIN UB, MOD/MIN LB  Discharge Goal: MIN    [OT] Dressing (Lower)(Active)  Current Status(02/10/2020): dep  Weekly Goal(02/17/2020): Max  Discharge Goal: min/mod    [OT] Dressing (Upper)(Active)  Current Status(02/10/2020): Mod  Weekly Goal(02/17/2020): Min  Discharge Goal: SBA    [OT] Grooming(Active)  Current Status(02/10/2020): MIN  Weekly Goal(02/17/2020): SBA  Discharge Goal: SBA    [OT] Toileting(Active)  Current Status(02/10/2020): dep  Weekly Goal(02/17/2020): Max  Discharge Goal: Min/Mod    [OT] Eating(Active)  Current Status(02/10/2020): min /set up has built up utensils  Weekly Goal(02/17/2020): setup  Discharge Goal: set up        Sphincter Control    [RN] Bladder Management(Active)  Current Status(02/10/2020): pt has been continent, using bedpan.She is reluctant  to use BSC. does wear brief.  Weekly Goal(02/17/2020): Continent bladder 100%  Discharge Goal: Continent bladder 100%    [RN] Bowel Management(Active)  Current Status(02/10/2020): Has ileostomy since 12/20/19.  aware of care  for ileostomy.  Weekly Goal(02/17/2020): maintain education of ileostomy with pt and family  Discharge Goal: Independent  with ileostomy care        Comments: 1/28: chronic anemia, heme + stool, GI aware; anticipate assessing amb  today;    2/4: flat affect; ileostomy bag staying in place, may change schedule to Mon and  Thurs; NSG to reinforce use of bedside commode/toilet vs bedpan;    2/11: function and mood improved after IV Ig admin;    Signed by: Miguel Agnel Mcdowell RN    Physician CoSigned By: Adolfo Valle 02/11/2020 08:34:15

## 2020-02-11 NOTE — PROGRESS NOTES
LOS: 18 days   Patient Care Team:  Armando Vlaentine MD as PCP - General (Internal Medicine)  Lisa Arriaza MD as Consulting Physician (Obstetrics and Gynecology)    Chief Complaint: GBS    Subjective     History of Present Illness     Tolerating IVIG.  She feels that she is showing improvement in her strength in the upper extremities and lower extremities.  Better arm function with actives of daily living.  Ambulating further.  History taken from: patient chart    Objective     Vital Signs  Temp:  [97.8 °F (36.6 °C)-98.3 °F (36.8 °C)] 97.9 °F (36.6 °C)  Heart Rate:  [62-88] 62  Resp:  [18] 18  BP: ()/(55-69) 100/60    Physical Exam:  General: Awake, alert, NAD  HEENT: normocepahlic, atraumatic   Heart: RRR, no m/r/g  Lungs: CTAB, no wheezing, rales, or rhonchi  Abdomen: soft, non-tender, non-distended, colostomy , incision dressed  Strength: BUE: 4/5 with bilateral elbow flexion, elbow extension, wrist extension, and finger flexion, right greater than left weak hand intrinsics R 3-/5, L 3/5       BLE:  HF right 3/5, left 2-/5,  knee extension right 4/5, left  4/5, ankle dorsiflexion B 3/5  Extremities: Right antecubital fossa with no erythema but residual cord.  No significant tenderness.    Results Review:     I reviewed the patient's new clinical results.  Results from last 7 days   Lab Units 02/10/20  0614 02/07/20  0627 02/05/20  0648   WBC 10*3/mm3 5.65 5.64 7.22   HEMOGLOBIN g/dL 8.4* 9.0* 8.9*   HEMATOCRIT % 26.0* 27.6* 28.1*   PLATELETS 10*3/mm3 468* 485* 468*     Results from last 7 days   Lab Units 02/10/20  0614 02/07/20  0627 02/05/20  0648   SODIUM mmol/L 140 136 137   POTASSIUM mmol/L 3.8 3.7 4.2   CHLORIDE mmol/L 106 105 104   CO2 mmol/L 21.2* 18.1* 20.0*   BUN mg/dL 22 16 12   CREATININE mg/dL 0.71 0.69 0.82   CALCIUM mg/dL 8.8 8.5* 8.5*   GLUCOSE mg/dL 91 109* 112*       Medication Review:   Scheduled Meds:    acetaminophen 500 mg Oral Daily   Iron 18 mg Sublingual BID    gabapentin 300 mg Oral 4x Daily   immune globulin (human) 20 g Intravenous Once   And      immune globulin (human) 10 g Intravenous Once   loperamide 2 mg Oral TID AC   methylPREDNISolone sodium succinate 60 mg Intravenous Daily   MULTIVITAMIN ADULT 1 tablet Sublingual Daily   pantoprazole 40 mg Oral Q AM   sodium bicarbonate 1,300 mg Oral TID   sodium chloride 10 mL Intravenous Q12H   sodium chloride 10 mL Intravenous Q12H   Cyanocobalamin 2,500 mcg Sublingual Weekly   Vitamin D-3 5,000 Units Sublingual Daily     Continuous Infusions:   PRN Meds:.aluminum sulfate-calcium acetate  •  diphenhydrAMINE  •  famotidine  •  gabapentin **AND** gabapentin  •  hydrocortisone sodium succinate  •  miconazole  •  sodium chloride  •  sodium chloride    Assessment/Plan   75 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     GBS  -Completed her plasmapharesis and has gotten good return.   - Will begin PT/OT for ADLS, strength, mobility, txs, gait.   -January 27: On gabapentin 300mg QID. Will continue to monitor and will titrate up as needed.  -Jan 29 -  Feels strength is somewhat better.  Worked on gait with rolling walker yesterday 15 feet. Today utilized ankle weights to decrease ataxic movements with gait. Transfers mod max assist. Bed mobility CTG.  - Feb 6 - Patient complains of increased numbness in her knees.  She is noted to have increased weakness with her hip extensors and possibly in the left quadricep.  Her ambulation distance is less and takes more effort with a shorter stride.  On examination she also appears weaker in her hands and ankles.  Discussed having neurology see her to assess for possibly IVIG or another round of plasmapheresis.  -Feb 7 - increased weakness noted in BUE and BLE and more difficulty with mobility - Neurology starting patient on course of IVIG   -February 10-shows good response on IVIG-tolerating.  Notes improvement in her strength in the upper extremities and lower extremities as  well as performance with mobility and self-care.  -February 11-continues to show improvement on IVIG    Crohn's  -s/p recent colostomy- wound nurse to see and education for home management.   -If more than 1 liter output a day, needs Immodium-per Dr. Albrecht colorectal surgeon.     Hyponatremia  -On salt tabs and fluid restriction. Renal following  -January 27: Na 130. Continue salt tabs and fluid restriction per renal. Will continue to monitor  -January 30: Na 133. Continue sodium bicarbonate and fluid restriction per renal.  -Feb 3- Na improved to 138  -February 4-fluid restriction discontinued.  -February 5-sodium 137  -February 10-sodium 140.  Sodium chloride tablets were discontinued on February 8.  Continues on sodium bicarb 1300 mg 3 times a day.    Anemia  -January 27: Hgb/Hct 7.6/24.3- stable. No signs of active bleeding. Will order fecal occult and reticulocyte count. Will continue to monitor.  -Jan 28 - hemoccult positive  -Jan 29 - Stool heme +.  HGB stable at 7.5. She had hypotension and tachycardia yesterday 87/64 and 110) , better today (99/63 and 88).  Reviewed option of transfusion PRBCs. She wished to hold on transfusion unless absolutely necessary. Discussed if HGB < 7, would recommend definitely transfuse.  -January 30: Repeat CBC scheduled for tomorrow. Will continue to monitor.    -January 31: Hgb 7.2. Will transfuse 1 unit of PRBCs today. Ordered anemia studies. Spoke with Dr. Albrecht and if iron supplementation is required she recommends IV iron for better absorption.  -February 1: Hemoglobin improved 8.8.  IV iron infusion ordered by nephrology  -Feb 3 - HGB improved 9.5. Not tolerate IV iron infusion due to burning in vein, does not wish to have placed a more proximal IV. She had tow infusions. She will get EZ Melt Iron from outside to take by mouth; on discussion with colorectal surgery last week she should be able to absorb PO iron.  February 5-hemoglobin 8.5  February 10-hemoglobin  8.4    DVT prophylaxis  -SCDS for now.   -January 27: Heparin has been on hold due to HGB trending down. Following results of fecal occult and reticulocyte count will consider restarting Heparin for DVT prophylaxis.   -January 28 - hemoccult positive.     Vitamin D deficiency-vitamin D 22.4  on January 29.    Feb 1 - Patient does not wish to take vitamin D p.o. through the hospital supply.  Wishes to have her vitamin D brought in from home.    Vitamin B12 replacement-sublingual from home    RUE antecubital fossa phlebitis/cellulitis-February 3-no sign of infection. K-PAD.   February 4-Right antecubital fossa phlebitis with cord palpable/tender with surrounding erythema consistent with cellulitis. Allergy to Keflex - throat swelled. Will start Doxycycline 100 mg bid x 7 days, continue K-pad.   February 5-She continues to have pain and redness and swelling of the right antecubital fossa and distal upper arm.  Reviewed continue with doxycycline which was started yesterday but if there is no show improvement will look to adjust antibiotic tomorrow.  White blood cell count is 7.22 with normal differential..  Feb 7 - area improved today on doxycycline.  February 10-further improvement.    TEAM CONF - JAN 28 - FLAT AFFECT. SUPINE SIT MIN ASSIST. TRANSFERS MAX 2. NONAMBULATORY. UBB MIN. LBB MAX. UBD MOD.  LBD DEP. ABD WOUND CARE. STOOL HEMOCCULT POSITIVE.  ELOS - 5 WEEKS.     TEAM CONF - FEB 4 - BED SBA. TRANSFERS MIN-MOD. GAIT 30 FEET MIN 2 FIDEL WALKER. UBD MOD. LBD DEP. ON 1200 CC FLUID RESTRICTION. EATING OKAY.  ABD WOUND CLOSING. OSTOMY DEVICE STAYING ON.  CONTINENT. NEEDS TO GET UP TO BSC.   ELOS- 4 WEEKS.     February 10-she had shown a decline in her mobility and self-care with flare of Guillain-Barré syndrome but has shown response on IVIG.  Improving.  Most recently in physical therapy and Occupational Therapy-toilet transfers moderate assist, shower transfer to be assessed, bathing minimum assist upper body and  maximum assist lower body, dressing moderate assist upper body independent lower body.  Grooming minimum assist.  Toileting dependent.  Eating with minimal assist to set up with built up utensils.  Bed mobility contact-guard.  Ambulated 25 feet moderate assist of 2 with Aircast bilateral ankles.    TEAM CONF - FEB 11 - She feels IVIG has been helpful.  She notes improvement in the strength in her hands and in her legs.  Easier transfers.  Walk better.  She had had a decline in her ability to do to box and blocks but that improved yesterday.  Tolerating IVIG.   describes her performance as 180 degree change from Friday.  In physical therapy and Occupational Therapy-toilet transfers moderate assist, shower transfer to be assessed, bathing minimum assist upper body and maximum assist lower body, dressing moderate assist upper body independent lower body.  Grooming minimum assist.  Toileting dependent.  Eating with minimal assist to set up with built up utensils.  Bed mobility contact-guard.  Ambulated 25 feet moderate assist of 2 with Aircast bilateral ankles.   Continent bladder. Abd wound improving.   ELOS - 3 WEEKS    Adolfo Valle MD  02/11/20  7:37 AM

## 2020-02-11 NOTE — THERAPY TREATMENT NOTE
Inpatient Rehabilitation - Occupational Therapy Treatment Note    [facility]     Patient Name: She López  : 1945  MRN: [id number]    Today's Date: 2020  Onset of Illness/Injury or Date of Surgery: 20              Admit Date: 2020      Visit Dx:    ICD-10-CM ICD-9-CM   1. General weakness R53.1 780.79       Patient Active Problem List   Diagnosis   • Crohn's disease of small intestine with other complication (CMS/HCC)   • Type 2 diabetes mellitus without complication (CMS/HCC)   • RSD upper limb   • Neuropathic pain of hand   • Hyperlipidemia   • Cervical myelopathy (CMS/HCC)   • Central pain syndrome   • Carpal tunnel syndrome   • Vitamin B 12 deficiency   • Guillain Barré syndrome (CMS/HCC)         Therapy Treatment    IRF Treatment Summary     Row Name 20 1510 20 1100          Evaluation/Treatment Time and Intent    Subjective Information  no complaints  -AF  no complaints  -LH,NM,LH2     Existing Precautions/Restrictions  fall  -AF  fall  -LH,NM,LH2     Document Type  therapy note (daily note)  -AF  therapy note (daily note)  -LH,NM,LH2     Mode of Treatment  occupational therapy  -AF  physical therapy  -LH,NM,LH2     Patient/Family Observations  sitting up in w/c in room in AM, supine in bed in PM   -AF  supine in bed; agreeable to PT; reports continuing to feel better   -LH,NM,LH2     Recorded by [AF] Reina Perera, OTR [LH,NM,LH2] Chelsie Cline, PT (r) Jaxon Atkinson PT Student (t) Chelsie Cline, PT (c)     Row Name 20 1510 20 1100          Cognition/Psychosocial- PT/OT    Affect/Mental Status (Cognitive)  WFL  -AF  WFL  -LH,NM,LH2     Orientation Status (Cognition)  oriented x 4  -AF  oriented x 4  -LH,NM,LH2     Follows Commands (Cognition)  WFL  -AF  WFL  -LH,NM,LH2     Personal Safety Interventions  fall prevention program maintained;gait belt;nonskid shoes/slippers when out of bed  -AF  fall prevention program maintained;gait belt;nonskid  shoes/slippers when out of bed;supervised activity  -LH,NM,LH2     Safety Deficit (Cognitive)  insight into deficits/self awareness  -AF  --     Recorded by [AF] Reina Perera, OTR [LH,NM,LH2] Chelsie Cline, PT (r) Jaxon Atkinson, PT Student (t) Chelsie Cline, PT (c)     Row Name 02/11/20 1510 02/11/20 1100          Bed Mobility Assessment/Treatment    Rolling Left [place] (Bed Mobility)  --  supervision  -LH,NM,LH2     Rolling Right [place] (Bed Mobility)  --  supervision  -LH,NM,LH2     Supine-Sit [place] (Bed Mobility)  supervision  -AF  supervision;verbal cues  -LH,NM,LH2     Sit-Supine [place] (Bed Mobility)  --  supervision;verbal cues  -LH,NM,LH2     Bed Mobility, Safety Issues  --  decreased use of arms for pushing/pulling;decreased use of legs for bridging/pushing;impaired trunk control for bed mobility  -LH,NM,LH2     Assistive Device (Bed Mobility)  --  bed rails  -LH,NM,LH2     Comment (Bed Mobility)  --  performed sup<>sit c bed flat this date,  supervision   -LH,NM,LH2     Recorded by [AF] Reina Perera, OTR [LH,NM,LH2] Chelsie Cline, PT (r) Jaxon Atkinson, PT Student (t) Chelsie Cline, PT (c)     Row Name 02/11/20 1510             Transfer Assessment/Treatment    Comment (Transfers)  sit pivot EOM <> w/c MIN/MOD A, sit to stands with LBD and toielting tasks MOD A   -AF      Recorded by [AF] Reina Perera, NILAMR      Row Name 02/11/20 1100             Bed-Chair Transfer    Bed-Chair [place] (Transfers)  moderate assist (50% patient effort);verbal cues;nonverbal cues (demo/gesture)  -LH,NM,LH2      Assistive Device (Bed-Chair Transfers)  wheelchair  -LH,NM,LH2      Recorded by [LH,NM,LH2] Chelsie Cline, PT (r) Jaxon Atkinson, PT Student (t) Chelsie Cline, PT (c)      Row Name 02/11/20 1100             Chair-Bed Transfer    Chair-Bed [place] (Transfers)  moderate assist (50% patient effort);verbal cues  -LH,NM,LH2      Assistive Device (Chair-Bed Transfers)   wheelchair  -LH,NM,LH2      Recorded by [LH,NM,LH2] Chelsie lCine, PT (r) Jaxon Atkinson, PT Student (t) Chelsie Cline, PT (c)      Row Name 02/11/20 1100             Sit-Stand Transfer    Sit-Stand [place] (Transfers)  moderate assist (50% patient effort);minimum assist (75% patient effort);2 person assist;verbal cues;nonverbal cues (demo/gesture) max x 2 once due to poor foot positioning prior to stand   -LH,NM,LH2      Assistive Device (Sit-Stand Transfers)  wheelchair;walker, 4-wheeled Drive heavy duty rollator   -LH,NM,LH2      Recorded by [LH,NM,LH2] Chelsie Cline, PT (r) Jaxon Atkinson, PT Student (t) Chelsie Cline, PT (c)      Row Name 02/11/20 1100             Stand-Sit Transfer    Stand-Sit [place] (Transfers)  moderate assist (50% patient effort);minimum assist (75% patient effort);2 person assist;verbal cues;nonverbal cues (demo/gesture)  -LH,NM,LH2      Assistive Device (Stand-Sit Transfers)  wheelchair;walker, 4-wheeled Drive heavy duty rollator   -LH,NM,LH2      Recorded by [LH,NM,LH2] Chelsie Cline, PT (r) Jaxon Atkinson, PT Student (t) Chelsie Cline, PT (c)      Row Name 02/11/20 1510             Toilet Transfer    Type (Toilet Transfer)  stand pivot/stand step  -AF      Portal Level (Toilet Transfer)  moderate assist (50% patient effort)  -AF      Assistive Device (Toilet Transfer)  commode;grab bars/safety frame;wheelchair  -AF      Recorded by [AF] Reina Perera OTR      Row Name 02/11/20 1100             Gait/Stairs Assessment/Training    [place] Level (Gait)  moderate assist (50% patient effort);minimum assist (75% patient effort);2 person assist;verbal cues;nonverbal cues (demo/gesture) wc following   -LH,NM,LH2      Assistive Device (Gait)  walker, 4-wheeled Drive heavy duty rollator; aircasts B ankles   -LH,NM,LH2      Distance in Feet (Gait)  30' x 2 c wx; 8' in // bars  30'x2 in PM c heavy duty rollator   -LH,NM,LH2      Pattern (Gait)  step-to;step-through  improved continuity of steps in PM   -LH,NM,LH2      Deviations/Abnormal Patterns (Gait)  bilateral deviations;ataxic;base of support, wide;gait speed decreased;stride length decreased;jt decreased  -LH,NM,LH2      Bilateral Gait Deviations  forward flexed posture;foot drop/toe drag;heel strike decreased;knee hyperextension;knee buckling, left side;weight shift ability decreased  -LH,NM,LH2      Left Sided Gait Deviations  knee buckling, left side  -,NM,LH2      Comment (Gait/Stairs)  decreased step length on the L, decreased anterior weight shift throughout the cycle due to balance deficits. knees did not collapse this session.  ankles did not roll in // bars w/out aircasts   -,NM,LH2      Recorded by [,NM,LH2] Chelsie Cline, PT (r) Jaxon Atkinson PT Student (t) Chelsie Cline, PT (c)      Row Name 02/11/20 1510             Bathing Assessment/Treatment    Bathing [place] Level  upper body;contact guard assist;lower body;moderate assist (50% patient effort)  -AF      Bathing Position  sink side;supported sitting;supported standing  -AF      Bathing Setup Assistance  obtain supplies  -AF      Comment (Bathing)  assist with bottom/ayah area and feet   -AF      Recorded by [AF] Reina Perera OTR      Row Name 02/11/20 1510             Upper Body Dressing Assessment/Treatment    Upper Body Dressing Task  upper body dressing skills;minimum assist (75% or more patient effort);pull over garment  -AF      Upper Body Dressing Position  supported sitting  -AF      Set-up Assistance (Upper Body Dressing)  obtain clothing  -AF      Recorded by [AF] Reina Perera OTR      Row Name 02/11/20 1510             Lower Body Dressing Assessment/Treatment    Lower Body Dressing [place] Level  doff;don;shoes/slippers;pants/bottoms;socks;dependent (less than 25% patient effort)  -AF      Lower Body Dressing Position  supported standing;supported sitting  -AF      Comment (Lower Body Dressing)  worked on  crossing her legs to doff socks/shoes and thread pants   -AF      Recorded by [AF] Reina Perera OTR      Row Name 02/11/20 1510             Grooming Assessment/Treatment    Grooming [place] Level  grooming skills;verbal cues;set up;oral care regimen;hair care, combing/brushing;deodorant application  -AF      Assistive Device (Grooming)  built-up handle items  -AF      Grooming Position  sink side;supported sitting  -AF      Recorded by [AF] Reina Perera OTR      Row Name 02/11/20 1510             Toileting Assessment/Treatment    Toileting [place] Level  toileting skills;maximum assist (25% patient effort)  -AF      Assistive Device Use (Toileting)  grab bar/safety frame  -AF      Toileting Position  unsupported sitting;supported standing  -AF      Comment (Toileting)  pt able to complete hygiene, assist with clothing management   -AF      Recorded by [AF] Reina Perera OTR      Row Name 02/11/20 1510             Fine Motor Testing & Training    Comment, Fine Motor Coordination  FMC task with peg/ring task alternating hands with MOD difficulty.   -AF      Recorded by [AF] Reina Perera OTR      Row Name 02/11/20 1510 02/11/20 1100          Pain Scale: Numbers Pre/Post-Treatment    Pain Scale: Numbers, Pretreatment  0/10 - no pain  -AF  0/10 - no pain  -LH,NM,LH2     Pain Scale: Numbers, Post-Treatment  0/10 - no pain  -AF  0/10 - no pain  -LH,NM,LH2     Recorded by [AF] Reina Perera OTR [LH,NM,LH2] Chelsie Cline, PT (r) Jaxon Atkinson, YUSUF Student (t) Chelsie Cline, PT (c)     Row Name 02/11/20 1510             Static Sitting Balance    Level of [place] (Unsupported Sitting, Static Balance)  standby assist  -AF      Sitting Position (Unsupported Sitting, Static Balance)  sitting edge of mat  -AF      Time Able to Maintain Position (Unsupported Sitting, Static Balance)  4 to 5 minutes  -AF      Comment (Unsupported Sitting, Static Balance)  crossing midline with gross  grasp/release alternating hands   -AF      Recorded by [AF] Reina Perera OTR      Row Name 02/11/20 1100             Standing Balance Activity    Comment (Standing, Balance Training)  // bars c BUE support, CGA-Doreen x 2, performed AP, bilateral and circular weight shifting c pt returning to midline c CGA and occasional vc. [place] improved self-correction this session  -LH,NM,LH2      Recorded by [LH,NM,LH2] Chelsie Cline, PT (r) Jaxon Atkinson, PT Student (t) Chelsie Cline, PT (c)      Row Name 02/11/20 1100             Therapeutic Exercise    Therapeutic Exercise  aerobic exercise  -LH,NM,LH2      Recorded by [LH,NM,LH2] Chelsie Cline, PT (r) Jaxon Atkinson, PT Student (t) Chelsie Cline, PT (c)      Row Name 02/11/20 1510             Upper Extremity Seated Therapeutic Exercise    Performed, Seated Upper Extremity (Therapeutic Exercise)  scapular stabilization;shoulder flexion/extension;forearm supination/pronation;wrist flexion/extension;elbow flexion/extension  -AF      Device, Seated Upper Extremity (Therapeutic Exercise)  -- hand weights #1 RUE, #2 LUE  -AF      Exercise Type, Seated Upper Extremity (Therapeutic Exercise)  AROM (active range of motion);resistive exercise  -AF      Expected Outcomes, Seated Upper Extremity (Therapeutic Exercise)  improve functional tolerance, self-care activity;improve motor control;improve performance, BADLs;improve performance, transfer skills  -AF      Sets/Reps Detail, Seated Upper Extremity (Therapeutic Exercise)  2/20  -AF      Transfers Skills, Training to Functional Activity, Seated Upper Extremity (Therapeutic Exercise)  beginning to transfer skills to functional activity  -AF      Recorded by [AF] Reina Perera OTR      Row Name 02/11/20 1100             Aerobic Exercise Activity    Exercise Performed (Aerobic, Therapeutic Exercise)  recumbent elliptical   -LH,NM,LH2      Expected Outcome (Aerobic, Therapeutic Exercise)  improve motor control   "-LH,NM,LH2      Restrictions (Aerobic, Therapeutic Exercise)  ace wraps for B feet   -LH,NM,LH2      Time (Aerobic, Therapeutic Exercise)  7  -LH,NM,LH2      Comment (Aerobic, Therapeutic Exercise)  level 2-4 using BLE and BUE; vc for BLE ext ROM, control and speed of movement  -LH,NM,LH2      Recorded by [LH,NM,LH2] Chelsie Cline, PT (r) Jaxon Atkinson, PT Student (t) Chelsie Cline, PT (c)      Row Name 02/11/20 1100             Lower Extremity Supine Therapeutic Exercise    Performed, Supine Lower Extremity (Therapeutic Exercise)  hip flexion/extension;bridging (bilateral w/bolster);SAQ (short arc quad) over bolster;SLR (straight leg raise);hip abduction/adduction  -LH,NM,LH2      Exercise Type, Supine Lower Extremity (Therapeutic Exercise)  AROM (active range of motion)  -LH,NM,LH2      Sets/Reps Detail, Supine Lower Extremity (Therapeutic Exercise)  1/15  -LH,NM,LH2      Comment, Supine Lower Extremity (Therapeutic Exercise)  SLR approx 3\" off bed   -LH,NM,LH2      Recorded by [LH,NM,LH2] Chelsie Cline, PT (r) Jaxon Atkinson, PT Student (t) Chelsie Cline, PT (c)      Row Name 02/11/20 1510 02/11/20 1100          Positioning and Restraints    Pre-Treatment Position  sitting in chair/recliner  -AF  in bed  -LH,NM,LH2     Post Treatment Position  wheelchair  -AF  wheelchair  -LH,NM,LH2     In Wheelchair  sitting;with PT;exit alarm on;with family/caregiver with  present in AM and PM   -AF  exit alarm on;with family/caregiver with   -LH,NM,LH2     Recorded by [AF] Reina Perera, OTR [LH,NM,LH2] Chelsie Cline, PT (r) Jaxon Atkinson, PT Student (t) Chelsie Cline, PT (c)       User Key  (r) = Recorded By, (t) = Taken By, (c) = Cosigned By    Initials Name Effective Dates     Chelsie Cline, PT 04/03/18 -     AF Reina Perera, OTR 04/03/18 -     NM Jaxon Atkinson, PT Student 01/03/20 -           Wound 12/09/19 1510 abdomen Incision (Active)   Dressing Appearance dry;intact 2/11/2020  7:20 " AM   Closure None 2/11/2020  7:20 AM   Base other (see comments) 2/11/2020  7:20 AM   Periwound intact 2/11/2020  7:20 AM   Wound Length (cm) 0.3 cm 2/10/2020  3:19 PM   Wound Width (cm) 0.3 cm 2/10/2020  3:19 PM   Wound Depth (cm) 0.8 cm 2/10/2020  3:19 PM   Drainage Characteristics/Odor tan 2/10/2020  3:19 PM   Drainage Amount scant 2/10/2020  3:19 PM   Care, Wound cleansed with;sterile normal saline 2/10/2020  3:19 PM   Dressing Care, Wound hydrogel 2/10/2020  3:19 PM   Periwound Care, Wound dry periwound area maintained 2/11/2020  7:20 AM         OT Recommendation and Plan                 OT IRF GOALS     Row Name 02/03/20 1540             Transfer Goal 1 (OT-IRF)    Activity/Assistive Device (Transfer Goal 1, OT-IRF)  toilet;shower chair;walk-in shower  -AF      [place] Level (Transfer Goal 1, OT-IRF)  minimum assist (75% or more patient effort);moderate assist (50-74% patient effort);verbal cues required  -AF      Time Frame (Transfer Goal 1, OT-IRF)  short term goal (STG)  -AF      Progress/Outcomes (Transfer Goal 1, OT-IRF)  goal ongoing  -AF         Transfer Goal 2 (OT-IRF)    Activity/Assistive Device (Transfer Goal 2, OT-IRF)  toilet;shower chair;walk-in shower  -AF      [place] Level (Transfer Goal 2, OT-IRF)  verbal cues required;contact guard assist  -AF      Time Frame (Transfer Goal 2, OT-IRF)  long term goal (LTG)  -AF      Progress/Outcomes (Transfer Goal 2, OT-IRF)  goal ongoing  -AF         Bathing Goal 1 (OT-IRF)    Activity/Device (Bathing Goal 1, OT-IRF)  bathing skills, all;grab bar/tub rail;hand-held shower spray hose;shower chair  -AF      [place] Level (Bathing Goal 1, OT-IRF)  verbal cues required;moderate assist (50-74% patient effort)  -AF      Time Frame (Bathing Goal 1, OT-IRF)  short term goal (STG)  -AF      Progress/Outcomes (Bathing Goal 1, OT-IRF)  goal ongoing  -AF         Bathing Goal 2 (OT-IRF)    Activity/Device (Bathing Goal 2, OT-IRF)  bathing skills,  all;grab bar/tub rail;hand-held shower spray hose;shower chair  -AF      [place] Level (Bathing Goal 2, OT-IRF)  verbal cues required;contact guard assist  -AF      Time Frame (Bathing Goal 2, OT-IRF)  long term goal (LTG)  -AF      Progress/Outcomes (Bathing Goal 2, OT-IRF)  goal ongoing  -AF         UB Dressing Goal 1 (OT-IRF)    Activity/Device (UB Dressing Goal 1, OT-IRF)  upper body dressing  -AF      [place] (UB Dress Goal 1, OT-IRF)  verbal cues required;set-up required  -AF      Time Frame (UB Dressing Goal 1, OT-IRF)  long term goal (LTG)  -AF      Progress/Outcomes (UB Dressing Goal 1, OT-IRF)  goal ongoing  -AF         LB Dressing Goal 1 (OT-IRF)    Activity/Device (LB Dressing Goal 1, OT-IRF)  lower body dressing  -AF      [place] (LB Dressing Goal 1, OT-IRF)  verbal cues required;moderate assist (50-74% patient effort)  -AF      Time Frame (LB Dressing Goal 1, OT-IRF)  short term goal (STG)  -AF      Progress/Outcomes (LB Dressing Goal 1, OT-IRF)  goal ongoing  -AF         LB Dressing Goal 2 (OT-IRF)    Activity/Device (LB Dressing Goal 2, OT-IRF)  lower body dressing  -AF      [place] (LB Dressing Goal 2, OT-IRF)  contact guard assist;verbal cues required  -AF      Time Frame (LB Dressing Goal 2, OT-IRF)  long term goal (LTG)  -AF      Progress/Outcomes (LB Dressing Goal 2, OT-IRF)  goal ongoing  -AF         Grooming Goal 1 (OT-IRF)    Activity/Device (Grooming Goal 1, OT-IRF)  grooming skills, all  -AF      [place] (Grooming Goal 1, OT-IRF)  set-up required;verbal cues required  -AF      Time Frame (Grooming Goal 1, OT-IRF)  short term goal (STG);long term goal (LTG)  -AF      Progress/Outcomes (Grooming Goal 1, OT-IRF)  goal ongoing  -AF         Toileting Goal 1 (OT-IRF)    Activity/Device (Toileting Goal 1, OT-IRF)  toileting skills, all;grab bar/safety frame;raised toilet seat  -AF      [place] Level (Toileting Goal 1, OT-IRF)  maximum assist (25-49% patient  effort);verbal cues required  -AF      Time Frame (Toileting Goal 1, OT-IRF)  short term goal (STG)  -AF      Progress/Outcomes (Toileting Goal 1, OT-IRF)  goal ongoing  -AF         Toileting Goal 2 (OT-IRF)    Activity/Device (Toileting Goal 2, OT-IRF)  toileting skills, all;grab bar/safety frame;raised toilet seat  -AF      [place] Level (Toileting Goal 2, OT-IRF)  minimum assist (75% or more patient effort)  -AF      Time Frame (Toileting Goal 2, OT-IRF)  long term goal (LTG)  -AF      Progress/Outcomes (Toileting Goal 2, OT-IRF)  goal ongoing  -AF         Balance Goal 1 (OT)    Activity/Assistive Device (Balance Goal 1, OT)  standing, static  -AF      [place] Level/Cues Needed (Balance Goal 1, OT)  moderate assist (50-74% patient effort);verbal cues required  -AF      Time Frame (Balance Goal 1, OT)  short term goal (STG)  -AF      Progress/Outcomes (Balance Goal 1, OT)  goal ongoing  -AF         Balance Goal 2 (OT)    Activity/Assistive Device (Balance Goal 2, OT)  standing, static  -AF      [place] Level (Balance Goal 2, OT)  minimum assist (75% or more patient effort);contact guard assist  -AF      Time Frame (Balance Goal 2, OT)  long term goal (LTG)  -AF      Progress/Outcome (Balance Goal 2, OT)  goal ongoing  -AF         Caregiver Training Goal 1 (OT-IRF)    Caregiver Training Goal 1 (OT-IRF)  Pt and  will demo safe techniques with ADLs, trasnfer, HEP and AD prior to d/c home   -AF      Time Frame (Caregiver Training Goal 1, OT-IRF)  long term goal (LTG)  -AF      Progress/Outcomes (Caregiver Training Goal 1, OT-IRF)  goal ongoing  -AF        User Key  (r) = Recorded By, (t) = Taken By, (c) = Cosigned By    Initials Name Provider Type    Reina Noe OTR Occupational Therapist                     Time Calculation:     Time Calculation- OT     Row Name 02/11/20 1517 02/11/20 1516          Time Calculation- OT    OT Start Time  1330  -AF  0930  -AF     OT Stop Time  1400  -AF   1030  -AF     OT Time Calculation (min)  30 min  -AF  60 min  -AF       User Key  (r) = Recorded By, (t) = Taken By, (c) = Cosigned By    Initials Name Provider Type    Reina Noe OTR Occupational Therapist          Therapy Charges for Today     Code Description Service Date Service Provider Modifiers Qty    72925826828 HC OT SELF CARE/MGMT/TRAIN EA 15 MIN 2/10/2020 Reina Perera OTR GO 3    74751902333 HC OT THER PROC EA 15 MIN 2/10/2020 Reina Perera OTR GO 1    07318486678 HC OT NEUROMUSC RE EDUCATION EA 15 MIN 2/10/2020 Reina Perera OTMENDEZ GO 2    33255419170 HC OT SELF CARE/MGMT/TRAIN EA 15 MIN 2/11/2020 Reina Perera OTR GO 3    14397191377 HC OT NEUROMUSC RE EDUCATION EA 15 MIN 2/11/2020 Reina Perera OTMENDEZ GO 1    42121584453 HC OT THER PROC EA 15 MIN 2/11/2020 Reina Perera OTR GO 2                   ALPESH Dempsey  2/11/2020

## 2020-02-11 NOTE — PROGRESS NOTES
Occupational Therapy: Branch    Physical Therapy: Individual: 90 minutes.    Speech Language Pathology:  Branch    Signed by: Jaxon Atkinson PT Student     - CoSigned By: Chelsie Cline PT 2/11/2020 3:51:47 PM

## 2020-02-11 NOTE — PROGRESS NOTES
Inpatient Rehabilitation Plan of Care Note    Plan of Care  Care Plan Reviewed - Updates as Follows    Psychosocial    Performed Intervention(s)  Verbalize needs and concerns  calm enviroment      Safety    Performed Intervention(s)  Bed alarm, wc alarm  Items within reach  Safety rounds      Sphincter Control    Performed Intervention(s)  Monitor intake and output  Encourage appropriate diet  wound ostomy nurse to continue to see pt for ostomy care.      Body Systems    Performed Intervention(s)  Daily skin inspection    Signed by: Andree Casey RN

## 2020-02-11 NOTE — PLAN OF CARE
Problem: Patient Care Overview  Goal: Plan of Care Review  Outcome: Ongoing (interventions implemented as appropriate)  Flowsheets (Taken 2/11/2020 0306)  Outcome Summary: Patient pleasant and cooperative, flat at times but in better mood now occasionally.Tolerating IVIG doses, last dose would be today tuesday. Tingling to hands and feet and occasionally around lips. Uses bedpan and continent. Ostomy bag intact and working well. Midline to L arm, flushing very well but no blood return.  Progress, Functional Goals: demonstrating adequate progress  Plan of Care Reviewed With: patient  IRF Plan of Care Review: progress ongoing, continue     Problem: Pain, Chronic (Adult)  Goal: Acceptable Pain/Comfort Level and Functional Ability  Description  Patient will demonstrate the desired outcomes by discharge/transition of care.  Outcome: Ongoing (interventions implemented as appropriate)  Flowsheets (Taken 2/11/2020 0306)  Acceptable Pain/Comfort Level and Functional Ability: making progress toward outcome     Problem: Functional Mobility Impairment (IRF) (Adult)  Goal: Optimal/Safe Level of [place] with Mobility  Outcome: Ongoing (interventions implemented as appropriate)  Flowsheets (Taken 2/10/2020 0257)  Optimal/Safe Level of [place] with Mobility: demonstrating adequate progress     Problem: Fall Risk (Adult)  Goal: Absence of Fall  Description  Patient will demonstrate the desired outcomes by discharge/transition of care.  Outcome: Ongoing (interventions implemented as appropriate)  Flowsheets (Taken 2/11/2020 0306)  Absence of Fall: making progress toward outcome

## 2020-02-11 NOTE — PROGRESS NOTES
Inpatient Rehabilitation Plan of Care Note    Plan of Care  Care Plan Reviewed - No updates at this time.    Body Systems    [RN] Integumentary(Active)  Current Status(02/11/2020): Abdominal incision healed.ileostomy bag in place.  both changed per WOCN  Weekly Goal(02/17/2020): No S&S infection noted. Skin is intact.  Discharge Goal: No S&S infection noted Skin is intact.    Performed Intervention(s)  Daily skin inspection      Psychosocial    [RN] Coping/Adjustment(Active)  Current Status(02/11/2020): Supportive family,  very helpful and assists  patient with care.  Weekly Goal(02/17/2020): Identify progress in functional status  Discharge Goal: Demonstrate healthy coping strategies    Performed Intervention(s)  Verbalize needs and concerns  calm enviroment      Safety    [RN] Potential for Injury(Active)  Current Status(02/11/2020): No unsafe behaviors. Weakness of lower extremities  Weekly Goal(02/17/2020): Use call light 100%  Discharge Goal: Pt/family aware of risk of fall and safety in the home setting    Performed Intervention(s)  Bed alarm, wc alarm  Items within reach  Safety rounds      Sphincter Control    [RN] Bladder Management(Active)  Current Status(02/10/2020): pt has been continent, using bedpan.She is reluctant  to use BSC. does wear brief.  Weekly Goal(02/17/2020): Continent bladder 100%  Discharge Goal: Continent bladder 100%    [RN] Bowel Management(Active)  Current Status(02/10/2020): Has ileostomy since 12/20/19.  aware of care  for ileostomy.  Weekly Goal(02/17/2020): maintain education of ileostomy with pt and family  Discharge Goal: Independent with ileostomy care    Performed Intervention(s)  Monitor intake and output  Encourage appropriate diet  wound ostomy nurse to continue to see pt for ostomy care.    Signed by: Jenn Anna RN

## 2020-02-12 LAB
BASOPHILS # BLD AUTO: 0.04 10*3/MM3 (ref 0–0.2)
BASOPHILS NFR BLD AUTO: 0.6 % (ref 0–1.5)
DEPRECATED RDW RBC AUTO: 46 FL (ref 37–54)
EOSINOPHIL # BLD AUTO: 0.01 10*3/MM3 (ref 0–0.4)
EOSINOPHIL NFR BLD AUTO: 0.2 % (ref 0.3–6.2)
ERYTHROCYTE [DISTWIDTH] IN BLOOD BY AUTOMATED COUNT: 14.5 % (ref 12.3–15.4)
HCT VFR BLD AUTO: 22.7 % (ref 34–46.6)
HGB BLD-MCNC: 7.3 G/DL (ref 12–15.9)
IMM GRANULOCYTES # BLD AUTO: 0.04 10*3/MM3 (ref 0–0.05)
IMM GRANULOCYTES NFR BLD AUTO: 0.6 % (ref 0–0.5)
LYMPHOCYTES # BLD AUTO: 1.13 10*3/MM3 (ref 0.7–3.1)
LYMPHOCYTES NFR BLD AUTO: 17.3 % (ref 19.6–45.3)
MCH RBC QN AUTO: 28.7 PG (ref 26.6–33)
MCHC RBC AUTO-ENTMCNC: 32.2 G/DL (ref 31.5–35.7)
MCV RBC AUTO: 89.4 FL (ref 79–97)
MONOCYTES # BLD AUTO: 0.56 10*3/MM3 (ref 0.1–0.9)
MONOCYTES NFR BLD AUTO: 8.6 % (ref 5–12)
NEUTROPHILS # BLD AUTO: 4.75 10*3/MM3 (ref 1.7–7)
NEUTROPHILS NFR BLD AUTO: 72.7 % (ref 42.7–76)
NRBC BLD AUTO-RTO: 0 /100 WBC (ref 0–0.2)
PLATELET # BLD AUTO: 440 10*3/MM3 (ref 140–450)
PMV BLD AUTO: 8.7 FL (ref 6–12)
RBC # BLD AUTO: 2.54 10*6/MM3 (ref 3.77–5.28)
WBC NRBC COR # BLD: 6.53 10*3/MM3 (ref 3.4–10.8)

## 2020-02-12 PROCEDURE — 97110 THERAPEUTIC EXERCISES: CPT

## 2020-02-12 PROCEDURE — 97116 GAIT TRAINING THERAPY: CPT

## 2020-02-12 PROCEDURE — 85025 COMPLETE CBC W/AUTO DIFF WBC: CPT | Performed by: PHYSICAL MEDICINE & REHABILITATION

## 2020-02-12 PROCEDURE — 63710000001 ONDANSETRON ODT 4 MG TABLET DISPERSIBLE: Performed by: PHYSICAL MEDICINE & REHABILITATION

## 2020-02-12 PROCEDURE — 97530 THERAPEUTIC ACTIVITIES: CPT

## 2020-02-12 PROCEDURE — 97535 SELF CARE MNGMENT TRAINING: CPT

## 2020-02-12 PROCEDURE — 97112 NEUROMUSCULAR REEDUCATION: CPT

## 2020-02-12 RX ORDER — ONDANSETRON 4 MG/1
4 TABLET, ORALLY DISINTEGRATING ORAL EVERY 6 HOURS PRN
Status: DISCONTINUED | OUTPATIENT
Start: 2020-02-12 | End: 2020-02-26

## 2020-02-12 RX ADMIN — SODIUM BICARBONATE 1300 MG: 650 TABLET ORAL at 17:08

## 2020-02-12 RX ADMIN — SODIUM BICARBONATE 1300 MG: 650 TABLET ORAL at 22:01

## 2020-02-12 RX ADMIN — GABAPENTIN 300 MG: 300 CAPSULE ORAL at 08:18

## 2020-02-12 RX ADMIN — SODIUM CHLORIDE, PRESERVATIVE FREE 10 ML: 5 INJECTION INTRAVENOUS at 08:19

## 2020-02-12 RX ADMIN — Medication 18 MG: at 22:01

## 2020-02-12 RX ADMIN — LOPERAMIDE HYDROCHLORIDE 2 MG: 2 CAPSULE ORAL at 06:24

## 2020-02-12 RX ADMIN — SODIUM CHLORIDE, PRESERVATIVE FREE 10 ML: 5 INJECTION INTRAVENOUS at 22:03

## 2020-02-12 RX ADMIN — SODIUM BICARBONATE 1300 MG: 650 TABLET ORAL at 08:17

## 2020-02-12 RX ADMIN — GABAPENTIN 300 MG: 300 CAPSULE ORAL at 03:05

## 2020-02-12 RX ADMIN — LOPERAMIDE HYDROCHLORIDE 2 MG: 2 CAPSULE ORAL at 12:00

## 2020-02-12 RX ADMIN — Medication 1 TABLET: at 08:20

## 2020-02-12 RX ADMIN — PANTOPRAZOLE SODIUM 40 MG: 40 TABLET, DELAYED RELEASE ORAL at 06:24

## 2020-02-12 RX ADMIN — Medication 18 MG: at 08:20

## 2020-02-12 RX ADMIN — GABAPENTIN 300 MG: 300 CAPSULE ORAL at 22:02

## 2020-02-12 RX ADMIN — LOPERAMIDE HYDROCHLORIDE 2 MG: 2 CAPSULE ORAL at 17:07

## 2020-02-12 RX ADMIN — SODIUM CHLORIDE, PRESERVATIVE FREE 10 ML: 5 INJECTION INTRAVENOUS at 22:02

## 2020-02-12 RX ADMIN — GABAPENTIN 300 MG: 300 CAPSULE ORAL at 12:00

## 2020-02-12 RX ADMIN — GABAPENTIN 300 MG: 300 CAPSULE ORAL at 17:08

## 2020-02-12 RX ADMIN — ONDANSETRON 4 MG: 4 TABLET, ORALLY DISINTEGRATING ORAL at 20:42

## 2020-02-12 RX ADMIN — CHOLECALCIFEROL TAB 125 MCG (5000 UNIT) 5000 UNITS: 125 TAB at 08:20

## 2020-02-12 NOTE — THERAPY TREATMENT NOTE
Inpatient Rehabilitation - Occupational Therapy Treatment Note    [facility]     Patient Name: She López  : 1945  MRN: [id number]    Today's Date: 2020  Onset of Illness/Injury or Date of Surgery: 20              Admit Date: 2020      Visit Dx:    ICD-10-CM ICD-9-CM   1. General weakness R53.1 780.79       Patient Active Problem List   Diagnosis   • Crohn's disease of small intestine with other complication (CMS/HCC)   • Type 2 diabetes mellitus without complication (CMS/HCC)   • RSD upper limb   • Neuropathic pain of hand   • Hyperlipidemia   • Cervical myelopathy (CMS/HCC)   • Central pain syndrome   • Carpal tunnel syndrome   • Vitamin B 12 deficiency   • Guillain Barré syndrome (CMS/HCC)         Therapy Treatment    IRF Treatment Summary     Row Name 20 1506 20 0800          Evaluation/Treatment Time and Intent    Subjective Information  no complaints  -AF  no complaints  (Pended)   -NM     Existing Precautions/Restrictions  fall  -AF  fall  (Pended)   -NM     Document Type  therapy note (daily note)  -AF  therapy note (daily note)  (Pended)   -NM     Mode of Treatment  occupational therapy  -AF  physical therapy  (Pended)   -NM     Patient/Family Observations  sitting up in w/c in AM, supine in bed in PM  -AF  supine in bed;  present; agreeable to present   (Pended)   -NM     Recorded by [AF] Reina Perera, OTR [NM] Jaxon Atkinson, PT Student     Row Name 20 1506 20 0800          Cognition/Psychosocial- PT/OT    Affect/Mental Status (Cognitive)  WFL  -AF  WFL  (Pended)   -NM     Orientation Status (Cognition)  oriented x 4  -AF  oriented x 4  (Pended)   -NM     Follows Commands (Cognition)  WFL  -AF  WFL  (Pended)   -NM     Personal Safety Interventions  fall prevention program maintained;gait belt;nonskid shoes/slippers when out of bed  -AF  fall prevention program maintained;gait belt;supervised activity;nonskid shoes/slippers when out of  bed  (Pended)   -NM     Safety Deficit (Cognitive)  insight into deficits/self awareness  -AF  --     Recorded by [AF] Reina Perera OTR [NM] Jaxon Atkinson, YUSUF Student     Row Name 02/12/20 1506 02/12/20 0800          Bed Mobility Assessment/Treatment    Rolling Left [place] (Bed Mobility)  --  supervision  (Pended)   -NM     Rolling Right [place] (Bed Mobility)  --  supervision  (Pended)   -NM     Supine-Sit [place] (Bed Mobility)  supervision  -AF  supervision  (Pended)   -NM     Sit-Supine [place] (Bed Mobility)  supervision  -AF  supervision  (Pended)   -NM     Bed Mobility, Safety Issues  --  decreased use of arms for pushing/pulling;decreased use of legs for bridging/pushing;impaired trunk control for bed mobility  (Pended)   -NM     Assistive Device (Bed Mobility)  bed rails  -AF  bed rails;head of bed elevated  (Pended)   -NM     Comment (Bed Mobility)  --  bed and therapy mat   (Pended)   -NM     Recorded by [AF] Reina Perera OTR [NM] Jaxon Atkinson, YUSUF Student     Row Name 02/12/20 1506             Functional Mobility    Functional Mobility- Comment  foot propelled w/c around room with MIN A, and 1/3 way to to therapy  -AF      Recorded by [AF] Reina Perera OTR      Row Name 02/12/20 0800             Transfer Assessment/Treatment    Transfer Assessment/Treatment  squat pivot transfer  (Pended)  CGA for lateral tranfser from mat to wc   -NM      Comment (Transfers)  modA x 2 for one sit to stand c pt's feet placed too far under pt  (Pended)   -NM      Recorded by [NM] Jaxon Atkinson, PT Student      Row Name 02/12/20 1506 02/12/20 0800          Bed-Chair Transfer    Bed-Chair [place] (Transfers)  moderate assist (50% patient effort);minimum assist (75% patient effort);verbal cues  -AF  moderate assist (50% patient effort);verbal cues;nonverbal cues (demo/gesture)  (Pended)   -NM     Assistive Device (Bed-Chair Transfers)  wheelchair  -AF  wheelchair  (Pended)    -NM     Recorded by [AF] Reina Perera, OTR [NM] Demetrio Jaxon, PT Student     Row Name 02/12/20 1506 02/12/20 0800          Chair-Bed Transfer    Chair-Bed [place] (Transfers)  moderate assist (50% patient effort);verbal cues  -AF  moderate assist (50% patient effort);verbal cues;nonverbal cues (demo/gesture)  (Pended)   -NM     Assistive Device (Chair-Bed Transfers)  wheelchair  -AF  wheelchair  (Pended)   -NM     Recorded by [AF] Reina Perera, OTR [NM] Jaxon Atkinson, PT Student     Row Name 02/12/20 0800             Sit-Stand Transfer    Sit-Stand [place] (Transfers)  moderate assist (50% patient effort);minimum assist (75% patient effort);2 person assist;verbal cues  (Pended)   -NM      Assistive Device (Sit-Stand Transfers)  wheelchair;walker, front-wheeled  (Pended)   -NM      Recorded by [NM] Jaxon Atkinson, PT Student      Row Name 02/12/20 0800             Stand-Sit Transfer    Stand-Sit [place] (Transfers)  minimum assist (75% patient effort);2 person assist;verbal cues  (Pended)   -NM      Assistive Device (Stand-Sit Transfers)  wheelchair;walker, front-wheeled  (Pended)   -NM      Recorded by [NM] Jaxon Atkinson, PT Student      Row Name 02/12/20 0800             Stand Pivot/Stand Step Transfer    Stand Pivot/Stand Step [place]  moderate assist (50% patient effort);verbal cues;nonverbal cues (demo/gesture)  (Pended)   -NM      Assistive Device (Stand Pivot Stand Step Transfer)  wheelchair  (Pended)   -NM      Recorded by [NM] Jaxon Atkinson, PT Student      Row Name 02/12/20 0800             Gait/Stairs Assessment/Training    [place] Level (Gait)  minimum assist (75% patient effort);verbal cues;nonverbal cues (demo/gesture);2 person assist  (Pended)   -NM      Assistive Device (Gait)  walker, 4-wheeled  (Pended)  Drive heavy duty rollator; aircasts B ankles; gloves B hand   -NM      Distance in Feet (Gait)  30' x 2, 40'  (Pended)   -NM      Pattern (Gait)   step-to;step-through  (Pended)   -NM      Deviations/Abnormal Patterns (Gait)  bilateral deviations;ataxic;stride length decreased;gait speed decreased  (Pended)   -NM      Bilateral Gait Deviations  heel strike decreased;forward flexed posture;weight shift ability decreased  (Pended)  anterior weight shift   -NM      Comment (Gait/Stairs)  pt CGA-Doreen x 2 at times c assist only for propelling the walker. Improved control and decreased foot slap on L. Increased distance this date. Min report of SOB, O2 sats % on room air. -126 bpm following gait  (Pended)   -NM      Recorded by [NM] Jaxon Atkinson, PT Student      Row Name 02/12/20 1506             Bathing Assessment/Treatment    Bathing Cammal Level  upper body;contact guard assist;verbal cues  -AF      Bathing Position  sink side;supported sitting  -AF      Bathing Setup Assistance  obtain supplies  -AF      Recorded by [AF] Reina Perera OTR      Row Name 02/12/20 1506             Upper Body Dressing Assessment/Treatment    Upper Body Dressing Task  upper body dressing skills;minimum assist (75% or more patient effort);verbal cues  -AF      Upper Body Dressing Position  supported sitting  -AF      Set-up Assistance (Upper Body Dressing)  obtain clothing  -AF      Recorded by [AF] Reina Perera OTR      Row Name 02/12/20 1506             Lower Body Dressing Assessment/Treatment    Comment (Lower Body Dressing)  in PM session worked on crossing legs to don shoes with MOD A , deferred stated that she just changed her pants  -AF      Recorded by [AF] Reina Perera OTR      Row Name 02/12/20 1506             Grooming Assessment/Treatment    Grooming Cammal Level  grooming skills;set up;supervision;verbal cues  -AF      Assistive Device (Grooming)  built-up handle items  -AF      Grooming Position  sink side;supported sitting  -AF      Grooming Setup Assistance  obtain supplies  -AF      Recorded by [AF] Reina Perera OTR       Row Name 02/12/20 1506             Fine Motor Testing & Training    Comment, Fine Motor Coordination  [facility] [place] with yellow putty, mitchell to manipulate 5 larger buttons out of the putty with increased time, focused on lateral pinch exs L> R.   -AF      Recorded by [AF] Reina Perera OTR      Row Name 02/12/20 1506 02/12/20 0800          Pain Scale: Numbers Pre/Post-Treatment    Pain Scale: Numbers, Pretreatment  0/10 - no pain  -AF  0/10 - no pain  (Pended)   -NM     Pain Scale: Numbers, Post-Treatment  0/10 - no pain  -AF  0/10 - no pain  (Pended)   -NM     Recorded by [AF] Reina Perera OTR [NM] Jaxon Atkinson, PT Student     Row Name 02/12/20 1506             Upper Extremity Seated Therapeutic Exercise    Performed, Seated Upper Extremity (Therapeutic Exercise)  scapular stabilization;shoulder flexion/extension;wrist flexion/extension;forearm supination/pronation;elbow flexion/extension;digit flexion/extension  -AF      Device, Seated Upper Extremity (Therapeutic Exercise)  -- #2 hand weight, #1 handweight for shoulder exs, hand gripper  -AF      Exercise Type, Seated Upper Extremity (Therapeutic Exercise)  AROM (active range of motion);resistive exercise  -AF      Expected Outcomes, Seated Upper Extremity (Therapeutic Exercise)  improve functional tolerance, self-care activity;improve motor control;improve performance, BADLs;improve performance, transfer skills;strengthen normal movement patterns  -AF      Restrictions, Seated Upper Extremity (Therapeutic Exercise)  increased ability to grasp objects  -AF      Sets/Reps Detail, Seated Upper Extremity (Therapeutic Exercise)  2/20  -AF      Comment, Seated Upper Extremity (Therapeutic Exercise)  rest breaks between sets, able to tolerate shoulder exs to 20 reps   -AF      Recorded by [AF] Reina Perera OTR      Row Name 02/12/20 0800             Lower Extremity Seated Therapeutic Exercise    Performed, Seated Lower Extremity (Therapeutic  Exercise)  LAQ (long arc quad), knee extension;ankle dorsiflexion/plantarflexion;knee flexion/extension;hip flexion/extension;hip abduction/adduction  (Pended)   -NM      Exercise Type, Seated Lower Extremity (Therapeutic Exercise)  AROM (active range of motion)  (Pended)   -NM      Sets/Reps Detail, Seated Lower Extremity (Therapeutic Exercise)  2/10  (Pended)  yellow tband HS curls/hip abd   -NM      Recorded by [NM] Jaxon Atkinson PT Student      Row Name 02/12/20 0800             Lower Extremity Supine Therapeutic Exercise    Performed, Supine Lower Extremity (Therapeutic Exercise)  bridging (bilateral w/bolster)  (Pended)   -NM      Device, Supine Lower Extremity (Therapeutic Exercise)  balance disc;rocker board  (Pended)  firm therapy mat   -NM      Exercise Type, Supine Lower Extremity (Therapeutic Exercise)  AROM (active range of motion)  (Pended)   -NM      Sets/Reps Detail, Supine Lower Extremity (Therapeutic Exercise)  1/10  (Pended)   -NM      Recorded by [NM] Jaxon Atkinson PT Student      Row Name 02/12/20 1506 02/12/20 0800          Positioning and Restraints    Pre-Treatment Position  sitting in chair/recliner  -AF  in bed  (Pended)   -NM     Post Treatment Position  bed  -AF  wheelchair  (Pended)   -NM     In Bed  supine;call light within reach;encouraged to call for assist;exit alarm on;notified nsg in AM  -AF  --     In Wheelchair  sitting;with family/caregiver;with PT iN PM  -AF  sitting;exit alarm on;with family/caregiver  (Pended)   -NM     Recorded by [AF] Reina Perera, ALPESH [NM] Jaxon Atkinson PT Student       User Key  (r) = Recorded By, (t) = Taken By, (c) = Cosigned By    Initials Name Effective Dates    AF Reina Perera OTR 04/03/18 -     NM Jaxon Atkinson PT Student 01/03/20 -           Wound 12/09/19 1510 abdomen Incision (Active)   Dressing Appearance dry;intact 2/12/2020  9:00 AM   Base clean;dry;other (see comments) 2/11/2020  9:34 PM   Periwound intact;dry  2/11/2020  9:34 PM         OT Recommendation and Plan                 OT IRF GOALS     Row Name 02/12/20 1514 02/03/20 1540          Transfer Goal 1 (OT-IRF)    Activity/Assistive Device (Transfer Goal 1, OT-IRF)  toilet;shower chair;walk-in shower  -AF  toilet;shower chair;walk-in shower  -AF     [place] Level (Transfer Goal 1, OT-IRF)  minimum assist (75% or more patient effort);verbal cues required  -AF  minimum assist (75% or more patient effort);moderate assist (50-74% patient effort);verbal cues required  -AF     Time Frame (Transfer Goal 1, OT-IRF)  short term goal (STG)  -AF  short term goal (STG)  -AF     Progress/Outcomes (Transfer Goal 1, OT-IRF)  goal ongoing  -AF  goal ongoing  -AF        Transfer Goal 2 (OT-IRF)    Activity/Assistive Device (Transfer Goal 2, OT-IRF)  toilet;shower chair;walk-in shower  -AF  toilet;shower chair;walk-in shower  -AF     [place] Level (Transfer Goal 2, OT-IRF)  verbal cues required;contact guard assist  -AF  verbal cues required;contact guard assist  -AF     Time Frame (Transfer Goal 2, OT-IRF)  long term goal (LTG)  -AF  long term goal (LTG)  -AF     Progress/Outcomes (Transfer Goal 2, OT-IRF)  goal ongoing  -AF  goal ongoing  -AF        Bathing Goal 1 (OT-IRF)    Activity/Device (Bathing Goal 1, OT-IRF)  bathing skills, all;grab bar/tub rail;hand-held shower spray hose;shower chair  -AF  bathing skills, all;grab bar/tub rail;hand-held shower spray hose;shower chair  -AF     [place] Level (Bathing Goal 1, OT-IRF)  verbal cues required;moderate assist (50-74% patient effort)  -AF  verbal cues required;moderate assist (50-74% patient effort)  -AF     Time Frame (Bathing Goal 1, OT-IRF)  short term goal (STG)  -AF  short term goal (STG)  -AF     Progress/Outcomes (Bathing Goal 1, OT-IRF)  goal ongoing  -AF  goal ongoing  -AF        Bathing Goal 2 (OT-IRF)    Activity/Device (Bathing Goal 2, OT-IRF)  bathing skills, all;grab bar/tub rail;hand-held shower spray  hose;shower chair  -AF  bathing skills, all;grab bar/tub rail;hand-held shower spray hose;shower chair  -AF     [place] Level (Bathing Goal 2, OT-IRF)  verbal cues required;contact guard assist  -AF  verbal cues required;contact guard assist  -AF     Time Frame (Bathing Goal 2, OT-IRF)  long term goal (LTG)  -AF  long term goal (LTG)  -AF     Progress/Outcomes (Bathing Goal 2, OT-IRF)  goal ongoing  -AF  goal ongoing  -AF        UB Dressing Goal 1 (OT-IRF)    Activity/Device (UB Dressing Goal 1, OT-IRF)  upper body dressing  -AF  upper body dressing  -AF     [place] (UB Dress Goal 1, OT-IRF)  set-up required;verbal cues required  -AF  verbal cues required;set-up required  -AF     Time Frame (UB Dressing Goal 1, OT-IRF)  long term goal (LTG)  -AF  long term goal (LTG)  -AF     Progress/Outcomes (UB Dressing Goal 1, OT-IRF)  goal ongoing  -AF  goal ongoing  -AF        LB Dressing Goal 1 (OT-IRF)    Activity/Device (LB Dressing Goal 1, OT-IRF)  lower body dressing  -AF  lower body dressing  -AF     [place] (LB Dressing Goal 1, OT-IRF)  verbal cues required;moderate assist (50-74% patient effort)  -AF  verbal cues required;moderate assist (50-74% patient effort)  -AF     Time Frame (LB Dressing Goal 1, OT-IRF)  short term goal (STG)  -AF  short term goal (STG)  -AF     Progress/Outcomes (LB Dressing Goal 1, OT-IRF)  goal ongoing  -AF  goal ongoing  -AF        LB Dressing Goal 2 (OT-IRF)    Activity/Device (LB Dressing Goal 2, OT-IRF)  lower body dressing  -AF  lower body dressing  -AF     [place] (LB Dressing Goal 2, OT-IRF)  verbal cues required;contact guard assist  -AF  contact guard assist;verbal cues required  -AF     Time Frame (LB Dressing Goal 2, OT-IRF)  long term goal (LTG)  -AF  long term goal (LTG)  -AF     Progress/Outcomes (LB Dressing Goal 2, OT-IRF)  goal ongoing  -AF  goal ongoing  -AF        Grooming Goal 1 (OT-IRF)    Activity/Device (Grooming Goal 1, OT-IRF)  grooming skills, all   -AF  grooming skills, all  -AF     [place] (Grooming Goal 1, OT-IRF)  set-up required  -AF  set-up required;verbal cues required  -AF     Time Frame (Grooming Goal 1, OT-IRF)  short term goal (STG)  -AF  short term goal (STG);long term goal (LTG)  -AF     Progress/Outcomes (Grooming Goal 1, OT-IRF)  goal ongoing  -AF  goal ongoing  -AF        Grooming Goal 2 (OT-IRF)    Activity/Device (Grooming Goal 2, OT-IRF)  grooming skills, all  -AF  --     [place] (Grooming Goal 2, OT-IRF)  conditional independence  -AF  --     Time Frame (Grooming Goal 2, OT-IRF)  long term goal (LTG)  -AF  --     Progress/Outcomes (Grooming Goal 2, OT-IRF)  goal ongoing  -AF  --        Toileting Goal 1 (OT-IRF)    Activity/Device (Toileting Goal 1, OT-IRF)  toileting skills, all;grab bar/safety frame;raised toilet seat  -AF  toileting skills, all;grab bar/safety frame;raised toilet seat  -AF     [place] Level (Toileting Goal 1, OT-IRF)  maximum assist (25-49% patient effort);moderate assist (50-74% patient effort)  -AF  maximum assist (25-49% patient effort);verbal cues required  -AF     Time Frame (Toileting Goal 1, OT-IRF)  short term goal (STG)  -AF  short term goal (STG)  -AF     Progress/Outcomes (Toileting Goal 1, OT-IRF)  goal ongoing  -AF  goal ongoing  -AF        Toileting Goal 2 (OT-IRF)    Activity/Device (Toileting Goal 2, OT-IRF)  toileting skills, all;grab bar/safety frame;raised toilet seat  -AF  toileting skills, all;grab bar/safety frame;raised toilet seat  -AF     [place] Level (Toileting Goal 2, OT-IRF)  minimum assist (75% or more patient effort);verbal cues required  -AF  minimum assist (75% or more patient effort)  -AF     Time Frame (Toileting Goal 2, OT-IRF)  long term goal (LTG)  -AF  long term goal (LTG)  -AF     Progress/Outcomes (Toileting Goal 2, OT-IRF)  goal ongoing  -AF  goal ongoing  -AF        Balance Goal 1 (OT)    Activity/Assistive Device (Balance Goal 1, OT)  standing, static  -AF   standing, static  -AF     [place] Level/Cues Needed (Balance Goal 1, OT)  moderate assist (50-74% patient effort)  -AF  moderate assist (50-74% patient effort);verbal cues required  -AF     Time Frame (Balance Goal 1, OT)  short term goal (STG)  -AF  short term goal (STG)  -AF     Progress/Outcomes (Balance Goal 1, OT)  goal ongoing  -AF  goal ongoing  -AF        Balance Goal 2 (OT)    Activity/Assistive Device (Balance Goal 2, OT)  standing, static  -AF  standing, static  -AF     [place] Level (Balance Goal 2, OT)  minimum assist (75% or more patient effort);verbal cues required  -AF  minimum assist (75% or more patient effort);contact guard assist  -AF     Time Frame (Balance Goal 2, OT)  long term goal (LTG)  -AF  long term goal (LTG)  -AF     Progress/Outcome (Balance Goal 2, OT)  goal ongoing  -AF  goal ongoing  -AF        Caregiver Training Goal 1 (OT-IRF)    Caregiver Training Goal 1 (OT-IRF)  pt and  will demo safe techniques with ADLs, transfers, HEP and AE prior to d/c home with home health services   -AF  Pt and  will demo safe techniques with ADLs, trasnfer, HEP and AD prior to d/c home   -AF     Time Frame (Caregiver Training Goal 1, OT-IRF)  long term goal (LTG)  -AF  long term goal (LTG)  -AF     Progress/Outcomes (Caregiver Training Goal 1, OT-IRF)  goal ongoing  -AF  goal ongoing  -AF       User Key  (r) = Recorded By, (t) = Taken By, (c) = Cosigned By    Initials Name Provider Type    Reina Noe OTR Occupational Therapist                     Time Calculation:     Time Calculation- OT     Row Name 02/12/20 1517 02/12/20 1516          Time Calculation- OT    OT Start Time  1330  -AF  0930  -AF     OT Stop Time  1400  -AF  1030  -AF     OT Time Calculation (min)  30 min  -AF  60 min  -AF       User Key  (r) = Recorded By, (t) = Taken By, (c) = Cosigned By    Initials Name Provider Type    Reina Noe OTMENDEZ Occupational Therapist          Therapy Charges for  Today     Code Description Service Date Service Provider Modifiers Qty    57687095268 HC OT SELF CARE/MGMT/TRAIN EA 15 MIN 2/11/2020 Reina Perera, OTR GO 3    19017557380 HC OT NEUROMUSC RE EDUCATION EA 15 MIN 2/11/2020 Reina Perera, OTR GO 1    95727366082 HC OT THER PROC EA 15 MIN 2/11/2020 Reina Perera, OTR GO 2    [id number] HC OT SELF CARE/MGMT/TRAIN EA 15 MIN 2/12/2020 Reina Perera, OTR GO 3    04567170074 HC OT NEUROMUSC RE EDUCATION EA 15 MIN 2/12/2020 Reina Perera, OTR GO 1    88051082939 HC OT THER PROC EA 15 MIN 2/12/2020 Reina Perera, OTR GO 2                   Reina Perera OTR  2/12/2020

## 2020-02-12 NOTE — PROGRESS NOTES
LOS: 19 days   Patient Care Team:  Armando Valentine MD as PCP - General (Internal Medicine)  Lisa Arriaza MD as Consulting Physician (Obstetrics and Gynecology)    Chief Complaint: GBS    Subjective     History of Present Illness     Patient tolerated IVIG.  Feels improvement in her strength and coordination in the hands as well as in her legs and walking further.  Hemoglobin trend down to 7.3.  She does not describe any blood in her stool.  We reviewed rechecking on Friday and we also reviewed resuming Venofer starting tomorrow to give her a day off from the IV infusions.    .  History taken from: patient chart    Objective     Vital Signs  Temp:  [98 °F (36.7 °C)-98.7 °F (37.1 °C)] 98.1 °F (36.7 °C)  Heart Rate:  [68-96] 68  Resp:  [16-18] 16  BP: (107-142)/(58-70) 142/60    Physical Exam:  General: Awake, alert, NAD  HEENT: normocepahlic, atraumatic   Heart: RRR, no m/r/g  Lungs: CTAB, no wheezing, rales, or rhonchi  Abdomen: soft, non-tender, non-distended, colostomy , incision dressed  Strength: BUE: 4/5 with bilateral elbow flexion, elbow extension, wrist extension, and finger flexion, right greater than left weak hand intrinsics R 3-/5, L 3/5       BLE:  HF right 3/5, left 3-/5,  knee extension right 4/5, left  4/5, ankle dorsiflexion B 3/5  Extremities: Right antecubital fossa with no erythema but residual cord.  No significant tenderness.    Results Review:     I reviewed the patient's new clinical results.  Results from last 7 days   Lab Units 02/12/20  0610 02/10/20  0614 02/07/20  0627   WBC 10*3/mm3 6.53 5.65 5.64   HEMOGLOBIN g/dL 7.3* 8.4* 9.0*   HEMATOCRIT % 22.7* 26.0* 27.6*   PLATELETS 10*3/mm3 440 468* 485*     Results from last 7 days   Lab Units 02/10/20  0614 02/07/20  0627   SODIUM mmol/L 140 136   POTASSIUM mmol/L 3.8 3.7   CHLORIDE mmol/L 106 105   CO2 mmol/L 21.2* 18.1*   BUN mg/dL 22 16   CREATININE mg/dL 0.71 0.69   CALCIUM mg/dL 8.8 8.5*   GLUCOSE mg/dL 91 109*        Medication Review:   Scheduled Meds:    Iron 18 mg Sublingual BID   gabapentin 300 mg Oral 4x Daily   [START ON 2/13/2020] iron sucrose 200 mg Intravenous Q48H   loperamide 2 mg Oral TID AC   MULTIVITAMIN ADULT 1 tablet Sublingual Daily   pantoprazole 40 mg Oral Q AM   sodium bicarbonate 1,300 mg Oral TID   sodium chloride 10 mL Intravenous Q12H   sodium chloride 10 mL Intravenous Q12H   Cyanocobalamin 2,500 mcg Sublingual Weekly   Vitamin D-3 5,000 Units Sublingual Daily     Continuous Infusions:   PRN Meds:.•  aluminum sulfate-calcium acetate  •  gabapentin **AND** gabapentin  •  miconazole  •  sodium chloride  •  sodium chloride    Assessment/Plan   73 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     GBS  -Completed her plasmapharesis and has gotten good return.   - Will begin PT/OT for ADLS, strength, mobility, txs, gait.   -January 27: On gabapentin 300mg QID. Will continue to monitor and will titrate up as needed.  -Jan 29 -  Feels strength is somewhat better.  Worked on gait with rolling walker yesterday 15 feet. Today utilized ankle weights to decrease ataxic movements with gait. Transfers mod max assist. Bed mobility CTG.  - Feb 6 - Patient complains of increased numbness in her knees.  She is noted to have increased weakness with her hip extensors and possibly in the left quadricep.  Her ambulation distance is less and takes more effort with a shorter stride.  On examination she also appears weaker in her hands and ankles.  Discussed having neurology see her to assess for possibly IVIG or another round of plasmapheresis.  -Feb 7 - increased weakness noted in BUE and BLE and more difficulty with mobility - Neurology starting patient on course of IVIG   -February 10-shows good response on IVIG-tolerating.  Notes improvement in her strength in the upper extremities and lower extremities as well as performance with mobility and self-care.  -February 11-continues to show improvement on  IVIG  -412-functional improvement after IVIG with improvement in her handgrip, feeding, transfers, and ambulation as well as improvement in her strength.    Crohn's  -s/p recent colostomy- wound nurse to see and education for home management.   -If more than 1 liter output a day, needs Immodium-per Dr. Albrecht colorectal surgeon.     Hyponatremia  -On salt tabs and fluid restriction. Renal following  -January 27: Na 130. Continue salt tabs and fluid restriction per renal. Will continue to monitor  -January 30: Na 133. Continue sodium bicarbonate and fluid restriction per renal.  -Feb 3- Na improved to 138  -February 4-fluid restriction discontinued.  -February 5-sodium 137  -February 10-sodium 140.  Sodium chloride tablets were discontinued on February 8.  Continues on sodium bicarb 1300 mg 3 times a day.    Anemia  -January 27: Hgb/Hct 7.6/24.3- stable. No signs of active bleeding. Will order fecal occult and reticulocyte count. Will continue to monitor.  -Jan 28 - hemoccult positive  -Jan 29 - Stool heme +.  HGB stable at 7.5. She had hypotension and tachycardia yesterday 87/64 and 110) , better today (99/63 and 88).  Reviewed option of transfusion PRBCs. She wished to hold on transfusion unless absolutely necessary. Discussed if HGB < 7, would recommend definitely transfuse.  -January 30: Repeat CBC scheduled for tomorrow. Will continue to monitor.    -January 31: Hgb 7.2. Will transfuse 1 unit of PRBCs today. Ordered anemia studies. Spoke with Dr. Albrecht and if iron supplementation is required she recommends IV iron for better absorption.  -February 1: Hemoglobin improved 8.8.  IV iron infusion ordered by nephrology  -Feb 3 - HGB improved 9.5. Not tolerate IV iron infusion due to burning in vein, does not wish to have placed a more proximal IV. She had tow infusions. She will get EZ Melt Iron from outside to take by mouth; on discussion with colorectal surgery last week she should be able to absorb PO  iron.  February 5-hemoglobin 8.5  February 10-hemoglobin 8.4  February 12-hemoglobin 7.3-As she has the midline in, will plan to resume the last 3 of the iron infusions that she did not get previously with the peripheral IV.  Will start tomorrow scheduled every other day for total of 3 doses.  Recheck hemoglobin on Friday.    DVT prophylaxis  -SCDS for now.   -January 27: Heparin has been on hold due to HGB trending down. Following results of fecal occult and reticulocyte count will consider restarting Heparin for DVT prophylaxis.   -January 28 - hemoccult positive.     Vitamin D deficiency-vitamin D 22.4  on January 29.    Feb 1 - Patient does not wish to take vitamin D p.o. through the hospital supply.  Wishes to have her vitamin D brought in from home.    Vitamin B12 replacement-sublingual from home    RUE antecubital fossa phlebitis/cellulitis-February 3-no sign of infection. K-PAD.   February 4-Right antecubital fossa phlebitis with cord palpable/tender with surrounding erythema consistent with cellulitis. Allergy to Keflex - throat swelled. Will start Doxycycline 100 mg bid x 7 days, continue K-pad.   February 5-She continues to have pain and redness and swelling of the right antecubital fossa and distal upper arm.  Reviewed continue with doxycycline which was started yesterday but if there is no show improvement will look to adjust antibiotic tomorrow.  White blood cell count is 7.22 with normal differential..  Feb 7 - area improved today on doxycycline.  February 10-further improvement.    TEAM CONF - JAN 28 - FLAT AFFECT. SUPINE SIT MIN ASSIST. TRANSFERS MAX 2. NONAMBULATORY. UBB MIN. LBB MAX. UBD MOD.  LBD DEP. ABD WOUND CARE. STOOL HEMOCCULT POSITIVE.  ELOS - 5 WEEKS.     TEAM CONF - FEB 4 - BED SBA. TRANSFERS MIN-MOD. GAIT 30 FEET MIN 2 FIDEL WALKER. UBD MOD. LBD DEP. ON 1200 CC FLUID RESTRICTION. EATING OKAY.  ABD WOUND CLOSING. OSTOMY DEVICE STAYING ON.  CONTINENT. NEEDS TO GET UP TO BSC.   ELOS- 4  WEEKS.     February 10-she had shown a decline in her mobility and self-care with flare of Guillain-Barré syndrome but has shown response on IVIG.  Improving.  Most recently in physical therapy and Occupational Therapy-toilet transfers moderate assist, shower transfer to be assessed, bathing minimum assist upper body and maximum assist lower body, dressing moderate assist upper body independent lower body.  Grooming minimum assist.  Toileting dependent.  Eating with minimal assist to set up with built up utensils.  Bed mobility contact-guard.  Ambulated 25 feet moderate assist of 2 with Aircast bilateral ankles.    TEAM CONF - FEB 11 - She feels IVIG has been helpful.  She notes improvement in the strength in her hands and in her legs.  Easier transfers.  Walk better.  She had had a decline in her ability to do to box and blocks but that improved yesterday.  Tolerating IVIG.   describes her performance as 180 degree change from Friday.  In physical therapy and Occupational Therapy-toilet transfers moderate assist, shower transfer to be assessed, bathing minimum assist upper body and maximum assist lower body, dressing moderate assist upper body independent lower body.  Grooming minimum assist.  Toileting dependent.  Eating with minimal assist to set up with built up utensils.  Bed mobility contact-guard.  Ambulated 25 feet moderate assist of 2 with Aircast bilateral ankles.   Continent bladder. Abd wound improving.   ELOS - 3 WEEKS    Adolfo Valle MD  02/12/20  12:20 PM

## 2020-02-12 NOTE — PROGRESS NOTES
Inpatient Rehabilitation Plan of Care Note    Plan of Care  Care Plan Reviewed - Updates as Follows    Sphincter Control    [RN] Bladder Management(Active)  Current Status(02/12/2020): pt has been continent, using bedpan.She is reluctant  to use BSC. does wear brief.  Weekly Goal(02/17/2020): Continent bladder 100%  Discharge Goal: Continent bladder 100%    [RN] Bowel Management(Active)  Current Status(02/12/2020): Has ileostomy since 12/20/19.  aware of care  for ileostomy.  Weekly Goal(02/17/2020): maintain education of ileostomy with pt and family  Discharge Goal: Independent with ileostomy care    Performed Intervention(s)  Monitor intake and output  Encourage appropriate diet  wound ostomy nurse to continue to see pt for ostomy care.      Psychosocial    Performed Intervention(s)  Verbalize needs and concerns  calm enviroment      Safety    Performed Intervention(s)  Bed alarm, wc alarm  Items within reach  Safety rounds      Body Systems    Performed Intervention(s)  Daily skin inspection    Signed by: Dora Cortez RN

## 2020-02-12 NOTE — PLAN OF CARE
Problem: Patient Care Overview  Goal: Plan of Care Review  Outcome: Ongoing (interventions implemented as appropriate)  Flowsheets (Taken 2/12/2020 0405)  Outcome Summary: States Gabapentin helps nerve pain in extremities. Emptied ileostomy prn. Voids per bedpan. States strength better with IVIG, nerve pain is not better.  Progress, Functional Goals: demonstrating adequate progress  Plan of Care Reviewed With: patient  IRF Plan of Care Review: progress ongoing, continue

## 2020-02-12 NOTE — PROGRESS NOTES
Inpatient Rehabilitation Plan of Care Note    Plan of Care  Care Plan Reviewed - Updates as Follows    Body Systems    [RN] Integumentary(Active)  Current Status(02/12/2020): Abdominal incision healed.ileostomy bag in place.  both changed per WOCN  Weekly Goal(02/19/2020): No S&S infection noted. Skin is intact.  Discharge Goal: No S&S infection noted Skin is intact.    Performed Intervention(s)  Daily skin inspection      Psychosocial    [RN] Coping/Adjustment(Active)  Current Status(02/12/2020): Supportive family,  very helpful and assists  patient with care.  Weekly Goal(02/19/2020): Identify progress in functional status  Discharge Goal: Demonstrate healthy coping strategies    Performed Intervention(s)  Verbalize needs and concerns  calm enviroment      Safety    [RN] Potential for Injury(Active)  Current Status(02/12/2020): No unsafe behaviors. Weakness of lower extremities  Weekly Goal(02/19/2020): Use call light 100%  Discharge Goal: Pt/family aware of risk of fall and safety in the home setting    Performed Intervention(s)  Bed alarm, wc alarm  Items within reach  Safety rounds      Sphincter Control    [RN] Bladder Management(Active)  Current Status(02/12/2020): pt has been continent, using bedpan.She is reluctant  to use BSC. does wear brief.  Weekly Goal(02/19/2020): Continent bladder 100%  Discharge Goal: Continent bladder 100%    [RN] Bowel Management(Active)  Current Status(02/12/2020): Has ileostomy since 12/20/19.  aware of care  for ileostomy.  Weekly Goal(02/19/2020): maintain education of ileostomy with pt and family  Discharge Goal: Independent with ileostomy care    Performed Intervention(s)  Monitor intake and output  Encourage appropriate diet  wound ostomy nurse to continue to see pt for ostomy care.    Signed by: Romina Cotton RN

## 2020-02-12 NOTE — THERAPY TREATMENT NOTE
Inpatient Rehabilitation - Physical Therapy Treatment Note  [facility]     Patient Name: She López  : 1945  MRN: [id number]    Today's Date: 2020  Onset of Illness/Injury or Date of Surgery: 20              Admit Date: 2020      Visit Dx:      ICD-10-CM ICD-9-CM   1. General weakness R53.1 780.79       Patient Active Problem List   Diagnosis   • Crohn's disease of small intestine with other complication (CMS/HCC)   • Type 2 diabetes mellitus without complication (CMS/HCC)   • RSD upper limb   • Neuropathic pain of hand   • Hyperlipidemia   • Cervical myelopathy (CMS/HCC)   • Central pain syndrome   • Carpal tunnel syndrome   • Vitamin B 12 deficiency   • Guillain Barré syndrome (CMS/HCC)       Therapy Treatment    IRF Treatment Summary     Row Name 20 1506 20 0800          Evaluation/Treatment Time and Intent    Subjective Information  no complaints  -AF  no complaints  -LH,NM,LH2     Existing Precautions/Restrictions  fall  -AF  fall  -LH,NM,LH2     Document Type  therapy note (daily note)  -AF  therapy note (daily note)  -LH,NM,LH2     Mode of Treatment  occupational therapy  -AF  physical therapy  -LH,NM,LH2     Patient/Family Observations  sitting up in w/c in AM, supine in bed in PM  -AF  supine in bed;  present; agreeable to present   -LH,NM,LH2     Recorded by [AF] Reina Perera, OTR [LH,NM,LH2] Chelsie Cline, PT (r) Jaxon Atkinson PT Student (t) Chelsie Cline, PT (c)     Row Name 20 1506 20 0800          Cognition/Psychosocial- PT/OT    Affect/Mental Status (Cognitive)  WFL  -AF  WFL  -LH,NM,LH2     Orientation Status (Cognition)  oriented x 4  -AF  oriented x 4  -LH,NM,LH2     Follows Commands (Cognition)  WFL  -AF  WFL  -LH,NM,LH2     Personal Safety Interventions  fall prevention program maintained;gait belt;nonskid shoes/slippers when out of bed  -AF  fall prevention program maintained;gait belt;supervised activity;nonskid  shoes/slippers when out of bed  -LH,NM,LH2     Safety Deficit (Cognitive)  insight into deficits/self awareness  -AF  --     Recorded by [AF] Reina Perera, NILAMR [LH,NM,LH2] Chelsie Cline, PT (r) Jaxon Atkinson, PT Student (t) Chelsie Cline, PT (c)     Row Name 02/12/20 1506 02/12/20 0800          Bed Mobility Assessment/Treatment    Rolling Left [place] (Bed Mobility)  --  supervision  -LH,NM,LH2     Rolling Right [place] (Bed Mobility)  --  supervision  -LH,NM,LH2     Supine-Sit [place] (Bed Mobility)  supervision  -AF  supervision  -LH,NM,LH2     Sit-Supine [place] (Bed Mobility)  supervision  -AF  supervision  -LH,NM,LH2     Bed Mobility, Safety Issues  --  decreased use of arms for pushing/pulling;decreased use of legs for bridging/pushing;impaired trunk control for bed mobility  -LH,NM,LH2     Assistive Device (Bed Mobility)  bed rails  -AF  bed rails;head of bed elevated  -LH,NM,LH2     Comment (Bed Mobility)  --  bed and therapy mat   -LH,NM,LH2     Recorded by [AF] Reina Perera, NILAMR [LH,NM,LH2] Chelsie Cline, PT (r) Jaxon Atkinson, PT Student (t) Chelsie Cline, PT (c)     Row Name 02/12/20 1506 02/12/20 0800          Functional Mobility    Functional Mobility- Comment  foot propelled w/c around room with MIN A, and 1/3 way to to therapy  -AF  mini squats inside // bars c Doreen; tactile cues for sequencing and to keep B hip in neutral. improved control this date. second person present for CGA; mod to come to stand from    2x10 reps, BUE support on // bars   -LH,NM,LH2     Recorded by [AF] Reina Perera, NILAMR [LH,NM,LH2] Chelsie Cline, PT (r) Jaxon Atkinson, PT Student (t) Chelsie Cline, PT (c)     Row Name 02/12/20 0800             Transfer Assessment/Treatment    Transfer Assessment/Treatment  squat pivot transfer CGA for lateral tranfser from mat to    -BROOKE,NM,LH2      Comment (Transfers)  modA x 2 for one sit to stand c pt's feet placed too far under   -,NM,2       Recorded by [LH,NM,LH2] Chelsie Cline, PT (r) Jaxon Atkinson, PT Student (t) Chelsie Cline, PT (c)      Row Name 02/12/20 1506 02/12/20 0800          Bed-Chair Transfer    Bed-Chair [place] (Transfers)  moderate assist (50% patient effort);minimum assist (75% patient effort);verbal cues  -AF  moderate assist (50% patient effort);verbal cues;nonverbal cues (demo/gesture)  -LH,NM,LH2     Assistive Device (Bed-Chair Transfers)  wheelchair  -AF  wheelchair  -LH,NM,LH2     Recorded by [AF] Reina Perera, OTR [LH,NM,LH2] Chelsie Cline, PT (r) Jaxon Atkinson, PT Student (t) Chelsie Cline, PT (c)     Row Name 02/12/20 1506 02/12/20 0800          Chair-Bed Transfer    Chair-Bed [place] (Transfers)  moderate assist (50% patient effort);verbal cues  -AF  moderate assist (50% patient effort);verbal cues;nonverbal cues (demo/gesture)  -LH,NM,LH2     Assistive Device (Chair-Bed Transfers)  wheelchair  -AF  wheelchair  -LH,NM,LH2     Recorded by [AF] Reina Perera, OTR [LH,NM,LH2] Chelsie Cline, PT (r) Jaxon Atkinson, PT Student (t) Chelsie Cline, PT (c)     Row Name 02/12/20 0800             Sit-Stand Transfer    Sit-Stand [place] (Transfers)  moderate assist (50% patient effort);minimum assist (75% patient effort);2 person assist;verbal cues  -LH,NM,LH2      Assistive Device (Sit-Stand Transfers)  wheelchair;walker, front-wheeled  -LH,NM,LH2      Recorded by [LH,NM,LH2] Chelsie Cline, PT (r) Jaxon Atkinson, PT Student (t) Chelsie Cline, PT (c)      Row Name 02/12/20 0800             Stand-Sit Transfer    Stand-Sit [place] (Transfers)  minimum assist (75% patient effort);2 person assist;verbal cues  -LH,NM,LH2      Assistive Device (Stand-Sit Transfers)  wheelchair;walker, front-wheeled  -LH,NM,LH2      Recorded by [LH,NM,LH2] Chelsie Cline, PT (r) Jaxon Atkinson PT Student (t) Chelsie Cline, PT (c)      Row Name 02/12/20 0800             Stand Pivot/Stand Step Transfer    Stand  Pivot/Stand Step [place]  moderate assist (50% patient effort);verbal cues;nonverbal cues (demo/gesture)  -LH,NM,LH2      Assistive Device (Stand Pivot Stand Step Transfer)  wheelchair  -LH,NM,LH2      Recorded by [LH,NM,LH2] Chelsie Cline, PT (r) Jaxon Atkinson, PT Student (t) Chelsie Cline, PT (c)      Row Name 02/12/20 0800             Gait/Stairs Assessment/Training    [place] Level (Gait)  minimum assist (75% patient effort);verbal cues;nonverbal cues (demo/gesture);2 person assist  -LH,NM,LH2      Assistive Device (Gait)  walker, 4-wheeled Drive heavy duty rollator; aircasts B ankles; gloves B hand   -LH,NM,LH2      Distance in Feet (Gait)  30' x 2, 40'x2, 65' increased distance in PM; improved jt/ hip control   -BROOKE,NM,LH2      Pattern (Gait)  step-to;step-through  -LH,NM,LH2      Deviations/Abnormal Patterns (Gait)  bilateral deviations;ataxic;stride length decreased;gait speed decreased  -LH,NM,LH2      Bilateral Gait Deviations  heel strike decreased;forward flexed posture;weight shift ability decreased anterior weight shift   -LH,NM,LH2      Comment (Gait/Stairs)  pt CGA-Doreen x 2 at times c assist only for propelling the walker. Improved control and decreased foot slap on L. Increased distance this date. Min report of SOB, O2 sats % on room air. -126 bpm following gait  -LH,NM,LH2      Recorded by [BROOKE,NM,LH2] Chelsie Cline, PT (r) Jaxon Atkinson, PT Student (t) Chelsie Cline, PT (c)      Row Name 02/12/20 1506             Bathing Assessment/Treatment    Bathing [place] Level  upper body;contact guard assist;verbal cues  -AF      Bathing Position  sink side;supported sitting  -AF      Bathing Setup Assistance  obtain supplies  -AF      Recorded by [AF] Reina Perera OTR      Row Name 02/12/20 1506             Upper Body Dressing Assessment/Treatment    Upper Body Dressing Task  upper body dressing skills;minimum assist (75% or more patient effort);verbal cues  -AF       Upper Body Dressing Position  supported sitting  -AF      Set-up Assistance (Upper Body Dressing)  obtain clothing  -AF      Recorded by [AF] Reina Perera OTR      Row Name 02/12/20 1506             Lower Body Dressing Assessment/Treatment    Comment (Lower Body Dressing)  in PM session worked on crossing legs to don shoes with MOD A , deferred stated that she just changed her pants  -AF      Recorded by [AF] Reina Perera OTR      Row Name 02/12/20 1506             Grooming Assessment/Treatment    Grooming [place] Level  grooming skills;set up;supervision;verbal cues  -AF      Assistive Device (Grooming)  built-up handle items  -AF      Grooming Position  sink side;supported sitting  -AF      Grooming Setup Assistance  obtain supplies  -AF      Recorded by [AF] Reina Perera OTR      Row Name 02/12/20 1506             Fine Motor Testing & Training    Comment, Fine Motor Coordination  FMC theraputty exs with yellow putty, mitchell to manipulate 5 larger buttons out of the putty with increased time, focused on lateral pinch exs L> R.   -AF      Recorded by [AF] Reina Perera OTR      Row Name 02/12/20 1506 02/12/20 0800          Pain Scale: Numbers Pre/Post-Treatment    Pain Scale: Numbers, Pretreatment  0/10 - no pain  -AF  0/10 - no pain  -LH,NM,LH2     Pain Scale: Numbers, Post-Treatment  0/10 - no pain  -AF  0/10 - no pain  -LH,NM,LH2     Recorded by [AF] Reina Perera OTR [LH,NM,LH2] Cehlsie Cline, PT (r) Jaxon Atkinson PT Student (t) Chelsie Cline, PT (c)     Row Name 02/12/20 1506             Upper Extremity Seated Therapeutic Exercise    Performed, Seated Upper Extremity (Therapeutic Exercise)  scapular stabilization;shoulder flexion/extension;wrist flexion/extension;forearm supination/pronation;elbow flexion/extension;digit flexion/extension  -AF      Device, Seated Upper Extremity (Therapeutic Exercise)  -- #2 hand weight, #1 handweight for shoulder exs, hand gripper  -AF       Exercise Type, Seated Upper Extremity (Therapeutic Exercise)  AROM (active range of motion);resistive exercise  -AF      Expected Outcomes, Seated Upper Extremity (Therapeutic Exercise)  improve functional tolerance, self-care activity;improve motor control;improve performance, BADLs;improve performance, transfer skills;strengthen normal movement patterns  -AF      Restrictions, Seated Upper Extremity (Therapeutic Exercise)  increased ability to grasp objects  -AF      Sets/Reps Detail, Seated Upper Extremity (Therapeutic Exercise)  2/20  -AF      Comment, Seated Upper Extremity (Therapeutic Exercise)  rest breaks between sets, able to tolerate shoulder exs to 20 reps   -AF      Recorded by [AF] Reina Perera OTR      Row Name 02/12/20 0800             Lower Extremity Seated Therapeutic Exercise    Performed, Seated Lower Extremity (Therapeutic Exercise)  LAQ (long arc quad), knee extension;ankle dorsiflexion/plantarflexion;knee flexion/extension;hip flexion/extension;hip abduction/adduction  -LH,NM,LH2      Exercise Type, Seated Lower Extremity (Therapeutic Exercise)  AROM (active range of motion)  -LH,NM,LH2      Sets/Reps Detail, Seated Lower Extremity (Therapeutic Exercise)  2/10 yellow tband HS curls/hip abd   -LH,NM,LH2      Recorded by [LH,NM,LH2] Chelsie Cline, PT (r) Jaxon Atkinson, PT Student (t) Chelsie Cline, PT (c)      Row Name 02/12/20 0800             Lower Extremity Supine Therapeutic Exercise    Performed, Supine Lower Extremity (Therapeutic Exercise)  bridging (bilateral w/bolster)  -LH,NM,LH2      Device, Supine Lower Extremity (Therapeutic Exercise)  balance disc;rocker board firm therapy mat   -LH,NM,LH2      Exercise Type, Supine Lower Extremity (Therapeutic Exercise)  AROM (active range of motion)  -LH,NM,LH2      Sets/Reps Detail, Supine Lower Extremity (Therapeutic Exercise)  1/10  -LH,NM,LH2      Recorded by [LH,NM,LH2] Chelsie Cline, PT (r) Jaxon Atkinson, PT Student (t) Son  Chelsie ESTRADA, PT (c)      Row Name 02/12/20 1506 02/12/20 0800          Positioning and Restraints    Pre-Treatment Position  sitting in chair/recliner  -AF  in bed  -LH,NM,LH2     Post Treatment Position  bed  -AF  wheelchair  -LH,NM,LH2     In Bed  supine;call light within reach;encouraged to call for assist;exit alarm on;notified nsg in AM  -AF  supine;call light within reach;encouraged to call for assist;exit alarm on;patient within staff view in PM  -LH,NM,LH2     In Wheelchair  sitting;with family/caregiver;with PT iN PM  -AF  sitting;exit alarm on;with family/caregiver  -LH,NM,LH2     Recorded by [AF] Reina Perera, OTR [LH,NM,LH2] Chelsie Cline, PT (r) Jaxon Atkinson, PT Student (t) Chelsie Cline, PT (c)       User Key  (r) = Recorded By, (t) = Taken By, (c) = Cosigned By    Initials Name Effective Dates     Chelsie Cline, PT 04/03/18 -     AF Reina Perera, OTR 04/03/18 -     NM Jaxon Atkinson, PT Student 01/03/20 -         Wound 12/09/19 1510 abdomen Incision (Active)   Dressing Appearance dry;intact 2/12/2020  9:00 AM   Base clean;dry;other (see comments) 2/11/2020  9:34 PM   Periwound intact;dry 2/11/2020  9:34 PM           PT Recommendation and Plan        Daily Summary of Progress (PT)  Recommendations: Physical Therapy: discussed pt's status c MD this morning related to decline in functional mobility and strength in BLE. Per MD pt to start on IVIG                Time Calculation:     PT Charges     Row Name 02/12/20 1602 02/12/20 1050          Time Calculation    Start Time  1400  -LH (r) NM (t) LH (c)  0830  -LH (r) NM (t) LH (c)     Stop Time  1430  -LH (r) NM (t) LH (c)  0930  -LH (r) NM (t) LH (c)     Time Calculation (min)  30 min  -LH (r) NM (t)  60 min  -LH (r) NM (t)     PT Received On  02/12/20  -LH (r) NM (t) LH (c)  02/12/20  -LH (r) NM (t) LH (c)     PT - Next Appointment  02/13/20  -LH (r) NM (t) LH (c)  --       User Key  (r) = Recorded By, (t) = Taken By, (c) = Cosigned By     Initials Name Provider Type     Chelsie Cline, PT Physical Therapist    NM Jaxon Atkinson, PT Student PT Student          Therapy Charges for Today     Code Description Service Date Service Provider Modifiers Qty    34787734715 HC PT THER PROC EA 15 MIN 2/11/2020 Jaxon Atkinson, PT Student GP 2    66750673626 HC GAIT TRAINING EA 15 MIN 2/11/2020 Jaxon tAkinson, PT Student GP 1    09439761335 HC PT THERAPEUTIC ACT EA 15 MIN 2/11/2020 Jaxon Atkinson, PT Student GP 1    27172363257 HC PT NEUROMUSC RE EDUCATION EA 15 MIN 2/11/2020 Jaxon Atkinson, PT Student GP 2    65755901284 HC GAIT TRAINING EA 15 MIN 2/12/2020 Jaxon Atkinson, PT Student GP 2    33122498982 HC PT THERAPEUTIC ACT EA 15 MIN 2/12/2020 Jaxon Atkinson, PT Student GP 1    81366260696 HC PT THER PROC EA 15 MIN 2/12/2020 Jaxon Atkinson, PT Student GP 2    76060205261 HC PT NEUROMUSC RE EDUCATION EA 15 MIN 2/12/2020 Jaxon Atkinson, PT Student GP 1                   Jaxon Atkinson, PT Student  2/12/2020

## 2020-02-12 NOTE — PROGRESS NOTES
Patient completed IVIG.  As she has the midline in, will plan to resume the last 3 of the iron infusions that she did not get previously with the peripheral IV.  Will start tomorrow scheduled every other day for total of 3 doses.

## 2020-02-12 NOTE — PLAN OF CARE
Problem: Patient Care Overview  Goal: Plan of Care Review  Outcome: Ongoing (interventions implemented as appropriate)  Flowsheets (Taken 2/12/2020 8866)  Outcome Summary: Attended therapies and cooperative with staff.  Pt. is A&Ox4.  Ileostomy intact with loose, watery stool noted.  Voids per bedpan when in bed.  Takes medications whole with water.   at bedside.  No safety issues noted.  Progress, Functional Goals: demonstrating adequate progress  Plan of Care Reviewed With: patient  IRF Plan of Care Review: progress ongoing, continue     Problem: Patient Care Overview  Goal: Individualization and Mutuality  Outcome: Ongoing (interventions implemented as appropriate)  Flowsheets (Taken 2/12/2020 7576)  Patient Specific Goals (Include Timeframe): To get stronger and go home.

## 2020-02-12 NOTE — PLAN OF CARE
Problem: Patient Care Overview  Goal: Plan of Care Review  Outcome: Ongoing (interventions implemented as appropriate)  Flowsheets (Taken 2/11/2020 1920)  Outcome Summary: pt alert and oriented. worked with therapies. no neuro changes. continent. ileostomy bag intact. gabapentin scheduled. last dose of IVIg given today, tolerated well. midline IV remains intact. will continue to monitor.  Progress, Functional Goals: demonstrating adequate progress  Plan of Care Reviewed With: patient  IRF Plan of Care Review: progress ongoing, continue

## 2020-02-12 NOTE — PROGRESS NOTES
Occupational Therapy: Branch    Physical Therapy: Individual: 90 minutes.    Speech Language Pathology:  Branch    Signed by: Jaxon Atkinson PT Student     - CoSigned By: hCelsie Cline PT 2/12/2020 4:12:56 PM

## 2020-02-13 PROCEDURE — 97112 NEUROMUSCULAR REEDUCATION: CPT

## 2020-02-13 PROCEDURE — 25010000002 IRON SUCROSE PER 1 MG: Performed by: PHYSICAL MEDICINE & REHABILITATION

## 2020-02-13 PROCEDURE — 97110 THERAPEUTIC EXERCISES: CPT

## 2020-02-13 PROCEDURE — 63710000001 ONDANSETRON ODT 4 MG TABLET DISPERSIBLE: Performed by: PHYSICAL MEDICINE & REHABILITATION

## 2020-02-13 PROCEDURE — 97116 GAIT TRAINING THERAPY: CPT

## 2020-02-13 PROCEDURE — 97530 THERAPEUTIC ACTIVITIES: CPT

## 2020-02-13 PROCEDURE — 97535 SELF CARE MNGMENT TRAINING: CPT

## 2020-02-13 RX ORDER — ACETAMINOPHEN 325 MG/1
650 TABLET ORAL EVERY 6 HOURS PRN
Status: DISCONTINUED | OUTPATIENT
Start: 2020-02-13 | End: 2020-02-27 | Stop reason: HOSPADM

## 2020-02-13 RX ADMIN — IRON SUCROSE 200 MG: 20 INJECTION, SOLUTION INTRAVENOUS at 15:13

## 2020-02-13 RX ADMIN — GABAPENTIN 300 MG: 300 CAPSULE ORAL at 07:47

## 2020-02-13 RX ADMIN — GABAPENTIN 300 MG: 300 CAPSULE ORAL at 22:10

## 2020-02-13 RX ADMIN — SODIUM CHLORIDE, PRESERVATIVE FREE 10 ML: 5 INJECTION INTRAVENOUS at 16:25

## 2020-02-13 RX ADMIN — ACETAMINOPHEN 650 MG: 325 TABLET, FILM COATED ORAL at 17:22

## 2020-02-13 RX ADMIN — LOPERAMIDE HYDROCHLORIDE 2 MG: 2 CAPSULE ORAL at 07:48

## 2020-02-13 RX ADMIN — Medication 18 MG: at 07:49

## 2020-02-13 RX ADMIN — LOPERAMIDE HYDROCHLORIDE 2 MG: 2 CAPSULE ORAL at 18:04

## 2020-02-13 RX ADMIN — CHOLECALCIFEROL TAB 125 MCG (5000 UNIT) 5000 UNITS: 125 TAB at 07:48

## 2020-02-13 RX ADMIN — SODIUM CHLORIDE, PRESERVATIVE FREE 10 ML: 5 INJECTION INTRAVENOUS at 22:15

## 2020-02-13 RX ADMIN — SODIUM BICARBONATE 1300 MG: 650 TABLET ORAL at 16:24

## 2020-02-13 RX ADMIN — SODIUM CHLORIDE, PRESERVATIVE FREE 10 ML: 5 INJECTION INTRAVENOUS at 07:47

## 2020-02-13 RX ADMIN — SODIUM BICARBONATE 1300 MG: 650 TABLET ORAL at 22:10

## 2020-02-13 RX ADMIN — PANTOPRAZOLE SODIUM 40 MG: 40 TABLET, DELAYED RELEASE ORAL at 05:58

## 2020-02-13 RX ADMIN — Medication 1 TABLET: at 07:48

## 2020-02-13 RX ADMIN — GABAPENTIN 300 MG: 300 CAPSULE ORAL at 18:05

## 2020-02-13 RX ADMIN — SODIUM BICARBONATE 1300 MG: 650 TABLET ORAL at 07:48

## 2020-02-13 RX ADMIN — LOPERAMIDE HYDROCHLORIDE 2 MG: 2 CAPSULE ORAL at 11:34

## 2020-02-13 RX ADMIN — ONDANSETRON 4 MG: 4 TABLET, ORALLY DISINTEGRATING ORAL at 17:15

## 2020-02-13 RX ADMIN — GABAPENTIN 300 MG: 300 CAPSULE ORAL at 03:03

## 2020-02-13 RX ADMIN — SODIUM CHLORIDE, PRESERVATIVE FREE 10 ML: 5 INJECTION INTRAVENOUS at 07:48

## 2020-02-13 RX ADMIN — Medication 18 MG: at 22:10

## 2020-02-13 RX ADMIN — ONDANSETRON 4 MG: 4 TABLET, ORALLY DISINTEGRATING ORAL at 09:21

## 2020-02-13 RX ADMIN — GABAPENTIN 300 MG: 300 CAPSULE ORAL at 12:02

## 2020-02-13 NOTE — PLAN OF CARE
Patient is cooperative. PRN Zofran given for complains of nausea and PRN gabapentin given for breakthrough pain with positive result. Using the call light for assistance. Continent of B/B. Using the bed pan during the night. Emptied ileostomy bag, stool is liquid and light brown with some food particles. Continues with tingling and weak  in both hand. Turning self to the right or left side during ADL's. Preferring to sleep supine with bilateral legs elevated on pillow. Will continue to monitor.

## 2020-02-13 NOTE — PROGRESS NOTES
LOS: 20 days   Patient Care Team:  Armando Valentine MD as PCP - General (Internal Medicine)  Lisa Arriaza MD as Consulting Physician (Obstetrics and Gynecology)    Chief Complaint: GBS    Subjective     History of Present Illness     She continues to feel improvement with B hand strength and dexterity and gait distance. Went around gym three times.    .  History taken from: patient chart    Objective     Vital Signs  Temp:  [97.5 °F (36.4 °C)-99.2 °F (37.3 °C)] 99.2 °F (37.3 °C)  Heart Rate:  [76-97] 97  Resp:  [16-18] 18  BP: ()/(56-74) 110/56    Physical Exam:  General: Awake, alert, NAD  HEENT: normocepahlic, atraumatic   Heart: RRR, no m/r/g  Lungs: CTAB, no wheezing, rales, or rhonchi  Abdomen: soft, non-tender, non-distended, colostomy , incision dressed  Strength:   BUE: 4/5 with bilateral elbow flexion, elbow extension, wrist extension, and finger flexion, right greater than left weak hand intrinsics R 3-/5, L 3+/5  Difficulty opposing right thumb to digits 2-3-4-5 but better.   Opposes left thumb to digits 2-3-4-5.   BLE:  HF right 3/5, left 3/5,  knee extension right 4/5, left  4/5, ankle dorsiflexion B 3/5  Extremities: Right antecubital fossa with no erythema and smaller residual cord.  No significant tenderness.    Results Review:     I reviewed the patient's new clinical results.  Results from last 7 days   Lab Units 02/12/20  0610 02/10/20  0614 02/07/20  0627   WBC 10*3/mm3 6.53 5.65 5.64   HEMOGLOBIN g/dL 7.3* 8.4* 9.0*   HEMATOCRIT % 22.7* 26.0* 27.6*   PLATELETS 10*3/mm3 440 468* 485*     Results from last 7 days   Lab Units 02/10/20  0614 02/07/20  0627   SODIUM mmol/L 140 136   POTASSIUM mmol/L 3.8 3.7   CHLORIDE mmol/L 106 105   CO2 mmol/L 21.2* 18.1*   BUN mg/dL 22 16   CREATININE mg/dL 0.71 0.69   CALCIUM mg/dL 8.8 8.5*   GLUCOSE mg/dL 91 109*       Medication Review:   Scheduled Meds:    Iron 18 mg Sublingual BID   gabapentin 300 mg Oral 4x Daily   iron sucrose 200 mg  Intravenous Q48H   loperamide 2 mg Oral TID AC   MULTIVITAMIN ADULT 1 tablet Sublingual Daily   pantoprazole 40 mg Oral Q AM   sodium bicarbonate 1,300 mg Oral TID   sodium chloride 10 mL Intravenous Q12H   sodium chloride 10 mL Intravenous Q12H   Cyanocobalamin 2,500 mcg Sublingual Weekly   Vitamin D-3 5,000 Units Sublingual Daily     Continuous Infusions:   PRN Meds:.•  aluminum sulfate-calcium acetate  •  gabapentin **AND** gabapentin  •  miconazole  •  ondansetron ODT  •  sodium chloride  •  sodium chloride    Assessment/Plan   75 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     GBS  -Completed her plasmapharesis and has gotten good return.   - Will begin PT/OT for ADLS, strength, mobility, txs, gait.   -January 27: On gabapentin 300mg QID. Will continue to monitor and will titrate up as needed.  -Jan 29 -  Feels strength is somewhat better.  Worked on gait with rolling walker yesterday 15 feet. Today utilized ankle weights to decrease ataxic movements with gait. Transfers mod max assist. Bed mobility CTG.  - Feb 6 - Patient complains of increased numbness in her knees.  She is noted to have increased weakness with her hip extensors and possibly in the left quadricep.  Her ambulation distance is less and takes more effort with a shorter stride.  On examination she also appears weaker in her hands and ankles.  Discussed having neurology see her to assess for possibly IVIG or another round of plasmapheresis.  -Feb 7 - increased weakness noted in BUE and BLE and more difficulty with mobility - Neurology starting patient on course of IVIG   -February 10-shows good response on IVIG-tolerating.  Notes improvement in her strength in the upper extremities and lower extremities as well as performance with mobility and self-care.  -February 11-continues to show improvement on IVIG  Feb 12-functional improvement after IVIG with improvement in her handgrip, feeding, transfers, and ambulation as well as improvement  in her strength.    Crohn's  -s/p recent colostomy- wound nurse to see and education for home management.   -If more than 1 liter output a day, needs Immodium-per Dr. Albrecht colorectal surgeon.     Hyponatremia  -On salt tabs and fluid restriction. Renal following  -January 27: Na 130. Continue salt tabs and fluid restriction per renal. Will continue to monitor  -January 30: Na 133. Continue sodium bicarbonate and fluid restriction per renal.  -Feb 3- Na improved to 138  -February 4-fluid restriction discontinued.  -February 5-sodium 137  -February 10-sodium 140.  Sodium chloride tablets were discontinued on February 8.  Continues on sodium bicarb 1300 mg 3 times a day.    Anemia  -January 27: Hgb/Hct 7.6/24.3- stable. No signs of active bleeding. Will order fecal occult and reticulocyte count. Will continue to monitor.  -Jan 28 - hemoccult positive  -Jan 29 - Stool heme +.  HGB stable at 7.5. She had hypotension and tachycardia yesterday 87/64 and 110) , better today (99/63 and 88).  Reviewed option of transfusion PRBCs. She wished to hold on transfusion unless absolutely necessary. Discussed if HGB < 7, would recommend definitely transfuse.  -January 30: Repeat CBC scheduled for tomorrow. Will continue to monitor.    -January 31: Hgb 7.2. Will transfuse 1 unit of PRBCs today. Ordered anemia studies. Spoke with Dr. Albrecht and if iron supplementation is required she recommends IV iron for better absorption.  -February 1: Hemoglobin improved 8.8.  IV iron infusion ordered by nephrology  -Feb 3 - HGB improved 9.5. Not tolerate IV iron infusion due to burning in vein, does not wish to have placed a more proximal IV. She had tow infusions. She will get EZ Melt Iron from outside to take by mouth; on discussion with colorectal surgery last week she should be able to absorb PO iron.  February 5-hemoglobin 8.5  February 10-hemoglobin 8.4  February 12-hemoglobin 7.3-As she has the midline in, will plan to resume the last 3 of  the iron infusions that she did not get previously with the peripheral IV.  Will start tomorrow scheduled every other day for total of 3 doses.  Recheck hemoglobin on Friday.    DVT prophylaxis  -SCDS for now.   -January 27: Heparin has been on hold due to HGB trending down. Following results of fecal occult and reticulocyte count will consider restarting Heparin for DVT prophylaxis.   -January 28 - hemoccult positive.     Vitamin D deficiency-vitamin D 22.4  on January 29.    Feb 1 - Patient does not wish to take vitamin D p.o. through the hospital supply.  Wishes to have her vitamin D brought in from home.    Vitamin B12 replacement-sublingual from home    RUE antecubital fossa phlebitis/cellulitis-February 3-no sign of infection. K-PAD.   February 4-Right antecubital fossa phlebitis with cord palpable/tender with surrounding erythema consistent with cellulitis. Allergy to Keflex - throat swelled. Will start Doxycycline 100 mg bid x 7 days, continue K-pad.   February 5-She continues to have pain and redness and swelling of the right antecubital fossa and distal upper arm.  Reviewed continue with doxycycline which was started yesterday but if there is no show improvement will look to adjust antibiotic tomorrow.  White blood cell count is 7.22 with normal differential..  Feb 7 - area improved today on doxycycline.  February 10-further improvement.    TEAM CONF - JAN 28 - FLAT AFFECT. SUPINE SIT MIN ASSIST. TRANSFERS MAX 2. NONAMBULATORY. UBB MIN. LBB MAX. UBD MOD.  LBD DEP. ABD WOUND CARE. STOOL HEMOCCULT POSITIVE.  ELOS - 5 WEEKS.     TEAM CONF - FEB 4 - BED SBA. TRANSFERS MIN-MOD. GAIT 30 FEET MIN 2 FIDEL WALKER. UBD MOD. LBD DEP. ON 1200 CC FLUID RESTRICTION. EATING OKAY.  ABD WOUND CLOSING. OSTOMY DEVICE STAYING ON.  CONTINENT. NEEDS TO GET UP TO BSC.   ELOS- 4 WEEKS.     February 10-she had shown a decline in her mobility and self-care with flare of Guillain-Barré syndrome but has shown response on IVIG.   Improving.  Most recently in physical therapy and Occupational Therapy-toilet transfers moderate assist, shower transfer to be assessed, bathing minimum assist upper body and maximum assist lower body, dressing moderate assist upper body independent lower body.  Grooming minimum assist.  Toileting dependent.  Eating with minimal assist to set up with built up utensils.  Bed mobility contact-guard.  Ambulated 25 feet moderate assist of 2 with Aircast bilateral ankles.    TEAM CONF - FEB 11 - She feels IVIG has been helpful.  She notes improvement in the strength in her hands and in her legs.  Easier transfers.  Walk better.  She had had a decline in her ability to do to box and blocks but that improved yesterday.  Tolerating IVIG.   describes her performance as 180 degree change from Friday.  In physical therapy and Occupational Therapy-toilet transfers moderate assist, shower transfer to be assessed, bathing minimum assist upper body and maximum assist lower body, dressing moderate assist upper body independent lower body.  Grooming minimum assist.  Toileting dependent.  Eating with minimal assist to set up with built up utensils.  Bed mobility contact-guard.  Ambulated 25 feet moderate assist of 2 with Aircast bilateral ankles.   Continent bladder. Abd wound improving.   ELOS - 3 WEEKS    Adolfo Valle MD  02/13/20  3:34 PM

## 2020-02-13 NOTE — THERAPY TREATMENT NOTE
Inpatient Rehabilitation - Physical Therapy Treatment Note  [facility]     Patient Name: She López  : 1945  MRN: [id number]    Today's Date: 2020  Onset of Illness/Injury or Date of Surgery: 20              Admit Date: 2020      Visit Dx:      ICD-10-CM ICD-9-CM   1. General weakness R53.1 780.79       Patient Active Problem List   Diagnosis   • Crohn's disease of small intestine with other complication (CMS/HCC)   • Type 2 diabetes mellitus without complication (CMS/HCC)   • RSD upper limb   • Neuropathic pain of hand   • Hyperlipidemia   • Cervical myelopathy (CMS/HCC)   • Central pain syndrome   • Carpal tunnel syndrome   • Vitamin B 12 deficiency   • Guillain Barré syndrome (CMS/HCC)       Therapy Treatment    IRF Treatment Summary     Row Name 20 1516 20 0800          Evaluation/Treatment Time and Intent    Subjective Information  no complaints  -AF  no complaints  -LH,NM,LH2     Existing Precautions/Restrictions  fall  -AF  fall  -LH,NM,LH2     Document Type  therapy note (daily note)  -AF  therapy note (daily note)  -LH,NM,LH2     Mode of Treatment  occupational therapy  -AF  physical therapy  -LH,NM,LH2     Patient/Family Observations  sitting up in w/c in AM, supine in bed in PM,  present for both sessions  -AF  supine in bed;  present; no acute distress   -LH,NM,LH2     Recorded by [AF] Reina Perera, OTR [LH,NM,LH2] Chelsie Cline, PT (r) Jaxon Atkinson PT Student (t) Chelsie Cline, PT (c)     Row Name 20 1516 20 0800          Cognition/Psychosocial- PT/OT    Affect/Mental Status (Cognitive)  WFL  -AF  WFL  -LH,NM,LH2     Orientation Status (Cognition)  oriented x 4  -AF  oriented x 4  -LH,NM,LH2     Follows Commands (Cognition)  WFL  -AF  WFL  -LH,NM,LH2     Personal Safety Interventions  fall prevention program maintained;gait belt;nonskid shoes/slippers when out of bed  -AF  fall prevention program maintained;gait  belt;nonskid shoes/slippers when out of bed;supervised activity  -LH,NM,LH2     Safety Deficit (Cognitive)  insight into deficits/self awareness  -AF  --     Recorded by [AF] Reina Perera, OTR [LH,NM,LH2] Chelsie Cline, PT (r) Jaxon Atkinson, PT Student (t) Chelsie Cline, PT (c)     Row Name 02/13/20 1516 02/13/20 0800          Bed Mobility Assessment/Treatment    Supine-Sit [place] (Bed Mobility)  supervision  -AF  supervision  -LH,NM,LH2     Sit-Supine [place] (Bed Mobility)  supervision  -AF  supervision  -LH,NM,LH2     Bed Mobility, Safety Issues  --  impaired trunk control for bed mobility;decreased use of legs for bridging/pushing;decreased use of arms for pushing/pulling able to lift LEs c shoes on to move EOB; control impaired    -LH,NM,LH2     Assistive Device (Bed Mobility)  bed rails  -AF  bed rails;head of bed elevated  -LH,NM,LH2     Recorded by [AF] Reina Perera, OTR [LH,NM,LH2] Chelsie Cline, PT (r) Jaxon Atkinson, PT Student (t) Chelsie Cline, PT (c)     Row Name 02/13/20 1516 02/13/20 0800          Transfer Assessment/Treatment    Comment (Transfers)  sit to stand at counter top wtih MOD A   -AF  [place]oreen-modA x 2 for sit<>stands in // bars c feet on foam cushion and stabilized wobble disc  -LH,NM,LH2     Recorded by [AF] Reina Perera, OTR [LH,NM,LH2] Chelsie Cline, PT (r) Jaxon Atkinson, PT Student (t) Chelsie Cline, PT (c)     Row Name 02/13/20 1516 02/13/20 0800          Bed-Chair Transfer    Bed-Chair [place] (Transfers)  moderate assist (50% patient effort);minimum assist (75% patient effort)  -AF  minimum assist (75% patient effort);verbal cues vc for placement; squat pivot   -LH,NM,LH2     Assistive Device (Bed-Chair Transfers)  wheelchair  -AF  wheelchair  -LH,NM,LH2     Recorded by [AF] Reina Perera, OTR [LH,NM,LH2] Chelsie Cline, PT (r) Jaxon Atkinson PT Student (t) Chelsie Cline, PT (c)     Row Name 02/13/20 1516             Chair-Bed Transfer     Chair-Bed [place] (Transfers)  moderate assist (50% patient effort);minimum assist (75% patient effort);verbal cues  -AF      Assistive Device (Chair-Bed Transfers)  wheelchair  -AF      Recorded by [AF] Reina Perera OTR      Row Name 02/13/20 0800             Sit-Stand Transfer    Sit-Stand [place] (Transfers)  minimum assist (75% patient effort);2 person assist;verbal cues;nonverbal cues (demo/gesture)  -LH,NM,LH2      Assistive Device (Sit-Stand Transfers)  wheelchair heavy duty rollator   -LH,NM,LH2      Recorded by [LH,NM,LH2] Chelsie Cline, PT (r) Jaxon Atkinson, PT Student (t) Chelsie Cline, PT (c)      Row Name 02/13/20 0800             Stand-Sit Transfer    Stand-Sit [place] (Transfers)  minimum assist (75% patient effort);2 person assist;verbal cues  -LH,NM,LH2      Assistive Device (Stand-Sit Transfers)  wheelchair heavy duty rollator   -LH,NM,LH2      Recorded by [LH,NM,LH2] Chelsie Cline, PT (r) Jaxon Atkinson, PT Student (t) Chelsie Cline, PT (c)      Row Name 02/13/20 0800             Stand Pivot/Stand Step Transfer    Stand Pivot/Stand Step [place]  moderate assist (50% patient effort);verbal cues;nonverbal cues (demo/gesture) wc<>nustep   -LH,NM,LH2      Assistive Device (Stand Pivot Stand Step Transfer)  wheelchair  -LH,NM,LH2      Recorded by [LH,NM,LH2] Chelsie Cline, PT (r) Jaxon Atkinson, PT Student (t) Chelsie Cline, PT (c)      Row Name 02/13/20 1516             Toilet Transfer    Type (Toilet Transfer)  stand pivot/stand step  -AF      [place] Level (Toilet Transfer)  minimum assist (75% patient effort);moderate assist (50% patient effort);verbal cues  -AF      Assistive Device (Toilet Transfer)  commode;grab bars/safety frame;wheelchair  -AF      Recorded by [AF] Reina Perera OTR      Row Name 02/13/20 0800             Gait/Stairs Assessment/Training    [place] Level (Gait)  minimum assist (75% patient effort);2 person assist;verbal  cues;nonverbal cues (demo/gesture) tactile cues to ext hips to dec fwd lean as pt fatigues   -LH,NM,LH2      Assistive Device (Gait)  walker, 4-wheeled heavy duty rollator; aircasts BLE   -LH,NM,LH2      Distance in Feet (Gait)  70', 90', 60' and 50'  -LH,NM,LH2      Pattern (Gait)  step-through  -LH,NM,LH2      Deviations/Abnormal Patterns (Gait)  bilateral deviations;ataxic;stride length decreased;gait speed decreased  -LH,NM,LH2      Bilateral Gait Deviations  heel strike decreased;forward flexed posture;weight shift ability decreased  -LH,NM,LH2      Comment (Gait/Stairs)  pt flexes fwd as she fatigues requiring tactile/vc to ext hips. third person following c wc. demos steppage gait c fatigue at which time pt rests. CGA for brief periods over first 10-15 feet.  increased fatigue in PM: amb 1x in PM (50')  -LH,NM,LH2      Recorded by [LH,NM,LH2] Chelsie Cline, PT (r) Jaxon Atkinson, YUSUF Student (t) Chelsie Cline, PT (c)      Row Name 02/13/20 [facility]6             Bathing Assessment/Treatment    Bathing [place] Level  upper body;contact guard assist;verbal cues feet  -AF      Bathing Position  sink side;supported sitting  -AF      Bathing Setup Assistance  obtain supplies  -AF      Recorded by [AF] Reina Perera OTR      Row Name 02/13/20 1516             Upper Body Dressing Assessment/Treatment    Upper Body Dressing Task  upper body dressing skills;minimum assist (75% or more patient effort);verbal cues  -AF      Upper Body Dressing Position  supported sitting  -AF      Set-up Assistance (Upper Body Dressing)  obtain clothing  -AF      Recorded by [AF] Reina Perera OTR      Row Name 02/13/20 1516             Lower Body Dressing Assessment/Treatment    Lower Body Dressing [place] Level  don;doff;socks;shoes/slippers;maximum assist (25% patient effort);verbal cues  -AF      Lower Body Dressing Position  supported sitting  -AF      Lower Body Dressing Setup Assistance  obtain clothing  -AF       Recorded by [AF] Reina Perera OTR      Row Name 02/13/20 1516             Grooming Assessment/Treatment    Grooming [place] Level  grooming skills;set up;supervision;verbal cues  -AF      Assistive Device (Grooming)  built-up handle items  -AF      Grooming Position  sink side;supported sitting  -AF      Grooming Setup Assistance  obtain supplies  -AF      Recorded by [AF] Reina Perera OTR      Row Name 02/13/20 1516             Toileting Assessment/Treatment    Toileting [place] Level  toileting skills;maximum assist (25% patient effort);verbal cues  -AF      Assistive Device Use (Toileting)  grab bar/safety frame;raised toilet seat  -AF      Toileting Position  unsupported sitting;supported standing  -AF      Comment (Toileting)  increased ability with standing balance during clothing management tasks   -AF      Recorded by [AF] Reina Perera OTR      Row Name 02/13/20 1516             Hand  Strength Testing    Right Hand, Setting 1 (Dynamometer Testing)  4  -AF      Left Hand, Setting 1 (Dynamometer Testing)  20  -AF      Recorded by [AF] Reina Perera OTR      Row Name 02/13/20 1516             Fine Motor Testing & Training    Comment, Fine Motor Coordination  [facility] theraputty yellow to increase strength for ADL tasks. able to place and remove pegs 2 at a time with alternating hands with MOD A  -AF      Recorded by [AF] Reina Perera OTR      Row Name 02/13/20 1516 02/13/20 0800          Pain Scale: Numbers Pre/Post-Treatment    Pain Scale: Numbers, Pretreatment  0/10 - no pain  -AF  0/10 - no pain  -LH,NM,LH2     Pain Scale: Numbers, Post-Treatment  0/10 - no pain  -AF  0/10 - no pain  -LH,NM,LH2     Pre/Post Treatment Pain Comment  --  tingling, pins and needles B hands intermittently   -LH,NM,LH2     Recorded by [AF] Reina Perera OTR [LH,NM,LH2] Chelsie Cline, PT (r) Jaxon Atkinson PT Student (t) Chelsie Cline, PT (c)     Row Name 02/13/20 1516              Static Standing Balance    Level of [place] (Supported Standing, Static Balance)  minimal assist, 75% patient effort  -AF      Time Able to Maintain Position (Supported Standing, Static Balance)  2 to 3 minutes x 2 trials   -AF      Assistive Device Utilized (Supported Standing, Static Balance)  walker, standard  -AF      Recorded by [AF] Reina Perera OTR      Row Name 02/13/20 0800             Standing Balance Activity    Activities Performed (Standing, Balance Training)  standing on foam roll;standing on balance board  -LH,NM,LH2      Support Needed for Balance (Standing, Balance Training)  CGA;minimal external support for balance, 75% patient effort;2 person assist  -LH,NM,LH2      Progressive Balance Activity (Standing, Balance Training)  eyes closed during activity  -LH,NM,LH2      Comment (Standing, Balance Training)  in // bars, began c standing on firm surface c BUE support, progressing to single UE support and w/out UE support, eyes open and closed. Performed standing on foam square and wobble disc c CGA-Doreen x 2. AP and bilat weight shifting. Increased sway c eyes closed and increased assist on wobble disc. Initall standing balance improved and pt positioned self in midline.  -LH,NM,LH2      Recorded by [LH,NM,LH2] Chelsie Cline, PT (r) Jaxon Atkinson PT Student (t) Chelsie Cline, PT (c)      Row Name 02/13/20 1516             Upper Extremity Seated Therapeutic Exercise    Performed, Seated Upper Extremity (Therapeutic Exercise)  shoulder flexion/extension;scapular protraction/retraction;elbow flexion/extension;forearm supination/pronation;wrist flexion/extension;shoulder horizontal abduction/adduction  -AF      Device, Seated Upper Extremity (Therapeutic Exercise)  -- #2 dowel pa, #2 hand weight  -AF      Exercise Type, Seated Upper Extremity (Therapeutic Exercise)  AROM (active range of motion);resistive exercise  -AF      Expected Outcomes, Seated Upper Extremity (Therapeutic Exercise)   improve functional tolerance, self-care activity;improve performance, BADLs;improve performance, fine motor coordination skills;improve performance, transfer skills  -AF      Sets/Reps Detail, Seated Upper Extremity (Therapeutic Exercise)  2/20  -AF      Transfers Skills, Training to Functional Activity, Seated Upper Extremity (Therapeutic Exercise)  beginning to transfer skills to functional activity;transfers skills to functional activity most of the time  -AF      Comment, Seated Upper Extremity (Therapeutic Exercise)  rest breaks   -AF      Recorded by [AF] Reina Perera OTR      Row Name 02/13/20 0800             Aerobic Exercise Activity    Exercise Performed (Aerobic, Therapeutic Exercise)  recumbent elliptical   -LH,NM,LH2      Expected Outcome (Aerobic, Therapeutic Exercise)  improve neuromuscular control;improve motor control;improve performance, gait skills  -LH,NM,LH2      Restrictions (Aerobic, Therapeutic Exercise)  ace wrap L foot; B LE aircasts  -LH,NM,LH2      Time (Aerobic, Therapeutic Exercise)  7  -LH,NM,LH2      Comment (Aerobic, Therapeutic Exercise)  3 min level 4, 5 min level 2 using BUE and BLE. required 2 rest breaks.  HR up to 129bpm, O2 sats 98% on room air   -LH,NM,LH2      Recorded by [LH,NM,LH2] Chelsie Cline, PT (r) Jaxon Atkinson, PT Student (t) Chelsie Cline, PT (c)      Row Name 02/13/20 0800             Lower Extremity Seated Therapeutic Exercise    Performed, Seated Lower Extremity (Therapeutic Exercise)  hip abduction/adduction;knee flexion/extension  -LH,NM,LH2      Exercise Type, Seated Lower Extremity (Therapeutic Exercise)  AROM (active range of motion)  -LH,NM,LH2      Sets/Reps Detail, Seated Lower Extremity (Therapeutic Exercise)  2/10  -LH,NM,LH2      Recorded by [LH,NM,LH2] Chelsie Cline, PT (r) Jaxon Atkinson, PT Student (t) Chelsie Cline, PT (c)      Row Name 02/13/20 1516             Neuromuscular Re-education    Comment (Neuromuscular Re-education)   gross grasp B'ly reaching wtih bean bag toss with MIN A for sitting balance   -AF      Recorded by [AF] Reina Perera, ALPESH      Row Name 02/13/20 1516 02/13/20 0800          Positioning and Restraints    Pre-Treatment Position  sitting in chair/recliner  -AF  in bed  -LH,NM,LH2     Post Treatment Position  bed  -AF  wheelchair  -LH,NM,LH2     In Bed  supine;call light within reach;encouraged to call for assist;exit alarm on in AM  -AF  --     In Wheelchair  sitting;with PT;exit alarm on with PT  -AF  exit alarm on;with family/caregiver  -LH,NM,LH2     Recorded by [AF] Reina Perera, OTR [LH,NM,LH2] Chelsie Cline, PT (r) Jaxon Atkinson, PT Student (t) Chelsie Cline, PT (c)       User Key  (r) = Recorded By, (t) = Taken By, (c) = Cosigned By    Initials Name Effective Dates     Chelsie Cline, PT 04/03/18 -     AF Reina Perera, OTR 04/03/18 -     NM Jaxon Atkinson, PT Student 01/03/20 -         Wound 12/09/19 1510 abdomen Incision (Active)   Dressing Appearance open to air 2/13/2020  9:15 AM   Closure None 2/13/2020  9:15 AM   Base clean;dry 2/13/2020  9:15 AM   Periwound intact 2/13/2020  9:15 AM   Edges open 2/13/2020  9:15 AM   Drainage Amount none 2/13/2020  9:15 AM   Dressing Care, Wound open to air 2/13/2020  9:15 AM           PT Recommendation and Plan        Daily Summary of Progress (PT)  Recommendations: Physical Therapy: discussed pt's status c MD this morning related to decline in functional mobility and strength in BLE. Per MD pt to start on IVIG                Time Calculation:     PT Charges     Row Name 02/13/20 1507 02/13/20 1126          Time Calculation    Start Time  1330  -LH (r) NM (t) LH (c)  0800  -LH (r) NM (t) LH (c)     Stop Time  1400  -LH (r) NM (t) LH (c)  0900  -LH (r) NM (t) LH (c)     Time Calculation (min)  30 min  -LH (r) NM (t)  60 min  -LH (r) NM (t)     PT Received On  02/13/20  -LH (r) NM (t) LH (c)  02/13/20  -LH (r) NM (t) LH (c)     PT - Next Appointment   02/14/20  - (r) NM (t) LH (c)  --       User Key  (r) = Recorded By, (t) = Taken By, (c) = Cosigned By    Initials Name Provider Type     Chelsie Cline, PT Physical Therapist    NM Jaxon Atkinson, PT Student PT Student          Therapy Charges for Today     Code Description Service Date Service Provider Modifiers Qty    92318493301 HC GAIT TRAINING EA 15 MIN 2/12/2020 Jaxon Atkinson, PT Student GP 2    78932093355 HC PT THERAPEUTIC ACT EA 15 MIN 2/12/2020 Jaxon Atkinson, PT Student GP 1    76391314843 HC PT THER PROC EA 15 MIN 2/12/2020 Jaxon Atkinson, PT Student GP 2    32422950967 HC PT NEUROMUSC RE EDUCATION EA 15 MIN 2/12/2020 Jaxon Atkinson, PT Student GP 1    05421238224 HC PT THER PROC EA 15 MIN 2/13/2020 Jaxon Atkinson, PT Student GP 2    15425418181 HC GAIT TRAINING EA 15 MIN 2/13/2020 Jaxon Atkinson, PT Student GP 1    59151138278 HC PT NEUROMUSC RE EDUCATION EA 15 MIN 2/13/2020 Jaxon Atkinson, PT Student GP 2    19243657086 HC PT THERAPEUTIC ACT EA 15 MIN 2/13/2020 Jaxon Atkinson, PT Student GP 1                   Jaxon Atkinson, PT Student  2/13/2020

## 2020-02-13 NOTE — PROGRESS NOTES
Inpatient Rehabilitation Plan of Care Note    Plan of Care  Care Plan Reviewed - No updates at this time.    Psychosocial    Performed Intervention(s)  Verbalize needs and concerns  calm enviroment      Safety    Performed Intervention(s)  Bed alarm, wc alarm  Items within reach  Safety rounds      Sphincter Control    Performed Intervention(s)  Monitor intake and output  Encourage appropriate diet  wound ostomy nurse to continue to see pt for ostomy care.      Body Systems    Performed Intervention(s)  Daily skin inspection  Dressing change as ordered    Signed by: Romina العراقي RN

## 2020-02-13 NOTE — PROGRESS NOTES
Occupational Therapy: Branch    Physical Therapy: Individual: 90 minutes.    Speech Language Pathology:  Branch    Signed by: Jaxon Atkinson PT Student     - CoSigned By: Chelsie Cline PT 2/13/2020 4:05:22 PM

## 2020-02-13 NOTE — PROGRESS NOTES
"Weekly progress report reviewed with pt and .  Pt states she is feeling \"better\" and feels she is progressing in therapies.   No new issues identified.  "

## 2020-02-13 NOTE — PROGRESS NOTES
Inpatient Rehabilitation Plan of Care Note    Plan of Care  Care Plan Reviewed - No updates at this time.    Psychosocial    Performed Intervention(s)  Verbalize needs and concerns  calm enviroment      Safety    Performed Intervention(s)  Bed alarm, wc alarm  Items within reach  Safety rounds      Sphincter Control    Performed Intervention(s)  Monitor intake and output  Encourage appropriate diet  wound ostomy nurse to continue to see pt for ostomy care.      Body Systems    Performed Intervention(s)  Daily skin inspection    Signed by: Grace Moe RN

## 2020-02-13 NOTE — THERAPY TREATMENT NOTE
Inpatient Rehabilitation - Occupational Therapy Treatment Note    [facility]     Patient Name: She López  : 1945  MRN: [id number]    Today's Date: 2020  Onset of Illness/Injury or Date of Surgery: 20              Admit Date: 2020      Visit Dx:    ICD-10-CM ICD-9-CM   1. General weakness R53.1 780.79       Patient Active Problem List   Diagnosis   • Crohn's disease of small intestine with other complication (CMS/HCC)   • Type 2 diabetes mellitus without complication (CMS/HCC)   • RSD upper limb   • Neuropathic pain of hand   • Hyperlipidemia   • Cervical myelopathy (CMS/HCC)   • Central pain syndrome   • Carpal tunnel syndrome   • Vitamin B 12 deficiency   • Guillain Barré syndrome (CMS/HCC)         Therapy Treatment    IRF Treatment Summary     Row Name 20 1516 20 0800          Evaluation/Treatment Time and Intent    Subjective Information  no complaints  -AF  no complaints  -LH,NM,LH2     Existing Precautions/Restrictions  fall  -AF  fall  -LH,NM,LH2     Document Type  therapy note (daily note)  -AF  therapy note (daily note)  -LH,NM,LH2     Mode of Treatment  occupational therapy  -AF  physical therapy  -LH,NM,LH2     Patient/Family Observations  sitting up in w/c in AM, supine in bed in PM,  present for both sessions  -AF  supine in bed;  present; no acute distress   -LH,NM,LH2     Recorded by [AF] Reina Perera, OTR [LH,NM,LH2] Chelsie Cline, PT (r) Jaxon Atkinson PT Student (t) Chelsie Cline, PT (c)     Row Name 20 1516 20 0800          Cognition/Psychosocial- PT/OT    Affect/Mental Status (Cognitive)  WFL  -AF  WFL  -LH,NM,LH2     Orientation Status (Cognition)  oriented x 4  -AF  oriented x 4  -LH,NM,LH2     Follows Commands (Cognition)  WFL  -AF  WFL  -LH,NM,LH2     Personal Safety Interventions  fall prevention program maintained;gait belt;nonskid shoes/slippers when out of bed  -AF  fall prevention program maintained;gait  belt;nonskid shoes/slippers when out of bed;supervised activity  -LH,NM,LH2     Safety Deficit (Cognitive)  insight into deficits/self awareness  -AF  --     Recorded by [AF] Reina Perera, OTR [LH,NM,LH2] Chelsie Cline, PT (r) Jaxon Atkinson, PT Student (t) Chelsie Cline, PT (c)     Row Name 02/13/20 1516 02/13/20 0800          Bed Mobility Assessment/Treatment    Supine-Sit [place] (Bed Mobility)  supervision  -AF  supervision  -LH,NM,LH2     Sit-Supine [place] (Bed Mobility)  supervision  -AF  supervision  -LH,NM,LH2     Bed Mobility, Safety Issues  --  impaired trunk control for bed mobility;decreased use of legs for bridging/pushing;decreased use of arms for pushing/pulling able to lift LEs c shoes on to move EOB; control impaired    -LH,NM,LH2     Assistive Device (Bed Mobility)  bed rails  -AF  bed rails;head of bed elevated  -LH,NM,LH2     Recorded by [AF] Reina Perera, OTR [LH,NM,LH2] Chelsie Cline, PT (r) Jaxon Atkinson, PT Student (t) Chelsie Cline, PT (c)     Row Name 02/13/20 1516 02/13/20 0800          Transfer Assessment/Treatment    Comment (Transfers)  sit to stand at counter top wtih MOD A   -AF  [place]-modA x 2 for sit<>stands in // bars c feet on foam cushion and stabilized wobble disc  -LH,NM,LH2     Recorded by [AF] Reina Perera, OTR [LH,NM,LH2] Chelsie lCine, PT (r) Jaxon Atkinson, PT Student (t) Chelsie Cline, PT (c)     Row Name 02/13/20 1516 02/13/20 0800          Bed-Chair Transfer    Bed-Chair [place] (Transfers)  moderate assist (50% patient effort);minimum assist (75% patient effort)  -AF  minimum assist (75% patient effort);verbal cues vc for placement; squat pivot   -LH,NM,LH2     Assistive Device (Bed-Chair Transfers)  wheelchair  -AF  wheelchair  -LH,NM,LH2     Recorded by [AF] Reina Perera, OTR [LH,NM,LH2] Chelsie Cline, PT (r) Jaxon Atkinson PT Student (t) Chelsie Cline, PT (c)     Row Name 02/13/20 1516             Chair-Bed Transfer     Chair-Bed [place] (Transfers)  moderate assist (50% patient effort);minimum assist (75% patient effort);verbal cues  -AF      Assistive Device (Chair-Bed Transfers)  wheelchair  -AF      Recorded by [AF] Reina Perera OTR      Row Name 02/13/20 0800             Sit-Stand Transfer    Sit-Stand [place] (Transfers)  minimum assist (75% patient effort);2 person assist;verbal cues;nonverbal cues (demo/gesture)  -LH,NM,LH2      Assistive Device (Sit-Stand Transfers)  wheelchair heavy duty rollator   -LH,NM,LH2      Recorded by [LH,NM,LH2] Chelsie Cline, PT (r) Jaxon Atkinson, PT Student (t) Chelsie Cline, PT (c)      Row Name 02/13/20 0800             Stand-Sit Transfer    Stand-Sit [place] (Transfers)  minimum assist (75% patient effort);2 person assist;verbal cues  -LH,NM,LH2      Assistive Device (Stand-Sit Transfers)  wheelchair heavy duty rollator   -LH,NM,LH2      Recorded by [LH,NM,LH2] Chelsie Cline, PT (r) Jaxon Atkinson, PT Student (t) Chelsie Cline, PT (c)      Row Name 02/13/20 0800             Stand Pivot/Stand Step Transfer    Stand Pivot/Stand Step [place]  moderate assist (50% patient effort);verbal cues;nonverbal cues (demo/gesture) wc<>nustep   -LH,NM,LH2      Assistive Device (Stand Pivot Stand Step Transfer)  wheelchair  -LH,NM,LH2      Recorded by [LH,NM,LH2] Chelsie Cline, PT (r) Jaxon Atkinson, PT Student (t) Chelsie Cline, PT (c)      Row Name 02/13/20 1516             Toilet Transfer    Type (Toilet Transfer)  stand pivot/stand step  -AF      [place] Level (Toilet Transfer)  minimum assist (75% patient effort);moderate assist (50% patient effort);verbal cues  -AF      Assistive Device (Toilet Transfer)  commode;grab bars/safety frame;wheelchair  -AF      Recorded by [AF] Reina Perera OTR      Row Name 02/13/20 0800             Gait/Stairs Assessment/Training    [place] Level (Gait)  minimum assist (75% patient effort);2 person assist;verbal  cues;nonverbal cues (demo/gesture) tactile cues to ext hips to dec fwd lean as pt fatigues   -LH,NM,LH2      Assistive Device (Gait)  walker, 4-wheeled heavy duty rollator; aircasts BLE   -LH,NM,LH2      Distance in Feet (Gait)  70', 90', 60' and 50'  -LH,NM,LH2      Pattern (Gait)  step-through  -LH,NM,LH2      Deviations/Abnormal Patterns (Gait)  bilateral deviations;ataxic;stride length decreased;gait speed decreased  -LH,NM,LH2      Bilateral Gait Deviations  heel strike decreased;forward flexed posture;weight shift ability decreased  -LH,NM,LH2      Comment (Gait/Stairs)  pt flexes fwd as she fatigues requiring tactile/vc to ext hips. third person following c wc. demos steppage gait c fatigue at which time pt rests. CGA for brief periods over first 10-15 feet.  increased fatigue in PM: amb 1x in PM (50')  -LH,NM,LH2      Recorded by [LH,NM,LH2] Chelsie Cline, PT (r) Jaxon Atkinson, YUSUF Student (t) Chelsie Cline, PT (c)      Row Name 02/13/20 [facility]             Bathing Assessment/Treatment    Bathing [place] Level  upper body;contact guard assist;verbal cues feet  -AF      Bathing Position  sink side;supported sitting  -AF      Bathing Setup Assistance  obtain supplies  -AF      Recorded by [AF] Reina Perera OTR      Row Name 02/13/20 1516             Upper Body Dressing Assessment/Treatment    Upper Body Dressing Task  upper body dressing skills;minimum assist (75% or more patient effort);verbal cues  -AF      Upper Body Dressing Position  supported sitting  -AF      Set-up Assistance (Upper Body Dressing)  obtain clothing  -AF      Recorded by [AF] Reina Perera OTR      Row Name 02/13/20 1516             Lower Body Dressing Assessment/Treatment    Lower Body Dressing [place] Level  don;doff;socks;shoes/slippers;maximum assist (25% patient effort);verbal cues  -AF      Lower Body Dressing Position  supported sitting  -AF      Lower Body Dressing Setup Assistance  obtain clothing  -AF       Recorded by [AF] Reina Perera OTR      Row Name 02/13/20 1516             Grooming Assessment/Treatment    Grooming [place] Level  grooming skills;set up;supervision;verbal cues  -AF      Assistive Device (Grooming)  built-up handle items  -AF      Grooming Position  sink side;supported sitting  -AF      Grooming Setup Assistance  obtain supplies  -AF      Recorded by [AF] Reina Perera OTR      Row Name 02/13/20 1516             Toileting Assessment/Treatment    Toileting [place] Level  toileting skills;maximum assist (25% patient effort);verbal cues  -AF      Assistive Device Use (Toileting)  grab bar/safety frame;raised toilet seat  -AF      Toileting Position  unsupported sitting;supported standing  -AF      Comment (Toileting)  increased ability with standing balance during clothing management tasks   -AF      Recorded by [AF] Reina Perera OTR      Row Name 02/13/20 1516             Hand  Strength Testing    Right Hand, Setting 1 (Dynamometer Testing)  4  -AF      Left Hand, Setting 1 (Dynamometer Testing)  20  -AF      Recorded by [AF] Reina Perera OTR      Row Name 02/13/20 1516             Fine Motor Testing & Training    Comment, Fine Motor Coordination  [facility] to increase strength for ADL tasks. able to place and remove pegs 2 at a time with alternating hands with MOD A  -AF      Recorded by [AF] Reina Perera OTR      Row Name 02/13/20 1516 02/13/20 0800          Pain Scale: Numbers Pre/Post-Treatment    Pain Scale: Numbers, Pretreatment  0/10 - no pain  -AF  0/10 - no pain  -LH,NM,LH2     Pain Scale: Numbers, Post-Treatment  0/10 - no pain  -AF  0/10 - no pain  -LH,NM,LH2     Pre/Post Treatment Pain Comment  --  tingling, pins and needles B hands intermittently   -LH,NM,LH2     Recorded by [AF] Reina Perera OTR [LH,NM,LH2] Chelsie Cline, PT (r) Jaxon Atkinson PT Student (t) Chelsie Cline, PT (c)     Row Name 02/13/20 1516              Static Standing Balance    Level of [place] (Supported Standing, Static Balance)  minimal assist, 75% patient effort  -AF      Time Able to Maintain Position (Supported Standing, Static Balance)  2 to 3 minutes x 2 trials   -AF      Assistive Device Utilized (Supported Standing, Static Balance)  walker, standard  -AF      Recorded by [AF] Reina Perera OTR      Row Name 02/13/20 0800             Standing Balance Activity    Activities Performed (Standing, Balance Training)  standing on foam roll;standing on balance board  -LH,NM,LH2      Support Needed for Balance (Standing, Balance Training)  CGA;minimal external support for balance, 75% patient effort;2 person assist  -LH,NM,LH2      Progressive Balance Activity (Standing, Balance Training)  eyes closed during activity  -LH,NM,LH2      Comment (Standing, Balance Training)  in // bars, began c standing on firm surface c BUE support, progressing to single UE support and w/out UE support, eyes open and closed. Performed standing on foam square and wobble disc c CGA-[place] x 2. AP and bilat weight shifting. Increased sway c eyes closed and increased assist on wobble disc. Initall standing balance improved and pt positioned self in midline.  -LH,NM,LH2      Recorded by [LH,NM,LH2] Chelsie Cline, PT (r) Jaxon Atkinson PT Student (t) Chelsie Cline, PT (c)      Row Name 02/13/20 1516             Upper Extremity Seated Therapeutic Exercise    Performed, Seated Upper Extremity (Therapeutic Exercise)  shoulder flexion/extension;scapular protraction/retraction;elbow flexion/extension;forearm supination/pronation;wrist flexion/extension;shoulder horizontal abduction/adduction  -AF      Device, Seated Upper Extremity (Therapeutic Exercise)  -- #2 dowel pa, #2 hand weight  -AF      Exercise Type, Seated Upper Extremity (Therapeutic Exercise)  AROM (active range of motion);resistive exercise  -AF      Expected Outcomes, Seated Upper Extremity (Therapeutic Exercise)   improve functional tolerance, self-care activity;improve performance, BADLs;improve performance, fine motor coordination skills;improve performance, transfer skills  -AF      Sets/Reps Detail, Seated Upper Extremity (Therapeutic Exercise)  2/20  -AF      Transfers Skills, Training to Functional Activity, Seated Upper Extremity (Therapeutic Exercise)  beginning to transfer skills to functional activity;transfers skills to functional activity most of the time  -AF      Comment, Seated Upper Extremity (Therapeutic Exercise)  rest breaks   -AF      Recorded by [AF] Reina Perera OTR      Row Name 02/13/20 0800             Aerobic Exercise Activity    Exercise Performed (Aerobic, Therapeutic Exercise)  recumbent elliptical   -LH,NM,LH2      Expected Outcome (Aerobic, Therapeutic Exercise)  improve neuromuscular control;improve motor control;improve performance, gait skills  -LH,NM,LH2      Restrictions (Aerobic, Therapeutic Exercise)  ace wrap L foot; B LE aircasts  -LH,NM,LH2      Time (Aerobic, Therapeutic Exercise)  7  -LH,NM,LH2      Comment (Aerobic, Therapeutic Exercise)  3 min level 4, 5 min level 2 using BUE and BLE. required 2 rest breaks.  HR up to 129bpm, O2 sats 98% on room air   -LH,NM,LH2      Recorded by [LH,NM,LH2] Chelsie Cline, PT (r) Jaxon Atkinson, PT Student (t) Chelsie Cline, PT (c)      Row Name 02/13/20 0800             Lower Extremity Seated Therapeutic Exercise    Performed, Seated Lower Extremity (Therapeutic Exercise)  hip abduction/adduction;knee flexion/extension  -LH,NM,LH2      Exercise Type, Seated Lower Extremity (Therapeutic Exercise)  AROM (active range of motion)  -LH,NM,LH2      Sets/Reps Detail, Seated Lower Extremity (Therapeutic Exercise)  2/10  -LH,NM,LH2      Recorded by [LH,NM,LH2] Chelsie Cline, PT (r) Jaxon Atkinson, PT Student (t) Chelsie Cline, PT (c)      Row Name 02/13/20 1516             Neuromuscular Re-education    Comment (Neuromuscular Re-education)   gross grasp B'ly reaching wtih bean bag toss with MIN A for sitting balance   -AF      Recorded by [AF] Reina Perera, ALPESH      Row Name 02/13/20 1516 02/13/20 0800          Positioning and Restraints    Pre-Treatment Position  sitting in chair/recliner  -AF  in bed  -,NM,LH2     Post Treatment Position  bed  -AF  wheelchair  -,NM,LH2     In Bed  supine;call light within reach;encouraged to call for assist;exit alarm on in AM  -AF  --     In Wheelchair  sitting;with PT;exit alarm on with PT  -AF  exit alarm on;with family/caregiver  -,NM,LH2     Recorded by [AF] Reina Perera, OTR [,NM,2] Chelsie Cline, PT (r) Jaxon Atkinson, PT Student (t) Chelsie Cline, PT (c)       User Key  (r) = Recorded By, (t) = Taken By, (c) = Cosigned By    Initials Name Effective Dates     Chelsie Cline, PT 04/03/18 -     AF Reina Perera, OTR 04/03/18 -     NM Jaxon Atkinson, PT Student 01/03/20 -           Wound 12/09/19 1510 abdomen Incision (Active)   Dressing Appearance open to air 2/13/2020  9:15 AM   Closure None 2/13/2020  9:15 AM   Base clean;dry 2/13/2020  9:15 AM   Periwound intact 2/13/2020  9:15 AM   Edges open 2/13/2020  9:15 AM   Drainage Amount none 2/13/2020  9:15 AM   Dressing Care, Wound open to air 2/13/2020  9:15 AM         OT Recommendation and Plan                 OT IRF GOALS     Row Name 02/12/20 1514 02/03/20 1540          Transfer Goal 1 (OT-IRF)    Activity/Assistive Device (Transfer Goal 1, OT-IRF)  toilet;shower chair;walk-in shower  -AF  toilet;shower chair;walk-in shower  -AF     [place] Level (Transfer Goal 1, OT-IRF)  minimum assist (75% or more patient effort);verbal cues required  -AF  minimum assist (75% or more patient effort);moderate assist (50-74% patient effort);verbal cues required  -AF     Time Frame (Transfer Goal 1, OT-IRF)  short term goal (STG)  -AF  short term goal (STG)  -AF     Progress/Outcomes (Transfer Goal 1, OT-IRF)  goal ongoing  -AF  goal ongoing   -AF        Transfer Goal 2 (OT-IRF)    Activity/Assistive Device (Transfer Goal 2, OT-IRF)  toilet;shower chair;walk-in shower  -AF  toilet;shower chair;walk-in shower  -AF     [place] Level (Transfer Goal 2, OT-IRF)  verbal cues required;contact guard assist  -AF  verbal cues required;contact guard assist  -AF     Time Frame (Transfer Goal 2, OT-IRF)  long term goal (LTG)  -AF  long term goal (LTG)  -AF     Progress/Outcomes (Transfer Goal 2, OT-IRF)  goal ongoing  -AF  goal ongoing  -AF        Bathing Goal 1 (OT-IRF)    Activity/Device (Bathing Goal 1, OT-IRF)  bathing skills, all;grab bar/tub rail;hand-held shower spray hose;shower chair  -AF  bathing skills, all;grab bar/tub rail;hand-held shower spray hose;shower chair  -AF     [place] Level (Bathing Goal 1, OT-IRF)  verbal cues required;moderate assist (50-74% patient effort)  -AF  verbal cues required;moderate assist (50-74% patient effort)  -AF     Time Frame (Bathing Goal 1, OT-IRF)  short term goal (STG)  -AF  short term goal (STG)  -AF     Progress/Outcomes (Bathing Goal 1, OT-IRF)  goal ongoing  -AF  goal ongoing  -AF        Bathing Goal 2 (OT-IRF)    Activity/Device (Bathing Goal 2, OT-IRF)  bathing skills, all;grab bar/tub rail;hand-held shower spray hose;shower chair  -AF  bathing skills, all;grab bar/tub rail;hand-held shower spray hose;shower chair  -AF     [place] Level (Bathing Goal 2, OT-IRF)  verbal cues required;contact guard assist  -AF  verbal cues required;contact guard assist  -AF     Time Frame (Bathing Goal 2, OT-IRF)  long term goal (LTG)  -AF  long term goal (LTG)  -AF     Progress/Outcomes (Bathing Goal 2, OT-IRF)  goal ongoing  -AF  goal ongoing  -AF        UB Dressing Goal 1 (OT-IRF)    Activity/Device (UB Dressing Goal 1, OT-IRF)  upper body dressing  -AF  upper body dressing  -AF     [place] (UB Dress Goal 1, OT-IRF)  set-up required;verbal cues required  -AF  verbal cues required;set-up required  -AF     Time  Frame (UB Dressing Goal 1, OT-IRF)  long term goal (LTG)  -AF  long term goal (LTG)  -AF     Progress/Outcomes (UB Dressing Goal 1, OT-IRF)  goal ongoing  -AF  goal ongoing  -AF        LB Dressing Goal 1 (OT-IRF)    Activity/Device (LB Dressing Goal 1, OT-IRF)  lower body dressing  -AF  lower body dressing  -AF     [place] (LB Dressing Goal 1, OT-IRF)  verbal cues required;moderate assist (50-74% patient effort)  -AF  verbal cues required;moderate assist (50-74% patient effort)  -AF     Time Frame (LB Dressing Goal 1, OT-IRF)  short term goal (STG)  -AF  short term goal (STG)  -AF     Progress/Outcomes (LB Dressing Goal 1, OT-IRF)  goal ongoing  -AF  goal ongoing  -AF        LB Dressing Goal 2 (OT-IRF)    Activity/Device (LB Dressing Goal 2, OT-IRF)  lower body dressing  -AF  lower body dressing  -AF     [place] (LB Dressing Goal 2, OT-IRF)  verbal cues required;contact guard assist  -AF  contact guard assist;verbal cues required  -AF     Time Frame (LB Dressing Goal 2, OT-IRF)  long term goal (LTG)  -AF  long term goal (LTG)  -AF     Progress/Outcomes (LB Dressing Goal 2, OT-IRF)  goal ongoing  -AF  goal ongoing  -AF        Grooming Goal 1 (OT-IRF)    Activity/Device (Grooming Goal 1, OT-IRF)  grooming skills, all  -AF  grooming skills, all  -AF     [place] (Grooming Goal 1, OT-IRF)  set-up required  -AF  set-up required;verbal cues required  -AF     Time Frame (Grooming Goal 1, OT-IRF)  short term goal (STG)  -AF  short term goal (STG);long term goal (LTG)  -AF     Progress/Outcomes (Grooming Goal 1, OT-IRF)  goal ongoing  -AF  goal ongoing  -AF        Grooming Goal 2 (OT-IRF)    Activity/Device (Grooming Goal 2, OT-IRF)  grooming skills, all  -AF  --     [place] (Grooming Goal 2, OT-IRF)  conditional independence  -AF  --     Time Frame (Grooming Goal 2, OT-IRF)  long term goal (LTG)  -AF  --     Progress/Outcomes (Grooming Goal 2, OT-IRF)  goal ongoing  -AF  --        Toileting Goal 1  (OT-IRF)    Activity/Device (Toileting Goal 1, OT-IRF)  toileting skills, all;grab bar/safety frame;raised toilet seat  -AF  toileting skills, all;grab bar/safety frame;raised toilet seat  -AF     [place] Level (Toileting Goal 1, OT-IRF)  maximum assist (25-49% patient effort);moderate assist (50-74% patient effort)  -AF  maximum assist (25-49% patient effort);verbal cues required  -AF     Time Frame (Toileting Goal 1, OT-IRF)  short term goal (STG)  -AF  short term goal (STG)  -AF     Progress/Outcomes (Toileting Goal 1, OT-IRF)  goal ongoing  -AF  goal ongoing  -AF        Toileting Goal 2 (OT-IRF)    Activity/Device (Toileting Goal 2, OT-IRF)  toileting skills, all;grab bar/safety frame;raised toilet seat  -AF  toileting skills, all;grab bar/safety frame;raised toilet seat  -AF     [place] Level (Toileting Goal 2, OT-IRF)  minimum assist (75% or more patient effort);verbal cues required  -AF  minimum assist (75% or more patient effort)  -AF     Time Frame (Toileting Goal 2, OT-IRF)  long term goal (LTG)  -AF  long term goal (LTG)  -AF     Progress/Outcomes (Toileting Goal 2, OT-IRF)  goal ongoing  -AF  goal ongoing  -AF        Balance Goal 1 (OT)    Activity/Assistive Device (Balance Goal 1, OT)  standing, static  -AF  standing, static  -AF     [place] Level/Cues Needed (Balance Goal 1, OT)  moderate assist (50-74% patient effort)  -AF  moderate assist (50-74% patient effort);verbal cues required  -AF     Time Frame (Balance Goal 1, OT)  short term goal (STG)  -AF  short term goal (STG)  -AF     Progress/Outcomes (Balance Goal 1, OT)  goal ongoing  -AF  goal ongoing  -AF        Balance Goal 2 (OT)    Activity/Assistive Device (Balance Goal 2, OT)  standing, static  -AF  standing, static  -AF     [place] Level (Balance Goal 2, OT)  minimum assist (75% or more patient effort);verbal cues required  -AF  minimum assist (75% or more patient effort);contact guard assist  -AF     Time Frame (Balance  Goal 2, OT)  long term goal (LTG)  -AF  long term goal (LTG)  -AF     Progress/Outcome (Balance Goal 2, OT)  goal ongoing  -AF  goal ongoing  -AF        Caregiver Training Goal 1 (OT-IRF)    Caregiver Training Goal 1 (OT-IRF)  pt and  will demo safe techniques with ADLs, transfers, HEP and AE prior to d/c home with home health services   -AF  Pt and  will demo safe techniques with ADLs, trasnfer, HEP and AD prior to d/c home   -AF     Time Frame (Caregiver Training Goal 1, OT-IRF)  long term goal (LTG)  -AF  long term goal (LTG)  -AF     Progress/Outcomes (Caregiver Training Goal 1, OT-IRF)  goal ongoing  -AF  goal ongoing  -AF       User Key  (r) = Recorded By, (t) = Taken By, (c) = Cosigned By    Initials Name Provider Type    AF Reina Perera OTR Occupational Therapist                     Time Calculation:     Time Calculation- OT     Row Name 02/13/20 1530 02/13/20 1529          Time Calculation- OT    OT Start Time  1330  -AF  0930  -AF     OT Stop Time  1400  -AF  1030  -AF     OT Time Calculation (min)  30 min  -AF  60 min  -AF       User Key  (r) = Recorded By, (t) = Taken By, (c) = Cosigned By    Initials Name Provider Type    Reina Noe OTMENDEZ Occupational Therapist          Therapy Charges for Today     Code Description Service Date Service Provider Modifiers Qty    97213496576 HC OT SELF CARE/MGMT/TRAIN EA 15 MIN 2/12/2020 Reina Perera OTR GO 3    36817490177 HC OT NEUROMUSC RE EDUCATION EA 15 MIN 2/12/2020 Reina Perera OTR GO 1    51564667826 HC OT THER PROC EA 15 MIN 2/12/2020 Reina Perera OTR GO 2    99364197067 HC OT SELF CARE/MGMT/TRAIN EA 15 MIN 2/13/2020 Reina Perera OTR GO 2    27498599846 HC OT THER PROC EA 15 MIN 2/13/2020 Reina Perera OTR GO 2    02614312172 HC OT NEUROMUSC RE EDUCATION EA 15 MIN 2/13/2020 Reina Perera OTR GO 1    [id number]  OT THERAPEUTIC ACT EA 15 MIN 2/13/2020 Reina Perera, OTR GO 1                    Reina Perera, OTR  2/13/2020

## 2020-02-14 LAB
ANION GAP SERPL CALCULATED.3IONS-SCNC: 10.5 MMOL/L (ref 5–15)
BASOPHILS # BLD AUTO: 0.06 10*3/MM3 (ref 0–0.2)
BASOPHILS NFR BLD AUTO: 0.8 % (ref 0–1.5)
BUN BLD-MCNC: 17 MG/DL (ref 8–23)
BUN/CREAT SERPL: 17.5 (ref 7–25)
CALCIUM SPEC-SCNC: 7.9 MG/DL (ref 8.6–10.5)
CHLORIDE SERPL-SCNC: 101 MMOL/L (ref 98–107)
CO2 SERPL-SCNC: 25.5 MMOL/L (ref 22–29)
CREAT BLD-MCNC: 0.97 MG/DL (ref 0.57–1)
DEPRECATED RDW RBC AUTO: 50.5 FL (ref 37–54)
EOSINOPHIL # BLD AUTO: 0.06 10*3/MM3 (ref 0–0.4)
EOSINOPHIL NFR BLD AUTO: 0.8 % (ref 0.3–6.2)
ERYTHROCYTE [DISTWIDTH] IN BLOOD BY AUTOMATED COUNT: 15.1 % (ref 12.3–15.4)
GFR SERPL CREATININE-BSD FRML MDRD: 56 ML/MIN/1.73
GLUCOSE BLD-MCNC: 86 MG/DL (ref 65–99)
HCT VFR BLD AUTO: 25.7 % (ref 34–46.6)
HGB BLD-MCNC: 8.1 G/DL (ref 12–15.9)
IMM GRANULOCYTES # BLD AUTO: 0.04 10*3/MM3 (ref 0–0.05)
IMM GRANULOCYTES NFR BLD AUTO: 0.6 % (ref 0–0.5)
LYMPHOCYTES # BLD AUTO: 1.42 10*3/MM3 (ref 0.7–3.1)
LYMPHOCYTES NFR BLD AUTO: 19.8 % (ref 19.6–45.3)
MCH RBC QN AUTO: 28.8 PG (ref 26.6–33)
MCHC RBC AUTO-ENTMCNC: 31.5 G/DL (ref 31.5–35.7)
MCV RBC AUTO: 91.5 FL (ref 79–97)
MONOCYTES # BLD AUTO: 0.62 10*3/MM3 (ref 0.1–0.9)
MONOCYTES NFR BLD AUTO: 8.6 % (ref 5–12)
NEUTROPHILS # BLD AUTO: 4.97 10*3/MM3 (ref 1.7–7)
NEUTROPHILS NFR BLD AUTO: 69.4 % (ref 42.7–76)
NRBC BLD AUTO-RTO: 0 /100 WBC (ref 0–0.2)
PLATELET # BLD AUTO: 351 10*3/MM3 (ref 140–450)
PMV BLD AUTO: 8.8 FL (ref 6–12)
POTASSIUM BLD-SCNC: 3.6 MMOL/L (ref 3.5–5.2)
RBC # BLD AUTO: 2.81 10*6/MM3 (ref 3.77–5.28)
SODIUM BLD-SCNC: 137 MMOL/L (ref 136–145)
WBC NRBC COR # BLD: 7.17 10*3/MM3 (ref 3.4–10.8)

## 2020-02-14 PROCEDURE — 97110 THERAPEUTIC EXERCISES: CPT

## 2020-02-14 PROCEDURE — 97116 GAIT TRAINING THERAPY: CPT

## 2020-02-14 PROCEDURE — 25010000002 IRON SUCROSE PER 1 MG: Performed by: PHYSICAL MEDICINE & REHABILITATION

## 2020-02-14 PROCEDURE — 97530 THERAPEUTIC ACTIVITIES: CPT

## 2020-02-14 PROCEDURE — 97535 SELF CARE MNGMENT TRAINING: CPT

## 2020-02-14 PROCEDURE — 63710000001 DIPHENHYDRAMINE PER 50 MG: Performed by: PHYSICAL MEDICINE & REHABILITATION

## 2020-02-14 PROCEDURE — 97112 NEUROMUSCULAR REEDUCATION: CPT

## 2020-02-14 PROCEDURE — 85025 COMPLETE CBC W/AUTO DIFF WBC: CPT | Performed by: PHYSICAL MEDICINE & REHABILITATION

## 2020-02-14 PROCEDURE — 80048 BASIC METABOLIC PNL TOTAL CA: CPT | Performed by: INTERNAL MEDICINE

## 2020-02-14 RX ORDER — DIPHENHYDRAMINE HCL 25 MG
25 CAPSULE ORAL ONCE
Status: DISCONTINUED | OUTPATIENT
Start: 2020-02-16 | End: 2020-02-17

## 2020-02-14 RX ORDER — DIPHENHYDRAMINE HCL 25 MG
25 CAPSULE ORAL ONCE
Status: COMPLETED | OUTPATIENT
Start: 2020-02-14 | End: 2020-02-14

## 2020-02-14 RX ORDER — ACETAMINOPHEN 325 MG/1
650 TABLET ORAL ONCE
Status: DISCONTINUED | OUTPATIENT
Start: 2020-02-16 | End: 2020-02-17

## 2020-02-14 RX ORDER — ACETAMINOPHEN 325 MG/1
650 TABLET ORAL ONCE
Status: COMPLETED | OUTPATIENT
Start: 2020-02-14 | End: 2020-02-14

## 2020-02-14 RX ADMIN — GABAPENTIN 300 MG: 300 CAPSULE ORAL at 09:01

## 2020-02-14 RX ADMIN — SODIUM CHLORIDE, PRESERVATIVE FREE 10 ML: 5 INJECTION INTRAVENOUS at 21:50

## 2020-02-14 RX ADMIN — SODIUM CHLORIDE, PRESERVATIVE FREE 10 ML: 5 INJECTION INTRAVENOUS at 21:51

## 2020-02-14 RX ADMIN — LOPERAMIDE HYDROCHLORIDE 2 MG: 2 CAPSULE ORAL at 06:06

## 2020-02-14 RX ADMIN — GABAPENTIN 300 MG: 300 CAPSULE ORAL at 12:44

## 2020-02-14 RX ADMIN — Medication 1 TABLET: at 09:08

## 2020-02-14 RX ADMIN — IRON SUCROSE 200 MG: 20 INJECTION, SOLUTION INTRAVENOUS at 16:09

## 2020-02-14 RX ADMIN — DIPHENHYDRAMINE HYDROCHLORIDE 25 MG: 25 CAPSULE ORAL at 16:08

## 2020-02-14 RX ADMIN — GABAPENTIN 300 MG: 300 CAPSULE ORAL at 17:35

## 2020-02-14 RX ADMIN — Medication 2500 MCG: at 09:09

## 2020-02-14 RX ADMIN — ACETAMINOPHEN 650 MG: 325 TABLET, FILM COATED ORAL at 16:08

## 2020-02-14 RX ADMIN — SODIUM BICARBONATE 1300 MG: 650 TABLET ORAL at 15:12

## 2020-02-14 RX ADMIN — Medication 18 MG: at 09:09

## 2020-02-14 RX ADMIN — LOPERAMIDE HYDROCHLORIDE 2 MG: 2 CAPSULE ORAL at 17:35

## 2020-02-14 RX ADMIN — PANTOPRAZOLE SODIUM 40 MG: 40 TABLET, DELAYED RELEASE ORAL at 06:06

## 2020-02-14 RX ADMIN — Medication 18 MG: at 21:51

## 2020-02-14 RX ADMIN — CHOLECALCIFEROL TAB 125 MCG (5000 UNIT) 5000 UNITS: 125 TAB at 09:09

## 2020-02-14 RX ADMIN — SODIUM BICARBONATE 1300 MG: 650 TABLET ORAL at 21:50

## 2020-02-14 RX ADMIN — GABAPENTIN 300 MG: 300 CAPSULE ORAL at 21:50

## 2020-02-14 RX ADMIN — GABAPENTIN 300 MG: 300 CAPSULE ORAL at 02:51

## 2020-02-14 RX ADMIN — LOPERAMIDE HYDROCHLORIDE 2 MG: 2 CAPSULE ORAL at 12:24

## 2020-02-14 RX ADMIN — ACETAMINOPHEN 650 MG: 325 TABLET, FILM COATED ORAL at 06:17

## 2020-02-14 RX ADMIN — SODIUM BICARBONATE 1300 MG: 650 TABLET ORAL at 08:59

## 2020-02-14 NOTE — THERAPY TREATMENT NOTE
Inpatient Rehabilitation - Occupational Therapy Treatment Note    [facility]     Patient Name: She López  : 1945  MRN: [id number]    Today's Date: 2020  Onset of Illness/Injury or Date of Surgery: 20              Admit Date: 2020      Visit Dx:    ICD-10-CM ICD-9-CM   1. General weakness R53.1 780.79       Patient Active Problem List   Diagnosis   • Crohn's disease of small intestine with other complication (CMS/HCC)   • Type 2 diabetes mellitus without complication (CMS/HCC)   • RSD upper limb   • Neuropathic pain of hand   • Hyperlipidemia   • Cervical myelopathy (CMS/HCC)   • Central pain syndrome   • Carpal tunnel syndrome   • Vitamin B 12 deficiency   • Guillain Barré syndrome (CMS/HCC)         Therapy Treatment    IRF Treatment Summary     Row Name 20 1539 20 0800          Evaluation/Treatment Time and Intent    Subjective Information  no complaints  -AF  no complaints  -LH,NM,LH2     Existing Precautions/Restrictions  fall  -AF  fall  -LH,NM,LH2     Document Type  therapy note (daily note)  -AF  progress note/recertification;therapy note (daily note)  -LH,NM,LH2     Mode of Treatment  occupational therapy  -AF  physical therapy  -LH,NM,LH2     Patient/Family Observations  sitting up in w/c in AM and PM sessions   -AF  supine in bed,  present  -LH,NM,LH2     Recorded by [AF] Reina Perera, OTR [LH,NM,LH2] Chelsie Cline, PT (r) Jaxon Atkinson PT Student (t) Chelsie Cline, PT (c)     Row Name 20 1539 20 0800          Cognition/Psychosocial- PT/OT    Affect/Mental Status (Cognitive)  WFL  -AF  WFL  -LH,NM,LH2     Orientation Status (Cognition)  oriented x 4  -AF  oriented x 4  -LH,NM,LH2     Follows Commands (Cognition)  WFL  -AF  WFL  -LH,NM,LH2     Personal Safety Interventions  fall prevention program maintained;gait belt;nonskid shoes/slippers when out of bed  -AF  fall prevention program maintained;gait belt;nonskid shoes/slippers when  out of bed;supervised activity  -LH,NM,LH2     Recorded by [AF] Reina Perera OTR [LH,NM,LH2] Chelsie Cline, PT (r) Jaxon Atkinson, PT Student (t) Chelsie Cline, PT (c)     Row Name 02/14/20 1539 02/14/20 0800          Bed Mobility Assessment/Treatment    Supine-Sit [place] (Bed Mobility)  --  supervision  -LH,NM,LH2     Sit-Supine [place] (Bed Mobility)  supervision  -AF  supervision  -LH,NM,LH2     Bed Mobility, Safety Issues  --  decreased use of arms for pushing/pulling;decreased use of legs for bridging/pushing;impaired trunk control for bed mobility  -LH,NM,LH2     Assistive Device (Bed Mobility)  --  bed rails  -LH,NM,LH2     Recorded by [AF] Reina Perera OTR [LH,NM,LH2] Chelsie Cline, PT (r) Jaxon Atkinson, PT Student (t) Chelsie Cline, PT (c)     Row Name 02/14/20 1539             Transfer Assessment/Treatment    Comment (Transfers)  sit pivot EOM <> w/c MIN A  -AF      Recorded by [AF] Reina Perera OTR      Row Name 02/14/20 0800             Bed-Chair Transfer    Bed-Chair [place] (Transfers)  moderate assist (50% patient effort);verbal cues;nonverbal cues (demo/gesture) vc to check foot placement before transferring   -LH,NM,LH2      Assistive Device (Bed-Chair Transfers)  wheelchair  -LH,NM,LH2      Recorded by [LH,NM,LH2] Chelsie Cline, PT (r) Jaxon Atkinson, PT Student (t) Chelsie Cline, PT (c)      Row Name 02/14/20 1539 02/14/20 0800          Chair-Bed Transfer    Chair-Bed [place] (Transfers)  minimum assist (75% patient effort);verbal cues  -AF  contact guard;verbal cues  -LH,NM,LH2     Assistive Device (Chair-Bed Transfers)  wheelchair in AM and PM  -AF  wheelchair lateral transfer from mat to  c armrest removed   -LH,NM,LH2     Recorded by [AF] Reina Perera OTR [LH,NM,LH2] Chelsie Cline, PT (r) Jaxon Atkinson PT Student (t) Chelsie Cline, PT (c)     Row Name 02/14/20 0800             Sit-Stand Transfer    Sit-Stand [place] (Transfers)   minimum assist (75% patient effort);2 person assist;verbal cues  -LH,NM,LH2      Assistive Device (Sit-Stand Transfers)  wheelchair  -LH,NM,LH2      Recorded by [LH,NM,LH2] Chelsie Cline, PT (r) Jaxon Atkinson, PT Student (t) Chelsie Cline, PT (c)      Row Name 02/14/20 0800             Stand-Sit Transfer    Stand-Sit [place] (Transfers)  minimum assist (75% patient effort);2 person assist;verbal cues  -LH,NM,LH2      Assistive Device (Stand-Sit Transfers)  wheelchair  -LH,NM,LH2      Recorded by [LH,NM,LH2] Chelsie Cline PT (r) Jaxon Atkinson, PT Student (t) Chelsie Cline, PT (c)      Row Name 02/14/20 0800             Gait/Stairs Assessment/Training    [place] Level (Gait)  minimum assist (75% patient effort);2 person assist;verbal cues;nonverbal cues (demo/gesture)  -LH,NM,LH2      Assistive Device (Gait)  walker, 4-wheeled heavy duty rollator; B aircasts; hinge ankle brace L  -LH,NM,LH2      Distance in Feet (Gait)  110x2, 80 70-80x2 in the PM: 1-2 standing rest breaks during bouts   -LH,NM,LH2      Pattern (Gait)  step-through  -LH,NM,LH2      Deviations/Abnormal Patterns (Gait)  bilateral deviations;ataxic;base of support, wide;gait speed decreased  -LH,NM,LH2      Bilateral Gait Deviations  heel strike decreased;forward flexed posture;weight shift ability decreased  -LH,NM,LH2      Left Sided Gait Deviations  -- L foot occasionally hits walker leg   -LH,NM,LH2      Comment (Gait/Stairs)  L foot inversion/supination, worsens c fatigue  AFO on LLE: noted improved stability in ankle during stance   -LH,NM,LH2      Recorded by [LH,NM,LH2] Chelsie Cline, PT (r) Jaxon Atkinson, PT Student (t) Chelsie Cline, PT (c)      Row Name 02/14/20 1539             Bathing Assessment/Treatment    Bathing [place] Level  upper body;set up;distal lower extremities/feet;minimum assist (75% patient effort)  -AF      Bathing Position  sink side;supported sitting  -AF      Bathing Setup Assistance  obtain  supplies  -AF      Comment (Bathing)  crossing legs to wash feet  -AF      Recorded by [AF] Reina Perera OTR      Row Name 02/14/20 1539             Upper Body Dressing Assessment/Treatment    Upper Body Dressing Task  upper body dressing skills;set up assistance;verbal cues  -AF      Upper Body Dressing Position  supported sitting  -AF      Set-up Assistance (Upper Body Dressing)  obtain clothing  -AF      Recorded by [AF] Reina Perera OTR      Row Name 02/14/20 1539             Lower Body Dressing Assessment/Treatment    Lower Body Dressing [place] Level  doff;don;shoes/slippers;socks;moderate assist (50% patient effort)  -AF      Lower Body Dressing Position  supported sitting  -AF      Recorded by [AF] Reina Perera OTR      Row Name 02/14/20 1539             Grooming Assessment/Treatment    Grooming [place] Level  grooming skills;minimum assist (75% patient effort)  -AF      Assistive Device (Grooming)  built-up handle items  -AF      Grooming Position  supported sitting;sink side  -AF      Comment (Grooming)  A to wash hair   -AF      Recorded by [AF] Reina Perera OTR      Row Name 02/14/20 1539             Fine Motor Testing & Training    Comment, Fine Motor Coordination  FMC task with alternating hands placing/removing pegs in and out of peg board on angled surface with MIN difficulty. able to stack large checkers with MAX diffiuclty and then placed dominos in slotted container with MOD diffiuclty. blue hand gripper. worked on wadding up paper with both hands and then tossing into conatiner   -AF      Recorded by [AF] Reina Perera OTR      Row Name 02/14/20 1539 02/14/20 0800          Pain Scale: Numbers Pre/Post-Treatment    Pain Scale: Numbers, Pretreatment  0/10 - no pain  -AF  0/10 - no pain  -LH,NM,LH2     Pain Scale: Numbers, Post-Treatment  0/10 - no pain  -AF  0/10 - no pain  -LH,NM,LH2     Recorded by [AF] Reina Perera OTR [LH,NM,LH2] Chelsie Cline, PT  (r) Jaxon Atkinson, PT Student (t) Chelsie Cline, PT (c)     Row Name 02/14/20 1539             Static Sitting Balance    Level of [place] (Unsupported Sitting, Static Balance)  standby assist  -AF      Sitting Position (Unsupported Sitting, Static Balance)  sitting edge of mat  -AF      Time Able to Maintain Position (Unsupported Sitting, Static Balance)  more than 5 minutes  -AF      Recorded by [AF] Reina Perera OTR      Row Name 02/14/20 1539             Upper Extremity Seated Therapeutic Exercise    Performed, Seated Upper Extremity (Therapeutic Exercise)  shoulder flexion/extension;shoulder external/internal rotation;scapular protraction/retraction;elbow flexion/extension;forearm supination/pronation;wrist flexion/extension  -AF      Device, Seated Upper Extremity (Therapeutic Exercise)  -- #2 hand weight   -AF      Exercise Type, Seated Upper Extremity (Therapeutic Exercise)  AROM (active range of motion);resistive exercise  -AF      Expected Outcomes, Seated Upper Extremity (Therapeutic Exercise)  improve functional tolerance, self-care activity;improve motor control;improve performance, reaching/manipulating objects;improve performance, transfer skills;improve postural control  -AF      Sets/Reps Detail, Seated Upper Extremity (Therapeutic Exercise)  2/20  -AF      Transfers Skills, Training to Functional Activity, Seated Upper Extremity (Therapeutic Exercise)  beginning to transfer skills to functional activity  -AF      Comment, Seated Upper Extremity (Therapeutic Exercise)  rest breaks   -AF      Recorded by [AF] Reina Perera OTR      Row Name 02/14/20 0800             Lower Extremity Seated Therapeutic Exercise    Performed, Seated Lower Extremity (Therapeutic Exercise)  hip flexion/extension;hip abduction/adduction;LAQ (long arc quad), knee extension;knee flexion/extension;ankle dorsiflexion/plantarflexion blue thera band knee flex; B ankle eversion   -LH,NM,LH2      Exercise Type,  Seated Lower Extremity (Therapeutic Exercise)  AROM (active range of motion)  -LH,NM,LH2      Sets/Reps Detail, Seated Lower Extremity (Therapeutic Exercise)  1/20  -LH,NM,LH2      Recorded by [LH,NM,LH2] Chelsie Cline, PT (r) Jaxon Atkinson, PT Student (t) Chelsie Cline, PT (c)      Row Name 02/14/20 0800             Lower Extremity Supine Therapeutic Exercise    Performed, Supine Lower Extremity (Therapeutic Exercise)  bridging (bilateral w/bolster);hip flexion/extension;SLR (straight leg raise)  -LH,NM,LH2      Device, Supine Lower Extremity (Therapeutic Exercise)  balance disc  -LH,NM,LH2      Exercise Type, Supine Lower Extremity (Therapeutic Exercise)  AROM (active range of motion)  -LH,NM,LH2      Sets/Reps Detail, Supine Lower Extremity (Therapeutic Exercise)  1/10 2/5 bridging on balance disc   -LH,NM,LH2      Recorded by [LH,NM,LH2] Chelsie Cline, PT (r) Jaxon Atkinson, PT Student (t) Chelsie Cline, PT (c)      Row Name 02/14/20 0800             Vital Signs    Pretreatment Heart Rate (beats/min)  89  -LH,NM,LH2      Intratreatment Heart Rate (beats/min)  113 following gait training   -LH,NM,LH2      Pre SpO2 (%)  98  -LH,NM,LH2      O2 Delivery Pre Treatment  room air  -LH,NM,LH2      Intra SpO2 (%)  97  -LH,NM,LH2      O2 Delivery Intra Treatment  room air  -LH,NM,LH2      Recorded by [LH,NM,LH2] Chelsie Cline, PT (r) Jaxon Atkinson, PT Student (t) Chelsie Cline, PT (c)      Row Name 02/14/20 1539 02/14/20 0800          Positioning and Restraints    Pre-Treatment Position  sitting in chair/recliner  -AF  in bed  -LH,NM,LH2     Post Treatment Position  bed  -AF  wheelchair  -LH,NM,LH2     In Bed  supine;call light within reach;encouraged to call for assist;exit alarm on in AM and PM sessions   -AF  --     In Wheelchair  --  call light within reach;sitting;exit alarm on;encouraged to call for assist;with family/caregiver  -LH,NM,LH2     Recorded by [AF] Reina Perera, OTR [LH,NM,LH2] Chelsie Cline  SEAN, PT (r) Jaxon Atkinson, PT Student (t) Chelsie Cline, PT (c)     Row Name 02/14/20 0800             Weekly Summary of Progress (PT)    Weekly Outcome Summary: Physical Therapy  Pt has responded well following IVIG treatment after a decline last week. Pt has progressed this week with all functional mobility tasks, requring decreased assist for bed mobs and transfers and increased distance and quality in gait. Pt's LTGs have been upgraded. Pt to continue to benefit from skilled PT intervention to allow pt to safely d/c home c assist from .   -LH,NM,LH2      Recorded by [LH,NM,LH2] Chelsie Cline, PT (r) Jaxon Atkinson, PT Student (t) Chelsie Cline, PT (c)        User Key  (r) = Recorded By, (t) = Taken By, (c) = Cosigned By    Initials Name Effective Dates     Chelsie Cline, PT 04/03/18 -     AF Reina Perera, OTR 04/03/18 -     NM Jaxon Atkinson, PT Student 01/03/20 -           Wound 12/09/19 1510 abdomen Incision (Active)   Dressing Appearance dry;intact 2/13/2020 10:00 PM   Closure SUJEY 2/13/2020 10:00 PM   Base dressing in place, unable to visualize 2/14/2020  9:00 AM         OT Recommendation and Plan                 OT IRF GOALS     Row Name 02/12/20 1514 02/03/20 1540          Transfer Goal 1 (OT-IRF)    Activity/Assistive Device (Transfer Goal 1, OT-IRF)  toilet;shower chair;walk-in shower  -AF  toilet;shower chair;walk-in shower  -AF     [place] Level (Transfer Goal 1, OT-IRF)  minimum assist (75% or more patient effort);verbal cues required  -AF  minimum assist (75% or more patient effort);moderate assist (50-74% patient effort);verbal cues required  -AF     Time Frame (Transfer Goal 1, OT-IRF)  short term goal (STG)  -AF  short term goal (STG)  -AF     Progress/Outcomes (Transfer Goal 1, OT-IRF)  goal ongoing  -AF  goal ongoing  -AF        Transfer Goal 2 (OT-IRF)    Activity/Assistive Device (Transfer Goal 2, OT-IRF)  toilet;shower chair;walk-in shower  -AF  toilet;shower  chair;walk-in shower  -AF     [place] Level (Transfer Goal 2, OT-IRF)  verbal cues required;contact guard assist  -AF  verbal cues required;contact guard assist  -AF     Time Frame (Transfer Goal 2, OT-IRF)  long term goal (LTG)  -AF  long term goal (LTG)  -AF     Progress/Outcomes (Transfer Goal 2, OT-IRF)  goal ongoing  -AF  goal ongoing  -AF        Bathing Goal 1 (OT-IRF)    Activity/Device (Bathing Goal 1, OT-IRF)  bathing skills, all;grab bar/tub rail;hand-held shower spray hose;shower chair  -AF  bathing skills, all;grab bar/tub rail;hand-held shower spray hose;shower chair  -AF     [place] Level (Bathing Goal 1, OT-IRF)  verbal cues required;moderate assist (50-74% patient effort)  -AF  verbal cues required;moderate assist (50-74% patient effort)  -AF     Time Frame (Bathing Goal 1, OT-IRF)  short term goal (STG)  -AF  short term goal (STG)  -AF     Progress/Outcomes (Bathing Goal 1, OT-IRF)  goal ongoing  -AF  goal ongoing  -AF        Bathing Goal 2 (OT-IRF)    Activity/Device (Bathing Goal 2, OT-IRF)  bathing skills, all;grab bar/tub rail;hand-held shower spray hose;shower chair  -AF  bathing skills, all;grab bar/tub rail;hand-held shower spray hose;shower chair  -AF     [place] Level (Bathing Goal 2, OT-IRF)  verbal cues required;contact guard assist  -AF  verbal cues required;contact guard assist  -AF     Time Frame (Bathing Goal 2, OT-IRF)  long term goal (LTG)  -AF  long term goal (LTG)  -AF     Progress/Outcomes (Bathing Goal 2, OT-IRF)  goal ongoing  -AF  goal ongoing  -AF        UB Dressing Goal 1 (OT-IRF)    Activity/Device (UB Dressing Goal 1, OT-IRF)  upper body dressing  -AF  upper body dressing  -AF     [place] (UB Dress Goal 1, OT-IRF)  set-up required;verbal cues required  -AF  verbal cues required;set-up required  -AF     Time Frame (UB Dressing Goal 1, OT-IRF)  long term goal (LTG)  -AF  long term goal (LTG)  -AF     Progress/Outcomes (UB Dressing Goal 1, OT-IRF)  goal  ongoing  -AF  goal ongoing  -AF        LB Dressing Goal 1 (OT-IRF)    Activity/Device (LB Dressing Goal 1, OT-IRF)  lower body dressing  -AF  lower body dressing  -AF     [place] (LB Dressing Goal 1, OT-IRF)  verbal cues required;moderate assist (50-74% patient effort)  -AF  verbal cues required;moderate assist (50-74% patient effort)  -AF     Time Frame (LB Dressing Goal 1, OT-IRF)  short term goal (STG)  -AF  short term goal (STG)  -AF     Progress/Outcomes (LB Dressing Goal 1, OT-IRF)  goal ongoing  -AF  goal ongoing  -AF        LB Dressing Goal 2 (OT-IRF)    Activity/Device (LB Dressing Goal 2, OT-IRF)  lower body dressing  -AF  lower body dressing  -AF     [place] (LB Dressing Goal 2, OT-IRF)  verbal cues required;contact guard assist  -AF  contact guard assist;verbal cues required  -AF     Time Frame (LB Dressing Goal 2, OT-IRF)  long term goal (LTG)  -AF  long term goal (LTG)  -AF     Progress/Outcomes (LB Dressing Goal 2, OT-IRF)  goal ongoing  -AF  goal ongoing  -AF        Grooming Goal 1 (OT-IRF)    Activity/Device (Grooming Goal 1, OT-IRF)  grooming skills, all  -AF  grooming skills, all  -AF     [place] (Grooming Goal 1, OT-IRF)  set-up required  -AF  set-up required;verbal cues required  -AF     Time Frame (Grooming Goal 1, OT-IRF)  short term goal (STG)  -AF  short term goal (STG);long term goal (LTG)  -AF     Progress/Outcomes (Grooming Goal 1, OT-IRF)  goal ongoing  -AF  goal ongoing  -AF        Grooming Goal 2 (OT-IRF)    Activity/Device (Grooming Goal 2, OT-IRF)  grooming skills, all  -AF  --     [place] (Grooming Goal 2, OT-IRF)  conditional independence  -AF  --     Time Frame (Grooming Goal 2, OT-IRF)  long term goal (LTG)  -AF  --     Progress/Outcomes (Grooming Goal 2, OT-IRF)  goal ongoing  -AF  --        Toileting Goal 1 (OT-IRF)    Activity/Device (Toileting Goal 1, OT-IRF)  toileting skills, all;grab bar/safety frame;raised toilet seat  -AF  toileting skills, all;grab  bar/safety frame;raised toilet seat  -AF     [place] Level (Toileting Goal 1, OT-IRF)  maximum assist (25-49% patient effort);moderate assist (50-74% patient effort)  -AF  maximum assist (25-49% patient effort);verbal cues required  -AF     Time Frame (Toileting Goal 1, OT-IRF)  short term goal (STG)  -AF  short term goal (STG)  -AF     Progress/Outcomes (Toileting Goal 1, OT-IRF)  goal ongoing  -AF  goal ongoing  -AF        Toileting Goal 2 (OT-IRF)    Activity/Device (Toileting Goal 2, OT-IRF)  toileting skills, all;grab bar/safety frame;raised toilet seat  -AF  toileting skills, all;grab bar/safety frame;raised toilet seat  -AF     [place] Level (Toileting Goal 2, OT-IRF)  minimum assist (75% or more patient effort);verbal cues required  -AF  minimum assist (75% or more patient effort)  -AF     Time Frame (Toileting Goal 2, OT-IRF)  long term goal (LTG)  -AF  long term goal (LTG)  -AF     Progress/Outcomes (Toileting Goal 2, OT-IRF)  goal ongoing  -AF  goal ongoing  -AF        Balance Goal 1 (OT)    Activity/Assistive Device (Balance Goal 1, OT)  standing, static  -AF  standing, static  -AF     [place] Level/Cues Needed (Balance Goal 1, OT)  moderate assist (50-74% patient effort)  -AF  moderate assist (50-74% patient effort);verbal cues required  -AF     Time Frame (Balance Goal 1, OT)  short term goal (STG)  -AF  short term goal (STG)  -AF     Progress/Outcomes (Balance Goal 1, OT)  goal ongoing  -AF  goal ongoing  -AF        Balance Goal 2 (OT)    Activity/Assistive Device (Balance Goal 2, OT)  standing, static  -AF  standing, static  -AF     [place] Level (Balance Goal 2, OT)  minimum assist (75% or more patient effort);verbal cues required  -AF  minimum assist (75% or more patient effort);contact guard assist  -AF     Time Frame (Balance Goal 2, OT)  long term goal (LTG)  -AF  long term goal (LTG)  -AF     Progress/Outcome (Balance Goal 2, OT)  goal ongoing  -AF  goal ongoing  -AF         Caregiver Training Goal 1 (OT-IRF)    Caregiver Training Goal 1 (OT-IRF)  pt and  will demo safe techniques with ADLs, transfers, HEP and AE prior to d/c home with home health services   -AF  Pt and  will demo safe techniques with ADLs, trasnfer, HEP and AD prior to d/c home   -AF     Time Frame (Caregiver Training Goal 1, OT-IRF)  long term goal (LTG)  -AF  long term goal (LTG)  -AF     Progress/Outcomes (Caregiver Training Goal 1, OT-IRF)  goal ongoing  -AF  goal ongoing  -AF       User Key  (r) = Recorded By, (t) = Taken By, (c) = Cosigned By    Initials Name Provider Type    Reina Noe OTMENDEZ Occupational Therapist                     Time Calculation:     Time Calculation- OT     Row Name 02/14/20 1544 02/14/20 1543          Time Calculation- OT    OT Start Time  1430  -AF  0930  -AF     OT Stop Time  1500  -AF  1030  -AF     OT Time Calculation (min)  30 min  -AF  60 min  -AF       User Key  (r) = Recorded By, (t) = Taken By, (c) = Cosigned By    Initials Name Provider Type    Reina Noe OTMENDEZ Occupational Therapist          Therapy Charges for Today     Code Description Service Date Service Provider Modifiers Qty    00013378271 HC OT SELF CARE/MGMT/TRAIN EA 15 MIN 2/13/2020 Reina Perera OTR GO 2    36325686495 HC OT THER PROC EA 15 MIN 2/13/2020 Reina Perera OTR GO 2    50291737047 HC OT NEUROMUSC RE EDUCATION EA 15 MIN 2/13/2020 Reina Perera OTR GO 1    31624022323 HC OT THERAPEUTIC ACT EA 15 MIN 2/13/2020 Reina Perera OTR GO 1    64821181052 HC OT SELF CARE/MGMT/TRAIN EA 15 MIN 2/14/2020 Reina Perera OTR GO 3    17561803273 HC OT NEUROMUSC RE EDUCATION EA 15 MIN 2/14/2020 Reina Perera OTR GO 2    55926102219 HC OT THER PROC EA 15 MIN 2/14/2020 Reina Perera OTR GO 1                   ALPESH Dempsey  2/14/2020

## 2020-02-14 NOTE — PLAN OF CARE
Problem: Patient Care Overview  Goal: Plan of Care Review  2/13/2020 1929 by Romina العراقي, RN  Outcome: Ongoing (interventions implemented as appropriate)  Flowsheets  Taken 2/13/2020 1929 by Romina العراقي, RN  Consent Given to Review Plan with: Pt. A&OX4. Continent of B&B. Wound nurse changed ileostomy pouch today. Pt. had trouble w/ nausea. Given sublingual Zofran twice today w/ relief. L. AC midline flushing well. Received IV iron and tolerated well. Has been running fever at 99.4 and 100.6 and 100.7. Given tylenol and temp went down. Tingling in both hands. Knees to feet in both legs numb and tingling.  Taken 2/13/2020 0915 by Romina العراقي, RN  Plan of Care Reviewed With: patient;spouse  Taken 1/31/2020 0510 by Lyla Givens RN  Patient Agreement with Plan of Care: agrees

## 2020-02-14 NOTE — NURSING NOTE
02/13/20 1539   Ileostomy RUQ   Placement Date/Time: 12/15/19 1407   Inserted by: DR PRAKASH  Location: RUQ   Stomal Appliance 1 piece;Clean;Dry;Intact;Changed;Drainable   Stoma Appearance round;moist;red;protruding above skin level   Peristomal Assessment Clean;Intact   Accessories/Skin Care convex wafer;skin barrier ring;skin sealant;cleansed with water   Stoma Function stool   Stool Color brown   Stool Consistency loose   Treatment Bag change   Output (mL) 150 mL

## 2020-02-14 NOTE — PROGRESS NOTES
Occupational Therapy: Branch    Physical Therapy: Individual: 90 minutes.    Speech Language Pathology:  Branch    Signed by: Jaxon Atkinson PT Student     - CoSigned By: Chelsie Cline PT 2/14/2020 3:22:21 PM

## 2020-02-14 NOTE — PROGRESS NOTES
Inpatient Rehabilitation Plan of Care Note    Plan of Care  Care Plan Reviewed - No updates at this time.    Psychosocial    Performed Intervention(s)  Verbalize needs and concerns  calm enviroment      Safety    Performed Intervention(s)  Bed alarm, wc alarm  Items within reach  Safety rounds      Sphincter Control    Performed Intervention(s)  Monitor intake and output  Encourage appropriate diet  wound ostomy nurse to continue to see pt for ostomy care.      Body Systems    Performed Intervention(s)  Daily skin inspection    Signed by: Ibis Fonseca RN

## 2020-02-14 NOTE — THERAPY PROGRESS REPORT/RE-CERT
Inpatient Rehabilitation - Physical Therapy Progress Note  [facility]     Patient Name: She López  : 1945  MRN: [id number]    Today's Date: 2020  Onset of Illness/Injury or Date of Surgery: 20              Admit Date: 2020      Visit Dx:      ICD-10-CM ICD-9-CM   1. General weakness R53.1 780.79       Patient Active Problem List   Diagnosis   • Crohn's disease of small intestine with other complication (CMS/HCC)   • Type 2 diabetes mellitus without complication (CMS/HCC)   • RSD upper limb   • Neuropathic pain of hand   • Hyperlipidemia   • Cervical myelopathy (CMS/HCC)   • Central pain syndrome   • Carpal tunnel syndrome   • Vitamin B 12 deficiency   • Guillain Barré syndrome (CMS/HCC)       Therapy Treatment    IRF Treatment Summary     Row Name 20 0800             Evaluation/Treatment Time and Intent    Subjective Information  no complaints  -LH,NM,LH2      Existing Precautions/Restrictions  fall  -LH,NM,LH2      Document Type  progress note/recertification;therapy note (daily note)  -LH,NM,LH2      Mode of Treatment  physical therapy  -LH,NM,LH2      Patient/Family Observations  supine in bed,  present  -LH,NM,LH2      Recorded by [LH,NM,LH2] Chelsie Cline, PT (r) Jaxon Atkinson, PT Student (t) Chelsie Cline, PT (c)      Row Name 20 0800             Cognition/Psychosocial- PT/OT    Affect/Mental Status (Cognitive)  WFL  -LH,NM,LH2      Orientation Status (Cognition)  oriented x 4  -LH,NM,LH2      Follows Commands (Cognition)  WFL  -LH,NM,LH2      Personal Safety Interventions  fall prevention program maintained;gait belt;nonskid shoes/slippers when out of bed;supervised activity  -LH,NM,LH2      Recorded by [LH,NM,LH2] Chelsie Cline, PT (r) Jaxon Atkinson, PT Student (t) Chelsie lCine, PT (c)      Row Name 20 0800             Bed Mobility Assessment/Treatment    Supine-Sit [place] (Bed Mobility)  supervision  -LH,NM,LH2      Sit-Supine  [place] (Bed Mobility)  supervision  -LH,NM,LH2      Bed Mobility, Safety Issues  decreased use of arms for pushing/pulling;decreased use of legs for bridging/pushing;impaired trunk control for bed mobility  -LH,NM,LH2      Assistive Device (Bed Mobility)  bed rails  -LH,NM,LH2      Recorded by [LH,NM,LH2] Chelsie Cline, PT (r) Jaxon Atkinson, PT Student (t) Chelsie Cline, PT (c)      Row Name 02/14/20 0800             Bed-Chair Transfer    Bed-Chair [place] (Transfers)  moderate assist (50% patient effort);verbal cues;nonverbal cues (demo/gesture) vc to check foot placement before transferring   -LH,NM,LH2      Assistive Device (Bed-Chair Transfers)  wheelchair  -LH,NM,LH2      Recorded by [LH,NM,LH2] Chelsie Cline, PT (r) Jaxon Atkinson, PT Student (t) Chelsie Cline, PT (c)      Row Name 02/14/20 0800             Chair-Bed Transfer    Chair-Bed [place] (Transfers)  contact guard;verbal cues  -LH,NM,LH2      Assistive Device (Chair-Bed Transfers)  wheelchair lateral transfer from mat to  c armrest removed   -LH,NM,LH2      Recorded by [LH,NM,LH2] Chelsie Cline, PT (r) Jaxon Atkinson, PT Student (t) Chelsie Cline, PT (c)      Row Name 02/14/20 0800             Sit-Stand Transfer    Sit-Stand [place] (Transfers)  minimum assist (75% patient effort);2 person assist;verbal cues  -LH,NM,LH2      Assistive Device (Sit-Stand Transfers)  wheelchair  -LH,NM,LH2      Recorded by [LH,NM,LH2] Chelsie Cline, PT (r) Jaxon Atkinson, PT Student (t) Chelsie Cline, PT (c)      Row Name 02/14/20 0800             Stand-Sit Transfer    Stand-Sit [place] (Transfers)  minimum assist (75% patient effort);2 person assist;verbal cues  -LH,NM,LH2      Assistive Device (Stand-Sit Transfers)  wheelchair  -LH,NM,LH2      Recorded by [LH,NM,LH2] Chelsie Cline, PT (r) Jaxon Atkinson, YUSUF Student (t) Chelsie Cline, PT (c)      Row Name 02/14/20 0800             Gait/Stairs Assessment/Training    [place]  Level (Gait)  minimum assist (75% patient effort);2 person assist;verbal cues;nonverbal cues (demo/gesture)  -LH,NM,LH2      Assistive Device (Gait)  walker, 4-wheeled heavy duty rollator; B aircasts; hinge ankle brace L  -LH,NM,LH2      Distance in Feet (Gait)  110x2, 80 70-80x2 in the PM: 1-2 standing rest breaks during bouts   -LH,NM,LH2      Pattern (Gait)  step-through  -LH,NM,LH2      Deviations/Abnormal Patterns (Gait)  bilateral deviations;ataxic;base of support, wide;gait speed decreased  -LH,NM,LH2      Bilateral Gait Deviations  heel strike decreased;forward flexed posture;weight shift ability decreased  -LH,NM,LH2      Left Sided Gait Deviations  -- L foot occasionally hits walker leg   -LH,NM,LH2      Comment (Gait/Stairs)  L foot inversion/supination, worsens c fatigue  AFO on LLE: noted improved stability in ankle during stance   -LH,NM,LH2      Recorded by [LH,NM,LH2] Chelsie Cline, PT (r) Jaxon Atkinson, PT Student (t) Chelsie Cline, PT (c)      Row Name 02/14/20 0800             Pain Scale: Numbers Pre/Post-Treatment    Pain Scale: Numbers, Pretreatment  0/10 - no pain  -LH,NM,LH2      Pain Scale: Numbers, Post-Treatment  0/10 - no pain  -LH,NM,LH2      Recorded by [LH,NM,LH2] Chelsie Cline, PT (r) Jaxon Atkinson, PT Student (t) Chelsie Cline, PT (c)      Row Name 02/14/20 0800             Lower Extremity Seated Therapeutic Exercise    Performed, Seated Lower Extremity (Therapeutic Exercise)  hip flexion/extension;hip abduction/adduction;LAQ (long arc quad), knee extension;knee flexion/extension;ankle dorsiflexion/plantarflexion blue thera band knee flex; B ankle eversion   -LH,NM,LH2      Exercise Type, Seated Lower Extremity (Therapeutic Exercise)  AROM (active range of motion)  -LH,NM,LH2      Sets/Reps Detail, Seated Lower Extremity (Therapeutic Exercise)  1/20  -LH,NM,LH2      Recorded by [LH,NM,LH2] Chelsie Cline, PT (r) Jaxon Atkinson, YUSUF Student (t) Chelsie Cline, PT (c)      Don Name  02/14/20 0800             Lower Extremity Supine Therapeutic Exercise    Performed, Supine Lower Extremity (Therapeutic Exercise)  bridging (bilateral w/bolster);hip flexion/extension;SLR (straight leg raise)  -LH,NM,LH2      Device, Supine Lower Extremity (Therapeutic Exercise)  balance disc  -LH,NM,LH2      Exercise Type, Supine Lower Extremity (Therapeutic Exercise)  AROM (active range of motion)  -LH,NM,LH2      Sets/Reps Detail, Supine Lower Extremity (Therapeutic Exercise)  1/10 2/5 bridging on balance disc   -LH,NM,LH2      Recorded by [LH,NM,LH2] Chelsie Cline, PT (r) Jaxon Atkinson, PT Student (t) Chelsie Cline, PT (c)      Row Name 02/14/20 0800             Vital Signs    Pretreatment Heart Rate (beats/min)  89  -LH,NM,LH2      Intratreatment Heart Rate (beats/min)  113 following gait training   -LH,NM,LH2      Pre SpO2 (%)  98  -LH,NM,LH2      O2 Delivery Pre Treatment  room air  -LH,NM,LH2      Intra SpO2 (%)  97  -LH,NM,LH2      O2 Delivery Intra Treatment  room air  -LH,NM,LH2      Recorded by [LH,NM,LH2] Chelsie Cline, PT (r) Jaxon Atkinson, PT Student (t) Chelsie Cline, PT (c)      Row Name 02/14/20 0800             Positioning and Restraints    Pre-Treatment Position  in bed  -LH,NM,LH2      Post Treatment Position  wheelchair  -LH,NM,LH2      In Wheelchair  call light within reach;sitting;exit alarm on;encouraged to call for assist;with family/caregiver  -LH,NM,LH2      Recorded by [LH,NM,LH2] Chelsie Cline, PT (r) Jaxon Atkinson, PT Student (t) Chelsie Cline, PT (c)      Row Name 02/14/20 0800             Weekly Summary of Progress (PT)    Weekly Outcome Summary: Physical Therapy  Pt has responded well following IVIG treatment after a decline last week. Pt has progressed this week with all functional mobility tasks, requring decreased assist for bed mobs and transfers and increased distance and quality in gait. Pt's LTGs have been upgraded. Pt to continue to benefit from skilled PT  intervention to allow pt to safely d/c home c assist from .   -LH,NM,LH2      Recorded by [LH,NM,LH2] Chelsie Cline, PT (r) Jaxon Atkinson, PT Student (t) Chelsie Cline, PT (c)        User Key  (r) = Recorded By, (t) = Taken By, (c) = Cosigned By    Initials Name Effective Dates    LH Chelsie Cline, PT 04/03/18 -     NM Jaxon Atkinson, PT Student 01/03/20 -         Wound 12/09/19 1510 abdomen Incision (Active)   Dressing Appearance dry;intact 2/13/2020 10:00 PM   Closure SUJEY 2/13/2020 10:00 PM   Base dressing in place, unable to visualize 2/14/2020  9:00 AM           PT Recommendation and Plan        Daily Summary of Progress (PT)  Recommendations: Physical Therapy: discussed pt's status c MD this morning related to decline in functional mobility and strength in BLE. Per MD pt to start on IVIG       PT IRF GOALS     Row Name 02/14/20 0700             Bed Mobility Goal 2 (PT-IRF)    Activity/Assistive Device (Bed Mobility Goal 2, PT-IRF)  bed mobility activities, all  -LH (r) NM (t) LH (c)      [place] Level (Bed Mobility Goal 2, PT-IRF)  independent  -LH (r) NM (t) LH (c)      Time Frame (Bed Mobility Goal 2, PT-IRF)  3 weeks  -LH (r) NM (t) LH (c)      Progress/Outcomes (Bed Mobility Goal 2, PT-IRF)  goal ongoing  -LH (r) NM (t) LH (c)         Transfer Goal 2 (PT-IRF)    Activity/Assistive Device (Transfer Goal 2, PT-IRF)  all transfers;walker, rolling  -LH (r) NM (t) LH (c)      [place] Level (Transfer Goal 2, PT-IRF)  minimum assist (75% or more patient effort)  -LH (r) NM (t) LH (c)      Time Frame (Transfer Goal 2, PT-IRF)  3 weeks  -LH (r) NM (t) LH (c)      Progress/Outcomes (Transfer Goal 2, PT-IRF)  goal ongoing  -LH (r) NM (t) LH (c)         Gait/Walking Locomotion Goal 2 (PT-IRF)    Activity/Assistive Device (Gait/Walking Locomotion Goal 2, PT-IRF)  gait (walking locomotion);assistive device use;walker, rolling  -LH (r) NM (t) LH (c)      Gait/Walking Locomotion Distance Goal 2  (PT-IRF)  100  -LH (r) NM (t) LH (c)      [place] Level (Gait/Walking Locomotion Goal 2, PT-IRF)  minimum assist (75% or more patient effort)  -LH (r) NM (t) LH (c)      Time Frame (Gait/Walking Locomotion Goal 2, PT-IRF)  3 weeks  -LH (r) NM (t) LH (c)      Progress/Outcomes (Gait/Walking Locomotion Goal 2, PT-IRF)  goal ongoing  -LH (r) NM (t) LH (c)         Balance Goal 2 (PT)    Activity/Assistive Device (Balance Goal 2, PT)  standing, static  -LH (r) NM (t) LH (c)      [place] Level (Balance Goal 2, PT)  contact guard assist with assistive device   -LH (r) NM (t) LH (c)      Time Frame (Balance Goal 2, PT)  3 weeks  -LH (r) NM (t) LH (c)         Caregiver Training Goal 2 (PT-IRF)    Caregiver Training Goal 2 (PT-IRF)  Family/caregiver to assist pt safely as needed.  -LH (r) NM (t) LH (c)      Time Frame (Caregiver Training Goal 2, PT-IRF)  3 weeks  -LH (r) NM (t) LH (c)      Progress/Outcomes (Caregiver Training Goal 2, PT-IRF)  goal ongoing  -LH (r) NM (t) LH (c)        User Key  (r) = Recorded By, (t) = Taken By, (c) = Cosigned By    Initials Name Provider Type     Chelsie Cline, PT Physical Therapist    NM Jaxon Atkinson, PT Student PT Student               Time Calculation:     PT Charges     Row Name 02/14/20 1344 02/14/20 1053          Time Calculation    Start Time  1300  -LH (r) NM (t) LH (c)  0830  -LH (r) NM (t) LH (c)     Stop Time  1330  -LH (r) NM (t) LH (c)  0930  -LH (r) NM (t) LH (c)     Time Calculation (min)  30 min  -LH (r) NM (t)  60 min  -LH (r) NM (t)     PT Received On  02/14/20  -LH (r) NM (t) LH (c)  02/14/20  -LH (r) NM (t) LH (c)     PT - Next Appointment  02/15/20  -LH (r) NM (t) LH (c)  02/14/20  -LH (r) NM (t) LH (c)     PT Goal Re-Cert Due Date  --  02/21/20  -LH (r) NM (t) LH (c)       User Key  (r) = Recorded By, (t) = Taken By, (c) = Cosigned By    Initials Name Provider Type    LH Chelsie Cline, PT Physical Therapist    Jaxon Arteaga, PT Student PT Student           Therapy Charges for Today     Code Description Service Date Service Provider Modifiers Qty    22399243006 HC PT THER PROC EA 15 MIN 2/13/2020 Jaxon Atkinson, PT Student GP 2    59237573984 HC GAIT TRAINING EA 15 MIN 2/13/2020 Jaxon Atkinson, PT Student GP 1    81637859419 HC PT NEUROMUSC RE EDUCATION EA 15 MIN 2/13/2020 Jaxon Atkinson, PT Student GP 2    54599803207 HC PT THERAPEUTIC ACT EA 15 MIN 2/13/2020 Jaxon Atkinson, PT Student GP 1    27991271904 HC GAIT TRAINING EA 15 MIN 2/14/2020 Jaxon Atkinson, PT Student GP 2    15986843215 HC PT THER PROC EA 15 MIN 2/14/2020 Jaxon Atkinson, PT Student GP 2    99555934701 HC PT NEUROMUSC RE EDUCATION EA 15 MIN 2/14/2020 Jaxon Atkinson, PT Student GP 1    51655040313 HC PT THERAPEUTIC ACT EA 15 MIN 2/14/2020 Jaxon Atkinson, PT Student GP 1                   Jaxon Atkinson, PT Student  2/14/2020

## 2020-02-14 NOTE — PROGRESS NOTES
Inpatient Rehabilitation Plan of Care Note    Plan of Care  Care Plan Reviewed - No updates at this time.    Psychosocial    Performed Intervention(s)  Verbalize needs and concerns  calm enviroment      Safety    Performed Intervention(s)  Bed alarm, wc alarm  Items within reach  Safety rounds      Sphincter Control    Performed Intervention(s)  Monitor intake and output  Encourage appropriate diet  wound ostomy nurse to continue to see pt for ostomy care.      Body Systems    Performed Intervention(s)  Daily skin inspection    Signed by: Jenn Anna RN

## 2020-02-14 NOTE — PROGRESS NOTES
LOS: 21 days   Patient Care Team:  Armando Valentine MD as PCP - General (Internal Medicine)  Lisa Arriaza MD as Consulting Physician (Obstetrics and Gynecology)    Chief Complaint: GBS    Subjective     History of Present Illness       Temperature 100.7 last evening when IV iron was infusing and afebrile than 100.4 earlier today.  Patient without any new symptoms.  White blood cell count normal.  Phlebitis at the right antecubital fossa continues improved.  Motor strength in the upper extremities and lower extremities continue to improve.  She was walking better today as well.  Still little fatigued.  She ambulated 110 feet x 3 with rolling walker.    .  History taken from: patient chart    Objective     Vital Signs  Temp:  [96.7 °F (35.9 °C)-100.7 °F (38.2 °C)] 99 °F (37.2 °C)  Heart Rate:  [81-97] 81  Resp:  [18] 18  BP: ()/(40-74) 109/61    Physical Exam:  General: Awake, alert, NAD  HEENT: normocepahlic, atraumatic   Heart: RRR, no m/r/g  Lungs: CTAB, no wheezing, rales, or rhonchi  Abdomen: soft, non-tender, non-distended, colostomy , incision dressed  Strength:   BUE: 4+/5 with bilateral elbow flexion, elbow extension, wrist extension, and finger flexion, right greater than left weak hand intrinsics R 3-/5, L 3+/5  Difficulty opposing right thumb to digits 2-3-4-5 but better.   Opposes left thumb to digits 2-3-4-5.   BLE:  HF right 4/5, left 4/5,  knee extension right 4+/5, left  4+/5, ankle dorsiflexion B 4/5  Extremities: Right antecubital fossa with no erythema and smaller residual cord.  No significant tenderness.    Results Review:     I reviewed the patient's new clinical results.  Results from last 7 days   Lab Units 02/14/20  0609 02/12/20  0610 02/10/20  0614   WBC 10*3/mm3 7.17 6.53 5.65   HEMOGLOBIN g/dL 8.1* 7.3* 8.4*   HEMATOCRIT % 25.7* 22.7* 26.0*   PLATELETS 10*3/mm3 351 440 468*     Results from last 7 days   Lab Units 02/14/20  0609 02/10/20  0614   SODIUM mmol/L 137 140    POTASSIUM mmol/L 3.6 3.8   CHLORIDE mmol/L 101 106   CO2 mmol/L 25.5 21.2*   BUN mg/dL 17 22   CREATININE mg/dL 0.97 0.71   CALCIUM mg/dL 7.9* 8.8   GLUCOSE mg/dL 86 91       Medication Review:   Scheduled Meds:    Iron 18 mg Sublingual BID   gabapentin 300 mg Oral 4x Daily   iron sucrose 200 mg Intravenous Q48H   loperamide 2 mg Oral TID AC   MULTIVITAMIN ADULT 1 tablet Sublingual Daily   pantoprazole 40 mg Oral Q AM   sodium bicarbonate 1,300 mg Oral TID   sodium chloride 10 mL Intravenous Q12H   sodium chloride 10 mL Intravenous Q12H   Cyanocobalamin 2,500 mcg Sublingual Weekly   Vitamin D-3 5,000 Units Sublingual Daily     Continuous Infusions:   PRN Meds:.•  acetaminophen  •  aluminum sulfate-calcium acetate  •  gabapentin **AND** gabapentin  •  miconazole  •  ondansetron ODT  •  sodium chloride  •  sodium chloride    Assessment/Plan   75 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     GBS  -Completed her plasmapharesis and has gotten good return.   - Will begin PT/OT for ADLS, strength, mobility, txs, gait.   -January 27: On gabapentin 300mg QID. Will continue to monitor and will titrate up as needed.  -Jan 29 -  Feels strength is somewhat better.  Worked on gait with rolling walker yesterday 15 feet. Today utilized ankle weights to decrease ataxic movements with gait. Transfers mod max assist. Bed mobility CTG.  - Feb 6 - Patient complains of increased numbness in her knees.  She is noted to have increased weakness with her hip extensors and possibly in the left quadricep.  Her ambulation distance is less and takes more effort with a shorter stride.  On examination she also appears weaker in her hands and ankles.  Discussed having neurology see her to assess for possibly IVIG or another round of plasmapheresis.  -Feb 7 - increased weakness noted in BUE and BLE and more difficulty with mobility - Neurology starting patient on course of IVIG   -February 10-shows good response on IVIG-tolerating.   Notes improvement in her strength in the upper extremities and lower extremities as well as performance with mobility and self-care.  -February 11-continues to show improvement on IVIG  Feb 12-functional improvement after IVIG with improvement in her handgrip, feeding, transfers, and ambulation as well as improvement in her strength.  February 14-strength noticeably improved in the bilateral upper extremities and bilateral lower extremities.  Ambulating further.    Crohn's  -s/p recent colostomy- wound nurse to see and education for home management.   -If more than 1 liter output a day, needs Immodium-per Dr. Albrecht colorectal surgeon.     Hyponatremia  -On salt tabs and fluid restriction. Renal following  -January 27: Na 130. Continue salt tabs and fluid restriction per renal. Will continue to monitor  -January 30: Na 133. Continue sodium bicarbonate and fluid restriction per renal.  -Feb 3- Na improved to 138  -February 4-fluid restriction discontinued.  -February 5-sodium 137  -February 10-sodium 140.  Sodium chloride tablets were discontinued on February 8.  Continues on sodium bicarb 1300 mg 3 times a day.    Anemia  -January 27: Hgb/Hct 7.6/24.3- stable. No signs of active bleeding. Will order fecal occult and reticulocyte count. Will continue to monitor.  -Jan 28 - hemoccult positive  -Jan 29 - Stool heme +.  HGB stable at 7.5. She had hypotension and tachycardia yesterday 87/64 and 110) , better today (99/63 and 88).  Reviewed option of transfusion PRBCs. She wished to hold on transfusion unless absolutely necessary. Discussed if HGB < 7, would recommend definitely transfuse.  -January 30: Repeat CBC scheduled for tomorrow. Will continue to monitor.    -January 31: Hgb 7.2. Will transfuse 1 unit of PRBCs today. Ordered anemia studies. Spoke with Dr. Albrecht and if iron supplementation is required she recommends IV iron for better absorption.  -February 1: Hemoglobin improved 8.8.  IV iron infusion ordered by  nephrology  -Feb 3 - HGB improved 9.5. Not tolerate IV iron infusion due to burning in vein, does not wish to have placed a more proximal IV. She had tow infusions. She will get EZ Melt Iron from outside to take by mouth; on discussion with colorectal surgery last week she should be able to absorb PO iron.  February 5-hemoglobin 8.5  February 10-hemoglobin 8.4  February 12-hemoglobin 7.3-As she has the midline in, will plan to resume the last 3 of the iron infusions that she did not get previously with the peripheral IV.  Will start tomorrow scheduled every other day for total of 3 doses.  Recheck hemoglobin on Friday.  February 14-hemoglobin 8.1.  She had a temperature last evening possibly related to the iron infusion.  She tolerated the infusion and would like to have it on consecutive days ( rather than every other day as was ordered to give her a break).    DVT prophylaxis  -SCDS for now.   -January 27: Heparin has been on hold due to HGB trending down. Following results of fecal occult and reticulocyte count will consider restarting Heparin for DVT prophylaxis.   -January 28 - hemoccult positive.     Vitamin D deficiency-vitamin D 22.4  on January 29.    Feb 1 - Patient does not wish to take vitamin D p.o. through the hospital supply.  Wishes to have her vitamin D brought in from home.    Vitamin B12 replacement-sublingual from home    RUE antecubital fossa phlebitis/cellulitis-February 3-no sign of infection. K-PAD.   February 4-Right antecubital fossa phlebitis with cord palpable/tender with surrounding erythema consistent with cellulitis. Allergy to Keflex - throat swelled. Will start Doxycycline 100 mg bid x 7 days, continue K-pad.   February 5-She continues to have pain and redness and swelling of the right antecubital fossa and distal upper arm.  Reviewed continue with doxycycline which was started yesterday but if there is no show improvement will look to adjust antibiotic tomorrow.  White blood cell  count is 7.22 with normal differential..  Feb 7 - area improved today on doxycycline.  February 10-further improvement.    TEAM CONF - JAN 28 - FLAT AFFECT. SUPINE SIT MIN ASSIST. TRANSFERS MAX 2. NONAMBULATORY. UBB MIN. LBB MAX. UBD MOD.  LBD DEP. ABD WOUND CARE. STOOL HEMOCCULT POSITIVE.  ELOS - 5 WEEKS.     TEAM CONF - FEB 4 - BED SBA. TRANSFERS MIN-MOD. GAIT 30 FEET MIN 2 FIDEL WALKER. UBD MOD. LBD DEP. ON 1200 CC FLUID RESTRICTION. EATING OKAY.  ABD WOUND CLOSING. OSTOMY DEVICE STAYING ON.  CONTINENT. NEEDS TO GET UP TO BS.   ELOS- 4 WEEKS.     February 10-she had shown a decline in her mobility and self-care with flare of Guillain-Barré syndrome but has shown response on IVIG.  Improving.  Most recently in physical therapy and Occupational Therapy-toilet transfers moderate assist, shower transfer to be assessed, bathing minimum assist upper body and maximum assist lower body, dressing moderate assist upper body independent lower body.  Grooming minimum assist.  Toileting dependent.  Eating with minimal assist to set up with built up utensils.  Bed mobility contact-guard.  Ambulated 25 feet moderate assist of 2 with Aircast bilateral ankles.    TEAM CONF - FEB 11 - She feels IVIG has been helpful.  She notes improvement in the strength in her hands and in her legs.  Easier transfers.  Walk better.  She had had a decline in her ability to do to box and blocks but that improved yesterday.  Tolerating IVIG.   describes her performance as 180 degree change from Friday.  In physical therapy and Occupational Therapy-toilet transfers moderate assist, shower transfer to be assessed, bathing minimum assist upper body and maximum assist lower body, dressing moderate assist upper body independent lower body.  Grooming minimum assist.  Toileting dependent.  Eating with minimal assist to set up with built up utensils.  Bed mobility contact-guard.  Ambulated 25 feet moderate assist of 2 with Aircast bilateral ankles.    Continent bladder. Abd wound improving.   ELOS - 3 WEEKS    February 14-Temperature 100.7 last evening when IV iron was infusing and afebrile than 100.4 earlier today.  Patient without any new symptoms.  White blood cell count normal.  Phlebitis at the right antecubital fossa continues improved.  No new focal complaints.  Will monitor.    Adolfo Valle MD  02/14/20  11:54 AM

## 2020-02-15 PROCEDURE — 97116 GAIT TRAINING THERAPY: CPT

## 2020-02-15 PROCEDURE — 25010000002 IRON SUCROSE PER 1 MG: Performed by: PHYSICAL MEDICINE & REHABILITATION

## 2020-02-15 PROCEDURE — 97110 THERAPEUTIC EXERCISES: CPT

## 2020-02-15 RX ADMIN — LOPERAMIDE HYDROCHLORIDE 2 MG: 2 CAPSULE ORAL at 05:59

## 2020-02-15 RX ADMIN — SODIUM BICARBONATE 1300 MG: 650 TABLET ORAL at 09:21

## 2020-02-15 RX ADMIN — Medication 18 MG: at 21:42

## 2020-02-15 RX ADMIN — CHOLECALCIFEROL TAB 125 MCG (5000 UNIT) 5000 UNITS: 125 TAB at 09:21

## 2020-02-15 RX ADMIN — SODIUM CHLORIDE, PRESERVATIVE FREE 10 ML: 5 INJECTION INTRAVENOUS at 09:23

## 2020-02-15 RX ADMIN — GABAPENTIN 300 MG: 300 CAPSULE ORAL at 21:41

## 2020-02-15 RX ADMIN — GABAPENTIN 300 MG: 300 CAPSULE ORAL at 17:10

## 2020-02-15 RX ADMIN — LOPERAMIDE HYDROCHLORIDE 2 MG: 2 CAPSULE ORAL at 17:10

## 2020-02-15 RX ADMIN — Medication 1 TABLET: at 09:21

## 2020-02-15 RX ADMIN — SODIUM BICARBONATE 1300 MG: 650 TABLET ORAL at 21:41

## 2020-02-15 RX ADMIN — PANTOPRAZOLE SODIUM 40 MG: 40 TABLET, DELAYED RELEASE ORAL at 05:59

## 2020-02-15 RX ADMIN — GABAPENTIN 300 MG: 300 CAPSULE ORAL at 13:21

## 2020-02-15 RX ADMIN — GABAPENTIN 300 MG: 300 CAPSULE ORAL at 03:20

## 2020-02-15 RX ADMIN — SODIUM CHLORIDE, PRESERVATIVE FREE 10 ML: 5 INJECTION INTRAVENOUS at 21:41

## 2020-02-15 RX ADMIN — LOPERAMIDE HYDROCHLORIDE 2 MG: 2 CAPSULE ORAL at 13:21

## 2020-02-15 RX ADMIN — SODIUM CHLORIDE, PRESERVATIVE FREE 10 ML: 5 INJECTION INTRAVENOUS at 21:42

## 2020-02-15 RX ADMIN — SODIUM BICARBONATE 1300 MG: 650 TABLET ORAL at 17:10

## 2020-02-15 RX ADMIN — GABAPENTIN 300 MG: 300 CAPSULE ORAL at 09:21

## 2020-02-15 RX ADMIN — IRON SUCROSE 200 MG: 20 INJECTION, SOLUTION INTRAVENOUS at 18:03

## 2020-02-15 RX ADMIN — Medication 18 MG: at 09:21

## 2020-02-15 NOTE — PROGRESS NOTES
Inpatient Rehabilitation Plan of Care Note    Plan of Care  Care Plan Reviewed - No updates at this time.    Psychosocial    Performed Intervention(s)  Verbalize needs and concerns  calm enviroment      Safety    Performed Intervention(s)  Bed alarm, wc alarm  Items within reach  Safety rounds      Sphincter Control    Performed Intervention(s)  Monitor intake and output  Encourage appropriate diet  wound ostomy nurse to continue to see pt for ostomy care.      Body Systems    Performed Intervention(s)  Daily skin inspection    Signed by: Dora Cortez RN

## 2020-02-15 NOTE — PROGRESS NOTES
LOS: 22 days   Patient Care Team:  Armando Valentine MD as PCP - General (Internal Medicine)  Lisa Arriaza MD as Consulting Physician (Obstetrics and Gynecology)    Chief Complaint: GBS    Subjective     History of Present Illness     Pt wants her last dose of IV iron today instead of tomorrow. Feels her strength is improving.    .  History taken from: patient chart    Objective     Vital Signs  Temp:  [97.7 °F (36.5 °C)-99 °F (37.2 °C)] 98 °F (36.7 °C)  Heart Rate:  [72-88] 88  Resp:  [16] 16  BP: ()/(52-60) 110/55    Physical Exam:  General: Awake, alert, NAD  HEENT: normocepahlic, atraumatic   Heart: RRR, no m/r/g  Lungs: CTAB, no wheezing, rales, or rhonchi  Abdomen: soft, non-tender, non-distended, colostomy , incision dressed  Strength:   BUE: 4+/5 with bilateral elbow flexion, elbow extension, wrist extension, and finger flexion, right greater than left weak hand intrinsics R 3-/5, L 3+/5  Difficulty opposing right thumb to digits 2-3-4-5 but better.   Opposes left thumb to digits 2-3-4-5.   BLE:  HF right 4/5, left 4/5,  knee extension right 4+/5, left  4+/5, ankle dorsiflexion B 4/5  Extremities: Right antecubital fossa with no erythema and smaller residual cord.  No significant tenderness.    Results Review:     I reviewed the patient's new clinical results.  Results from last 7 days   Lab Units 02/14/20  0609 02/12/20  0610 02/10/20  0614   WBC 10*3/mm3 7.17 6.53 5.65   HEMOGLOBIN g/dL 8.1* 7.3* 8.4*   HEMATOCRIT % 25.7* 22.7* 26.0*   PLATELETS 10*3/mm3 351 440 468*     Results from last 7 days   Lab Units 02/14/20  0609 02/10/20  0614   SODIUM mmol/L 137 140   POTASSIUM mmol/L 3.6 3.8   CHLORIDE mmol/L 101 106   CO2 mmol/L 25.5 21.2*   BUN mg/dL 17 22   CREATININE mg/dL 0.97 0.71   CALCIUM mg/dL 7.9* 8.8   GLUCOSE mg/dL 86 91       Medication Review:   Scheduled Meds:    [START ON 2/16/2020] acetaminophen 650 mg Oral Once   And      [START ON 2/16/2020] diphenhydrAMINE 25 mg Oral Once    Iron 18 mg Sublingual BID   gabapentin 300 mg Oral 4x Daily   iron sucrose 200 mg Intravenous Q24H   loperamide 2 mg Oral TID AC   MULTIVITAMIN ADULT 1 tablet Sublingual Daily   pantoprazole 40 mg Oral Q AM   sodium bicarbonate 1,300 mg Oral TID   sodium chloride 10 mL Intravenous Q12H   sodium chloride 10 mL Intravenous Q12H   Cyanocobalamin 2,500 mcg Sublingual Weekly   Vitamin D-3 5,000 Units Sublingual Daily     Continuous Infusions:   PRN Meds:.•  acetaminophen  •  aluminum sulfate-calcium acetate  •  gabapentin **AND** gabapentin  •  miconazole  •  ondansetron ODT  •  sodium chloride  •  sodium chloride    Assessment/Plan   75 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     GBS  -Completed her plasmapharesis and has gotten good return.   - Will begin PT/OT for ADLS, strength, mobility, txs, gait.   -January 27: On gabapentin 300mg QID. Will continue to monitor and will titrate up as needed.  -Jan 29 -  Feels strength is somewhat better.  Worked on gait with rolling walker yesterday 15 feet. Today utilized ankle weights to decrease ataxic movements with gait. Transfers mod max assist. Bed mobility CTG.  - Feb 6 - Patient complains of increased numbness in her knees.  She is noted to have increased weakness with her hip extensors and possibly in the left quadricep.  Her ambulation distance is less and takes more effort with a shorter stride.  On examination she also appears weaker in her hands and ankles.  Discussed having neurology see her to assess for possibly IVIG or another round of plasmapheresis.  -Feb 7 - increased weakness noted in BUE and BLE and more difficulty with mobility - Neurology starting patient on course of IVIG   -February 10-shows good response on IVIG-tolerating.  Notes improvement in her strength in the upper extremities and lower extremities as well as performance with mobility and self-care.  -February 11-continues to show improvement on IVIG  Feb 12-functional improvement  after IVIG with improvement in her handgrip, feeding, transfers, and ambulation as well as improvement in her strength.  February 14-strength noticeably improved in the bilateral upper extremities and bilateral lower extremities.  Ambulating further.    Crohn's  -s/p recent colostomy- wound nurse to see and education for home management.   -If more than 1 liter output a day, needs Immodium-per Dr. Albrecht colorectal surgeon.     Hyponatremia  -On salt tabs and fluid restriction. Renal following  -January 27: Na 130. Continue salt tabs and fluid restriction per renal. Will continue to monitor  -January 30: Na 133. Continue sodium bicarbonate and fluid restriction per renal.  -Feb 3- Na improved to 138  -February 4-fluid restriction discontinued.  -February 5-sodium 137  -February 10-sodium 140.  Sodium chloride tablets were discontinued on February 8.  Continues on sodium bicarb 1300 mg 3 times a day.    Anemia  -January 27: Hgb/Hct 7.6/24.3- stable. No signs of active bleeding. Will order fecal occult and reticulocyte count. Will continue to monitor.  -Jan 28 - hemoccult positive  -Jan 29 - Stool heme +.  HGB stable at 7.5. She had hypotension and tachycardia yesterday 87/64 and 110) , better today (99/63 and 88).  Reviewed option of transfusion PRBCs. She wished to hold on transfusion unless absolutely necessary. Discussed if HGB < 7, would recommend definitely transfuse.  -January 30: Repeat CBC scheduled for tomorrow. Will continue to monitor.    -January 31: Hgb 7.2. Will transfuse 1 unit of PRBCs today. Ordered anemia studies. Spoke with Dr. Albrecht and if iron supplementation is required she recommends IV iron for better absorption.  -February 1: Hemoglobin improved 8.8.  IV iron infusion ordered by nephrology  -Feb 3 - HGB improved 9.5. Not tolerate IV iron infusion due to burning in vein, does not wish to have placed a more proximal IV. She had tow infusions. She will get EZ Melt Iron from outside to take by  mouth; on discussion with colorectal surgery last week she should be able to absorb PO iron.  February 5-hemoglobin 8.5  February 10-hemoglobin 8.4  February 12-hemoglobin 7.3-As she has the midline in, will plan to resume the last 3 of the iron infusions that she did not get previously with the peripheral IV.  Will start tomorrow scheduled every other day for total of 3 doses.  Recheck hemoglobin on Friday.  February 14-hemoglobin 8.1.  She had a temperature last evening possibly related to the iron infusion.  She tolerated the infusion and would like to have it on consecutive days ( rather than every other day as was ordered to give her a break).  2/15 - Last dose of IV Iron ordered for today    DVT prophylaxis  -SCDS for now.   -January 27: Heparin has been on hold due to HGB trending down. Following results of fecal occult and reticulocyte count will consider restarting Heparin for DVT prophylaxis.   -January 28 - hemoccult positive.     Vitamin D deficiency-vitamin D 22.4  on January 29.    Feb 1 - Patient does not wish to take vitamin D p.o. through the hospital supply.  Wishes to have her vitamin D brought in from home.    Vitamin B12 replacement-sublingual from home    RUE antecubital fossa phlebitis/cellulitis-February 3-no sign of infection. K-PAD.   February 4-Right antecubital fossa phlebitis with cord palpable/tender with surrounding erythema consistent with cellulitis. Allergy to Keflex - throat swelled. Will start Doxycycline 100 mg bid x 7 days, continue K-pad.   February 5-She continues to have pain and redness and swelling of the right antecubital fossa and distal upper arm.  Reviewed continue with doxycycline which was started yesterday but if there is no show improvement will look to adjust antibiotic tomorrow.  White blood cell count is 7.22 with normal differential..  Feb 7 - area improved today on doxycycline.  February 10-further improvement.    TEAM CONF - JAN 28 - FLAT AFFECT. SUPINE SIT  MIN ASSIST. TRANSFERS MAX 2. NONAMBULATORY. UBB MIN. LBB MAX. UBD MOD.  LBD DEP. ABD WOUND CARE. STOOL HEMOCCULT POSITIVE.  ELOS - 5 WEEKS.     TEAM CONF - FEB 4 - BED SBA. TRANSFERS MIN-MOD. GAIT 30 FEET MIN 2 FIDEL WALKER. UBD MOD. LBD DEP. ON 1200 CC FLUID RESTRICTION. EATING OKAY.  ABD WOUND CLOSING. OSTOMY DEVICE STAYING ON.  CONTINENT. NEEDS TO GET UP TO BSC.   ELOS- 4 WEEKS.     February 10-she had shown a decline in her mobility and self-care with flare of Guillain-Barré syndrome but has shown response on IVIG.  Improving.  Most recently in physical therapy and Occupational Therapy-toilet transfers moderate assist, shower transfer to be assessed, bathing minimum assist upper body and maximum assist lower body, dressing moderate assist upper body independent lower body.  Grooming minimum assist.  Toileting dependent.  Eating with minimal assist to set up with built up utensils.  Bed mobility contact-guard.  Ambulated 25 feet moderate assist of 2 with Aircast bilateral ankles.    TEAM CONF - FEB 11 - She feels IVIG has been helpful.  She notes improvement in the strength in her hands and in her legs.  Easier transfers.  Walk better.  She had had a decline in her ability to do to box and blocks but that improved yesterday.  Tolerating IVIG.   describes her performance as 180 degree change from Friday.  In physical therapy and Occupational Therapy-toilet transfers moderate assist, shower transfer to be assessed, bathing minimum assist upper body and maximum assist lower body, dressing moderate assist upper body independent lower body.  Grooming minimum assist.  Toileting dependent.  Eating with minimal assist to set up with built up utensils.  Bed mobility contact-guard.  Ambulated 25 feet moderate assist of 2 with Aircast bilateral ankles.   Continent bladder. Abd wound improving.   ELOS - 3 WEEKS    February 14-Temperature 100.7 last evening when IV iron was infusing and afebrile than 100.4 earlier today.   Patient without any new symptoms.  White blood cell count normal.  Phlebitis at the right antecubital fossa continues improved.  No new focal complaints.  Will monitor.    Binh Lou MD  02/15/20  4:06 PM

## 2020-02-15 NOTE — PLAN OF CARE
Problem: Patient Care Overview  Goal: Plan of Care Review  Outcome: Ongoing (interventions implemented as appropriate)  Flowsheets (Taken 2/15/2020 1478)  Outcome Summary: pt alert and oriented. n/t to EXT unchanged. pain to bilat hands. ileostomy bag remains intact with loose dark brown stools. x1 assist pivot. will continue to monitor.  Progress, Functional Goals: demonstrating adequate progress  Plan of Care Reviewed With: patient  IRF Plan of Care Review: progress ongoing, continue

## 2020-02-15 NOTE — PROGRESS NOTES
Occupational Therapy: Branch    Physical Therapy: Individual: 30 minutes.    Speech Language Pathology:  Branch    Signed by: Sona Rahman PT

## 2020-02-15 NOTE — THERAPY TREATMENT NOTE
Inpatient Rehabilitation - Physical Therapy Treatment Note  [facility]     Patient Name: She López  : 1945  MRN: [id number]    Today's Date: 2/15/2020  Onset of Illness/Injury or Date of Surgery: 20              Admit Date: 2020      Visit Dx:      ICD-10-CM ICD-9-CM   1. General weakness R53.1 780.79       Patient Active Problem List   Diagnosis   • Crohn's disease of small intestine with other complication (CMS/HCC)   • Type 2 diabetes mellitus without complication (CMS/HCC)   • RSD upper limb   • Neuropathic pain of hand   • Hyperlipidemia   • Cervical myelopathy (CMS/HCC)   • Central pain syndrome   • Carpal tunnel syndrome   • Vitamin B 12 deficiency   • Guillain Barré syndrome (CMS/HCC)       Therapy Treatment    IRF Treatment Summary     Row Name 02/15/20 0848             Evaluation/Treatment Time and Intent    Subjective Information  no complaints  -LB      Existing Precautions/Restrictions  fall  -LB      Document Type  therapy note (daily note)  -LB      Mode of Treatment  physical therapy  -LB      Patient/Family Observations  pt in bed, NAD  -LB      Recorded by [LB] Sona Rahman PT      Row Name 02/15/20 0815             Cognition/Psychosocial- PT/OT    Affect/Mental Status (Cognitive)  WFL  -LB      Orientation Status (Cognition)  oriented x 4  -LB      Follows Commands (Cognition)  WFL  -LB      Personal Safety Interventions  fall prevention program maintained;muscle strengthening facilitated;nonskid shoes/slippers when out of bed;gait belt  -LB      Safety Deficit (Cognitive)  judgment;problem solving  -LB      Recorded by [LB] Sona Rahman PT      Row Name 02/15/20 0808             Sit-Stand Transfer    Sit-Stand [place] (Transfers)  verbal cues;minimum assist (75% patient effort);2 person assist  -LB      Assistive Device (Sit-Stand Transfers)  walker, front-wheeled;wheelchair  -LB      Recorded by [LB] Sona Rahman PT      Row Name 02/15/20 0861              Stand-Sit Transfer    Stand-Sit [place] (Transfers)  verbal cues;minimum assist (75% patient effort);2 person assist  -LB      Assistive Device (Stand-Sit Transfers)  walker, front-wheeled;wheelchair  -LB      Recorded by [LB] Sona Rahman, PT      Row Name 02/15/20 0848             Gait/Stairs Assessment/Training    [place] Level (Gait)  minimum assist (75% patient effort);2 person assist  -LB      Assistive Device (Gait)  walker, 4-wheeled sekou aircast  -LB      Distance in Feet (Gait)  100; 80; 60  -LB      Deviations/Abnormal Patterns (Gait)  bilateral deviations;ataxic;base of support, wide decreased trunk control  -LB      Bilateral Gait Deviations  heel strike decreased;forward flexed posture;weight shift ability decreased varying step length, placement of feet  -LB      Comment (Gait/Stairs)  L foot crosses to midline   -LB      Recorded by [LB] Sona Rahman, PT      Row Name 02/15/20 0848             Safety Issues, Functional Mobility    Impairments Affecting Function (Mobility)  strength;endurance/activity tolerance  -LB      Recorded by [LB] Sona Rahman, PT      Row Name 02/15/20 0848             Pain Scale: Numbers Pre/Post-Treatment    Pain Scale: Numbers, Pretreatment  0/10 - no pain  -LB      Pain Scale: Numbers, Post-Treatment  0/10 - no pain  -LB      Recorded by [LB] Sona Rahman, PT      Row Name 02/15/20 0848             Lower Extremity Seated Therapeutic Exercise    Performed, Seated Lower Extremity (Therapeutic Exercise)  hip flexion/extension;LAQ (long arc quad), knee extension;ankle dorsiflexion/plantarflexion  -LB      Exercise Type, Seated Lower Extremity (Therapeutic Exercise)  AROM (active range of motion)  -LB      Sets/Reps Detail, Seated Lower Extremity (Therapeutic Exercise)  1/10  -LB      Recorded by [LB] Sona Rahman, PT      Row Name 02/15/20 0848             Positioning and Restraints    Pre-Treatment Position  sitting in chair/recliner  -LB      Post Treatment  Position  wheelchair  -LB      In Wheelchair  call light within reach;with family/caregiver  -LB      Recorded by [LB] Sona Rahman, YUSUF        User Key  (r) = Recorded By, (t) = Taken By, (c) = Cosigned By    Initials Name Effective Dates    Sona Recio, PT 04/03/18 -         Wound 12/09/19 1510 abdomen Incision (Active)   Dressing Appearance dry;intact 2/15/2020  9:25 AM   Closure SUJEY 2/15/2020  9:25 AM   Base dressing in place, unable to visualize 2/15/2020  9:25 AM   Periwound dry;intact 2/15/2020  9:25 AM   Drainage Amount none 2/15/2020  9:25 AM   Periwound Care, Wound dry periwound area maintained 2/15/2020  9:25 AM           PT Recommendation and Plan                        Time Calculation:     PT Charges     Row Name 02/15/20 0927             Time Calculation    Start Time  0830  -LB      Stop Time  0900  -LB      Time Calculation (min)  30 min  -LB      PT Received On  02/15/20  -LB      PT - Next Appointment  02/17/20  -LB        User Key  (r) = Recorded By, (t) = Taken By, (c) = Cosigned By    Initials Name Provider Type    Sona Recio, PT Physical Therapist          Therapy Charges for Today     Code Description Service Date Service Provider Modifiers Qty    12446923043 HC PT THER SUPP EA 15 MIN 2/15/2020 Sona Rahman, PT GP 1    00408524639 HC PT THER PROC EA 15 MIN 2/15/2020 Sona Rahman, PT GP 1    20683089521 HC GAIT TRAINING EA 15 MIN 2/15/2020 Sona Rahman, PT GP 1                   Sona Rahman PT  2/15/2020

## 2020-02-15 NOTE — PLAN OF CARE
Problem: Patient Care Overview  Goal: Plan of Care Review  Outcome: Ongoing (interventions implemented as appropriate)  Flowsheets (Taken 2/15/2020 0501)  Outcome Summary: Pt slept with no unsafe behavior. C/o bilat hand pain. Requested to be awakened at 0300 for Gabapentin. Ileostomy emptied per NA.  Progress, Functional Goals: demonstrating adequate progress  Plan of Care Reviewed With: patient  IRF Plan of Care Review: progress ongoing, continue

## 2020-02-16 RX ADMIN — SODIUM BICARBONATE 1300 MG: 650 TABLET ORAL at 17:03

## 2020-02-16 RX ADMIN — SODIUM BICARBONATE 1300 MG: 650 TABLET ORAL at 23:20

## 2020-02-16 RX ADMIN — Medication 18 MG: at 21:50

## 2020-02-16 RX ADMIN — GABAPENTIN 300 MG: 300 CAPSULE ORAL at 08:14

## 2020-02-16 RX ADMIN — SODIUM BICARBONATE 1300 MG: 650 TABLET ORAL at 08:14

## 2020-02-16 RX ADMIN — Medication 18 MG: at 08:14

## 2020-02-16 RX ADMIN — GABAPENTIN 300 MG: 300 CAPSULE ORAL at 12:08

## 2020-02-16 RX ADMIN — LOPERAMIDE HYDROCHLORIDE 2 MG: 2 CAPSULE ORAL at 17:03

## 2020-02-16 RX ADMIN — CHOLECALCIFEROL TAB 125 MCG (5000 UNIT) 5000 UNITS: 125 TAB at 08:14

## 2020-02-16 RX ADMIN — LOPERAMIDE HYDROCHLORIDE 2 MG: 2 CAPSULE ORAL at 05:53

## 2020-02-16 RX ADMIN — SODIUM CHLORIDE, PRESERVATIVE FREE 10 ML: 5 INJECTION INTRAVENOUS at 08:15

## 2020-02-16 RX ADMIN — PANTOPRAZOLE SODIUM 40 MG: 40 TABLET, DELAYED RELEASE ORAL at 05:53

## 2020-02-16 RX ADMIN — SODIUM CHLORIDE, PRESERVATIVE FREE 10 ML: 5 INJECTION INTRAVENOUS at 08:14

## 2020-02-16 RX ADMIN — GABAPENTIN 300 MG: 300 CAPSULE ORAL at 17:03

## 2020-02-16 RX ADMIN — Medication 1 TABLET: at 08:14

## 2020-02-16 RX ADMIN — GABAPENTIN 300 MG: 300 CAPSULE ORAL at 21:49

## 2020-02-16 RX ADMIN — GABAPENTIN 300 MG: 300 CAPSULE ORAL at 03:20

## 2020-02-16 RX ADMIN — LOPERAMIDE HYDROCHLORIDE 2 MG: 2 CAPSULE ORAL at 12:08

## 2020-02-16 NOTE — PROGRESS NOTES
LOS: 23 days   Patient Care Team:  Armando Valentine MD as PCP - General (Internal Medicine)  Lisa Arriaza MD as Consulting Physician (Obstetrics and Gynecology)    Chief Complaint: GBS    Subjective     History of Present Illness     Pt received her last dose of Iron last night. Feels her strength is improving. RN reports no return on Midline IV site. She denies CP, SOB, F/C      .  History taken from: patient chart    Objective     Vital Signs  Temp:  [97.1 °F (36.2 °C)-99.4 °F (37.4 °C)] 99 °F (37.2 °C)  Heart Rate:  [72-88] 88  Resp:  [18] 18  BP: (106-115)/(55-59) 115/56    Physical Exam:  General: Awake, alert, NAD  HEENT: normocepahlic, atraumatic   Heart: RRR, no m/r/g  Lungs: CTAB, no wheezing, rales, or rhonchi  Abdomen: soft, non-tender, non-distended, colostomy , incision dressed  Strength:   BUE: 4+/5 with bilateral elbow flexion, elbow extension, wrist extension, and finger flexion, right greater than left weak hand intrinsics R 3-/5, L 3+/5  Difficulty opposing right thumb to digits 2-3-4-5 but better.   Opposes left thumb to digits 2-3-4-5.   BLE:  HF right 4/5, left 4/5,  knee extension right 4+/5, left  4+/5, ankle dorsiflexion B 4/5  Extremities: Right antecubital fossa with no erythema and smaller residual cord.  No significant tenderness.    Results Review:     I reviewed the patient's new clinical results.  Results from last 7 days   Lab Units 02/14/20  0609 02/12/20  0610 02/10/20  0614   WBC 10*3/mm3 7.17 6.53 5.65   HEMOGLOBIN g/dL 8.1* 7.3* 8.4*   HEMATOCRIT % 25.7* 22.7* 26.0*   PLATELETS 10*3/mm3 351 440 468*     Results from last 7 days   Lab Units 02/14/20  0609 02/10/20  0614   SODIUM mmol/L 137 140   POTASSIUM mmol/L 3.6 3.8   CHLORIDE mmol/L 101 106   CO2 mmol/L 25.5 21.2*   BUN mg/dL 17 22   CREATININE mg/dL 0.97 0.71   CALCIUM mg/dL 7.9* 8.8   GLUCOSE mg/dL 86 91       Medication Review:   Scheduled Meds:    acetaminophen 650 mg Oral Once   And      diphenhydrAMINE 25  mg Oral Once   Iron 18 mg Sublingual BID   gabapentin 300 mg Oral 4x Daily   loperamide 2 mg Oral TID AC   MULTIVITAMIN ADULT 1 tablet Sublingual Daily   pantoprazole 40 mg Oral Q AM   sodium bicarbonate 1,300 mg Oral TID   Cyanocobalamin 2,500 mcg Sublingual Weekly   Vitamin D-3 5,000 Units Sublingual Daily     Continuous Infusions:   PRN Meds:.•  acetaminophen  •  aluminum sulfate-calcium acetate  •  gabapentin **AND** gabapentin  •  miconazole  •  ondansetron ODT    Assessment/Plan   75 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     GBS  -Completed her plasmapharesis and has gotten good return.   - Will begin PT/OT for ADLS, strength, mobility, txs, gait.   -January 27: On gabapentin 300mg QID. Will continue to monitor and will titrate up as needed.  -Jan 29 -  Feels strength is somewhat better.  Worked on gait with rolling walker yesterday 15 feet. Today utilized ankle weights to decrease ataxic movements with gait. Transfers mod max assist. Bed mobility CTG.  - Feb 6 - Patient complains of increased numbness in her knees.  She is noted to have increased weakness with her hip extensors and possibly in the left quadricep.  Her ambulation distance is less and takes more effort with a shorter stride.  On examination she also appears weaker in her hands and ankles.  Discussed having neurology see her to assess for possibly IVIG or another round of plasmapheresis.  -Feb 7 - increased weakness noted in BUE and BLE and more difficulty with mobility - Neurology starting patient on course of IVIG   -February 10-shows good response on IVIG-tolerating.  Notes improvement in her strength in the upper extremities and lower extremities as well as performance with mobility and self-care.  -February 11-continues to show improvement on IVIG  Feb 12-functional improvement after IVIG with improvement in her handgrip, feeding, transfers, and ambulation as well as improvement in her strength.  February 14-strength  noticeably improved in the bilateral upper extremities and bilateral lower extremities.  Ambulating further.    Crohn's  -s/p recent colostomy- wound nurse to see and education for home management.   -If more than 1 liter output a day, needs Immodium-per Dr. Albrecht colorectal surgeon.     Hyponatremia  -On salt tabs and fluid restriction. Renal following  -January 27: Na 130. Continue salt tabs and fluid restriction per renal. Will continue to monitor  -January 30: Na 133. Continue sodium bicarbonate and fluid restriction per renal.  -Feb 3- Na improved to 138  -February 4-fluid restriction discontinued.  -February 5-sodium 137  -February 10-sodium 140.  Sodium chloride tablets were discontinued on February 8.  Continues on sodium bicarb 1300 mg 3 times a day.    Anemia  -January 27: Hgb/Hct 7.6/24.3- stable. No signs of active bleeding. Will order fecal occult and reticulocyte count. Will continue to monitor.  -Jan 28 - hemoccult positive  -Jan 29 - Stool heme +.  HGB stable at 7.5. She had hypotension and tachycardia yesterday 87/64 and 110) , better today (99/63 and 88).  Reviewed option of transfusion PRBCs. She wished to hold on transfusion unless absolutely necessary. Discussed if HGB < 7, would recommend definitely transfuse.  -January 30: Repeat CBC scheduled for tomorrow. Will continue to monitor.    -January 31: Hgb 7.2. Will transfuse 1 unit of PRBCs today. Ordered anemia studies. Spoke with Dr. Albrecht and if iron supplementation is required she recommends IV iron for better absorption.  -February 1: Hemoglobin improved 8.8.  IV iron infusion ordered by nephrology  -Feb 3 - HGB improved 9.5. Not tolerate IV iron infusion due to burning in vein, does not wish to have placed a more proximal IV. She had tow infusions. She will get EZ Melt Iron from outside to take by mouth; on discussion with colorectal surgery last week she should be able to absorb PO iron.  February 5-hemoglobin 8.5  February 10-hemoglobin  8.4  February 12-hemoglobin 7.3-As she has the midline in, will plan to resume the last 3 of the iron infusions that she did not get previously with the peripheral IV.  Will start tomorrow scheduled every other day for total of 3 doses.  Recheck hemoglobin on Friday.  February 14-hemoglobin 8.1.  She had a temperature last evening possibly related to the iron infusion.  She tolerated the infusion and would like to have it on consecutive days ( rather than every other day as was ordered to give her a break).  2/15 - Last dose of IV Iron ordered for today  2/16 - I will d/c Midline IV site    DVT prophylaxis  -SCDS for now.   -January 27: Heparin has been on hold due to HGB trending down. Following results of fecal occult and reticulocyte count will consider restarting Heparin for DVT prophylaxis.   -January 28 - hemoccult positive.     Vitamin D deficiency-vitamin D 22.4  on January 29.    Feb 1 - Patient does not wish to take vitamin D p.o. through the hospital supply.  Wishes to have her vitamin D brought in from home.    Vitamin B12 replacement-sublingual from home    RUE antecubital fossa phlebitis/cellulitis-February 3-no sign of infection. K-PAD.   February 4-Right antecubital fossa phlebitis with cord palpable/tender with surrounding erythema consistent with cellulitis. Allergy to Keflex - throat swelled. Will start Doxycycline 100 mg bid x 7 days, continue K-pad.   February 5-She continues to have pain and redness and swelling of the right antecubital fossa and distal upper arm.  Reviewed continue with doxycycline which was started yesterday but if there is no show improvement will look to adjust antibiotic tomorrow.  White blood cell count is 7.22 with normal differential..  Feb 7 - area improved today on doxycycline.  February 10-further improvement.    TEAM CONF - JAN 28 - FLAT AFFECT. SUPINE SIT MIN ASSIST. TRANSFERS MAX 2. NONAMBULATORY. UBB MIN. LBB MAX. UBD MOD.  LBD DEP. ABD WOUND CARE. STOOL  HEMOCCULT POSITIVE.  ELOS - 5 WEEKS.     TEAM CONF - FEB 4 - BED SBA. TRANSFERS MIN-MOD. GAIT 30 FEET MIN 2 FIDEL WALKER. UBD MOD. LBD DEP. ON 1200 CC FLUID RESTRICTION. EATING OKAY.  ABD WOUND CLOSING. OSTOMY DEVICE STAYING ON.  CONTINENT. NEEDS TO GET UP TO BSC.   ELOS- 4 WEEKS.     February 10-she had shown a decline in her mobility and self-care with flare of Guillain-Barré syndrome but has shown response on IVIG.  Improving.  Most recently in physical therapy and Occupational Therapy-toilet transfers moderate assist, shower transfer to be assessed, bathing minimum assist upper body and maximum assist lower body, dressing moderate assist upper body independent lower body.  Grooming minimum assist.  Toileting dependent.  Eating with minimal assist to set up with built up utensils.  Bed mobility contact-guard.  Ambulated 25 feet moderate assist of 2 with Aircast bilateral ankles.    TEAM CONF - FEB 11 - She feels IVIG has been helpful.  She notes improvement in the strength in her hands and in her legs.  Easier transfers.  Walk better.  She had had a decline in her ability to do to box and blocks but that improved yesterday.  Tolerating IVIG.   describes her performance as 180 degree change from Friday.  In physical therapy and Occupational Therapy-toilet transfers moderate assist, shower transfer to be assessed, bathing minimum assist upper body and maximum assist lower body, dressing moderate assist upper body independent lower body.  Grooming minimum assist.  Toileting dependent.  Eating with minimal assist to set up with built up utensils.  Bed mobility contact-guard.  Ambulated 25 feet moderate assist of 2 with Aircast bilateral ankles.   Continent bladder. Abd wound improving.   ELOS - 3 WEEKS    February 14-Temperature 100.7 last evening when IV iron was infusing and afebrile than 100.4 earlier today.  Patient without any new symptoms.  White blood cell count normal.  Phlebitis at the right antecubital  fossa continues improved.  No new focal complaints.  Will monitor.    Binh Lou MD  02/16/20  3:15 PM

## 2020-02-16 NOTE — PLAN OF CARE
Problem: Patient Care Overview  Goal: Plan of Care Review  Outcome: Ongoing (interventions implemented as appropriate)  Flowsheets (Taken 2/16/2020 1761)  Outcome Summary: pt alert and oriented. bilat hand tingling. ileostomy and gauze dry and intact to midline incision. midline IV d/c'd per MD orders. continent. will continue to monitor.  Progress, Functional Goals: demonstrating adequate progress  Plan of Care Reviewed With: patient  IRF Plan of Care Review: progress ongoing, continue

## 2020-02-16 NOTE — PLAN OF CARE
Problem: Patient Care Overview  Goal: Plan of Care Review  Outcome: Ongoing (interventions implemented as appropriate)  Flowsheets (Taken 2/16/2020 5425)  Outcome Summary: C/o bilat. hand pain. Gabapentin given after 0300 per pt request. Small amount light green drng. leaking from ostomy pouch at lower abd incision. Cleaned, applied hydrogel, folded 4x4, and tegaderm. No unsafe behavior.  Progress, Functional Goals: demonstrating adequate progress  Plan of Care Reviewed With: patient  IRF Plan of Care Review: progress ongoing, continue

## 2020-02-17 LAB
BASOPHILS # BLD AUTO: 0.04 10*3/MM3 (ref 0–0.2)
BASOPHILS NFR BLD AUTO: 0.8 % (ref 0–1.5)
DEPRECATED RDW RBC AUTO: 48.2 FL (ref 37–54)
EOSINOPHIL # BLD AUTO: 0.16 10*3/MM3 (ref 0–0.4)
EOSINOPHIL NFR BLD AUTO: 3.3 % (ref 0.3–6.2)
ERYTHROCYTE [DISTWIDTH] IN BLOOD BY AUTOMATED COUNT: 14.8 % (ref 12.3–15.4)
HCT VFR BLD AUTO: 22.1 % (ref 34–46.6)
HGB BLD-MCNC: 7.2 G/DL (ref 12–15.9)
IMM GRANULOCYTES # BLD AUTO: 0.04 10*3/MM3 (ref 0–0.05)
IMM GRANULOCYTES NFR BLD AUTO: 0.8 % (ref 0–0.5)
LYMPHOCYTES # BLD AUTO: 0.76 10*3/MM3 (ref 0.7–3.1)
LYMPHOCYTES NFR BLD AUTO: 15.6 % (ref 19.6–45.3)
MCH RBC QN AUTO: 29.5 PG (ref 26.6–33)
MCHC RBC AUTO-ENTMCNC: 32.6 G/DL (ref 31.5–35.7)
MCV RBC AUTO: 90.6 FL (ref 79–97)
MONOCYTES # BLD AUTO: 0.5 10*3/MM3 (ref 0.1–0.9)
MONOCYTES NFR BLD AUTO: 10.3 % (ref 5–12)
NEUTROPHILS # BLD AUTO: 3.37 10*3/MM3 (ref 1.7–7)
NEUTROPHILS NFR BLD AUTO: 69.2 % (ref 42.7–76)
NRBC BLD AUTO-RTO: 0 /100 WBC (ref 0–0.2)
PLATELET # BLD AUTO: 249 10*3/MM3 (ref 140–450)
PMV BLD AUTO: 8.9 FL (ref 6–12)
RBC # BLD AUTO: 2.44 10*6/MM3 (ref 3.77–5.28)
WBC NRBC COR # BLD: 4.87 10*3/MM3 (ref 3.4–10.8)

## 2020-02-17 PROCEDURE — 97110 THERAPEUTIC EXERCISES: CPT

## 2020-02-17 PROCEDURE — 97112 NEUROMUSCULAR REEDUCATION: CPT

## 2020-02-17 PROCEDURE — 97535 SELF CARE MNGMENT TRAINING: CPT

## 2020-02-17 PROCEDURE — 85025 COMPLETE CBC W/AUTO DIFF WBC: CPT | Performed by: PHYSICAL MEDICINE & REHABILITATION

## 2020-02-17 PROCEDURE — 97116 GAIT TRAINING THERAPY: CPT

## 2020-02-17 PROCEDURE — 97530 THERAPEUTIC ACTIVITIES: CPT

## 2020-02-17 RX ADMIN — LOPERAMIDE HYDROCHLORIDE 2 MG: 2 CAPSULE ORAL at 16:34

## 2020-02-17 RX ADMIN — Medication 18 MG: at 21:47

## 2020-02-17 RX ADMIN — SODIUM BICARBONATE 1300 MG: 650 TABLET ORAL at 21:47

## 2020-02-17 RX ADMIN — CHOLECALCIFEROL TAB 125 MCG (5000 UNIT) 5000 UNITS: 125 TAB at 08:36

## 2020-02-17 RX ADMIN — LOPERAMIDE HYDROCHLORIDE 2 MG: 2 CAPSULE ORAL at 11:40

## 2020-02-17 RX ADMIN — GABAPENTIN 300 MG: 300 CAPSULE ORAL at 08:35

## 2020-02-17 RX ADMIN — SODIUM BICARBONATE 1300 MG: 650 TABLET ORAL at 16:34

## 2020-02-17 RX ADMIN — GABAPENTIN 300 MG: 300 CAPSULE ORAL at 11:40

## 2020-02-17 RX ADMIN — Medication 18 MG: at 08:35

## 2020-02-17 RX ADMIN — PANTOPRAZOLE SODIUM 40 MG: 40 TABLET, DELAYED RELEASE ORAL at 05:49

## 2020-02-17 RX ADMIN — GABAPENTIN 300 MG: 300 CAPSULE ORAL at 21:47

## 2020-02-17 RX ADMIN — Medication 1 TABLET: at 08:36

## 2020-02-17 RX ADMIN — SODIUM BICARBONATE 1300 MG: 650 TABLET ORAL at 08:35

## 2020-02-17 RX ADMIN — LOPERAMIDE HYDROCHLORIDE 2 MG: 2 CAPSULE ORAL at 05:49

## 2020-02-17 RX ADMIN — GABAPENTIN 300 MG: 300 CAPSULE ORAL at 03:04

## 2020-02-17 RX ADMIN — GABAPENTIN 300 MG: 300 CAPSULE ORAL at 17:33

## 2020-02-17 NOTE — PROGRESS NOTES
Adult Nutrition  Assessment/PES    Patient Name:  She López  YOB: 1945  MRN: [id number]  Admit Date:  1/24/2020    Assessment Date:  2/17/2020    Reason for Assessment     Row Name 02/17/20 1327          Reason for Assessment    Reason For Assessment  follow-up protocol         Nutrition/Diet History     Row Name 02/17/20 1327          Nutrition/Diet History    Typical Food/Fluid Intake  Intake continues to be good most meals - prefers to stay in her room to eat.            Labs/Tests/Procedures/Meds     Row Name 02/17/20 1328          Labs/Procedures/Meds    Lab Results Reviewed  reviewed     Lab Results Comments  2/14 - wnl        Diagnostic Tests/Procedures    Diagnostic Test/Procedure Reviewed  reviewed     Diagnostic Test/Procedures Comments  s/p plasmapharesis, iron transfusions.         Medications    Pertinent Medications Reviewed  reviewed         Physical Findings     Row Name 02/17/20 1329          Physical Findings    Gastrointestinal  ileostomy     Skin  surgical incision abdomen           Nutrition Prescription Ordered     Row Name 02/17/20 1329          Nutrition Prescription PO    Current PO Diet  Regular     Fluid Consistency  Thin         Evaluation of Received Nutrient/Fluid Intake     Row Name 02/17/20 1329          PO Evaluation    Number of Days PO Intake Evaluated  3 days     % PO Intake  %               Problem/Interventions:            Nutrition Intervention     Row Name 02/17/20 1341          Nutrition Intervention    RD/Tech Action  Encourage intake;Follow Tx progress;Care plan reviewd           Education/Evaluation     Row Name 02/17/20 1341          Monitor/Evaluation    Monitor  Per protocol           Electronically signed by:  Lisa Phoenix RD  02/17/20 1:42 PM

## 2020-02-17 NOTE — THERAPY TREATMENT NOTE
Inpatient Rehabilitation - Occupational Therapy Treatment Note    [facility]     Patient Name: She López  : 1945  MRN: [id number]    Today's Date: 2020  Onset of Illness/Injury or Date of Surgery: 20              Admit Date: 2020      Visit Dx:    ICD-10-CM ICD-9-CM   1. General weakness R53.1 780.79       Patient Active Problem List   Diagnosis   • Crohn's disease of small intestine with other complication (CMS/HCC)   • Type 2 diabetes mellitus without complication (CMS/HCC)   • RSD upper limb   • Neuropathic pain of hand   • Hyperlipidemia   • Cervical myelopathy (CMS/HCC)   • Central pain syndrome   • Carpal tunnel syndrome   • Vitamin B 12 deficiency   • Guillain Barré syndrome (CMS/HCC)         Therapy Treatment    IRF Treatment Summary     Row Name 20 1526 20 0800          Evaluation/Treatment Time and Intent    Subjective Information  no complaints  -AF  no complaints  (Pended)   -NM     Existing Precautions/Restrictions  fall  -AF  fall  (Pended)   -NM     Document Type  therapy note (daily note)  -AF  therapy note (daily note)  (Pended)   -NM     Mode of Treatment  occupational therapy  -AF  physical therapy  (Pended)   -NM     Patient/Family Observations  pt sitting up in w/c in AM, supine in bed in PM  -AF  pt in bed; no acute distress   (Pended)   -NM     Recorded by [AF] Reina Perera, OTR [NM] Jaxon Atkinson, PT Student     Row Name 20 1526 20 0800          Cognition/Psychosocial- PT/OT    Affect/Mental Status (Cognitive)  WFL  -AF  WFL  (Pended)   -NM     Orientation Status (Cognition)  oriented x 4  -AF  oriented x 4  (Pended)   -NM     Follows Commands (Cognition)  WFL  -AF  WFL  (Pended)   -NM     Personal Safety Interventions  fall prevention program maintained;gait belt;nonskid shoes/slippers when out of bed  -AF  fall prevention program maintained;gait belt;nonskid shoes/slippers when out of bed;supervised activity  (Pended)   -NM      Safety Deficit (Cognitive)  judgment;insight into deficits/self awareness  -AF  --     Recorded by [AF] Reina Perera, NILAMR [NM] Jaxon Atkinson, PT Student     Row Name 02/17/20 1526 02/17/20 0800          Bed Mobility Assessment/Treatment    Rolling Left [place] (Bed Mobility)  --  supervision  (Pended)   -NM     Rolling Right [place] (Bed Mobility)  --  supervision  (Pended)   -NM     Supine-Sit [place] (Bed Mobility)  supervision  -AF  supervision  (Pended)   -NM     Sit-Supine [place] (Bed Mobility)  supervision  -AF  supervision  (Pended)   -NM     Bed Mobility, Safety Issues  --  decreased use of arms for pushing/pulling;decreased use of legs for bridging/pushing;impaired trunk control for bed mobility  (Pended)   -NM     Assistive Device (Bed Mobility)  bed rails  -AF  bed rails;head of bed elevated  (Pended)   -NM     Recorded by [AF] Reina Perera, NILAMR [NM] Jaxon Atkinson, YUSUF Student     Row Name 02/17/20 0800             Functional Mobility    Functional Mobility- Ind. Level  minimum assist (75% patient effort);contact guard assist;2 person assist required;verbal cues required  (Pended)   -NM      Functional Mobility- Device  rollator  (Pended)  heavy duty   -NM      Functional Mobility-Distance (Feet)  80  (Pended)  x2  -NM      Functional Mobility- Comment  in the PM, trialed ambulation w/out aircasts on BLE. Pt demonstrated increased L foot  supination and walking on lateral border. Went back to wearing airacast on the L but not the R as R foot/ankle has improved control and stability during gait. Decreased distance in PM due to fatigue   (Pended)   -NM      Recorded by [NM] Jaxon Atkinson, PT Student      Row Name 02/17/20 1526 02/17/20 0800          Transfer Assessment/Treatment    Transfer Assessment/Treatment  shower transfer  -AF  --     Comment (Transfers)  --  vc to check foot placement prior to standing; vc to reach back for wc when sitting   (Pended)   -NM      Recorded by [AF] Reina Perera, OTR [NM] Jaxon Atkinson, PT Student     Row Name 02/17/20 1526 02/17/20 0800          Bed-Chair Transfer    Bed-Chair [place] (Transfers)  minimum assist (75% patient effort);verbal cues  -AF  moderate assist (50% patient effort);verbal cues  (Pended)   -NM     Assistive Device (Bed-Chair Transfers)  wheelchair  -AF  wheelchair  (Pended)   -NM     Recorded by [AF] Reina Perera, OTR [NM] Jaxon Atkinson, PT Student     Row Name 02/17/20 1526 02/17/20 0800          Chair-Bed Transfer    Chair-Bed [place] (Transfers)  minimum assist (75% patient effort);verbal cues  -AF  moderate assist (50% patient effort);verbal cues;nonverbal cues (demo/gesture)  (Pended)   -NM     Assistive Device (Chair-Bed Transfers)  wheelchair  -AF  wheelchair  (Pended)   -NM     Recorded by [AF] Reina Perera, ALPESH [NM] Jaxon Atkinson, PT Student     Row Name 02/17/20 0800             Sit-Stand Transfer    Sit-Stand [place] (Transfers)  minimum assist (75% patient effort);moderate assist (50% patient effort);verbal cues  (Pended)   -NM      Assistive Device (Sit-Stand Transfers)  wheelchair;walker, 4-wheeled  (Pended)   -NM      Recorded by [NM] Jaxon Atkinson, PT Student      Row Name 02/17/20 0800             Stand-Sit Transfer    Stand-Sit [place] (Transfers)  minimum assist (75% patient effort);moderate assist (50% patient effort);verbal cues  (Pended)   -NM      Assistive Device (Stand-Sit Transfers)  wheelchair;walker, 4-wheeled  (Pended)   -NM      Recorded by [NM] Jaxon Atkinson, PT Student      Row Name 02/17/20 1526             Shower Transfer    Type (Shower Transfer)  stand pivot/stand step  -AF      [place] Level (Shower Transfer)  moderate assist (50% patient effort);verbal cues  -AF      Assistive Device (Shower Transfer)  grab bars/tub rail;tub bench;wheelchair  -AF      Recorded by [AF] Reina Perera OTR      Row Name 02/17/20 0800              Gait/Stairs Assessment/Training    [place] Level (Gait)  minimum assist (75% patient effort);contact guard;2 person assist;verbal cues  (Pended)   -NM      Assistive Device (Gait)  walker, 4-wheeled  (Pended)  Drive heavy duty rollator; aircasts B ankles   -NM      Distance in Feet (Gait)  130, 120, 80  (Pended)   -NM      Pattern (Gait)  step-through  (Pended)   -NM      Deviations/Abnormal Patterns (Gait)  bilateral deviations;base of support, wide;ataxic  (Pended)   -NM      Bilateral Gait Deviations  weight shift ability decreased;heel strike decreased;forward flexed posture  (Pended)   -NM      Comment (Gait/Stairs)  vc for imrpoved step length and speed, especially to slow durign turns. L foot bumped R foot occasionally c turns, no LOB. Intermittent manual guidance of the walker.  (Pended)   -NM      Recorded by [NM] Jaxon Atkinson, PT Student      Row Name 02/17/20 [facility]             Bathing Assessment/Treatment    Bathing [place] Level  bathing skills;upper body;contact guard assist;lower body;maximum assist (25% patient effort)  -AF      Bathing Position  sink side;supported sitting;supported standing  -AF      Bathing Setup Assistance  obtain supplies  -AF      Recorded by [AF] Reina Perera OTR      Row Name 02/17/20 [facility]             Upper Body Dressing Assessment/Treatment    Upper Body Dressing Task  upper body dressing skills;set up assistance;pull over garment;minimum assist (75% or more patient effort);front opening garment  -AF      Upper Body Dressing Position  supported sitting  -AF      Set-up Assistance (Upper Body Dressing)  obtain clothing  -AF      Recorded by [AF] Reina Perera OTR      Row Name 02/17/20 [facility]             Lower Body Dressing Assessment/Treatment    Lower Body Dressing [place] Level  doff;don;pants/bottoms;shoes/slippers;socks;moderate assist (50% patient effort);verbal cues  -AF      Lower Body Dressing Position  supported sitting  -AF      Lower  Body Dressing Setup Assistance  obtain clothing  -AF      Recorded by [AF] Reina Perera OTR      Row Name 02/17/20 1526             Grooming Assessment/Treatment    Grooming [place] Level  grooming skills;minimum assist (75% patient effort);verbal cues  -AF      Assistive Device (Grooming)  built-up handle items  -AF      Grooming Position  supported sitting  -AF      Grooming Setup Assistance  obtain supplies  -AF      Comment (Grooming)  A to wash hair, able to shampoo, therapist to rinse and blow dry  -AF      Recorded by [AF] Reina Perera OTR      Row Name 02/17/20 1526             Fine Motor Testing & Training    Comment, Fine Motor Coordination  FMC task in standing with manipulating round pegs into peg board and checkers into slotted game board with MIN difficulty.   -AF      Recorded by [AF] Reina Perera OTR      Row Name 02/17/20 1526 02/17/20 0800          Pain Scale: Numbers Pre/Post-Treatment    Pain Scale: Numbers, Pretreatment  0/10 - no pain  -AF  0/10 - no pain  (Pended)   -NM     Pain Scale: Numbers, Post-Treatment  0/10 - no pain  -AF  --     Recorded by [AF] Reina Perera, ALPESH [NM] Jaxon Atkinson, PT Student     Row Name 02/17/20 1526             Static Standing Balance    Level of [place] (Supported Standing, Static Balance)  minimal assist, 75% patient effort;contact guard assist  -AF      Time Able to Maintain Position (Supported Standing, Static Balance)  3 to 4 minutes  x 2 trials   -AF      Assistive Device Utilized (Supported Standing, Static Balance)  walker, standard  -AF      Recorded by [AF] Reina Perera OTR      Row Name 02/17/20 1526             Upper Extremity Seated Therapeutic Exercise    Performed, Seated Upper Extremity (Therapeutic Exercise)  scapular protraction/retraction;shoulder flexion/extension;shoulder horizontal abduction/adduction;elbow flexion/extension;forearm supination/pronation;digit flexion/extension  -AF      Device, Seated  Upper Extremity (Therapeutic Exercise)  -- #2.5 dowel pa, #2 hand weight  -AF      Exercise Type, Seated Upper Extremity (Therapeutic Exercise)  AROM (active range of motion)  -AF      Expected Outcomes, Seated Upper Extremity (Therapeutic Exercise)  improve functional tolerance, self-care activity;improve performance, BADLs;improve performance, transfer skills  -AF      Sets/Reps Detail, Seated Upper Extremity (Therapeutic Exercise)  2/20  -AF      Transfers Skills, Training to Functional Activity, Seated Upper Extremity (Therapeutic Exercise)  beginning to transfer skills to functional activity;transfers skills to functional activity most of the time  -AF      Comment, Seated Upper Extremity (Therapeutic Exercise)  rest breaks  -AF      Recorded by [AF] Reina Perera, ALPESH      Row Name 02/17/20 0800             Aerobic Exercise Activity    Exercise Performed (Aerobic, Therapeutic Exercise)  recumbent elliptical   (Pended)   -NM      Expected Outcome (Aerobic, Therapeutic Exercise)  improve neuromuscular control  (Pended)   -NM      Time (Aerobic, Therapeutic Exercise)  11  (Pended)   -NM      Comment (Aerobic, Therapeutic Exercise)  performed intervals of 2-3 min between level 2-4.  during activity. improved control of LLE not requiring ace wrap   (Pended)   -NM      Recorded by [NM] Jaxon Atkinson, PT Student      Row Name 02/17/20 0800             Core Strengthening Therapeutic Exercise    Exercise Performed (Core Strengthening Therapeutic Exercise)  bridging with bilateral leg support  (Pended)   -NM      Sets/Reps (Core Strengthening Therapeutic Exercise)  1/10  (Pended)   -NM      Comment (Core Strengthening Therapeutic Exercise)  Bilat bridging on mat, c feet on air disc, and air disc c hip abd/add using yellow thera band  (Pended)   -NM      Recorded by [NM] Jaxon Atkinson, PT Student      Row Name 02/17/20 0800             Lower Extremity Seated Therapeutic Exercise    Performed,  Seated Lower Extremity (Therapeutic Exercise)  hip flexion/extension;hip abduction/adduction;knee flexion/extension;ankle dorsiflexion/plantarflexion  (Pended)   -NM      Exercise Type, Seated Lower Extremity (Therapeutic Exercise)  AROM (active range of motion)  (Pended)   -NM      Sets/Reps Detail, Seated Lower Extremity (Therapeutic Exercise)  1/20  (Pended)   -NM      Recorded by [NM] Jaxon Atkinson, PT Student      Row Name 02/17/20 0800             Lower Extremity Supine Therapeutic Exercise    Performed, Supine Lower Extremity (Therapeutic Exercise)  hip flexion/extension;SLR (straight leg raise);SAQ (short arc quad) over bolster  (Pended)   -NM      Exercise Type, Supine Lower Extremity (Therapeutic Exercise)  AROM (active range of motion)  (Pended)   -NM      Sets/Reps Detail, Supine Lower Extremity (Therapeutic Exercise)  1/10  (Pended)   -NM      Recorded by [NM] Jaxon Atkinson, PT Student      Row Name 02/17/20 0800             Vital Signs    Pretreatment Heart Rate (beats/min)  95  (Pended)   -NM      Intratreatment Heart Rate (beats/min)  119  (Pended)  gait   -NM      Pre SpO2 (%)  98  (Pended)   -NM      O2 Delivery Pre Treatment  room air  (Pended)   -NM      Intra SpO2 (%)  98  (Pended)  gait; denies shortness of breath   -NM      O2 Delivery Intra Treatment  room air  (Pended)   -NM      Recorded by [NM] Jaxon Atkinson, PT Student      Row Name 02/17/20 1526 02/17/20 0800          Positioning and Restraints    Pre-Treatment Position  sitting in chair/recliner  -AF  in bed  (Pended)   -NM     Post Treatment Position  bed  -AF  wheelchair  (Pended)   -NM     In Bed  supine;call light within reach;encouraged to call for assist;exit alarm on in AM  -AF  supine;call light within reach;encouraged to call for assist;exit alarm on  (Pended)  before lunch   -NM     In Wheelchair  sitting;exit alarm on;with PT in PM sessions  -AF  exit alarm on;with family/caregiver  (Pended)   -NM     Recorded by  [AF] Reina Perera, OTR [NM] Jaxon Atkinson, PT Student       User Key  (r) = Recorded By, (t) = Taken By, (c) = Cosigned By    Initials Name Effective Dates    AF Reina Perera, OTR 04/03/18 -     NM Jaxon Atkinson, PT Student 01/03/20 -           Wound 12/09/19 1510 abdomen Incision (Active)   Dressing Appearance dry;intact 2/17/2020  9:51 AM   Closure SUJEY 2/17/2020  9:51 AM   Base dressing in place, unable to visualize 2/17/2020  9:51 AM   Periwound dry;intact 2/16/2020  9:50 PM         OT Recommendation and Plan                 OT IRF GOALS     Row Name 02/12/20 1514             Transfer Goal 1 (OT-IRF)    Activity/Assistive Device (Transfer Goal 1, OT-IRF)  toilet;shower chair;walk-in shower  -AF      [place] Level (Transfer Goal 1, OT-IRF)  minimum assist (75% or more patient effort);verbal cues required  -AF      Time Frame (Transfer Goal 1, OT-IRF)  short term goal (STG)  -AF      Progress/Outcomes (Transfer Goal 1, OT-IRF)  goal ongoing  -AF         Transfer Goal 2 (OT-IRF)    Activity/Assistive Device (Transfer Goal 2, OT-IRF)  toilet;shower chair;walk-in shower  -AF      [place] Level (Transfer Goal 2, OT-IRF)  verbal cues required;contact guard assist  -AF      Time Frame (Transfer Goal 2, OT-IRF)  long term goal (LTG)  -AF      Progress/Outcomes (Transfer Goal 2, OT-IRF)  goal ongoing  -AF         Bathing Goal 1 (OT-IRF)    Activity/Device (Bathing Goal 1, OT-IRF)  bathing skills, all;grab bar/tub rail;hand-held shower spray hose;shower chair  -AF      [place] Level (Bathing Goal 1, OT-IRF)  verbal cues required;moderate assist (50-74% patient effort)  -AF      Time Frame (Bathing Goal 1, OT-IRF)  short term goal (STG)  -AF      Progress/Outcomes (Bathing Goal 1, OT-IRF)  goal ongoing  -AF         Bathing Goal 2 (OT-IRF)    Activity/Device (Bathing Goal 2, OT-IRF)  bathing skills, all;grab bar/tub rail;hand-held shower spray hose;shower chair  -AF      [place] Level  (Bathing Goal 2, OT-IRF)  verbal cues required;contact guard assist  -AF      Time Frame (Bathing Goal 2, OT-IRF)  long term goal (LTG)  -AF      Progress/Outcomes (Bathing Goal 2, OT-IRF)  goal ongoing  -AF         UB Dressing Goal 1 (OT-IRF)    Activity/Device (UB Dressing Goal 1, OT-IRF)  upper body dressing  -AF      [place] (UB Dress Goal 1, OT-IRF)  set-up required;verbal cues required  -AF      Time Frame (UB Dressing Goal 1, OT-IRF)  long term goal (LTG)  -AF      Progress/Outcomes (UB Dressing Goal 1, OT-IRF)  goal ongoing  -AF         LB Dressing Goal 1 (OT-IRF)    Activity/Device (LB Dressing Goal 1, OT-IRF)  lower body dressing  -AF      [place] (LB Dressing Goal 1, OT-IRF)  verbal cues required;moderate assist (50-74% patient effort)  -AF      Time Frame (LB Dressing Goal 1, OT-IRF)  short term goal (STG)  -AF      Progress/Outcomes (LB Dressing Goal 1, OT-IRF)  goal ongoing  -AF         LB Dressing Goal 2 (OT-IRF)    Activity/Device (LB Dressing Goal 2, OT-IRF)  lower body dressing  -AF      [place] (LB Dressing Goal 2, OT-IRF)  verbal cues required;contact guard assist  -AF      Time Frame (LB Dressing Goal 2, OT-IRF)  long term goal (LTG)  -AF      Progress/Outcomes (LB Dressing Goal 2, OT-IRF)  goal ongoing  -AF         Grooming Goal 1 (OT-IRF)    Activity/Device (Grooming Goal 1, OT-IRF)  grooming skills, all  -AF      [place] (Grooming Goal 1, OT-IRF)  set-up required  -AF      Time Frame (Grooming Goal 1, OT-IRF)  short term goal (STG)  -AF      Progress/Outcomes (Grooming Goal 1, OT-IRF)  goal ongoing  -AF         Grooming Goal 2 (OT-IRF)    Activity/Device (Grooming Goal 2, OT-IRF)  grooming skills, all  -AF      [place] (Grooming Goal 2, OT-IRF)  conditional independence  -AF      Time Frame (Grooming Goal 2, OT-IRF)  long term goal (LTG)  -AF      Progress/Outcomes (Grooming Goal 2, OT-IRF)  goal ongoing  -AF         Toileting Goal 1 (OT-IRF)    Activity/Device  (Toileting Goal 1, OT-IRF)  toileting skills, all;grab bar/safety frame;raised toilet seat  -AF      [place] Level (Toileting Goal 1, OT-IRF)  maximum assist (25-49% patient effort);moderate assist (50-74% patient effort)  -AF      Time Frame (Toileting Goal 1, OT-IRF)  short term goal (STG)  -AF      Progress/Outcomes (Toileting Goal 1, OT-IRF)  goal ongoing  -AF         Toileting Goal 2 (OT-IRF)    Activity/Device (Toileting Goal 2, OT-IRF)  toileting skills, all;grab bar/safety frame;raised toilet seat  -AF      [place] Level (Toileting Goal 2, OT-IRF)  minimum assist (75% or more patient effort);verbal cues required  -AF      Time Frame (Toileting Goal 2, OT-IRF)  long term goal (LTG)  -AF      Progress/Outcomes (Toileting Goal 2, OT-IRF)  goal ongoing  -AF         Balance Goal 1 (OT)    Activity/Assistive Device (Balance Goal 1, OT)  standing, static  -AF      [place] Level/Cues Needed (Balance Goal 1, OT)  moderate assist (50-74% patient effort)  -AF      Time Frame (Balance Goal 1, OT)  short term goal (STG)  -AF      Progress/Outcomes (Balance Goal 1, OT)  goal ongoing  -AF         Balance Goal 2 (OT)    Activity/Assistive Device (Balance Goal 2, OT)  standing, static  -AF      [place] Level (Balance Goal 2, OT)  minimum assist (75% or more patient effort);verbal cues required  -AF      Time Frame (Balance Goal 2, OT)  long term goal (LTG)  -AF      Progress/Outcome (Balance Goal 2, OT)  goal ongoing  -AF         Caregiver Training Goal 1 (OT-IRF)    Caregiver Training Goal 1 (OT-IRF)  pt and  will demo safe techniques with ADLs, transfers, HEP and AE prior to d/c home with home health services   -AF      Time Frame (Caregiver Training Goal 1, OT-IRF)  long term goal (LTG)  -AF      Progress/Outcomes (Caregiver Training Goal 1, OT-IRF)  goal ongoing  -AF        User Key  (r) = Recorded By, (t) = Taken By, (c) = Cosigned By    Initials Name Provider Type    AF Reina Perera, OTR  Occupational Therapist                     Time Calculation:     Time Calculation- OT     Row Name 02/17/20 1532 02/17/20 1531          Time Calculation- OT    OT Start Time  1230  -AF  0930  -AF     OT Stop Time  1300  -AF  1030  -AF     OT Time Calculation (min)  30 min  -AF  60 min  -AF       User Key  (r) = Recorded By, (t) = Taken By, (c) = Cosigned By    Initials Name Provider Type    AF Reina Perera OTR Occupational Therapist          Therapy Charges for Today     Code Description Service Date Service Provider Modifiers Qty    67687551359 HC OT SELF CARE/MGMT/TRAIN EA 15 MIN 2/17/2020 Reina Perera OTR GO 3    60106761363 HC OT THERAPEUTIC ACT EA 15 MIN 2/17/2020 Reina Perera OTR GO 1    65223025208 HC OT THER PROC EA 15 MIN 2/17/2020 Reina Perera OTR GO 1    22496874798 HC OT NEUROMUSC RE EDUCATION EA 15 MIN 2/17/2020 Reina Perera OTMENDEZ GO 1                   ALPESH Dempsey  2/17/2020

## 2020-02-17 NOTE — PLAN OF CARE
Problem: Patient Care Overview  Goal: Plan of Care Review  Outcome: Ongoing (interventions implemented as appropriate)  Flowsheets (Taken 2/17/2020 0452)  Outcome Summary: Ileostomy intact with liquid/ loose light brown bm. BP 89/50, encouraged PO fluids, up to 98/58. Bilat hand pain, uses ice pack/ Gabapentin scheduled and prn dose given at 0300. Dry occlusive dressing intact to midline IV removal site ERICK.  Progress, Functional Goals: demonstrating adequate progress  Plan of Care Reviewed With: patient  IRF Plan of Care Review: progress ongoing, continue

## 2020-02-17 NOTE — PROGRESS NOTES
LOS: 24 days   Patient Care Team:  Armando Valentine MD as PCP - General (Internal Medicine)  Lisa Arriaza MD as Consulting Physician (Obstetrics and Gynecology)    Chief Complaint: GBS    Subjective     History of Present Illness     She feels that she is stronger overall.  Stronger in the upper extremities and lower extremities.  Walking better.  Gait progressively improving.  Better dexterity in her hands.  Did better with standing task.        .  History taken from: patient chart    Objective     Vital Signs  Temp:  [98.3 °F (36.8 °C)-99 °F (37.2 °C)] 98.3 °F (36.8 °C)  Heart Rate:  [80-88] 80  Resp:  [18] 18  BP: ()/(50-58) 90/55    Physical Exam:  General: Awake, alert, NAD  HEENT: normocepahlic, atraumatic   Heart: RRR, no m/r/g  Lungs: CTAB, no wheezing, rales, or rhonchi  Abdomen: soft, non-tender, non-distended, colostomy , incision dressed  Strength:   BUE: 4+/5 with bilateral elbow flexion, elbow extension, wrist extension, and finger flexion, right greater than left weak hand intrinsics R 3-/5, L 3+/5  Difficulty opposing right thumb to digits 2-3-4-5 but better.   Opposes left thumb to digits 2-3-4-5.   BLE:  HF right 4/5, left 4/5,  knee extension right 4+/5, left  4+/5, ankle dorsiflexion B 4/5  Extremities: Right antecubital fossa with no erythema and smaller residual cord.  No significant tenderness.    Results Review:     I reviewed the patient's new clinical results.  Results from last 7 days   Lab Units 02/17/20  0550 02/14/20  0609 02/12/20  0610   WBC 10*3/mm3 4.87 7.17 6.53   HEMOGLOBIN g/dL 7.2* 8.1* 7.3*   HEMATOCRIT % 22.1* 25.7* 22.7*   PLATELETS 10*3/mm3 249 351 440     Results from last 7 days   Lab Units 02/14/20  0609   SODIUM mmol/L 137   POTASSIUM mmol/L 3.6   CHLORIDE mmol/L 101   CO2 mmol/L 25.5   BUN mg/dL 17   CREATININE mg/dL 0.97   CALCIUM mg/dL 7.9*   GLUCOSE mg/dL 86       Medication Review:   Scheduled Meds:    Iron 18 mg Sublingual BID   gabapentin 300  mg Oral 4x Daily   loperamide 2 mg Oral TID AC   MULTIVITAMIN ADULT 1 tablet Sublingual Daily   pantoprazole 40 mg Oral Q AM   sodium bicarbonate 1,300 mg Oral TID   Cyanocobalamin 2,500 mcg Sublingual Weekly   Vitamin D-3 5,000 Units Sublingual Daily     Continuous Infusions:   PRN Meds:.•  acetaminophen  •  aluminum sulfate-calcium acetate  •  gabapentin **AND** gabapentin  •  miconazole  •  ondansetron ODT    Assessment/Plan   73 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     GBS  -Completed her plasmapharesis and has gotten good return.   - Will begin PT/OT for ADLS, strength, mobility, txs, gait.   -January 27: On gabapentin 300mg QID. Will continue to monitor and will titrate up as needed.  -Jan 29 -  Feels strength is somewhat better.  Worked on gait with rolling walker yesterday 15 feet. Today utilized ankle weights to decrease ataxic movements with gait. Transfers mod max assist. Bed mobility CTG.  - Feb 6 - Patient complains of increased numbness in her knees.  She is noted to have increased weakness with her hip extensors and possibly in the left quadricep.  Her ambulation distance is less and takes more effort with a shorter stride.  On examination she also appears weaker in her hands and ankles.  Discussed having neurology see her to assess for possibly IVIG or another round of plasmapheresis.  -Feb 7 - increased weakness noted in BUE and BLE and more difficulty with mobility - Neurology starting patient on course of IVIG   -February 10-shows good response on IVIG-tolerating.  Notes improvement in her strength in the upper extremities and lower extremities as well as performance with mobility and self-care.  -February 11-continues to show improvement on IVIG  Feb 12-functional improvement after IVIG with improvement in her handgrip, feeding, transfers, and ambulation as well as improvement in her strength.  February 14-strength noticeably improved in the bilateral upper extremities and  bilateral lower extremities.  Ambulating further.    Crohn's  -s/p recent colostomy- wound nurse to see and education for home management.   -If more than 1 liter output a day, needs Immodium-per Dr. Albrecht colorectal surgeon.     Hyponatremia  -On salt tabs and fluid restriction. Renal following  -January 27: Na 130. Continue salt tabs and fluid restriction per renal. Will continue to monitor  -January 30: Na 133. Continue sodium bicarbonate and fluid restriction per renal.  -Feb 3- Na improved to 138  -February 4-fluid restriction discontinued.  -February 5-sodium 137  -February 10-sodium 140.  Sodium chloride tablets were discontinued on February 8.  Continues on sodium bicarb 1300 mg 3 times a day.    Anemia  -January 27: Hgb/Hct 7.6/24.3- stable. No signs of active bleeding. Will order fecal occult and reticulocyte count. Will continue to monitor.  -Jan 28 - hemoccult positive  -Jan 29 - Stool heme +.  HGB stable at 7.5. She had hypotension and tachycardia yesterday 87/64 and 110) , better today (99/63 and 88).  Reviewed option of transfusion PRBCs. She wished to hold on transfusion unless absolutely necessary. Discussed if HGB < 7, would recommend definitely transfuse.  -January 30: Repeat CBC scheduled for tomorrow. Will continue to monitor.    -January 31: Hgb 7.2. Will transfuse 1 unit of PRBCs today. Ordered anemia studies. Spoke with Dr. Albrecht and if iron supplementation is required she recommends IV iron for better absorption.  -February 1: Hemoglobin improved 8.8.  IV iron infusion ordered by nephrology  -Feb 3 - HGB improved 9.5. Not tolerate IV iron infusion due to burning in vein, does not wish to have placed a more proximal IV. She had tow infusions. She will get EZ Melt Iron from outside to take by mouth; on discussion with colorectal surgery last week she should be able to absorb PO iron.  February 5-hemoglobin 8.5  February 10-hemoglobin 8.4  February 12-hemoglobin 7.3-As she has the midline in,  will plan to resume the last 3 of the iron infusions that she did not get previously with the peripheral IV.  Will start tomorrow scheduled every other day for total of 3 doses.  Recheck hemoglobin on Friday.  February 14-hemoglobin 8.1.  She had a temperature last evening possibly related to the iron infusion.  She tolerated the infusion and would like to have it on consecutive days ( rather than every other day as was ordered to give her a break).    DVT prophylaxis  -SCDS for now.   -January 27: Heparin has been on hold due to HGB trending down. Following results of fecal occult and reticulocyte count will consider restarting Heparin for DVT prophylaxis.   -January 28 - hemoccult positive.     Vitamin D deficiency-vitamin D 22.4  on January 29.    Feb 1 - Patient does not wish to take vitamin D p.o. through the hospital supply.  Wishes to have her vitamin D brought in from home.    Vitamin B12 replacement-sublingual from home    RUE antecubital fossa phlebitis/cellulitis-February 3-no sign of infection. K-PAD.   February 4-Right antecubital fossa phlebitis with cord palpable/tender with surrounding erythema consistent with cellulitis. Allergy to Keflex - throat swelled. Will start Doxycycline 100 mg bid x 7 days, continue K-pad.   February 5-She continues to have pain and redness and swelling of the right antecubital fossa and distal upper arm.  Reviewed continue with doxycycline which was started yesterday but if there is no show improvement will look to adjust antibiotic tomorrow.  White blood cell count is 7.22 with normal differential..  Feb 7 - area improved today on doxycycline.  February 10-further improvement.    TEAM CONF - JAN 28 - FLAT AFFECT. SUPINE SIT MIN ASSIST. TRANSFERS MAX 2. NONAMBULATORY. UBB MIN. LBB MAX. UBD MOD.  LBD DEP. ABD WOUND CARE. STOOL HEMOCCULT POSITIVE.  ELOS - 5 WEEKS.     TEAM CONF - FEB 4 - BED SBA. TRANSFERS MIN-MOD. GAIT 30 FEET MIN 2 FIDEL WALKER. UBD MOD. LBD DEP. ON 1200 CC  FLUID RESTRICTION. EATING OKAY.  ABD WOUND CLOSING. OSTOMY DEVICE STAYING ON.  CONTINENT. NEEDS TO GET UP TO [facility].   ELOS- 4 WEEKS.     February 10-she had shown a decline in her mobility and self-care with flare of Guillain-Barré syndrome but has shown response on IVIG.  Improving.  Most recently in physical therapy and Occupational Therapy-toilet transfers moderate assist, shower transfer to be assessed, bathing minimum assist upper body and maximum assist lower body, dressing moderate assist upper body independent lower body.  Grooming minimum assist.  Toileting dependent.  Eating with minimal assist to set up with built up utensils.  Bed mobility contact-guard.  Ambulated 25 feet moderate assist of 2 with Aircast bilateral ankles.    TEAM CONF - FEB 11 - She feels IVIG has been helpful.  She notes improvement in the strength in her hands and in her legs.  Easier transfers.  Walk better.  She had had a decline in her ability to do to box and blocks but that improved yesterday.  Tolerating IVIG.   describes her performance as 180 degree change from Friday.  In physical therapy and Occupational Therapy-toilet transfers moderate assist, shower transfer to be assessed, bathing minimum assist upper body and maximum assist lower body, dressing moderate assist upper body independent lower body.  Grooming minimum assist.  Toileting dependent.  Eating with minimal assist to set up with built up utensils.  Bed mobility contact-guard.  Ambulated 25 feet moderate assist of 2 with Aircast bilateral ankles.   Continent bladder. Abd wound improving.   ELOS - 3 WEEKS        Adolfo Valle MD  02/17/20  10:54 AM

## 2020-02-17 NOTE — PROGRESS NOTES
Inpatient Rehabilitation Functional Measures Assessment and Plan of Care    Plan of Care  Updated Problems/Interventions  Mobility    [OT] Toilet Transfers(Active)  Current Status(02/17/2020): MIN/MOD  Weekly Goal(02/24/2020): MIN/CGA  Discharge Goal: MIN/CGA    [OT] Tub/Shower Transfers(Active)  Current Status(02/17/2020): MOD  Weekly Goal(02/24/2020): MIN  Discharge Goal: MIN        Self Care    [OT] Bathing(Active)  Current Status(02/17/2020): MIN UB, MOD LB  Weekly Goal(02/24/2020): MIN UB, MOD/MIN LB  Discharge Goal: MIN    [OT] Dressing (Lower)(Active)  Current Status(02/17/2020): DEP/MAX  Weekly Goal(02/24/2020): Max  Discharge Goal: MOD    [OT] Dressing (Upper)(Active)  Current Status(02/10/2020): MIN  Weekly Goal(02/24/2020): Set up  Discharge Goal: SBA    [OT] Grooming(Active)  Current Status(02/17/2020): MIN  Weekly Goal(02/24/2020): SBA  Discharge Goal: SBA    [OT] Toileting(Active)  Current Status(02/17/2020): MAX  Weekly Goal(02/24/2020): MOD  Discharge Goal: MOD/MIN    [OT] Eating(Active)  Current Status(02/17/2020): set up  Weekly Goal(02/24/2020): setup  Discharge Goal: set up    Functional Measures  ANKUR Eating:  Branch  ANKUR Grooming: Branch  ANKUR Bathing:  Branch  ANKUR Upper Body Dressing:  Branch  ANKUR Lower Body Dressing:  Branch  ANKUR Toileting:  Branch    ANKUR Bladder Management  Level of Assistance:  Branch  Frequency/Number of Accidents this Shift:  Branch    ANKUR Bowel Management  Level of Assistance: Branch  Frequency/Number of Accidents this Shift: Branch    ANKUR Bed/Chair/Wheelchair Transfer:  Branch  ANKUR Toilet Transfer:  Branch  ANKUR Tub/Shower Transfer:  Branch    Previously Documented Mode of Locomotion at Discharge: Field  ANKUR Expected Mode of Locomotion at Discharge: Branch  ANKUR Walk/Wheelchair:  Branch  ANKUR Stairs:  Branch    ANKUR Comprehension:  Branch  ANKUR Expression:  Branch  ANKUR Social Interaction:  Branch  ANKUR Problem Solving:  Branch  ANKUR Memory:  Branch    Therapy Mode  Minutes  Occupational Therapy: Branch  Physical Therapy: Branch  Speech Language Pathology:  Branch    Signed by: Reina Perera OT

## 2020-02-17 NOTE — THERAPY TREATMENT NOTE
Inpatient Rehabilitation - Physical Therapy Treatment Note  [facility]     Patient Name: She López  : 1945  MRN: [id number]    Today's Date: 2020  Onset of Illness/Injury or Date of Surgery: 20              Admit Date: 2020      Visit Dx:      ICD-10-CM ICD-9-CM   1. General weakness R53.1 780.79       Patient Active Problem List   Diagnosis   • Crohn's disease of small intestine with other complication (CMS/HCC)   • Type 2 diabetes mellitus without complication (CMS/HCC)   • RSD upper limb   • Neuropathic pain of hand   • Hyperlipidemia   • Cervical myelopathy (CMS/HCC)   • Central pain syndrome   • Carpal tunnel syndrome   • Vitamin B 12 deficiency   • Guillain Barré syndrome (CMS/HCC)       Therapy Treatment    IRF Treatment Summary     Row Name 20 1526 20 0800          Evaluation/Treatment Time and Intent    Subjective Information  no complaints  -AF  no complaints  (Pended)   -NM     Existing Precautions/Restrictions  fall  -AF  fall  (Pended)   -NM     Document Type  therapy note (daily note)  -AF  therapy note (daily note)  (Pended)   -NM     Mode of Treatment  occupational therapy  -AF  physical therapy  (Pended)   -NM     Patient/Family Observations  pt sitting up in w/c in AM, supine in bed in PM  -AF  pt in bed; no acute distress   (Pended)   -NM     Recorded by [AF] Reina Perera, OTR [NM] Jaxon Atkinson, PT Student     Row Name 20 1526 20 0800          Cognition/Psychosocial- PT/OT    Affect/Mental Status (Cognitive)  WFL  -AF  WFL  (Pended)   -NM     Orientation Status (Cognition)  oriented x 4  -AF  oriented x 4  (Pended)   -NM     Follows Commands (Cognition)  WFL  -AF  WFL  (Pended)   -NM     Personal Safety Interventions  fall prevention program maintained;gait belt;nonskid shoes/slippers when out of bed  -AF  fall prevention program maintained;gait belt;nonskid shoes/slippers when out of bed;supervised activity  (Pended)   -NM      Safety Deficit (Cognitive)  judgment;insight into deficits/self awareness  -AF  --     Recorded by [AF] Reina Perera, NILAMR [NM] Jaxon Atkinson, PT Student     Row Name 02/17/20 1526 02/17/20 0800          Bed Mobility Assessment/Treatment    Rolling Left [place] (Bed Mobility)  --  supervision  (Pended)   -NM     Rolling Right [place] (Bed Mobility)  --  supervision  (Pended)   -NM     Supine-Sit [place] (Bed Mobility)  supervision  -AF  supervision  (Pended)   -NM     Sit-Supine [place] (Bed Mobility)  supervision  -AF  supervision  (Pended)   -NM     Bed Mobility, Safety Issues  --  decreased use of arms for pushing/pulling;decreased use of legs for bridging/pushing;impaired trunk control for bed mobility  (Pended)   -NM     Assistive Device (Bed Mobility)  bed rails  -AF  bed rails;head of bed elevated  (Pended)   -NM     Recorded by [AF] Reina Perera, NILAMR [NM] Jaxon Atkinson, YUSUF Student     Row Name 02/17/20 0800             Functional Mobility    Functional Mobility- Ind. Level  minimum assist (75% patient effort);contact guard assist;2 person assist required;verbal cues required  (Pended)   -NM      Functional Mobility- Device  rollator  (Pended)  heavy duty   -NM      Functional Mobility-Distance (Feet)  80  (Pended)  x2  -NM      Functional Mobility- Comment  in the PM, trialed ambulation w/out aircasts on BLE. Pt demonstrated increased L foot  supination and walking on lateral border. Went back to wearing airacast on the L but not the R as R foot/ankle has improved control and stability during gait. Decreased distance in PM due to fatigue   (Pended)   -NM      Recorded by [NM] Jaxon Aktinson, PT Student      Row Name 02/17/20 1526 02/17/20 0800          Transfer Assessment/Treatment    Transfer Assessment/Treatment  shower transfer  -AF  --     Comment (Transfers)  --  vc to check foot placement prior to standing; vc to reach back for wc when sitting   (Pended)   -NM      Recorded by [AF] Reina Perera, OTR [NM] Jaxon Atkinson, PT Student     Row Name 02/17/20 1526 02/17/20 0800          Bed-Chair Transfer    Bed-Chair [place] (Transfers)  minimum assist (75% patient effort);verbal cues  -AF  moderate assist (50% patient effort);verbal cues  (Pended)   -NM     Assistive Device (Bed-Chair Transfers)  wheelchair  -AF  wheelchair  (Pended)   -NM     Recorded by [AF] Reina Perera, OTR [NM] Jaxon Atkinson, PT Student     Row Name 02/17/20 1526 02/17/20 0800          Chair-Bed Transfer    Chair-Bed [place] (Transfers)  minimum assist (75% patient effort);verbal cues  -AF  moderate assist (50% patient effort);verbal cues;nonverbal cues (demo/gesture)  (Pended)   -NM     Assistive Device (Chair-Bed Transfers)  wheelchair  -AF  wheelchair  (Pended)   -NM     Recorded by [AF] Reina Perera, ALPESH [NM] Jaxon Atkinson, PT Student     Row Name 02/17/20 0800             Sit-Stand Transfer    Sit-Stand [place] (Transfers)  minimum assist (75% patient effort);moderate assist (50% patient effort);verbal cues  (Pended)   -NM      Assistive Device (Sit-Stand Transfers)  wheelchair;walker, 4-wheeled  (Pended)   -NM      Recorded by [NM] Jaxon Atkinson, PT Student      Row Name 02/17/20 0800             Stand-Sit Transfer    Stand-Sit [place] (Transfers)  minimum assist (75% patient effort);moderate assist (50% patient effort);verbal cues  (Pended)   -NM      Assistive Device (Stand-Sit Transfers)  wheelchair;walker, 4-wheeled  (Pended)   -NM      Recorded by [NM] Jaxon Atkinson, PT Student      Row Name 02/17/20 1526             Shower Transfer    Type (Shower Transfer)  stand pivot/stand step  -AF      [place] Level (Shower Transfer)  moderate assist (50% patient effort);verbal cues  -AF      Assistive Device (Shower Transfer)  grab bars/tub rail;tub bench;wheelchair  -AF      Recorded by [AF] Reina Perera OTR      Row Name 02/17/20 0800              Gait/Stairs Assessment/Training    [place] Level (Gait)  minimum assist (75% patient effort);contact guard;2 person assist;verbal cues  (Pended)   -NM      Assistive Device (Gait)  walker, 4-wheeled  (Pended)  Drive heavy duty rollator; aircasts B ankles   -NM      Distance in Feet (Gait)  130, 120, 80  (Pended)   -NM      Pattern (Gait)  step-through  (Pended)   -NM      Deviations/Abnormal Patterns (Gait)  bilateral deviations;base of support, wide;ataxic  (Pended)   -NM      Bilateral Gait Deviations  weight shift ability decreased;heel strike decreased;forward flexed posture  (Pended)   -NM      Comment (Gait/Stairs)  vc for imrpoved step length and speed, especially to slow durign turns. L foot bumped R foot occasionally c turns, no LOB. Intermittent manual guidance of the walker.  (Pended)   -NM      Recorded by [NM] Jaxon Atkinson, PT Student      Row Name 02/17/20 [facility]             Bathing Assessment/Treatment    Bathing [place] Level  bathing skills;upper body;contact guard assist;lower body;maximum assist (25% patient effort)  -AF      Bathing Position  sink side;supported sitting;supported standing  -AF      Bathing Setup Assistance  obtain supplies  -AF      Recorded by [AF] Reina Perera OTR      Row Name 02/17/20 [facility]             Upper Body Dressing Assessment/Treatment    Upper Body Dressing Task  upper body dressing skills;set up assistance;pull over garment;minimum assist (75% or more patient effort);front opening garment  -AF      Upper Body Dressing Position  supported sitting  -AF      Set-up Assistance (Upper Body Dressing)  obtain clothing  -AF      Recorded by [AF] Reina Perera OTR      Row Name 02/17/20 [facility]             Lower Body Dressing Assessment/Treatment    Lower Body Dressing [place] Level  doff;don;pants/bottoms;shoes/slippers;socks;moderate assist (50% patient effort);verbal cues  -AF      Lower Body Dressing Position  supported sitting  -AF      Lower Body  Dressing Setup Assistance  obtain clothing  -AF      Recorded by [AF] Riena Perera OTR      Row Name 02/17/20 1526             Grooming Assessment/Treatment    Grooming [place] Level  grooming skills;minimum assist (75% patient effort);verbal cues  -AF      Assistive Device (Grooming)  built-up handle items  -AF      Grooming Position  supported sitting  -AF      Grooming Setup Assistance  obtain supplies  -AF      Comment (Grooming)  A to wash hair, able to shampoo, therapist to rinse and blow dry  -AF      Recorded by [AF] Reina Perera OTR      Row Name 02/17/20 1526             Fine Motor Testing & Training    Comment, Fine Motor Coordination  FMC task in standing with manipulating round pegs into peg board and checkers into slotted game board with MIN difficulty.   -AF      Recorded by [AF] Reina Perera OTR      Row Name 02/17/20 1526 02/17/20 0800          Pain Scale: Numbers Pre/Post-Treatment    Pain Scale: Numbers, Pretreatment  0/10 - no pain  -AF  0/10 - no pain  (Pended)   -NM     Pain Scale: Numbers, Post-Treatment  0/10 - no pain  -AF  --     Recorded by [AF] Reina Perera, ALPESH [NM] Jaxon Atkinson, PT Student     Row Name 02/17/20 1526             Static Standing Balance    Level of [place] (Supported Standing, Static Balance)  minimal assist, 75% patient effort;contact guard assist  -AF      Time Able to Maintain Position (Supported Standing, Static Balance)  3 to 4 minutes  x 2 trials   -AF      Assistive Device Utilized (Supported Standing, Static Balance)  walker, standard  -AF      Recorded by [AF] Reina Perera OTR      Row Name 02/17/20 1526             Upper Extremity Seated Therapeutic Exercise    Performed, Seated Upper Extremity (Therapeutic Exercise)  scapular protraction/retraction;shoulder flexion/extension;shoulder horizontal abduction/adduction;elbow flexion/extension;forearm supination/pronation;digit flexion/extension  -AF      Device, Seated  Upper Extremity (Therapeutic Exercise)  -- #2.5 dowel pa, #2 hand weight  -AF      Exercise Type, Seated Upper Extremity (Therapeutic Exercise)  AROM (active range of motion)  -AF      Expected Outcomes, Seated Upper Extremity (Therapeutic Exercise)  improve functional tolerance, self-care activity;improve performance, BADLs;improve performance, transfer skills  -AF      Sets/Reps Detail, Seated Upper Extremity (Therapeutic Exercise)  2/20  -AF      Transfers Skills, Training to Functional Activity, Seated Upper Extremity (Therapeutic Exercise)  beginning to transfer skills to functional activity;transfers skills to functional activity most of the time  -AF      Comment, Seated Upper Extremity (Therapeutic Exercise)  rest breaks  -AF      Recorded by [AF] Reina Perera, ALPESH      Row Name 02/17/20 0800             Aerobic Exercise Activity    Exercise Performed (Aerobic, Therapeutic Exercise)  recumbent elliptical   (Pended)   -NM      Expected Outcome (Aerobic, Therapeutic Exercise)  improve neuromuscular control  (Pended)   -NM      Time (Aerobic, Therapeutic Exercise)  11  (Pended)   -NM      Comment (Aerobic, Therapeutic Exercise)  performed intervals of 2-3 min between level 2-4.  during activity. improved control of LLE not requiring ace wrap   (Pended)   -NM      Recorded by [NM] Jaxon Atkinson, PT Student      Row Name 02/17/20 0800             Core Strengthening Therapeutic Exercise    Exercise Performed (Core Strengthening Therapeutic Exercise)  bridging with bilateral leg support  (Pended)   -NM      Sets/Reps (Core Strengthening Therapeutic Exercise)  1/10  (Pended)   -NM      Comment (Core Strengthening Therapeutic Exercise)  Bilat bridging on mat, c feet on air disc, and air disc c hip abd/add using yellow thera band  (Pended)   -NM      Recorded by [NM] Jaxon Atkinson, PT Student      Row Name 02/17/20 0800             Lower Extremity Seated Therapeutic Exercise    Performed,  Seated Lower Extremity (Therapeutic Exercise)  hip flexion/extension;hip abduction/adduction;knee flexion/extension;ankle dorsiflexion/plantarflexion  (Pended)   -NM      Exercise Type, Seated Lower Extremity (Therapeutic Exercise)  AROM (active range of motion)  (Pended)   -NM      Sets/Reps Detail, Seated Lower Extremity (Therapeutic Exercise)  1/20  (Pended)   -NM      Recorded by [NM] Jaxon Atkinson, PT Student      Row Name 02/17/20 0800             Lower Extremity Supine Therapeutic Exercise    Performed, Supine Lower Extremity (Therapeutic Exercise)  hip flexion/extension;SLR (straight leg raise);SAQ (short arc quad) over bolster  (Pended)   -NM      Exercise Type, Supine Lower Extremity (Therapeutic Exercise)  AROM (active range of motion)  (Pended)   -NM      Sets/Reps Detail, Supine Lower Extremity (Therapeutic Exercise)  1/10  (Pended)   -NM      Recorded by [NM] Jaxon Atkinson, PT Student      Row Name 02/17/20 0800             Vital Signs    Pretreatment Heart Rate (beats/min)  95  (Pended)   -NM      Intratreatment Heart Rate (beats/min)  119  (Pended)  gait   -NM      Pre SpO2 (%)  98  (Pended)   -NM      O2 Delivery Pre Treatment  room air  (Pended)   -NM      Intra SpO2 (%)  98  (Pended)  gait; denies shortness of breath   -NM      O2 Delivery Intra Treatment  room air  (Pended)   -NM      Recorded by [NM] Jaxon Atkinson, PT Student      Row Name 02/17/20 1526 02/17/20 0800          Positioning and Restraints    Pre-Treatment Position  sitting in chair/recliner  -AF  in bed  (Pended)   -NM     Post Treatment Position  bed  -AF  wheelchair  (Pended)   -NM     In Bed  supine;call light within reach;encouraged to call for assist;exit alarm on in AM  -AF  supine;call light within reach;encouraged to call for assist;exit alarm on  (Pended)  before lunch   -NM     In Wheelchair  sitting;exit alarm on;with PT in PM sessions  -AF  exit alarm on;with family/caregiver  (Pended)   -NM     Recorded by  [AF] Reina Perera, OTR [NM] Jaxon Atkinson, PT Student       User Key  (r) = Recorded By, (t) = Taken By, (c) = Cosigned By    Initials Name Effective Dates    AF Reina Perera, OTR 04/03/18 -     NM Jaxon Atkinson, PT Student 01/03/20 -         Wound 12/09/19 1510 abdomen Incision (Active)   Dressing Appearance dry;intact 2/17/2020  9:51 AM   Closure SUJEY 2/17/2020  9:51 AM   Base dressing in place, unable to visualize 2/17/2020  9:51 AM   Periwound dry;intact 2/16/2020  9:50 PM           PT Recommendation and Plan        Daily Summary of Progress (PT)  Recommendations: Physical Therapy: discussed pt's status c MD this morning related to decline in functional mobility and strength in BLE. Per MD pt to start on IVIG                Time Calculation:     PT Charges     Row Name 02/17/20 1342 02/17/20 1139 02/17/20 1138       Time Calculation    Start Time  1300  (Pended)   -NM  1100  (Pended)   -NM  0800  (Pended)   -NM    Stop Time  1330  (Pended)   -NM  1130  (Pended)   -NM  0830  (Pended)   -NM    Time Calculation (min)  30 min  (Pended)   -NM  30 min  (Pended)   -NM  30 min  (Pended)   -NM    PT Received On  02/17/20  (Pended)   -NM  02/17/20  (Pended)   -NM  02/17/20  (Pended)   -NM    PT - Next Appointment  02/18/20  (Pended)   -NM  --  --      User Key  (r) = Recorded By, (t) = Taken By, (c) = Cosigned By    Initials Name Provider Type    NM Jaxon Atkinson, PT Student PT Student          Therapy Charges for Today     Code Description Service Date Service Provider Modifiers Qty    84829015934 HC GAIT TRAINING EA 15 MIN 2/17/2020 Jaxon Atkinson, PT Student GP 2    00776732244 HC PT THER PROC EA 15 MIN 2/17/2020 Jaxon Atkinson, PT Student GP 3    36409777947 HC PT NEUROMUSC RE EDUCATION EA 15 MIN 2/17/2020 Jaxon Atkinson, PT Student GP 1                   Jaxon Atkinson, PT Student  2/17/2020

## 2020-02-17 NOTE — NURSING NOTE
CWOCN follow up- changed ostomy pouch and midline dressing. Patient tolerated well. Pouch is staying on well.      02/17/20 1615   Wound 12/09/19 1510 abdomen Incision   Date first assessed/Time first assessed: 12/09/19 1510   Location: (c) abdomen  Primary Wound Type: Incision   Dressing Appearance moist drainage   Base moist;pink   Periwound intact   Periwound Temperature warm   Periwound Skin Turgor soft   Edges open   Drainage Characteristics/Odor tan   Drainage Amount small   Care, Wound cleansed with;sterile normal saline   Dressing Care, Wound dressing changed;gauze   Periwound Care, Wound dry periwound area maintained   Ileostomy RUQ   Placement Date/Time: 12/15/19 1407   Inserted by: DR PRAKASH  Location: RUQ   Stomal Appliance 1 piece;Intact;Changed;Drainable   Stoma Appearance round;rosebud appearance;moist;red;protruding above skin level   Peristomal Assessment Pink   Accessories/Skin Care convex wafer;cleansed with water;skin barrier powder;skin barrier ring;skin barrier strip   Stoma Function stool   Stool Color brown   Stool Consistency loose   Treatment Bag change   Output (mL) 150 mL

## 2020-02-17 NOTE — PROGRESS NOTES
Inpatient Rehabilitation Functional Measures Assessment and Plan of Care    Plan of Care  Updated Problems/Interventions  Mobility    [PT] Bed Mobility(Active)  Current Status(02/17/2020): sup  Weekly Goal(02/24/2020): sup  Discharge Goal: SUP    [PT] Bed/Chair/Wheelchair(Active)  Current Status(02/17/2020): min  Weekly Goal(02/10/2020): CGA  Discharge Goal: CGA    [PT] Walk(Active)  Current Status(02/17/2020): 120', mod/min x 2, heavy rollator, aircasts BLE  Weekly Goal(02/24/2020): PT  Discharge Goal: 120, min, rwx    Functional Measures  ANKUR Eating:  Branch  ANKUR Grooming: Branch  ANKUR Bathing:  Branch  ANKUR Upper Body Dressing:  Branch  ANKUR Lower Body Dressing:  Branch  [facility] Toileting:  Branch    ANKUR Bladder Management  Level of Assistance:  Branch  Frequency/Number of Accidents this Shift:  Branch    ANKUR Bowel Management  Level of Assistance: [place]  Frequency/Number of Accidents this Shift: Branch    ANKUR Bed/Chair/Wheelchair Transfer:  Bed/chair/wheelchair Transfer Score = 4.  Patient performs 75% or more of effort and minimal assistance (little/incidental  help/lifting of one limb/steadying) for transferring to and from the  bed/chair/wheelchair, requiring: No assistive devices were required.  ANKUR Toilet Transfer:  Branch  [facility] Tub/Shower Transfer:  Branch    Previously Documented Mode of Locomotion at Discharge: Field  ANKUR Expected Mode of Locomotion at Discharge: Branch  [facility] Walk/Wheelchair:  WHEELCHAIR OBSERVATION   Activity was not observed.    WALK OBSERVATION   Walk Distance Scale = 2.  Distance walked is 50 -149 feet. Walk Score = 1.  Patient performs 50-74% of effort and requires moderate assistance of two or  more people. Assist of two for LE instability. . Patient walked a distance of  120 feet. Patient requires the following assistive device(s): Rolling walker.  ANKUR Stairs:  Activity was not observed.    ANKUR Comprehension:  Branch  ANKUR Expression:  Branch  ANKUR Social Interaction:  Branch  [facility] Problem  Solving:  Branch  ANKUR Memory:  Branch    Therapy Mode Minutes  Occupational Therapy: Branch  Physical Therapy: Individual: 90 minutes.  Speech Language Pathology:  Branch    Signed by: Hannah Cruz PT

## 2020-02-17 NOTE — PROGRESS NOTES
Inpatient Rehabilitation Plan of Care Note    Plan of Care  Care Plan Reviewed - Updates as Follows    Body Systems    [RN] Integumentary(Active)  Current Status(02/17/2020): Abdominal incision healed.ileostomy bag in place.  both changed per WOCN  Weekly Goal(02/24/2020): No S&S infection noted. Skin is intact.  Discharge Goal: No S&S infection noted Skin is intact.    Performed Intervention(s)  Daily skin inspection      Psychosocial    [RN] Coping/Adjustment(Active)  Current Status(02/17/2020): Supportive family,  very helpful and assists  patient with care.  Weekly Goal(02/24/2020): Identify progress in functional status  Discharge Goal: Demonstrate healthy coping strategies    Performed Intervention(s)  Verbalize needs and concerns  calm enviroment      Safety    [RN] Potential for Injury(Active)  Current Status(02/17/2020): No unsafe behaviors. Weakness of lower extremities  Weekly Goal(02/24/2020): Use call light 100%  Discharge Goal: Pt/family aware of risk of fall and safety in the home setting    Performed Intervention(s)  Bed alarm, wc alarm  Items within reach  Safety rounds      Sphincter Control    [RN] Bladder Management(Active)  Current Status(02/17/2020): pt has been continent, using bedpan.She is reluctant  to use BSC. does wear brief.  Weekly Goal(02/24/2020): Continent bladder 100%  Discharge Goal: Continent bladder 100%    [RN] Bowel Management(Active)  Current Status(02/17/2020): Has ileostomy since 12/20/19.  aware of care  for ileostomy.  Weekly Goal(02/24/2020): maintain education of ileostomy with pt and family  Discharge Goal: Independent with ileostomy care    Performed Intervention(s)  Monitor intake and output  Encourage appropriate diet  wound ostomy nurse to continue to see pt for ostomy care.    Signed by: Romina Cotton RN

## 2020-02-18 PROCEDURE — 97530 THERAPEUTIC ACTIVITIES: CPT

## 2020-02-18 PROCEDURE — 97116 GAIT TRAINING THERAPY: CPT

## 2020-02-18 PROCEDURE — 97110 THERAPEUTIC EXERCISES: CPT

## 2020-02-18 PROCEDURE — 97535 SELF CARE MNGMENT TRAINING: CPT

## 2020-02-18 PROCEDURE — 97112 NEUROMUSCULAR REEDUCATION: CPT

## 2020-02-18 RX ADMIN — GABAPENTIN 300 MG: 300 CAPSULE ORAL at 17:23

## 2020-02-18 RX ADMIN — LOPERAMIDE HYDROCHLORIDE 2 MG: 2 CAPSULE ORAL at 05:39

## 2020-02-18 RX ADMIN — SODIUM BICARBONATE 1300 MG: 650 TABLET ORAL at 21:58

## 2020-02-18 RX ADMIN — SODIUM BICARBONATE 1300 MG: 650 TABLET ORAL at 17:23

## 2020-02-18 RX ADMIN — CHOLECALCIFEROL TAB 125 MCG (5000 UNIT) 5000 UNITS: 125 TAB at 08:47

## 2020-02-18 RX ADMIN — GABAPENTIN 300 MG: 300 CAPSULE ORAL at 08:46

## 2020-02-18 RX ADMIN — Medication 1 TABLET: at 08:47

## 2020-02-18 RX ADMIN — GABAPENTIN 300 MG: 300 CAPSULE ORAL at 12:29

## 2020-02-18 RX ADMIN — GABAPENTIN 300 MG: 300 CAPSULE ORAL at 02:58

## 2020-02-18 RX ADMIN — SODIUM BICARBONATE 1300 MG: 650 TABLET ORAL at 08:46

## 2020-02-18 RX ADMIN — LOPERAMIDE HYDROCHLORIDE 2 MG: 2 CAPSULE ORAL at 12:29

## 2020-02-18 RX ADMIN — GABAPENTIN 300 MG: 300 CAPSULE ORAL at 21:58

## 2020-02-18 RX ADMIN — PANTOPRAZOLE SODIUM 40 MG: 40 TABLET, DELAYED RELEASE ORAL at 05:40

## 2020-02-18 RX ADMIN — Medication 18 MG: at 08:47

## 2020-02-18 RX ADMIN — LOPERAMIDE HYDROCHLORIDE 2 MG: 2 CAPSULE ORAL at 17:23

## 2020-02-18 RX ADMIN — Medication 18 MG: at 21:58

## 2020-02-18 NOTE — PROGRESS NOTES
Occupational Therapy: Branch    Physical Therapy: Individual: 90 minutes.    Speech Language Pathology:  Branch    Signed by: Jaxon Atkinson PT Student     - CoSigned By: Chelsie Cline PT 2/18/2020 4:02:56 PM

## 2020-02-18 NOTE — THERAPY TREATMENT NOTE
Inpatient Rehabilitation - Occupational Therapy Treatment Note    [facility]     Patient Name: She López  : 1945  MRN: [id number]    Today's Date: 2020  Onset of Illness/Injury or Date of Surgery: 20              Admit Date: 2020      Visit Dx:    ICD-10-CM ICD-9-CM   1. General weakness R53.1 780.79       Patient Active Problem List   Diagnosis   • Crohn's disease of small intestine with other complication (CMS/HCC)   • Type 2 diabetes mellitus without complication (CMS/HCC)   • RSD upper limb   • Neuropathic pain of hand   • Hyperlipidemia   • Cervical myelopathy (CMS/HCC)   • Central pain syndrome   • Carpal tunnel syndrome   • Vitamin B 12 deficiency   • Guillain Barré syndrome (CMS/HCC)         Therapy Treatment    IRF Treatment Summary     Row Name 20 1505 20 1153 20 0800       Evaluation/Treatment Time and Intent    Subjective Information  no complaints  -AF  no complaints  -AF  no complaints  -LH,NM,LH2    Existing Precautions/Restrictions  fall  -AF  fall  -AF  fall  -LH,NM,LH2    Document Type  therapy note (daily note)  -AF  therapy note (daily note)  -AF  therapy note (daily note)  -LH,NM,LH2    Mode of Treatment  occupational therapy  -AF  occupational therapy  -AF  physical therapy  -LH,NM,LH2    Patient/Family Observations  sitting up in bed with  present  -AF  sitting up in w/c in room,  present   -AF   brought pt to the gym in the w/c   -LH,NM,LH2    Recorded by [AF] Reina Perera, OTR [AF] Reina Perera, OTR [LH,NM,LH2] Chelsie Cline, PT (r) Jaxon Atkinson, PT Student (t) Chelsie Cline, PT (c)    Row Name 20 0800             Coping/Community Reintegration    Additional Documentation  Caregiver Training (Group)  -LH,NM,LH2      Recorded by [LH,NM,LH2] Chelsie Cline, PT (r) Jaxon Atkinson, PT Student (t) Chelsie Cline, PT (c)      Row Name 20 0800             Caregiver Training    Caregiver(s) to be  Trained  spouse/significant  -LH,NM,LH2      Comment, Caregiver Training Plan  Pt's  trained to assist pt c transfers between bed<>wc, demonstrating anf stating understanding and safe handling.  Training also occured for bed mobs/sitting EOB   -LH,NM,LH2      Recorded by [LH,NM,LH2] Chelsie Cline, PT (r) Jaxon Atkinson, PT Student (t) Chelsie Cline, PT (c)      Row Name 02/18/20 1505 02/18/20 1153 02/18/20 0800       Cognition/Psychosocial- PT/OT    Affect/Mental Status (Cognitive)  WFL  -AF  WFL  -AF  WFL  -LH,NM,LH2    Orientation Status (Cognition)  oriented x 4  -AF  oriented x 4  -AF  oriented x 4  -LH,NM,LH2    Follows Commands (Cognition)  WFL  -AF  WFL  -AF  WFL  -LH,NM,LH2    Personal Safety Interventions  fall prevention program maintained;gait belt;nonskid shoes/slippers when out of bed  -AF  fall prevention program maintained;gait belt;nonskid shoes/slippers when out of bed  -AF  fall prevention program maintained;gait belt;nonskid shoes/slippers when out of bed;supervised activity  -LH,NM,LH2    Safety Deficit (Cognitive)  insight into deficits/self awareness  -AF  insight into deficits/self awareness  -AF  --    Recorded by [AF] Reina Perera, OTR [AF] Reina Perera, OTR [LH,NM,LH2] Chelsie Cline, PT (r) Jaxon Atkinson, PT Student (t) Chelsie Cline, PT (c)    Row Name 02/18/20 1505 02/18/20 1153 02/18/20 0800       Bed Mobility Assessment/Treatment    Rolling Left [place] (Bed Mobility)  --  --  supervision  -LH,NM,LH2    Rolling Right [place] (Bed Mobility)  --  --  supervision  -LH,NM,LH2    Supine-Sit [place] (Bed Mobility)  supervision  -AF  --  supervision  -LHNM,LH2    Sit-Supine [place] (Bed Mobility)  --  supervision  -AF  supervision;verbal cues  -LH,NM,LH2    Assistive Device (Bed Mobility)  --  --  bed rails  -LH,NM,LH2    Comment (Bed Mobility)  --  --   safely assists pt c bed mobility.   -LH,NM,LH2    Recorded by [AF] Reina Perera, OTR  [AF] Reina Perera, OTR [LH,NM,LH2] Chelsie Cline, PT (r) Jaxon Atkinson, PT Student (t) Chelsie Cline, PT (c)    Row Name 02/18/20 0800             Functional Mobility    Functional Mobility- Ind. Level  minimum assist (75% patient effort);1 person  -LH,NM,LH2      Functional Mobility- Device  rolling walker aircast LLE  -LH,NM,LH2      Functional Mobility-Distance (Feet)  30 x2   -LH,NM,LH2      Functional Mobility- Safety Issues  balance decreased during turns  -LH,NM,LH2      Functional Mobility- Comment  ambulated approx 30 feet between wc and a standard chair, working on turning c a rwx and backing up to chairs. Required jagruti for sit<>stand and Jagruti intermittently throughout. SBA from second person.   -LH,NM,LH2      Recorded by [LH,NM,LH2] Chelsie Cline, PT (r) Jaxon Atkinson, PT Student (t) Chelsie Cline, PT (c)      Row Name 02/18/20 1505 02/18/20 0800          Transfer Assessment/Treatment    Transfer Assessment/Treatment  --  car transfer  -LH,NM,LH2     Comment (Transfers)  sit to stand with bean bag toss and gross grasp reaching task MIN/CGA   -AF  VC for foot placement during transfers. VC to stay inside the wx an dmin guidance of the wx when pivoting to sit in wc/standard chairs   -LH,NM,LH2     Recorded by [AF] Reina Perera, OTR [LH,NM,LH2] Chelsie Cline, PT (r) Jaxon Atkinson, PT Student (t) Chelsie Cline, PT (c)     Row Name 02/18/20 1505 02/18/20 0800          Bed-Chair Transfer    Bed-Chair [place] (Transfers)  minimum assist (75% patient effort);verbal cues  -AF  minimum assist (75% patient effort);verbal cues  -LH,NM,LH2     Assistive Device (Bed-Chair Transfers)  wheelchair  -AF  wheelchair  -LH,NM,LH2     Recorded by [AF] Reina Perera, OTR [LH,NM,LH2] Chelsie Cline, PT (r) Jaxon Atkinson, PT Student (t) Chelsie Cline, PT (c)     Row Name 02/18/20 1153 02/18/20 0800          Chair-Bed Transfer    Chair-Bed [place] (Transfers)  minimum assist (75% patient effort)  sit pivot   -AF  minimum assist (75% patient effort);verbal cues;nonverbal cues (demo/gesture)  -LH,NM,LH2     Assistive Device (Chair-Bed Transfers)  wheelchair  -AF  wheelchair  -LH,NM,LH2     Recorded by [AF] Reina Perera, OTR [LH,NM,LH2] Chelsie Cline, PT (r) Jaxon Atkinson, PT Student (t) Chelsie Cline, PT (c)     Row Name 02/18/20 0800             Sit-Stand Transfer    Sit-Stand [place] (Transfers)  minimum assist (75% patient effort);moderate assist (50% patient effort);verbal cues;nonverbal cues (demo/gesture) from wc, bed, and standard chairs c armrests   -LH,NM,LH2      Assistive Device (Sit-Stand Transfers)  wheelchair;walker, 4-wheeled heavy rollator   -LH,NM,LH2      Recorded by [LH,NM,LH2] Chelsie Cline, PT (r) Jaxon Atkinson, PT Student (t) Chelsie Cline, PT (c)      Row Name 02/18/20 0800             Stand-Sit Transfer    Stand-Sit [place] (Transfers)  minimum assist (75% patient effort);verbal cues;nonverbal cues (demo/gesture)  -LH,NM,LH2      Assistive Device (Stand-Sit Transfers)  wheelchair;walker, 4-wheeled heavy rollator   -LH,NM,LH2      Recorded by [LH,NM,LH2] Chelsie Cline, PT (r) Jaxon Atkinson, PT Student (t) Chelsie Cline, PT (c)      Row Name 02/18/20 0800             Stand Pivot/Stand Step Transfer    Stand Pivot/Stand Step [place]  minimum assist (75% patient effort);nonverbal cues (demo/gesture);verbal cues CGA in PM c rwx in pt's room; vc/demo for technique   -LH,NM,LH2      Assistive Device (Stand Pivot Stand Step Transfer)  wheelchair;walker, 4-wheeled heavy rollator: cues/guidance of the wx  -LH,NM,LH2      Recorded by [LH,NM,LH2] Chelsie Cline, PT (r) Jaxon Atkinson, PT Student (t) Chelsie Cline, PT (c)      Row Name 02/18/20 0800             Car Transfer    Type (Car Transfer)  stand pivot/stand step  -LH,NM,LH2      [place] Level (Car Transfer)  minimum assist (75% patient effort);verbal cues;nonverbal cues (demo/gesture)  -[place],NM,LH2       Assistive Device (Car Transfer)  wheelchair  -LH,NM,LH2      Recorded by [LH,NM,LH2] Chelsie Cline, PT (r) Jaxon Atkinson, PT Student (t) Chelsie Cline, PT (c)      Row Name 02/18/20 0800             Gait/Stairs Assessment/Training    [place] Level (Gait)  minimum assist (75% patient effort);contact guard;2 person assist;verbal cues;nonverbal cues (demo/gesture) L foot bumped R once requiring Doreen x 2 to correct   -LH,NM,LH2      Assistive Device (Gait)  walker, 4-wheeled heavy rollator   -LH,NM,LH2      Distance in Feet (Gait)  180, 110, 30x2  -LH,NM,LH2      Pattern (Gait)  step-through  -LH,NM,LH2      Deviations/Abnormal Patterns (Gait)  bilateral deviations;base of support, wide;ataxic  -LH,NM,LH2      Bilateral Gait Deviations  weight shift ability decreased;heel strike decreased;forward flexed posture  -LH,NM,LH2      Left Sided Gait Deviations  -- L foot occasionally crosses midline   -LH,NM,LH2      Recorded by [LH,NM,LH2] Chelsie Cline, PT (r) Jaxon Atkinson, PT Student (t) Chelsie Cline, PT (c)      Row Name 02/18/20 1153             Bathing Assessment/Treatment    Bathing [place] Level  upper body;contact guard assist;set up  -AF      Bathing Position  sink side;supported sitting  -AF      Comment (Bathing)  deferred LB bathing since showered yesterday  -AF      Recorded by [AF] Reina Perera OTR      Row Name 02/18/20 1153             Upper Body Dressing Assessment/Treatment    Upper Body Dressing Task  upper body dressing skills;pull over garment;set up assistance;front opening garment;minimum assist (75% or more patient effort)  -AF      Upper Body Dressing Position  supported sitting  -AF      Comment (Upper Body Dressing)  vc's   -AF      Recorded by [AF] Reina Perera OTR      Row Name 02/18/20 1153             Lower Body Dressing Assessment/Treatment    Comment (Lower Body Dressing)  deferred stated she had clean pants on  -AF      Recorded by [AF] Reina Perera OTR       Row Name 02/18/20 1153             Grooming Assessment/Treatment    Grooming [place] Level  grooming skills;supervision;set up;verbal cues  -AF      Assistive Device (Grooming)  built-up handle items  -AF      Grooming Position  sink side;supported sitting  -AF      Comment (Grooming)  with increased time able to demo increased motor control and complete grooming tasks with just supervision  -AF      Recorded by [AF] Reina Perera OTR      Row Name 02/18/20 1153             Hand  Strength Testing    Right Hand, Setting 1 (Dynamometer Testing)  10  -AF      Left Hand, Setting 1 (Dynamometer Testing)  27  -AF      Recorded by [AF] Reina Perera OTR      Row Name 02/18/20 1153             Fine Motor Testing & Training    Results, Box N Block Test-Right  40  -AF      Results, Box N Block Test-Left  25  -AF      Recorded by [AF] Reina Perera OTR      Row Name 02/18/20 1505 02/18/20 1153 02/18/20 0800       Pain Scale: Numbers Pre/Post-Treatment    Pain Scale: Numbers, Pretreatment  0/10 - no pain  -AF  0/10 - no pain  -AF  0/10 - no pain  -LH,NM,LH2    Pain Scale: Numbers, Post-Treatment  0/10 - no pain  -AF  0/10 - no pain  -AF  0/10 - no pain  -LH,NM,LH2    Recorded by [AF] Reina Perera OTR [AF] Reina Perera OTR [LH,NM,LH2] Chelsie Cline, PT (r) Jaxon Atkinson PT Student (t) Chelsie Cline, PT (c)    Row Name 02/18/20 1505             Static Standing Balance    Level of [place] (Supported Standing, Static Balance)  minimal assist, 75% patient effort;contact guard assist with bean bag toss   -AF      Time Able to Maintain Position (Supported Standing, Static Balance)  2 to 3 minutes x 4 trials   -AF      Assistive Device Utilized (Supported Standing, Static Balance)  walker, standard  -AF      Recorded by [AF] Reina Perera OTR      Row Name 02/18/20 0800             Standing Balance Activity    Comment (Standing, Balance Training)  Inside the // bars, BUE support and  CGA, pt performed mini squats (2x5), step-ups (7x and 10x) leading c the R when ascending. Performed static standing w/out UE support, c CGA x 30-45 seconds (3x) c vc/tactile cues to return to midline.   -LH,NM,LH2      Recorded by [LH,NM,LH2] Chelsie Cline, PT (r) Jaxon Atkinson PT Student (t) Chelsie Cline, PT (c)      Row Name 02/18/20 1153             Upper Extremity Seated Therapeutic Exercise    Performed, Seated Upper Extremity (Therapeutic Exercise)  shoulder horizontal abduction/adduction;scapular protraction/retraction;elbow flexion/extension;forearm supination/pronation  -AF      Device, Seated Upper Extremity (Therapeutic Exercise)  -- #3 hand weight   -AF      Exercise Type, Seated Upper Extremity (Therapeutic Exercise)  AROM (active range of motion);resistive exercise  -AF      Expected Outcomes, Seated Upper Extremity (Therapeutic Exercise)  improve functional tolerance, self-care activity;improve performance, BADLs;improve performance, fine motor coordination skills;improve performance, transfer skills  -AF      Restrictions, Seated Upper Extremity (Therapeutic Exercise)  increased weight from #2 to #3  -AF      Sets/Reps Detail, Seated Upper Extremity (Therapeutic Exercise)  2/20  -AF      Transfers Skills, Training to Functional Activity, Seated Upper Extremity (Therapeutic Exercise)  beginning to transfer skills to functional activity  -AF      Comment, Seated Upper Extremity (Therapeutic Exercise)  rest breaks   -AF      Recorded by [AF] Reina Perera OTR      Row Name 02/18/20 0800             Lower Extremity Seated Therapeutic Exercise    Performed, Seated Lower Extremity (Therapeutic Exercise)  hip flexion/extension;hip abduction/adduction;knee flexion/extension;ankle dorsiflexion/plantarflexion;LAQ (long arc quad), knee extension red thera band   -LH,NM,LH2      Exercise Type, Seated Lower Extremity (Therapeutic Exercise)  AROM (active range of motion)  -LH,NM,LH2      Sets/Reps  Detail, Seated Lower Extremity (Therapeutic Exercise)  1/20  -LH,NM,LH2      Recorded by [LH,NM,LH2] Chelsie Cline, PT (r) Jaxon Atkinson, PT Student (t) Chelsie Cline, PT (c)      Row Name 02/18/20 1505 02/18/20 1153          Neuromuscular Re-education    Comment (Neuromuscular Re-education)  gross grasp with alternating hands with rings and bean bags in standing   -AF  attempted multiple FMC tasks that pt was not able to competle and increased her frustration attempted to sting medium and large beads unable to, then attempted to place chinese checkers into peg board unable to complete. pt did participate in theraputty exs to increase her strength and manipulation skills with ADL tasks   -AF     Recorded by [AF] Reina Perera OTR [AF] Reina Perera, ALPESH     Row Name 02/18/20 0800             Vital Signs    Pretreatment Heart Rate (beats/min)  95  -LH,NM,LH2      Intratreatment Heart Rate (beats/min)  115 gait  -LH,NM,LH2      Pre SpO2 (%)  98  -LH,NM,LH2      O2 Delivery Pre Treatment  room air  -LH,NM,LH2      Intra SpO2 (%)  98 gait   -LH,NM,LH2      O2 Delivery Intra Treatment  room air  -LH,NM,LH2      Recorded by [LH,NM,LH2] Chelsie Cline, PT (r) Jaxon Atkinson, PT Student (t) Chelsie Cline, PT (c)      Row Name 02/18/20 1505 02/18/20 1153 02/18/20 0800       Positioning and Restraints    Pre-Treatment Position  in bed  -AF  sitting in chair/recliner  -AF  in bed CNA got pt up an dhusband brought pt to the gym   -LH,NM,LH2    Post Treatment Position  wheelchair  -AF  bed  -AF  wheelchair  -LH,NM,LH2    In Bed  --  supine;call light within reach;encouraged to call for assist;exit alarm on;with family/caregiver  present   -AF  call light within reach;exit alarm on;with family/caregiver before lunch   -LH,NM,LH2    In Wheelchair  sitting;exit alarm on;with PT  -AF  --  exit alarm on;with family/caregiver with    -LH,NM,LH2    Recorded by [AF] Reina Perera, OTR [AF] Reina Perera  Linda, OTR [LH,NM,LH2] Chelsie Cline, PT (r) Jaxon Atkinson, PT Student (t) Chelsie Cline, PT (c)      User Key  (r) = Recorded By, (t) = Taken By, (c) = Cosigned By    Initials Name Effective Dates     Chelsie Cline, PT 04/03/18 -     AF PereraReina crum, OTR 04/03/18 -     NM Jaxon Atkinson, PT Student 01/03/20 -           Wound 12/09/19 1510 abdomen Incision (Active)   Dressing Appearance dry;intact 2/18/2020  7:30 AM   Closure SUJEY 2/18/2020  7:30 AM   Base moist;pink 2/17/2020  4:15 PM   Periwound intact 2/17/2020  4:15 PM   Periwound Temperature warm 2/17/2020  4:15 PM   Periwound Skin Turgor soft 2/17/2020  4:15 PM   Edges open 2/17/2020  4:15 PM   Drainage Characteristics/Odor tan 2/17/2020  4:15 PM   Drainage Amount none 2/18/2020  7:30 AM   Care, Wound cleansed with;sterile normal saline 2/17/2020  4:15 PM   Dressing Care, Wound gauze 2/18/2020  7:30 AM   Periwound Care, Wound dry periwound area maintained 2/18/2020  7:30 AM         OT Recommendation and Plan                 OT IRF GOALS     Row Name 02/12/20 1514             Transfer Goal 1 (OT-IRF)    Activity/Assistive Device (Transfer Goal 1, OT-IRF)  toilet;shower chair;walk-in shower  -AF      [place] Level (Transfer Goal 1, OT-IRF)  minimum assist (75% or more patient effort);verbal cues required  -AF      Time Frame (Transfer Goal 1, OT-IRF)  short term goal (STG)  -AF      Progress/Outcomes (Transfer Goal 1, OT-IRF)  goal ongoing  -AF         Transfer Goal 2 (OT-IRF)    Activity/Assistive Device (Transfer Goal 2, OT-IRF)  toilet;shower chair;walk-in shower  -AF      [place] Level (Transfer Goal 2, OT-IRF)  verbal cues required;contact guard assist  -AF      Time Frame (Transfer Goal 2, OT-IRF)  long term goal (LTG)  -AF      Progress/Outcomes (Transfer Goal 2, OT-IRF)  goal ongoing  -AF         Bathing Goal 1 (OT-IRF)    Activity/Device (Bathing Goal 1, OT-IRF)  bathing skills, all;grab bar/tub rail;hand-held shower spray  hose;shower chair  -AF      [place] Level (Bathing Goal 1, OT-IRF)  verbal cues required;moderate assist (50-74% patient effort)  -AF      Time Frame (Bathing Goal 1, OT-IRF)  short term goal (STG)  -AF      Progress/Outcomes (Bathing Goal 1, OT-IRF)  goal ongoing  -AF         Bathing Goal 2 (OT-IRF)    Activity/Device (Bathing Goal 2, OT-IRF)  bathing skills, all;grab bar/tub rail;hand-held shower spray hose;shower chair  -AF      [place] Level (Bathing Goal 2, OT-IRF)  verbal cues required;contact guard assist  -AF      Time Frame (Bathing Goal 2, OT-IRF)  long term goal (LTG)  -AF      Progress/Outcomes (Bathing Goal 2, OT-IRF)  goal ongoing  -AF         UB Dressing Goal 1 (OT-IRF)    Activity/Device (UB Dressing Goal 1, OT-IRF)  upper body dressing  -AF      [place] (UB Dress Goal 1, OT-IRF)  set-up required;verbal cues required  -AF      Time Frame (UB Dressing Goal 1, OT-IRF)  long term goal (LTG)  -AF      Progress/Outcomes (UB Dressing Goal 1, OT-IRF)  goal ongoing  -AF         LB Dressing Goal 1 (OT-IRF)    Activity/Device (LB Dressing Goal 1, OT-IRF)  lower body dressing  -AF      [place] (LB Dressing Goal 1, OT-IRF)  verbal cues required;moderate assist (50-74% patient effort)  -AF      Time Frame (LB Dressing Goal 1, OT-IRF)  short term goal (STG)  -AF      Progress/Outcomes (LB Dressing Goal 1, OT-IRF)  goal ongoing  -AF         LB Dressing Goal 2 (OT-IRF)    Activity/Device (LB Dressing Goal 2, OT-IRF)  lower body dressing  -AF      [place] (LB Dressing Goal 2, OT-IRF)  verbal cues required;contact guard assist  -AF      Time Frame (LB Dressing Goal 2, OT-IRF)  long term goal (LTG)  -AF      Progress/Outcomes (LB Dressing Goal 2, OT-IRF)  goal ongoing  -AF         Grooming Goal 1 (OT-IRF)    Activity/Device (Grooming Goal 1, OT-IRF)  grooming skills, all  -AF      [place] (Grooming Goal 1, OT-IRF)  set-up required  -AF      Time Frame (Grooming Goal 1, OT-IRF)  short term  goal (STG)  -AF      Progress/Outcomes (Grooming Goal 1, OT-IRF)  goal ongoing  -AF         Grooming Goal 2 (OT-IRF)    Activity/Device (Grooming Goal 2, OT-IRF)  grooming skills, all  -AF      [place] (Grooming Goal 2, OT-IRF)  conditional independence  -AF      Time Frame (Grooming Goal 2, OT-IRF)  long term goal (LTG)  -AF      Progress/Outcomes (Grooming Goal 2, OT-IRF)  goal ongoing  -AF         Toileting Goal 1 (OT-IRF)    Activity/Device (Toileting Goal 1, OT-IRF)  toileting skills, all;grab bar/safety frame;raised toilet seat  -AF      [place] Level (Toileting Goal 1, OT-IRF)  maximum assist (25-49% patient effort);moderate assist (50-74% patient effort)  -AF      Time Frame (Toileting Goal 1, OT-IRF)  short term goal (STG)  -AF      Progress/Outcomes (Toileting Goal 1, OT-IRF)  goal ongoing  -AF         Toileting Goal 2 (OT-IRF)    Activity/Device (Toileting Goal 2, OT-IRF)  toileting skills, all;grab bar/safety frame;raised toilet seat  -AF      [place] Level (Toileting Goal 2, OT-IRF)  minimum assist (75% or more patient effort);verbal cues required  -AF      Time Frame (Toileting Goal 2, OT-IRF)  long term goal (LTG)  -AF      Progress/Outcomes (Toileting Goal 2, OT-IRF)  goal ongoing  -AF         Balance Goal 1 (OT)    Activity/Assistive Device (Balance Goal 1, OT)  standing, static  -AF      [place] Level/Cues Needed (Balance Goal 1, OT)  moderate assist (50-74% patient effort)  -AF      Time Frame (Balance Goal 1, OT)  short term goal (STG)  -AF      Progress/Outcomes (Balance Goal 1, OT)  goal ongoing  -AF         Balance Goal 2 (OT)    Activity/Assistive Device (Balance Goal 2, OT)  standing, static  -AF      [place] Level (Balance Goal 2, OT)  minimum assist (75% or more patient effort);verbal cues required  -AF      Time Frame (Balance Goal 2, OT)  long term goal (LTG)  -AF      Progress/Outcome (Balance Goal 2, OT)  goal ongoing  -AF         Caregiver Training Goal 1  (OT-IRF)    Caregiver Training Goal 1 (OT-IRF)  pt and  will demo safe techniques with ADLs, transfers, HEP and AE prior to d/c home with home health services   -AF      Time Frame (Caregiver Training Goal 1, OT-IRF)  long term goal (LTG)  -AF      Progress/Outcomes (Caregiver Training Goal 1, OT-IRF)  goal ongoing  -AF        User Key  (r) = Recorded By, (t) = Taken By, (c) = Cosigned By    Initials Name Provider Type    AF Reina Perera OTMENDEZ Occupational Therapist                     Time Calculation:     Time Calculation- OT     Row Name 02/18/20 1510 02/18/20 1205          Time Calculation- OT    OT Start Time  1400  -AF  0930  -AF     OT Stop Time  1430  -AF  1030  -AF     OT Time Calculation (min)  30 min  -AF  60 min  -AF       User Key  (r) = Recorded By, (t) = Taken By, (c) = Cosigned By    Initials Name Provider Type    Reina Noe OTMENDEZ Occupational Therapist          Therapy Charges for Today     Code Description Service Date Service Provider Modifiers Qty    49567357081 HC OT SELF CARE/MGMT/TRAIN EA 15 MIN 2/17/2020 Reina Perera OTR GO 3    36673126988 HC OT THERAPEUTIC ACT EA 15 MIN 2/17/2020 Reina Perera, OTR GO 1    93563975419 HC OT THER PROC EA 15 MIN 2/17/2020 Reina Perera, OTR GO 1    47287312559 HC OT NEUROMUSC RE EDUCATION EA 15 MIN 2/17/2020 Reina Perera, OTR GO 1    23045626507 HC OT SELF CARE/MGMT/TRAIN EA 15 MIN 2/18/2020 Reina Perera, OTR GO 2    13567926291 HC OT THER PROC EA 15 MIN 2/18/2020 Reina Perera, OTR GO 1    77643808219 HC OT NEUROMUSC RE EDUCATION EA 15 MIN 2/18/2020 Reina Perera, OTR GO 1    11777982588 HC OT THERAPEUTIC ACT EA 15 MIN 2/18/2020 Reina Perera, OTR GO 1    66130295395 HC OT NEUROMUSC RE EDUCATION EA 15 MIN 2/18/2020 Reina Perera, OTR GO 1                   Reina Perera, OTR  2/18/2020

## 2020-02-18 NOTE — PROGRESS NOTES
Case Management  Inpatient Rehabilitation Team Conference    Conference Date/Time: 2/18/2020 7:33:27 AM    Team Conference Attendees:  Anusha Fagan.pedro Delcid, FUNMILAYOW  Chelsie Cline, PT  Reina Perera, OT  Isa Walter, CTRS  Lisa Phoenix RD, LD  Miguel Angel Mcdowell, RN  Andree Casey, RN  Carson Rivera, Chaplain Jaxon Atkinson, PT Student    Demographics            Age: 74Y            Gender: Female    Admission Date: 1/24/2020 5:58:20 PM  Rehabilitation Diagnosis:  GBS  Past Medical History: PMH  Crohn's s/p recent colostomy  Carpal tunnel  Hyperlipidemia  Cervical stenosis s/p ACDF  Barett's esophagus  Skin cancer  Hiatal hernia  RSD upper limb  Vit B-12 and D def  ?  ?  ?  Past Surgical History:  ProcedureLateralityDate  ?CARPAL TUNNEL NREGTPYKpslr94/2012  ?CERVICAL FUSIONN/A01/25/2012  ?C- 5,6,7 fused, DR. MSIAEL NORTH AT [facility]  ?CHOLECYSTECTOMYN/A04/12/2018  ?LAPAROSCOPIC, DR. VENICE SALDAÑA AT [facility]  ?COLON RESECTIONN/A12/9/2019  ?Procedure: laparoscopic to open right hemicolectomy; ?Surgeon: Dorcas Albrecht MD; ?Location: [facility] OR; ?Service: General  ?COLON RESECTIONN/A12/20/2019  ?Procedure: EXPLORATORY LAPAROTOMY WITH SMALL BOWEL RESECTION; ?Surgeon: Dorcas Albrecht MD; ?Location: [facility]; ?Service: General  ?COLONOSCOPYN/A8/16/2019  ?ANAL SKIN TAGS, ANASTAMOSIS ULCERATED, INDURATED, AND NODULAR, INTERNAL  HEMORRHOIDS, ANASTAMOSIS STRICTURE, DR. YANG BIRD AT [facility]  ?COLONOSCOPYN/A04/20/2010  ?PROCTITIS, RECTAL STENOSIS, ACTIVE CROHNS DISEASE AT [facility], RIGHT COLON,  DR.GERARD AGRAWAL AT [facility]  ?ENDOSCOPYN/A8/16/2019  ?GRADE A ESOPHAGITIS, SLIDING HIATAL HERNIA, EROSIVE GASTRITIS, Z LINE  IRREGULAR, DR. YANG BIRD AT [facility]  ?EXPLORATORY LAPAROTOMYN/A12/15/2019  ?Procedure: LAPAROTOMY EXPLORATORY AND WASHOUT, RESECTION OF ANASTOMOSIS WITH  END ILEOSTOMY; ?Surgeon: Dorcas Albrecht MD; ?Location: [facility] MAIN OR;  ?Service: General  ?EYE  SURGERY??  ?SMALL INTESTINE SURGERYN/A10/01/2001  ?ILEOCOLECTOMY      Plan of Care  Anticipated Discharge Date/Estimated Length of Stay: ELOS: 3 weeks  Anticipated Discharge Destination: Community discharge with assistance  Discharge Plan : Pt lives with her  in a one story home with 3 steps to  enter. Pt's  is retired and can provide 24 hour assist if needed.  Medical Necessity Expected Level Rationale: MIN/MOD with home health therapies  Intensity and Duration: an average of 3 hours/5 days per week  Medical Supervision and 24 Hour Rehab Nursing: x  Physical Therapy: x  PT Intensity/Duration: 90 minutes/day, 5 days/week for approximately 15 - 20  days  Occupational Therapy: x  OT Intensity/Duration: 90 minutes/day, 5 days/week for approximately 15 - 20  days  Social Work: x  Therapeutic Recreation: x  Updated (if changes indicated)    Anticipated Discharge Date/Estimated Length of Stay:   ELOS: 2 weeks    Based on the patient's medical and functional status, their prognosis and  expected level of functional improvement is: MIN/MOD with home health therapies      Interdisciplinary Problem/Goals/Status    All Rehab Problems:  Body Systems    [RN] Integumentary(Active)  Current Status(02/17/2020): Abdominal incision healed.ileostomy bag in place.  both changed per WOCN  Weekly Goal(02/24/2020): No S&S infection noted. Skin is intact.  Discharge Goal: No S&S infection noted Skin is intact.        Mobility    [OT] Toilet Transfers(Active)  Current Status(02/17/2020): MIN/MOD  Weekly Goal(02/24/2020): MIN/CGA  Discharge Goal: MIN/CGA    [OT] Tub/Shower Transfers(Active)  Current Status(02/17/2020): MOD  Weekly Goal(02/24/2020): MIN  Discharge Goal: MIN    [PT] Bed Mobility(Active)  Current Status(02/17/2020): sup  Weekly Goal(02/24/2020): sup  Discharge Goal: SUP    [PT] Bed/Chair/Wheelchair(Active)  Current Status(02/17/2020): min  Weekly Goal(02/10/2020): CGA  Discharge Goal: CGA    [PT]  Walk(Active)  Current Status(02/17/2020): 120', mod/min x 2, heavy rollator, aircasts BLE  Weekly Goal(02/24/2020): PT  Discharge Goal: 120, min, rwx        Psychosocial    [RN] Coping/Adjustment(Active)  Current Status(02/18/2020): Supportive family,  very helpful and assists  patient with care.  Weekly Goal(02/24/2020): Identify progress in functional status  Discharge Goal: Demonstrate healthy coping strategies        Safety    [RN] Potential for Injury(Active)  Current Status(02/18/2020): No unsafe behaviors. Weakness of lower extremities  Weekly Goal(02/24/2020): Use call light 100%  Discharge Goal: Pt/family aware of risk of fall and safety in the home setting        Self Care    [OT] Bathing(Active)  Current Status(02/17/2020): MIN UB, MOD LB  Weekly Goal(02/24/2020): MIN UB, MOD/MIN LB  Discharge Goal: MIN    [OT] Dressing (Lower)(Active)  Current Status(02/17/2020): DEP/MAX  Weekly Goal(02/24/2020): Max  Discharge Goal: MOD    [OT] Dressing (Upper)(Active)  Current Status(02/10/2020): MIN  Weekly Goal(02/24/2020): Set up  Discharge Goal: SBA    [OT] Grooming(Active)  Current Status(02/17/2020): MIN  Weekly Goal(02/24/2020): SBA  Discharge Goal: SBA    [OT] Toileting(Active)  Current Status(02/17/2020): MAX  Weekly Goal(02/24/2020): MOD  Discharge Goal: MOD/MIN    [OT] Eating(Active)  Current Status(02/17/2020): set up  Weekly Goal(02/24/2020): setup  Discharge Goal: set up        Sphincter Control    [RN] Bladder Management(Active)  Current Status(02/18/2020): pt has been continent, using bedpan.She is reluctant  to use BSC. does wear brief.  Weekly Goal(02/24/2020): Continent bladder 100%  Discharge Goal: Continent bladder 100%    [RN] Bowel Management(Active)  Current Status(02/18/2020): Has ileostomy since 12/20/19.  aware of care  for ileostomy.  Weekly Goal(02/24/2020): maintain education of ileostomy with pt and family  Discharge Goal: Independent with ileostomy care        Comments:  1/28: chronic anemia, heme + stool, GI aware; anticipate assessing amb  today;    2/4: flat affect; ileostomy bag staying in place, may change schedule to Mon and  Thurs; NSG to reinforce use of bedside commode/toilet vs bedpan;    2/11: function and mood improved after IV Ig admin;    2/18: still with LOB at times, needs 2 people to correct; NSG to again reinforce  not using bedpan; anticipate family conf this week, likely to need day pass and  ramp at home;    Signed by: Miguel Angel Mcdowell, TIANA    Physician CoSigned By: Adolfo Valle 02/18/2020 09:01:53

## 2020-02-18 NOTE — THERAPY TREATMENT NOTE
Inpatient Rehabilitation - Occupational Therapy Treatment Note    [facility]     Patient Name: She López  : 1945  MRN: [id number]    Today's Date: 2020  Onset of Illness/Injury or Date of Surgery: 20              Admit Date: 2020      Visit Dx:    ICD-10-CM ICD-9-CM   1. General weakness R53.1 780.79       Patient Active Problem List   Diagnosis   • Crohn's disease of small intestine with other complication (CMS/HCC)   • Type 2 diabetes mellitus without complication (CMS/HCC)   • RSD upper limb   • Neuropathic pain of hand   • Hyperlipidemia   • Cervical myelopathy (CMS/HCC)   • Central pain syndrome   • Carpal tunnel syndrome   • Vitamin B 12 deficiency   • Guillain Barré syndrome (CMS/HCC)         Therapy Treatment    IRF Treatment Summary     Row Name 20 1153 20 0800          Evaluation/Treatment Time and Intent    Subjective Information  no complaints  -AF  no complaints  -LH,NM,LH2     Existing Precautions/Restrictions  fall  -AF  fall  -LH,NM,LH2     Document Type  therapy note (daily note)  -AF  therapy note (daily note)  -LH,NM,LH2     Mode of Treatment  occupational therapy  -AF  physical therapy  -LH,NM,LH2     Patient/Family Observations  sitting up in w/c in room,  present   -AF   brought pt to the gym in the w/c   -LH,NM,LH2     Recorded by [AF] Reina Perera, OTR [LH,NM,LH2] Chelsie Cline, PT (r) Jaxon Atkinson, PT Student (t) Chelsie Cline, PT (c)     Row Name 20 0800             Coping/Community Reintegration    Additional Documentation  Caregiver Training (Group)  (Pended)   -NM      Recorded by [NM] Jaxon Atkinson, PT Student      Row Name 20 0800             Caregiver Training    Caregiver(s) to be Trained  spouse/significant  (Pended)   -NM      Comment, Caregiver Training Plan  Pt's  trained to assist pt c transfers between bed<>wc, demonstrating anf stating understanding and safe handling.   (Pended)   Training also occured for bed mobs/sitting EOB   -NM      Recorded by [NM] Jaxon Atkinson, PT Student      Row Name 02/18/20 1153 02/18/20 0800          Cognition/Psychosocial- PT/OT    Affect/Mental Status (Cognitive)  WFL  -AF  WFL  -LH,NM,LH2     Orientation Status (Cognition)  oriented x 4  -AF  oriented x 4  -LH,NM,LH2     Follows Commands (Cognition)  WFL  -AF  WFL  -LH,NM,LH2     Personal Safety Interventions  fall prevention program maintained;gait belt;nonskid shoes/slippers when out of bed  -AF  fall prevention program maintained;gait belt;nonskid shoes/slippers when out of bed;supervised activity  -LH,NM,LH2     Safety Deficit (Cognitive)  insight into deficits/self awareness  -AF  --     Recorded by [AF] Reina Perera, NILAMR [LH,NM,LH2] Chelsie Cline, PT (r) Jaxon Atkinson, PT Student (t) Chelsie Cline, PT (c)     Row Name 02/18/20 1153 02/18/20 0800          Bed Mobility Assessment/Treatment    Supine-Sit [place] (Bed Mobility)  --  supervision  (Pended)   -NM     Sit-Supine [place] (Bed Mobility)  supervision  -AF  supervision  (Pended)   -NM     Assistive Device (Bed Mobility)  --  --  (Pended)   -NM     Comment (Bed Mobility)  --   safely assists pt c bed mobility.   (Pended)   -NM     Recorded by [AF] Reina Perera, NILAMR [NM] Jaxon Atkinson, PT Student     Row Name 02/18/20 0800             Transfer Assessment/Treatment    Transfer Assessment/Treatment  car transfer  (Pended)   -NM      Comment (Transfers)  VC for foot placement during transfers. VC to stay inside the wx an dmin guidance of the wx when pivoting to sit in wc/standard chairs   -LH,NM2,LH2      Recorded by [LH,NM2,LH2] Chelsie Cline, PT (r) Jaxon Atkinson, PT Student (t) Chelsie Cline, PT (c)  [NM] Jaxon Atkinson, PT Student      Row Name 02/18/20 0800             Bed-Chair Transfer    Bed-Chair [place] (Transfers)  minimum assist (75% patient effort);verbal cues  (Pended)   -NM      Assistive  Device (Bed-Chair Transfers)  wheelchair  (Pended)   -NM      Recorded by [NM] Jaxon Atkinson, PT Student      Row Name 02/18/20 1153 02/18/20 0800          Chair-Bed Transfer    Chair-Bed [place] (Transfers)  minimum assist (75% patient effort) sit pivot   -AF  minimum assist (75% patient effort);verbal cues;nonverbal cues (demo/gesture)  (Pended)   -NM     Assistive Device (Chair-Bed Transfers)  wheelchair  -AF  wheelchair  (Pended)   -NM     Recorded by [AF] Reina Perera OTR [NM] Jaxon Atkinson, PT Student     Row Name 02/18/20 0800             Sit-Stand Transfer    Sit-Stand [place] (Transfers)  minimum assist (75% patient effort);moderate assist (50% patient effort);verbal cues;nonverbal cues (demo/gesture)  -LH,NM,LH2      Assistive Device (Sit-Stand Transfers)  wheelchair;walker, 4-wheeled heavy rollator   -LH,NM,LH2      Recorded by [LH,NM,LH2] Chelsie Cline, PT (r) Jaxon Atkinson, PT Student (t) Chelsie Cline, PT (c)      Row Name 02/18/20 0800             Stand-Sit Transfer    Stand-Sit [place] (Transfers)  minimum assist (75% patient effort);verbal cues;nonverbal cues (demo/gesture)  -LH,NM,LH2      Assistive Device (Stand-Sit Transfers)  wheelchair;walker, 4-wheeled heavy rollator   -LH,NM,LH2      Recorded by [LH,NM,LH2] Chelsie Cline, PT (r) Jaxon Atkinson, PT Student (t) Chelsie Cline, PT (c)      Row Name 02/18/20 0800             Stand Pivot/Stand Step Transfer    Stand Pivot/Stand Step [place]  minimum assist (75% patient effort);nonverbal cues (demo/gesture);verbal cues  -LH,NM,LH2      Assistive Device (Stand Pivot Stand Step Transfer)  wheelchair;walker, 4-wheeled heavy rollator: cues/guidance of the wx  -LH,NM,LH2      Recorded by [LH,NM,LH2] Chelsie Cline, PT (r) Jaxon Atkinson, PT Student (t) Chelsie Cline, PT (c)      Row Name 02/18/20 0800             Car Transfer    Type (Car Transfer)  stand pivot/stand step  (Pended)   -NM      [place] Level (Car  Transfer)  minimum assist (75% patient effort);verbal cues;nonverbal cues (demo/gesture)  (Pended)   -NM      Assistive Device (Car Transfer)  wheelchair  (Pended)   -NM      Recorded by [NM] Jaxon Atkinson, PT Student      Row Name 02/18/20 0800             Gait/Stairs Assessment/Training    [place] Level (Gait)  minimum assist (75% patient effort);contact guard;2 person assist;verbal cues;nonverbal cues (demo/gesture) L foot bumped R once requiring Doreen x 2 to correct   -LH,NM,LH2      Assistive Device (Gait)  walker, 4-wheeled heavy rollator   -LH,NM,LH2      Distance in Feet (Gait)  180, 110, 30x2  -LH,NM,LH2      Pattern (Gait)  step-through  -LH,NM,LH2      Deviations/Abnormal Patterns (Gait)  bilateral deviations;base of support, wide;ataxic  -LH,NM,LH2      Bilateral Gait Deviations  weight shift ability decreased;heel strike decreased;forward flexed posture  -LH,NM,LH2      Left Sided Gait Deviations  -- L foot occasionally crosses midline   -LH,NM,LH2      Recorded by [LH,NM,LH2] Chelsie Cline, PT (r) Jaxon Atkinson, PT Student (t) Chelsie Cline, PT (c)      Row Name 02/18/20 115             Bathing Assessment/Treatment    Bathing [place] Level  upper body;contact guard assist;set up  -AF      Bathing Position  sink side;supported sitting  -AF      Comment (Bathing)  deferred LB bathing since showered yesterday  -AF      Recorded by [AF] Reina Perera OTR      Row Name 02/18/20 1153             Upper Body Dressing Assessment/Treatment    Upper Body Dressing Task  upper body dressing skills;pull over garment;set up assistance;front opening garment;minimum assist (75% or more patient effort)  -AF      Upper Body Dressing Position  supported sitting  -AF      Comment (Upper Body Dressing)  vc's   -AF      Recorded by [AF] Reina Perera OTR      Row Name 02/18/20 1153             Lower Body Dressing Assessment/Treatment    Comment (Lower Body Dressing)  deferred stated she had clean  pants on  -AF      Recorded by [AF] Reina Perera OTR      Row Name 02/18/20 1153             Grooming Assessment/Treatment    Grooming [place] Level  grooming skills;supervision;set up;verbal cues  -AF      Assistive Device (Grooming)  built-up handle items  -AF      Grooming Position  sink side;supported sitting  -AF      Comment (Grooming)  with increased time able to demo increased motor control and complete grooming tasks with just supervision  -AF      Recorded by [AF] Reina Perera OTR      Row Name 02/18/20 1153             Hand  Strength Testing    Right Hand, Setting 1 (Dynamometer Testing)  10  -AF      Left Hand, Setting 1 (Dynamometer Testing)  27  -AF      Recorded by [AF] Reina Perera OTR      Row Name 02/18/20 1153             Fine Motor Testing & Training    Results, Box N Block Test-Right  40  -AF      Results, Box N Block Test-Left  25  -AF      Recorded by [AF] Reina Perera OTR      Row Name 02/18/20 1153 02/18/20 0800          Pain Scale: Numbers Pre/Post-Treatment    Pain Scale: Numbers, Pretreatment  0/10 - no pain  -AF  0/10 - no pain  -LH,NM,LH2     Pain Scale: Numbers, Post-Treatment  0/10 - no pain  -AF  0/10 - no pain  -LH,NM,LH2     Recorded by [AF] Reina Perera OTR [LH,NM,LH2] Chelsie Cline, PT (r) Jaxon Atkinson, PT Student (t) Chelsie Cline, PT (c)     Row Name 02/18/20 0800             Standing Balance Activity    Comment (Standing, Balance Training)  Inside the // bars, BUE support and CGA, pt performed mini squats (2x5), step-ups (7x and 10x) leading c the R when ascending. Performed static standing w/out UE support, c CGA x 30-45 seconds (3x) c vc/tactile cues to return to midline.   (Pended)   -NM      Recorded by [NM] Jaxon Atkinson, PT Student      Row Name 02/18/20 1153             Upper Extremity Seated Therapeutic Exercise    Performed, Seated Upper Extremity (Therapeutic Exercise)  shoulder horizontal abduction/adduction;scapular  protraction/retraction;elbow flexion/extension;forearm supination/pronation  -AF      Device, Seated Upper Extremity (Therapeutic Exercise)  -- #3 hand weight   -AF      Exercise Type, Seated Upper Extremity (Therapeutic Exercise)  AROM (active range of motion);resistive exercise  -AF      Expected Outcomes, Seated Upper Extremity (Therapeutic Exercise)  improve functional tolerance, self-care activity;improve performance, BADLs;improve performance, fine motor coordination skills;improve performance, transfer skills  -AF      Restrictions, Seated Upper Extremity (Therapeutic Exercise)  increased weight from #2 to #3  -AF      Sets/Reps Detail, Seated Upper Extremity (Therapeutic Exercise)  2/20  -AF      Transfers Skills, Training to Functional Activity, Seated Upper Extremity (Therapeutic Exercise)  beginning to transfer skills to functional activity  -AF      Comment, Seated Upper Extremity (Therapeutic Exercise)  rest breaks   -AF      Recorded by [AF] Reina Perera OTR      Row Name 02/18/20 0800             Lower Extremity Seated Therapeutic Exercise    Performed, Seated Lower Extremity (Therapeutic Exercise)  hip flexion/extension;hip abduction/adduction;knee flexion/extension;ankle dorsiflexion/plantarflexion;LAQ (long arc quad), knee extension red thera band   -LH,NM,LH2      Exercise Type, Seated Lower Extremity (Therapeutic Exercise)  AROM (active range of motion)  -LH,NM,LH2      Sets/Reps Detail, Seated Lower Extremity (Therapeutic Exercise)  1/20  -LH,NM,LH2      Recorded by [LH,NM,LH2] Chelsie Cline, PT (r) Jaxon Atkinson PT Student (t) Chelsie Cline, PT (c)      Row Name 02/18/20 1153             Neuromuscular Re-education    Comment (Neuromuscular Re-education)  attempted multiple FMC tasks that pt was not able to competle and increased her frustration attempted to sting medium and large beads unable to, then attempted to place chinese checkers into peg board unable to complete. pt did  participate in theraputty exs to increase her strength and manipulation skills with ADL tasks   -AF      Recorded by [AF] Reina Perera, NILAMR      Row Name 02/18/20 1153 02/18/20 0800          Positioning and Restraints    Pre-Treatment Position  sitting in chair/recliner  -AF  in bed CNA got pt up an dhusband brought pt to the gym   -LH,NM,LH2     Post Treatment Position  bed  -AF  wheelchair  -LH,NM,LH2     In Bed  supine;call light within reach;encouraged to call for assist;exit alarm on;with family/caregiver  present   -AF  call light within reach;exit alarm on;with family/caregiver  (Pended)  before lunch   -NM2     In Wheelchair  --  exit alarm on;with family/caregiver with    -LH,NM,LH2     Recorded by [AF] Reina Perera, OTR [LH,NM,LH2] Chelsie Cline, PT (r) Jaxon Atkinson, PT Student (t) Chelsie Cline, PT (c)  [NM2] Jaxon Atkinson, PT Student       User Key  (r) = Recorded By, (t) = Taken By, (c) = Cosigned By    Initials Name Effective Dates     Chelsie Cline, PT 04/03/18 -     AF Reina Perera, OTR 04/03/18 -     NM Jaxon Atkinson, PT Student 01/03/20 -           Wound 12/09/19 1510 abdomen Incision (Active)   Dressing Appearance dry;intact 2/18/2020  7:30 AM   Closure SUJEY 2/18/2020  7:30 AM   Base moist;pink 2/17/2020  4:15 PM   Periwound intact 2/17/2020  4:15 PM   Periwound Temperature warm 2/17/2020  4:15 PM   Periwound Skin Turgor soft 2/17/2020  4:15 PM   Edges open 2/17/2020  4:15 PM   Drainage Characteristics/Odor tan 2/17/2020  4:15 PM   Drainage Amount none 2/18/2020  7:30 AM   Care, Wound cleansed with;sterile normal saline 2/17/2020  4:15 PM   Dressing Care, Wound gauze 2/18/2020  7:30 AM   Periwound Care, Wound dry periwound area maintained 2/18/2020  7:30 AM         OT Recommendation and Plan                 OT IRF GOALS     Row Name 02/12/20 1514             Transfer Goal 1 (OT-IRF)    Activity/Assistive Device (Transfer Goal 1, OT-IRF)  toilet;shower  chair;walk-in shower  -AF      [place] Level (Transfer Goal 1, OT-IRF)  minimum assist (75% or more patient effort);verbal cues required  -AF      Time Frame (Transfer Goal 1, OT-IRF)  short term goal (STG)  -AF      Progress/Outcomes (Transfer Goal 1, OT-IRF)  goal ongoing  -AF         Transfer Goal 2 (OT-IRF)    Activity/Assistive Device (Transfer Goal 2, OT-IRF)  toilet;shower chair;walk-in shower  -AF      [place] Level (Transfer Goal 2, OT-IRF)  verbal cues required;contact guard assist  -AF      Time Frame (Transfer Goal 2, OT-IRF)  long term goal (LTG)  -AF      Progress/Outcomes (Transfer Goal 2, OT-IRF)  goal ongoing  -AF         Bathing Goal 1 (OT-IRF)    Activity/Device (Bathing Goal 1, OT-IRF)  bathing skills, all;grab bar/tub rail;hand-held shower spray hose;shower chair  -AF      [place] Level (Bathing Goal 1, OT-IRF)  verbal cues required;moderate assist (50-74% patient effort)  -AF      Time Frame (Bathing Goal 1, OT-IRF)  short term goal (STG)  -AF      Progress/Outcomes (Bathing Goal 1, OT-IRF)  goal ongoing  -AF         Bathing Goal 2 (OT-IRF)    Activity/Device (Bathing Goal 2, OT-IRF)  bathing skills, all;grab bar/tub rail;hand-held shower spray hose;shower chair  -AF      [place] Level (Bathing Goal 2, OT-IRF)  verbal cues required;contact guard assist  -AF      Time Frame (Bathing Goal 2, OT-IRF)  long term goal (LTG)  -AF      Progress/Outcomes (Bathing Goal 2, OT-IRF)  goal ongoing  -AF         UB Dressing Goal 1 (OT-IRF)    Activity/Device (UB Dressing Goal 1, OT-IRF)  upper body dressing  -AF      [place] (UB Dress Goal 1, OT-IRF)  set-up required;verbal cues required  -AF      Time Frame (UB Dressing Goal 1, OT-IRF)  long term goal (LTG)  -AF      Progress/Outcomes (UB Dressing Goal 1, OT-IRF)  goal ongoing  -AF         LB Dressing Goal 1 (OT-IRF)    Activity/Device (LB Dressing Goal 1, OT-IRF)  lower body dressing  -AF      [place] (LB Dressing Goal 1,  OT-IRF)  verbal cues required;moderate assist (50-74% patient effort)  -AF      Time Frame (LB Dressing Goal 1, OT-IRF)  short term goal (STG)  -AF      Progress/Outcomes (LB Dressing Goal 1, OT-IRF)  goal ongoing  -AF         LB Dressing Goal 2 (OT-IRF)    Activity/Device (LB Dressing Goal 2, OT-IRF)  lower body dressing  -AF      [place] (LB Dressing Goal 2, OT-IRF)  verbal cues required;contact guard assist  -AF      Time Frame (LB Dressing Goal 2, OT-IRF)  long term goal (LTG)  -AF      Progress/Outcomes (LB Dressing Goal 2, OT-IRF)  goal ongoing  -AF         Grooming Goal 1 (OT-IRF)    Activity/Device (Grooming Goal 1, OT-IRF)  grooming skills, all  -AF      [place] (Grooming Goal 1, OT-IRF)  set-up required  -AF      Time Frame (Grooming Goal 1, OT-IRF)  short term goal (STG)  -AF      Progress/Outcomes (Grooming Goal 1, OT-IRF)  goal ongoing  -AF         Grooming Goal 2 (OT-IRF)    Activity/Device (Grooming Goal 2, OT-IRF)  grooming skills, all  -AF      [place] (Grooming Goal 2, OT-IRF)  conditional independence  -AF      Time Frame (Grooming Goal 2, OT-IRF)  long term goal (LTG)  -AF      Progress/Outcomes (Grooming Goal 2, OT-IRF)  goal ongoing  -AF         Toileting Goal 1 (OT-IRF)    Activity/Device (Toileting Goal 1, OT-IRF)  toileting skills, all;grab bar/safety frame;raised toilet seat  -AF      [place] Level (Toileting Goal 1, OT-IRF)  maximum assist (25-49% patient effort);moderate assist (50-74% patient effort)  -AF      Time Frame (Toileting Goal 1, OT-IRF)  short term goal (STG)  -AF      Progress/Outcomes (Toileting Goal 1, OT-IRF)  goal ongoing  -AF         Toileting Goal 2 (OT-IRF)    Activity/Device (Toileting Goal 2, OT-IRF)  toileting skills, all;grab bar/safety frame;raised toilet seat  -AF      [place] Level (Toileting Goal 2, OT-IRF)  minimum assist (75% or more patient effort);verbal cues required  -AF      Time Frame (Toileting Goal 2, OT-IRF)  long term goal  (LTG)  -AF      Progress/Outcomes (Toileting Goal 2, OT-IRF)  goal ongoing  -AF         Balance Goal 1 (OT)    Activity/Assistive Device (Balance Goal 1, OT)  standing, static  -AF      [place] Level/Cues Needed (Balance Goal 1, OT)  moderate assist (50-74% patient effort)  -AF      Time Frame (Balance Goal 1, OT)  short term goal (STG)  -AF      Progress/Outcomes (Balance Goal 1, OT)  goal ongoing  -AF         Balance Goal 2 (OT)    Activity/Assistive Device (Balance Goal 2, OT)  standing, static  -AF      [place] Level (Balance Goal 2, OT)  minimum assist (75% or more patient effort);verbal cues required  -AF      Time Frame (Balance Goal 2, OT)  long term goal (LTG)  -AF      Progress/Outcome (Balance Goal 2, OT)  goal ongoing  -AF         Caregiver Training Goal 1 (OT-IRF)    Caregiver Training Goal 1 (OT-IRF)  pt and  will demo safe techniques with ADLs, transfers, HEP and AE prior to d/c home with home health services   -AF      Time Frame (Caregiver Training Goal 1, OT-IRF)  long term goal (LTG)  -AF      Progress/Outcomes (Caregiver Training Goal 1, OT-IRF)  goal ongoing  -AF        User Key  (r) = Recorded By, (t) = Taken By, (c) = Cosigned By    Initials Name Provider Type    Reina Noe OTR Occupational Therapist                     Time Calculation:     Time Calculation- OT     Row Name 02/18/20 1205             Time Calculation- OT    OT Start Time  0930  -AF      OT Stop Time  1030  -AF      OT Time Calculation (min)  60 min  -AF        User Key  (r) = Recorded By, (t) = Taken By, (c) = Cosigned By    Initials Name Provider Type    Reina Noe OTR Occupational Therapist          Therapy Charges for Today     Code Description Service Date Service Provider Modifiers Qty    30713258960 HC OT SELF CARE/MGMT/TRAIN EA 15 MIN 2/17/2020 Reina Perera OTR GO 3    82088983701 HC OT THERAPEUTIC ACT EA 15 MIN 2/17/2020 Reina Perera OTR GO 1    76398039131 HC OT THER  PROC EA 15 MIN 2/17/2020 Reina Perera, OTR GO 1    67486664847 HC OT NEUROMUSC RE EDUCATION EA 15 MIN 2/17/2020 Reina Perera, OTR GO 1    10684795174 HC OT SELF CARE/MGMT/TRAIN EA 15 MIN 2/18/2020 Reina Perera, OTR GO 2    80017477539 HC OT THER PROC EA 15 MIN 2/18/2020 Reina Perera, OTR GO 1    73789903496 HC OT NEUROMUSC RE EDUCATION EA 15 MIN 2/18/2020 Reina Perera, OTR GO 1                   Reina Perera OTMENDEZ  2/18/2020

## 2020-02-18 NOTE — PROGRESS NOTES
LOS: 25 days   Patient Care Team:  Armando Valentine MD as PCP - General (Internal Medicine)  Lisa Arriaza MD as Consulting Physician (Obstetrics and Gynecology)    Chief Complaint: GBS    Subjective     History of Present Illness        Continues stronger.   Tolerates therapies.       .  History taken from: patient chart    Objective     Vital Signs  Temp:  [97.7 °F (36.5 °C)-99.9 °F (37.7 °C)] 97.7 °F (36.5 °C)  Heart Rate:  [78-94] 78  Resp:  [16-18] 16  BP: ()/(52-61) 96/53    Physical Exam:  General: Awake, alert, NAD  HEENT: normocepahlic, atraumatic   Heart: RRR, no m/r/g  Lungs: CTAB, no wheezing, rales, or rhonchi  Abdomen: soft, non-tender, non-distended, colostomy , incision dressed  Strength:   BUE: 4+/5 with bilateral elbow flexion, elbow extension, wrist extension, and finger flexion, right greater than left weak hand intrinsics R 3-/5, L 3+/5  Difficulty opposing right thumb to digits 2-3-4-5 but better.   Opposes left thumb to digits 2-3-4-5.   BLE:  HF right 4/5, left 4/5,  knee extension right 4+/5, left  4+/5, ankle dorsiflexion B 4/5  Extremities: No edema distally    Results Review:     I reviewed the patient's new clinical results.  Results from last 7 days   Lab Units 02/17/20  0550 02/14/20  0609 02/12/20  0610   WBC 10*3/mm3 4.87 7.17 6.53   HEMOGLOBIN g/dL 7.2* 8.1* 7.3*   HEMATOCRIT % 22.1* 25.7* 22.7*   PLATELETS 10*3/mm3 249 351 440     Results from last 7 days   Lab Units 02/14/20  0609   SODIUM mmol/L 137   POTASSIUM mmol/L 3.6   CHLORIDE mmol/L 101   CO2 mmol/L 25.5   BUN mg/dL 17   CREATININE mg/dL 0.97   CALCIUM mg/dL 7.9*   GLUCOSE mg/dL 86       Medication Review:   Scheduled Meds:    Iron 18 mg Sublingual BID   gabapentin 300 mg Oral 4x Daily   loperamide 2 mg Oral TID AC   MULTIVITAMIN ADULT 1 tablet Sublingual Daily   pantoprazole 40 mg Oral Q AM   sodium bicarbonate 1,300 mg Oral TID   Cyanocobalamin 2,500 mcg Sublingual Weekly   Vitamin D-3 5,000 Units  Sublingual Daily     Continuous Infusions:   PRN Meds:.•  acetaminophen  •  aluminum sulfate-calcium acetate  •  gabapentin **AND** gabapentin  •  miconazole  •  ondansetron ODT    Assessment/Plan   73 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     GBS  -Completed her plasmapharesis and has gotten good return.   - Will begin PT/OT for ADLS, strength, mobility, txs, gait.   -January 27: On gabapentin 300mg QID. Will continue to monitor and will titrate up as needed.  -Jan 29 -  Feels strength is somewhat better.  Worked on gait with rolling walker yesterday 15 feet. Today utilized ankle weights to decrease ataxic movements with gait. Transfers mod max assist. Bed mobility CTG.  - Feb 6 - Patient complains of increased numbness in her knees.  She is noted to have increased weakness with her hip extensors and possibly in the left quadricep.  Her ambulation distance is less and takes more effort with a shorter stride.  On examination she also appears weaker in her hands and ankles.  Discussed having neurology see her to assess for possibly IVIG or another round of plasmapheresis.  -Feb 7 - increased weakness noted in BUE and BLE and more difficulty with mobility - Neurology starting patient on course of IVIG   -February 10-shows good response on IVIG-tolerating.  Notes improvement in her strength in the upper extremities and lower extremities as well as performance with mobility and self-care.  -February 11-continues to show improvement on IVIG  Feb 12-functional improvement after IVIG with improvement in her handgrip, feeding, transfers, and ambulation as well as improvement in her strength.  February 14-strength noticeably improved in the bilateral upper extremities and bilateral lower extremities.  Ambulating further.    Crohn's  -s/p recent colostomy- wound nurse to see and education for home management.   -If more than 1 liter output a day, needs Immodium-per Dr. Albrecht colorectal surgeon.      Hyponatremia  -On salt tabs and fluid restriction. Renal following  -January 27: Na 130. Continue salt tabs and fluid restriction per renal. Will continue to monitor  -January 30: Na 133. Continue sodium bicarbonate and fluid restriction per renal.  -Feb 3- Na improved to 138  -February 4-fluid restriction discontinued.  -February 5-sodium 137  -February 10-sodium 140.  Sodium chloride tablets were discontinued on February 8.  Continues on sodium bicarb 1300 mg 3 times a day.    Anemia  -January 27: Hgb/Hct 7.6/24.3- stable. No signs of active bleeding. Will order fecal occult and reticulocyte count. Will continue to monitor.  -Jan 28 - hemoccult positive  -Jan 29 - Stool heme +.  HGB stable at 7.5. She had hypotension and tachycardia yesterday 87/64 and 110) , better today (99/63 and 88).  Reviewed option of transfusion PRBCs. She wished to hold on transfusion unless absolutely necessary. Discussed if HGB < 7, would recommend definitely transfuse.  -January 30: Repeat CBC scheduled for tomorrow. Will continue to monitor.    -January 31: Hgb 7.2. Will transfuse 1 unit of PRBCs today. Ordered anemia studies. Spoke with Dr. Albrecht and if iron supplementation is required she recommends IV iron for better absorption.  -February 1: Hemoglobin improved 8.8.  IV iron infusion ordered by nephrology  -Feb 3 - HGB improved 9.5. Not tolerate IV iron infusion due to burning in vein, does not wish to have placed a more proximal IV. She had tow infusions. She will get EZ Melt Iron from outside to take by mouth; on discussion with colorectal surgery last week she should be able to absorb PO iron.  February 5-hemoglobin 8.5  February 10-hemoglobin 8.4  February 12-hemoglobin 7.3-As she has the midline in, will plan to resume the last 3 of the iron infusions that she did not get previously with the peripheral IV.  Will start tomorrow scheduled every other day for total of 3 doses.  Recheck hemoglobin on Friday.  February  14-hemoglobin 8.1.  She had a temperature last evening possibly related to the iron infusion.  She tolerated the infusion and would like to have it on consecutive days ( rather than every other day as was ordered to give her a break).    DVT prophylaxis  -SCDS for now.   -January 27: Heparin has been on hold due to HGB trending down. Following results of fecal occult and reticulocyte count will consider restarting Heparin for DVT prophylaxis.   -January 28 - hemoccult positive.     Vitamin D deficiency-vitamin D 22.4  on January 29. Feb 1 - Patient does not wish to take vitamin D p.o. through the hospital supply.  Wishes to have her vitamin D brought in from home.    Vitamin B12 replacement-sublingual from home    RUE antecubital fossa phlebitis/cellulitis-February 3-no sign of infection. K-PAD.   February 4-Right antecubital fossa phlebitis with cord palpable/tender with surrounding erythema consistent with cellulitis. Allergy to Keflex - throat swelled. Will start Doxycycline 100 mg bid x 7 days, continue K-pad.   February 5-She continues to have pain and redness and swelling of the right antecubital fossa and distal upper arm.  Reviewed continue with doxycycline which was started yesterday but if there is no show improvement will look to adjust antibiotic tomorrow.  White blood cell count is 7.22 with normal differential..  Feb 7 - area improved today on doxycycline.  February 10-further improvement.    TEAM CONF - JAN 28 - FLAT AFFECT. SUPINE SIT MIN ASSIST. TRANSFERS MAX 2. NONAMBULATORY. UBB MIN. LBB MAX. UBD MOD.  LBD DEP. ABD WOUND CARE. STOOL HEMOCCULT POSITIVE.  ELOS - 5 WEEKS.     TEAM CONF - FEB 4 - BED SBA. TRANSFERS MIN-MOD. GAIT 30 FEET MIN 2 FIDEL WALKER. UBD MOD. LBD DEP. ON 1200 CC FLUID RESTRICTION. EATING OKAY.  ABD WOUND CLOSING. OSTOMY DEVICE STAYING ON.  CONTINENT. NEEDS TO GET UP TO BSC.   ELOS- 4 WEEKS.     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  Improving.  Most recently in physical therapy and Occupational Therapy-toilet transfers moderate assist, shower transfer to be assessed, bathing minimum assist upper body and maximum assist lower body, dressing moderate assist upper body independent lower body.  Grooming minimum assist.  Toileting dependent.  Eating with minimal assist to set up with built up utensils.  Bed mobility contact-guard.  Ambulated 25 feet moderate assist of 2 with Aircast bilateral ankles.    TEAM CONF - FEB 11 - She feels IVIG has been helpful.  She notes improvement in the strength in her hands and in her legs.  Easier transfers.  Walk better.  She had had a decline in her ability to do to box and blocks but that improved yesterday.  Tolerating IVIG.   describes her performance as 180 degree change from Friday.  In physical therapy and Occupational Therapy-toilet transfers moderate assist, shower transfer to be assessed, bathing minimum assist upper body and maximum assist lower body, dressing moderate assist upper body independent lower body.  Grooming minimum assist.  Toileting dependent.  Eating with minimal assist to set up with built up utensils.  Bed mobility contact-guard.  Ambulated 25 feet moderate assist of 2 with Aircast bilateral ankles.   Continent bladder. Abd wound improving.   ELOS - 3 WEEKS    TEAM CONF - FEB 18 - BED SUP. TRANSFERS MIN. GAIT 120 FEET RW MIN OF 2. TOILET TRANSFER MIN-MOD. TOILETING MAX ASSIST. UBD MIN. LBD MAX-DEP. UBB MIN. .LBB MOD ASSIST. CONTINENT BLADDER. OSTOMY. ABD WOUND IMPROVED. GABAPENTIN FOR PAIN.    ELOS - 2 WEEKS    Adolfo Valle MD  02/18/20

## 2020-02-18 NOTE — THERAPY TREATMENT NOTE
Inpatient Rehabilitation - Physical Therapy Treatment Note  [facility]     Patient Name: She López  : 1945  MRN: [id number]    Today's Date: 2020  Onset of Illness/Injury or Date of Surgery: 20              Admit Date: 2020      Visit Dx:      ICD-10-CM ICD-9-CM   1. General weakness R53.1 780.79       Patient Active Problem List   Diagnosis   • Crohn's disease of small intestine with other complication (CMS/HCC)   • Type 2 diabetes mellitus without complication (CMS/HCC)   • RSD upper limb   • Neuropathic pain of hand   • Hyperlipidemia   • Cervical myelopathy (CMS/HCC)   • Central pain syndrome   • Carpal tunnel syndrome   • Vitamin B 12 deficiency   • Guillain Barré syndrome (CMS/HCC)       Therapy Treatment    IRF Treatment Summary     Row Name 20 1505 20 1153 20 0800       Evaluation/Treatment Time and Intent    Subjective Information  no complaints  -AF  no complaints  -AF  no complaints  -LH,NM,LH2    Existing Precautions/Restrictions  fall  -AF  fall  -AF  fall  -LH,NM,LH2    Document Type  therapy note (daily note)  -AF  therapy note (daily note)  -AF  therapy note (daily note)  -LH,NM,LH2    Mode of Treatment  occupational therapy  -AF  occupational therapy  -AF  physical therapy  -LH,NM,LH2    Patient/Family Observations  sitting up in bed with  present  -AF  sitting up in w/c in room,  present   -AF   brought pt to the gym in the w/c   -LH,NM,LH2    Recorded by [AF] Reina ePrera, OTR [AF] Reina Perera, OTR [LH,NM,LH2] Chelsie Cline, PT (r) Jaxon tAkinson, PT Student (t) Chelsie Cline, PT (c)    Row Name 20 0800             Coping/Community Reintegration    Additional Documentation  Caregiver Training (Group)  -LH,NM,LH2      Recorded by [LH,NM,LH2] Chelsie Cline, PT (r) Jaxon Atkinson, PT Student (t) Chelsie Cline, PT (c)      Row Name 20 0800             Caregiver Training    Caregiver(s) to be Trained   spouse/significant  -LH,NM,LH2      Comment, Caregiver Training Plan  Pt's  trained to assist pt c transfers between bed<>wc, demonstrating anf stating understanding and safe handling.  Training also occured for bed mobs/sitting EOB   -LH,NM,LH2      Recorded by [LH,NM,LH2] Chelsie Cline, PT (r) Jaxon Atkinson, PT Student (t) Chelsie Cline, PT (c)      Row Name 02/18/20 1505 02/18/20 1153 02/18/20 0800       Cognition/Psychosocial- PT/OT    Affect/Mental Status (Cognitive)  WFL  -AF  WFL  -AF  WFL  -LH,NM,LH2    Orientation Status (Cognition)  oriented x 4  -AF  oriented x 4  -AF  oriented x 4  -LH,NM,LH2    Follows Commands (Cognition)  WFL  -AF  WFL  -AF  WFL  -LH,NM,LH2    Personal Safety Interventions  fall prevention program maintained;gait belt;nonskid shoes/slippers when out of bed  -AF  fall prevention program maintained;gait belt;nonskid shoes/slippers when out of bed  -AF  fall prevention program maintained;gait belt;nonskid shoes/slippers when out of bed;supervised activity  -LH,NM,LH2    Safety Deficit (Cognitive)  insight into deficits/self awareness  -AF  insight into deficits/self awareness  -AF  --    Recorded by [AF] Reina Perera, OTR [AF] Reina Perera, OTR [LH,NM,LH2] Chelsie Cline, PT (r) Jaxon Atkinson, PT Student (t) Chelsie Cline, PT (c)    Row Name 02/18/20 1505 02/18/20 1153 02/18/20 0800       Bed Mobility Assessment/Treatment    Rolling Left [place] (Bed Mobility)  --  --  supervision  -LH,NM,LH2    Rolling Right [place] (Bed Mobility)  --  --  supervision  -LH,NM,LH2    Supine-Sit [place] (Bed Mobility)  supervision  -AF  --  supervision  -LH,NM,LH2    Sit-Supine [place] (Bed Mobility)  --  supervision  -AF  supervision;verbal cues  -LH,NM,LH2    Assistive Device (Bed Mobility)  --  --  bed rails  -LH,NM,LH2    Comment (Bed Mobility)  --  --   safely assists pt c bed mobility.   -LH,NM,LH2    Recorded by [AF] Reina Perera, OTR [AF]  Reina Perera, OTR [LH,NM,LH2] Chelsie Cline, PT (r) Jaxon Atkinson, YUSUF Student (t) Chelsie Cline, PT (c)    Row Name 02/18/20 0800             Functional Mobility    Functional Mobility- Ind. Level  minimum assist (75% patient effort);1 person  -LH,NM,LH2      Functional Mobility- Device  rolling walker aircast LLE  -LH,NM,LH2      Functional Mobility-Distance (Feet)  30 x2   -LH,NM,LH2      Functional Mobility- Safety Issues  balance decreased during turns  -LH,NM,LH2      Functional Mobility- Comment  ambulated approx 30 feet between wc and a standard chair, working on turning c a rwx and backing up to chairs. Required jagruti for sit<>stand and Jagruti intermittently throughout. SBA from second person.   -LH,NM,LH2      Recorded by [LH,NM,LH2] Chelsie Cline, PT (r) Jaxon Atkinson, PT Student (t) Chelsie Cline, PT (c)      Row Name 02/18/20 1505 02/18/20 0800          Transfer Assessment/Treatment    Transfer Assessment/Treatment  --  car transfer  -LH,NM,LH2     Comment (Transfers)  sit to stand with bean bag toss and gross grasp reaching task MIN/CGA   -AF  VC for foot placement during transfers. VC to stay inside the wx an dmin guidance of the wx when pivoting to sit in wc/standard chairs   -LH,NM,LH2     Recorded by [AF] Reina Perera, NILAMR [LH,NM,LH2] Chelsie Cline, PT (r) Jaxon Atkinson, YUSUF Student (t) Chelsie Cline, PT (c)     Row Name 02/18/20 1505 02/18/20 0800          Bed-Chair Transfer    Bed-Chair [place] (Transfers)  minimum assist (75% patient effort);verbal cues  -AF  minimum assist (75% patient effort);verbal cues  -LH,NM,LH2     Assistive Device (Bed-Chair Transfers)  wheelchair  -AF  wheelchair  -LH,NM,LH2     Recorded by [AF] Reina Perera, OTR [LH,NM,LH2] Chelsie Cline, PT (r) Jaxon Atkinson, PT Student (t) Chelsie Cline, PT (c)     Row Name 02/18/20 1153 02/18/20 0800          Chair-Bed Transfer    Chair-Bed [place] (Transfers)  minimum assist (75% patient effort) sit  pivot   -AF  minimum assist (75% patient effort);verbal cues;nonverbal cues (demo/gesture)  -LH,NM,LH2     Assistive Device (Chair-Bed Transfers)  wheelchair  -AF  wheelchair  -LH,NM,LH2     Recorded by [AF] Reina Perera, OTR [LH,NM,LH2] Chelsie Cline, PT (r) Jaxon Atkinson, PT Student (t) Chelsie Cline, PT (c)     Row Name 02/18/20 0800             Sit-Stand Transfer    Sit-Stand [place] (Transfers)  minimum assist (75% patient effort);moderate assist (50% patient effort);verbal cues;nonverbal cues (demo/gesture) from wc, bed, and standard chairs c armrests   -LH,NM,LH2      Assistive Device (Sit-Stand Transfers)  wheelchair;walker, 4-wheeled heavy rollator   -LH,NM,LH2      Recorded by [LH,NM,LH2] Chelsie Cline, PT (r) Jaxon Atkinson, PT Student (t) Chelsie Cline, PT (c)      Row Name 02/18/20 0800             Stand-Sit Transfer    Stand-Sit [place] (Transfers)  minimum assist (75% patient effort);verbal cues;nonverbal cues (demo/gesture)  -LH,NM,LH2      Assistive Device (Stand-Sit Transfers)  wheelchair;walker, 4-wheeled heavy rollator   -LH,NM,LH2      Recorded by [LH,NM,LH2] Chelsie Cline, PT (r) Jaxon Atkinson, PT Student (t) Chelsie Cline, PT (c)      Row Name 02/18/20 0800             Stand Pivot/Stand Step Transfer    Stand Pivot/Stand Step [place]  minimum assist (75% patient effort);nonverbal cues (demo/gesture);verbal cues CGA in PM c rwx in pt's room; vc/demo for technique   -LH,NM,LH2      Assistive Device (Stand Pivot Stand Step Transfer)  wheelchair;walker, 4-wheeled heavy rollator: cues/guidance of the wx  -LH,NM,LH2      Recorded by [LH,NM,LH2] Chelsie Cline, PT (r) Jaxon Atkinson, PT Student (t) Chelsie Cline, PT (c)      Row Name 02/18/20 0800             Car Transfer    Type (Car Transfer)  stand pivot/stand step  -LH,NM,LH2      [place] Level (Car Transfer)  minimum assist (75% patient effort);verbal cues;nonverbal cues (demo/gesture)  -LH,NM,LH2      Assistive  Device (Car Transfer)  wheelchair  -LH,NM,LH2      Recorded by [LH,NM,LH2] Chelsie Cline, PT (r) Jaxon Atkinson, PT Student (t) Chelsie Cline, PT (c)      Row Name 02/18/20 0800             Gait/Stairs Assessment/Training    [place] Level (Gait)  minimum assist (75% patient effort);contact guard;2 person assist;verbal cues;nonverbal cues (demo/gesture) L foot bumped R once requiring Doreen x 2 to correct   -LH,NM,LH2      Assistive Device (Gait)  walker, 4-wheeled heavy rollator   -LH,NM,LH2      Distance in Feet (Gait)  180, 110, 30x2  -LH,NM,LH2      Pattern (Gait)  step-through  -LH,NM,LH2      Deviations/Abnormal Patterns (Gait)  bilateral deviations;base of support, wide;ataxic  -LH,NM,LH2      Bilateral Gait Deviations  weight shift ability decreased;heel strike decreased;forward flexed posture  -LH,NM,LH2      Left Sided Gait Deviations  -- L foot occasionally crosses midline   -LH,NM,LH2      Recorded by [LH,NM,LH2] Chelsie Cline, PT (r) Jaxon Atkinson, PT Student (t) Chelsie Cline, PT (c)      Row Name 02/18/20 1153             Bathing Assessment/Treatment    Bathing [place] Level  upper body;contact guard assist;set up  -AF      Bathing Position  sink side;supported sitting  -AF      Comment (Bathing)  deferred LB bathing since showered yesterday  -AF      Recorded by [AF] Reina Perera OTR      Row Name 02/18/20 1153             Upper Body Dressing Assessment/Treatment    Upper Body Dressing Task  upper body dressing skills;pull over garment;set up assistance;front opening garment;minimum assist (75% or more patient effort)  -AF      Upper Body Dressing Position  supported sitting  -AF      Comment (Upper Body Dressing)  vc's   -AF      Recorded by [AF] Reina Perera OTR      Row Name 02/18/20 1153             Lower Body Dressing Assessment/Treatment    Comment (Lower Body Dressing)  deferred stated she had clean pants on  -AF      Recorded by [AF] Reina Perera OTR Row  Name 02/18/20 1153             Grooming Assessment/Treatment    Grooming [place] Level  grooming skills;supervision;set up;verbal cues  -AF      Assistive Device (Grooming)  built-up handle items  -AF      Grooming Position  sink side;supported sitting  -AF      Comment (Grooming)  with increased time able to demo increased motor control and complete grooming tasks with just supervision  -AF      Recorded by [AF] Reina Perera OTR      Row Name 02/18/20 1153             Hand  Strength Testing    Right Hand, Setting 1 (Dynamometer Testing)  10  -AF      Left Hand, Setting 1 (Dynamometer Testing)  27  -AF      Recorded by [AF] Reina Perera OTR      Row Name 02/18/20 1153             Fine Motor Testing & Training    Results, Box N Block Test-Right  40  -AF      Results, Box N Block Test-Left  25  -AF      Recorded by [AF] Reina Perera OTR      Row Name 02/18/20 1505 02/18/20 1153 02/18/20 0800       Pain Scale: Numbers Pre/Post-Treatment    Pain Scale: Numbers, Pretreatment  0/10 - no pain  -AF  0/10 - no pain  -AF  0/10 - no pain  -LH,NM,LH2    Pain Scale: Numbers, Post-Treatment  0/10 - no pain  -AF  0/10 - no pain  -AF  0/10 - no pain  -LH,NM,LH2    Recorded by [AF] Reina Perera OTR [AF] Reina Perera OTR [LH,NM,LH2] Chelsie Cline, PT (r) Jaxon Atkinson PT Student (t) Chelsie Cline, PT (c)    Row Name 02/18/20 1505             Static Standing Balance    Level of [place] (Supported Standing, Static Balance)  minimal assist, 75% patient effort;contact guard assist with bean bag toss   -AF      Time Able to Maintain Position (Supported Standing, Static Balance)  2 to 3 minutes x 4 trials   -AF      Assistive Device Utilized (Supported Standing, Static Balance)  walker, standard  -AF      Recorded by [AF] Reina Perera OTR      Row Name 02/18/20 0800             Standing Balance Activity    Comment (Standing, Balance Training)  Inside the // bars, BUE support and CGA, pt  performed mini squats (2x5), step-ups (7x and 10x) leading c the R when ascending. Performed static standing w/out UE support, c CGA x 30-45 seconds (3x) c vc/tactile cues to return to midline.   -LH,NM,LH2      Recorded by [LH,NM,LH2] Chelsie Cline, PT (r) Jaxon Atkinson PT Student (t) Chelsie Cline, PT (c)      Row Name 02/18/20 1153             Upper Extremity Seated Therapeutic Exercise    Performed, Seated Upper Extremity (Therapeutic Exercise)  shoulder horizontal abduction/adduction;scapular protraction/retraction;elbow flexion/extension;forearm supination/pronation  -AF      Device, Seated Upper Extremity (Therapeutic Exercise)  -- #3 hand weight   -AF      Exercise Type, Seated Upper Extremity (Therapeutic Exercise)  AROM (active range of motion);resistive exercise  -AF      Expected Outcomes, Seated Upper Extremity (Therapeutic Exercise)  improve functional tolerance, self-care activity;improve performance, BADLs;improve performance, fine motor coordination skills;improve performance, transfer skills  -AF      Restrictions, Seated Upper Extremity (Therapeutic Exercise)  increased weight from #2 to #3  -AF      Sets/Reps Detail, Seated Upper Extremity (Therapeutic Exercise)  2/20  -AF      Transfers Skills, Training to Functional Activity, Seated Upper Extremity (Therapeutic Exercise)  beginning to transfer skills to functional activity  -AF      Comment, Seated Upper Extremity (Therapeutic Exercise)  rest breaks   -AF      Recorded by [AF] Reina Perera OTR      Row Name 02/18/20 0800             Lower Extremity Seated Therapeutic Exercise    Performed, Seated Lower Extremity (Therapeutic Exercise)  hip flexion/extension;hip abduction/adduction;knee flexion/extension;ankle dorsiflexion/plantarflexion;LAQ (long arc quad), knee extension red thera band   -LH,NM,LH2      Exercise Type, Seated Lower Extremity (Therapeutic Exercise)  AROM (active range of motion)  -LH,NM,LH2      Sets/Reps Detail,  Seated Lower Extremity (Therapeutic Exercise)  1/20  -LH,NM,LH2      Recorded by [LH,NM,LH2] Chelsie Cline, PT (r) Jaxon Atkinson, PT Student (t) Chelsie Cline, PT (c)      Row Name 02/18/20 1505 02/18/20 1153          Neuromuscular Re-education    Comment (Neuromuscular Re-education)  gross grasp with alternating hands with rings and bean bags in standing   -AF  attempted multiple FMC tasks that pt was not able to competle and increased her frustration attempted to sting medium and large beads unable to, then attempted to place chinese checkers into peg board unable to complete. pt did participate in theraputty exs to increase her strength and manipulation skills with ADL tasks   -AF     Recorded by [AF] Reina Perera OTR [AF] Reina Perera OTR     Row Name 02/18/20 0800             Vital Signs    Pretreatment Heart Rate (beats/min)  95  -LH,NM,LH2      Intratreatment Heart Rate (beats/min)  115 gait  -LH,NM,LH2      Pre SpO2 (%)  98  -LH,NM,LH2      O2 Delivery Pre Treatment  room air  -LH,NM,LH2      Intra SpO2 (%)  98 gait   -LH,NM,LH2      O2 Delivery Intra Treatment  room air  -LH,NM,LH2      Recorded by [LH,NM,LH2] Chelsie Cline, PT (r) Jaxon Atkinson, PT Student (t) Chelsie Cline, PT (c)      Row Name 02/18/20 1505 02/18/20 1153 02/18/20 0800       Positioning and Restraints    Pre-Treatment Position  in bed  -AF  sitting in chair/recliner  -AF  in bed CNA got pt up an dhusband brought pt to the gym   -LH,NM,LH2    Post Treatment Position  wheelchair  -AF  bed  -AF  wheelchair  -LH,NM,LH2    In Bed  --  supine;call light within reach;encouraged to call for assist;exit alarm on;with family/caregiver  present   -AF  call light within reach;exit alarm on;with family/caregiver before lunch   -LH,NM,LH2    In Wheelchair  sitting;exit alarm on;with PT  -AF  --  exit alarm on;with family/caregiver with    -LH,NM,LH2    Recorded by [AF] Reina Perera OTR [AF] Reina Perera, OTR  [LH,NM,LH2] Chelsie Cline, PT (r) Jaxon Atkinson, PT Student (t) Chelsie Cline, PT (c)      User Key  (r) = Recorded By, (t) = Taken By, (c) = Cosigned By    Initials Name Effective Dates     Chelsie Cline, PT 04/03/18 -     AF Reina Perera, OTR 04/03/18 -     NM Jaxon Atkinson, PT Student 01/03/20 -         Wound 12/09/19 1510 abdomen Incision (Active)   Dressing Appearance dry;intact 2/18/2020  7:30 AM   Closure SUJEY 2/18/2020  7:30 AM   Base moist;pink 2/17/2020  4:15 PM   Periwound intact 2/17/2020  4:15 PM   Periwound Temperature warm 2/17/2020  4:15 PM   Periwound Skin Turgor soft 2/17/2020  4:15 PM   Edges open 2/17/2020  4:15 PM   Drainage Characteristics/Odor tan 2/17/2020  4:15 PM   Drainage Amount none 2/18/2020  7:30 AM   Care, Wound cleansed with;sterile normal saline 2/17/2020  4:15 PM   Dressing Care, Wound gauze 2/18/2020  7:30 AM   Periwound Care, Wound dry periwound area maintained 2/18/2020  7:30 AM           PT Recommendation and Plan        Daily Summary of Progress (PT)  Recommendations: Physical Therapy: discussed pt's status c MD this morning related to decline in functional mobility and strength in BLE. Per MD pt to start on IVIG                Time Calculation:     PT Charges     Row Name 02/18/20 1454 02/18/20 1146 02/18/20 0908       Time Calculation    Start Time  1430  -LH (r) NM (t) LH (c)  1100  -LH (r) NM (t) LH (c)  0800  -LH (r) NM (t) LH (c)    Stop Time  1500  -LH (r) NM (t) LH (c)  1130  -LH (r) NM (t) LH (c)  0830  -LH (r) NM (t) LH (c)    Time Calculation (min)  30 min  -LH (r) NM (t)  30 min  -LH (r) NM (t)  30 min  -LH (r) NM (t)    PT Received On  02/18/20  -LH (r) NM (t) LH (c)  02/18/20  -LH (r) NM (t) LH (c)  02/18/20  -LH (r) NM (t) LH (c)    PT - Next Appointment  02/19/20  -LH (r) NM (t) LH (c)  --  --      User Key  (r) = Recorded By, (t) = Taken By, (c) = Cosigned By    Initials Name Provider Type    LH Chelsie Cline, PT Physical Therapist    ROCK Atkinson,  Jaxon, PT Student PT Student          Therapy Charges for Today     Code Description Service Date Service Provider Modifiers Qty    60845472828 HC GAIT TRAINING EA 15 MIN 2/17/2020 Jaxon Atkinson, PT Student GP 2    58801760422 HC PT THER PROC EA 15 MIN 2/17/2020 Jaxon Atkinson, PT Student GP 3    42843993246 HC PT NEUROMUSC RE EDUCATION EA 15 MIN 2/17/2020 Jaxon Atkinson, PT Student GP 1    21613511343 HC GAIT TRAINING EA 15 MIN 2/18/2020 Jaxon Atkinson, PT Student GP 2    23621637469 HC PT THERAPEUTIC ACT EA 15 MIN 2/18/2020 Jaxon Atkinson, PT Student GP 2    92938787749 HC PT NEUROMUSC RE EDUCATION EA 15 MIN 2/18/2020 Jaxon Atkinson, PT Student GP 1    07898263278 HC PT THER PROC EA 15 MIN 2/18/2020 Jaxon Atkinson, PT Student GP 1                   Jaxon Atkinson, PT Student  2/18/2020

## 2020-02-18 NOTE — PLAN OF CARE
Problem: Patient Care Overview  Goal: Plan of Care Review  Outcome: Ongoing (interventions implemented as appropriate)  Flowsheets (Taken 2/18/2020 0245)  Outcome Summary: Patient sleeping well tonight. Pleasant and cooperative. Takes gabapentin for pain management. Continues with numbness and tingling to hand and feet. Continent of bladder. Ostomy bag working well.  Progress, Functional Goals: demonstrating adequate progress  Plan of Care Reviewed With: patient  IRF Plan of Care Review: progress ongoing, continue     Problem: Pain, Chronic (Adult)  Goal: Acceptable Pain/Comfort Level and Functional Ability  Description  Patient will demonstrate the desired outcomes by discharge/transition of care.  Outcome: Ongoing (interventions implemented as appropriate)  Flowsheets (Taken 2/18/2020 0245)  Acceptable Pain/Comfort Level and Functional Ability: making progress toward outcome     Problem: Fall Risk (Adult)  Goal: Absence of Fall  Description  Patient will demonstrate the desired outcomes by discharge/transition of care.  Outcome: Ongoing (interventions implemented as appropriate)  Flowsheets (Taken 2/18/2020 0245)  Absence of Fall: making progress toward outcome     Problem: Functional Mobility Impairment (IRF) (Adult)  Goal: Optimal/Safe Level of [place] with Mobility  Outcome: Ongoing (interventions implemented as appropriate)  Flowsheets (Taken 2/17/2020 1609 by Romina Cotton RN)  Optimal/Safe Level of [place] with Mobility: demonstrating adequate progress

## 2020-02-18 NOTE — PROGRESS NOTES
Inpatient Rehabilitation Plan of Care Note    Plan of Care  Care Plan Reviewed - No updates at this time.    Body Systems    [RN] Integumentary(Active)  Current Status(02/17/2020): Abdominal incision healed.ileostomy bag in place.  both changed per WOCN  Weekly Goal(02/24/2020): No S&S infection noted. Skin is intact.  Discharge Goal: No S&S infection noted Skin is intact.    Performed Intervention(s)  Daily skin inspection      Psychosocial    [RN] Coping/Adjustment(Active)  Current Status(02/18/2020): Supportive family,  very helpful and assists  patient with care.  Weekly Goal(02/24/2020): Identify progress in functional status  Discharge Goal: Demonstrate healthy coping strategies    Performed Intervention(s)  Verbalize needs and concerns  calm enviroment      Safety    [RN] Potential for Injury(Active)  Current Status(02/18/2020): No unsafe behaviors. Weakness of lower extremities  Weekly Goal(02/24/2020): Use call light 100%  Discharge Goal: Pt/family aware of risk of fall and safety in the home setting    Performed Intervention(s)  Bed alarm, wc alarm  Items within reach  Safety rounds      Sphincter Control    [RN] Bladder Management(Active)  Current Status(02/18/2020): pt has been continent, using bedpan.She is reluctant  to use BSC. does wear brief.  Weekly Goal(02/24/2020): Continent bladder 100%  Discharge Goal: Continent bladder 100%    [RN] Bowel Management(Active)  Current Status(02/18/2020): Has ileostomy since 12/20/19.  aware of care  for ileostomy.  Weekly Goal(02/24/2020): maintain education of ileostomy with pt and family  Discharge Goal: Independent with ileostomy care    Performed Intervention(s)  Monitor intake and output  Encourage appropriate diet  wound ostomy nurse to continue to see pt for ostomy care.    Signed by: Jenn Anna RN

## 2020-02-18 NOTE — PLAN OF CARE
Problem: Patient Care Overview  Goal: Plan of Care Review  Outcome: Ongoing (interventions implemented as appropriate)  Flowsheets (Taken 2/18/2020 0576)  Outcome Summary: pt alert and oriented. worked well with therapies. continent. ileostomy bag intact. n/t unchanged in extremities. will continue to monitor.  Progress, Functional Goals: demonstrating adequate progress  Plan of Care Reviewed With: patient  IRF Plan of Care Review: progress ongoing, continue

## 2020-02-19 LAB
BASOPHILS # BLD AUTO: 0.05 10*3/MM3 (ref 0–0.2)
BASOPHILS NFR BLD AUTO: 0.9 % (ref 0–1.5)
DEPRECATED RDW RBC AUTO: 52.5 FL (ref 37–54)
EOSINOPHIL # BLD AUTO: 0.15 10*3/MM3 (ref 0–0.4)
EOSINOPHIL NFR BLD AUTO: 2.7 % (ref 0.3–6.2)
ERYTHROCYTE [DISTWIDTH] IN BLOOD BY AUTOMATED COUNT: 15.4 % (ref 12.3–15.4)
HCT VFR BLD AUTO: 23.4 % (ref 34–46.6)
HGB BLD-MCNC: 7.5 G/DL (ref 12–15.9)
IMM GRANULOCYTES # BLD AUTO: 0.05 10*3/MM3 (ref 0–0.05)
IMM GRANULOCYTES NFR BLD AUTO: 0.9 % (ref 0–0.5)
LYMPHOCYTES # BLD AUTO: 0.9 10*3/MM3 (ref 0.7–3.1)
LYMPHOCYTES NFR BLD AUTO: 16.2 % (ref 19.6–45.3)
MCH RBC QN AUTO: 29.6 PG (ref 26.6–33)
MCHC RBC AUTO-ENTMCNC: 32.1 G/DL (ref 31.5–35.7)
MCV RBC AUTO: 92.5 FL (ref 79–97)
MONOCYTES # BLD AUTO: 0.49 10*3/MM3 (ref 0.1–0.9)
MONOCYTES NFR BLD AUTO: 8.8 % (ref 5–12)
NEUTROPHILS # BLD AUTO: 3.9 10*3/MM3 (ref 1.7–7)
NEUTROPHILS NFR BLD AUTO: 70.5 % (ref 42.7–76)
NRBC BLD AUTO-RTO: 0 /100 WBC (ref 0–0.2)
PLATELET # BLD AUTO: 301 10*3/MM3 (ref 140–450)
PMV BLD AUTO: 9.3 FL (ref 6–12)
RBC # BLD AUTO: 2.53 10*6/MM3 (ref 3.77–5.28)
WBC NRBC COR # BLD: 5.54 10*3/MM3 (ref 3.4–10.8)

## 2020-02-19 PROCEDURE — 85025 COMPLETE CBC W/AUTO DIFF WBC: CPT | Performed by: PHYSICAL MEDICINE & REHABILITATION

## 2020-02-19 PROCEDURE — 97535 SELF CARE MNGMENT TRAINING: CPT

## 2020-02-19 PROCEDURE — 97116 GAIT TRAINING THERAPY: CPT

## 2020-02-19 PROCEDURE — 97110 THERAPEUTIC EXERCISES: CPT

## 2020-02-19 PROCEDURE — 97530 THERAPEUTIC ACTIVITIES: CPT

## 2020-02-19 PROCEDURE — 97112 NEUROMUSCULAR REEDUCATION: CPT

## 2020-02-19 RX ADMIN — LOPERAMIDE HYDROCHLORIDE 2 MG: 2 CAPSULE ORAL at 05:39

## 2020-02-19 RX ADMIN — CHOLECALCIFEROL TAB 125 MCG (5000 UNIT) 5000 UNITS: 125 TAB at 07:48

## 2020-02-19 RX ADMIN — GABAPENTIN 300 MG: 300 CAPSULE ORAL at 17:33

## 2020-02-19 RX ADMIN — Medication 18 MG: at 07:48

## 2020-02-19 RX ADMIN — Medication 18 MG: at 22:00

## 2020-02-19 RX ADMIN — LOPERAMIDE HYDROCHLORIDE 2 MG: 2 CAPSULE ORAL at 11:46

## 2020-02-19 RX ADMIN — Medication 1 TABLET: at 07:49

## 2020-02-19 RX ADMIN — GABAPENTIN 300 MG: 300 CAPSULE ORAL at 07:47

## 2020-02-19 RX ADMIN — SODIUM BICARBONATE 1300 MG: 650 TABLET ORAL at 16:35

## 2020-02-19 RX ADMIN — GABAPENTIN 300 MG: 300 CAPSULE ORAL at 22:00

## 2020-02-19 RX ADMIN — PANTOPRAZOLE SODIUM 40 MG: 40 TABLET, DELAYED RELEASE ORAL at 05:39

## 2020-02-19 RX ADMIN — SODIUM BICARBONATE 1300 MG: 650 TABLET ORAL at 22:00

## 2020-02-19 RX ADMIN — SODIUM BICARBONATE 1300 MG: 650 TABLET ORAL at 07:48

## 2020-02-19 RX ADMIN — GABAPENTIN 300 MG: 300 CAPSULE ORAL at 03:10

## 2020-02-19 RX ADMIN — LOPERAMIDE HYDROCHLORIDE 2 MG: 2 CAPSULE ORAL at 16:36

## 2020-02-19 RX ADMIN — GABAPENTIN 300 MG: 300 CAPSULE ORAL at 11:46

## 2020-02-19 NOTE — PLAN OF CARE
Problem: Patient Care Overview  Goal: Plan of Care Review  Outcome: Ongoing (interventions implemented as appropriate)  Flowsheets (Taken 2/19/2020 0333)  Outcome Summary: Pt. is calm and cooperative with staff. A&OX4. Takes Meds whole with water. Complained of any pain to bilateral hands. Gabapentin 300 mg PO PRN given at 0310, and then relieved well. Uses bedpan at night. Continent of urinary. Ileostomy working and tolerated well. Got loose brown dark BM. Ice pack applied to right hand at 0310 per pt. asked. Uses call light for assistance. No unsafe issues to note at this time.  Progress, Functional Goals: demonstrating adequate progress  Plan of Care Reviewed With: patient  IRF Plan of Care Review: progress ongoing, continue

## 2020-02-19 NOTE — THERAPY TREATMENT NOTE
Inpatient Rehabilitation - Physical Therapy Treatment Note  [facility]     Patient Name: She López  : 1945  MRN: [id number]    Today's Date: 2020  Onset of Illness/Injury or Date of Surgery: 20              Admit Date: 2020      Visit Dx:      ICD-10-CM ICD-9-CM   1. General weakness R53.1 780.79       Patient Active Problem List   Diagnosis   • Crohn's disease of small intestine with other complication (CMS/HCC)   • Type 2 diabetes mellitus without complication (CMS/HCC)   • RSD upper limb   • Neuropathic pain of hand   • Hyperlipidemia   • Cervical myelopathy (CMS/HCC)   • Central pain syndrome   • Carpal tunnel syndrome   • Vitamin B 12 deficiency   • Guillain Barré syndrome (CMS/HCC)       Therapy Treatment    IRF Treatment Summary     Row Name 20 1302 20 0800          Evaluation/Treatment Time and Intent    Subjective Information  no complaints  -  no complaints  (Pended)   -NM     Existing Precautions/Restrictions  fall  -  fall  (Pended)   -NM     Document Type  therapy note (daily note)  -  therapy note (daily note)  (Pended)   -NM     Mode of Treatment  individual therapy;physical therapy  -  physical therapy  (Pended)   -NM     Patient/Family Observations  pt seated in WC spouse bedside no acute distress  -  Pt's  brought pt to the gym. Reports no difficulty c  assisting pt in room.   (Pended)   -NM     Recorded by [] Chelsie Cline, PT [NM] Jaxon Atkinson, PT Student     Row Name 20 1302 20 0800          Cognition/Psychosocial- PT/OT    Affect/Mental Status (Cognitive)  WFL  -  WFL  (Pended)   -NM     Orientation Status (Cognition)  oriented x 4  -  oriented x 4  (Pended)   -NM     Follows Commands (Cognition)  WFL  -  WNL  (Pended)   -NM     Personal Safety Interventions  fall prevention program maintained;gait belt;supervised activity  -  fall prevention program maintained;gait belt;nonskid shoes/slippers when  out of bed;supervised activity  (Pended)   -NM     Safety Deficit (Cognitive)  --  -LH  --     Recorded by [] Chelsie Cline, PT [NM] Jaxon Atkinson, PT Student     Row Name 02/19/20 1302             Bed Mobility Assessment/Treatment    Comment (Bed Mobility)  NT  -LH      Recorded by [] Chelsie Cline, PT      Row Name 02/19/20 1302 02/19/20 0800          Functional Mobility    Functional Mobility- Ind. Level  --  contact guard assist;minimum assist (75% patient effort);verbal cues required  (Pended)  Doreen during turns  -NM     Functional Mobility- Device  --  rolling walker  (Pended)  aircast on L ankle   -NM     Functional Mobility-Distance (Feet)  --  100  (Pended)  x2  -NM     Functional Mobility- Safety Issues  --  balance decreased during turns  (Pended)   -NM     Functional Mobility- Comment  practiced ambulation w turning and backing up to - s for improved control w backing up  -  Pt amb w/in the gym and sunroom between chairs, working on turns, tighter spaces and carpet.   (Pended)   -NM     Recorded by [] Chelsie Cline, PT [NM] Jaxon Atkinson, PT Student     Row Name 02/19/20 0800             Transfer Assessment/Treatment    Comment (Transfers)  Performed sit<>stands in the sunroom from a lower chair c armrests and couch using armrest on the R, Doreen.   (Pended)   -NM      Recorded by [NM] Jaxon Atkinson, PT Student      Row Name 02/19/20 1302 02/19/20 0800          Sit-Stand Transfer    Sit-Stand [place] (Transfers)  minimum assist (75% patient effort);contact guard;verbal cues  -  minimum assist (75% patient effort);contact guard;verbal cues  (Pended)   -NM     Assistive Device (Sit-Stand Transfers)  walker, front-wheeled  -  wheelchair;walker, front-wheeled  (Pended)   -NM     Recorded by [] Chelsie Cline, PT [NM] Jaxon Atkinson, PT Student     Row Name 02/19/20 1302 02/19/20 0800          Stand-Sit Transfer    Stand-Sit [place] (Transfers)  contact guard;minimum assist  (75% patient effort);verbal cues  -  minimum assist (75% patient effort);contact guard;verbal cues  (Pended)   -NM     Assistive Device (Stand-Sit Transfers)  walker, front-wheeled  -  wheelchair;walker, front-wheeled  (Pended)   -NM     Recorded by [LH] Chelsie Cline, PT [NM] Jaxon Atkinson, PT Student     Row Name 02/19/20 0800             Stand Pivot/Stand Step Transfer    Stand Pivot/Stand Step [place]  minimum assist (75% patient effort);contact guard;verbal cues  (Pended)   -NM      Assistive Device (Stand Pivot Stand Step Transfer)  wheelchair;walker, front-wheeled  (Pended)   -NM      Recorded by [NM] Jaxon Atkinson, PT Student      Row Name 02/19/20 1302             Gait/Stairs Assessment/Training    [place] Level (Gait)  minimum assist (75% patient effort);verbal cues;nonverbal cues (demo/gesture) 2nd person SBA for safety  -      Assistive Device (Gait)  walker, front-wheeled;AFO  -LH      Distance in Feet (Gait)  80  -      Pattern (Gait)  step-through  -      Deviations/Abnormal Patterns (Gait)  bilateral deviations;base of support, wide;ataxic  -      Bilateral Gait Deviations  weight shift ability decreased  -      Left Sided Gait Deviations  hip hiking L ankle supination w fatigue-improved w AFO use  -      Comment (Gait/Stairs)  trial of carbon fiber posterior leaf for improved L foot/ankle stability- pt displays improved mechanics w AFO donned  -      Recorded by [] Chelsie Cline, PT      Row Name 02/19/20 1302             Proprioception Assessment    Left Lower Extremity: Proprioception Assessment  moderate impairment, 50 to 74% correct responses  -      Right Lower Extremity: Proprioception Assessment  mild impairment, 75% or more correct responses  -      Recorded by [] Chelsie Cline, PT      Row Name 02/19/20 1302             Pain Scale: Numbers Pre/Post-Treatment    Pain Scale: Numbers, Pretreatment  0/10 - no pain  -      Pain Scale: Numbers,  "Post-Treatment  0/10 - no pain  -      Pre/Post Treatment Pain Comment  persistent c/o numbness/burning in hands  -      Recorded by [] Chelsie Cline, PT      Row Name 02/19/20 1302             Standing Balance Activity    Support Needed for Balance (Standing, Balance Training)  minimal external support for balance, 75% patient effort;uses both upper extremities for support  -      Comment (Standing, Balance Training)  standing in // bars, step ups onto 4\" aerobic step, 10 reps w aircast LLE, 10 reps w L AFO donned- improved LLE quality and mechanics clearing step w AFO donned.  -      Recorded by [] Chelsie Cline, PT      Row Name 02/19/20 1302             Lower Extremity Seated Therapeutic Exercise    Performed, Seated Lower Extremity (Therapeutic Exercise)  hip flexion/extension;hip abduction/adduction;LAQ (long arc quad), knee extension;ankle dorsiflexion/plantarflexion;knee flexion/extension  -      Exercise Type, Seated Lower Extremity (Therapeutic Exercise)  AROM (active range of motion);resistive exercise BTB  -      Sets/Reps Detail, Seated Lower Extremity (Therapeutic Exercise)  1/20  -      Recorded by [] Chelsie Cline, PT      Row Name 02/19/20 1302 02/19/20 0800          Positioning and Restraints    Pre-Treatment Position  sitting in chair/recliner  -  sitting in chair/recliner  (Pended)   -NM     Post Treatment Position  wheelchair  -  wheelchair  (Pended)   -NM     In Wheelchair  sitting;call light within reach;encouraged to call for assist;with family/caregiver  -  exit alarm on;with family/caregiver  (Pended)   -NM     Recorded by [] Chelsie Cline, PT [NM] Jaxon Atkinson, PT Student       User Key  (r) = Recorded By, (t) = Taken By, (c) = Cosigned By    Initials Name Effective Dates     Chelsie Cline, PT 04/03/18 -     NM Jaxon Atkinson, YUSUF Student 01/03/20 -         Wound 12/09/19 1510 abdomen Incision (Active)   Dressing Appearance dry;intact 2/19/2020  9:19 AM "   Closure SUJEY 2/19/2020  9:19 AM   Base dressing in place, unable to visualize 2/19/2020  9:19 AM           PT Recommendation and Plan                        Time Calculation:     PT Charges     Row Name 02/19/20 1345 02/19/20 0851          Time Calculation    Start Time  1100  -  0800  (Pended)   -NM     Stop Time  1130  -  0830  (Pended)   -NM     Time Calculation (min)  30 min  -  30 min  (Pended)   -NM     PT Received On  02/19/20  -  02/19/20  (Pended)   -NM     PT - Next Appointment  02/20/20  -  --       User Key  (r) = Recorded By, (t) = Taken By, (c) = Cosigned By    Initials Name Provider Type     Chelsie Cline, PT Physical Therapist    NM Jaxon Atkinson, PT Student PT Student          Therapy Charges for Today     Code Description Service Date Service Provider Modifiers Qty    87576307573 HC PT THER PROC EA 15 MIN 2/19/2020 Chelsie Cline, PT GP 1    60554477282 HC GAIT TRAINING EA 15 MIN 2/19/2020 Chelsie Cline, PT GP 1                   Chelsie Cline, PT  2/19/2020

## 2020-02-19 NOTE — THERAPY TREATMENT NOTE
Inpatient Rehabilitation - Physical Therapy Treatment Note  [facility]     Patient Name: She López  : 1945  MRN: [id number]    Today's Date: 2020  Onset of Illness/Injury or Date of Surgery: 20              Admit Date: 2020      Visit Dx:      ICD-10-CM ICD-9-CM   1. General weakness R53.1 780.79       Patient Active Problem List   Diagnosis   • Crohn's disease of small intestine with other complication (CMS/HCC)   • Type 2 diabetes mellitus without complication (CMS/HCC)   • RSD upper limb   • Neuropathic pain of hand   • Hyperlipidemia   • Cervical myelopathy (CMS/HCC)   • Central pain syndrome   • Carpal tunnel syndrome   • Vitamin B 12 deficiency   • Guillain Barré syndrome (CMS/HCC)       Therapy Treatment    IRF Treatment Summary     Row Name 20 1302 20 0800       Evaluation/Treatment Time and Intent    Subjective Information  no complaints  -AF  no complaints  -LH  no complaints  -LH,NM,LH2    Existing Precautions/Restrictions  fall  -AF  fall  -LH  fall  -LH,NM,LH2    Document Type  therapy note (daily note)  -AF  therapy note (daily note)  -  therapy note (daily note)  -LH,NM,LH2    Mode of Treatment  occupational therapy  -AF  individual therapy;physical therapy  -LH  physical therapy  -LH,NM,LH2    Patient/Family Observations  w/c level for all sessions,  present  -AF  pt seated in WC spouse bedside no acute distress  -  Pt's  brought pt to the gym. Reports no difficulty c  assisting pt in room.   -LH,NM,LH2    Recorded by [AF] Reina Perera, OTR [LH] Chelsie Cline, PT [LH,NM,LH2] Chelsie Cline, PT (r) Jaxon Atkinson, YUSUF Student (t) Chelsie Cline, PT (c)    Row Name 20 1533 02/19/20 1302 20 0800       Cognition/Psychosocial- PT/OT    Affect/Mental Status (Cognitive)  WFL  -AF  WFL  -LH  WFL  -LH,NM,LH2    Orientation Status (Cognition)  oriented x 4  -AF  oriented x 4  -LH  oriented x 4  -LH,NM,LH2     Follows Commands (Cognition)  WFL  -AF  WFL  -LH  WNL  -LH,NM,LH2    Personal Safety Interventions  fall prevention program maintained;gait belt;nonskid shoes/slippers when out of bed  -AF  fall prevention program maintained;gait belt;supervised activity  -LH  fall prevention program maintained;gait belt;nonskid shoes/slippers when out of bed;supervised activity  -LH,NM,LH2    Safety Deficit (Cognitive)  insight into deficits/self awareness  -AF  --  -LH  --    Recorded by [AF] Reina Perera, OTR [LH] Chelsie Cline, PT [LH,NM,LH2] Chelsie lCine, PT (r) Jaxon Atkinson, PT Student (t) Chelsie Cline, PT (c)    Row Name 02/19/20 0800             Mobility    Advanced Gait Activity  curb negotiation  -LH,NM,LH2      Additional Documentation  Advanced Gait Activity (Row)  -LH,NM,LH2      Recorded by [LH,NM,LH2] Chelsie Cline, PT (r) Jaxon Atkinson, PT Student (t) Chelsie Cline, PT (c)      Row Name 02/19/20 1533 02/19/20 1302          Bed Mobility Assessment/Treatment    Sit-Supine [place] (Bed Mobility)  supervision  -  --     Comment (Bed Mobility)  --  NT  -LH     Recorded by [AF] Reina Perera, OTR [] Chelsie Cline, PT     Row Name 02/19/20 1302 02/19/20 0800          Functional Mobility    Functional Mobility- Ind. Level  --  contact guard assist;minimum assist (75% patient effort);verbal cues required Doreen during turns  -LH,NM,LH2     Functional Mobility- Device  --  rolling walker aircast on L ankle   -LH,NM,LH2     Functional Mobility-Distance (Feet)  --  100 x2  -LH,NM,LH2     Functional Mobility- Safety Issues  --  balance decreased during turns  -LH,NM,LH2     Functional Mobility- Comment  practiced ambulation w turning and backing up to - VCs for improved control w backing up  -  Pt amb w/in the gym and sunroom between chairs, working on turns, tighter spaces and carpet.   -LH,NM,LH2     Recorded by [LH] Chelsie Cline, PT [LH,NM,LH2] Chelsie Cline, PT (r) Jaxon Atkinson, PT Student  (t) Chelsie Cline, PT (c)     Row Name 02/19/20 0800             Transfer Assessment/Treatment    Comment (Transfers)  Performed sit<>stands in the sunroom from a lower chair c armrests and couch using armrest on the R, Doreen.   -LH,NM,LH2      Recorded by [LH,NM,LH2] Chelsie Cline, PT (r) Jaxon Atkinson, PT Student (t) Chelsie Cline, PT (c)      Row Name 02/19/20 1533             Bed-Chair Transfer    Bed-Chair Traverse (Transfers)  minimum assist (75% patient effort);verbal cues  -AF      Assistive Device (Bed-Chair Transfers)  wheelchair  -AF      Recorded by [AF] Reina Perera OTMENDEZ      Row Name 02/19/20 1533             Chair-Bed Transfer    Chair-Bed Traverse (Transfers)  minimum assist (75% patient effort);verbal cues  -AF      Assistive Device (Chair-Bed Transfers)  wheelchair  -AF      Recorded by [AF] Reina Perera OTR      Row Name 02/19/20 1302 02/19/20 0800          Sit-Stand Transfer    Sit-Stand Traverse (Transfers)  minimum assist (75% patient effort);contact guard;verbal cues  -LH  minimum assist (75% patient effort);contact guard;verbal cues  -LH,NM,LH2     Assistive Device (Sit-Stand Transfers)  walker, front-wheeled  -LH  wheelchair;walker, front-wheeled  -LH,NM,LH2     Recorded by [LH] Chelsie Cline, PT [LH,NM,LH2] Chelsie Cline, PT (r) Jaxon Atkinson, PT Student (t) Chelsie Cline, PT (c)     Row Name 02/19/20 1302 02/19/20 0800          Stand-Sit Transfer    Stand-Sit Traverse (Transfers)  contact guard;minimum assist (75% patient effort);verbal cues  -LH  minimum assist (75% patient effort);contact guard;verbal cues  -LH,NM,LH2     Assistive Device (Stand-Sit Transfers)  walker, front-wheeled  -LH  wheelchair;walker, front-wheeled  -LH,NM,LH2     Recorded by [LH] Chelsie Cline, PT [LH,NM,LH2] Chelsie Cline, PT (r) Jaxon Atkinson, PT Student (t) Chelsie Cline, PT (c)     Row Name 02/19/20 0800             Stand Pivot/Stand Step Transfer    Stand Pivot/Stand Step  [place]  minimum assist (75% patient effort);contact guard;verbal cues  -LH,NM,LH2      Assistive Device (Stand Pivot Stand Step Transfer)  wheelchair;walker, front-wheeled  -LH,NM,LH2      Recorded by [LH,NM,LH2] Chelsie Cline, PT (r) Jaxon Atkinson PT Student (t) Chelsie Cline, PT (c)      Row Name 02/19/20 1533             Toilet Transfer    Type (Toilet Transfer)  stand pivot/stand step  -AF      [place] Level (Toilet Transfer)  minimum assist (75% patient effort);verbal cues  -AF      Assistive Device (Toilet Transfer)  commode;grab bars/safety frame;wheelchair  -AF      Recorded by [AF] Reina Perera OTR      Row Name 02/19/20 1302 02/19/20 0800          Gait/Stairs Assessment/Training    [place] Level (Gait)  minimum assist (75% patient effort);verbal cues;nonverbal cues (demo/gesture) 2nd person SBA for safety  -  minimum assist (75% patient effort);verbal cues;nonverbal cues (demo/gesture)  -LH,NM,LH2     Assistive Device (Gait)  walker, front-wheeled;AFO  -LH  walker, front-wheeled;AFO AFO LLE   -LH,NM,LH2     Distance in Feet (Gait)  80  -LH  90x2   -LH,NM,LH2     Pattern (Gait)  step-through  -LH  step-through  -LH,NM,LH2     Deviations/Abnormal Patterns (Gait)  bilateral deviations;base of support, wide;ataxic  -LH  bilateral deviations;base of support, wide;ataxic  -LH,NM,LH2     Bilateral Gait Deviations  weight shift ability decreased  -LH  weight shift ability decreased  -LH,NM,LH2     Left Sided Gait Deviations  hip hiking L ankle supination w fatigue-improved w AFO use  -LH  --     Comment (Gait/Stairs)  trial of carbon fiber posterior leaf for improved L foot/ankle stability- pt displays improved mechanics w AFO donned  -LH  ambulated the length of the gym, approx 30' x3 c turns. Ambulated around cones using a rwx c vc for speed and staying inside the fww; on episopde of LOB requiring [place].   -LH,NM,LH2     Recorded by [LH] Chelsie Cline, PT [LH,NM,LH2] Chelsie Cline, PT  "(r) Jaxon Atkinson, PT Student (t) Chelsie Cline, PT (c)     Row Name 02/19/20 0800             Curb Negotiation (Mobility)    [place], Curb Negotiation  minimal assist, 75% or more patient effort;2 person assist;verbal cues;nonverbal cues, demo/gestures  -LH,NM,LH2      Comment, Curb Negotiation (Mobility)  Ascended/descended a 4\" step using a rwx c assist to place the rwx, up c the R and down c the R. Progressed to a 6\" step, stepping up c the R and down c the L, showing more control when stepping down c the R instead of the L.   -LH,NM,LH2      Recorded by [LH,NM,LH2] Chelsie Cline, PT (r) Jaxon Atkinson, PT Student (t) Chelsie Cline, PT (c)      Row Name 02/19/20 1533             Bathing Assessment/Treatment    Bathing [place] Level  upper body;distal lower extremities/feet;minimum assist (75% patient effort);verbal cues  -AF      Bathing Position  sink side;supported sitting  -AF      Bathing Setup Assistance  obtain supplies  -AF      Recorded by [AF] Reina Perera OTR      Row Name 02/19/20 1533             Upper Body Dressing Assessment/Treatment    Upper Body Dressing Task  upper body dressing skills;set up assistance;pull over garment;minimum assist (75% or more patient effort);front opening garment  -AF      Upper Body Dressing Position  supported sitting  -AF      Set-up Assistance (Upper Body Dressing)  obtain clothing  -AF      Recorded by [AF] Reina Perera OTR      Row Name 02/19/20 1533             Lower Body Dressing Assessment/Treatment    Lower Body Dressing [place] Level  doff;don;socks;shoes/slippers;moderate assist (50% patient effort);verbal cues  -AF      Lower Body Dressing Position  supported sitting  -AF      Lower Body Dressing Setup Assistance  obtain clothing  -AF      Recorded by [AF] Reina Perera OTR      Row Name 02/19/20 1533             Grooming Assessment/Treatment    Grooming [place] Level  grooming skills;set up  -AF      Assistive Device " (Grooming)  built-up handle items  -AF      Grooming Position  sink side;supported sitting  -AF      Grooming Setup Assistance  obtain supplies  -AF      Recorded by [AF] Reina Perera OTR      Row Name 02/19/20 1533             Toileting Assessment/Treatment    Toileting [place] Level  toileting skills;maximum assist (25% patient effort);verbal cues  -AF      Assistive Device Use (Toileting)  grab bar/safety frame;raised toilet seat  -AF      Toileting Position  unsupported sitting;supported standing  -AF      Comment (Toileting)  completed with , therapist present   -AF      Recorded by [AF] Reina Perera, NILAMR      Row Name 02/19/20 1533             Fine Motor Testing & Training    Comment, Fine Motor Coordination  FMC with manipulating rings onto vertical dowel pa with MOD diffculty   -AF      Recorded by [AF] Reina Perera OTR      Row Name 02/19/20 1302             Proprioception Assessment    Left Lower Extremity: Proprioception Assessment  moderate impairment, 50 to 74% correct responses  -      Right Lower Extremity: Proprioception Assessment  mild impairment, 75% or more correct responses  -LH      Recorded by [LH] Chelsie Cline, PT      Row Name 02/19/20 1533             Sensory Re-Education    Discriminative (Sensory Re-Education)  discrimination, objects in rice;stereognosis, specific items B hands, with MOD/MSX diffiuclty   -AF      Recorded by [AF] Reina Perera, NILAMR      Row Name 02/19/20 1533 02/19/20 1302 02/19/20 0800       Pain Scale: Numbers Pre/Post-Treatment    Pain Scale: Numbers, Pretreatment  0/10 - no pain  -AF  0/10 - no pain  -LH  0/10 - no pain  -LH,NM,LH2    Pain Scale: Numbers, Post-Treatment  0/10 - no pain  -AF  0/10 - no pain  -LH  0/10 - no pain  -LH,NM,LH2    Pre/Post Treatment Pain Comment  --  persistent c/o numbness/burning in hands  -  --    Recorded by [AF] Reina Perera, OTR [LH] Chelsie Cline, PT [LH,NM,LH2] Chelsie Cline, PT (r)  "Jaxon Atkinson, PT Student (t) Chelsie Cline, PT (c)    Row Name 02/19/20 1533             Static Sitting Balance    Level of [place] (Unsupported Sitting, Static Balance)  standby assist  -AF      Sitting Position (Unsupported Sitting, Static Balance)  sitting edge of mat  -AF      Time Able to Maintain Position (Unsupported Sitting, Static Balance)  more than 5 minutes  -AF      Comment (Unsupported Sitting, Static Balance)  with FMC tasks, and isometric BUE to increase core strength  -AF      Recorded by [AF] Reina Perera OTR      Row Name 02/19/20 1302             Standing Balance Activity    Support Needed for Balance (Standing, Balance Training)  minimal external support for balance, 75% patient effort;uses both upper extremities for support  -LH      Comment (Standing, Balance Training)  standing in // bars, step ups onto 4\" aerobic step, 10 reps w aircast LLE, 10 reps w L AFO donned- improved LLE quality and mechanics clearing step w AFO donned.  -LH      Recorded by [LH] Chelsie Cline, PT      Row Name 02/19/20 1533             Upper Extremity Seated Therapeutic Exercise    Performed, Seated Upper Extremity (Therapeutic Exercise)  shoulder horizontal abduction/adduction;shoulder flexion/extension;scapular protraction/retraction;forearm supination/pronation;wrist flexion/extension;digit flexion/extension  -AF      Device, Seated Upper Extremity (Therapeutic Exercise)  -- #3 hand weight, #3 dowel pa  -AF      Exercise Type, Seated Upper Extremity (Therapeutic Exercise)  AROM (active range of motion);resistive exercise  -AF      Expected Outcomes, Seated Upper Extremity (Therapeutic Exercise)  improve motor control;improve performance, BADLs;improve performance, transfer skills  -AF      Sets/Reps Detail, Seated Upper Extremity (Therapeutic Exercise)  2/20  -AF      Transfers Skills, Training to Functional Activity, Seated Upper Extremity (Therapeutic Exercise)  beginning to transfer skills to " functional activity  -AF      Comment, Seated Upper Extremity (Therapeutic Exercise)  rest breaks, blue hand gripper  -AF      Recorded by [AF] Reina Perera OTR      Row Name 02/19/20 1302             Lower Extremity Seated Therapeutic Exercise    Performed, Seated Lower Extremity (Therapeutic Exercise)  hip flexion/extension;hip abduction/adduction;LAQ (long arc quad), knee extension;ankle dorsiflexion/plantarflexion;knee flexion/extension  -      Exercise Type, Seated Lower Extremity (Therapeutic Exercise)  AROM (active range of motion);resistive exercise [facility]  -      Sets/Reps Detail, Seated Lower Extremity (Therapeutic Exercise)  1/20  -      Recorded by [] Chelsie Cline, PT      Row Name 02/19/20 1533 02/19/20 1302 02/19/20 0800       Positioning and Restraints    Pre-Treatment Position  sitting in chair/recliner  -AF  sitting in chair/recliner  -  sitting in chair/recliner  -,NM,LH2    Post Treatment Position  wheelchair  -AF  wheelchair  -  wheelchair  -,NM,2    In Bed  supine;call light within reach;encouraged to call for assist;exit alarm on in AM  -AF  --  --    In Wheelchair  sitting;with PT;with other staff with RT In AM, wtih PT in PM  -AF  sitting;call light within reach;encouraged to call for assist;with family/caregiver  -  exit alarm on;with family/caregiver  -,NM,LH2    Recorded by [AF] Reina Perera, NILAMR [] Chelsie Cline, PT [,NM,2] Chelsie Cline, PT (r) Jaxon Atkinson, PT Student (t) Chelsie Cline, PT (c)      User Key  (r) = Recorded By, (t) = Taken By, (c) = Cosigned By    Initials Name Effective Dates     Chelsie Cline, PT 04/03/18 -     AF Reina Perera, ALPESH 04/03/18 -     NM Jaxon Atkinson, PT Student 01/03/20 -         Wound 12/09/19 1510 abdomen Incision (Active)   Dressing Appearance dry;intact 2/19/2020  9:19 AM   Closure SUJEY 2/19/2020  9:19 AM   Base dressing in place, unable to visualize 2/19/2020  9:19 AM           PT Recommendation and  Plan        Daily Summary of Progress (PT)  Recommendations: Physical Therapy: discussed pt's status c MD this morning related to decline in functional mobility and strength in BLE. Per MD pt to start on IVIG                Time Calculation:     PT Charges     Row Name 02/19/20 1500 02/19/20 1345 02/19/20 0851       Time Calculation    Start Time  1430  -LH (r) NM (t) LH (c)  1100  -LH  0800  -LH (r) NM (t) LH (c)    Stop Time  1500  -LH (r) NM (t) LH (c)  1130  -LH  0830  -LH (r) NM (t) LH (c)    Time Calculation (min)  30 min  -LH (r) NM (t)  30 min  -LH  30 min  -LH (r) NM (t)    PT Received On  02/19/20  -LH (r) NM (t) LH (c)  02/19/20  -LH  02/19/20  -LH (r) NM (t) LH (c)    PT - Next Appointment  02/20/20  -LH (r) NM (t) LH (c)  02/20/20  -LH  --      User Key  (r) = Recorded By, (t) = Taken By, (c) = Cosigned By    Initials Name Provider Type     Chelsie Cline, PT Physical Therapist    NM Jaxon Atkinson, PT Student PT Student          Therapy Charges for Today     Code Description Service Date Service Provider Modifiers Qty    60940873116 HC GAIT TRAINING EA 15 MIN 2/18/2020 Jaxon Atkinson, PT Student GP 2    [id number]  PT THERAPEUTIC ACT EA 15 MIN 2/18/2020 Jaxon Aktinson, PT Student GP 2    [id number]  PT NEUROMUSC RE EDUCATION EA 15 MIN 2/18/2020 Jaxon Atkinson, PT Student GP 1    92473184058 HC PT THER PROC EA 15 MIN 2/18/2020 Jaxon Atkinson, PT Student GP 1    [id number] HC GAIT TRAINING EA 15 MIN 2/19/2020 Jaxon Atkinson, PT Student GP 1    [id number] HC PT THER PROC EA 15 MIN 2/19/2020 Jaxon Atkinson, PT Student GP 1    [id number]  PT THERAPEUTIC ACT EA 15 MIN 2/19/2020 Jaxon Atkinson, PT Student GP 2                   Jaxon Atkinson, PT Student  2/19/2020

## 2020-02-19 NOTE — PROGRESS NOTES
Inpatient Rehabilitation Plan of Care Note    Plan of Care  Care Plan Reviewed - Updates as Follows    Psychosocial    Performed Intervention(s)  Verbalize needs and concerns  calm enviroment  Therapeutic environmental set-up      Safety    Performed Intervention(s)  Bed alarm, wc alarm  Items within reach  Safety rounds      Sphincter Control    Performed Intervention(s)  Monitor intake and output  Encourage appropriate diet  wound ostomy nurse to continue to see pt for ostomy care.      Body Systems    Performed Intervention(s)  Daily skin inspection    Signed by: Lenin Telles RN

## 2020-02-19 NOTE — PROGRESS NOTES
LOS: 26 days   Patient Care Team:  Armando Valentine MD as PCP - General (Internal Medicine)  Lisa Arriaza MD as Consulting Physician (Obstetrics and Gynecology)    Chief Complaint: GBS    Subjective     History of Present Illness        Continues stronger.  Strength improved in upper extremities and lower extremities.  Tolerates therapies.       .  History taken from: patient chart    Objective     Vital Signs  Temp:  [97.8 °F (36.6 °C)-98.5 °F (36.9 °C)] 98.4 °F (36.9 °C)  Heart Rate:  [76-90] 88  Resp:  [16-18] 18  BP: (100-114)/(53-67) 103/53    Physical Exam:  General: Awake, alert, NAD  HEENT: normocepahlic, atraumatic   Heart: RRR, no m/r/g  Lungs: CTAB, no wheezing, rales, or rhonchi  Abdomen: soft, non-tender, non-distended, colostomy , incision dressed  Strength:   BUE: 4+/5 with bilateral elbow flexion, elbow extension, wrist extension, and finger flexion, right greater than left weak hand intrinsics R 3-/5, L 3+/5  Difficulty opposing right thumb to digits 2-3-4-5 but better.   Opposes left thumb to digits 2-3-4-5.   BLE:  HF right 4/5, left 4/5,  knee extension right 4+/5, left  4+/5, ankle dorsiflexion B 4+/5  Extremities: No edema distally    Results Review:     I reviewed the patient's new clinical results.  Results from last 7 days   Lab Units 02/19/20  0647 02/17/20  0550 02/14/20  0609   WBC 10*3/mm3 5.54 4.87 7.17   HEMOGLOBIN g/dL 7.5* 7.2* 8.1*   HEMATOCRIT % 23.4* 22.1* 25.7*   PLATELETS 10*3/mm3 301 249 351     Results from last 7 days   Lab Units 02/14/20  0609   SODIUM mmol/L 137   POTASSIUM mmol/L 3.6   CHLORIDE mmol/L 101   CO2 mmol/L 25.5   BUN mg/dL 17   CREATININE mg/dL 0.97   CALCIUM mg/dL 7.9*   GLUCOSE mg/dL 86       Medication Review:   Scheduled Meds:    Iron 18 mg Sublingual BID   gabapentin 300 mg Oral 4x Daily   loperamide 2 mg Oral TID AC   MULTIVITAMIN ADULT 1 tablet Sublingual Daily   pantoprazole 40 mg Oral Q AM   sodium bicarbonate 1,300 mg Oral TID    Cyanocobalamin 2,500 mcg Sublingual Weekly   Vitamin D-3 5,000 Units Sublingual Daily     Continuous Infusions:   PRN Meds:.•  acetaminophen  •  aluminum sulfate-calcium acetate  •  gabapentin **AND** gabapentin  •  miconazole  •  ondansetron ODT    Assessment/Plan   75 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     GBS  -Completed her plasmapharesis and has gotten good return.   - Will begin PT/OT for ADLS, strength, mobility, txs, gait.   -January 27: On gabapentin 300mg QID. Will continue to monitor and will titrate up as needed.  -Jan 29 -  Feels strength is somewhat better.  Worked on gait with rolling walker yesterday 15 feet. Today utilized ankle weights to decrease ataxic movements with gait. Transfers mod max assist. Bed mobility CTG.  - Feb 6 - Patient complains of increased numbness in her knees.  She is noted to have increased weakness with her hip extensors and possibly in the left quadricep.  Her ambulation distance is less and takes more effort with a shorter stride.  On examination she also appears weaker in her hands and ankles.  Discussed having neurology see her to assess for possibly IVIG or another round of plasmapheresis.  -Feb 7 - increased weakness noted in BUE and BLE and more difficulty with mobility - Neurology starting patient on course of IVIG   -February 10-shows good response on IVIG-tolerating.  Notes improvement in her strength in the upper extremities and lower extremities as well as performance with mobility and self-care.  -February 11-continues to show improvement on IVIG  Feb 12-functional improvement after IVIG with improvement in her handgrip, feeding, transfers, and ambulation as well as improvement in her strength.  February 14-strength noticeably improved in the bilateral upper extremities and bilateral lower extremities.  Ambulating further.    Crohn's  -s/p recent colostomy- wound nurse to see and education for home management.   -If more than 1 liter output  a day, needs Immodium-per Dr. Albrecht colorectal surgeon.     Hyponatremia  -On salt tabs and fluid restriction. Renal following  -January 27: Na 130. Continue salt tabs and fluid restriction per renal. Will continue to monitor  -January 30: Na 133. Continue sodium bicarbonate and fluid restriction per renal.  -Feb 3- Na improved to 138  -February 4-fluid restriction discontinued.  -February 5-sodium 137  -February 10-sodium 140.  Sodium chloride tablets were discontinued on February 8.  Continues on sodium bicarb 1300 mg 3 times a day.    Anemia  -January 27: Hgb/Hct 7.6/24.3- stable. No signs of active bleeding. Will order fecal occult and reticulocyte count. Will continue to monitor.  -Jan 28 - hemoccult positive  -Jan 29 - Stool heme +.  HGB stable at 7.5. She had hypotension and tachycardia yesterday 87/64 and 110) , better today (99/63 and 88).  Reviewed option of transfusion PRBCs. She wished to hold on transfusion unless absolutely necessary. Discussed if HGB < 7, would recommend definitely transfuse.  -January 30: Repeat CBC scheduled for tomorrow. Will continue to monitor.    -January 31: Hgb 7.2. Will transfuse 1 unit of PRBCs today. Ordered anemia studies. Spoke with Dr. Albrecht and if iron supplementation is required she recommends IV iron for better absorption.  -February 1: Hemoglobin improved 8.8.  IV iron infusion ordered by nephrology  -Feb 3 - HGB improved 9.5. Not tolerate IV iron infusion due to burning in vein, does not wish to have placed a more proximal IV. She had tow infusions. She will get EZ Melt Iron from outside to take by mouth; on discussion with colorectal surgery last week she should be able to absorb PO iron.  February 5-hemoglobin 8.5  February 10-hemoglobin 8.4  February 12-hemoglobin 7.3-As she has the midline in, will plan to resume the last 3 of the iron infusions that she did not get previously with the peripheral IV.  Will start tomorrow scheduled every other day for total of 3  doses.  Recheck hemoglobin on Friday.  February 14-hemoglobin 8.1.  She had a temperature last evening possibly related to the iron infusion.  She tolerated the infusion and would like to have it on consecutive days ( rather than every other day as was ordered to give her a break).  February 19-hemoglobin 7.5-stable    DVT prophylaxis  -SCDS for now.   -January 27: Heparin has been on hold due to HGB trending down. Following results of fecal occult and reticulocyte count will consider restarting Heparin for DVT prophylaxis.   -January 28 - hemoccult positive.     Vitamin D deficiency-vitamin D 22.4  on January 29.    Feb 1 - Patient does not wish to take vitamin D p.o. through the hospital supply.  Wishes to have her vitamin D brought in from home.    Vitamin B12 replacement-sublingual from home    RUE antecubital fossa phlebitis/cellulitis-February 3-no sign of infection. K-PAD.   February 4-Right antecubital fossa phlebitis with cord palpable/tender with surrounding erythema consistent with cellulitis. Allergy to Keflex - throat swelled. Will start Doxycycline 100 mg bid x 7 days, continue K-pad.   February 5-She continues to have pain and redness and swelling of the right antecubital fossa and distal upper arm.  Reviewed continue with doxycycline which was started yesterday but if there is no show improvement will look to adjust antibiotic tomorrow.  White blood cell count is 7.22 with normal differential..  Feb 7 - area improved today on doxycycline.  February 10-further improvement.    TEAM CONF - JAN 28 - FLAT AFFECT. SUPINE SIT MIN ASSIST. TRANSFERS MAX 2. NONAMBULATORY. UBB MIN. LBB MAX. UBD MOD.  LBD DEP. ABD WOUND CARE. STOOL HEMOCCULT POSITIVE.  ELOS - 5 WEEKS.     TEAM CONF - FEB 4 - BED SBA. TRANSFERS MIN-MOD. GAIT 30 FEET MIN 2 FIDEL WALKER. UBD MOD. LBD DEP. ON 1200 CC FLUID RESTRICTION. EATING OKAY.  ABD WOUND CLOSING. OSTOMY DEVICE STAYING ON.  CONTINENT. NEEDS TO GET UP TO BSC.   ELOS- 4 WEEKS.      February 10-she had shown a decline in her mobility and self-care with flare of Guillain-Barré syndrome but has shown response on IVIG.  Improving.  Most recently in physical therapy and Occupational Therapy-toilet transfers moderate assist, shower transfer to be assessed, bathing minimum assist upper body and maximum assist lower body, dressing moderate assist upper body independent lower body.  Grooming minimum assist.  Toileting dependent.  Eating with minimal assist to set up with built up utensils.  Bed mobility contact-guard.  Ambulated 25 feet moderate assist of 2 with Aircast bilateral ankles.    TEAM CONF - FEB 11 - She feels IVIG has been helpful.  She notes improvement in the strength in her hands and in her legs.  Easier transfers.  Walk better.  She had had a decline in her ability to do to box and blocks but that improved yesterday.  Tolerating IVIG.   describes her performance as 180 degree change from Friday.  In physical therapy and Occupational Therapy-toilet transfers moderate assist, shower transfer to be assessed, bathing minimum assist upper body and maximum assist lower body, dressing moderate assist upper body independent lower body.  Grooming minimum assist.  Toileting dependent.  Eating with minimal assist to set up with built up utensils.  Bed mobility contact-guard.  Ambulated 25 feet moderate assist of 2 with Aircast bilateral ankles.   Continent bladder. Abd wound improving.   ELOS - 3 WEEKS    TEAM CONF - FEB 18 - BED SUP. TRANSFERS MIN. GAIT 120 FEET RW MIN OF 2. TOILET TRANSFER MIN-MOD. TOILETING MAX ASSIST. UBD MIN. LBD MAX-DEP. UBB MIN. .LBB MOD ASSIST. CONTINENT BLADDER. OSTOMY. ABD WOUND IMPROVED. GABAPENTIN FOR PAIN.    ELOS - 2 WEEKS    Adolfo Valle MD  02/19/20

## 2020-02-19 NOTE — PLAN OF CARE
Problem: Patient Care Overview  Goal: Plan of Care Review  Outcome: Ongoing (interventions implemented as appropriate)  Flowsheets (Taken 2/19/2020 0852)  Outcome Summary: Attended therapies and cooperative with staff.  Sits up in bed most of the day in between therapies.   visits during the day.  Ileostomy pouch intact.  Can move all extremities.  Uses call light for assistance.  No safety issues noted.  Progress, Functional Goals: demonstrating adequate progress  Plan of Care Reviewed With: patient  [facility] Plan of Care Review: progress ongoing, continue     Problem: Patient Care Overview  Goal: Individualization and Mutuality  Outcome: Ongoing (interventions implemented as appropriate)  Flowsheets (Taken 2/19/2020 3233)  Patient Specific Preferences: Sits up in bed when not in therapy

## 2020-02-19 NOTE — THERAPY TREATMENT NOTE
Inpatient Rehabilitation - Occupational Therapy Treatment Note    [facility]     Patient Name: She López  : 1945  MRN: [id number]    Today's Date: 2020  Onset of Illness/Injury or Date of Surgery: 20              Admit Date: 2020      Visit Dx:    ICD-10-CM ICD-9-CM   1. General weakness R53.1 780.79       Patient Active Problem List   Diagnosis   • Crohn's disease of small intestine with other complication (CMS/HCC)   • Type 2 diabetes mellitus without complication (CMS/HCC)   • RSD upper limb   • Neuropathic pain of hand   • Hyperlipidemia   • Cervical myelopathy (CMS/HCC)   • Central pain syndrome   • Carpal tunnel syndrome   • Vitamin B 12 deficiency   • Guillain Barré syndrome (CMS/HCC)         Therapy Treatment    IRF Treatment Summary     Row Name 20 1533 02/19/20 1302 20 0800       Evaluation/Treatment Time and Intent    Subjective Information  no complaints  -AF  no complaints  -LH  no complaints  -LH,NM,LH2    Existing Precautions/Restrictions  fall  -AF  fall  -LH  fall  -LH,NM,LH2    Document Type  therapy note (daily note)  -AF  therapy note (daily note)  -  therapy note (daily note)  -LH,NM,LH2    Mode of Treatment  occupational therapy  -AF  individual therapy;physical therapy  -LH  physical therapy  -LH,NM,LH2    Patient/Family Observations  w/c level for all sessions,  present  -AF  pt seated in WC spouse bedside no acute distress  -  Pt's  brought pt to the gym. Reports no difficulty c  assisting pt in room.   -LH,NM,LH2    Recorded by [AF] Reina Perera, OTR [LH] Chelsie Cline, PT [LH,NM,LH2] Chelsie Cline, PT (r) Jaxon Atkinson, YUSUF Student (t) Chelsie Cline, PT (c)    Row Name 20 1533 20 1302 20 0800       Cognition/Psychosocial- PT/OT    Affect/Mental Status (Cognitive)  WFL  -AF  WFL  -LH  WFL  -LH,NM,LH2    Orientation Status (Cognition)  oriented x 4  -AF  oriented x 4  -LH  oriented x 4   -LH,NM,LH2    Follows Commands (Cognition)  WFL  -AF  WFL  -LH  WNL  -LH,NM,LH2    Personal Safety Interventions  fall prevention program maintained;gait belt;nonskid shoes/slippers when out of bed  -AF  fall prevention program maintained;gait belt;supervised activity  -LH  fall prevention program maintained;gait belt;nonskid shoes/slippers when out of bed;supervised activity  -LH,NM,LH2    Safety Deficit (Cognitive)  insight into deficits/self awareness  -AF  --  -LH  --    Recorded by [AF] Reina Perera, OTR [LH] Chelsie Cline, PT [LH,NM,LH2] Cheslie Cline, PT (r) Jaxon Atkinson, PT Student (t) Chelsie Cline, PT (c)    Row Name 02/19/20 0800             Mobility    Advanced Gait Activity  curb negotiation  -LH,NM,LH2      Additional Documentation  Advanced Gait Activity (Row)  -LH,NM,LH2      Recorded by [LH,NM,LH2] Chelsie Cline, PT (r) Jaxon Atkinson, PT Student (t) Chelsie Cline, PT (c)      Row Name 02/19/20 1533 02/19/20 1302          Bed Mobility Assessment/Treatment    Sit-Supine [place] (Bed Mobility)  supervision  -AF  --     Comment (Bed Mobility)  --  NT  -LH     Recorded by [AF] Reina Perera, OTR [] Chelsie Cline, PT     Row Name 02/19/20 1302 02/19/20 0800          Functional Mobility    Functional Mobility- Ind. Level  --  contact guard assist;minimum assist (75% patient effort);verbal cues required Doreen during turns  -LH,NM,LH2     Functional Mobility- Device  --  rolling walker aircast on L ankle   -LH,NM,LH2     Functional Mobility-Distance (Feet)  --  100 x2  -LH,NM,LH2     Functional Mobility- Safety Issues  --  balance decreased during turns  -LH,NM,LH2     Functional Mobility- Comment  practiced ambulation w turning and backing up to - VCs for improved control w backing up  -  Pt amb w/in the gym and sunroom between chairs, working on turns, tighter spaces and carpet.   -LH,NM,LH2     Recorded by [LH] Chelsie Cline, PT [,NM,LH2] Chelsie Cline, PT (r) Jaxon Atkinson,  PT Student (t) Chelsie Cline, PT (c)     Row Name 02/19/20 0800             Transfer Assessment/Treatment    Comment (Transfers)  Performed sit<>stands in the sunroom from a lower chair c armrests and couch using armrest on the R, Doreen.   -LH,NM,LH2      Recorded by [LH,NM,LH2] Chelsie Cline, PT (r) Jaxon Atkinson, PT Student (t) Chelsie Cline, PT (c)      Row Name 02/19/20 1533             Bed-Chair Transfer    Bed-Chair Given (Transfers)  minimum assist (75% patient effort);verbal cues  -AF      Assistive Device (Bed-Chair Transfers)  wheelchair  -AF      Recorded by [AF] Reina Perera OTR      Row Name 02/19/20 1533             Chair-Bed Transfer    Chair-Bed Given (Transfers)  minimum assist (75% patient effort);verbal cues  -AF      Assistive Device (Chair-Bed Transfers)  wheelchair  -AF      Recorded by [AF] Reina Perera OTR      Row Name 02/19/20 1302 02/19/20 0800          Sit-Stand Transfer    Sit-Stand Given (Transfers)  minimum assist (75% patient effort);contact guard;verbal cues  -LH  minimum assist (75% patient effort);contact guard;verbal cues  -LH,NM,LH2     Assistive Device (Sit-Stand Transfers)  walker, front-wheeled  -LH  wheelchair;walker, front-wheeled  -LH,NM,LH2     Recorded by [LH] Chelsie Cline, PT [LH,NM,LH2] Chelsie Cline, PT (r) Jaxon Atkinson, PT Student (t) Chelsie Cline, PT (c)     Row Name 02/19/20 1302 02/19/20 0800          Stand-Sit Transfer    Stand-Sit Given (Transfers)  contact guard;minimum assist (75% patient effort);verbal cues  -LH  minimum assist (75% patient effort);contact guard;verbal cues  -LH,NM,LH2     Assistive Device (Stand-Sit Transfers)  walker, front-wheeled  -LH  wheelchair;walker, front-wheeled  -LH,NM,LH2     Recorded by [LH] Chelsie Cline, PT [LH,NM,LH2] Chelsie Cline, PT (r) Jaxon Atkinson, YUSUF Student (t) Chelsie Cline, PT (c)     Row Name 02/19/20 0800             Stand Pivot/Stand Step Transfer    Stand  Pivot/Stand Step [place]  minimum assist (75% patient effort);contact guard;verbal cues  -LH,NM,LH2      Assistive Device (Stand Pivot Stand Step Transfer)  wheelchair;walker, front-wheeled  -LH,NM,LH2      Recorded by [LH,NM,LH2] Chelsie Cline, PT (r) Jaxon Atkinson PT Student (t) Chelsie Cline, PT (c)      Row Name 02/19/20 1533             Toilet Transfer    Type (Toilet Transfer)  stand pivot/stand step  -AF      [place] Level (Toilet Transfer)  minimum assist (75% patient effort);verbal cues  -AF      Assistive Device (Toilet Transfer)  commode;grab bars/safety frame;wheelchair  -AF      Recorded by [AF] Reina Perera OTR      Row Name 02/19/20 1302 02/19/20 0800          Gait/Stairs Assessment/Training    [place] Level (Gait)  minimum assist (75% patient effort);verbal cues;nonverbal cues (demo/gesture) 2nd person SBA for safety  -  minimum assist (75% patient effort);verbal cues;nonverbal cues (demo/gesture)  -LH,NM,LH2     Assistive Device (Gait)  walker, front-wheeled;AFO  -LH  walker, front-wheeled;AFO AFO LLE   -LH,NM,LH2     Distance in Feet (Gait)  80  -LH  90x2   -LH,NM,LH2     Pattern (Gait)  step-through  -LH  step-through  -LH,NM,LH2     Deviations/Abnormal Patterns (Gait)  bilateral deviations;base of support, wide;ataxic  -LH  bilateral deviations;base of support, wide;ataxic  -LH,NM,LH2     Bilateral Gait Deviations  weight shift ability decreased  -LH  weight shift ability decreased  -LH,NM,LH2     Left Sided Gait Deviations  hip hiking L ankle supination w fatigue-improved w AFO use  -LH  --     Comment (Gait/Stairs)  trial of carbon fiber posterior leaf for improved L foot/ankle stability- pt displays improved mechanics w AFO donned  -LH  ambulated the length of the gym, approx 30' x3 c turns. Ambulated around cones using a rwx c vc for speed and staying inside the fww; on episopde of LOB requiring Doreen.   -LH,NM,LH2     Recorded by [LH] Chelsie Cline, PT [LH,NM,LH2]  "Chelsie Cline, PT (r) Jaxon Atkinson, PT Student (t) Chelsie Cline, PT (c)     Row Name 02/19/20 0800             Curb Negotiation (Mobility)    [place], Curb Negotiation  minimal assist, 75% or more patient effort;2 person assist;verbal cues;nonverbal cues, demo/gestures  -LH,NM,LH2      Comment, Curb Negotiation (Mobility)  Ascended/descended a 4\" step using a rwx c assist to place the rwx, up c the R and down c the R. Progressed to a 6\" step, stepping up c the R and down c the L, showing more control when stepping down c the R instead of the L.   -LH,NM,LH2      Recorded by [LH,NM,LH2] Chelsie Cline PT (r) Jaxon Atkinson, PT Student (t) Chelsie Cline, PT (c)      Row Name 02/19/20 1533             Bathing Assessment/Treatment    Bathing [place] Level  upper body;distal lower extremities/feet;minimum assist (75% patient effort);verbal cues  -AF      Bathing Position  sink side;supported sitting  -AF      Bathing Setup Assistance  obtain supplies  -AF      Recorded by [AF] Reina Perera OTR      Row Name 02/19/20 1533             Upper Body Dressing Assessment/Treatment    Upper Body Dressing Task  upper body dressing skills;set up assistance;pull over garment;minimum assist (75% or more patient effort);front opening garment  -AF      Upper Body Dressing Position  supported sitting  -AF      Set-up Assistance (Upper Body Dressing)  obtain clothing  -AF      Recorded by [AF] Reina Perera OTR      Row Name 02/19/20 1533             Lower Body Dressing Assessment/Treatment    Lower Body Dressing [place] Level  doff;don;socks;shoes/slippers;moderate assist (50% patient effort);verbal cues  -AF      Lower Body Dressing Position  supported sitting  -AF      Lower Body Dressing Setup Assistance  obtain clothing  -AF      Recorded by [AF] Reina Perera OTR      Row Name 02/19/20 1533             Grooming Assessment/Treatment    Grooming [place] Level  grooming skills;set up  -AF   "    Assistive Device (Grooming)  built-up handle items  -AF      Grooming Position  sink side;supported sitting  -AF      Grooming Setup Assistance  obtain supplies  -AF      Recorded by [AF] Reina Perera OTR      Row Name 02/19/20 1533             Toileting Assessment/Treatment    Toileting [place] Level  toileting skills;maximum assist (25% patient effort);verbal cues  -AF      Assistive Device Use (Toileting)  grab bar/safety frame;raised toilet seat  -AF      Toileting Position  unsupported sitting;supported standing  -AF      Comment (Toileting)  completed with , therapist present   -AF      Recorded by [AF] Reina Perera, OTR      Row Name 02/19/20 1533             Fine Motor Testing & Training    Comment, Fine Motor Coordination  FMC with manipulating rings onto vertical dowel pa with MOD diffculty   -AF      Recorded by [AF] Reina Perera, NILAMR      Row Name 02/19/20 1302             Proprioception Assessment    Left Lower Extremity: Proprioception Assessment  moderate impairment, 50 to 74% correct responses  -      Right Lower Extremity: Proprioception Assessment  mild impairment, 75% or more correct responses  -LH      Recorded by [LH] Chelsie Cline, PT      Row Name 02/19/20 1533             Sensory Re-Education    Discriminative (Sensory Re-Education)  discrimination, objects in rice;stereognosis, specific items B hands, with MOD/MSX diffiuclty   -AF      Recorded by [AF] Reina Perera, NILAMR      Row Name 02/19/20 1533 02/19/20 1302 02/19/20 0800       Pain Scale: Numbers Pre/Post-Treatment    Pain Scale: Numbers, Pretreatment  0/10 - no pain  -AF  0/10 - no pain  -LH  0/10 - no pain  -LH,NM,LH2    Pain Scale: Numbers, Post-Treatment  0/10 - no pain  -AF  0/10 - no pain  -LH  0/10 - no pain  -LH,NM,LH2    Pre/Post Treatment Pain Comment  --  persistent c/o numbness/burning in hands  -LH  --    Recorded by [AF] Reina Perera, OTR [LH] Chelsie Cline, PT [LH,NM,LH2] Son,  "Chelsie ESTRADA, PT (r) Jaxon Atkinson, PT Student (t) Chelsie Cline, PT (c)    Row Name 02/19/20 1533             Static Sitting Balance    Level of [place] (Unsupported Sitting, Static Balance)  standby assist  -AF      Sitting Position (Unsupported Sitting, Static Balance)  sitting edge of mat  -AF      Time Able to Maintain Position (Unsupported Sitting, Static Balance)  more than 5 minutes  -AF      Comment (Unsupported Sitting, Static Balance)  with FMC tasks, and isometric BUE to increase core strength  -AF      Recorded by [AF] Reina Perera OTR      Row Name 02/19/20 1302             Standing Balance Activity    Support Needed for Balance (Standing, Balance Training)  minimal external support for balance, 75% patient effort;uses both upper extremities for support  -LH      Comment (Standing, Balance Training)  standing in // bars, step ups onto 4\" aerobic step, 10 reps w aircast LLE, 10 reps w L AFO donned- improved LLE quality and mechanics clearing step w AFO donned.  -LH      Recorded by [LH] Chelsie Cline, PT      Row Name 02/19/20 1533             Upper Extremity Seated Therapeutic Exercise    Performed, Seated Upper Extremity (Therapeutic Exercise)  shoulder horizontal abduction/adduction;shoulder flexion/extension;scapular protraction/retraction;forearm supination/pronation;wrist flexion/extension;digit flexion/extension  -AF      Device, Seated Upper Extremity (Therapeutic Exercise)  -- #3 hand weight, #3 dowel pa  -AF      Exercise Type, Seated Upper Extremity (Therapeutic Exercise)  AROM (active range of motion);resistive exercise  -AF      Expected Outcomes, Seated Upper Extremity (Therapeutic Exercise)  improve motor control;improve performance, BADLs;improve performance, transfer skills  -AF      Sets/Reps Detail, Seated Upper Extremity (Therapeutic Exercise)  2/20  -AF      Transfers Skills, Training to Functional Activity, Seated Upper Extremity (Therapeutic Exercise)  beginning to " transfer skills to functional activity  -AF      Comment, Seated Upper Extremity (Therapeutic Exercise)  rest breaks, blue hand gripper  -AF      Recorded by [AF] Reina Perera OTR      Row Name 02/19/20 1302             Lower Extremity Seated Therapeutic Exercise    Performed, Seated Lower Extremity (Therapeutic Exercise)  hip flexion/extension;hip abduction/adduction;LAQ (long arc quad), knee extension;ankle dorsiflexion/plantarflexion;knee flexion/extension  -      Exercise Type, Seated Lower Extremity (Therapeutic Exercise)  AROM (active range of motion);resistive exercise [facility]  -      Sets/Reps Detail, Seated Lower Extremity (Therapeutic Exercise)  1/20  -      Recorded by [] Chelsie Cline, YUSUF      Row Name 02/19/20 1533 02/19/20 1302 02/19/20 0800       Positioning and Restraints    Pre-Treatment Position  sitting in chair/recliner  -AF  sitting in chair/recliner  -  sitting in chair/recliner  -,NM,LH2    Post Treatment Position  wheelchair  -AF  wheelchair  -  wheelchair  -,NM,LH2    In Bed  supine;call light within reach;encouraged to call for assist;exit alarm on in AM  -AF  --  --    In Wheelchair  sitting;with PT;with other staff with RT In AM, wtih PT in PM  -AF  sitting;call light within reach;encouraged to call for assist;with family/caregiver  -  exit alarm on;with family/caregiver  -,NM,LH2    Recorded by [AF] Reina Perera, NILAMR [] Chelsie Cline, PT [,NM,LH2] Chelsie Cline, PT (r) Jaxon Atkinson, PT Student (t) Chelsie Cline, PT (c)      User Key  (r) = Recorded By, (t) = Taken By, (c) = Cosigned By    Initials Name Effective Dates     Chelsie Cline, PT 04/03/18 -     AF Reina Perera, NILAMR 04/03/18 -     Jaxon Arteaga, PT Student 01/03/20 -           Wound 12/09/19 1510 abdomen Incision (Active)   Dressing Appearance dry;intact 2/19/2020  9:19 AM   Closure SUJEY 2/19/2020  9:19 AM   Base dressing in place, unable to visualize 2/19/2020  9:19 AM         OT  Recommendation and Plan                 OT IRF GOALS     Row Name 02/12/20 1514             Transfer Goal 1 (OT-IRF)    Activity/Assistive Device (Transfer Goal 1, OT-IRF)  toilet;shower chair;walk-in shower  -AF      [place] Level (Transfer Goal 1, OT-IRF)  minimum assist (75% or more patient effort);verbal cues required  -AF      Time Frame (Transfer Goal 1, OT-IRF)  short term goal (STG)  -AF      Progress/Outcomes (Transfer Goal 1, OT-IRF)  goal ongoing  -AF         Transfer Goal 2 (OT-IRF)    Activity/Assistive Device (Transfer Goal 2, OT-IRF)  toilet;shower chair;walk-in shower  -AF      [place] Level (Transfer Goal 2, OT-IRF)  verbal cues required;contact guard assist  -AF      Time Frame (Transfer Goal 2, OT-IRF)  long term goal (LTG)  -AF      Progress/Outcomes (Transfer Goal 2, OT-IRF)  goal ongoing  -AF         Bathing Goal 1 (OT-IRF)    Activity/Device (Bathing Goal 1, OT-IRF)  bathing skills, all;grab bar/tub rail;hand-held shower spray hose;shower chair  -AF      [place] Level (Bathing Goal 1, OT-IRF)  verbal cues required;moderate assist (50-74% patient effort)  -AF      Time Frame (Bathing Goal 1, OT-IRF)  short term goal (STG)  -AF      Progress/Outcomes (Bathing Goal 1, OT-IRF)  goal ongoing  -AF         Bathing Goal 2 (OT-IRF)    Activity/Device (Bathing Goal 2, OT-IRF)  bathing skills, all;grab bar/tub rail;hand-held shower spray hose;shower chair  -AF      [place] Level (Bathing Goal 2, OT-IRF)  verbal cues required;contact guard assist  -AF      Time Frame (Bathing Goal 2, OT-IRF)  long term goal (LTG)  -AF      Progress/Outcomes (Bathing Goal 2, OT-IRF)  goal ongoing  -AF         UB Dressing Goal 1 (OT-IRF)    Activity/Device (UB Dressing Goal 1, OT-IRF)  upper body dressing  -AF      [place] (UB Dress Goal 1, OT-IRF)  set-up required;verbal cues required  -AF      Time Frame (UB Dressing Goal 1, OT-IRF)  long term goal (LTG)  -AF      Progress/Outcomes (UB Dressing  Goal 1, OT-IRF)  goal ongoing  -AF         LB Dressing Goal 1 (OT-IRF)    Activity/Device (LB Dressing Goal 1, OT-IRF)  lower body dressing  -AF      [place] (LB Dressing Goal 1, OT-IRF)  verbal cues required;moderate assist (50-74% patient effort)  -AF      Time Frame (LB Dressing Goal 1, OT-IRF)  short term goal (STG)  -AF      Progress/Outcomes (LB Dressing Goal 1, OT-IRF)  goal ongoing  -AF         LB Dressing Goal 2 (OT-IRF)    Activity/Device (LB Dressing Goal 2, OT-IRF)  lower body dressing  -AF      [place] (LB Dressing Goal 2, OT-IRF)  verbal cues required;contact guard assist  -AF      Time Frame (LB Dressing Goal 2, OT-IRF)  long term goal (LTG)  -AF      Progress/Outcomes (LB Dressing Goal 2, OT-IRF)  goal ongoing  -AF         Grooming Goal 1 (OT-IRF)    Activity/Device (Grooming Goal 1, OT-IRF)  grooming skills, all  -AF      [place] (Grooming Goal 1, OT-IRF)  set-up required  -AF      Time Frame (Grooming Goal 1, OT-IRF)  short term goal (STG)  -AF      Progress/Outcomes (Grooming Goal 1, OT-IRF)  goal ongoing  -AF         Grooming Goal 2 (OT-IRF)    Activity/Device (Grooming Goal 2, OT-IRF)  grooming skills, all  -AF      [place] (Grooming Goal 2, OT-IRF)  conditional independence  -AF      Time Frame (Grooming Goal 2, OT-IRF)  long term goal (LTG)  -AF      Progress/Outcomes (Grooming Goal 2, OT-IRF)  goal ongoing  -AF         Toileting Goal 1 (OT-IRF)    Activity/Device (Toileting Goal 1, OT-IRF)  toileting skills, all;grab bar/safety frame;raised toilet seat  -AF      [place] Level (Toileting Goal 1, OT-IRF)  maximum assist (25-49% patient effort);moderate assist (50-74% patient effort)  -AF      Time Frame (Toileting Goal 1, OT-IRF)  short term goal (STG)  -AF      Progress/Outcomes (Toileting Goal 1, OT-IRF)  goal ongoing  -AF         Toileting Goal 2 (OT-IRF)    Activity/Device (Toileting Goal 2, OT-IRF)  toileting skills, all;grab bar/safety frame;raised toilet seat   -AF      [place] Level (Toileting Goal 2, OT-IRF)  minimum assist (75% or more patient effort);verbal cues required  -AF      Time Frame (Toileting Goal 2, OT-IRF)  long term goal (LTG)  -AF      Progress/Outcomes (Toileting Goal 2, OT-IRF)  goal ongoing  -AF         Balance Goal 1 (OT)    Activity/Assistive Device (Balance Goal 1, OT)  standing, static  -AF      [place] Level/Cues Needed (Balance Goal 1, OT)  moderate assist (50-74% patient effort)  -AF      Time Frame (Balance Goal 1, OT)  short term goal (STG)  -AF      Progress/Outcomes (Balance Goal 1, OT)  goal ongoing  -AF         Balance Goal 2 (OT)    Activity/Assistive Device (Balance Goal 2, OT)  standing, static  -AF      [place] Level (Balance Goal 2, OT)  minimum assist (75% or more patient effort);verbal cues required  -AF      Time Frame (Balance Goal 2, OT)  long term goal (LTG)  -AF      Progress/Outcome (Balance Goal 2, OT)  goal ongoing  -AF         Caregiver Training Goal 1 (OT-IRF)    Caregiver Training Goal 1 (OT-IRF)  pt and  will demo safe techniques with ADLs, transfers, HEP and AE prior to d/c home with home health services   -AF      Time Frame (Caregiver Training Goal 1, OT-IRF)  long term goal (LTG)  -AF      Progress/Outcomes (Caregiver Training Goal 1, OT-IRF)  goal ongoing  -AF        User Key  (r) = Recorded By, (t) = Taken By, (c) = Cosigned By    Initials Name Provider Type    Reina Noe OTMENDEZ Occupational Therapist                     Time Calculation:     Time Calculation- OT     Row Name 02/19/20 1539 02/19/20 1538 02/19/20 1537       Time Calculation- OT    OT Start Time  1400  -AF  1000  -AF  0830  -AF    OT Stop Time  1430  -AF  1030  -AF  0900  -AF    OT Time Calculation (min)  30 min  -AF  30 min  -AF  30 min  -AF      User Key  (r) = Recorded By, (t) = Taken By, (c) = Cosigned By    Initials Name Provider Type    Reina Noe, OTR Occupational Therapist          Therapy Charges for  Today     Code Description Service Date Service Provider Modifiers Qty    90808193426 HC OT SELF CARE/MGMT/TRAIN EA 15 MIN 2/18/2020 Reina Perera, OTR GO 2    95219682163 HC OT THER PROC EA 15 MIN 2/18/2020 Reina Perera, OTR GO 1    46802858377 HC OT NEUROMUSC RE EDUCATION EA 15 MIN 2/18/2020 Reina Perera, OTR GO 1    23941215011 HC OT THERAPEUTIC ACT EA 15 MIN 2/18/2020 Reina Perera, OTR GO 1    90773304865 HC OT NEUROMUSC RE EDUCATION EA 15 MIN 2/18/2020 Reina Perera, OTR GO 1    96901231040 HC OT SELF CARE/MGMT/TRAIN EA 15 MIN 2/19/2020 Reina Perera, OTR GO 2    61910654585 HC OT THER PROC EA 15 MIN 2/19/2020 Reina Perera, OTR GO 2    49555072193 HC OT NEUROMUSC RE EDUCATION EA 15 MIN 2/19/2020 Reina Perera, OTR GO 2                   Reina Perera OTR  2/19/2020

## 2020-02-19 NOTE — PROGRESS NOTES
Weekly progress report and length of stay reviewed with pt and . Family conference scheduled for tomorrow @ 1:00.

## 2020-02-19 NOTE — PROGRESS NOTES
Occupational Therapy: [place]    Physical Therapy: Individual: 90 minutes.    Speech Language Pathology:  [place]    Signed by: Jaxon Atkinson PT Student     - CoSigned By: Chelsie Cline PT 2/19/2020 4:09:13 PM

## 2020-02-19 NOTE — PROGRESS NOTES
Inpatient Rehabilitation Plan of Care Note    Plan of Care  Care Plan Reviewed - Updates as Follows    Body Systems    [RN] Integumentary(Active)  Current Status(02/19/2020): Abdominal incision healed.ileostomy bag in place.  both changed per WOCN  Weekly Goal(02/26/2020): No S&S infection noted. Skin is intact.  Discharge Goal: No S&S infection noted Skin is intact.    Performed Intervention(s)  Daily skin inspection      Psychosocial    [RN] Coping/Adjustment(Active)  Current Status(02/19/2020): Supportive family,  very helpful and assists  patient with care.  Weekly Goal(02/26/2020): Identify progress in functional status  Discharge Goal: Demonstrate healthy coping strategies    Performed Intervention(s)  Verbalize needs and concerns  calm enviroment  Therapeutic environmental set-up      Safety    [RN] Potential for Injury(Active)  Current Status(02/19/2020): No unsafe behaviors. Weakness of lower extremities  Weekly Goal(02/26/2020): Use call light 100%  Discharge Goal: Pt/family aware of risk of fall and safety in the home setting    Performed Intervention(s)  Bed alarm, wc alarm  Items within reach  Safety rounds      Sphincter Control    [RN] Bladder Management(Active)  Current Status(02/19/2020): pt has been continent, using bedpan.She is reluctant  to use BSC. does wear brief.  Weekly Goal(02/26/2020): Continent bladder 100%  Discharge Goal: Continent bladder 100%    [RN] Bowel Management(Active)  Current Status(02/19/2020): Has ileostomy since 12/20/19.  aware of care  for ileostomy.  Weekly Goal(02/26/2020): maintain education of ileostomy with pt and family  Discharge Goal: Independent with ileostomy care    Performed Intervention(s)  Monitor intake and output  Encourage appropriate diet  wound ostomy nurse to continue to see pt for ostomy care.    Signed by: Romina Cotton RN

## 2020-02-20 PROCEDURE — 97110 THERAPEUTIC EXERCISES: CPT

## 2020-02-20 PROCEDURE — 97112 NEUROMUSCULAR REEDUCATION: CPT

## 2020-02-20 PROCEDURE — 97530 THERAPEUTIC ACTIVITIES: CPT

## 2020-02-20 PROCEDURE — 97535 SELF CARE MNGMENT TRAINING: CPT

## 2020-02-20 PROCEDURE — 97116 GAIT TRAINING THERAPY: CPT

## 2020-02-20 RX ADMIN — Medication 1 TABLET: at 07:49

## 2020-02-20 RX ADMIN — SODIUM BICARBONATE 1300 MG: 650 TABLET ORAL at 17:01

## 2020-02-20 RX ADMIN — CHOLECALCIFEROL TAB 125 MCG (5000 UNIT) 5000 UNITS: 125 TAB at 07:49

## 2020-02-20 RX ADMIN — GABAPENTIN 300 MG: 300 CAPSULE ORAL at 07:49

## 2020-02-20 RX ADMIN — GABAPENTIN 300 MG: 300 CAPSULE ORAL at 03:09

## 2020-02-20 RX ADMIN — LOPERAMIDE HYDROCHLORIDE 2 MG: 2 CAPSULE ORAL at 17:01

## 2020-02-20 RX ADMIN — GABAPENTIN 300 MG: 300 CAPSULE ORAL at 12:26

## 2020-02-20 RX ADMIN — SODIUM BICARBONATE 1300 MG: 650 TABLET ORAL at 22:56

## 2020-02-20 RX ADMIN — LOPERAMIDE HYDROCHLORIDE 2 MG: 2 CAPSULE ORAL at 06:29

## 2020-02-20 RX ADMIN — PANTOPRAZOLE SODIUM 40 MG: 40 TABLET, DELAYED RELEASE ORAL at 06:29

## 2020-02-20 RX ADMIN — GABAPENTIN 300 MG: 300 CAPSULE ORAL at 22:23

## 2020-02-20 RX ADMIN — LOPERAMIDE HYDROCHLORIDE 2 MG: 2 CAPSULE ORAL at 12:26

## 2020-02-20 RX ADMIN — SODIUM BICARBONATE 1300 MG: 650 TABLET ORAL at 07:49

## 2020-02-20 RX ADMIN — GABAPENTIN 300 MG: 300 CAPSULE ORAL at 17:01

## 2020-02-20 RX ADMIN — Medication 18 MG: at 22:23

## 2020-02-20 RX ADMIN — Medication 18 MG: at 07:49

## 2020-02-20 NOTE — PLAN OF CARE
Problem: Patient Care Overview  Goal: Plan of Care Review  Outcome: Ongoing (interventions implemented as appropriate)  Flowsheets (Taken 2/20/2020 7279)  Outcome Summary: Voids per bedpan. Ileostomy emptied per Nursing. Gabapentin 3am dose given per pt request for bilat. hand pain. General weakness with poor fine motor control with hands.  Progress, Functional Goals: demonstrating adequate progress  Plan of Care Reviewed With: patient  IRF Plan of Care Review: progress ongoing, continue

## 2020-02-20 NOTE — THERAPY TREATMENT NOTE
Inpatient Rehabilitation - Occupational Therapy Treatment Note    [facility]     Patient Name: She López  : 1945  MRN: [id number]    Today's Date: 2020  Onset of Illness/Injury or Date of Surgery: 20              Admit Date: 2020      Visit Dx:    ICD-10-CM ICD-9-CM   1. General weakness R53.1 780.79       Patient Active Problem List   Diagnosis   • Crohn's disease of small intestine with other complication (CMS/HCC)   • Type 2 diabetes mellitus without complication (CMS/HCC)   • RSD upper limb   • Neuropathic pain of hand   • Hyperlipidemia   • Cervical myelopathy (CMS/HCC)   • Central pain syndrome   • Carpal tunnel syndrome   • Vitamin B 12 deficiency   • Guillain Barré syndrome (CMS/HCC)         Therapy Treatment    IRF Treatment Summary     Row Name 20 1536 20 0800          Evaluation/Treatment Time and Intent    Subjective Information  no complaints  -AF  no complaints  -LH,NM,LH2     Existing Precautions/Restrictions  fall  -AF  fall  -LH,NM,LH2     Document Type  therapy note (daily note)  -AF  therapy note (daily note)  -LH,NM,LH2     Mode of Treatment  occupational therapy  -AF  physical therapy  -LH,NM,LH2     Patient/Family Observations  sitting up in w/c in AM and PM sessions,  present   -AF  Sitting in wc c . No acute distress.  -LH,NM,LH2     Recorded by [AF] Reina Perera, OTR [LH,NM,LH2] Chelsie Cline, PT (r) Jaxon Atkinson PT Student (t) Chelsie Cline, PT (c)     Row Name 20 1536 20 0800          Cognition/Psychosocial- PT/OT    Affect/Mental Status (Cognitive)  WFL  -AF  WFL  -LH,NM,LH2     Orientation Status (Cognition)  oriented x 4  -AF  oriented x 4  -LH,NM,LH2     Follows Commands (Cognition)  WFL  -AF  WFL  -LH,NM,LH2     Personal Safety Interventions  fall prevention program maintained;gait belt;nonskid shoes/slippers when out of bed  -AF  fall prevention program maintained;gait belt;nonskid shoes/slippers when  out of bed;supervised activity  -LH,NM,LH2     Safety Deficit (Cognitive)  insight into deficits/self awareness  -AF  --     Recorded by [AF] Reina Perera, NILMAR [LH,NM,LH2] Chelsie Cline, PT (r) Jaxon Atkinson, YUSUF Student (t) Chelsie Cline, PT (c)     Row Name 02/20/20 1536 02/20/20 0800          Transfer Assessment/Treatment    Comment (Transfers)  sit to stand at counter top CGA  -AF  Performed transfers from the w/c, car, and a low mat without armrests. Required Doreen when pivoting and backing up c the rwx. VCfor technique c car transfers.  -LH,NM,LH2     Recorded by [AF] Reina Perera, NILAMR [LH,NM,LH2] Chelsie Cline, PT (r) Jaxon Atkinson, YUSUF Student (t) Chelsie Cline, PT (c)     Row Name 02/20/20 0800             Bed-Chair Transfer    Bed-Chair [place] (Transfers)  minimum assist (75% patient effort);verbal cues  -LH,NM,LH2      Assistive Device (Bed-Chair Transfers)  wheelchair  -LH,NM,LH2      Recorded by [LH,NM,LH2] Chelsie Cline, PT (r) Jaxon Atkinson, PT Student (t) Chelsie Cline, PT (c)      Row Name 02/20/20 0800             Chair-Bed Transfer    Chair-Bed [place] (Transfers)  minimum assist (75% patient effort);verbal cues  -LH,NM,LH2      Assistive Device (Chair-Bed Transfers)  wheelchair  -LH,NM,LH2      Recorded by [LH,NM,LH2] Chelsie Cline, PT (r) Jaxon Atkinson, PT Student (t) Chelsie Cline, PT (c)      Row Name 02/20/20 0800             Sit-Stand Transfer    Sit-Stand [place] (Transfers)  minimum assist (75% patient effort);verbal cues  -LH,NM,LH2      Assistive Device (Sit-Stand Transfers)  walker, front-wheeled  -LH,NM,LH2      Recorded by [LH,NM,LH2] Chelsie Cline, PT (r) Jaxon Atkinson, PT Student (t) Chelsie Cline, PT (c)      Row Name 02/20/20 0800             Stand-Sit Transfer    Stand-Sit [place] (Transfers)  minimum assist (75% patient effort);verbal cues  -LH,NM,LH2      Assistive Device (Stand-Sit Transfers)  walker, front-wheeled  -LH,NM,LH2       Recorded by [LH,NM,LH2] Chelsie Cline, PT (r) Jaxon Atkinson, PT Student (t) Chelsie Cline, PT (c)      Row Name 02/20/20 0800             Stand Pivot/Stand Step Transfer    Stand Pivot/Stand Step [place]  minimum assist (75% patient effort);verbal cues  -LH,NM,LH2      Assistive Device (Stand Pivot Stand Step Transfer)  walker, front-wheeled  -LH,NM,LH2      Recorded by [LH,NM,LH2] Chelsie Cline, PT (r) Jaxon Atkinson, PT Student (t) Chelsie Cline, PT (c)      Row Name 02/20/20 1536             Toilet Transfer    Type (Toilet Transfer)  stand pivot/stand step  -AF      [place] Level (Toilet Transfer)  minimum assist (75% patient effort);verbal cues  -AF      Assistive Device (Toilet Transfer)  commode;grab bars/safety frame;wheelchair  -AF      Recorded by [AF] Reina Perera OTR      Row Name 02/20/20 0800             Car Transfer    Type (Car Transfer)  stand pivot/stand step  -LH,NM,LH2      [place] Level (Car Transfer)  minimum assist (75% patient effort);verbal cues;nonverbal cues (demo/gesture)  -LH,NM,LH2      Assistive Device (Car Transfer)  walker, front-wheeled  -LH,NM,LH2      Recorded by [LH,NM,LH2] Chelsie Cline, PT (r) Jaxon Atkinson, PT Student (t) Chelsie Cline, PT (c)      Row Name 02/20/20 0800             Gait/Stairs Assessment/Training    [place] Level (Gait)  minimum assist (75% patient effort);verbal cues;nonverbal cues (demo/gesture)  -LH,NM,LH2      Assistive Device (Gait)  walker, front-wheeled;AFO L AFO  -LH,NM,LH2      Distance in Feet (Gait)  80x3  -LH,NM,LH2      Pattern (Gait)  step-through  -LH,NM,LH2      Deviations/Abnormal Patterns (Gait)  bilateral deviations;base of support, wide;ataxic  -LH,NM,LH2      Bilateral Gait Deviations  weight shift ability decreased  -LH,NM,LH2      Recorded by [LH,NM,LH2] Chelsie Cline, PT (r) Jaxon Atkinson PT Student (t) Chelsie Cline PT (c)      Row Name 02/20/20 1536             Bathing Assessment/Treatment     Bathing [place] Level  upper body;distal lower extremities/feet;minimum assist (75% patient effort);verbal cues  -AF      Bathing Position  sink side;supported sitting  -AF      Recorded by [AF] Reina Perera R      Row Name 02/20/20 1536             Upper Body Dressing Assessment/Treatment    Upper Body Dressing Task  upper body dressing skills;set up assistance;contact guard  -AF      Upper Body Dressing Position  supported sitting  -AF      Set-up Assistance (Upper Body Dressing)  obtain clothing  -AF      Comment (Upper Body Dressing)  vc's  -AF      Recorded by [AF] Reina Perera, R      Row Name 02/20/20 1536             Lower Body Dressing Assessment/Treatment    Lower Body Dressing [place] Level  don;doff;socks;underwear;shoes/slippers;pants/bottoms;maximum assist (25% patient effort);verbal cues  -AF      Lower Body Dressing Position  supported sitting;supported standing  -AF      Lower Body Dressing Setup Assistance  obtain clothing  -AF      Recorded by [AF] Reina Perera OTR      Row Name 02/20/20 1536             Grooming Assessment/Treatment    Grooming [place] Level  grooming skills;set up  -AF      Assistive Device (Grooming)  built-up handle items  -AF      Grooming Position  sink side;supported sitting  -AF      Grooming Setup Assistance  obtain supplies  -AF      Recorded by [AF] Reina Perera, OTR      Row Name 02/20/20 1536             Toileting Assessment/Treatment    Toileting [place] Level  toileting skills;maximum assist (25% patient effort);verbal cues  -AF      Assistive Device Use (Toileting)  grab bar/safety frame;raised toilet seat  -AF      Toileting Position  unsupported sitting;supported standing  -AF      Comment (Toileting)  assist to empty ilisotomy  -AF      Recorded by [AF] Reina Perera, OTR      Row Name 02/20/20 1536             Fine Motor Testing & Training    Comment, Fine Motor Coordination   blue hand gripper   -AF       Recorded by [AF] Reina Perera OTR      Row Name 02/20/20 1536 02/20/20 0800          Pain Scale: Numbers Pre/Post-Treatment    Pain Scale: Numbers, Pretreatment  0/10 - no pain  -AF  0/10 - no pain  -LH,NM,LH2     Pain Scale: Numbers, Post-Treatment  0/10 - no pain  -AF  0/10 - no pain  -LH,NM,LH2     Recorded by [AF] Reina Perera OTR [LH,NM,LH2] Chelsie Cline, PT (r) Jaxon Atkinson, PT Student (t) Chelsie Cline, PT (c)     Row Name 02/20/20 1536             Static Standing Balance    Level of [place] (Supported Standing, Static Balance)  contact guard assist  -AF      Time Able to Maintain Position (Supported Standing, Static Balance)  2 to 3 minutes  -AF      Assistive Device Utilized (Supported Standing, Static Balance)  -- at counter top  -AF      Recorded by [AF] Reina Perera OTR      Row Name 02/20/20 0800             Sitting Balance Activity    Activities Performed (Sitting, Balance Training)  sit to stand activity with arm support  -LH,NM,LH2      Support Needed (Sitting, Balance Training)  CGA;minimal external support for balance, 75% patient effort  -LH,NM,LH2      Comment (Sitting, Balance Training)  2 sets x 5 reps   -LH,NM,LH2      Recorded by [LH,NM,LH2] Chelsie Cline, PT (r) Jaxon Atkinson, PT Student (t) Chelsie Cline, PT (c)      Row Name 02/20/20 0800             Standing Balance Activity    Activities Performed (Standing, Balance Training)  standing reaching outside base of support balloon tapping; kicking a ball in // bars   -LH,NM,LH2      Support Needed for Balance (Standing, Balance Training)  CGA;minimal external support for balance, 75% patient effort;uses both upper extremities for support;balances without upper extremity support;uses one upper extremity part time for support  -LH,NM,LH2      Progressive Balance Activity (Standing, Balance Training)  speed of movements increased during activity;upper extremity activities added during activity;base of support  narrowed during activity began standing c a wide SAMUEL; required vc to narrow SAMUEL   -LH,NM,LH2      Restrictions (Standing, Balance Training)  One LOB when reaching for cones requiring Doreen to correct c BUE support on a rwx   -LH,NM,LH2      Transfers Skills, Training to Functional Activity (Standing, Balance Training)  beginning to transfer skills to functional activity  -LH,NM,LH2      Comment (Standing, Balance Training)  Standing c BUE support on a wx, pt reached for cones outside SAMUEL and across midline, progressing from BUE support to without support. Performed balloon batting c unilateral UE support and CGA, vc for weight distribution and SAMUEL. In // bars c BUE support, CGA, pt kicked a moving ball interchanging BLE   -LH,NM,LH2      Recorded by [LH,NM,LH2] Chelsie Cline, PT (r) Jaxon Atkinson PT Student (t) Chelsie Cline, PT (c)      Row Name 02/20/20 1536             Upper Extremity Seated Therapeutic Exercise    Performed, Seated Upper Extremity (Therapeutic Exercise)  shoulder horizontal abduction/adduction;shoulder flexion/extension;scapular protraction/retraction;elbow flexion/extension;forearm supination/pronation;wrist flexion/extension;digit flexion/extension  -AF      Device, Seated Upper Extremity (Therapeutic Exercise)  -- #3 hand weight and dowel pa  -AF      Exercise Type, Seated Upper Extremity (Therapeutic Exercise)  AROM (active range of motion);resistive exercise  -AF      Expected Outcomes, Seated Upper Extremity (Therapeutic Exercise)  improve motor control;improve performance, BADLs;improve performance, transfer skills  -AF      Sets/Reps Detail, Seated Upper Extremity (Therapeutic Exercise)  2/20  -AF      Transfers Skills, Training to Functional Activity, Seated Upper Extremity (Therapeutic Exercise)  beginning to transfer skills to functional activity  -AF      Comment, Seated Upper Extremity (Therapeutic Exercise)  able to tolerate increased weight with UB exs  -AF      Recorded by  [AF] Reina Perera OTR      Row Name 02/20/20 0800             Lower Extremity Supine Therapeutic Exercise    Performed, Supine Lower Extremity (Therapeutic Exercise)  bridging (bilateral w/bolster);hip abduction/adduction;SLR (straight leg raise) bridge c ball squeeze, bridge c hip abd (red band)  -LH,NM,LH2      Device, Supine Lower Extremity (Therapeutic Exercise)  elastic bands/tubing red  -LH,NM,LH2      Exercise Type, Supine Lower Extremity (Therapeutic Exercise)  AROM (active range of motion)  -LH,NM,LH2      Sets/Reps Detail, Supine Lower Extremity (Therapeutic Exercise)  1/20  -LH,NM,LH2      Recorded by [LH,NM,LH2] Chelsie Cline, PT (r) Jaxon Atkinson, PT Student (t) Chelsie Cline, PT (c)      Row Name 02/20/20 0800             Lower Extremity Sidelying Therapeutic Exercise    Performed, Sidelying Lower Extremity (Therapeutic Exercise)  hip abduction clamshells  -LH,NM,LH2      Device, Sidelying Lower Extremity (Therapeutic Exercise)  elastic bands/tubing red  -LH,NM,LH2      Exercise Type, Sidelying Lower Extremity (Therapeutic Exercise)  AROM (active range of motion)  -LH,NM,LH2      Sets/Reps Detail, Sidelying Lower Extremity (Therapeutic Exercise)  1/15  -LH,NM,LH2      Recorded by [LH,NM,LH2] Chelsie Cline, PT (r) Jaxon Atkinson, PT Student (t) Chelsie Cline, PT (c)      Row Name 02/20/20 1536             Neuromuscular Re-education    Comment (Neuromuscular Re-education)  MOD diffiuclty with gross grasp/manipulation with clothepins and large shaped puzzle pieces   -AF      Recorded by [AF] Reina Perera OTR      Row Name 02/20/20 1536 02/20/20 0800          Positioning and Restraints    Pre-Treatment Position  sitting in chair/recliner  -AF  sitting in chair/recliner  -LH,NM,LH2     Post Treatment Position  wheelchair  -AF  wheelchair  -LH,NM,LH2     In Bed  --  call light within reach;encouraged to call for assist;exit alarm on;patient within staff view AM  -LH,NM,LH2     In Wheelchair   sitting;exit alarm on;with family/caregiver with  in AM and Pm sessions  -AF  exit alarm on;with family/caregiver PM  -LH,NM,LH2     Recorded by [AF] Reina Perera, OTR [LH,NM,LH2] Chelsie Cline, PT (r) Jaxon Atkinson, PT Student (t) Chelsie Cline, PT (c)       User Key  (r) = Recorded By, (t) = Taken By, (c) = Cosigned By    Initials Name Effective Dates     Chelsie Cline, PT 04/03/18 -     AF Reina Perera, OTR 04/03/18 -     NM Jaxon Atkinson, PT Student 01/03/20 -           Wound 12/09/19 1510 abdomen Incision (Active)   Dressing Appearance dry;intact 2/20/2020  7:50 AM   Closure SUJEY 2/20/2020  7:50 AM   Base dressing in place, unable to visualize 2/20/2020  7:50 AM   Periwound intact 2/20/2020  7:50 AM   Periwound Temperature warm 2/20/2020  7:50 AM   Periwound Skin Turgor soft 2/20/2020  7:50 AM   Drainage Amount none 2/20/2020  7:50 AM   Dressing Care, Wound gauze 2/20/2020  7:50 AM   Periwound Care, Wound dry periwound area maintained 2/20/2020  7:50 AM         OT Recommendation and Plan                 OT IRF GOALS     Row Name 02/12/20 1514             Transfer Goal 1 (OT-IRF)    Activity/Assistive Device (Transfer Goal 1, OT-IRF)  toilet;shower chair;walk-in shower  -AF      [place] Level (Transfer Goal 1, OT-IRF)  minimum assist (75% or more patient effort);verbal cues required  -AF      Time Frame (Transfer Goal 1, OT-IRF)  short term goal (STG)  -AF      Progress/Outcomes (Transfer Goal 1, OT-IRF)  goal ongoing  -AF         Transfer Goal 2 (OT-IRF)    Activity/Assistive Device (Transfer Goal 2, OT-IRF)  toilet;shower chair;walk-in shower  -AF      [place] Level (Transfer Goal 2, OT-IRF)  verbal cues required;contact guard assist  -AF      Time Frame (Transfer Goal 2, OT-IRF)  long term goal (LTG)  -AF      Progress/Outcomes (Transfer Goal 2, OT-IRF)  goal ongoing  -AF         Bathing Goal 1 (OT-IRF)    Activity/Device (Bathing Goal 1, OT-IRF)  bathing skills, all;grab  bar/tub rail;hand-held shower spray hose;shower chair  -AF      [place] Level (Bathing Goal 1, OT-IRF)  verbal cues required;moderate assist (50-74% patient effort)  -AF      Time Frame (Bathing Goal 1, OT-IRF)  short term goal (STG)  -AF      Progress/Outcomes (Bathing Goal 1, OT-IRF)  goal ongoing  -AF         Bathing Goal 2 (OT-IRF)    Activity/Device (Bathing Goal 2, OT-IRF)  bathing skills, all;grab bar/tub rail;hand-held shower spray hose;shower chair  -AF      [place] Level (Bathing Goal 2, OT-IRF)  verbal cues required;contact guard assist  -AF      Time Frame (Bathing Goal 2, OT-IRF)  long term goal (LTG)  -AF      Progress/Outcomes (Bathing Goal 2, OT-IRF)  goal ongoing  -AF         UB Dressing Goal 1 (OT-IRF)    Activity/Device (UB Dressing Goal 1, OT-IRF)  upper body dressing  -AF      [place] (UB Dress Goal 1, OT-IRF)  set-up required;verbal cues required  -AF      Time Frame (UB Dressing Goal 1, OT-IRF)  long term goal (LTG)  -AF      Progress/Outcomes (UB Dressing Goal 1, OT-IRF)  goal ongoing  -AF         LB Dressing Goal 1 (OT-IRF)    Activity/Device (LB Dressing Goal 1, OT-IRF)  lower body dressing  -AF      [place] (LB Dressing Goal 1, OT-IRF)  verbal cues required;moderate assist (50-74% patient effort)  -AF      Time Frame (LB Dressing Goal 1, OT-IRF)  short term goal (STG)  -AF      Progress/Outcomes (LB Dressing Goal 1, OT-IRF)  goal ongoing  -AF         LB Dressing Goal 2 (OT-IRF)    Activity/Device (LB Dressing Goal 2, OT-IRF)  lower body dressing  -AF      [place] (LB Dressing Goal 2, OT-IRF)  verbal cues required;contact guard assist  -AF      Time Frame (LB Dressing Goal 2, OT-IRF)  long term goal (LTG)  -AF      Progress/Outcomes (LB Dressing Goal 2, OT-IRF)  goal ongoing  -AF         Grooming Goal 1 (OT-IRF)    Activity/Device (Grooming Goal 1, OT-IRF)  grooming skills, all  -AF      [place] (Grooming Goal 1, OT-IRF)  set-up required  -AF      Time Frame  (Grooming Goal 1, OT-IRF)  short term goal (STG)  -AF      Progress/Outcomes (Grooming Goal 1, OT-IRF)  goal ongoing  -AF         Grooming Goal 2 (OT-IRF)    Activity/Device (Grooming Goal 2, OT-IRF)  grooming skills, all  -AF      [place] (Grooming Goal 2, OT-IRF)  conditional independence  -AF      Time Frame (Grooming Goal 2, OT-IRF)  long term goal (LTG)  -AF      Progress/Outcomes (Grooming Goal 2, OT-IRF)  goal ongoing  -AF         Toileting Goal 1 (OT-IRF)    Activity/Device (Toileting Goal 1, OT-IRF)  toileting skills, all;grab bar/safety frame;raised toilet seat  -AF      [place] Level (Toileting Goal 1, OT-IRF)  maximum assist (25-49% patient effort);moderate assist (50-74% patient effort)  -AF      Time Frame (Toileting Goal 1, OT-IRF)  short term goal (STG)  -AF      Progress/Outcomes (Toileting Goal 1, OT-IRF)  goal ongoing  -AF         Toileting Goal 2 (OT-IRF)    Activity/Device (Toileting Goal 2, OT-IRF)  toileting skills, all;grab bar/safety frame;raised toilet seat  -AF      [place] Level (Toileting Goal 2, OT-IRF)  minimum assist (75% or more patient effort);verbal cues required  -AF      Time Frame (Toileting Goal 2, OT-IRF)  long term goal (LTG)  -AF      Progress/Outcomes (Toileting Goal 2, OT-IRF)  goal ongoing  -AF         Balance Goal 1 (OT)    Activity/Assistive Device (Balance Goal 1, OT)  standing, static  -AF      [place] Level/Cues Needed (Balance Goal 1, OT)  moderate assist (50-74% patient effort)  -AF      Time Frame (Balance Goal 1, OT)  short term goal (STG)  -AF      Progress/Outcomes (Balance Goal 1, OT)  goal ongoing  -AF         Balance Goal 2 (OT)    Activity/Assistive Device (Balance Goal 2, OT)  standing, static  -AF      [place] Level (Balance Goal 2, OT)  minimum assist (75% or more patient effort);verbal cues required  -AF      Time Frame (Balance Goal 2, OT)  long term goal (LTG)  -AF      Progress/Outcome (Balance Goal 2, OT)  goal ongoing  -AF          Caregiver Training Goal 1 (OT-IRF)    Caregiver Training Goal 1 (OT-IRF)  pt and  will demo safe techniques with ADLs, transfers, HEP and AE prior to d/c home with home health services   -AF      Time Frame (Caregiver Training Goal 1, OT-IRF)  long term goal (LTG)  -AF      Progress/Outcomes (Caregiver Training Goal 1, OT-IRF)  goal ongoing  -AF        User Key  (r) = Recorded By, (t) = Taken By, (c) = Cosigned By    Initials Name Provider Type    Reina Noe OTMENDEZ Occupational Therapist                     Time Calculation:     Time Calculation- OT     Row Name 02/20/20 1544 02/20/20 1543          Time Calculation- OT    OT Start Time  1400  -AF  0930  -AF     OT Stop Time  1430  -AF  1030  -AF     OT Time Calculation (min)  30 min  -AF  60 min  -AF       User Key  (r) = Recorded By, (t) = Taken By, (c) = Cosigned By    Initials Name Provider Type    Reina Noe OTMENDEZ Occupational Therapist          Therapy Charges for Today     Code Description Service Date Service Provider Modifiers Qty    42033227323 HC OT SELF CARE/MGMT/TRAIN EA 15 MIN 2/19/2020 Reina Perera OTR GO 2    30977977437 HC OT THER PROC EA 15 MIN 2/19/2020 Reina Perera, OTR GO 2    74686339195 HC OT NEUROMUSC RE EDUCATION EA 15 MIN 2/19/2020 Reina Perera OTR GO 2    10546730763 HC OT SELF CARE/MGMT/TRAIN EA 15 MIN 2/20/2020 Reina Perera OTR GO 2    79762879615 HC OT THER PROC EA 15 MIN 2/20/2020 Reina Perera, OTR GO 2    97848561839 HC OT NEUROMUSC RE EDUCATION EA 15 MIN 2/20/2020 Reina Perera OTR GO 1    09233370896 HC OT THERAPEUTIC ACT EA 15 MIN 2/20/2020 Reina Perera OTR GO 1                   ALPESH Dempsey  2/20/2020

## 2020-02-20 NOTE — NURSING NOTE
02/20/20 1644   Ileostomy RUQ   Placement Date/Time: 12/15/19 1407   Inserted by: DR PRAKASH  Location: RUQ   Stomal Appliance 1 piece;Intact;Changed   Stoma Appearance round;moist;pink   Peristomal Assessment Intact   Accessories/Skin Care convex wafer;cleansed with water;skin barrier ring   Stoma Function stool   Stool Color brown   Stool Consistency loose   Treatment Bag change   Output (mL) 200 mL   CWOCN For ostomy pouch change.  Wound midline abdomen also treated with hydrogel and covered with pouching system. No complications noted.

## 2020-02-20 NOTE — PROGRESS NOTES
LOS: 27 days   Patient Care Team:  Armando Valentine MD as PCP - General (Internal Medicine)  Lisa Arriaza MD as Consulting Physician (Obstetrics and Gynecology)    Chief Complaint: GBS    Subjective     History of Present Illness     She continues to feel stronger in the arms and legs.  Tolerating therapies.  Gabapentin continues to be helpful.      .  History taken from: patient chart    Objective     Vital Signs  Temp:  [97.6 °F (36.4 °C)-98.4 °F (36.9 °C)] 97.9 °F (36.6 °C)  Heart Rate:  [74-88] 74  Resp:  [16-18] 16  BP: (100-103)/(49-62) 103/62    Physical Exam:  Seen in physical therapy working on standing activities.  Strength continues improved in the upper extremities but still impaired dexterity right more than left hand.    Results Review:     I reviewed the patient's new clinical results.  Results from last 7 days   Lab Units 02/19/20  0647 02/17/20  0550 02/14/20  0609   WBC 10*3/mm3 5.54 4.87 7.17   HEMOGLOBIN g/dL 7.5* 7.2* 8.1*   HEMATOCRIT % 23.4* 22.1* 25.7*   PLATELETS 10*3/mm3 301 249 351     Results from last 7 days   Lab Units 02/14/20  0609   SODIUM mmol/L 137   POTASSIUM mmol/L 3.6   CHLORIDE mmol/L 101   CO2 mmol/L 25.5   BUN mg/dL 17   CREATININE mg/dL 0.97   CALCIUM mg/dL 7.9*   GLUCOSE mg/dL 86       Medication Review:   Scheduled Meds:    Iron 18 mg Sublingual BID   gabapentin 300 mg Oral 4x Daily   loperamide 2 mg Oral TID AC   MULTIVITAMIN ADULT 1 tablet Sublingual Daily   pantoprazole 40 mg Oral Q AM   sodium bicarbonate 1,300 mg Oral TID   Cyanocobalamin 2,500 mcg Sublingual Weekly   Vitamin D-3 5,000 Units Sublingual Daily     Continuous Infusions:   PRN Meds:.•  acetaminophen  •  aluminum sulfate-calcium acetate  •  gabapentin **AND** gabapentin  •  miconazole  •  ondansetron ODT    Assessment/Plan   75 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     GBS  -Completed her plasmapharesis and has gotten good return.   - Will begin PT/OT for ADLS, strength,  mobility, txs, gait.   -January 27: On gabapentin 300mg QID. Will continue to monitor and will titrate up as needed.  -Jan 29 -  Feels strength is somewhat better.  Worked on gait with rolling walker yesterday 15 feet. Today utilized ankle weights to decrease ataxic movements with gait. Transfers mod max assist. Bed mobility CTG.  - Feb 6 - Patient complains of increased numbness in her knees.  She is noted to have increased weakness with her hip extensors and possibly in the left quadricep.  Her ambulation distance is less and takes more effort with a shorter stride.  On examination she also appears weaker in her hands and ankles.  Discussed having neurology see her to assess for possibly IVIG or another round of plasmapheresis.  -Feb 7 - increased weakness noted in BUE and BLE and more difficulty with mobility - Neurology starting patient on course of IVIG   -February 10-shows good response on IVIG-tolerating.  Notes improvement in her strength in the upper extremities and lower extremities as well as performance with mobility and self-care.  -February 11-continues to show improvement on IVIG  Feb 12-functional improvement after IVIG with improvement in her handgrip, feeding, transfers, and ambulation as well as improvement in her strength.  February 14-strength noticeably improved in the bilateral upper extremities and bilateral lower extremities.  Ambulating further.  February 20-reviewed need to follow-up with [facility] as an outpatient for nerve conduction study/EMG and if show CIDP the possibility of monthly IVIG injections at home.  She states that they previously had mentioned to her the possible option of the home IVIG injections.    Crohn's  -s/p recent colostomy- wound nurse to see and education for home management.   -If more than 1 liter output a day, needs Immodium-per Dr. Albrecht colorectal surgeon.     Hyponatremia  -On salt tabs and fluid restriction. Renal following  -January 27: Na 130.  Continue salt tabs and fluid restriction per renal. Will continue to monitor  -January 30: Na 133. Continue sodium bicarbonate and fluid restriction per renal.  -Feb 3- Na improved to 138  -February 4-fluid restriction discontinued.  -February 5-sodium 137  -February 10-sodium 140.  Sodium chloride tablets were discontinued on February 8.  Continues on sodium bicarb 1300 mg 3 times a day.    Anemia  -January 27: Hgb/Hct 7.6/24.3- stable. No signs of active bleeding. Will order fecal occult and reticulocyte count. Will continue to monitor.  -Jan 28 - hemoccult positive  -Jan 29 - Stool heme +.  HGB stable at 7.5. She had hypotension and tachycardia yesterday 87/64 and 110) , better today (99/63 and 88).  Reviewed option of transfusion PRBCs. She wished to hold on transfusion unless absolutely necessary. Discussed if HGB < 7, would recommend definitely transfuse.  -January 30: Repeat CBC scheduled for tomorrow. Will continue to monitor.    -January 31: Hgb 7.2. Will transfuse 1 unit of PRBCs today. Ordered anemia studies. Spoke with Dr. Albrecht and if iron supplementation is required she recommends IV iron for better absorption.  -February 1: Hemoglobin improved 8.8.  IV iron infusion ordered by nephrology  -Feb 3 - HGB improved 9.5. Not tolerate IV iron infusion due to burning in vein, does not wish to have placed a more proximal IV. She had tow infusions. She will get EZ Melt Iron from outside to take by mouth; on discussion with colorectal surgery last week she should be able to absorb PO iron.  February 5-hemoglobin 8.5  February 10-hemoglobin 8.4  February 12-hemoglobin 7.3-As she has the midline in, will plan to resume the last 3 of the iron infusions that she did not get previously with the peripheral IV.  Will start tomorrow scheduled every other day for total of 3 doses.  Recheck hemoglobin on Friday.  February 14-hemoglobin 8.1.  She had a temperature last evening possibly related to the iron infusion.  She  tolerated the infusion and would like to have it on consecutive days ( rather than every other day as was ordered to give her a break).  February 19-hemoglobin 7.5-stable    DVT prophylaxis  -SCDS for now.   -January 27: Heparin has been on hold due to HGB trending down. Following results of fecal occult and reticulocyte count will consider restarting Heparin for DVT prophylaxis.   -January 28 - hemoccult positive.     Vitamin D deficiency-vitamin D 22.4  on January 29.    Feb 1 - Patient does not wish to take vitamin D p.o. through the hospital supply.  Wishes to have her vitamin D brought in from home.    Vitamin B12 replacement-sublingual from home    RUE antecubital fossa phlebitis/cellulitis-February 3-no sign of infection. K-PAD.   February 4-Right antecubital fossa phlebitis with cord palpable/tender with surrounding erythema consistent with cellulitis. Allergy to Keflex - throat swelled. Will start Doxycycline 100 mg bid x 7 days, continue K-pad.   February 5-She continues to have pain and redness and swelling of the right antecubital fossa and distal upper arm.  Reviewed continue with doxycycline which was started yesterday but if there is no show improvement will look to adjust antibiotic tomorrow.  White blood cell count is 7.22 with normal differential..  Feb 7 - area improved today on doxycycline.  February 10-further improvement.    TEAM CONF - JAN 28 - FLAT AFFECT. SUPINE SIT MIN ASSIST. TRANSFERS MAX 2. NONAMBULATORY. UBB MIN. LBB MAX. UBD MOD.  LBD DEP. ABD WOUND CARE. STOOL HEMOCCULT POSITIVE.  ELOS - 5 WEEKS.     TEAM CONF - FEB 4 - BED SBA. TRANSFERS MIN-MOD. GAIT 30 FEET MIN 2 FIDEL WALKER. UBD MOD. LBD DEP. ON 1200 CC FLUID RESTRICTION. EATING OKAY.  ABD WOUND CLOSING. OSTOMY DEVICE STAYING ON.  CONTINENT. NEEDS TO GET UP TO BSC.   ELOS- 4 WEEKS.     February 10-she had shown a decline in her mobility and self-care with flare of Guillain-Barré syndrome but has shown response on IVIG.  Improving.   Most recently in physical therapy and Occupational Therapy-toilet transfers moderate assist, shower transfer to be assessed, bathing minimum assist upper body and maximum assist lower body, dressing moderate assist upper body independent lower body.  Grooming minimum assist.  Toileting dependent.  Eating with minimal assist to set up with built up utensils.  Bed mobility contact-guard.  Ambulated 25 feet moderate assist of 2 with Aircast bilateral ankles.    TEAM CONF - FEB 11 - She feels IVIG has been helpful.  She notes improvement in the strength in her hands and in her legs.  Easier transfers.  Walk better.  She had had a decline in her ability to do to box and blocks but that improved yesterday.  Tolerating IVIG.   describes her performance as 180 degree change from Friday.  In physical therapy and Occupational Therapy-toilet transfers moderate assist, shower transfer to be assessed, bathing minimum assist upper body and maximum assist lower body, dressing moderate assist upper body independent lower body.  Grooming minimum assist.  Toileting dependent.  Eating with minimal assist to set up with built up utensils.  Bed mobility contact-guard.  Ambulated 25 feet moderate assist of 2 with Aircast bilateral ankles.   Continent bladder. Abd wound improving.   ELOS - 3 WEEKS    TEAM CONF - FEB 18 - BED SUP. TRANSFERS MIN. GAIT 120 FEET RW MIN OF 2. TOILET TRANSFER MIN-MOD. TOILETING MAX ASSIST. UBD MIN. LBD MAX-DEP. UBB MIN. .LBB MOD ASSIST. CONTINENT BLADDER. OSTOMY. ABD WOUND IMPROVED. GABAPENTIN FOR PAIN.    ELOS - 2 WEEKS    Adolfo Valle MD  02/20/20

## 2020-02-20 NOTE — PROGRESS NOTES
Family conference held today with pt, pt's , PT,OT,RN and SW.  Discussed pt's current status, progress and discharge goals.  Pt has made steady progress in therapies. PT reports pt completes bed mobility with supervision and transfers with MIN assist. She ambulates with a rolling walker and left AFO, 150' with MIN assist. Pt completes car transfers with MIN assist and is working on ascending/ descending a curb.  has completed teaching and is transferring the pt independently. Discharge goal for home is CG-MIN assist with transfers and ambulation.    In OT, pt completed commode and shower transfers with MIN-MOD assist. Although improving, impaired sensation, coordination and strength in pt's hands bilaterally limit pt's ability to complete bathing, dressing and toileting tasks. She completes upper body bathing and dressing with MIN assist and lower body bathing and dressing with MOD_MAX assist. She is dependent with ostomy care.  Pt's endurance is impaired, but improving.    Nursing reviewed current medications and provided a list. Follow up appointments will be made with [facility], General Surgery and pt's PCP, Dr Valentine.  Pt has an appointment for EMG on May 1.  Pt's  has been emptying pt's ostomy bag and will learn how to change the appliance.    DME discussed. Pt to receive a wheelchair, cushion, AFO and BSC.  She has a shower seat and hand held shower at home.  will purchase a rolling walker online.    Home health PT,OT and RN is recommended at discharge and pt requests referral to [facility].     Family teaching will continue and day pass will be scheduled before discharge. Pt and  state they are pleased with pt's progress and care received at [facility].  ELOS: 1 1/2 weeks.

## 2020-02-20 NOTE — PROGRESS NOTES
Inpatient Rehabilitation Plan of Care Note    Plan of Care  Care Plan Reviewed - No updates at this time.    Psychosocial    Performed Intervention(s)  Verbalize needs and concerns  calm enviroment  Therapeutic environmental set-up      Safety    Performed Intervention(s)  Bed alarm, wc alarm  Items within reach  Safety rounds      Sphincter Control    Performed Intervention(s)  Monitor intake and output  Encourage appropriate diet  wound ostomy nurse to continue to see pt for ostomy care.      Body Systems    Performed Intervention(s)  Daily skin inspection    Signed by: Dora Cortez RN

## 2020-02-20 NOTE — PROGRESS NOTES
Inpatient Rehabilitation Plan of Care Note    Plan of Care  Care Plan Reviewed - Updates as Follows    Psychosocial    Performed Intervention(s)  Verbalize needs and concerns  calm enviroment  Therapeutic environmental set-up      Safety    Performed Intervention(s)  Bed alarm, wc alarm  Items within reach  Safety rounds      Sphincter Control    Performed Intervention(s)  Monitor intake and output  Encourage appropriate diet  wound ostomy nurse to continue to see pt for ostomy care.      Body Systems    Performed Intervention(s)  Daily skin inspection    Signed by: Andree Casey RN

## 2020-02-20 NOTE — PLAN OF CARE
Problem: Patient Care Overview  Goal: Plan of Care Review  Outcome: Ongoing (interventions implemented as appropriate)  Flowsheets (Taken 2/20/2020 1045)  Outcome Summary: pt alert and oriented. n/t to ext unchanged. ileostomy bag changed per WOCN today. family conference held today. continent, needs to be using BSC to void during day.  assists with care. worked well with therapies. will continue to monitor.  Progress, Functional Goals: demonstrating adequate progress  Plan of Care Reviewed With: patient  IRF Plan of Care Review: progress ongoing, continue

## 2020-02-20 NOTE — PROGRESS NOTES
Occupational Therapy: Branch    Physical Therapy: Individual: 90 minutes.    Speech Language Pathology:  Branch    Signed by: Jaxon Atkinson PT Student     - CoSigned By: Chelsie Cline PT 2/20/2020 4:02:08 PM

## 2020-02-21 LAB
ANION GAP SERPL CALCULATED.3IONS-SCNC: 11.7 MMOL/L (ref 5–15)
BUN BLD-MCNC: 3 MG/DL (ref 8–23)
BUN/CREAT SERPL: 4.6 (ref 7–25)
CALCIUM SPEC-SCNC: 8.3 MG/DL (ref 8.6–10.5)
CHLORIDE SERPL-SCNC: 100 MMOL/L (ref 98–107)
CO2 SERPL-SCNC: 27.3 MMOL/L (ref 22–29)
CREAT BLD-MCNC: 0.65 MG/DL (ref 0.57–1)
GFR SERPL CREATININE-BSD FRML MDRD: 90 ML/MIN/1.73
GLUCOSE BLD-MCNC: 92 MG/DL (ref 65–99)
POTASSIUM BLD-SCNC: 3.4 MMOL/L (ref 3.5–5.2)
SODIUM BLD-SCNC: 139 MMOL/L (ref 136–145)

## 2020-02-21 PROCEDURE — 97112 NEUROMUSCULAR REEDUCATION: CPT

## 2020-02-21 PROCEDURE — 97116 GAIT TRAINING THERAPY: CPT

## 2020-02-21 PROCEDURE — 97110 THERAPEUTIC EXERCISES: CPT

## 2020-02-21 PROCEDURE — 97535 SELF CARE MNGMENT TRAINING: CPT

## 2020-02-21 PROCEDURE — 63710000001 ONDANSETRON ODT 4 MG TABLET DISPERSIBLE: Performed by: PHYSICAL MEDICINE & REHABILITATION

## 2020-02-21 PROCEDURE — 80048 BASIC METABOLIC PNL TOTAL CA: CPT | Performed by: INTERNAL MEDICINE

## 2020-02-21 PROCEDURE — 97530 THERAPEUTIC ACTIVITIES: CPT

## 2020-02-21 RX ADMIN — GABAPENTIN 300 MG: 300 CAPSULE ORAL at 21:59

## 2020-02-21 RX ADMIN — Medication 1 TABLET: at 08:23

## 2020-02-21 RX ADMIN — GABAPENTIN 300 MG: 300 CAPSULE ORAL at 18:19

## 2020-02-21 RX ADMIN — GABAPENTIN 300 MG: 300 CAPSULE ORAL at 03:02

## 2020-02-21 RX ADMIN — LOPERAMIDE HYDROCHLORIDE 2 MG: 2 CAPSULE ORAL at 06:07

## 2020-02-21 RX ADMIN — CHOLECALCIFEROL TAB 125 MCG (5000 UNIT) 5000 UNITS: 125 TAB at 08:23

## 2020-02-21 RX ADMIN — Medication 2500 MCG: at 08:23

## 2020-02-21 RX ADMIN — GABAPENTIN 300 MG: 300 CAPSULE ORAL at 08:22

## 2020-02-21 RX ADMIN — SODIUM BICARBONATE 1300 MG: 650 TABLET ORAL at 08:22

## 2020-02-21 RX ADMIN — GABAPENTIN 300 MG: 300 CAPSULE ORAL at 11:59

## 2020-02-21 RX ADMIN — LOPERAMIDE HYDROCHLORIDE 2 MG: 2 CAPSULE ORAL at 11:58

## 2020-02-21 RX ADMIN — Medication 18 MG: at 08:23

## 2020-02-21 RX ADMIN — ONDANSETRON 4 MG: 4 TABLET, ORALLY DISINTEGRATING ORAL at 18:00

## 2020-02-21 RX ADMIN — SODIUM BICARBONATE 1300 MG: 650 TABLET ORAL at 22:00

## 2020-02-21 RX ADMIN — PANTOPRAZOLE SODIUM 40 MG: 40 TABLET, DELAYED RELEASE ORAL at 06:07

## 2020-02-21 RX ADMIN — Medication 18 MG: at 22:00

## 2020-02-21 RX ADMIN — LOPERAMIDE HYDROCHLORIDE 2 MG: 2 CAPSULE ORAL at 16:40

## 2020-02-21 RX ADMIN — SODIUM BICARBONATE 1300 MG: 650 TABLET ORAL at 16:40

## 2020-02-21 NOTE — PROGRESS NOTES
LOS: 28 days   Patient Care Team:  Armando Valentine MD as PCP - General (Internal Medicine)  Lisa Arriaza MD as Consulting Physician (Obstetrics and Gynecology)    Chief Complaint: GBS    Subjective     History of Present Illness     She continues to feel stronger in the arms and legs.  Tolerating therapies.    Still weak with fine motor activities in hands.     .  History taken from: patient chart    Objective     Vital Signs  Temp:  [97.4 °F (36.3 °C)-98.2 °F (36.8 °C)] 98.2 °F (36.8 °C)  Heart Rate:  [73-89] 89  Resp:  [16-18] 16  BP: ()/(50-64) 110/55    Physical Exam:   General: Awake, alert, NAD  HEENT: normocepahlic, atraumatic   Heart: RRR, no m/r/g  Lungs: CTAB, no wheezing, rales, or rhonchi  Abdomen: soft, non-tender, non-distended, colostomy , incision dressed  Strength:   BUE: 4+/5 with bilateral elbow flexion, elbow extension, wrist extension, and finger flexion, right greater than left weak hand intrinsics R 3-/5, L 3+/5  Difficulty opposing right thumb to digits 2-3-4-5 but better.   Opposes left thumb to digits 2-3-4-5.   BLE:  HF right 4+/5, left 4+/5,  knee extension right 4+/5, left  4+/5, ankle dorsiflexion B 4+/5  Extremities: No edema distally       Results Review:     I reviewed the patient's new clinical results.  Results from last 7 days   Lab Units 02/19/20  0647 02/17/20  0550   WBC 10*3/mm3 5.54 4.87   HEMOGLOBIN g/dL 7.5* 7.2*   HEMATOCRIT % 23.4* 22.1*   PLATELETS 10*3/mm3 301 249     Results from last 7 days   Lab Units 02/21/20  0539   SODIUM mmol/L 139   POTASSIUM mmol/L 3.4*   CHLORIDE mmol/L 100   CO2 mmol/L 27.3   BUN mg/dL 3*   CREATININE mg/dL 0.65   CALCIUM mg/dL 8.3*   GLUCOSE mg/dL 92       Medication Review:   Scheduled Meds:    Iron 18 mg Sublingual BID   gabapentin 300 mg Oral 4x Daily   loperamide 2 mg Oral TID AC   MULTIVITAMIN ADULT 1 tablet Sublingual Daily   pantoprazole 40 mg Oral Q AM   sodium bicarbonate 1,300 mg Oral TID   Cyanocobalamin 2,500  mcg Sublingual Weekly   Vitamin D-3 5,000 Units Sublingual Daily     Continuous Infusions:   PRN Meds:.•  acetaminophen  •  aluminum sulfate-calcium acetate  •  gabapentin **AND** gabapentin  •  miconazole  •  ondansetron ODT    Assessment/Plan   75 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     GBS  -Completed her plasmapharesis and has gotten good return.   - Will begin PT/OT for ADLS, strength, mobility, txs, gait.   -January 27: On gabapentin 300mg QID. Will continue to monitor and will titrate up as needed.  -Jan 29 -  Feels strength is somewhat better.  Worked on gait with rolling walker yesterday 15 feet. Today utilized ankle weights to decrease ataxic movements with gait. Transfers mod max assist. Bed mobility CTG.  - Feb 6 - Patient complains of increased numbness in her knees.  She is noted to have increased weakness with her hip extensors and possibly in the left quadricep.  Her ambulation distance is less and takes more effort with a shorter stride.  On examination she also appears weaker in her hands and ankles.  Discussed having neurology see her to assess for possibly IVIG or another round of plasmapheresis.  -Feb 7 - increased weakness noted in BUE and BLE and more difficulty with mobility - Neurology starting patient on course of IVIG   -February 10-shows good response on IVIG-tolerating.  Notes improvement in her strength in the upper extremities and lower extremities as well as performance with mobility and self-care.  -February 11-continues to show improvement on IVIG  Feb 12-functional improvement after IVIG with improvement in her handgrip, feeding, transfers, and ambulation as well as improvement in her strength.  February 14-strength noticeably improved in the bilateral upper extremities and bilateral lower extremities.  Ambulating further.  February 20-reviewed need to follow-up with [facility]y as an outpatient for nerve conduction study/EMG and if show CIDP the possibility  of monthly IVIG injections at home.  She states that they previously had mentioned to her the possible option of the home IVIG injections.    Crohn's  -s/p recent colostomy- wound nurse to see and education for home management.   -If more than 1 liter output a day, needs Immodium-per Dr. Albrecht colorectal surgeon.     Hyponatremia  -On salt tabs and fluid restriction. Renal following  -January 27: Na 130. Continue salt tabs and fluid restriction per renal. Will continue to monitor  -January 30: Na 133. Continue sodium bicarbonate and fluid restriction per renal.  -Feb 3- Na improved to 138  -February 4-fluid restriction discontinued.  -February 5-sodium 137  -February 10-sodium 140.  Sodium chloride tablets were discontinued on February 8.  Continues on sodium bicarb 1300 mg 3 times a day.    Anemia  -January 27: Hgb/Hct 7.6/24.3- stable. No signs of active bleeding. Will order fecal occult and reticulocyte count. Will continue to monitor.  -Jan 28 - hemoccult positive  -Jan 29 - Stool heme +.  HGB stable at 7.5. She had hypotension and tachycardia yesterday 87/64 and 110) , better today (99/63 and 88).  Reviewed option of transfusion PRBCs. She wished to hold on transfusion unless absolutely necessary. Discussed if HGB < 7, would recommend definitely transfuse.  -January 30: Repeat CBC scheduled for tomorrow. Will continue to monitor.    -January 31: Hgb 7.2. Will transfuse 1 unit of PRBCs today. Ordered anemia studies. Spoke with Dr. Albrecht and if iron supplementation is required she recommends IV iron for better absorption.  -February 1: Hemoglobin improved 8.8.  IV iron infusion ordered by nephrology  -Feb 3 - HGB improved 9.5. Not tolerate IV iron infusion due to burning in vein, does not wish to have placed a more proximal IV. She had tow infusions. She will get EZ Melt Iron from outside to take by mouth; on discussion with colorectal surgery last week she should be able to absorb PO iron.  February 5-hemoglobin  8.5  February 10-hemoglobin 8.4  February 12-hemoglobin 7.3-As she has the midline in, will plan to resume the last 3 of the iron infusions that she did not get previously with the peripheral IV.  Will start tomorrow scheduled every other day for total of 3 doses.  Recheck hemoglobin on Friday.  February 14-hemoglobin 8.1.  She had a temperature last evening possibly related to the iron infusion.  She tolerated the infusion and would like to have it on consecutive days ( rather than every other day as was ordered to give her a break).  February 19-hemoglobin 7.5-stable    DVT prophylaxis  -SCDS for now.   -January 27: Heparin has been on hold due to HGB trending down. Following results of fecal occult and reticulocyte count will consider restarting Heparin for DVT prophylaxis.   -January 28 - hemoccult positive.     Vitamin D deficiency-vitamin D 22.4  on January 29.    Feb 1 - Patient does not wish to take vitamin D p.o. through the hospital supply.  Wishes to have her vitamin D brought in from home.    Vitamin B12 replacement-sublingual from home    RUE antecubital fossa phlebitis/cellulitis-February 3-no sign of infection. K-PAD.   February 4-Right antecubital fossa phlebitis with cord palpable/tender with surrounding erythema consistent with cellulitis. Allergy to Keflex - throat swelled. Will start Doxycycline 100 mg bid x 7 days, continue K-pad.   February 5-She continues to have pain and redness and swelling of the right antecubital fossa and distal upper arm.  Reviewed continue with doxycycline which was started yesterday but if there is no show improvement will look to adjust antibiotic tomorrow.  White blood cell count is 7.22 with normal differential..  Feb 7 - area improved today on doxycycline.  February 10-further improvement.    TEAM CONF - JAN 28 - FLAT AFFECT. SUPINE SIT MIN ASSIST. TRANSFERS MAX 2. NONAMBULATORY. UBB MIN. LBB MAX. UBD MOD.  LBD DEP. ABD WOUND CARE. STOOL HEMOCCULT POSITIVE.  ELOS -  5 WEEKS.     TEAM CONF - FEB 4 - BED SBA. TRANSFERS MIN-MOD. GAIT 30 FEET MIN 2 FIDEL WALKER. UBD MOD. LBD DEP. ON 1200 CC FLUID RESTRICTION. EATING OKAY.  ABD WOUND CLOSING. OSTOMY DEVICE STAYING ON.  CONTINENT. NEEDS TO GET UP TO BSC.   ELOS- 4 WEEKS.     February 10-she had shown a decline in her mobility and self-care with flare of Guillain-Barré syndrome but has shown response on IVIG.  Improving.  Most recently in physical therapy and Occupational Therapy-toilet transfers moderate assist, shower transfer to be assessed, bathing minimum assist upper body and maximum assist lower body, dressing moderate assist upper body independent lower body.  Grooming minimum assist.  Toileting dependent.  Eating with minimal assist to set up with built up utensils.  Bed mobility contact-guard.  Ambulated 25 feet moderate assist of 2 with Aircast bilateral ankles.    TEAM CONF - FEB 11 - She feels IVIG has been helpful.  She notes improvement in the strength in her hands and in her legs.  Easier transfers.  Walk better.  She had had a decline in her ability to do to box and blocks but that improved yesterday.  Tolerating IVIG.   describes her performance as 180 degree change from Friday.  In physical therapy and Occupational Therapy-toilet transfers moderate assist, shower transfer to be assessed, bathing minimum assist upper body and maximum assist lower body, dressing moderate assist upper body independent lower body.  Grooming minimum assist.  Toileting dependent.  Eating with minimal assist to set up with built up utensils.  Bed mobility contact-guard.  Ambulated 25 feet moderate assist of 2 with Aircast bilateral ankles.   Continent bladder. Abd wound improving.   ELOS - 3 WEEKS    TEAM CONF - FEB 18 - BED SUP. TRANSFERS MIN. GAIT 120 FEET RW MIN OF 2. TOILET TRANSFER MIN-MOD. TOILETING MAX ASSIST. UBD MIN. LBD MAX-DEP. UBB MIN. .LBB MOD ASSIST. CONTINENT BLADDER. OSTOMY. ABD WOUND IMPROVED. GABAPENTIN FOR PAIN.     GEEOS - 2 WEEKS    Adolfo Valle MD  02/21/20

## 2020-02-21 NOTE — PROGRESS NOTES
Occupational Therapy: Branch    Physical Therapy: Individual: 90 minutes.    Speech Language Pathology:  Branch    Signed by: Jaxon Atkinson PT Student     - CoSigned By: Chelsie Cline PT 2/21/2020 3:36:23 PM

## 2020-02-21 NOTE — PLAN OF CARE
Problem: Patient Care Overview  Goal: Plan of Care Review  Outcome: Ongoing (interventions implemented as appropriate)  Flowsheets (Taken 2/21/2020 1077)  Outcome Summary: Pt with bilat. hand pain. No unsafe behavior. Ileostomy emptied multiple times. Liquid loose brown bm. Voids per bedpan. 3am dose Neurontin given per pt request.  Progress, Functional Goals: demonstrating adequate progress  Plan of Care Reviewed With: patient  IRF Plan of Care Review: progress ongoing, continue

## 2020-02-21 NOTE — THERAPY TREATMENT NOTE
Inpatient Rehabilitation - Physical Therapy Progress Note  [facility]     Patient Name: She López  : 1945  MRN: [id number]    Today's Date: 2020  Onset of Illness/Injury or Date of Surgery: 20              Admit Date: 2020      Visit Dx:      ICD-10-CM ICD-9-CM   1. General weakness R53.1 780.79       Patient Active Problem List   Diagnosis   • Crohn's disease of small intestine with other complication (CMS/HCC)   • Type 2 diabetes mellitus without complication (CMS/HCC)   • RSD upper limb   • Neuropathic pain of hand   • Hyperlipidemia   • Cervical myelopathy (CMS/HCC)   • Central pain syndrome   • Carpal tunnel syndrome   • Vitamin B 12 deficiency   • Guillain Barré syndrome (CMS/HCC)       Therapy Treatment    IRF Treatment Summary     Row Name 20 1524 20 0700          Evaluation/Treatment Time and Intent    Subjective Information  no complaints  -AF  no complaints  -LH,NM,LH2     Existing Precautions/Restrictions  fall  -AF  fall  -LH,NM,LH2     Document Type  therapy note (daily note)  -AF  therapy note (daily note)  -LH,NM,LH2     Mode of Treatment  occupational therapy  -AF  physical therapy  -LH,NM,LH2     Patient/Family Observations  sitting up in w/c in AM and PM sessions,  present   -AF   brought pt to the gym in w/c.  -LH,NM,LH2     Recorded by [AF] Reina Perera, OTR [LH,NM,LH2] Chelsie Cline, PT (r) Jaxon Atkinson, PT Student (t) Chelsie Cline, PT (c)     Row Name 20 0700             Caregiver Training    Caregiver(s) to be Trained  spouse/significant  -LH,NM,LH2      Comment, Caregiver Training Plan  Pt and  education on home environment and modifications to allow pt safely get in/out of bed and up/down curbs.  -LH,NM,LH2      Recorded by [LH,NM,LH2] Chelsie Cline, PT (r) Jaxon Atkinson, PT Student (t) Chelsie Cline, PT (c)      Row Name 20 1524 20 0700          Cognition/Psychosocial- PT/OT    Affect/Mental  "Status (Cognitive)  WFL  -AF  WFL  -LH,NM,LH2     Orientation Status (Cognition)  oriented x 4  -AF  oriented x 4  -LH,NM,LH2     Follows Commands (Cognition)  WFL  -AF  WFL  -LH,NM,LH2     Personal Safety Interventions  fall prevention program maintained;gait belt;nonskid shoes/slippers when out of bed  -AF  fall prevention program maintained;gait belt;nonskid shoes/slippers when out of bed;supervised activity  -LH,NM,LH2     Recorded by [AF] Reina Perera, NILAMR [LH,NM,LH2] Chelsie Cline, PT (r) Jaxon Atkinson, PT Student (t) Chelsie Cline, PT (c)     Row Name 02/21/20 0700             Bed Mobility Assessment/Treatment    Rolling Left [place] (Bed Mobility)  supervision  -LH,NM,LH2      Rolling Right [place] (Bed Mobility)  supervision  -LH,NM,LH2      Supine-Sit [place] (Bed Mobility)  supervision  -LH,NM,LH2      Sit-Supine [place] (Bed Mobility)  supervision  -LH,NM,LH2      Comment (Bed Mobility)  Completed on therapy mat   -LH,NM,LH2      Recorded by [LH,NM,LH2] Chelsie Cline, PT (r) Jaxon Atkinson, PT Student (t) Chelsie Cline, PT (c)      Row Name 02/21/20 0700             Functional Mobility    Functional Mobility- Ind. Level  contact guard assist  -LH,NM,LH2      Functional Mobility- Device  rolling walker  -LH,NM,LH2      Functional Mobility-Distance (Feet)  125 in the hallway in PM   -LH,NM,LH2      Functional Mobility- Comment  Training on curbs c pt amb 20 ft using a rwx, stepping up 4\" curb x 2, turning and descending curbs using a rwx c CGA. Required vc for placement and sequencing of the rwx.   -LH,NM,LH2      Recorded by [LH,NM,LH2] Chelsie Cline, PT (r) Jaxon Atkinson, PT Student (t) Chelsie Cline, PT (c)      Row Name 02/21/20 0700             Transfer Assessment/Treatment    Comment (Transfers)  Worked on sit<>stand from an elevated mat, approx 29 inches as pt's bed at home is 33 inches, per . Able to sit<>stand c CGA from 29 inch mat.  CGA c step stool from " "33\" bed in the PM c rwx   -LH,NM,LH2      Recorded by [LH,NM,LH2] Chelsie Cline, PT (r) Jaxon Atkinson, PT Student (t) Chelsie Cline, PT (c)      Row Name 02/21/20 0700             Bed-Chair Transfer    Bed-Chair [place] (Transfers)  contact guard  -LH,NM,LH2      Assistive Device (Bed-Chair Transfers)  walker, front-wheeled  -LH,NM,LH2      Recorded by [LH,NM,LH2] Chelsie Cline, PT (r) Jaxon Atkinson, PT Student (t) Chelsie Cline, PT (c)      Row Name 02/21/20 0700             Chair-Bed Transfer    Chair-Bed [place] (Transfers)  contact guard  -LH,NM,LH2      Assistive Device (Chair-Bed Transfers)  walker, front-wheeled  -LH,NM,LH2      Recorded by [LH,NM,LH2] Chelsie Cline, PT (r) Jaxon Atkinson, PT Student (t) Chelsie Cline, PT (c)      Row Name 02/21/20 0700             Sit-Stand Transfer    Sit-Stand [place] (Transfers)  contact guard  -LH,NM,LH2      Assistive Device (Sit-Stand Transfers)  walker, front-wheeled;wheelchair  -LH,NM,LH2      Recorded by [LH,NM,LH2] Chelsie Cline, PT (r) Jaxon Atkinson, PT Student (t) Chelsie Cline, PT (c)      Row Name 02/21/20 0700             Stand-Sit Transfer    Stand-Sit [place] (Transfers)  contact guard  -LH,NM,LH2      Assistive Device (Stand-Sit Transfers)  walker, front-wheeled;wheelchair  -LH,NM,LH2      Recorded by [LH,NM,LH2] Chelsie Cline, PT (r) Jaxon Atkinson, PT Student (t) Chelsie Cline, PT (c)      Row Name 02/21/20 0700             Stand Pivot/Stand Step Transfer    Stand Pivot/Stand Step [place]  contact guard  -LH,NM,LH2      Assistive Device (Stand Pivot Stand Step Transfer)  walker, front-wheeled;wheelchair  -LH,NM,LH2      Recorded by [LH,NM,LH2] Chelsie Cline, PT (r) Jaxon Atkinson, YUSUF Student (t) Chelsie Cline, PT (c)      Row Name 02/21/20 0700             Gait/Stairs Assessment/Training    [place] Level (Gait)  contact guard  -LH,NM,LH2      Assistive Device (Gait)  walker, front-wheeled;AFO AFO LLE  " -LH,NM,LH2      Distance in Feet (Gait)  160x2 40' c hinge ankle brace on LLE   -LH,NM,LH2      Pattern (Gait)  step-through  -LH,NM,LH2      Deviations/Abnormal Patterns (Gait)  bilateral deviations;ataxic  -LH,NM,LH2      Bilateral Gait Deviations  weight shift ability decreased;heel strike decreased  -LH,NM,LH2      Comment (Gait/Stairs)  Pt amb without gloves on this date and had no difficulty keeping hands on the walker. No LOB. Pt tolerated hinge ankle brace well on the LLE; demo good support w/out ankle supination/rolling   -LH,NM,LH2      Recorded by [LH,NM,LH2] Chelsie Cline, PT (r) Jaxon Atkinson PT Student (t) Chelsie Cline, PT (c)      Row Name 02/21/20 1524             Bathing Assessment/Treatment    Bathing [place] Level  upper body;distal lower extremities/feet;verbal cues;contact guard assist  -AF      Bathing Position  sink side;supported sitting  -AF      Bathing Setup Assistance  obtain supplies  -AF      Recorded by [AF] Reina Perera OTR      Row Name 02/21/20 1524             Upper Body Dressing Assessment/Treatment    Upper Body Dressing Task  upper body dressing skills;set up assistance  -AF      Upper Body Dressing Position  supported sitting  -AF      Set-up Assistance (Upper Body Dressing)  obtain clothing  -AF      Recorded by [AF] Reina Perera OTR      Row Name 02/21/20 1524             Lower Body Dressing Assessment/Treatment    Lower Body Dressing [place] Level  doff;don;socks;shoes/slippers;moderate assist (50% patient effort);verbal cues  -AF      Lower Body Dressing Position  supported sitting  -AF      Lower Body Dressing Setup Assistance  obtain clothing  -AF      Recorded by [AF] Reina Perera OTR      Row Name 02/21/20 1524             Grooming Assessment/Treatment    Grooming [place] Level  grooming skills;supervision;set up;verbal cues  -AF      Assistive Device (Grooming)  built-up handle items  -AF      Grooming Position  sink side;supported  sitting  -AF      Grooming Setup Assistance  obtain supplies  -AF      Recorded by [AF] Reina Perera OTR      Row Name 02/21/20 1524             Fine Motor Testing & Training    Comment, Fine Motor Coordination  FMC task, able to manipulate golf tees with MOD diffiuclty, yellow theraputty exs, game pieces into vertical game board and pipe tree design. increased ability to use L hand tip to tip pinch  -AF      Recorded by [AF] Reina Perera OTR      Row Name 02/21/20 1524 02/21/20 0700          Pain Scale: Numbers Pre/Post-Treatment    Pain Scale: Numbers, Pretreatment  0/10 - no pain  -AF  0/10 - no pain  -LH,NM,LH2     Pain Scale: Numbers, Post-Treatment  0/10 - no pain  -AF  0/10 - no pain  -LH,NM,LH2     Recorded by [AF] Reina Perera, NILAMR [LH,NM,LH2] Chelsie Cline, PT (r) Jaxon Atkinson, PT Student (t) Chelsie Cilne, PT (c)     Row Name 02/21/20 0700             Standing Balance Activity    Support Needed for Balance (Standing, Balance Training)  CGA;uses both upper extremities for support  -LH,NM,LH2      Comment (Standing, Balance Training)  In // bars, pt performed fwd and lateral stepping over foam cushions, braided walking, and retro walking. Completed all c BUE support and w/out LOB.  -LH,NM,LH2      Recorded by [LH,NM,LH2] Chelsie Cline, PT (r) Jaxon Atkinson, PT Student (t) Chelsie Cline, PT (c)      Row Name 02/21/20 1524             Upper Extremity Seated Therapeutic Exercise    Performed, Seated Upper Extremity (Therapeutic Exercise)  shoulder horizontal abduction/adduction;shoulder flexion/extension;scapular protraction/retraction;elbow flexion/extension;forearm supination/pronation;wrist flexion/extension;digit flexion/extension  -AF      Device, Seated Upper Extremity (Therapeutic Exercise)  -- #3 hand weight, red theraband, blue hand gripper   -AF      Exercise Type, Seated Upper Extremity (Therapeutic Exercise)  AROM (active range of motion);resistive exercise  -AF       Expected Outcomes, Seated Upper Extremity (Therapeutic Exercise)  improve motor control;improve functional tolerance, self-care activity;improve performance, transfer skills;improve performance, reaching for objects  -AF      Sets/Reps Detail, Seated Upper Extremity (Therapeutic Exercise)  2/20  -AF      Transfers Skills, Training to Functional Activity, Seated Upper Extremity (Therapeutic Exercise)  beginning to transfer skills to functional activity  -AF      Comment, Seated Upper Extremity (Therapeutic Exercise)  educated on theraband exs   -AF      Recorded by [AF] Reina Perera OTR      Row Name 02/21/20 0700             Lower Extremity Seated Therapeutic Exercise    Performed, Seated Lower Extremity (Therapeutic Exercise)  LAQ (long arc quad), knee extension;knee flexion/extension red thera band  -LH,NM,LH2      Exercise Type, Seated Lower Extremity (Therapeutic Exercise)  AROM (active range of motion)  -LH,NM,LH2      Sets/Reps Detail, Seated Lower Extremity (Therapeutic Exercise)  2/15  -LH,NM,LH2      Recorded by [LH,NM,LH2] Chelsie Cline, PT (r) Jaxon Atkinson, PT Student (t) Chelsie Cline, PT (c)      Row Name 02/21/20 0700             Lower Extremity Supine Therapeutic Exercise    Performed, Supine Lower Extremity (Therapeutic Exercise)  hip flexion/extension;SLR (straight leg raise);hip abduction/adduction  -LH,NM,LH2      Device, Supine Lower Extremity (Therapeutic Exercise)  elastic bands/tubing red  -LH,NM,LH2      Exercise Type, Supine Lower Extremity (Therapeutic Exercise)  AROM (active range of motion)  -LH,NM,LH2      Sets/Reps Detail, Supine Lower Extremity (Therapeutic Exercise)  2/10  -LH,NM,LH2      Recorded by [LH,NM,LH2] Chelsie Cline, PT (r) Jaxon Atkinson, PT Student (t) Chelsie Cline, PT (c)      Row Name 02/21/20 0700             Lower Extremity Sidelying Therapeutic Exercise    Performed, Sidelying Lower Extremity (Therapeutic Exercise)  hip abduction  -LH,NM,LH2      Device,  Sidelying Lower Extremity (Therapeutic Exercise)  elastic bands/tubing red  -LH,NM,LH2      Exercise Type, Sidelying Lower Extremity (Therapeutic Exercise)  AROM (active range of motion)  -LH,NM,LH2      Sets/Reps Detail, Sidelying Lower Extremity (Therapeutic Exercise)  2/10  -LH,NM,LH2      Recorded by [LH,NM,LH2] Chelsie Cline, PT (r) Jaxon Atkinson, PT Student (t) Chelsie lCine, PT (c)      Row Name 02/21/20 1524 02/21/20 0700          Positioning and Restraints    Pre-Treatment Position  sitting in chair/recliner  -AF  sitting in chair/recliner  -LH,NM,LH2     Post Treatment Position  wheelchair  -AF  wheelchair  -LHNMBROOKE2     In Wheelchair  sitting;exit alarm on;with family/caregiver with  present both sessions   -AF  exit alarm on;with family/caregiver  -LH,NM,LH2     Recorded by [AF] Reina Perera, OTR [LH,NM,LH2] Chelsie Cline, PT (r) Jaxon Atkinson, PT Student (t) Chelsie Cline, PT (c)     Row Name 02/21/20 0700             Weekly Summary of Progress (PT)    Weekly Outcome Summary: Physical Therapy  Pt continues to progress with skilled PT interventions. Pt's gait and transfer LTGs have been upgraded from Doreen to CGA. Pt has met her static standing goal and all other goals remain appropriate. To continue to progress gait, standing balance, and training with the  to increase pt independence and ensure safe handling and assist in prep for d/c home.   -LH,NM,LH2      Recorded by [BROOKE,NM,LH2] Chelsie Cline, PT (r) Jaxon Atkinson, PT Student (t) Chelsie Cline, PT (c)        User Key  (r) = Recorded By, (t) = Taken By, (c) = Cosigned By    Initials Name Effective Dates     Chelsie Cline, PT 04/03/18 -     AF Reina Perera, OTR 04/03/18 -     Jaxon Arteaga, PT Student 01/03/20 -         Wound 12/09/19 1510 abdomen Incision (Active)   Dressing Appearance dry;intact 2/21/2020  8:26 AM   Closure SUJEY 2/21/2020  8:26 AM   Base dressing in place, unable to visualize 2/21/2020  8:26  AM   Drainage Amount none 2/20/2020 10:23 PM           PT Recommendation and Plan        Daily Summary of Progress (PT)  Recommendations: Physical Therapy: discussed pt's status c MD this morning related to decline in functional mobility and strength in BLE. Per MD pt to start on IVIG       PT IRF GOALS     Row Name 02/21/20 0700             Bed Mobility Goal 2 (PT-IRF)    Activity/Assistive Device (Bed Mobility Goal 2, PT-IRF)  bed mobility activities, all  -LH (r) NM (t) LH (c)      [place] Level (Bed Mobility Goal 2, PT-IRF)  independent  -LH (r) NM (t) LH (c)      Time Frame (Bed Mobility Goal 2, PT-IRF)  2 weeks  -LH (r) NM (t) LH (c)      Progress/Outcomes (Bed Mobility Goal 2, PT-IRF)  goal ongoing  -LH (r) NM (t) LH (c)         Transfer Goal 2 (PT-IRF)    Activity/Assistive Device (Transfer Goal 2, PT-IRF)  all transfers;walker, rolling  -LH (r) NM (t) LH (c)      [place] Level (Transfer Goal 2, PT-IRF)  contact guard assist  -LH (r) NM (t) LH (c)      Time Frame (Transfer Goal 2, PT-IRF)  2 weeks  -LH (r) NM (t) LH (c)      Progress/Outcomes (Transfer Goal 2, PT-IRF)  goal revised this date  -LH (r) NM (t) LH (c)         Gait/Walking Locomotion Goal 2 (PT-IRF)    Activity/Assistive Device (Gait/Walking Locomotion Goal 2, PT-IRF)  gait (walking locomotion);assistive device use;walker, rolling  -LH (r) NM (t) LH (c)      Gait/Walking Locomotion Distance Goal 2 (PT-IRF)  100  -LH (r) NM (t) LH (c)      [place] Level (Gait/Walking Locomotion Goal 2, PT-IRF)  contact guard assist  -LH (r) NM (t) LH (c)      Time Frame (Gait/Walking Locomotion Goal 2, PT-IRF)  2 weeks  -LH (r) NM (t) LH (c)      Progress/Outcomes (Gait/Walking Locomotion Goal 2, PT-IRF)  goal revised this date  -LH (r) NM (t) LH (c)         Balance Goal 2 (PT)    Progress/Outcome (Balance Goal 2, PT)  goal met  -LH (r) NM (t) LH (c)         Caregiver Training Goal 2 (PT-IRF)    Caregiver Training Goal 2 (PT-IRF)  Family/caregiver  to assist pt safely as needed.  -LH (r) NM (t) LH (c)      Time Frame (Caregiver Training Goal 2, PT-IRF)  2 weeks  -LH (r) NM (t) LH (c)      Progress/Outcomes (Caregiver Training Goal 2, PT-IRF)  goal ongoing  -LH (r) NM (t) LH (c)        User Key  (r) = Recorded By, (t) = Taken By, (c) = Cosigned By    Initials Name Provider Type     Chelsie Cline, PT Physical Therapist    Jaxon Arteaga, PT Student PT Student               Time Calculation:     PT Charges     Row Name 02/21/20 1454 02/21/20 1136          Time Calculation    Start Time  1330  -LH (r) NM (t) LH (c)  0830  -LH (r) NM (t) LH (c)     Stop Time  1400  -LH (r) NM (t) LH (c)  0930  -LH (r) NM (t) LH (c)     Time Calculation (min)  30 min  -LH (r) NM (t)  60 min  -LH (r) NM (t)     PT Received On  02/21/20  -LH (r) NM (t) LH (c)  02/21/20  -LH (r) NM (t) LH (c)     PT - Next Appointment  02/22/20  -LH (r) NM (t) LH (c)  --     PT Goal Re-Cert Due Date  02/28/20  -LH (r) NM (t) LH (c)  --       User Key  (r) = Recorded By, (t) = Taken By, (c) = Cosigned By    Initials Name Provider Type     Chelsie Cline, PT Physical Therapist    Jaxon Arteaga, PT Student PT Student          Therapy Charges for Today     Code Description Service Date Service Provider Modifiers Qty    [id number]  PT NEUROMUSC RE EDUCATION EA 15 MIN 2/20/2020 Jaxon Atkinson, PT Student GP 2    [id number] HC GAIT TRAINING EA 15 MIN 2/20/2020 Jaxon Atkinson, PT Student GP 1    [id number]  PT THERAPEUTIC ACT EA 15 MIN 2/20/2020 Jaxon Atkinson, PT Student GP 1    [id number]  PT THER PROC EA 15 MIN 2/20/2020 Jaxon Atkinson, PT Student GP 2    [id number] HC GAIT TRAINING EA 15 MIN 2/21/2020 Jaxon Atkinson, PT Student GP 1    [id number]  PT THERAPEUTIC ACT EA 15 MIN 2/21/2020 Jaxon Atkinson, PT Student GP 1    [id number]  PT NEUROMUSC RE EDUCATION EA 15 MIN 2/21/2020 Jaxon Atkinson, PT Student GP 2    [id number]  PT THER PROC EA 15 MIN 2/21/2020  Jaxon Atkinson, PT Student GP 2                   Jaxon Atkinson, PT Student  2/21/2020

## 2020-02-21 NOTE — THERAPY TREATMENT NOTE
Inpatient Rehabilitation - Occupational Therapy Treatment Note    [facility]     Patient Name: She López  : 1945  MRN: [id number]    Today's Date: 2020  Onset of Illness/Injury or Date of Surgery: 20              Admit Date: 2020      Visit Dx:    ICD-10-CM ICD-9-CM   1. General weakness R53.1 780.79       Patient Active Problem List   Diagnosis   • Crohn's disease of small intestine with other complication (CMS/HCC)   • Type 2 diabetes mellitus without complication (CMS/HCC)   • RSD upper limb   • Neuropathic pain of hand   • Hyperlipidemia   • Cervical myelopathy (CMS/HCC)   • Central pain syndrome   • Carpal tunnel syndrome   • Vitamin B 12 deficiency   • Guillain Barré syndrome (CMS/HCC)         Therapy Treatment    IRF Treatment Summary     Row Name 20 1524 20 07          Evaluation/Treatment Time and Intent    Subjective Information  no complaints  -AF  no complaints  (Pended)   -NM     Existing Precautions/Restrictions  fall  -AF  fall  (Pended)   -NM     Document Type  therapy note (daily note)  -AF  therapy note (daily note)  (Pended)   -NM     Mode of Treatment  occupational therapy  -AF  physical therapy  (Pended)   -NM     Patient/Family Observations  sitting up in w/c in AM and PM sessions,  present   -AF   brought pt to the gym in w/c.  (Pended)   -NM     Recorded by [AF] Reina Perera, OTR [NM] Jaxon Atkinson, PT Student     Row Name 20 0700             Caregiver Training    Caregiver(s) to be Trained  spouse/significant  (Pended)   -NM      Comment, Caregiver Training Plan  Pt and  education on home environment and modifications to allow pt safely get in/out of bed and up/down curbs.  (Pended)   -NM      Recorded by [NM] Jaxon Atkinson, PT Student      Row Name 20 1524 20 0700          Cognition/Psychosocial- PT/OT    Affect/Mental Status (Cognitive)  WFL  -AF  WFL  (Pended)   -NM     Orientation Status  "(Cognition)  oriented x 4  -AF  oriented x 4  (Pended)   -NM     Follows Commands (Cognition)  WFL  -AF  WFL  (Pended)   -NM     Personal Safety Interventions  fall prevention program maintained;gait belt;nonskid shoes/slippers when out of bed  -AF  fall prevention program maintained;gait belt;nonskid shoes/slippers when out of bed;supervised activity  (Pended)   -NM     Recorded by [AF] Reina Perera OTR [NM] Jaxon Atkinson, PT Student     Row Name 02/21/20 0700             Bed Mobility Assessment/Treatment    Rolling Left [place] (Bed Mobility)  supervision  (Pended)   -NM      Rolling Right [place] (Bed Mobility)  supervision  (Pended)   -NM      Supine-Sit [place] (Bed Mobility)  supervision  (Pended)   -NM      Sit-Supine [place] (Bed Mobility)  supervision  (Pended)   -NM      Comment (Bed Mobility)  Completed on therapy mat   (Pended)   -NM      Recorded by [NM] Jaxon Atkinson, PT Student      Row Name 02/21/20 0700             Functional Mobility    Functional Mobility- Ind. Level  contact guard assist  (Pended)   -NM      Functional Mobility- Device  rolling walker  (Pended)   -NM      Functional Mobility-Distance (Feet)  125  (Pended)  in the hallway in PM   -NM      Functional Mobility- Comment  Training on curbs c pt amb 20 ft using a rwx, stepping up 4\" curb x 2, turning and descending curbs using a rwx c CGA. Required vc for placement and sequencing of the rwx.   (Pended)   -NM      Recorded by [NM] Jaxon Atkinson, PT Student      Row Name 02/21/20 0700             Transfer Assessment/Treatment    Comment (Transfers)  Worked on sit<>stand from an elevated mat, approx 29 inches as pt's bed at home is 33 inches, per . Able to sit<>stand c CGA from 29 inch mat.   (Pended)  CGA c step stool from 33\" bed in the PM c rwx   -NM      Recorded by [NM] Jaxon Atkinson, PT Student      Row Name 02/21/20 0700             Bed-Chair Transfer    Bed-Chair [place] " (Transfers)  contact guard  (Pended)   -NM      Assistive Device (Bed-Chair Transfers)  walker, front-wheeled  (Pended)   -NM      Recorded by [NM] Jaxon Atkinson, PT Student      Row Name 02/21/20 0700             Chair-Bed Transfer    Chair-Bed [place] (Transfers)  contact guard  (Pended)   -NM      Assistive Device (Chair-Bed Transfers)  walker, front-wheeled  (Pended)   -NM      Recorded by [NM] Jaxon Atkinson, PT Student      Row Name 02/21/20 0700             Sit-Stand Transfer    Sit-Stand [place] (Transfers)  contact guard  (Pended)   -NM      Assistive Device (Sit-Stand Transfers)  walker, front-wheeled;wheelchair  (Pended)   -NM      Recorded by [NM] Jaxon Atkinson, PT Student      Row Name 02/21/20 0700             Stand-Sit Transfer    Stand-Sit [place] (Transfers)  contact guard  (Pended)   -NM      Assistive Device (Stand-Sit Transfers)  walker, front-wheeled;wheelchair  (Pended)   -NM      Recorded by [NM] Jaxon Atkinson, PT Student      Row Name 02/21/20 0700             Stand Pivot/Stand Step Transfer    Stand Pivot/Stand Step [place]  contact guard  (Pended)   -NM      Assistive Device (Stand Pivot Stand Step Transfer)  walker, front-wheeled;wheelchair  (Pended)   -NM      Recorded by [NM] Jaxon Atkinson, PT Student      Row Name 02/21/20 0700             Gait/Stairs Assessment/Training    [place] Level (Gait)  contact guard  (Pended)   -NM      Assistive Device (Gait)  walker, front-wheeled;AFO  (Pended)  AFO LLE  -NM      Distance in Feet (Gait)  160x2  (Pended)  40' c hinge ankle brace on LLE   -NM      Pattern (Gait)  step-through  (Pended)   -NM      Deviations/Abnormal Patterns (Gait)  bilateral deviations;ataxic  (Pended)   -NM      Bilateral Gait Deviations  weight shift ability decreased;heel strike decreased  (Pended)   -NM      Comment (Gait/Stairs)  Pt amb without gloves on this date and had no difficulty keeping hands on the walker. No LOB. Pt  tolerated hinge ankle brace well on the LLE; demo good support w/out ankle supination/rolling   (Pended)   -NM      Recorded by [NM] Jaxon Atkinson, PT Student      Row Name 02/21/20 1524             Bathing Assessment/Treatment    Bathing [place] Level  upper body;distal lower extremities/feet;verbal cues;contact guard assist  -AF      Bathing Position  sink side;supported sitting  -AF      Bathing Setup Assistance  obtain supplies  -AF      Recorded by [AF] Reina Perera OTR      Row Name 02/21/20 1524             Upper Body Dressing Assessment/Treatment    Upper Body Dressing Task  upper body dressing skills;set up assistance  -AF      Upper Body Dressing Position  supported sitting  -AF      Set-up Assistance (Upper Body Dressing)  obtain clothing  -AF      Recorded by [AF] Reina Perera OTR      Row Name 02/21/20 1524             Lower Body Dressing Assessment/Treatment    Lower Body Dressing [place] Level  doff;don;socks;shoes/slippers;moderate assist (50% patient effort);verbal cues  -AF      Lower Body Dressing Position  supported sitting  -AF      Lower Body Dressing Setup Assistance  obtain clothing  -AF      Recorded by [AF] Reina Perera OTR      Row Name 02/21/20 1524             Grooming Assessment/Treatment    Grooming [place] Level  grooming skills;supervision;set up;verbal cues  -AF      Assistive Device (Grooming)  built-up handle items  -AF      Grooming Position  sink side;supported sitting  -AF      Grooming Setup Assistance  obtain supplies  -AF      Recorded by [AF] Reina Perera, OTR      Row Name 02/21/20 1524             Fine Motor Testing & Training    Comment, Fine Motor Coordination  FMC task, able to manipulate golf tees with MOD diffiuclty, yellow theraputty exs, game pieces into vertical game board and pipe tree design. increased ability to use L hand tip to tip pinch  -AF      Recorded by [AF] Reina Perera OTR      Row Name 02/21/20 1524  02/21/20 0700          Pain Scale: Numbers Pre/Post-Treatment    Pain Scale: Numbers, Pretreatment  0/10 - no pain  -AF  0/10 - no pain  (Pended)   -NM     Pain Scale: Numbers, Post-Treatment  0/10 - no pain  -AF  0/10 - no pain  (Pended)   -NM     Recorded by [AF] Reina Perera OTR [NM] Jaxon Atkinson, PT Student     Row Name 02/21/20 0700             Standing Balance Activity    Support Needed for Balance (Standing, Balance Training)  CGA;uses both upper extremities for support  (Pended)   -NM      Comment (Standing, Balance Training)  In // bars, pt performed fwd and lateral stepping over foam cushions, braided walking, and retro walking. Completed all c BUE support and w/out LOB.  (Pended)   -NM      Recorded by [NM] Jaxon Atkinson, PT Student      Row Name 02/21/20 1524             Upper Extremity Seated Therapeutic Exercise    Performed, Seated Upper Extremity (Therapeutic Exercise)  shoulder horizontal abduction/adduction;shoulder flexion/extension;scapular protraction/retraction;elbow flexion/extension;forearm supination/pronation;wrist flexion/extension;digit flexion/extension  -AF      Device, Seated Upper Extremity (Therapeutic Exercise)  -- #3 hand weight, red theraband, blue hand gripper   -AF      Exercise Type, Seated Upper Extremity (Therapeutic Exercise)  AROM (active range of motion);resistive exercise  -AF      Expected Outcomes, Seated Upper Extremity (Therapeutic Exercise)  improve motor control;improve functional tolerance, self-care activity;improve performance, transfer skills;improve performance, reaching for objects  -AF      Sets/Reps Detail, Seated Upper Extremity (Therapeutic Exercise)  2/20  -AF      Transfers Skills, Training to Functional Activity, Seated Upper Extremity (Therapeutic Exercise)  beginning to transfer skills to functional activity  -AF      Comment, Seated Upper Extremity (Therapeutic Exercise)  educated on theraband exs   -AF      Recorded by [AF] Reina Perera  ALPESH Mckeon      Row Name 02/21/20 0700             Lower Extremity Seated Therapeutic Exercise    Performed, Seated Lower Extremity (Therapeutic Exercise)  LAQ (long arc quad), knee extension;knee flexion/extension  (Pended)  red thera band  -NM      Exercise Type, Seated Lower Extremity (Therapeutic Exercise)  AROM (active range of motion)  (Pended)   -NM      Sets/Reps Detail, Seated Lower Extremity (Therapeutic Exercise)  2/15  (Pended)   -NM      Recorded by [NM] Jaxon Atkinson, PT Student      Row Name 02/21/20 0700             Lower Extremity Supine Therapeutic Exercise    Performed, Supine Lower Extremity (Therapeutic Exercise)  hip flexion/extension;SLR (straight leg raise);hip abduction/adduction  (Pended)   -NM      Device, Supine Lower Extremity (Therapeutic Exercise)  elastic bands/tubing  (Pended)  red  -NM      Exercise Type, Supine Lower Extremity (Therapeutic Exercise)  AROM (active range of motion)  (Pended)   -NM      Sets/Reps Detail, Supine Lower Extremity (Therapeutic Exercise)  2/10  (Pended)   -NM      Recorded by [NM] Jaxon Atkinson, PT Student      Row Name 02/21/20 0700             Lower Extremity Sidelying Therapeutic Exercise    Performed, Sidelying Lower Extremity (Therapeutic Exercise)  hip abduction  (Pended)   -NM      Device, Sidelying Lower Extremity (Therapeutic Exercise)  elastic bands/tubing  (Pended)  red  -NM      Exercise Type, Sidelying Lower Extremity (Therapeutic Exercise)  AROM (active range of motion)  (Pended)   -NM      Sets/Reps Detail, Sidelying Lower Extremity (Therapeutic Exercise)  2/10  (Pended)   -NM      Recorded by [NM] Jaxon Atkinson, PT Student      Row Name 02/21/20 1524 02/21/20 0700          Positioning and Restraints    Pre-Treatment Position  sitting in chair/recliner  -AF  sitting in chair/recliner  (Pended)   -NM     Post Treatment Position  wheelchair  -AF  wheelchair  (Pended)   -NM     In Wheelchair  sitting;exit alarm on;with family/caregiver  with  present both sessions   -AF  exit alarm on;with family/caregiver  (Pended)   -NM     Recorded by [AF] Reina Perera, OTR [NM] Jaxon Atkinson, PT Student     Row Name 02/21/20 0700             Weekly Summary of Progress (PT)    Weekly Outcome Summary: Physical Therapy  Pt continues to progress with skilled PT interventions. Pt's gait and transfer LTGs have been upgraded from Doreen to CGA. Pt has met her static standing goal and all other goals remain appropriate. To continue to progress gait, standing balance, and training with the  to increase pt independence and ensure safe handling and assist in prep for d/c home.   (Pended)   -NM      Recorded by [NM] Jaxon Atkinson, PT Student        User Key  (r) = Recorded By, (t) = Taken By, (c) = Cosigned By    Initials Name Effective Dates    AF Reina Perera, OTR 04/03/18 -     NM Jaxon Atkinson, PT Student 01/03/20 -           Wound 12/09/19 1510 abdomen Incision (Active)   Dressing Appearance dry;intact 2/21/2020  8:26 AM   Closure SUJEY 2/21/2020  8:26 AM   Base dressing in place, unable to visualize 2/21/2020  8:26 AM   Drainage Amount none 2/20/2020 10:23 PM         OT Recommendation and Plan                 OT IRF GOALS     Row Name 02/12/20 1514             Transfer Goal 1 (OT-IRF)    Activity/Assistive Device (Transfer Goal 1, OT-IRF)  toilet;shower chair;walk-in shower  -AF      [place] Level (Transfer Goal 1, OT-IRF)  minimum assist (75% or more patient effort);verbal cues required  -AF      Time Frame (Transfer Goal 1, OT-IRF)  short term goal (STG)  -AF      Progress/Outcomes (Transfer Goal 1, OT-IRF)  goal ongoing  -AF         Transfer Goal 2 (OT-IRF)    Activity/Assistive Device (Transfer Goal 2, OT-IRF)  toilet;shower chair;walk-in shower  -AF      [place] Level (Transfer Goal 2, OT-IRF)  verbal cues required;contact guard assist  -AF      Time Frame (Transfer Goal 2, OT-IRF)  long term goal (LTG)  -AF       Progress/Outcomes (Transfer Goal 2, OT-IRF)  goal ongoing  -AF         Bathing Goal 1 (OT-IRF)    Activity/Device (Bathing Goal 1, OT-IRF)  bathing skills, all;grab bar/tub rail;hand-held shower spray hose;shower chair  -AF      [place] Level (Bathing Goal 1, OT-IRF)  verbal cues required;moderate assist (50-74% patient effort)  -AF      Time Frame (Bathing Goal 1, OT-IRF)  short term goal (STG)  -AF      Progress/Outcomes (Bathing Goal 1, OT-IRF)  goal ongoing  -AF         Bathing Goal 2 (OT-IRF)    Activity/Device (Bathing Goal 2, OT-IRF)  bathing skills, all;grab bar/tub rail;hand-held shower spray hose;shower chair  -AF      [place] Level (Bathing Goal 2, OT-IRF)  verbal cues required;contact guard assist  -AF      Time Frame (Bathing Goal 2, OT-IRF)  long term goal (LTG)  -AF      Progress/Outcomes (Bathing Goal 2, OT-IRF)  goal ongoing  -AF         UB Dressing Goal 1 (OT-IRF)    Activity/Device (UB Dressing Goal 1, OT-IRF)  upper body dressing  -AF      [place] (UB Dress Goal 1, OT-IRF)  set-up required;verbal cues required  -AF      Time Frame (UB Dressing Goal 1, OT-IRF)  long term goal (LTG)  -AF      Progress/Outcomes (UB Dressing Goal 1, OT-IRF)  goal ongoing  -AF         LB Dressing Goal 1 (OT-IRF)    Activity/Device (LB Dressing Goal 1, OT-IRF)  lower body dressing  -AF      [place] (LB Dressing Goal 1, OT-IRF)  verbal cues required;moderate assist (50-74% patient effort)  -AF      Time Frame (LB Dressing Goal 1, OT-IRF)  short term goal (STG)  -AF      Progress/Outcomes (LB Dressing Goal 1, OT-IRF)  goal ongoing  -AF         LB Dressing Goal 2 (OT-IRF)    Activity/Device (LB Dressing Goal 2, OT-IRF)  lower body dressing  -AF      [place] (LB Dressing Goal 2, OT-IRF)  verbal cues required;contact guard assist  -AF      Time Frame (LB Dressing Goal 2, OT-IRF)  long term goal (LTG)  -AF      Progress/Outcomes (LB Dressing Goal 2, OT-IRF)  goal ongoing  -AF         Grooming Goal 1  (OT-IRF)    Activity/Device (Grooming Goal 1, OT-IRF)  grooming skills, all  -AF      [place] (Grooming Goal 1, OT-IRF)  set-up required  -AF      Time Frame (Grooming Goal 1, OT-IRF)  short term goal (STG)  -AF      Progress/Outcomes (Grooming Goal 1, OT-IRF)  goal ongoing  -AF         Grooming Goal 2 (OT-IRF)    Activity/Device (Grooming Goal 2, OT-IRF)  grooming skills, all  -AF      [place] (Grooming Goal 2, OT-IRF)  conditional independence  -AF      Time Frame (Grooming Goal 2, OT-IRF)  long term goal (LTG)  -AF      Progress/Outcomes (Grooming Goal 2, OT-IRF)  goal ongoing  -AF         Toileting Goal 1 (OT-IRF)    Activity/Device (Toileting Goal 1, OT-IRF)  toileting skills, all;grab bar/safety frame;raised toilet seat  -AF      [place] Level (Toileting Goal 1, OT-IRF)  maximum assist (25-49% patient effort);moderate assist (50-74% patient effort)  -AF      Time Frame (Toileting Goal 1, OT-IRF)  short term goal (STG)  -AF      Progress/Outcomes (Toileting Goal 1, OT-IRF)  goal ongoing  -AF         Toileting Goal 2 (OT-IRF)    Activity/Device (Toileting Goal 2, OT-IRF)  toileting skills, all;grab bar/safety frame;raised toilet seat  -AF      [place] Level (Toileting Goal 2, OT-IRF)  minimum assist (75% or more patient effort);verbal cues required  -AF      Time Frame (Toileting Goal 2, OT-IRF)  long term goal (LTG)  -AF      Progress/Outcomes (Toileting Goal 2, OT-IRF)  goal ongoing  -AF         Balance Goal 1 (OT)    Activity/Assistive Device (Balance Goal 1, OT)  standing, static  -AF      [place] Level/Cues Needed (Balance Goal 1, OT)  moderate assist (50-74% patient effort)  -AF      Time Frame (Balance Goal 1, OT)  short term goal (STG)  -AF      Progress/Outcomes (Balance Goal 1, OT)  goal ongoing  -AF         Balance Goal 2 (OT)    Activity/Assistive Device (Balance Goal 2, OT)  standing, static  -AF      [place] Level (Balance Goal 2, OT)  minimum assist (75% or more  patient effort);verbal cues required  -AF      Time Frame (Balance Goal 2, OT)  long term goal (LTG)  -AF      Progress/Outcome (Balance Goal 2, OT)  goal ongoing  -AF         Caregiver Training Goal 1 (OT-IRF)    Caregiver Training Goal 1 (OT-IRF)  pt and  will demo safe techniques with ADLs, transfers, HEP and AE prior to d/c home with home health services   -AF      Time Frame (Caregiver Training Goal 1, OT-IRF)  long term goal (LTG)  -AF      Progress/Outcomes (Caregiver Training Goal 1, OT-IRF)  goal ongoing  -AF        User Key  (r) = Recorded By, (t) = Taken By, (c) = Cosigned By    Initials Name Provider Type    Reina Noe OTR Occupational Therapist                     Time Calculation:     Time Calculation- OT     Row Name 02/21/20 1528 02/21/20 1527          Time Calculation- OT    OT Start Time  1400  -AF  0930  -AF     OT Stop Time  1430  -AF  1030  -AF     OT Time Calculation (min)  30 min  -AF  60 min  -AF       User Key  (r) = Recorded By, (t) = Taken By, (c) = Cosigned By    Initials Name Provider Type    Reina Noe OTMENDEZ Occupational Therapist          Therapy Charges for Today     Code Description Service Date Service Provider Modifiers Qty    06843749132 HC OT SELF CARE/MGMT/TRAIN EA 15 MIN 2/20/2020 Reina Perera OTR GO 2    94447318213 HC OT THER PROC EA 15 MIN 2/20/2020 Reina Perera OTR GO 2    56359490169 HC OT NEUROMUSC RE EDUCATION EA 15 MIN 2/20/2020 Reina Perera OTR GO 1    90402754473 HC OT THERAPEUTIC ACT EA 15 MIN 2/20/2020 Reina Perera OTR GO 1    40626732122 HC OT SELF CARE/MGMT/TRAIN EA 15 MIN 2/21/2020 Reina Perera OTR GO 2    80221839600 HC OT THER PROC EA 15 MIN 2/21/2020 Reina Perera OTR GO 2    57142825987 HC OT NEUROMUSC RE EDUCATION EA 15 MIN 2/21/2020 Reina Perera OTR GO 2                   ALPESH Dempsey  2/21/2020

## 2020-02-21 NOTE — PROGRESS NOTES
"                                                                                        [facility] Follow up Note        PATIENT IDENTIFICATION     Name: She López  Age: 74 y.o.  Sex: female  :  1945  MRN: [id number]       CHIEF COMPLIANTS / REASON FOR FOLLOW UP          Hyponatremia,metabolic acidosis.      Subjective:          Pt is a 74 yrs old white female s/p colostomy previously,recent admission to [facility] for gbs treated with 5 sessions of plasmapheresis,our group followed for plasmaperesis treatment that was completed uneventfully.Pt also noted with hyponatremia,controlled with oral salt.we will follow for ongoing hyponatremia.    20:improving with pt.walked 4 steps today.  2020: Motor strength continue to improve.  20:seen in pt.  20:No complaints.       Review of Systems:          Constitutional: No fever, no chills, no lethargy, no weakness.  HEENT:  No headache, otalgia, itchy eyes, nasal discharge or sore throat.  Cardiac:  No chest pain, dyspnea, orthopnea or PND.  Chest:  No cough, phlegm or wheezing.  Abdomen:  No abdominal pain, nausea or vomiting.  Neuro:  No focal weakness, abnormal movements or seizure-like activity.  :   No hematuria, no pyuria, no dysuria, no flank pain.  Extremities:  No  joint pains.  ROS was otherwise negative except as mentioned in the [place].        OBJECTIVE                                                                        Exam:  BP 95/64 (BP Location: Right arm, Patient Position: Lying)   Pulse 73   Temp 97.4 °F (36.3 °C) (Oral)   Resp 18   Ht 165.1 cm (65\")   Wt 58.1 kg (128 lb)   LMP  (LMP Unknown)   SpO2 100%   BMI 21.30 kg/m²   Intake/Output last 3 shifts:  I/O last 3 completed shifts:  In: 740 [P.O.:740]  Out: 3550 [Urine:975; Stool:2575]  Intake/Output this shift:  I/O this shift:  In: 600 [P.O.:600]  Out: 900 [Urine:500; Stool:400]    General Appearance:  Alert, cooperative, no " distress, appears stated age  Head:  Normocephalic, without obvious abnormality, atraumatic  Eyes:  Sclerae anicteric, EOM's intact     Neck:  Supple,  no adenopathy;      Lungs:   Clear to auscultation bilaterally, respirations unlabored  Heart:  Regular rate and rhythm, S1 and S2 normal, no  rub   or gallop  Abdomen:  Soft, nontender,    no masses, no hepatomegaly, no splenomegaly  Extremities:  Extremities normal, trace edema  Neurologic:   Alert and oriented, no focal deficits    Scheduled Meds:    Iron 18 mg Sublingual BID   gabapentin 300 mg Oral 4x Daily   loperamide 2 mg Oral TID AC   MULTIVITAMIN ADULT 1 tablet Sublingual Daily   pantoprazole 40 mg Oral Q AM   sodium bicarbonate 1,300 mg Oral TID   Cyanocobalamin 2,500 mcg Sublingual Weekly   Vitamin D-3 5,000 Units Sublingual Daily     Continuous Infusions:   PRN Meds:•  acetaminophen  •  aluminum sulfate-calcium acetate  •  gabapentin **AND** gabapentin  •  miconazole  •  ondansetron ODT         Data Review:                                                                           CBC:   Results from last 7 days   Lab Units 02/19/20  0647   WBC 10*3/mm3 5.54   RBC 10*6/mm3 2.53*     BMP:         Invalid input(s): SODIUM, POTASSIUM, CHLORIDE  ABGs:       Invalid input(s): PO2       Imaging:                                                                                         ASSESSMENT:                                                                                Guillain Barré syndrome (CMS/HCC)       Hyponatremia, secondary to increased/high ADH state    Gillan barre syndrome.    crohns disease/s/p colostomy.    Hyperlipidemia.    Skin cancer.    Non aniongap metabolic acidosis:secondary to gi losses.    Iron deficiency    Anemia.    Vit d deficiency.  ·       PLAN:                                                                            ·   Sodium level 137,satisfactory.  Continue sodium bicarbonate for sodium and acidosis.  Off of fluid  restriction.  Defer transfusion to medicine.  Fecal occult blood positive  Might need GI work-up   Will follow.           Kalen Mao MD  [facility]  2/21/2020  6:40 AM

## 2020-02-22 PROCEDURE — 97110 THERAPEUTIC EXERCISES: CPT

## 2020-02-22 RX ADMIN — LOPERAMIDE HYDROCHLORIDE 2 MG: 2 CAPSULE ORAL at 05:26

## 2020-02-22 RX ADMIN — CHOLECALCIFEROL TAB 125 MCG (5000 UNIT) 5000 UNITS: 125 TAB at 08:12

## 2020-02-22 RX ADMIN — SODIUM BICARBONATE 1300 MG: 650 TABLET ORAL at 21:59

## 2020-02-22 RX ADMIN — GABAPENTIN 300 MG: 300 CAPSULE ORAL at 17:23

## 2020-02-22 RX ADMIN — Medication 1 TABLET: at 08:12

## 2020-02-22 RX ADMIN — LOPERAMIDE HYDROCHLORIDE 2 MG: 2 CAPSULE ORAL at 11:44

## 2020-02-22 RX ADMIN — PANTOPRAZOLE SODIUM 40 MG: 40 TABLET, DELAYED RELEASE ORAL at 05:26

## 2020-02-22 RX ADMIN — Medication 18 MG: at 08:12

## 2020-02-22 RX ADMIN — GABAPENTIN 300 MG: 300 CAPSULE ORAL at 08:11

## 2020-02-22 RX ADMIN — SODIUM BICARBONATE 1300 MG: 650 TABLET ORAL at 16:34

## 2020-02-22 RX ADMIN — LOPERAMIDE HYDROCHLORIDE 2 MG: 2 CAPSULE ORAL at 16:34

## 2020-02-22 RX ADMIN — GABAPENTIN 300 MG: 300 CAPSULE ORAL at 11:44

## 2020-02-22 RX ADMIN — SODIUM BICARBONATE 1300 MG: 650 TABLET ORAL at 08:11

## 2020-02-22 RX ADMIN — GABAPENTIN 300 MG: 300 CAPSULE ORAL at 21:58

## 2020-02-22 RX ADMIN — Medication 18 MG: at 21:58

## 2020-02-22 NOTE — PLAN OF CARE
Problem: Patient Care Overview  Goal: Plan of Care Review  Outcome: Ongoing (interventions implemented as appropriate)  Flowsheets (Taken 2/22/2020 1603)  Outcome Summary: Attended therapy this morning.   at bedside most of the day.  Ileostomy pouch intact.  Takes medications whole with water.  Up to BSC assist x1.  Uses call light for assistance.  No safety issues noted.  Progress, Functional Goals: demonstrating adequate progress  Plan of Care Reviewed With: patient  IRF Plan of Care Review: progress ongoing, continue     Problem: Patient Care Overview  Goal: Individualization and Mutuality  Outcome: Ongoing (interventions implemented as appropriate)  Flowsheets (Taken 2/22/2020 1605)  Patient Specific Goals (Include Timeframe): To get stronger and go home soon.

## 2020-02-22 NOTE — PROGRESS NOTES
Inpatient Rehabilitation Plan of Care Note    Plan of Care  Care Plan Reviewed - Updates as Follows    Psychosocial    Performed Intervention(s)  Verbalize needs and concerns  calm enviroment  Therapeutic environmental set-up      Safety    Performed Intervention(s)  Bed alarm, wc alarm  Items within reach  Safety rounds  Environmental set-up to reduce risk      Sphincter Control    Performed Intervention(s)  Monitor intake and output  Encourage appropriate diet  wound ostomy nurse to continue to see pt for ostomy care.      Body Systems    Performed Intervention(s)  Daily skin inspection    Signed by: Lenin Telles RN

## 2020-02-22 NOTE — PROGRESS NOTES
Inpatient Rehabilitation Plan of Care Note    Plan of Care  Care Plan Reviewed - No updates at this time.    Psychosocial    Performed Intervention(s)  Verbalize needs and concerns  calm enviroment  Therapeutic environmental set-up      Safety    Performed Intervention(s)  Bed alarm, wc alarm  Items within reach  Safety rounds  Environmental set-up to reduce risk      Sphincter Control    Performed Intervention(s)  Monitor intake and output  Encourage appropriate diet  wound ostomy nurse to continue to see pt for ostomy care.      Body Systems    Performed Intervention(s)  Daily skin inspection    Signed by: Romina Cotton RN

## 2020-02-22 NOTE — PLAN OF CARE
Problem: Patient Care Overview  Goal: Plan of Care Review  Outcome: Ongoing (interventions implemented as appropriate)  Flowsheets (Taken 2/22/2020 4414)  Outcome Summary: Pt. is calm and cooperative with staff. A&OX4. Takes Meds whole with water. Complained of any pain to bilateral hands. Gabapentin 300 mg PO Scheduled given at 2159, and then relieved well. Uses bedpan at night. Continent of urinary. Ileostomy working and tolerated well. Got loose brown BM. Uses call light for assistance. No unsafe behavior to note at this time.  Progress, Functional Goals: demonstrating adequate progress  Plan of Care Reviewed With: patient  IRF Plan of Care Review: progress ongoing, continue

## 2020-02-22 NOTE — PROGRESS NOTES
Occupational Therapy: Branch    Physical Therapy: Individual: 30 minutes.    Speech Language Pathology:  Branch    Signed by: Chelsie Cline PT

## 2020-02-22 NOTE — THERAPY TREATMENT NOTE
Inpatient Rehabilitation - Physical Therapy Treatment Note  [facility]     Patient Name: She López  : 1945  MRN: [id number]    Today's Date: 2020  Onset of Illness/Injury or Date of Surgery: 20              Admit Date: 2020      Visit Dx:      ICD-10-CM ICD-9-CM   1. General weakness R53.1 780.79       Patient Active Problem List   Diagnosis   • Crohn's disease of small intestine with other complication (CMS/HCC)   • Type 2 diabetes mellitus without complication (CMS/HCC)   • RSD upper limb   • Neuropathic pain of hand   • Hyperlipidemia   • Cervical myelopathy (CMS/HCC)   • Central pain syndrome   • Carpal tunnel syndrome   • Vitamin B 12 deficiency   • Guillain Barré syndrome (CMS/HCC)       Therapy Treatment    IRF Treatment Summary     Row Name 20 1146             Evaluation/Treatment Time and Intent    Subjective Information  no complaints  -      Existing Precautions/Restrictions  fall  -      Document Type  therapy note (daily note)  -      Mode of Treatment  individual therapy;physical therapy  -      Patient/Family Observations  pt supine in bed no acute distress, spouse bedside  -LH      Recorded by [] Chelsie Cline, PT      Row Name 20 1146             Cognition/Psychosocial- PT/OT    Affect/Mental Status (Cognitive)  WNL  -      Orientation Status (Cognition)  oriented x 4  -      Follows Commands (Cognition)  WFL  -      Personal Safety Interventions  fall prevention program maintained;gait belt;supervised activity  -LH      Recorded by [] Chelsie Cline, PT      Row Name 20 1146             Bed Mobility Assessment/Treatment    Supine-Sit [place] (Bed Mobility)  supervision  -LH      Recorded by [] Chelsie Cline, PT      Row Name 20 1146             Functional Mobility    Functional Mobility- Comment  practiced ambulating over slight incline into/out of // bars w rwx CGA  -LH      Recorded by [] Chelsie Cline, PT       Row Name 02/22/20 1146             Bed-Chair Transfer    Bed-Chair [place] (Transfers)  contact guard  -LH      Assistive Device (Bed-Chair Transfers)  wheelchair  -LH      Recorded by [] Chelsie Cline, PT      Row Name 02/22/20 1146             Sit-Stand Transfer    Sit-Stand [place] (Transfers)  contact guard  -LH      Assistive Device (Sit-Stand Transfers)  walker, front-wheeled  -LH      Recorded by [] Chelsie Cline, PT      Row Name 02/22/20 1146             Stand-Sit Transfer    Stand-Sit [place] (Transfers)  contact guard  -LH      Assistive Device (Stand-Sit Transfers)  walker, front-wheeled  -LH      Recorded by [] Chelsie Cline, PT      Row Name 02/22/20 1146             Gait/Stairs Assessment/Training    [place] Level (Gait)  contact guard  -LH      Assistive Device (Gait)  walker, front-wheeled  -LH      Distance in Feet (Gait)  160x2  -LH      Pattern (Gait)  step-through  -LH      Deviations/Abnormal Patterns (Gait)  bilateral deviations;ataxic  -LH      Bilateral Gait Deviations  forward flexed posture;heel strike decreased;weight shift ability decreased  -      Comment (Gait/Stairs)  L active ankle aircast  -LH      Recorded by [] Chelsie Cline, PT      Row Name 02/22/20 1146             Pain Scale: Numbers Pre/Post-Treatment    Pain Scale: Numbers, Pretreatment  0/10 - no pain  -      Pain Scale: Numbers, Post-Treatment  0/10 - no pain  -      Pre/Post Treatment Pain Comment  persistent burning in hands  -LH      Recorded by [] Chelsie Cline, PT      Row Name 02/22/20 1146             Balance    Balance  dynamic balance activity  -LH      Recorded by [] Chelsie Cline, PT      Row Name 02/22/20 1146             Dynamic Balance Activity    Therapeutic Training Performed (Dynamic Balance)  ambulate backward;side stepping tandem fwd and  back  -LH      Support Needed for Balance (Dynamic Balance Training)  CGA;uses both upper extremities for support  -       Restrictions (Dynamic Balance Training)  // bars, over nl surface then foam balance beam - noted decreased ankle stability on foam however no LOB  -LH      Recorded by [] Chelsie Cline, PT      Row Name 02/22/20 1146             Positioning and Restraints    Pre-Treatment Position  sitting in chair/recliner  -LH      Post Treatment Position  wheelchair  -LH      In Wheelchair  sitting;call light within reach;encouraged to call for assist;exit alarm on;with family/caregiver  -LH      Recorded by [] Chelsie Cline, PT        User Key  (r) = Recorded By, (t) = Taken By, (c) = Cosigned By    Initials Name Effective Dates     Chelsie Cline, PT 04/03/18 -         Wound 12/09/19 1510 abdomen Incision (Active)   Dressing Appearance dry;intact 2/22/2020 10:58 AM   Closure SUJEY 2/22/2020 10:58 AM   Base dressing in place, unable to visualize 2/22/2020 10:58 AM           PT Recommendation and Plan                        Time Calculation:     PT Charges     Row Name 02/22/20 1201             Time Calculation    Start Time  0930  -      Stop Time  1000  -      Time Calculation (min)  30 min  -      PT Received On  02/22/20  -      PT - Next Appointment  02/24/20  -        User Key  (r) = Recorded By, (t) = Taken By, (c) = Cosigned By    Initials Name Provider Type     Chelsie Cline, PT Physical Therapist          Therapy Charges for Today     Code Description Service Date Service Provider Modifiers Qty    59981894515 HC PT THER PROC EA 15 MIN 2/22/2020 Chelsie Cline, PT GP 2                   Chelsie Cline PT  2/22/2020

## 2020-02-22 NOTE — PROGRESS NOTES
LOS: 29 days   Patient Care Team:  Armando Valentine MD as PCP - General (Internal Medicine)  Lisa Arriaza MD as Consulting Physician (Obstetrics and Gynecology)    Chief Complaint: same    Subjective     History of Present Illness    Subjective Pt is awake and alert.  present. Both are pleased with progress    History taken from: patient    Objective     Vital Signs  Temp:  [97.7 °F (36.5 °C)-98.2 °F (36.8 °C)] 97.8 °F (36.6 °C)  Heart Rate:  [68-89] 68  Resp:  [16-18] 18  BP: ()/(53-61) 101/61    Objective exam unchanged calves soft and nT resp unlabored and reglar    Results Review:     I reviewed the patient's new clinical results.    Medication Review:     Assessment/Plan       Guillain Barré syndrome (CMS/HCC)      Assessment & Plan Continue to prepare for DC. ELOS 1-2 weeks.     Kendall Kohler MD  02/22/20  12:59 PM    Time:

## 2020-02-23 RX ADMIN — SODIUM BICARBONATE 1300 MG: 650 TABLET ORAL at 16:53

## 2020-02-23 RX ADMIN — GABAPENTIN 300 MG: 300 CAPSULE ORAL at 22:00

## 2020-02-23 RX ADMIN — Medication 18 MG: at 08:13

## 2020-02-23 RX ADMIN — PANTOPRAZOLE SODIUM 40 MG: 40 TABLET, DELAYED RELEASE ORAL at 05:39

## 2020-02-23 RX ADMIN — GABAPENTIN 300 MG: 300 CAPSULE ORAL at 17:45

## 2020-02-23 RX ADMIN — LOPERAMIDE HYDROCHLORIDE 2 MG: 2 CAPSULE ORAL at 16:53

## 2020-02-23 RX ADMIN — Medication 18 MG: at 22:15

## 2020-02-23 RX ADMIN — CHOLECALCIFEROL TAB 125 MCG (5000 UNIT) 5000 UNITS: 125 TAB at 08:13

## 2020-02-23 RX ADMIN — GABAPENTIN 300 MG: 300 CAPSULE ORAL at 11:46

## 2020-02-23 RX ADMIN — GABAPENTIN 300 MG: 300 CAPSULE ORAL at 08:13

## 2020-02-23 RX ADMIN — SODIUM BICARBONATE 1300 MG: 650 TABLET ORAL at 22:00

## 2020-02-23 RX ADMIN — Medication 1 TABLET: at 08:13

## 2020-02-23 RX ADMIN — LOPERAMIDE HYDROCHLORIDE 2 MG: 2 CAPSULE ORAL at 05:39

## 2020-02-23 RX ADMIN — LOPERAMIDE HYDROCHLORIDE 2 MG: 2 CAPSULE ORAL at 11:46

## 2020-02-23 RX ADMIN — SODIUM BICARBONATE 1300 MG: 650 TABLET ORAL at 08:13

## 2020-02-23 NOTE — PROGRESS NOTES
Inpatient Rehabilitation Plan of Care Note    Plan of Care  Care Plan Reviewed - Updates as Follows    Psychosocial    Performed Intervention(s)  Verbalize needs and concerns  calm enviroment  Therapeutic environmental set-up  Family support      Safety    Performed Intervention(s)  Bed alarm, wc alarm  Items within reach  Safety rounds  Environmental set-up to reduce risk      Sphincter Control    Performed Intervention(s)  Monitor intake and output  Encourage appropriate diet  wound ostomy nurse to continue to see pt for ostomy care.      Body Systems    Performed Intervention(s)  Daily skin inspection    Signed by: Lenin Telles RN

## 2020-02-23 NOTE — PROGRESS NOTES
LOS: 30 days   Patient Care Team:  Armando Valentine MD as PCP - General (Internal Medicine)  Lisa Arriaza MD as Consulting Physician (Obstetrics and Gynecology)    Chief Complaint: same    Subjective     History of Present Illness    Subjective Pt is awake and alert.  present    History taken from: patient    Objective     Vital Signs  Temp:  [97.6 °F (36.4 °C)-97.7 °F (36.5 °C)] 97.7 °F (36.5 °C)  Heart Rate:  [72-89] 72  Resp:  [18] 18  BP: (91-97)/(52-59) 97/59    Objective  Exam unchanged  Results Review:     I reviewed the patient's new clinical results.    Medication Review:     Assessment/Plan       Guillain Barré syndrome (CMS/HCC)      Assessment & Plan Continue to prepare for dc.     Kendall Kohler MD  02/23/20  12:29 PM    Time:

## 2020-02-23 NOTE — PROGRESS NOTES
Inpatient Rehabilitation Plan of Care Note    Plan of Care  Care Plan Reviewed - No updates at this time.    Psychosocial    Performed Intervention(s)  Verbalize needs and concerns  calm enviroment  Therapeutic environmental set-up  Family support      Safety    Performed Intervention(s)  Bed alarm, wc alarm  Items within reach  Safety rounds  Environmental set-up to reduce risk      Sphincter Control    Performed Intervention(s)  Monitor intake and output  Encourage appropriate diet  wound ostomy nurse to continue to see pt for ostomy care.      Body Systems    Performed Intervention(s)  Daily skin inspection    Signed by: Romina Cotton RN

## 2020-02-23 NOTE — PLAN OF CARE
Problem: Patient Care Overview  Goal: Plan of Care Review  Outcome: Ongoing (interventions implemented as appropriate)  Flowsheets (Taken 2/23/2020 0034)  Outcome Summary: Pt. is calm and cooperative. A&OX4. Takes Meds whole with water. Complained of any pain to bilateral hands. Gabapentin 300 mg PO Scheduled given at 2158, and then controlled pain well. Uses bedpan at night. Continent of urinary. Ileostomy working and tolerated well. Got loose brown BM. Uses call light for assistance. No new issues to note at this time.  Progress, Functional Goals: demonstrating adequate progress  Plan of Care Reviewed With: patient  IRF Plan of Care Review: progress ongoing, continue

## 2020-02-23 NOTE — PLAN OF CARE
Problem: Patient Care Overview  Goal: Plan of Care Review  Outcome: Ongoing (interventions implemented as appropriate)  Flowsheets (Taken 2/23/2020 9212)  Outcome Summary: Rested in bed most of the day.   visited today.  Sat up in wc for awhile and used arm weights.  Assist x1 to wc.  Takes medications whole with water.  Uses call light for assistance.  No safety issues noted.  Progress, Functional Goals: demonstrating adequate progress  Plan of Care Reviewed With: patient  IRF Plan of Care Review: progress ongoing, continue     Problem: Patient Care Overview  Goal: Individualization and Mutuality  Outcome: Ongoing (interventions implemented as appropriate)  Flowsheets (Taken 2/23/2020 5772)  Patient Specific Preferences:  stays with patient throughout the day.

## 2020-02-24 LAB
ANION GAP SERPL CALCULATED.3IONS-SCNC: 11.9 MMOL/L (ref 5–15)
BASOPHILS # BLD AUTO: 0.04 10*3/MM3 (ref 0–0.2)
BASOPHILS NFR BLD AUTO: 0.9 % (ref 0–1.5)
BUN BLD-MCNC: 6 MG/DL (ref 8–23)
BUN/CREAT SERPL: 7.7 (ref 7–25)
CALCIUM SPEC-SCNC: 8.5 MG/DL (ref 8.6–10.5)
CHLORIDE SERPL-SCNC: 100 MMOL/L (ref 98–107)
CO2 SERPL-SCNC: 27.1 MMOL/L (ref 22–29)
CREAT BLD-MCNC: 0.78 MG/DL (ref 0.57–1)
DEPRECATED RDW RBC AUTO: 53.9 FL (ref 37–54)
EOSINOPHIL # BLD AUTO: 0.14 10*3/MM3 (ref 0–0.4)
EOSINOPHIL NFR BLD AUTO: 3.1 % (ref 0.3–6.2)
ERYTHROCYTE [DISTWIDTH] IN BLOOD BY AUTOMATED COUNT: 15.8 % (ref 12.3–15.4)
GFR SERPL CREATININE-BSD FRML MDRD: 73 ML/MIN/1.73
GLUCOSE BLD-MCNC: 95 MG/DL (ref 65–99)
HCT VFR BLD AUTO: 26 % (ref 34–46.6)
HGB BLD-MCNC: 8.1 G/DL (ref 12–15.9)
IMM GRANULOCYTES # BLD AUTO: 0.03 10*3/MM3 (ref 0–0.05)
IMM GRANULOCYTES NFR BLD AUTO: 0.7 % (ref 0–0.5)
LYMPHOCYTES # BLD AUTO: 0.68 10*3/MM3 (ref 0.7–3.1)
LYMPHOCYTES NFR BLD AUTO: 14.8 % (ref 19.6–45.3)
MCH RBC QN AUTO: 29 PG (ref 26.6–33)
MCHC RBC AUTO-ENTMCNC: 31.2 G/DL (ref 31.5–35.7)
MCV RBC AUTO: 93.2 FL (ref 79–97)
MONOCYTES # BLD AUTO: 0.39 10*3/MM3 (ref 0.1–0.9)
MONOCYTES NFR BLD AUTO: 8.5 % (ref 5–12)
NEUTROPHILS # BLD AUTO: 3.3 10*3/MM3 (ref 1.7–7)
NEUTROPHILS NFR BLD AUTO: 72 % (ref 42.7–76)
NRBC BLD AUTO-RTO: 0 /100 WBC (ref 0–0.2)
PLATELET # BLD AUTO: 378 10*3/MM3 (ref 140–450)
PMV BLD AUTO: 8.9 FL (ref 6–12)
POTASSIUM BLD-SCNC: 3.4 MMOL/L (ref 3.5–5.2)
RBC # BLD AUTO: 2.79 10*6/MM3 (ref 3.77–5.28)
SODIUM BLD-SCNC: 139 MMOL/L (ref 136–145)
WBC NRBC COR # BLD: 4.58 10*3/MM3 (ref 3.4–10.8)

## 2020-02-24 PROCEDURE — 97535 SELF CARE MNGMENT TRAINING: CPT

## 2020-02-24 PROCEDURE — 85025 COMPLETE CBC W/AUTO DIFF WBC: CPT | Performed by: PHYSICAL MEDICINE & REHABILITATION

## 2020-02-24 PROCEDURE — 97112 NEUROMUSCULAR REEDUCATION: CPT

## 2020-02-24 PROCEDURE — 80048 BASIC METABOLIC PNL TOTAL CA: CPT | Performed by: PHYSICAL MEDICINE & REHABILITATION

## 2020-02-24 PROCEDURE — 97530 THERAPEUTIC ACTIVITIES: CPT

## 2020-02-24 PROCEDURE — 97110 THERAPEUTIC EXERCISES: CPT

## 2020-02-24 PROCEDURE — 63710000001 ONDANSETRON ODT 4 MG TABLET DISPERSIBLE: Performed by: PHYSICAL MEDICINE & REHABILITATION

## 2020-02-24 PROCEDURE — 97116 GAIT TRAINING THERAPY: CPT

## 2020-02-24 RX ORDER — POTASSIUM CHLORIDE 1.5 G/1.77G
40 POWDER, FOR SOLUTION ORAL AS NEEDED
Status: DISCONTINUED | OUTPATIENT
Start: 2020-02-24 | End: 2020-02-26

## 2020-02-24 RX ORDER — POTASSIUM CHLORIDE 750 MG/1
40 CAPSULE, EXTENDED RELEASE ORAL AS NEEDED
Status: DISCONTINUED | OUTPATIENT
Start: 2020-02-24 | End: 2020-02-26

## 2020-02-24 RX ADMIN — Medication 1 TABLET: at 08:24

## 2020-02-24 RX ADMIN — SODIUM BICARBONATE 1300 MG: 650 TABLET ORAL at 16:53

## 2020-02-24 RX ADMIN — LOPERAMIDE HYDROCHLORIDE 2 MG: 2 CAPSULE ORAL at 05:34

## 2020-02-24 RX ADMIN — GABAPENTIN 300 MG: 300 CAPSULE ORAL at 21:56

## 2020-02-24 RX ADMIN — POTASSIUM CHLORIDE 40 MEQ: 750 CAPSULE, EXTENDED RELEASE ORAL at 11:54

## 2020-02-24 RX ADMIN — CHOLECALCIFEROL TAB 125 MCG (5000 UNIT) 5000 UNITS: 125 TAB at 08:24

## 2020-02-24 RX ADMIN — POTASSIUM CHLORIDE 40 MEQ: 750 CAPSULE, EXTENDED RELEASE ORAL at 16:58

## 2020-02-24 RX ADMIN — PANTOPRAZOLE SODIUM 40 MG: 40 TABLET, DELAYED RELEASE ORAL at 05:34

## 2020-02-24 RX ADMIN — GABAPENTIN 300 MG: 300 CAPSULE ORAL at 12:00

## 2020-02-24 RX ADMIN — ONDANSETRON 4 MG: 4 TABLET, ORALLY DISINTEGRATING ORAL at 10:43

## 2020-02-24 RX ADMIN — Medication 18 MG: at 08:23

## 2020-02-24 RX ADMIN — LOPERAMIDE HYDROCHLORIDE 2 MG: 2 CAPSULE ORAL at 16:53

## 2020-02-24 RX ADMIN — Medication 18 MG: at 21:56

## 2020-02-24 RX ADMIN — SODIUM BICARBONATE 1300 MG: 650 TABLET ORAL at 21:56

## 2020-02-24 RX ADMIN — GABAPENTIN 300 MG: 300 CAPSULE ORAL at 08:23

## 2020-02-24 RX ADMIN — LOPERAMIDE HYDROCHLORIDE 2 MG: 2 CAPSULE ORAL at 11:54

## 2020-02-24 RX ADMIN — SODIUM BICARBONATE 1300 MG: 650 TABLET ORAL at 08:23

## 2020-02-24 RX ADMIN — GABAPENTIN 300 MG: 300 CAPSULE ORAL at 16:53

## 2020-02-24 NOTE — PROGRESS NOTES
Case Management  Inpatient Rehabilitation Plan of Care and Discharge Plan Note    Rehabilitation Diagnosis:  Branch  Date of Onset:  Branch    Medical Summary:  Branch  Past Medical History: Branch    Plan of Care  Updated Problems/Interventions  Field    Expected Intensity:  Branch  Interdisciplinary Team:  Branch  Estimated Length of Stay/Anticipated Discharge Date: Branch  Anticipated Discharge Destination:  Anticipated discharge destination from inpatient rehabilitation is community  discharge with assistance. Family conference held 2/20/2020. Family teaching for  day pass initiated with .      Based on the patient's medical and functional status, their prognosis and  expected level of functional improvement is:  Branch    Signed by: VANESA Lozano

## 2020-02-24 NOTE — PROGRESS NOTES
Inpatient Rehabilitation Functional Measures Assessment and Plan of Care    Plan of Care  Updated Problems/Interventions  Mobility    [OT] Toilet Transfers(Active)  Current Status(02/24/2020): MIN  Weekly Goal(03/03/2020): MIN/CGA  Discharge Goal: MIN/CGA    [OT] Tub/Shower Transfers(Active)  Current Status(02/24/2020): MIN  Weekly Goal(03/03/2020): MIN  Discharge Goal: MIN        Self Care    [OT] Bathing(Active)  Current Status(02/24/2020): CGA UB, MIN LB  Weekly Goal(03/03/2020): MIN  Discharge Goal: MIN    [OT] Dressing (Lower)(Active)  Current Status(02/24/2020): MAX/MOD  Weekly Goal(03/03/2020): MOD  Discharge Goal: MOD    [OT] Dressing (Upper)(Active)  Current Status(02/24/2020): MIN  Weekly Goal(03/03/2020): Set up/MIN  Discharge Goal: SBA    [OT] Grooming(Active)  Current Status(02/24/2020): MIN  Weekly Goal(03/03/2020): SBA/MIN  Discharge Goal: SBA/MIN    [OT] Toileting(Active)  Current Status(02/24/2020): MAX  Weekly Goal(03/03/2020): MOD  Discharge Goal: MOD    [OT] Eating(Active)  Current Status(02/24/2020): set up  Weekly Goal(03/03/2020): setup  Discharge Goal: set up    Functional Measures  ANKUR Eating:  Branch  ANKUR Grooming: Branch  ANKUR Bathing:  Branch  ANKUR Upper Body Dressing:  Branch  ANKUR Lower Body Dressing:  Branch  ANKUR Toileting:  Branch    ANKUR Bladder Management  Level of Assistance:  Branch  Frequency/Number of Accidents this Shift:  Branch    ANKUR Bowel Management  Level of Assistance: Branch  Frequency/Number of Accidents this Shift: Branch    ANKUR Bed/Chair/Wheelchair Transfer:  Branch  ANKUR Toilet Transfer:  Branch  ANKUR Tub/Shower Transfer:  Branch    Previously Documented Mode of Locomotion at Discharge: Field  ANKUR Expected Mode of Locomotion at Discharge: Branch  ANKUR Walk/Wheelchair:  Branch  ANKUR Stairs:  Branch    ANKUR Comprehension:  Branch  ANKUR Expression:  Branch  ANKUR Social Interaction:  Branch  ANKUR Problem Solving:  Branch  ANKUR Memory:  Branch    Therapy Mode Minutes  Occupational  Therapy: Branch  Physical Therapy: Branch  Speech Language Pathology:  Branch    Signed by: Reina Perera OT

## 2020-02-24 NOTE — THERAPY TREATMENT NOTE
Inpatient Rehabilitation - Occupational Therapy Treatment Note    [facility]     Patient Name: She López  : 1945  MRN: [id number]    Today's Date: 2020  Onset of Illness/Injury or Date of Surgery: 20              Admit Date: 2020      Visit Dx:    ICD-10-CM ICD-9-CM   1. General weakness R53.1 780.79       Patient Active Problem List   Diagnosis   • Crohn's disease of small intestine with other complication (CMS/HCC)   • Type 2 diabetes mellitus without complication (CMS/HCC)   • RSD upper limb   • Neuropathic pain of hand   • Hyperlipidemia   • Cervical myelopathy (CMS/HCC)   • Central pain syndrome   • Carpal tunnel syndrome   • Vitamin B 12 deficiency   • Guillain Barré syndrome (CMS/HCC)         Therapy Treatment    IRF Treatment Summary     Row Name 20 1515 20 0842          Evaluation/Treatment Time and Intent    Subjective Information  no complaints  -AF  no complaints  -     Existing Precautions/Restrictions  fall  -AF  fall ostomy  -     Document Type  therapy note (daily note)  -AF  therapy note (daily note)  -     Mode of Treatment  occupational therapy  -AF  individual therapy;physical therapy  -     Patient/Family Observations  pt sitting up in w/c in AM and PM sessions,  present   -AF  pt seated in WC No acute distress  -LH     Recorded by [AF] Reina Perera, OTR [LH] Chelsie Cline, PT     Row Name 20 1515 20 0842          Cognition/Psychosocial- PT/OT    Affect/Mental Status (Cognitive)  WNL  -AF  WNL  -LH     Orientation Status (Cognition)  oriented x 4  -AF  oriented x 4  -LH     Follows Commands (Cognition)  WFL  -AF  WFL  -     Personal Safety Interventions  fall prevention program maintained;gait belt;nonskid shoes/slippers when out of bed  -AF  fall prevention program maintained;gait belt;supervised activity  -     Safety Deficit (Cognitive)  insight into deficits/self awareness  -AF  --     Recorded by [AF]  Reina Perera, OTR [] Chelsie Cline, PT     Row Name 02/24/20 1300             Mobility    Advanced Gait Activity  step over obstacle  -LH      Recorded by [] Chelsie Cline, PT      Row Name 02/24/20 0842             Bed Mobility Assessment/Treatment    Comment (Bed Mobility)  NT  -LH      Recorded by [] Chelsie Cline, PT      Row Name 02/24/20 0842             Functional Mobility    Functional Mobility- Comment  practiced turning multidirectional w ambulation  -LH      Recorded by [] Chelsie Cline, PT      Row Name 02/24/20 1515             Transfer Assessment/Treatment    Comment (Transfers)  sit to stand CGA with LBD  -AF      Recorded by [] Reina Perera, OTR      Row Name 02/24/20 0842             Sit-Stand Transfer    Sit-Stand [place] (Transfers)  contact guard;verbal cues  -      Assistive Device (Sit-Stand Transfers)  walker, front-wheeled  -LH      Recorded by [] Chelsie Cline, PT      Row Name 02/24/20 0842             Stand-Sit Transfer    Stand-Sit [place] (Transfers)  contact guard;verbal cues  -      Assistive Device (Stand-Sit Transfers)  walker, front-wheeled  -LH      Recorded by [] Chelsie Cline, PT      Row Name 02/24/20 0842             Toilet Transfer    Type (Toilet Transfer)  sit-stand;stand-sit;stand pivot/stand step  -LH      [place] Level (Toilet Transfer)  minimum assist (75% patient effort);verbal cues  -      Assistive Device (Toilet Transfer)  commode;walker, front-wheeled;grab bars/safety frame  -LH      Recorded by [] Chelsie Cline, PT      Row Name 02/24/20 1515             Shower Transfer    Type (Shower Transfer)  stand pivot/stand step  -AF      [place] Level (Shower Transfer)  minimum assist (75% patient effort);verbal cues  -AF      Assistive Device (Shower Transfer)  grab bars/tub rail;tub bench;wheelchair  -AF      Recorded by [AF] Reina Perera, OTR      Row Name 02/24/20 0842             Gait/Stairs Assessment/Training     [place] Level (Gait)  contact guard;verbal cues  -      Assistive Device (Gait)  walker, front-wheeled  -      Distance in Feet (Gait)  160x4  -      Pattern (Gait)  step-through  -      Deviations/Abnormal Patterns (Gait)  base of support, wide;bilateral deviations  -      Bilateral Gait Deviations  forward flexed posture;heel strike decreased  -      Comment (Gait/Stairs)  L active ankle brace  -      Recorded by [LH] Chelsie Cline, PT      Row Name 02/24/20 0842             Step Over Obstacle (Mobility)    [place], Stepping Over Obstacles (Mobility)  contact guard  -      Comment, Stepping Over Obstacles (Mobility)  over foam beams in // bars CGA- fwd, laterally and backwards  -LH      Recorded by [LH] Chelsie Cline, YUSUF      Row Name 02/24/20 1515             Bathing Assessment/Treatment    Bathing [place] Level  bathing skills;upper body;lower body;moderate assist (50% patient effort);verbal cues;contact guard assist  -AF      Assistive Device (Bathing)  grab bar/tub rail;hand held shower spray hose;tub bench  -AF      Bathing Position  supported sitting;supported standing  -AF      Bathing Setup Assistance  obtain supplies  -AF      Comment (Bathing)  assistance with feet, hand held shower, washed hair at sink secodnary to not liking water in her face  -AF      Recorded by [AF] Reina Perera OTR      Row Name 02/24/20 1515             Upper Body Dressing Assessment/Treatment    Upper Body Dressing Task  upper body dressing skills;set up assistance;verbal cues  -AF      Upper Body Dressing Position  supported sitting  -AF      Set-up Assistance (Upper Body Dressing)  obtain clothing  -AF      Comment (Upper Body Dressing)  MIN with jacket   -AF      Recorded by [AF] Reina Perera OTR      Row Name 02/24/20 1515             Lower Body Dressing Assessment/Treatment    Lower Body Dressing [place] Level  doff;don;pants/bottoms;shoes/slippers;socks;underwear;moderate  assist (50% patient effort);verbal cues  -AF      Lower Body Dressing Position  supported sitting;supported standing  -AF      Comment (Lower Body Dressing)  with vc's able to pull down pants/ underwear with encouragement   -AF      Recorded by [AF] Reina Perera OTR      Row Name 02/24/20 1515             Grooming Assessment/Treatment    Grooming [place] Level  grooming skills;verbal cues;minimum assist (75% patient effort);set up  -AF      Assistive Device (Grooming)  built-up handle items  -AF      Grooming Position  sink side;supported sitting  -AF      Grooming Setup Assistance  obtain supplies  -AF      Comment (Grooming)  built up hand on toothbrush  -AF      Recorded by [AF] Reina Perera OTR      Row Name 02/24/20 1515             Fine Motor Testing & Training    Comment, Fine Motor Coordination  theraputty exs yellow/red mixture, FMC task with chinese checkers able to remove with MOD diffiuclty, had diffiuclty with placing into board with MAX diffiuclty   -AF      Recorded by [AF] Reina Perera OTR      Row Name 02/24/20 1515 02/24/20 0842          Pain Scale: Numbers Pre/Post-Treatment    Pain Scale: Numbers, Pretreatment  0/10 - no pain  -AF  0/10 - no pain  -LH     Pain Scale: Numbers, Post-Treatment  0/10 - no pain  -AF  0/10 - no pain  -LH     Recorded by [AF] Reina Perera, ALPESH [LH] Chelsie Cline, PT     Row Name 02/24/20 1515             Static Sitting Balance    Level of [place] (Unsupported Sitting, Static Balance)  standby assist  -AF      Sitting Position (Unsupported Sitting, Static Balance)  -- seated on tub transfer bench  -AF      Time Able to Maintain Position (Unsupported Sitting, Static Balance)  more than 5 minutes  -AF      Comment (Unsupported Sitting, Static Balance)  with ADL tasks   -AF      Recorded by [AF] Reina Perera OTR      Row Name 02/24/20 0842             Standing Balance Activity    Activities Performed (Standing, Balance Training)   standing ball toss  -      Support Needed for Balance (Standing, Balance Training)  CGA;uses at least one upper extremity for support  -      Progressive Balance Activity (Standing, Balance Training)  combined head and eye movements during activity;base of support narrowed during activity;upper extremity activities added during activity  -      Restrictions (Standing, Balance Training)  also, standing at table top folding laudry- pt able to utilize BUEs for folding task however BLEs braced against walker/table infront of pt  -      Transfers Skills, Training to Functional Activity (Standing, Balance Training)  able to transfer skills from training to functional activity  -      Comment (Standing, Balance Training)  standing at rwx- tossing bean bags into clown- LUE utilized to toss bag, other hand on rwx, seated rest break then RUE utilized  -      Recorded by [] Chelsie Cline, PT      Row Name 02/24/20 0842             Dynamic Balance Activity    Therapeutic Training Performed (Dynamic Balance)  ambulate backward;lateral walking  -      Support Needed for Balance (Dynamic Balance Training)  CGA;uses both upper extremities for support  -      Restrictions (Dynamic Balance Training)  // bars  -      Recorded by [] Chelsie Cline, PT      Row Name 02/24/20 1515             Upper Extremity Seated Therapeutic Exercise    Performed, Seated Upper Extremity (Therapeutic Exercise)  shoulder horizontal abduction/adduction;shoulder flexion/extension;scapular protraction/retraction;elbow flexion/extension;forearm supination/pronation;wrist flexion/extension;digit flexion/extension  -AF      Device, Seated Upper Extremity (Therapeutic Exercise)  -- #3 hand weight, blue hand gripper  -AF      Exercise Type, Seated Upper Extremity (Therapeutic Exercise)  AROM (active range of motion);resistive exercise  -AF      Expected Outcomes, Seated Upper Extremity (Therapeutic Exercise)  improve motor control;improve  performance, BADLs;improve performance, transfer skills  -AF      Sets/Reps Detail, Seated Upper Extremity (Therapeutic Exercise)  2/20  -AF      Transfers Skills, Training to Functional Activity, Seated Upper Extremity (Therapeutic Exercise)  transfers skills to functional activity most of the time;beginning to transfer skills to functional activity  -AF      Comment, Seated Upper Extremity (Therapeutic Exercise)  bounced ball back and forth and trying to catch herself with MIN diffiuclty, able to use Both hands for task  -AF      Recorded by [AF] Reina Perera, OTR      Row Name 02/24/20 0842             Aerobic Exercise Activity    Comment (Aerobic, Therapeutic Exercise)  5x sit<->standing CGA VCs for more NBOS (noted pt to have WBOS)  -LH      Recorded by [LH] Chelsie Cline, PT      Row Name 02/24/20 0842             Lower Extremity Seated Therapeutic Exercise    Performed, Seated Lower Extremity (Therapeutic Exercise)  hip abduction/adduction;knee flexion/extension;hip flexion/extension;ankle dorsiflexion/plantarflexion;LAQ (long arc quad), knee extension ankle PF, Inversion/eversion  -      Exercise Type, Seated Lower Extremity (Therapeutic Exercise)  resistive exercise [facility]  -      Sets/Reps Detail, Seated Lower Extremity (Therapeutic Exercise)  1/20  -LH      Recorded by [LH] Chelsie Cline, PT      Row Name 02/24/20 1515 02/24/20 0842          Positioning and Restraints    Pre-Treatment Position  sitting in chair/recliner  -AF  sitting in chair/recliner  -LH     Post Treatment Position  wheelchair  -AF  wheelchair  -LH     In Bed  sitting;exit alarm on;with family/caregiver with  in AM and PM sessions   -AF  --     In Wheelchair  --  sitting;call light within reach;encouraged to call for assist;exit alarm on;with family/caregiver  -LH     Recorded by [AF] Reina Perera, OTR [LH] Chelsie Cline, PT       User Key  (r) = Recorded By, (t) = Taken By, (c) = Cosigned By    Initials Name Effective  Dates    LH Chelsie Cline, PT 04/03/18 -     AF Reina Perera Linda, OTR 04/03/18 -           Wound 12/09/19 1510 abdomen Incision (Active)   Dressing Appearance dry;intact 2/23/2020  8:02 PM   Closure SUJEY 2/23/2020  8:02 PM   Base dressing in place, unable to visualize 2/23/2020  8:02 PM   Drainage Amount none 2/23/2020  8:02 PM   Periwound Care, Wound dry periwound area maintained 2/23/2020  8:02 PM         OT Recommendation and Plan                 OT IRF GOALS     Row Name 02/12/20 1514             Transfer Goal 1 (OT-IRF)    Activity/Assistive Device (Transfer Goal 1, OT-IRF)  toilet;shower chair;walk-in shower  -AF      [place] Level (Transfer Goal 1, OT-IRF)  minimum assist (75% or more patient effort);verbal cues required  -AF      Time Frame (Transfer Goal 1, OT-IRF)  short term goal (STG)  -AF      Progress/Outcomes (Transfer Goal 1, OT-IRF)  goal ongoing  -AF         Transfer Goal 2 (OT-IRF)    Activity/Assistive Device (Transfer Goal 2, OT-IRF)  toilet;shower chair;walk-in shower  -AF      [place] Level (Transfer Goal 2, OT-IRF)  verbal cues required;contact guard assist  -AF      Time Frame (Transfer Goal 2, OT-IRF)  long term goal (LTG)  -AF      Progress/Outcomes (Transfer Goal 2, OT-IRF)  goal ongoing  -AF         Bathing Goal 1 (OT-IRF)    Activity/Device (Bathing Goal 1, OT-IRF)  bathing skills, all;grab bar/tub rail;hand-held shower spray hose;shower chair  -AF      [place] Level (Bathing Goal 1, OT-IRF)  verbal cues required;moderate assist (50-74% patient effort)  -AF      Time Frame (Bathing Goal 1, OT-IRF)  short term goal (STG)  -AF      Progress/Outcomes (Bathing Goal 1, OT-IRF)  goal ongoing  -AF         Bathing Goal 2 (OT-IRF)    Activity/Device (Bathing Goal 2, OT-IRF)  bathing skills, all;grab bar/tub rail;hand-held shower spray hose;shower chair  -AF      [place] Level (Bathing Goal 2, OT-IRF)  verbal cues required;contact guard assist  -AF      Time Frame (Bathing  Goal 2, OT-IRF)  long term goal (LTG)  -AF      Progress/Outcomes (Bathing Goal 2, OT-IRF)  goal ongoing  -AF         UB Dressing Goal 1 (OT-IRF)    Activity/Device (UB Dressing Goal 1, OT-IRF)  upper body dressing  -AF      [place] (UB Dress Goal 1, OT-IRF)  set-up required;verbal cues required  -AF      Time Frame (UB Dressing Goal 1, OT-IRF)  long term goal (LTG)  -AF      Progress/Outcomes (UB Dressing Goal 1, OT-IRF)  goal ongoing  -AF         LB Dressing Goal 1 (OT-IRF)    Activity/Device (LB Dressing Goal 1, OT-IRF)  lower body dressing  -AF      [place] (LB Dressing Goal 1, OT-IRF)  verbal cues required;moderate assist (50-74% patient effort)  -AF      Time Frame (LB Dressing Goal 1, OT-IRF)  short term goal (STG)  -AF      Progress/Outcomes (LB Dressing Goal 1, OT-IRF)  goal ongoing  -AF         LB Dressing Goal 2 (OT-IRF)    Activity/Device (LB Dressing Goal 2, OT-IRF)  lower body dressing  -AF      [place] (LB Dressing Goal 2, OT-IRF)  verbal cues required;contact guard assist  -AF      Time Frame (LB Dressing Goal 2, OT-IRF)  long term goal (LTG)  -AF      Progress/Outcomes (LB Dressing Goal 2, OT-IRF)  goal ongoing  -AF         Grooming Goal 1 (OT-IRF)    Activity/Device (Grooming Goal 1, OT-IRF)  grooming skills, all  -AF      [place] (Grooming Goal 1, OT-IRF)  set-up required  -AF      Time Frame (Grooming Goal 1, OT-IRF)  short term goal (STG)  -AF      Progress/Outcomes (Grooming Goal 1, OT-IRF)  goal ongoing  -AF         Grooming Goal 2 (OT-IRF)    Activity/Device (Grooming Goal 2, OT-IRF)  grooming skills, all  -AF      [place] (Grooming Goal 2, OT-IRF)  conditional independence  -AF      Time Frame (Grooming Goal 2, OT-IRF)  long term goal (LTG)  -AF      Progress/Outcomes (Grooming Goal 2, OT-IRF)  goal ongoing  -AF         Toileting Goal 1 (OT-IRF)    Activity/Device (Toileting Goal 1, OT-IRF)  toileting skills, all;grab bar/safety frame;raised toilet seat  -AF       [place] Level (Toileting Goal 1, OT-IRF)  maximum assist (25-49% patient effort);moderate assist (50-74% patient effort)  -AF      Time Frame (Toileting Goal 1, OT-IRF)  short term goal (STG)  -AF      Progress/Outcomes (Toileting Goal 1, OT-IRF)  goal ongoing  -AF         Toileting Goal 2 (OT-IRF)    Activity/Device (Toileting Goal 2, OT-IRF)  toileting skills, all;grab bar/safety frame;raised toilet seat  -AF      [place] Level (Toileting Goal 2, OT-IRF)  minimum assist (75% or more patient effort);verbal cues required  -AF      Time Frame (Toileting Goal 2, OT-IRF)  long term goal (LTG)  -AF      Progress/Outcomes (Toileting Goal 2, OT-IRF)  goal ongoing  -AF         Balance Goal 1 (OT)    Activity/Assistive Device (Balance Goal 1, OT)  standing, static  -AF      [place] Level/Cues Needed (Balance Goal 1, OT)  moderate assist (50-74% patient effort)  -AF      Time Frame (Balance Goal 1, OT)  short term goal (STG)  -AF      Progress/Outcomes (Balance Goal 1, OT)  goal ongoing  -AF         Balance Goal 2 (OT)    Activity/Assistive Device (Balance Goal 2, OT)  standing, static  -AF      [place] Level (Balance Goal 2, OT)  minimum assist (75% or more patient effort);verbal cues required  -AF      Time Frame (Balance Goal 2, OT)  long term goal (LTG)  -AF      Progress/Outcome (Balance Goal 2, OT)  goal ongoing  -AF         Caregiver Training Goal 1 (OT-IRF)    Caregiver Training Goal 1 (OT-IRF)  pt and  will demo safe techniques with ADLs, transfers, HEP and AE prior to d/c home with home health services   -AF      Time Frame (Caregiver Training Goal 1, OT-IRF)  long term goal (LTG)  -AF      Progress/Outcomes (Caregiver Training Goal 1, OT-IRF)  goal ongoing  -AF        User Key  (r) = Recorded By, (t) = Taken By, (c) = Cosigned By    Initials Name Provider Type    Reina Noe, OTR Occupational Therapist                     Time Calculation:     Time Calculation- OT     Row Name  02/24/20 1521 02/24/20 1520          Time Calculation- OT    OT Start Time  1400  -AF  0930  -AF     OT Stop Time  1430  -AF  1030  -AF     OT Time Calculation (min)  30 min  -AF  60 min  -AF       User Key  (r) = Recorded By, (t) = Taken By, (c) = Cosigned By    Initials Name Provider Type    AF Reina Perera, OTR Occupational Therapist          Therapy Charges for Today     Code Description Service Date Service Provider Modifiers Qty    82116060625 HC OT SELF CARE/MGMT/TRAIN EA 15 MIN 2/24/2020 Reina Perera OTR GO 2    43379938017 HC OT THER PROC EA 15 MIN 2/24/2020 Reina Perera OTR GO 2    56747512002 HC OT NEUROMUSC RE EDUCATION EA 15 MIN 2/24/2020 Reina Perera OTR GO 1    33868625481 HC OT THERAPEUTIC ACT EA 15 MIN 2/24/2020 Reina Perera OTR GO 1                   ALPESH Dempsey  2/24/2020

## 2020-02-24 NOTE — PLAN OF CARE
Problem: Patient Care Overview  Goal: Plan of Care Review  Outcome: Ongoing (interventions implemented as appropriate)  Flowsheets (Taken 2/24/2020 3663)  Progress: improving  Plan of Care Reviewed With: patient  Patient Agreement with Plan of Care: agrees  Outcome Summary: Patient slept well tonight. Patient denied pain. Patient continues to use scheduled Neurontin scheduled but did not require any Neurontin for break through pain. Patient remains A&Ox4 and vital signs remain stable. Patient continues to swallow pills whole without difficulty. Continue to monitor closely, continue with current plan of care.

## 2020-02-24 NOTE — PROGRESS NOTES
"                                                                                        [facility] Follow up Note        PATIENT IDENTIFICATION     Name: She López  Age: 74 y.o.  Sex: female  :  1945  MRN: [id number]       CHIEF COMPLIANTS / REASON FOR FOLLOW UP          Hyponatremia,metabolic acidosis.      Subjective:          Pt is a 74 yrs old white female s/p colostomy previously,recent admission to [facility] for gbs treated with 5 sessions of plasmapheresis,our group followed for plasmaperesis treatment that was completed uneventfully.Pt also noted with hyponatremia,controlled with oral salt.we will follow for ongoing hyponatremia.    20:improving with pt.walked 4 steps today.  2020: Motor strength continue to improve.  20:seen in pt.  20:No complaints.       Review of Systems:          Constitutional: No fever, no chills, no lethargy, no weakness.  HEENT:  No headache, otalgia, itchy eyes, nasal discharge or sore throat.  Cardiac:  No chest pain, dyspnea, orthopnea or PND.  Chest:  No cough, phlegm or wheezing.  Abdomen:  No abdominal pain, nausea or vomiting.  Neuro:  No focal weakness, abnormal movements or seizure-like activity.  :   No hematuria, no pyuria, no dysuria, no flank pain.  Extremities:  No  joint pains.  ROS was otherwise negative except as mentioned in the [place].        OBJECTIVE                                                                        Exam:  /65 (BP Location: Right arm, Patient Position: Lying)   Pulse 74   Temp 97.8 °F (36.6 °C) (Oral)   Resp 18   Ht 165.1 cm (65\")   Wt 58.1 kg (128 lb)   LMP  (LMP Unknown)   SpO2 98%   BMI 21.30 kg/m²   Intake/Output last 3 shifts:  I/O last 3 completed shifts:  In: 720 [P.O.:720]  Out:  [Stool:]  Intake/Output this shift:  I/O this shift:  In: 120 [P.O.:120]  Out: 150 [Stool:150]    General Appearance:  Alert, cooperative, no distress, appears stated " age  Head:  Normocephalic, without obvious abnormality, atraumatic  Eyes:  Sclerae anicteric, EOM's intact     Neck:  Supple,  no adenopathy;      Lungs:   Clear to auscultation bilaterally, respirations unlabored  Heart:  Regular rate and rhythm, S1 and S2 normal, no  rub   or gallop  Abdomen:  Soft, nontender,    no masses, no hepatomegaly, no splenomegaly  Extremities:  Extremities normal, trace edema  Neurologic:   Alert and oriented, no focal deficits    Scheduled Meds:    Iron 18 mg Sublingual BID   gabapentin 300 mg Oral 4x Daily   loperamide 2 mg Oral TID AC   MULTIVITAMIN ADULT 1 tablet Sublingual Daily   pantoprazole 40 mg Oral Q AM   sodium bicarbonate 1,300 mg Oral TID   Cyanocobalamin 2,500 mcg Sublingual Weekly   Vitamin D-3 5,000 Units Sublingual Daily     Continuous Infusions:   PRN Meds:•  acetaminophen  •  aluminum sulfate-calcium acetate  •  gabapentin **AND** gabapentin  •  miconazole  •  ondansetron ODT         Data Review:                                                                           CBC:   Results from last 7 days   Lab Units 02/24/20  0650   WBC 10*3/mm3 4.58   RBC 10*6/mm3 2.79*     BMP:   Results from last 7 days   Lab Units 02/24/20  0650   GLUCOSE mg/dL 95   CO2 mmol/L 27.1   BUN mg/dL 6*   CREATININE mg/dL 0.78   CALCIUM mg/dL 8.5*     ABGs:       Invalid input(s): PO2       Imaging:                                                                                         ASSESSMENT:                                                                                Guillain Barré syndrome (CMS/HCC)       Hyponatremia, secondary to increased/high ADH state    Gillan barre syndrome.    crohns disease/s/p colostomy.    Hyperlipidemia.    Skin cancer.    Non aniongap metabolic acidosis:secondary to gi losses.    Iron deficiency    Anemia.    Vit d deficiency.  ·       PLAN:                                                                            ·   Sodium level  139,satisfactory.  Continue sodium bicarbonate for sodium and acidosis.  Off of fluid restriction.  Defer transfusion to medicine.  Fecal occult blood positive  Might need GI work-up   Will follow.           Kalen Mao MD  [facility]  2/24/2020  9:27 AM

## 2020-02-24 NOTE — PROGRESS NOTES
Nutrition Services    Patient Name:  She López  YOB: 1945  MRN: [id number]  Admit Date:  1/24/2020    Nutrition follow-up:    Regular diet  Intake 50-75% most meals    brings in food most every day from outside restaurants  Continues to make good progress.   Labs reviewed (K 3.4), skin intact.   ELOS 1 week.     Electronically signed by:  Lisa Phoenix RD  02/24/20 9:34 AM

## 2020-02-24 NOTE — NURSING NOTE
02/24/20 1533   Wound 12/09/19 1510 abdomen Incision   Date first assessed/Time first assessed: 12/09/19 1510   Location: (c) abdomen  Primary Wound Type: Incision   Base clean;granulating   Wound Length (cm) 0.2 cm   Wound Width (cm) 0.2 cm   Wound Depth (cm) 0.1 cm   Drainage Characteristics/Odor tan   Drainage Amount scant   Care, Wound cleansed with;sterile normal saline   Dressing Care, Wound   (hydrogel, covered with pouching)   Ileostomy RUQ   Placement Date/Time: 12/15/19 1407   Inserted by: DR PRAKASH  Location: RUQ   Stomal Appliance 1 piece;Intact;Changed   Stoma Appearance round;moist;pink   Peristomal Assessment Clean;Intact   Accessories/Skin Care convex wafer;cleansed with water;skin barrier ring   Stoma Function stool   Stool Color brown   Stool Consistency loose   Treatment Bag change   Output (mL) 150 mL   CWOCN provided hands on instruction today with patient and spouse.  Spouse cut ostomy pouch opening and applied stoma ring, heat prepped prior to pouch application.  We applied a filter to the pouch today for gas; sample and order form provided if they wish to continue for home use with Brittani pouch.  We also used deodorant/ lubricant today per spouse request for odor control.  Continued healing of midline wound; cleaned with cotton applicator and hydrogel applied.  Will likely be completely healed by discharge.  Will follow up on Thursday.

## 2020-02-24 NOTE — THERAPY TREATMENT NOTE
Inpatient Rehabilitation - Physical Therapy Treatment Note  [facility]     Patient Name: She López  : 1945  MRN: [id number]    Today's Date: 2020  Onset of Illness/Injury or Date of Surgery: 20              Admit Date: 2020      Visit Dx:      ICD-10-CM ICD-9-CM   1. General weakness R53.1 780.79       Patient Active Problem List   Diagnosis   • Crohn's disease of small intestine with other complication (CMS/HCC)   • Type 2 diabetes mellitus without complication (CMS/HCC)   • RSD upper limb   • Neuropathic pain of hand   • Hyperlipidemia   • Cervical myelopathy (CMS/HCC)   • Central pain syndrome   • Carpal tunnel syndrome   • Vitamin B 12 deficiency   • Guillain Barré syndrome (CMS/HCC)       Therapy Treatment    IRF Treatment Summary     Row Name 20 0842             Evaluation/Treatment Time and Intent    Subjective Information  no complaints  -      Existing Precautions/Restrictions  fall ostomy  -      Document Type  therapy note (daily note)  -      Mode of Treatment  individual therapy;physical therapy  -      Patient/Family Observations  pt seated in WC No acute distress  -      Recorded by [] Chelsie Cline, PT      Row Name 20 0842             Cognition/Psychosocial- PT/OT    Affect/Mental Status (Cognitive)  WNL  -      Orientation Status (Cognition)  oriented x 4  -      Follows Commands (Cognition)  WFL  -      Personal Safety Interventions  fall prevention program maintained;gait belt;supervised activity  -      Recorded by [] Chelsie Cline, PT      Row Name 20 1300             Mobility    Advanced Gait Activity  step over obstacle  -      Recorded by [] Chelsie Cline, PT      Row Name 20 0842             Bed Mobility Assessment/Treatment    Comment (Bed Mobility)  NT  -      Recorded by [] Chelsie Cline, PT      Row Name 20 0842             Functional Mobility    Functional Mobility- Comment  practiced turning  multidirectional w ambulation  -LH      Recorded by [] Chelsie Cline, PT      Row Name 02/24/20 0842             Sit-Stand Transfer    Sit-Stand Regent (Transfers)  contact guard;verbal cues  -      Assistive Device (Sit-Stand Transfers)  walker, front-wheeled  -LH      Recorded by [] Chelsie Cline, PT      Row Name 02/24/20 0842             Stand-Sit Transfer    Stand-Sit Regent (Transfers)  contact guard;verbal cues  -      Assistive Device (Stand-Sit Transfers)  walker, front-wheeled  -LH      Recorded by [] Chelsie Cline, PT      Row Name 02/24/20 0842             Toilet Transfer    Type (Toilet Transfer)  sit-stand;stand-sit;stand pivot/stand step  -      Regent Level (Toilet Transfer)  minimum assist (75% patient effort);verbal cues  -      Assistive Device (Toilet Transfer)  commode;walker, front-wheeled;grab bars/safety frame  -LH      Recorded by [] Chelsie Cline, PT      Row Name 02/24/20 0842             Gait/Stairs Assessment/Training    Regent Level (Gait)  contact guard;verbal cues  -      Assistive Device (Gait)  walker, front-wheeled  -      Distance in Feet (Gait)  160x4  -      Pattern (Gait)  step-through  -      Deviations/Abnormal Patterns (Gait)  base of support, wide;bilateral deviations  -      Bilateral Gait Deviations  forward flexed posture;heel strike decreased  -      Comment (Gait/Stairs)  L active ankle brace  -      Recorded by [] Chelsie Cline, PT      Row Name 02/24/20 0842             Step Over Obstacle (Mobility)    Regent, Stepping Over Obstacles (Mobility)  contact guard  -      Comment, Stepping Over Obstacles (Mobility)  over foam beams in // bars CGA- fwd, laterally and backwards  -      Recorded by [] Chelsie Cline, PT      Row Name 02/24/20 0842             Pain Scale: Numbers Pre/Post-Treatment    Pain Scale: Numbers, Pretreatment  0/10 - no pain  -      Pain Scale: Numbers, Post-Treatment  0/10 - no  pain  -LH      Recorded by [] Chelsie Cline, PT      Row Name 02/24/20 0842             Standing Balance Activity    Activities Performed (Standing, Balance Training)  standing ball toss  -      Support Needed for Balance (Standing, Balance Training)  CGA;uses at least one upper extremity for support  -      Progressive Balance Activity (Standing, Balance Training)  combined head and eye movements during activity;base of support narrowed during activity;upper extremity activities added during activity  -      Restrictions (Standing, Balance Training)  also, standing at table top folding laudry- pt able to utilize BUEs for folding task however BLEs braced against walker/table infront of pt  -      Transfers Skills, Training to Functional Activity (Standing, Balance Training)  able to transfer skills from training to functional activity  -      Comment (Standing, Balance Training)  standing at rwx- tossing bean bags into clown- LUE utilized to toss bag, other hand on rwx, seated rest break then RUE utilized  -LH      Recorded by [] Chelsie Cline, PT      Row Name 02/24/20 0842             Dynamic Balance Activity    Therapeutic Training Performed (Dynamic Balance)  ambulate backward;lateral walking  -      Support Needed for Balance (Dynamic Balance Training)  CGA;uses both upper extremities for support  -      Restrictions (Dynamic Balance Training)  // bars  -LH      Recorded by [] Chelsie Cline, PT      Row Name 02/24/20 0842             Aerobic Exercise Activity    Comment (Aerobic, Therapeutic Exercise)  5x sit<->standing CGA VCs for more NBOS (noted pt to have WBOS)  -      Recorded by [] Chelsie Cline, PT      Row Name 02/24/20 0842             Lower Extremity Seated Therapeutic Exercise    Performed, Seated Lower Extremity (Therapeutic Exercise)  hip abduction/adduction;knee flexion/extension;hip flexion/extension;ankle dorsiflexion/plantarflexion;LAQ (long arc quad), knee extension  ankle PF, Inversion/eversion  -      Exercise Type, Seated Lower Extremity (Therapeutic Exercise)  resistive exercise BTB  -      Sets/Reps Detail, Seated Lower Extremity (Therapeutic Exercise)  1/20  -      Recorded by [] Chelsie Cline, PT      Row Name 02/24/20 0842             Positioning and Restraints    Pre-Treatment Position  sitting in chair/recliner  -      Post Treatment Position  wheelchair  -      In Wheelchair  sitting;call light within reach;encouraged to call for assist;exit alarm on;with family/caregiver  -      Recorded by [] Chelsie Cline, PT        User Key  (r) = Recorded By, (t) = Taken By, (c) = Cosigned By    Initials Name Effective Dates     Chelsie Cline, PT 04/03/18 -         Wound 12/09/19 1510 abdomen Incision (Active)   Dressing Appearance dry;intact 2/23/2020  8:02 PM   Closure SUJEY 2/23/2020  8:02 PM   Base dressing in place, unable to visualize 2/23/2020  8:02 PM   Drainage Amount none 2/23/2020  8:02 PM   Periwound Care, Wound dry periwound area maintained 2/23/2020  8:02 PM           PT Recommendation and Plan                        Time Calculation:     PT Charges     Row Name 02/24/20 1341 02/24/20 0845          Time Calculation    Start Time  1330  -  0830  -     Stop Time  1400  -  0930  -     Time Calculation (min)  30 min  -  60 min  -     PT Received On  --  02/24/20  -     PT - Next Appointment  --  02/25/20  -       User Key  (r) = Recorded By, (t) = Taken By, (c) = Cosigned By    Initials Name Provider Type     Chelsie Cline, PT Physical Therapist          Therapy Charges for Today     Code Description Service Date Service Provider Modifiers Qty    02553527976 HC GAIT TRAINING EA 15 MIN 2/24/2020 Chelsie Cline, PT GP 3    31761957932 HC PT THERAPEUTIC ACT EA 15 MIN 2/24/2020 Chelsie Cline, PT GP 3                   Chelsie Cline, PT  2/24/2020

## 2020-02-24 NOTE — PROGRESS NOTES
Occupational Therapy: Branch    Physical Therapy: Individual: 90 minutes.    Speech Language Pathology:  Branch    Signed by: Chelsie Cline PT

## 2020-02-24 NOTE — PROGRESS NOTES
LOS: 31 days   Patient Care Team:  Armando Valentine MD as PCP - General (Internal Medicine)  Lisa Arriaza MD as Consulting Physician (Obstetrics and Gynecology)    Chief Complaint: GBS    Subjective     History of Present Illness   She feels that she continues to show improvement in her strength.  Better dexterity in her hands.  Tolerating activities.       .  History taken from: patient chart    Objective     Vital Signs  Temp:  [97.4 °F (36.3 °C)-98.9 °F (37.2 °C)] 97.8 °F (36.6 °C)  Heart Rate:  [74-88] 74  Resp:  [18] 18  BP: (104-107)/(56-65) 104/65    Physical Exam:   General: Awake, alert, NAD  HEENT: normocepahlic, atraumatic   Heart: RRR, no m/r/g  Lungs: CTAB, no wheezing, rales, or rhonchi  Abdomen: soft, non-tender, non-distended, colostomy , incision dressed  Strength:   BUE: 4+/5 with bilateral elbow flexion, elbow extension, wrist extension, and finger flexion, right greater than left weak hand intrinsics R 3-/5, L 3+/5  Difficulty opposing right thumb to digits 2-3-4-5 but further improvement  Opposes left thumb to digits 2-3-4-5.   BLE:  HF right 4+/5, left 4+/5,  knee extension right 4+/5, left  4+/5, ankle dorsiflexion B 4+/5  Extremities: No edema distally       Results Review:     I reviewed the patient's new clinical results.  Results from last 7 days   Lab Units 02/24/20  0650 02/19/20  0647   WBC 10*3/mm3 4.58 5.54   HEMOGLOBIN g/dL 8.1* 7.5*   HEMATOCRIT % 26.0* 23.4*   PLATELETS 10*3/mm3 378 301     Results from last 7 days   Lab Units 02/24/20  0650 02/21/20  0539   SODIUM mmol/L 139 139   POTASSIUM mmol/L 3.4* 3.4*   CHLORIDE mmol/L 100 100   CO2 mmol/L 27.1 27.3   BUN mg/dL 6* 3*   CREATININE mg/dL 0.78 0.65   CALCIUM mg/dL 8.5* 8.3*   GLUCOSE mg/dL 95 92       Medication Review:   Scheduled Meds:    Iron 18 mg Sublingual BID   gabapentin 300 mg Oral 4x Daily   loperamide 2 mg Oral TID AC   MULTIVITAMIN ADULT 1 tablet Sublingual Daily   pantoprazole 40 mg Oral Q AM   sodium  bicarbonate 1,300 mg Oral TID   Cyanocobalamin 2,500 mcg Sublingual Weekly   Vitamin D-3 5,000 Units Sublingual Daily     Continuous Infusions:   PRN Meds:.•  acetaminophen  •  aluminum sulfate-calcium acetate  •  gabapentin **AND** gabapentin  •  miconazole  •  ondansetron ODT  •  potassium chloride  •  potassium chloride    Assessment/Plan   73 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     GBS  -Completed her plasmapharesis and has gotten good return.   - Will begin PT/OT for ADLS, strength, mobility, txs, gait.   -January 27: On gabapentin 300mg QID. Will continue to monitor and will titrate up as needed.  -Jan 29 -  Feels strength is somewhat better.  Worked on gait with rolling walker yesterday 15 feet. Today utilized ankle weights to decrease ataxic movements with gait. Transfers mod max assist. Bed mobility CTG.  - Feb 6 - Patient complains of increased numbness in her knees.  She is noted to have increased weakness with her hip extensors and possibly in the left quadricep.  Her ambulation distance is less and takes more effort with a shorter stride.  On examination she also appears weaker in her hands and ankles.  Discussed having neurology see her to assess for possibly IVIG or another round of plasmapheresis.  -Feb 7 - increased weakness noted in BUE and BLE and more difficulty with mobility - Neurology starting patient on course of IVIG   -February 10-shows good response on IVIG-tolerating.  Notes improvement in her strength in the upper extremities and lower extremities as well as performance with mobility and self-care.  -February 11-continues to show improvement on IVIG  Feb 12-functional improvement after IVIG with improvement in her handgrip, feeding, transfers, and ambulation as well as improvement in her strength.  February 14-strength noticeably improved in the bilateral upper extremities and bilateral lower extremities.  Ambulating further.  February 20-reviewed need to follow-up with  [facility] as an outpatient for nerve conduction study/EMG and if show CIDP the possibility of monthly IVIG injections at home.  She states that they previously had mentioned to her the possible option of the home IVIG injections.    Crohn's  -s/p recent colostomy- wound nurse to see and education for home management.   -If more than 1 liter output a day, needs Immodium-per Dr. Albrecht colorectal surgeon.     Hyponatremia  -On salt tabs and fluid restriction. Renal following  -January 27: Na 130. Continue salt tabs and fluid restriction per renal. Will continue to monitor  -January 30: Na 133. Continue sodium bicarbonate and fluid restriction per renal.  -Feb 3- Na improved to 138  -February 4-fluid restriction discontinued.  -February 5-sodium 137  -February 10-sodium 140.  Sodium chloride tablets were discontinued on February 8.  Continues on sodium bicarb 1300 mg 3 times a day.    Anemia  -January 27: Hgb/Hct 7.6/24.3- stable. No signs of active bleeding. Will order fecal occult and reticulocyte count. Will continue to monitor.  -Jan 28 - hemoccult positive  -Jan 29 - Stool heme +.  HGB stable at 7.5. She had hypotension and tachycardia yesterday 87/64 and 110) , better today (99/63 and 88).  Reviewed option of transfusion PRBCs. She wished to hold on transfusion unless absolutely necessary. Discussed if HGB < 7, would recommend definitely transfuse.  -January 30: Repeat CBC scheduled for tomorrow. Will continue to monitor.    -January 31: Hgb 7.2. Will transfuse 1 unit of PRBCs today. Ordered anemia studies. Spoke with Dr. Albrecht and if iron supplementation is required she recommends IV iron for better absorption.  -February 1: Hemoglobin improved 8.8.  IV iron infusion ordered by nephrology  -Feb 3 - HGB improved 9.5. Not tolerate IV iron infusion due to burning in vein, does not wish to have placed a more proximal IV. She had tow infusions. She will get EZ Melt Iron from outside to take by mouth; on  discussion with colorectal surgery last week she should be able to absorb PO iron.  February 5-hemoglobin 8.5  February 10-hemoglobin 8.4  February 12-hemoglobin 7.3-As she has the midline in, will plan to resume the last 3 of the iron infusions that she did not get previously with the peripheral IV.  Will start tomorrow scheduled every other day for total of 3 doses.  Recheck hemoglobin on Friday.  February 14-hemoglobin 8.1.  She had a temperature last evening possibly related to the iron infusion.  She tolerated the infusion and would like to have it on consecutive days ( rather than every other day as was ordered to give her a break).  February 19-hemoglobin 7.5-stable    DVT prophylaxis  -SCDS for now.   -January 27: Heparin has been on hold due to HGB trending down. Following results of fecal occult and reticulocyte count will consider restarting Heparin for DVT prophylaxis.   -January 28 - hemoccult positive.     Vitamin D deficiency-vitamin D 22.4  on January 29.    Feb 1 - Patient does not wish to take vitamin D p.o. through the hospital supply.  Wishes to have her vitamin D brought in from home.    Vitamin B12 replacement-sublingual from home    RUE antecubital fossa phlebitis/cellulitis-February 3-no sign of infection. K-PAD.   February 4-Right antecubital fossa phlebitis with cord palpable/tender with surrounding erythema consistent with cellulitis. Allergy to Keflex - throat swelled. Will start Doxycycline 100 mg bid x 7 days, continue K-pad.   February 5-She continues to have pain and redness and swelling of the right antecubital fossa and distal upper arm.  Reviewed continue with doxycycline which was started yesterday but if there is no show improvement will look to adjust antibiotic tomorrow.  White blood cell count is 7.22 with normal differential..  Feb 7 - area improved today on doxycycline.  February 10-further improvement.    TEAM CONF - JAN 28 - FLAT AFFECT. SUPINE SIT MIN ASSIST. TRANSFERS  MAX 2. NONAMBULATORY. UBB MIN. LBB MAX. UBD MOD.  LBD DEP. ABD WOUND CARE. STOOL HEMOCCULT POSITIVE.  ELOS - 5 WEEKS.     TEAM CONF - FEB 4 - BED SBA. TRANSFERS MIN-MOD. GAIT 30 FEET MIN 2 FIDEL WALKER. UBD MOD. LBD DEP. ON 1200 CC FLUID RESTRICTION. EATING OKAY.  ABD WOUND CLOSING. OSTOMY DEVICE STAYING ON.  CONTINENT. NEEDS TO GET UP TO BSC.   ELOS- 4 WEEKS.     February 10-she had shown a decline in her mobility and self-care with flare of Guillain-Barré syndrome but has shown response on IVIG.  Improving.  Most recently in physical therapy and Occupational Therapy-toilet transfers moderate assist, shower transfer to be assessed, bathing minimum assist upper body and maximum assist lower body, dressing moderate assist upper body independent lower body.  Grooming minimum assist.  Toileting dependent.  Eating with minimal assist to set up with built up utensils.  Bed mobility contact-guard.  Ambulated 25 feet moderate assist of 2 with Aircast bilateral ankles.    TEAM CONF - FEB 11 - She feels IVIG has been helpful.  She notes improvement in the strength in her hands and in her legs.  Easier transfers.  Walk better.  She had had a decline in her ability to do to box and blocks but that improved yesterday.  Tolerating IVIG.   describes her performance as 180 degree change from Friday.  In physical therapy and Occupational Therapy-toilet transfers moderate assist, shower transfer to be assessed, bathing minimum assist upper body and maximum assist lower body, dressing moderate assist upper body independent lower body.  Grooming minimum assist.  Toileting dependent.  Eating with minimal assist to set up with built up utensils.  Bed mobility contact-guard.  Ambulated 25 feet moderate assist of 2 with Aircast bilateral ankles.   Continent bladder. Abd wound improving.   ELOS - 3 WEEKS    TEAM CONF - FEB 18 - BED SUP. TRANSFERS MIN. GAIT 120 FEET RW MIN OF 2. TOILET TRANSFER MIN-MOD. TOILETING MAX ASSIST. UBD MIN.  LBD MAX-DEP. UBB MIN. .LBB MOD ASSIST. CONTINENT BLADDER. OSTOMY. ABD WOUND IMPROVED. GABAPENTIN FOR PAIN.    ELOS - 2 WEEKS    Adolfo Valle MD  02/24/20

## 2020-02-25 PROCEDURE — 97535 SELF CARE MNGMENT TRAINING: CPT

## 2020-02-25 PROCEDURE — 97530 THERAPEUTIC ACTIVITIES: CPT

## 2020-02-25 PROCEDURE — 97112 NEUROMUSCULAR REEDUCATION: CPT

## 2020-02-25 PROCEDURE — 97110 THERAPEUTIC EXERCISES: CPT

## 2020-02-25 RX ADMIN — Medication 18 MG: at 21:46

## 2020-02-25 RX ADMIN — Medication 1 TABLET: at 09:22

## 2020-02-25 RX ADMIN — GABAPENTIN 300 MG: 300 CAPSULE ORAL at 12:06

## 2020-02-25 RX ADMIN — GABAPENTIN 300 MG: 300 CAPSULE ORAL at 21:46

## 2020-02-25 RX ADMIN — LOPERAMIDE HYDROCHLORIDE 2 MG: 2 CAPSULE ORAL at 11:31

## 2020-02-25 RX ADMIN — LOPERAMIDE HYDROCHLORIDE 2 MG: 2 CAPSULE ORAL at 17:56

## 2020-02-25 RX ADMIN — SODIUM BICARBONATE 1300 MG: 650 TABLET ORAL at 09:21

## 2020-02-25 RX ADMIN — GABAPENTIN 300 MG: 300 CAPSULE ORAL at 09:20

## 2020-02-25 RX ADMIN — CHOLECALCIFEROL TAB 125 MCG (5000 UNIT) 5000 UNITS: 125 TAB at 09:22

## 2020-02-25 RX ADMIN — GABAPENTIN 300 MG: 300 CAPSULE ORAL at 17:56

## 2020-02-25 RX ADMIN — LOPERAMIDE HYDROCHLORIDE 2 MG: 2 CAPSULE ORAL at 09:21

## 2020-02-25 RX ADMIN — SODIUM BICARBONATE 1300 MG: 650 TABLET ORAL at 21:46

## 2020-02-25 RX ADMIN — SODIUM BICARBONATE 1300 MG: 650 TABLET ORAL at 16:45

## 2020-02-25 RX ADMIN — PANTOPRAZOLE SODIUM 40 MG: 40 TABLET, DELAYED RELEASE ORAL at 06:26

## 2020-02-25 RX ADMIN — Medication 18 MG: at 09:22

## 2020-02-25 NOTE — PROGRESS NOTES
LOS: 32 days   Patient Care Team:  Armando Valentine MD as PCP - General (Internal Medicine)  Lisa Arriaza MD as Consulting Physician (Obstetrics and Gynecology)    Chief Complaint: GBS    Subjective     History of Present Illness        Continues to improve with strength, mobility, dexterity, and ADLs  Tolerates therapies    .  History taken from: patient chart    Objective     Vital Signs  Temp:  [97.5 °F (36.4 °C)-99.4 °F (37.4 °C)] 98.3 °F (36.8 °C)  Heart Rate:  [81-96] 81  Resp:  [16] 16  BP: ()/(54-65) 104/65    Physical Exam:   General: Awake, alert, NAD  HEENT: normocepahlic, atraumatic   Heart: RRR, no m/r/g  Lungs: CTAB, no wheezing, rales, or rhonchi  Abdomen: soft, non-tender, non-distended, colostomy , incision dressed  Strength:   BUE: 4+/5 with bilateral elbow flexion, elbow extension, wrist extension, and finger flexion, right greater than left weak hand intrinsics R 3-/5, L 3+/5  Difficulty opposing right thumb to digits 2-3-4-5 but further improvement  Opposes left thumb to digits 2-3-4-5.   BLE:  HF right 4+/5, left 4+/5,  knee extension right 4+/5, left  4+/5, ankle dorsiflexion B 4+/5  Extremities: No edema distally       Results Review:     I reviewed the patient's new clinical results.  Results from last 7 days   Lab Units 02/24/20  0650 02/19/20  0647   WBC 10*3/mm3 4.58 5.54   HEMOGLOBIN g/dL 8.1* 7.5*   HEMATOCRIT % 26.0* 23.4*   PLATELETS 10*3/mm3 378 301     Results from last 7 days   Lab Units 02/24/20  0650 02/21/20  0539   SODIUM mmol/L 139 139   POTASSIUM mmol/L 3.4* 3.4*   CHLORIDE mmol/L 100 100   CO2 mmol/L 27.1 27.3   BUN mg/dL 6* 3*   CREATININE mg/dL 0.78 0.65   CALCIUM mg/dL 8.5* 8.3*   GLUCOSE mg/dL 95 92       Medication Review:   Scheduled Meds:    Iron 18 mg Sublingual BID   gabapentin 300 mg Oral 4x Daily   loperamide 2 mg Oral TID AC   MULTIVITAMIN ADULT 1 tablet Sublingual Daily   pantoprazole 40 mg Oral Q AM   sodium bicarbonate 1,300 mg Oral TID    Cyanocobalamin 2,500 mcg Sublingual Weekly   Vitamin D-3 5,000 Units Sublingual Daily     Continuous Infusions:   PRN Meds:.•  acetaminophen  •  aluminum sulfate-calcium acetate  •  gabapentin **AND** gabapentin  •  miconazole  •  ondansetron ODT  •  potassium chloride  •  potassium chloride    Assessment/Plan   73 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     GBS  -Completed her plasmapharesis and has gotten good return.   - Will begin PT/OT for ADLS, strength, mobility, txs, gait.   -January 27: On gabapentin 300mg QID. Will continue to monitor and will titrate up as needed.  -Jan 29 -  Feels strength is somewhat better.  Worked on gait with rolling walker yesterday 15 feet. Today utilized ankle weights to decrease ataxic movements with gait. Transfers mod max assist. Bed mobility CTG.  - Feb 6 - Patient complains of increased numbness in her knees.  She is noted to have increased weakness with her hip extensors and possibly in the left quadricep.  Her ambulation distance is less and takes more effort with a shorter stride.  On examination she also appears weaker in her hands and ankles.  Discussed having neurology see her to assess for possibly IVIG or another round of plasmapheresis.  -Feb 7 - increased weakness noted in BUE and BLE and more difficulty with mobility - Neurology starting patient on course of IVIG   -February 10-shows good response on IVIG-tolerating.  Notes improvement in her strength in the upper extremities and lower extremities as well as performance with mobility and self-care.  -February 11-continues to show improvement on IVIG  Feb 12-functional improvement after IVIG with improvement in her handgrip, feeding, transfers, and ambulation as well as improvement in her strength.  February 14-strength noticeably improved in the bilateral upper extremities and bilateral lower extremities.  Ambulating further.  February 20-reviewed need to follow-up with [facility] as an  outpatient for nerve conduction study/EMG and if show CIDP the possibility of monthly IVIG injections at home.  She states that they previously had mentioned to her the possible option of the home IVIG injections.  Feb 24 - Nerve conduction study/ electromyography scheduled for March 24, 2020 at [facility]  -s/p recent colostomy- wound nurse to see and education for home management.   -If more than 1 liter output a day, needs Immodium-per Dr. Albrecht colorectal surgeon.     Hyponatremia  -On salt tabs and fluid restriction. Renal following  -January 27: Na 130. Continue salt tabs and fluid restriction per renal. Will continue to monitor  -January 30: Na 133. Continue sodium bicarbonate and fluid restriction per renal.  -Feb 3- Na improved to 138  -February 4-fluid restriction discontinued.  -February 5-sodium 137  -February 10-sodium 140.  Sodium chloride tablets were discontinued on February 8.  Continues on sodium bicarb 1300 mg 3 times a day.    Anemia  -January 27: Hgb/Hct 7.6/24.3- stable. No signs of active bleeding. Will order fecal occult and reticulocyte count. Will continue to monitor.  -Jan 28 - hemoccult positive  -Jan 29 - Stool heme +.  HGB stable at 7.5. She had hypotension and tachycardia yesterday 87/64 and 110) , better today (99/63 and 88).  Reviewed option of transfusion PRBCs. She wished to hold on transfusion unless absolutely necessary. Discussed if HGB < 7, would recommend definitely transfuse.  -January 30: Repeat CBC scheduled for tomorrow. Will continue to monitor.    -January 31: Hgb 7.2. Will transfuse 1 unit of PRBCs today. Ordered anemia studies. Spoke with Dr. Albrecht and if iron supplementation is required she recommends IV iron for better absorption.  -February 1: Hemoglobin improved 8.8.  IV iron infusion ordered by nephrology  -Feb 3 - HGB improved 9.5. Not tolerate IV iron infusion due to burning in vein, does not wish to have placed a more proximal IV. She had tow infusions.  She will get EZ Melt Iron from outside to take by mouth; on discussion with colorectal surgery last week she should be able to absorb PO iron.  February 5-hemoglobin 8.5  February 10-hemoglobin 8.4  February 12-hemoglobin 7.3-As she has the midline in, will plan to resume the last 3 of the iron infusions that she did not get previously with the peripheral IV.  Will start tomorrow scheduled every other day for total of 3 doses.  Recheck hemoglobin on Friday.  February 14-hemoglobin 8.1.  She had a temperature last evening possibly related to the iron infusion.  She tolerated the infusion and would like to have it on consecutive days ( rather than every other day as was ordered to give her a break).  February 19-hemoglobin 7.5-stable    DVT prophylaxis  -SCDS for now.   -January 27: Heparin has been on hold due to HGB trending down. Following results of fecal occult and reticulocyte count will consider restarting Heparin for DVT prophylaxis.   -January 28 - hemoccult positive.     Vitamin D deficiency-vitamin D 22.4  on January 29.    Feb 1 - Patient does not wish to take vitamin D p.o. through the hospital supply.  Wishes to have her vitamin D brought in from home.    Vitamin B12 replacement-sublingual from home    RUE antecubital fossa phlebitis/cellulitis-February 3-no sign of infection. K-PAD.   February 4-Right antecubital fossa phlebitis with cord palpable/tender with surrounding erythema consistent with cellulitis. Allergy to Keflex - throat swelled. Will start Doxycycline 100 mg bid x 7 days, continue K-pad.   February 5-She continues to have pain and redness and swelling of the right antecubital fossa and distal upper arm.  Reviewed continue with doxycycline which was started yesterday but if there is no show improvement will look to adjust antibiotic tomorrow.  White blood cell count is 7.22 with normal differential..  Feb 7 - area improved today on doxycycline.  February 10-further improvement.    TEAM CONF  - JAN 28 - FLAT AFFECT. SUPINE SIT MIN ASSIST. TRANSFERS MAX 2. NONAMBULATORY. UBB MIN. LBB MAX. UBD MOD.  LBD DEP. ABD WOUND CARE. STOOL HEMOCCULT POSITIVE.  ELOS - 5 WEEKS.     TEAM CONF - FEB 4 - BED SBA. TRANSFERS MIN-MOD. GAIT 30 FEET MIN 2 FIDEL WALKER. UBD MOD. LBD DEP. ON 1200 CC FLUID RESTRICTION. EATING OKAY.  ABD WOUND CLOSING. OSTOMY DEVICE STAYING ON.  CONTINENT. NEEDS TO GET UP TO BSC.   ELOS- 4 WEEKS.     February 10-she had shown a decline in her mobility and self-care with flare of Guillain-Barré syndrome but has shown response on IVIG.  Improving.  Most recently in physical therapy and Occupational Therapy-toilet transfers moderate assist, shower transfer to be assessed, bathing minimum assist upper body and maximum assist lower body, dressing moderate assist upper body independent lower body.  Grooming minimum assist.  Toileting dependent.  Eating with minimal assist to set up with built up utensils.  Bed mobility contact-guard.  Ambulated 25 feet moderate assist of 2 with Aircast bilateral ankles.    TEAM CONF - FEB 11 - She feels IVIG has been helpful.  She notes improvement in the strength in her hands and in her legs.  Easier transfers.  Walk better.  She had had a decline in her ability to do to box and blocks but that improved yesterday.  Tolerating IVIG.   describes her performance as 180 degree change from Friday.  In physical therapy and Occupational Therapy-toilet transfers moderate assist, shower transfer to be assessed, bathing minimum assist upper body and maximum assist lower body, dressing moderate assist upper body independent lower body.  Grooming minimum assist.  Toileting dependent.  Eating with minimal assist to set up with built up utensils.  Bed mobility contact-guard.  Ambulated 25 feet moderate assist of 2 with Aircast bilateral ankles.   Continent bladder. Abd wound improving.   ELOS - 3 WEEKS    TEAM CONF - FEB 18 - BED SUP. TRANSFERS MIN. GAIT 120 FEET RW MIN OF 2.  TOILET TRANSFER MIN-MOD. TOILETING MAX ASSIST. UBD MIN. LBD MAX-DEP. UBB MIN. .LBB MOD ASSIST. CONTINENT BLADDER. OSTOMY. ABD WOUND IMPROVED. GABAPENTIN FOR PAIN.    ELOS - 2 WEEKS    TEAM CONF - FEB 25 - BED SUP. TRANSFERS CTG-MIN. GAIT 160 FEET RW CTG, LEFT DYNAMIC AIR CAST. TOILET TRANSFERS MIN ASSIST. SHOWER TRANSFERS MIN ASSIST. UBD MIN. LBD MOD-MAX ASSIST. UBB CTG. LBB MIN ASSIST.  ELOS - ONE WEEK    Adolfo Valle MD  02/25/20

## 2020-02-25 NOTE — THERAPY TREATMENT NOTE
Inpatient Rehabilitation - Physical Therapy Treatment Note  [facility]     Patient Name: She López  : 1945  MRN: [id number]    Today's Date: 2020  Onset of Illness/Injury or Date of Surgery: 20              Admit Date: 2020      Visit Dx:      ICD-10-CM ICD-9-CM   1. General weakness R53.1 780.79       Patient Active Problem List   Diagnosis   • Crohn's disease of small intestine with other complication (CMS/HCC)   • Type 2 diabetes mellitus without complication (CMS/HCC)   • RSD upper limb   • Neuropathic pain of hand   • Hyperlipidemia   • Cervical myelopathy (CMS/HCC)   • Central pain syndrome   • Carpal tunnel syndrome   • Vitamin B 12 deficiency   • Guillain Barré syndrome (CMS/HCC)       Therapy Treatment    IRF Treatment Summary     Row Name 20 8174             Evaluation/Treatment Time and Intent    Subjective Information  no complaints  -      Existing Precautions/Restrictions  fall  -      Document Type  therapy note (daily note)  -      Mode of Treatment  individual therapy;physical therapy  -      Patient/Family Observations  pt seated in  spouse bedside no acute distress  -      Recorded by [] Chelsie Cline, PT      Row Name 20 0845             Caregiver Training    Caregiver(s) to be Trained  spouse/significant  -      Caregiver Training Plan  ambulation using assistive device  -      Comment, Caregiver Training Plan  spouse performed ambulation PT  gym->pt room, in pt room to bathroom w pt safely, CGA w belt and rwx. PM session- pt and spouse performed car tsf outside w pt's SUV, ambulated on concrete sidewalk and up/down curb. pt and spouse perform safe handling skills.  board updated to reflect progress  -      Recorded by [] Chelsie Cline, PT      Row Name 20 0845             Cognition/Psychosocial- PT/OT    Affect/Mental Status (Cognitive)  WNL  -      Orientation Status (Cognition)  oriented x 4  -      Personal Safety  Interventions  fall prevention program maintained;gait belt;supervised activity  -LH      Recorded by [] Chelsie Cline, PT      Row Name 02/25/20 0850             Bed Mobility Assessment/Treatment    Comment (Bed Mobility)  NT  -LH      Recorded by [] Chelsie Cline, PT      Row Name 02/25/20 0850             Transfer Assessment/Treatment    Comment (Transfers)  practiced sit<->standing from varied height armed chairs CGA.   -LH      Recorded by [] Chelsie Cline, PT      Row Name 02/25/20 0850             Sit-Stand Transfer    Sit-Stand [place] (Transfers)  contact guard  -LH      Assistive Device (Sit-Stand Transfers)  walker, front-wheeled  -LH      Recorded by [] Chelsie Cline, PT      Row Name 02/25/20 0850             Stand-Sit Transfer    Stand-Sit [place] (Transfers)  contact guard;verbal cues  -      Assistive Device (Stand-Sit Transfers)  walker, front-wheeled  -LH      Recorded by [] Chelsie Cline, PT      Row Name 02/25/20 0850             Car Transfer    Type (Car Transfer)  sit-stand;stand-sit  -LH      [place] Level (Car Transfer)  contact guard;verbal cues  -      Assistive Device (Car Transfer)  walker, front-wheeled indoor and outside w pt SUV  -LH      Recorded by [] Chelsie Cline, PT      Row Name 02/25/20 0850             Gait/Stairs Assessment/Training    [place] Level (Gait)  contact guard;verbal cues  -LH      Assistive Device (Gait)  walker, front-wheeled  -LH      Distance in Feet (Gait)  160x4  -      Pattern (Gait)  step-through  -      Deviations/Abnormal Patterns (Gait)  base of support, wide;ataxic  -LH      Bilateral Gait Deviations  forward flexed posture;heel strike decreased  -LH      Left Sided Gait Deviations  -- dec ankle control-improved w L active ankle brace  -LH      Comment (Gait/Stairs)  ambulated over carpet, PT gym->OP waiting room->PT gym CGA rwx- pt displays safe environmental awareness  -LH      Recorded by [] Chelsie Cline, PT   "    Row Name 02/25/20 0850             Curb Negotiation (Mobility)    [place], Curb Negotiation  contact guard;verbal cues;tactile cues  -      Comment, Curb Negotiation (Mobility)  4\" curb x 2 to simulate home entrance  -      Recorded by [] Chelsie Cline, PT      Row Name 02/25/20 0850             Step Over Obstacle (Mobility)    [place], Stepping Over Obstacles (Mobility)  contact guard;verbal cues  -      Comment, Stepping Over Obstacles (Mobility)  fwd and laterally over orange hurdles, BUE and uni UE support at hemibars  -      Recorded by [] Chelsie Cline, PT      Row Name 02/25/20 0850             Pain Scale: Numbers Pre/Post-Treatment    Pain Scale: Numbers, Pretreatment  0/10 - no pain  -      Pain Scale: Numbers, Post-Treatment  0/10 - no pain  -      Recorded by [] Chelsie Cline, PT      Row Name 02/25/20 0850             Standing Balance Activity    Activities Performed (Standing, Balance Training)  standing ball toss;standing reaching outside base of support;standing on foam roll  -      Support Needed for Balance (Standing, Balance Training)  CGA;uses at least one upper extremity for support  -      Progressive Balance Activity (Standing, Balance Training)  upper extremity activities added during activity;base of support narrowed during activity;combined head and eye movements during activity  -      Comment (Standing, Balance Training)  standing on foam square in // bars- reaching laterally outside SAMUEL diagonally for bean bags- tossing fwd into bucket, one UE supported at hemibar   -      Recorded by [] Chelsie Cline, PT      Row Name 02/25/20 0850             Dynamic Balance Activity    Therapeutic Training Performed (Dynamic Balance)  side stepping;backward walking  -      Support Needed for Balance (Dynamic Balance Training)  CGA;uses at least one upper extremity for support  -      Comment (Dynamic Balance Training)  hemibars, over red foam mat  -      " Recorded by [] Chelsie Cline PT      Row Name 02/25/20 0850             Positioning and Restraints    Pre-Treatment Position  sitting in chair/recliner  -      Post Treatment Position  wheelchair  -      In Wheelchair  sitting;call light within reach;encouraged to call for assist;exit alarm on  -      Recorded by [] Chelsie Cline PT        User Key  (r) = Recorded By, (t) = Taken By, (c) = Cosigned By    Initials Name Effective Dates     Chelsie Cline PT 04/03/18 -         Wound 12/09/19 1510 abdomen Incision (Active)   Dressing Appearance dry;intact 2/24/2020  7:54 PM   Closure SUJEY 2/24/2020  7:54 PM   Base dressing in place, unable to visualize 2/24/2020  7:54 PM   Wound Length (cm) 0.2 cm 2/24/2020  3:33 PM   Wound Width (cm) 0.2 cm 2/24/2020  3:33 PM   Wound Depth (cm) 0.1 cm 2/24/2020  3:33 PM   Drainage Characteristics/Odor tan 2/24/2020  3:33 PM   Drainage Amount scant 2/24/2020  3:33 PM   Care, Wound cleansed with;sterile normal saline 2/24/2020  3:33 PM           PT Recommendation and Plan                        Time Calculation:     PT Charges     Row Name 02/25/20 1401 02/25/20 0854          Time Calculation    Start Time  1330  -  0830  -     Stop Time  1400  -  0930  -     Time Calculation (min)  30 min  -  60 min  -     PT Received On  --  02/25/20  -     PT - Next Appointment  --  02/26/20  -       User Key  (r) = Recorded By, (t) = Taken By, (c) = Cosigned By    Initials Name Provider Type     Chelsie Cline, PT Physical Therapist          Therapy Charges for Today     Code Description Service Date Service Provider Modifiers Qty    53901217081 HC GAIT TRAINING EA 15 MIN 2/24/2020 Chelsie Cline, PT GP 3    90156360356 HC PT THERAPEUTIC ACT EA 15 MIN 2/24/2020 Chelsie Cline, PT GP 3    79656671717 HC PT THER PROC EA 15 MIN 2/25/2020 Chelsie Cline, PT GP 3    90065580900 HC PT THERAPEUTIC ACT EA 15 MIN 2/25/2020 Chelsie Cline, PT GP 3                   Chelsie Cline,  PT  2/25/2020

## 2020-02-25 NOTE — PLAN OF CARE
Problem: Patient Care Overview  Goal: Plan of Care Review  Outcome: Ongoing (interventions implemented as appropriate)  Flowsheets  Taken 2/25/2020 1651 by Romina العراقي, RN  Consent Given to Review Plan with: Pt. A&OX4. Continent of B&B. Took meds whole w/ water. Ate food brought in from restaurants by . Pt. complained of pain to hands, numbness, and tingling. Some relief given from gabapentin. Patient's strength is improving. Changed two mediplexes on outer right lower leg. No nausea today. Scheduled to be discharged on Tuesday, 3/3.  Taken 2/25/2020 0900 by Romina العراقي, RN  Plan of Care Reviewed With: patient;spouse  Taken 2/24/2020 9709 by Lyla Givens RN  Patient Agreement with Plan of Care: agrees

## 2020-02-25 NOTE — PROGRESS NOTES
Case Management  Inpatient Rehabilitation Team Conference    Conference Date/Time: 2/25/2020 7:40:32 AM    Team Conference Attendees:  Blayne Fagan, Pharmacist  Kellee Delcid, MSSW  Chelsie Cline, PT  Reina Perera, OT  Isa Walter, CTRS  Lisa Phoenix RD, LD  Miguel Angel Mcdowell, RN  Romina العراقي, RN  Chaplain Thelma    Demographics            Age: 74Y            Gender: Female    Admission Date: 1/24/2020 5:58:20 PM  Rehabilitation Diagnosis:  GBS  Past Medical History: PMH  Crohn's s/p recent colostomy  Carpal tunnel  Hyperlipidemia  Cervical stenosis s/p ACDF  Barett's esophagus  Skin cancer  Hiatal hernia  RSD upper limb  Vit B-12 and D def  ?  ?  ?  Past Surgical History:  ProcedureLateralityDate  ?CARPAL TUNNEL LDXQBGANxoli98/2012  ?CERVICAL FUSIONN/A01/25/2012  ?C- 5,6,7 fused, DR. MISAEL NORTH AT [place]  ?CHOLECYSTECTOMYN/A04/12/2018  ?LAPAROSCOPIC, DR. VENICE SALDAÑA AT [place]  ?COLON RESECTIONN/A12/9/2019  ?Procedure: laparoscopic to open right hemicolectomy; ?Surgeon: Dorcas Albrecht MD; ?Location: [facility] OR; ?Service: General  ?COLON RESECTIONN/A12/20/2019  ?Procedure: EXPLORATORY LAPAROTOMY WITH SMALL BOWEL RESECTION; ?Surgeon: Dorcas Albrecht MD; ?Location: [facility]; ?Service: General  ?COLONOSCOPYN/A8/16/2019  ?ANAL SKIN TAGS, ANASTAMOSIS ULCERATED, INDURATED, AND NODULAR, INTERNAL  HEMORRHOIDS, ANASTAMOSIS STRICTURE, DR. YANG BIRD AT [facility]  ?COLONOSCOPYN/A04/20/2010  ?PROCTITIS, RECTAL STENOSIS, ACTIVE CROHNS DISEASE AT [facility], RIGHT COLON,  DR.GERARD AGRAWAL AT [place]  ?ENDOSCOPYN/A8/16/2019  ?GRADE A ESOPHAGITIS, SLIDING HIATAL HERNIA, EROSIVE GASTRITIS, Z LINE  IRREGULAR, DR. YANG BIRD AT [facility]  ?EXPLORATORY LAPAROTOMYN/A12/15/2019  ?Procedure: LAPAROTOMY EXPLORATORY AND WASHOUT, RESECTION OF ANASTOMOSIS WITH  END ILEOSTOMY; ?Surgeon: Dorcas Albrecht MD; ?Location: [facility] MAIN OR;  ?Service: General  ?EYE  SURGERY??  ?SMALL INTESTINE SURGERYN/A10/01/2001  ?ILEOCOLECTOMY      Plan of Care  Anticipated Discharge Date/Estimated Length of Stay: ELOS: 2 weeks  Anticipated Discharge Destination: Community discharge with assistance  Discharge Plan : Family conference held 2/20/2020. Family teaching for day pass  initiated with .  Medical Necessity Expected Level Rationale: MIN/MOD with home health therapies  Intensity and Duration: an average of 3 hours/5 days per week  Medical Supervision and 24 Hour Rehab Nursing: x  Physical Therapy: x  PT Intensity/Duration: 90 minutes/day, 5 days/week for approximately 15 - 20  days  Occupational Therapy: x  OT Intensity/Duration: 90 minutes/day, 5 days/week for approximately 15 - 20  days  Social Work: x  Therapeutic Recreation: x  Updated (if changes indicated)    Anticipated Discharge Date/Estimated Length of Stay:   ELOS: DC 3/3    Based on the patient's medical and functional status, their prognosis and  expected level of functional improvement is: MIN/MOD with home health therapies      Interdisciplinary Problem/Goals/Status    All Rehab Problems:  Body Systems    [RN] Integumentary(Active)  Current Status(02/19/2020): Abdominal incision healed.ileostomy bag in place.  both changed per WOCN  Weekly Goal(02/26/2020): No S&S infection noted. Skin is intact.  Discharge Goal: No S&S infection noted Skin is intact.        Mobility    [OT] Toilet Transfers(Active)  Current Status(02/24/2020): MIN  Weekly Goal(03/03/2020): MIN/CGA  Discharge Goal: MIN/CGA    [OT] Tub/Shower Transfers(Active)  Current Status(02/24/2020): MIN  Weekly Goal(03/03/2020): MIN  Discharge Goal: MIN    [PT] Bed Mobility(Active)  Current Status(02/24/2020): sup  Weekly Goal(03/03/2020): sup  Discharge Goal: SUP    [PT] Bed/Chair/Wheelchair(Active)  Current Status(02/24/2020): min/CGA  Weekly Goal(03/02/2020): CGA  Discharge Goal: CGA    [PT] Walk(Active)  Current Status(02/24/2020): 160ft CGA rwx, L  aircast  Weekly Goal(03/04/2020): PT/OT at this time  Discharge Goal: 240ft, CGA, rwx, L air cast        Psychosocial    [RN] Coping/Adjustment(Active)  Current Status(02/19/2020): Supportive family,  very helpful and assists  patient with care.  Weekly Goal(02/26/2020): Identify progress in functional status  Discharge Goal: Demonstrate healthy coping strategies        Safety    [RN] Potential for Injury(Active)  Current Status(02/19/2020): No unsafe behaviors. Weakness of lower extremities  Weekly Goal(02/26/2020): Use call light 100%  Discharge Goal: Pt/family aware of risk of fall and safety in the home setting        Self Care    [OT] Bathing(Active)  Current Status(02/24/2020): CGA UB, MIN LB  Weekly Goal(03/03/2020): MIN  Discharge Goal: MIN    [OT] Dressing (Lower)(Active)  Current Status(02/24/2020): MAX/MOD  Weekly Goal(03/03/2020): MOD  Discharge Goal: MOD    [OT] Dressing (Upper)(Active)  Current Status(02/24/2020): MIN  Weekly Goal(03/03/2020): Set up/MIN  Discharge Goal: SBA    [OT] Grooming(Active)  Current Status(02/24/2020): MIN  Weekly Goal(03/03/2020): SBA/MIN  Discharge Goal: SBA/MIN    [OT] Toileting(Active)  Current Status(02/24/2020): MAX  Weekly Goal(03/03/2020): MOD  Discharge Goal: MOD    [OT] Eating(Active)  Current Status(02/24/2020): set up  Weekly Goal(03/03/2020): setup  Discharge Goal: set up        Sphincter Control    [RN] Bladder Management(Active)  Current Status(02/19/2020): pt has been continent, using bedpan.She is reluctant  to use BSC. does wear brief.  Weekly Goal(02/26/2020): Continent bladder 100%  Discharge Goal: Continent bladder 100%    [RN] Bowel Management(Active)  Current Status(02/19/2020): Has ileostomy since 12/20/19.  aware of care  for ileostomy.  Weekly Goal(02/26/2020): maintain education of ileostomy with pt and family  Discharge Goal: Independent with ileostomy care        Comments: 1/28: chronic anemia, heme + stool, GI aware; anticipate  assessing amb  today;    2/4: flat affect; ileostomy bag staying in place, may change schedule to Mon and  Thurs; NSG to reinforce use of bedside commode/toilet vs bedpan;    2/11: function and mood improved after IV Ig admin;    2/18: still with LOB at times, needs 2 people to correct; NSG to again reinforce  not using bedpan; anticipate family conf this week, likely to need day pass and  ramp at home;    2/25: family conf last week;    Signed by: Miguel Angel Mcdowell RN    Physician CoSigned By: Adolfo Valle 02/25/2020 07:50:17

## 2020-02-25 NOTE — PROGRESS NOTES
Inpatient Rehabilitation Plan of Care Note    Plan of Care  Care Plan Reviewed - No updates at this time.    Psychosocial    Performed Intervention(s)  Verbalize needs and concerns  calm enviroment  Therapeutic environmental set-up  Family support      Safety    Performed Intervention(s)  Bed alarm, wc alarm  Items within reach  Safety rounds  Environmental set-up to reduce risk      Sphincter Control    Performed Intervention(s)  Monitor intake and output  Encourage appropriate diet  wound ostomy nurse to continue to see pt for ostomy care.      Body Systems    Performed Intervention(s)  Daily skin inspection    Signed by: Grace Moe RN

## 2020-02-25 NOTE — THERAPY TREATMENT NOTE
Inpatient Rehabilitation - Occupational Therapy Treatment Note    [facility]     Patient Name: She López  : 1945  MRN: [id number]    Today's Date: 2020  Onset of Illness/Injury or Date of Surgery: 20              Admit Date: 2020      Visit Dx:    ICD-10-CM ICD-9-CM   1. General weakness R53.1 780.79       Patient Active Problem List   Diagnosis   • Crohn's disease of small intestine with other complication (CMS/HCC)   • Type 2 diabetes mellitus without complication (CMS/HCC)   • RSD upper limb   • Neuropathic pain of hand   • Hyperlipidemia   • Cervical myelopathy (CMS/HCC)   • Central pain syndrome   • Carpal tunnel syndrome   • Vitamin B 12 deficiency   • Guillain Barré syndrome (CMS/HCC)         Therapy Treatment    IRF Treatment Summary     Row Name 20 1536 20 0850          Evaluation/Treatment Time and Intent    Subjective Information  no complaints  -AF  no complaints  -     Existing Precautions/Restrictions  fall  -AF  fall  -     Document Type  therapy note (daily note)  -  therapy note (daily note)  -     Mode of Treatment  occupational therapy  -AF  individual therapy;physical therapy  -     Patient/Family Observations  sitting up in w/c in AM and PM session,  present   -AF  pt seated in WC spouse bedside no acute distress  -     Recorded by [AF] Reina Perera, OTR [] Chelsie Cline, PT     Row Name 20 0850             Caregiver Training    Caregiver(s) to be Trained  spouse/significant  -      Caregiver Training Plan  ambulation using assistive device  -      Comment, Caregiver Training Plan  spouse performed ambulation PT  gym->pt room, in pt room to bathroom w pt safely, CGA w belt and rwx. PM session- pt and spouse performed car tsf outside w pt's SUV, ambulated on concrete sidewalk and up/down curb. pt and spouse perform safe handling skills.  board updated to reflect progress  -LH      Recorded by [] Chelsie Cline, PT       Row Name 02/25/20 1536 02/25/20 0850          Cognition/Psychosocial- PT/OT    Affect/Mental Status (Cognitive)  WNL  -AF  WNL  -LH     Orientation Status (Cognition)  oriented x 4  -AF  oriented x 4  -LH     Follows Commands (Cognition)  WFL  -AF  --     Personal Safety Interventions  fall prevention program maintained;gait belt;nonskid shoes/slippers when out of bed  -AF  fall prevention program maintained;gait belt;supervised activity  -LH     Recorded by [AF] Reina Perera, OTR [] Chelsie Cline, PT     Row Name 02/25/20 0850             Mobility    Advanced Gait Activity  rough/uneven surfaces  -LH      Recorded by [] Chelsie Cline, PT      Row Name 02/25/20 0850             Bed Mobility Assessment/Treatment    Comment (Bed Mobility)  NT  -LH      Recorded by [] Chelsie Cline, PT      Row Name 02/25/20 1536 02/25/20 0850          Transfer Assessment/Treatment    Comment (Transfers)  sit to stand at elevated table CGA  -AF  practiced sit<->standing from varied height armed chairs CGA.   -LH     Recorded by [AF] Reina Perera, OTR [] Chelsie Cline, PT     Row Name 02/25/20 0850             Sit-Stand Transfer    Sit-Stand [place] (Transfers)  contact guard  -LH      Assistive Device (Sit-Stand Transfers)  walker, front-wheeled  -LH      Recorded by [] Chelsie Cline, PT      Row Name 02/25/20 0850             Stand-Sit Transfer    Stand-Sit [place] (Transfers)  contact guard;verbal cues  -LH      Assistive Device (Stand-Sit Transfers)  walker, front-wheeled  -LH      Recorded by [] Chelsie Cline, PT      Row Name 02/25/20 0850             Car Transfer    Type (Car Transfer)  sit-stand;stand-sit  -LH      [place] Level (Car Transfer)  contact guard;verbal cues  -LH      Assistive Device (Car Transfer)  walker, front-wheeled indoor and outside w pt SUV  -LH      Recorded by [] Chelsie Cline, PT      Row Name 02/25/20 0850             Gait/Stairs Assessment/Training     "[place] Level (Gait)  contact guard;verbal cues  -      Assistive Device (Gait)  walker, front-wheeled  -      Distance in Feet (Gait)  160x4  -      Pattern (Gait)  step-through  -      Deviations/Abnormal Patterns (Gait)  base of support, wide;ataxic  -      Bilateral Gait Deviations  forward flexed posture;heel strike decreased  -      Left Sided Gait Deviations  -- dec ankle control-improved w L active ankle brace  -      Comment (Gait/Stairs)  ambulated over carpet, PT gym->OP waiting room->PT gym CGA rwx- pt displays safe environmental awareness  -LH      Recorded by [] Chelsie Cline, PT      Row Name 02/25/20 0850             Curb Negotiation (Mobility)    [place], Curb Negotiation  contact guard;verbal cues;tactile cues  -      Comment, Curb Negotiation (Mobility)  4\" curb x 2 to simulate home entrance  -LH      Recorded by [] Chelsie Cline, PT      Row Name 02/25/20 0850             Rough/Uneven Surface Gait Skills (Mobility)    [place], Gait on Rough/Uneven Surface (Mobility)  contact guard  -      Comment, Gait Rough/Uneven Surface (Mobility)  rwx outside over concrete sidewalk 20ft to simulate home  -LH      Recorded by [] Chelsie Cline, PT      Row Name 02/25/20 0850             Step Over Obstacle (Mobility)    [place], Stepping Over Obstacles (Mobility)  contact guard;verbal cues  -      Comment, Stepping Over Obstacles (Mobility)  fwd and laterally over orange hurdles, BUE and uni UE support at hemibars  -LH      Recorded by [LH] Chelsie Cline, PT      Row Name 02/25/20 1536             Bathing Assessment/Treatment    Bathing [place] Level  upper body;verbal cues;set up  -AF      Bathing Position  sink side;supported sitting  -AF      Recorded by [AF] Reina Perera OTMENDEZ      Row Name 02/25/20 1536             Upper Body Dressing Assessment/Treatment    Upper Body Dressing Task  upper body dressing skills;supervision;verbal cues  -AF      Upper " Body Dressing Position  supported sitting  -AF      Set-up Assistance (Upper Body Dressing)  obtain clothing  -AF      Recorded by [AF] Reina Perera OTR      Row Name 02/25/20 1536             Lower Body Dressing Assessment/Treatment    Lower Body Dressing [place] Level  doff;don;socks;shoes/slippers;moderate assist (50% patient effort)  -AF      Lower Body Dressing Position  supported sitting  -AF      Lower Body Dressing Setup Assistance  obtain clothing  -AF      Comment (Lower Body Dressing)  assist with air cast on L ankle   -AF      Recorded by [AF] Reina Perera OTR      Row Name 02/25/20 1536             Grooming Assessment/Treatment    Grooming [place] Level  grooming skills;verbal cues;set up  -AF      Grooming Position  sink side;supported sitting  -AF      Grooming Setup Assistance  obtain supplies  -AF      Recorded by [AF] Reina Perera OTR      Row Name 02/25/20 1536             Fine Motor Testing & Training    Comment, Fine Motor Coordination  FMC task with both hands, able to manipulate 8 beads on string with MIN difficulty, then in standing placeing wooden beads on vertical dowel pa with min difficulty, increased ablilty with tip to tip pinch wtih L hand, prewriting task and simple hand writing with built up handle and marker with R hand, had difficulty with tripod grasp  -AF      Recorded by [AF] Reina Perera OTR      Row Name 02/25/20 1536 02/25/20 0850          Pain Scale: Numbers Pre/Post-Treatment    Pain Scale: Numbers, Pretreatment  0/10 - no pain  -AF  0/10 - no pain  -LH     Pain Scale: Numbers, Post-Treatment  0/10 - no pain  -AF  0/10 - no pain  -LH     Recorded by [AF] Reina Perera OTR [LH] Chelsie Cline PT     Row Name 02/25/20 0850             Standing Balance Activity    Activities Performed (Standing, Balance Training)  standing ball toss;standing reaching outside base of support;standing on foam roll  -LH      Support Needed for Balance  (Standing, Balance Training)  CGA;uses at least one upper extremity for support  -      Progressive Balance Activity (Standing, Balance Training)  upper extremity activities added during activity;base of support narrowed during activity;combined head and eye movements during activity  -      Comment (Standing, Balance Training)  standing on foam square in // bars- reaching laterally outside SAMUEL diagonally for bean bags- tossing fwd into bucket, one UE supported at hemibar   -      Recorded by [LH] Chelsie Cline, PT      Row Name 02/25/20 0850             Dynamic Balance Activity    Therapeutic Training Performed (Dynamic Balance)  side stepping;backward walking  -      Support Needed for Balance (Dynamic Balance Training)  CGA;uses at least one upper extremity for support  -      Comment (Dynamic Balance Training)  hemibars, over red foam mat  -      Recorded by [LH] Chelsie Cline PT      Row Name 02/25/20 1536             Upper Extremity Seated Therapeutic Exercise    Performed, Seated Upper Extremity (Therapeutic Exercise)  shoulder flexion/extension;scapular protraction/retraction;shoulder horizontal abduction/adduction;elbow flexion/extension;forearm supination/pronation;wrist flexion/extension  -AF      Device, Seated Upper Extremity (Therapeutic Exercise)  -- #3 dowel pa, #3 hand weight  -AF      Exercise Type, Seated Upper Extremity (Therapeutic Exercise)  AROM (active range of motion);resistive exercise  -AF      Expected Outcomes, Seated Upper Extremity (Therapeutic Exercise)  improve motor control;improve performance, BADLs;improve performance, transfer skills  -AF      Sets/Reps Detail, Seated Upper Extremity (Therapeutic Exercise)  2/20  -AF      Transfers Skills, Training to Functional Activity, Seated Upper Extremity (Therapeutic Exercise)  transfers skills to functional activity most of the time  -AF      Recorded by [AF] Reina Perera OTR      Row Name 02/25/20 6226              Neuromuscular Re-education    Activities/Techniques Used (Neuromuscular Re-education)  facilitation/inhibition  -AF      Positions Used (Neuromuscular Re-education)  standing;sitting;unsupported  -AF      Recorded by [AF] Reina Perera OTR      Row Name 02/25/20 1536 02/25/20 0850          Positioning and Restraints    Pre-Treatment Position  sitting in chair/recliner  -AF  sitting in chair/recliner  -LH     Post Treatment Position  wheelchair  -AF  wheelchair  -LH     In Wheelchair  sitting;exit alarm on;with family/caregiver with  in AM and PM sessions   -AF  sitting;call light within reach;encouraged to call for assist;exit alarm on  -LH     Recorded by [AF] Reina Perera, OTR [LH] Chelsie Cline, PT       User Key  (r) = Recorded By, (t) = Taken By, (c) = Cosigned By    Initials Name Effective Dates     Chelsie Cline, PT 04/03/18 -     AF Reina Perera, ALPESH 04/03/18 -           Wound 12/09/19 1510 abdomen Incision (Active)   Dressing Appearance dry;intact 2/25/2020  9:00 AM   Closure SUJEY 2/25/2020  9:00 AM   Base dressing in place, unable to visualize 2/25/2020  9:00 AM   Periwound Care, Wound dry periwound area maintained 2/25/2020  9:00 AM         OT Recommendation and Plan                 OT IRF GOALS     Row Name 02/12/20 1514             Transfer Goal 1 (OT-IRF)    Activity/Assistive Device (Transfer Goal 1, OT-IRF)  toilet;shower chair;walk-in shower  -AF      [place] Level (Transfer Goal 1, OT-IRF)  minimum assist (75% or more patient effort);verbal cues required  -AF      Time Frame (Transfer Goal 1, OT-IRF)  short term goal (STG)  -AF      Progress/Outcomes (Transfer Goal 1, OT-IRF)  goal ongoing  -AF         Transfer Goal 2 (OT-IRF)    Activity/Assistive Device (Transfer Goal 2, OT-IRF)  toilet;shower chair;walk-in shower  -AF      [place] Level (Transfer Goal 2, OT-IRF)  verbal cues required;contact guard assist  -AF      Time Frame (Transfer Goal 2, OT-IRF)  long term  goal (LTG)  -AF      Progress/Outcomes (Transfer Goal 2, OT-IRF)  goal ongoing  -AF         Bathing Goal 1 (OT-IRF)    Activity/Device (Bathing Goal 1, OT-IRF)  bathing skills, all;grab bar/tub rail;hand-held shower spray hose;shower chair  -AF      [place] Level (Bathing Goal 1, OT-IRF)  verbal cues required;moderate assist (50-74% patient effort)  -AF      Time Frame (Bathing Goal 1, OT-IRF)  short term goal (STG)  -AF      Progress/Outcomes (Bathing Goal 1, OT-IRF)  goal ongoing  -AF         Bathing Goal 2 (OT-IRF)    Activity/Device (Bathing Goal 2, OT-IRF)  bathing skills, all;grab bar/tub rail;hand-held shower spray hose;shower chair  -AF      [place] Level (Bathing Goal 2, OT-IRF)  verbal cues required;contact guard assist  -AF      Time Frame (Bathing Goal 2, OT-IRF)  long term goal (LTG)  -AF      Progress/Outcomes (Bathing Goal 2, OT-IRF)  goal ongoing  -AF         UB Dressing Goal 1 (OT-IRF)    Activity/Device (UB Dressing Goal 1, OT-IRF)  upper body dressing  -AF      [place] (UB Dress Goal 1, OT-IRF)  set-up required;verbal cues required  -AF      Time Frame (UB Dressing Goal 1, OT-IRF)  long term goal (LTG)  -AF      Progress/Outcomes (UB Dressing Goal 1, OT-IRF)  goal ongoing  -AF         LB Dressing Goal 1 (OT-IRF)    Activity/Device (LB Dressing Goal 1, OT-IRF)  lower body dressing  -AF      [place] (LB Dressing Goal 1, OT-IRF)  verbal cues required;moderate assist (50-74% patient effort)  -AF      Time Frame (LB Dressing Goal 1, OT-IRF)  short term goal (STG)  -AF      Progress/Outcomes (LB Dressing Goal 1, OT-IRF)  goal ongoing  -AF         LB Dressing Goal 2 (OT-IRF)    Activity/Device (LB Dressing Goal 2, OT-IRF)  lower body dressing  -AF      [place] (LB Dressing Goal 2, OT-IRF)  verbal cues required;contact guard assist  -AF      Time Frame (LB Dressing Goal 2, OT-IRF)  long term goal (LTG)  -AF      Progress/Outcomes (LB Dressing Goal 2, OT-IRF)  goal ongoing  -AF          Grooming Goal 1 (OT-IRF)    Activity/Device (Grooming Goal 1, OT-IRF)  grooming skills, all  -AF      [place] (Grooming Goal 1, OT-IRF)  set-up required  -AF      Time Frame (Grooming Goal 1, OT-IRF)  short term goal (STG)  -AF      Progress/Outcomes (Grooming Goal 1, OT-IRF)  goal ongoing  -AF         Grooming Goal 2 (OT-IRF)    Activity/Device (Grooming Goal 2, OT-IRF)  grooming skills, all  -AF      [place] (Grooming Goal 2, OT-IRF)  conditional independence  -AF      Time Frame (Grooming Goal 2, OT-IRF)  long term goal (LTG)  -AF      Progress/Outcomes (Grooming Goal 2, OT-IRF)  goal ongoing  -AF         Toileting Goal 1 (OT-IRF)    Activity/Device (Toileting Goal 1, OT-IRF)  toileting skills, all;grab bar/safety frame;raised toilet seat  -AF      [place] Level (Toileting Goal 1, OT-IRF)  maximum assist (25-49% patient effort);moderate assist (50-74% patient effort)  -AF      Time Frame (Toileting Goal 1, OT-IRF)  short term goal (STG)  -AF      Progress/Outcomes (Toileting Goal 1, OT-IRF)  goal ongoing  -AF         Toileting Goal 2 (OT-IRF)    Activity/Device (Toileting Goal 2, OT-IRF)  toileting skills, all;grab bar/safety frame;raised toilet seat  -AF      [place] Level (Toileting Goal 2, OT-IRF)  minimum assist (75% or more patient effort);verbal cues required  -AF      Time Frame (Toileting Goal 2, OT-IRF)  long term goal (LTG)  -AF      Progress/Outcomes (Toileting Goal 2, OT-IRF)  goal ongoing  -AF         Balance Goal 1 (OT)    Activity/Assistive Device (Balance Goal 1, OT)  standing, static  -AF      [place] Level/Cues Needed (Balance Goal 1, OT)  moderate assist (50-74% patient effort)  -AF      Time Frame (Balance Goal 1, OT)  short term goal (STG)  -AF      Progress/Outcomes (Balance Goal 1, OT)  goal ongoing  -AF         Balance Goal 2 (OT)    Activity/Assistive Device (Balance Goal 2, OT)  standing, static  -AF      [place] Level (Balance Goal 2, OT)  minimum  assist (75% or more patient effort);verbal cues required  -AF      Time Frame (Balance Goal 2, OT)  long term goal (LTG)  -AF      Progress/Outcome (Balance Goal 2, OT)  goal ongoing  -AF         Caregiver Training Goal 1 (OT-IRF)    Caregiver Training Goal 1 (OT-IRF)  pt and  will demo safe techniques with ADLs, transfers, HEP and AE prior to d/c home with home health services   -AF      Time Frame (Caregiver Training Goal 1, OT-IRF)  long term goal (LTG)  -AF      Progress/Outcomes (Caregiver Training Goal 1, OT-IRF)  goal ongoing  -AF        User Key  (r) = Recorded By, (t) = Taken By, (c) = Cosigned By    Initials Name Provider Type    Reina Noe OTR Occupational Therapist                     Time Calculation:     Time Calculation- OT     Row Name 02/25/20 1547 02/25/20 1545          Time Calculation- OT    OT Start Time  1400  -AF  0930  -AF     OT Stop Time  1430  -AF  1030  -AF     OT Time Calculation (min)  30 min  -AF  60 min  -AF       User Key  (r) = Recorded By, (t) = Taken By, (c) = Cosigned By    Initials Name Provider Type    Reina Noe OTMENDEZ Occupational Therapist          Therapy Charges for Today     Code Description Service Date Service Provider Modifiers Qty    48671823611 HC OT SELF CARE/MGMT/TRAIN EA 15 MIN 2/24/2020 Reina Perera OTR GO 2    49541931732 HC OT THER PROC EA 15 MIN 2/24/2020 Reina Perera OTR GO 2    35057472478 HC OT NEUROMUSC RE EDUCATION EA 15 MIN 2/24/2020 Reina Perera OTR GO 1    12741434649 HC OT THERAPEUTIC ACT EA 15 MIN 2/24/2020 Reina Perera OTR GO 1    05361198637 HC OT SELF CARE/MGMT/TRAIN EA 15 MIN 2/25/2020 Reina Perera OTR GO 2    53981729554 HC OT NEUROMUSC RE EDUCATION EA 15 MIN 2/25/2020 Reina Perera, OTR GO 2    11580811223 HC OT THER PROC EA 15 MIN 2/25/2020 Reina Perera OTR GO 2                   Reina Perera OTMENDEZ  2/25/2020

## 2020-02-25 NOTE — PLAN OF CARE
Patient is cooperative. Alert and oriented x 4. Using the call light for assistance. Continent of bladder. Ileostomy is patent and collecting liquid stool. Taking her medication whole with water. Continues with tingling to both hand and numbness to both legs from the knees down. Wearing SCD's during the night. No safety issues observed.

## 2020-02-25 NOTE — PROGRESS NOTES
Inpatient Rehabilitation Plan of Care Note    Plan of Care  Care Plan Reviewed - No updates at this time.    Psychosocial    Performed Intervention(s)  Verbalize needs and concerns  calm enviroment  Therapeutic environmental set-up  Family support      Safety    Performed Intervention(s)  Bed alarm, wc alarm  Items within reach  Safety rounds  Environmental set-up to reduce risk      Sphincter Control    Performed Intervention(s)  Monitor intake and output  Encourage appropriate diet  wound ostomy nurse to continue to see pt for ostomy care.      Body Systems    Performed Intervention(s)  Daily skin inspection  Dressing change as ordered    Signed by: Romina العراقي RN

## 2020-02-26 PROCEDURE — 97535 SELF CARE MNGMENT TRAINING: CPT

## 2020-02-26 PROCEDURE — 97112 NEUROMUSCULAR REEDUCATION: CPT

## 2020-02-26 PROCEDURE — 97110 THERAPEUTIC EXERCISES: CPT

## 2020-02-26 PROCEDURE — 63710000001 ONDANSETRON ODT 4 MG TABLET DISPERSIBLE: Performed by: PHYSICAL MEDICINE & REHABILITATION

## 2020-02-26 PROCEDURE — 97116 GAIT TRAINING THERAPY: CPT

## 2020-02-26 PROCEDURE — 97530 THERAPEUTIC ACTIVITIES: CPT

## 2020-02-26 RX ORDER — PYRIDOXINE HCL (VITAMIN B6) 100 MG
18 TABLET ORAL 2 TIMES DAILY
Start: 2020-02-26

## 2020-02-26 RX ORDER — ELECTROLYTES/DEXTROSE
1 SOLUTION, ORAL ORAL DAILY
Start: 2020-02-27

## 2020-02-26 RX ORDER — LOPERAMIDE HYDROCHLORIDE 2 MG/1
2 CAPSULE ORAL
Qty: 90 CAPSULE | Refills: 1 | Status: SHIPPED | OUTPATIENT
Start: 2020-02-27 | End: 2020-05-05 | Stop reason: SDUPTHER

## 2020-02-26 RX ORDER — ACETAMINOPHEN 325 MG/1
650 TABLET ORAL EVERY 6 HOURS PRN
Start: 2020-02-26

## 2020-02-26 RX ORDER — ONDANSETRON 4 MG/1
4 TABLET, ORALLY DISINTEGRATING ORAL EVERY 6 HOURS PRN
Status: DISCONTINUED | OUTPATIENT
Start: 2020-02-26 | End: 2020-02-27 | Stop reason: HOSPADM

## 2020-02-26 RX ORDER — SODIUM BICARBONATE 650 MG/1
1300 TABLET ORAL 3 TIMES DAILY
Qty: 180 TABLET | Refills: 0 | Status: SHIPPED | OUTPATIENT
Start: 2020-02-26 | End: 2020-09-22

## 2020-02-26 RX ORDER — GABAPENTIN 300 MG/1
300 CAPSULE ORAL 4 TIMES DAILY
Qty: 120 CAPSULE | Refills: 2 | Status: SHIPPED | OUTPATIENT
Start: 2020-02-26 | End: 2020-09-22

## 2020-02-26 RX ADMIN — SODIUM BICARBONATE 1300 MG: 650 TABLET ORAL at 21:52

## 2020-02-26 RX ADMIN — SODIUM BICARBONATE 1300 MG: 650 TABLET ORAL at 08:06

## 2020-02-26 RX ADMIN — GABAPENTIN 300 MG: 300 CAPSULE ORAL at 21:52

## 2020-02-26 RX ADMIN — CHOLECALCIFEROL TAB 125 MCG (5000 UNIT) 5000 UNITS: 125 TAB at 08:07

## 2020-02-26 RX ADMIN — Medication 18 MG: at 08:07

## 2020-02-26 RX ADMIN — GABAPENTIN 300 MG: 300 CAPSULE ORAL at 16:50

## 2020-02-26 RX ADMIN — GABAPENTIN 300 MG: 300 CAPSULE ORAL at 08:06

## 2020-02-26 RX ADMIN — LOPERAMIDE HYDROCHLORIDE 2 MG: 2 CAPSULE ORAL at 16:49

## 2020-02-26 RX ADMIN — LOPERAMIDE HYDROCHLORIDE 2 MG: 2 CAPSULE ORAL at 12:21

## 2020-02-26 RX ADMIN — Medication 18 MG: at 21:52

## 2020-02-26 RX ADMIN — SODIUM BICARBONATE 1300 MG: 650 TABLET ORAL at 16:50

## 2020-02-26 RX ADMIN — Medication 1 TABLET: at 08:09

## 2020-02-26 RX ADMIN — PANTOPRAZOLE SODIUM 40 MG: 40 TABLET, DELAYED RELEASE ORAL at 06:18

## 2020-02-26 RX ADMIN — LOPERAMIDE HYDROCHLORIDE 2 MG: 2 CAPSULE ORAL at 06:18

## 2020-02-26 RX ADMIN — ONDANSETRON 4 MG: 4 TABLET, ORALLY DISINTEGRATING ORAL at 18:38

## 2020-02-26 RX ADMIN — GABAPENTIN 300 MG: 300 CAPSULE ORAL at 12:21

## 2020-02-26 NOTE — PLAN OF CARE
Problem: Patient Care Overview  Goal: Plan of Care Review  Outcome: Ongoing (interventions implemented as appropriate)  Flowsheets  Taken 2/25/2020 1651 by Romina العراقي, RN  Consent Given to Review Plan with: Pt. A&OX4. Continent of B&B. Took meds whole w/ water. Ate food brought in from restaurants by . Pt. complained of pain to hands, numbness, and tingling. Some relief given from gabapentin. Patient's strength is improving. Changed two mediplexes on outer right lower leg. No nausea today. Scheduled to be discharged on Tuesday, 3/3.  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 Pt requires a lightweight WC to safely and independently mobilize self in the home over multisurface including carpet.

## 2020-02-26 NOTE — PROGRESS NOTES
Pt has made significant progress and requests discharge this week.  Discussed with rehab team and therapists agree pt and  are ready to transition home.  Family teaching has been completed with pt's  and he states he is comfortable providing patient care. He has also completed instruction with the ostomy RN.    Pt has a rolling walker, shower seat and hand held shower. We will order a light weight wheelchair, cushion and BSC.      Outpatient PT and OT is recommended and pt requests referral to [facility].  Referral made today and outpatient therapy is scheduled to begin on March 3.   Pt will receive only one therapy session (OT) next week due to limited openings. Her regular schedule will be PT and OT on Tuesdays and Thursdays beginning March 10.

## 2020-02-26 NOTE — PLAN OF CARE
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 Pt requires a lightweight WC to safely and independently mobilize self in the home over multisurface including carpet.

## 2020-02-26 NOTE — PLAN OF CARE
Problem: Patient Care Overview  Goal: Plan of Care Review  Outcome: Ongoing (interventions implemented as appropriate)  Flowsheets (Taken 2/26/2020 0242)  Outcome Summary: Pt. is calm and cooperative. A&OX4. Takes Meds whole with water. Complained of any pain to bilateral hands. Gabapentin 300 mg PO Scheduled given at 2146, and then controlled pain well. Uses bedpan at night. Continent of urinary. Ileostomy working and tolerated well. Got loose brown BM. Uses call light for assistance. No unsafe behavior to note at this time.  Progress, Functional Goals: demonstrating adequate progress  Plan of Care Reviewed With: patient  IRF Plan of Care Review: progress ongoing, continue      rec pt change Normodyne dosing to 200 mg 1/2 TID #45 with 2 RF's. rec pt see cardiology for recent chest pain (Pt has seen Dr. Chowdhury in the past).  Call in 1 wk with BP reading's

## 2020-02-26 NOTE — THERAPY TREATMENT NOTE
Inpatient Rehabilitation - Physical Therapy Treatment Note  [facility]     Patient Name: She López  : 1945  MRN: [id number]    Today's Date: 2020  Onset of Illness/Injury or Date of Surgery: 20              Admit Date: 2020      Visit Dx:      ICD-10-CM ICD-9-CM   1. General weakness R53.1 780.79       Patient Active Problem List   Diagnosis   • Crohn's disease of small intestine with other complication (CMS/HCC)   • Type 2 diabetes mellitus without complication (CMS/HCC)   • RSD upper limb   • Neuropathic pain of hand   • Hyperlipidemia   • Cervical myelopathy (CMS/HCC)   • Central pain syndrome   • Carpal tunnel syndrome   • Vitamin B 12 deficiency   • Guillain Barré syndrome (CMS/HCC)       Therapy Treatment    IRF Treatment Summary     Row Name 20 0842             Evaluation/Treatment Time and Intent    Subjective Information  no complaints  -      Existing Precautions/Restrictions  fall  -      Document Type  therapy note (daily note)  -      Mode of Treatment  individual therapy;physical therapy  -      Patient/Family Observations  pt seated in WC spouse present, no acute distress  -      Unable to Perform (Evaluation/Treatment)  -- pt has rwx, air cast. WC ordered from [place]  -      Recorded by [] Chelsie Cline, PT      Row Name 20 0842             Caregiver Training    Caregiver(s) to be Trained  spouse/significant  -      Comment, Caregiver Training Plan  PT demo'ed to pt and spouse proper floor tsf techniques. also discussed fall prevention strategies- both receptive to PT recommendations  -LH      Recorded by [] Chelsie Cline, PT      Row Name 20 0842             Cognition/Psychosocial- PT/OT    Affect/Mental Status (Cognitive)  WFL  -      Orientation Status (Cognition)  oriented x 4  -      Follows Commands (Cognition)  WFL  -      Personal Safety Interventions  fall prevention program maintained;gait belt;supervised activity   -LH      Recorded by [] Chelsie Cline, PT      Row Name 02/26/20 0842             Mobility    Advanced Gait Activity  load/unload object from car  -LH      Recorded by [] Chelsie Cline, PT      Row Name 02/26/20 0842             Bed Mobility Assessment/Treatment    Comment (Bed Mobility)  NT  -LH      Recorded by [] Chelsie Cline, PT      Row Name 02/26/20 0842             Functional Mobility    Functional Mobility- Comment  ambulated over red mat CGA rwx  -LH      Recorded by [] Chelsie Cline, PT      Row Name 02/26/20 0842             Sit-Stand Transfer    Sit-Stand [place] (Transfers)  contact guard  -      Assistive Device (Sit-Stand Transfers)  walker, front-wheeled  -LH      Recorded by [] Chelsie Cline, PT      Row Name 02/26/20 0842             Stand-Sit Transfer    Stand-Sit [place] (Transfers)  contact guard  -      Assistive Device (Stand-Sit Transfers)  walker, front-wheeled  -LH      Recorded by [] Chelsie Cline, PT      Row Name 02/26/20 0842             Car Transfer    Type (Car Transfer)  sit-stand;stand-sit  -LH      Axson Level (Car Transfer)  contact guard  -      Assistive Device (Car Transfer)  walker, front-wheeled  -LH      Recorded by [] Chelsie Cline, PT      Row Name 02/26/20 0842             Gait/Stairs Assessment/Training    [place] Level (Gait)  contact guard  -      Assistive Device (Gait)  walker, front-wheeled  -LH      Distance in Feet (Gait)  160, 220  -      Pattern (Gait)  step-through  -LH      Deviations/Abnormal Patterns (Gait)  base of support, wide;ataxic  -LH      Bilateral Gait Deviations  forward flexed posture;heel strike decreased  -LH      Left Sided Gait Deviations  -- dec ankle control/supination-improved w active ankle brace  -LH      Recorded by [] Chelsie Cline, PT      Row Name 02/26/20 0842             Load/Unload Object In Car (Mobility)    [place], Loading/Unloading Objects To/From Car (Mobility)  contact  guard  -LH      Comment, Loading/Unloading Objects To/From Car (Mobility)  pt ambulated to car with grocery cart - placed light weighted grocery bag into/out of back seat of car  -LH      Recorded by [] Chelsie Cline, PT      Row Name 02/26/20 0842             Pain Scale: Numbers Pre/Post-Treatment    Pain Scale: Numbers, Pretreatment  0/10 - no pain  -LH      Pain Scale: Numbers, Post-Treatment  0/10 - no pain  -LH      Recorded by [] Chelsie Cline, PT      Row Name 02/26/20 0842             Sitting Balance Activity    Comment (Sitting, Balance Training)  crossovers and side stepping BUE supported at table top CGA  -LH      Recorded by [] Chelsie Cline, PT      Row Name 02/26/20 0842             Standing Balance Activity    Activities Performed (Standing, Balance Training)  standing ball toss;standing on foam roll  -      Support Needed for Balance (Standing, Balance Training)  CGA;uses at least one upper extremity for support  -      Progressive Balance Activity (Standing, Balance Training)  base of support narrowed during activity;upper extremity activities added during activity  -      Restrictions (Standing, Balance Training)  standing on foam mat, kicking ball, BUE to progress to unilateral UE support hemibars  -      Comment (Standing, Balance Training)  standing on foam square, balloon taping w UEs, one UE supported at hemibar  -LH      Recorded by [] Chelsie Cline, PT      Row Name 02/26/20 0842             Dynamic Balance Activity    Therapeutic Training Performed (Dynamic Balance)  functional activities with reaching or leaning any direction  -      Support Needed for Balance (Dynamic Balance Training)  CGA;uses at least one upper extremity for support  -      Restrictions (Dynamic Balance Training)  lateral walking and backwards walking unilateral support // bars. ambulated 8ft in // bars no UE support CGA  -      Comment (Dynamic Balance Training)  pushing grocery cart 80ft in gym-  scanning and reaching for items at varied height and placing into grocery cart. bagging groceries in standing , at least one UE supported at cart  -      Recorded by [] Chelsie Cline, PT      Row Name 02/26/20 0842             Therapeutic Exercise    Therapeutic Exercise  standing, lower extremities  -      Recorded by [] Chelsie Cline, PT      Row Name 02/26/20 0842             Lower Extremity Standing Therapeutic Exercise    Performed, Standing Lower Extremity (Therapeutic Exercise)  mini-squats;heel/toe raises;hip abduction/adduction;knee flexion/extension;hip flexion/extension  -      Exercise Type, Standing Lower Extremity (Therapeutic Exercise)  AROM (active range of motion)  -      Sets/Reps Detail, Standing Lower Extremity (Therapeutic Exercise)  1/5  -      Comment, Standing Lower Extremity (Therapeutic Exercise)  BUE supported at table top  -      Recorded by [] Chelsie Cline, PT      Row Name 02/26/20 0842             Positioning and Restraints    Pre-Treatment Position  sitting in chair/recliner  -      Post Treatment Position  wheelchair  -      In Wheelchair  sitting;call light within reach;encouraged to call for assist;exit alarm on;with family/caregiver  -      Recorded by [] Chelsie Cline, PT        User Key  (r) = Recorded By, (t) = Taken By, (c) = Cosigned By    Initials Name Effective Dates     Chelsie Cline, PT 04/03/18 -         Wound 12/09/19 1510 abdomen Incision (Active)   Dressing Appearance open to air 2/26/2020  7:42 AM   Closure Open to air 2/26/2020  7:42 AM   Base dressing in place, unable to visualize 2/25/2020  9:46 PM           PT Recommendation and Plan                        Time Calculation:     PT Charges     Row Name 02/26/20 1400 02/26/20 0852          Time Calculation    Start Time  1330  -  0830  -     Stop Time  1400  -  0930  -     Time Calculation (min)  30 min  -  60 min  -     PT Received On  --  02/26/20  -     PT - Next  Appointment  --  02/27/20  -       User Key  (r) = Recorded By, (t) = Taken By, (c) = Cosigned By    Initials Name Provider Type     Chelsie Cline, PT Physical Therapist          Therapy Charges for Today     Code Description Service Date Service Provider Modifiers Qty    26583577835 HC PT THER PROC EA 15 MIN 2/25/2020 Chelsie Cline, PT GP 3    22481097894 HC PT THERAPEUTIC ACT EA 15 MIN 2/25/2020 Chelsie Cline, PT GP 3    39250977253 HC PT THER PROC EA 15 MIN 2/26/2020 Chelsie Cline, PT GP 2    77804296619 HC GAIT TRAINING EA 15 MIN 2/26/2020 Chelsie Cline, PT GP 1    79200993046 HC PT THERAPEUTIC ACT EA 15 MIN 2/26/2020 Chelsie Cline, PT GP 3                   Chelsie Cline, PT  2/26/2020

## 2020-02-26 NOTE — PROGRESS NOTES
LOS: 33 days   Patient Care Team:  Armando Valentine MD as PCP - General (Internal Medicine)  Lisa Arriaza MD as Consulting Physician (Obstetrics and Gynecology)    Chief Complaint: GBS    Subjective     History of Present Illness        Continues to improve with strength, mobility, dexterity, and ADLs  Tolerates therapies  Ambulation improving.  Hand strength improving.  Patient would like to look at earlier discharge.  Reviewed therapies will be less intensive as an outpatient.  Her  has been participating in therapies and patient/ will continue with home exercise program in addition to outpatient PT and OT.  Will look at discharge home tomorrow.    .  History taken from: patient chart    Objective     Vital Signs  Temp:  [97.4 °F (36.3 °C)-98.2 °F (36.8 °C)] 98.2 °F (36.8 °C)  Heart Rate:  [72-96] 96  Resp:  [16-18] 16  BP: ()/(59-67) 110/67    Physical Exam:   General: Awake, alert, NAD  HEENT: normocepahlic, atraumatic   Heart: RRR, no m/r/g  Lungs: CTAB, no wheezing, rales, or rhonchi  Abdomen: soft, non-tender, non-distended, colostomy , incision dressed  Strength:   Bilateral shoulder flexion 4+/5, elbow flexion 5/5, finger flexion 5/5, right hand intrinsic 3/5, left hand intrinsic 3+/5, bilateral hip flexion 5/5, knee extension 5/5, ankle dorsiflexion 4+/5.       Results Review:     I reviewed the patient's new clinical results.  Results from last 7 days   Lab Units 02/24/20  0650   WBC 10*3/mm3 4.58   HEMOGLOBIN g/dL 8.1*   HEMATOCRIT % 26.0*   PLATELETS 10*3/mm3 378     Results from last 7 days   Lab Units 02/24/20  0650 02/21/20  0539   SODIUM mmol/L 139 139   POTASSIUM mmol/L 3.4* 3.4*   CHLORIDE mmol/L 100 100   CO2 mmol/L 27.1 27.3   BUN mg/dL 6* 3*   CREATININE mg/dL 0.78 0.65   CALCIUM mg/dL 8.5* 8.3*   GLUCOSE mg/dL 95 92       Medication Review:   Scheduled Meds:    Iron 18 mg Sublingual BID   gabapentin 300 mg Oral 4x Daily   loperamide 2 mg Oral TID AC    MULTIVITAMIN ADULT 1 tablet Sublingual Daily   pantoprazole 40 mg Oral Q AM   sodium bicarbonate 1,300 mg Oral TID   Cyanocobalamin 2,500 mcg Sublingual Weekly   Vitamin D-3 5,000 Units Sublingual Daily     Continuous Infusions:   PRN Meds:.•  acetaminophen  •  aluminum sulfate-calcium acetate  •  gabapentin **AND** gabapentin  •  miconazole  •  ondansetron ODT  •  potassium chloride  •  potassium chloride    Assessment/Plan   73 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     GBS  -Completed her plasmapharesis and has gotten good return.   - Will begin PT/OT for ADLS, strength, mobility, txs, gait.   -January 27: On gabapentin 300mg QID. Will continue to monitor and will titrate up as needed.  -Jan 29 -  Feels strength is somewhat better.  Worked on gait with rolling walker yesterday 15 feet. Today utilized ankle weights to decrease ataxic movements with gait. Transfers mod max assist. Bed mobility CTG.  - Feb 6 - Patient complains of increased numbness in her knees.  She is noted to have increased weakness with her hip extensors and possibly in the left quadricep.  Her ambulation distance is less and takes more effort with a shorter stride.  On examination she also appears weaker in her hands and ankles.  Discussed having neurology see her to assess for possibly IVIG or another round of plasmapheresis.  -Feb 7 - increased weakness noted in BUE and BLE and more difficulty with mobility - Neurology starting patient on course of IVIG   -February 10-shows good response on IVIG-tolerating.  Notes improvement in her strength in the upper extremities and lower extremities as well as performance with mobility and self-care.  -February 11-continues to show improvement on IVIG  Feb 12-functional improvement after IVIG with improvement in her handgrip, feeding, transfers, and ambulation as well as improvement in her strength.  February 14-strength noticeably improved in the bilateral upper extremities and  bilateral lower extremities.  Ambulating further.  February 20-reviewed need to follow-up with [place] neurology as an outpatient for nerve conduction study/EMG and if show CIDP the possibility of monthly IVIG injections at home.  She states that they previously had mentioned to her the possible option of the home IVIG injections.  Feb 24 - Nerve conduction study/ electromyography scheduled for March 24, 2020 at [facility]    Crohn's  -s/p recent colostomy- wound nurse to see and education for home management.   -If more than 1 liter output a day, needs Immodium-per Dr. Albrecht colorectal surgeon.     Hyponatremia  -On salt tabs and fluid restriction. Renal following  -January 27: Na 130. Continue salt tabs and fluid restriction per renal. Will continue to monitor  -January 30: Na 133. Continue sodium bicarbonate and fluid restriction per renal.  -Feb 3- Na improved to 138  -February 4-fluid restriction discontinued.  -February 5-sodium 137  -February 10-sodium 140.  Sodium chloride tablets were discontinued on February 8.  Continues on sodium bicarb 1300 mg 3 times a day.    Anemia  -January 27: Hgb/Hct 7.6/24.3- stable. No signs of active bleeding. Will order fecal occult and reticulocyte count. Will continue to monitor.  -Jan 28 - hemoccult positive  -Jan 29 - Stool heme +.  HGB stable at 7.5. She had hypotension and tachycardia yesterday 87/64 and 110) , better today (99/63 and 88).  Reviewed option of transfusion PRBCs. She wished to hold on transfusion unless absolutely necessary. Discussed if HGB < 7, would recommend definitely transfuse.  -January 30: Repeat CBC scheduled for tomorrow. Will continue to monitor.    -January 31: Hgb 7.2. Will transfuse 1 unit of PRBCs today. Ordered anemia studies. Spoke with Dr. Albrecht and if iron supplementation is required she recommends IV iron for better absorption.  -February 1: Hemoglobin improved 8.8.  IV iron infusion ordered by nephrology  -Feb 3 - HGB improved 9.5. Not  tolerate IV iron infusion due to burning in vein, does not wish to have placed a more proximal IV. She had tow infusions. She will get EZ Melt Iron from outside to take by mouth; on discussion with colorectal surgery last week she should be able to absorb PO iron.  February 5-hemoglobin 8.5  February 10-hemoglobin 8.4  February 12-hemoglobin 7.3-As she has the midline in, will plan to resume the last 3 of the iron infusions that she did not get previously with the peripheral IV.  Will start tomorrow scheduled every other day for total of 3 doses.  Recheck hemoglobin on Friday.  February 14-hemoglobin 8.1.  She had a temperature last evening possibly related to the iron infusion.  She tolerated the infusion and would like to have it on consecutive days ( rather than every other day as was ordered to give her a break).  February 19-hemoglobin 7.5-stable    DVT prophylaxis  -SCDS for now.   -January 27: Heparin has been on hold due to HGB trending down. Following results of fecal occult and reticulocyte count will consider restarting Heparin for DVT prophylaxis.   -January 28 - hemoccult positive.     Vitamin D deficiency-vitamin D 22.4  on January 29.    Feb 1 - Patient does not wish to take vitamin D p.o. through the hospital supply.  Wishes to have her vitamin D brought in from home.    Vitamin B12 replacement-sublingual from home    RUE antecubital fossa phlebitis/cellulitis-February 3-no sign of infection. K-PAD.   February 4-Right antecubital fossa phlebitis with cord palpable/tender with surrounding erythema consistent with cellulitis. Allergy to Keflex - throat swelled. Will start Doxycycline 100 mg bid x 7 days, continue K-pad.   February 5-She continues to have pain and redness and swelling of the right antecubital fossa and distal upper arm.  Reviewed continue with doxycycline which was started yesterday but if there is no show improvement will look to adjust antibiotic tomorrow.  White blood cell count is  7.22 with normal differential..  Feb 7 - area improved today on doxycycline.  February 10-further improvement.    TEAM CONF - JAN 28 - FLAT AFFECT. SUPINE SIT MIN ASSIST. TRANSFERS MAX 2. NONAMBULATORY. UBB MIN. LBB MAX. UBD MOD.  LBD DEP. ABD WOUND CARE. STOOL HEMOCCULT POSITIVE.  ELOS - 5 WEEKS.     TEAM CONF - FEB 4 - BED SBA. TRANSFERS MIN-MOD. GAIT 30 FEET MIN 2 FIDEL WALKER. UBD MOD. LBD DEP. ON 1200 CC FLUID RESTRICTION. EATING OKAY.  ABD WOUND CLOSING. OSTOMY DEVICE STAYING ON.  CONTINENT. NEEDS TO GET UP TO [facility].   ELOS- 4 WEEKS.     February 10-she had shown a decline in her mobility and self-care with flare of Guillain-Barré syndrome but has shown response on IVIG.  Improving.  Most recently in physical therapy and Occupational Therapy-toilet transfers moderate assist, shower transfer to be assessed, bathing minimum assist upper body and maximum assist lower body, dressing moderate assist upper body independent lower body.  Grooming minimum assist.  Toileting dependent.  Eating with minimal assist to set up with built up utensils.  Bed mobility contact-guard.  Ambulated 25 feet moderate assist of 2 with Aircast bilateral ankles.    TEAM CONF - FEB 11 - She feels IVIG has been helpful.  She notes improvement in the strength in her hands and in her legs.  Easier transfers.  Walk better.  She had had a decline in her ability to do to box and blocks but that improved yesterday.  Tolerating IVIG.   describes her performance as 180 degree change from Friday.  In physical therapy and Occupational Therapy-toilet transfers moderate assist, shower transfer to be assessed, bathing minimum assist upper body and maximum assist lower body, dressing moderate assist upper body independent lower body.  Grooming minimum assist.  Toileting dependent.  Eating with minimal assist to set up with built up utensils.  Bed mobility contact-guard.  Ambulated 25 feet moderate assist of 2 with Aircast bilateral ankles.    Continent bladder. Abd wound improving.   ELOS - 3 WEEKS    TEAM CONF - FEB 18 - BED SUP. TRANSFERS MIN. GAIT 120 FEET RW MIN OF 2. TOILET TRANSFER MIN-MOD. TOILETING MAX ASSIST. UBD MIN. LBD MAX-DEP. UBB MIN. .LBB MOD ASSIST. CONTINENT BLADDER. OSTOMY. ABD WOUND IMPROVED. GABAPENTIN FOR PAIN.    ELOS - 2 WEEKS    TEAM CONF - FEB 25 - BED SUP. TRANSFERS CTG-MIN. GAIT 160 FEET RW CTG, LEFT DYNAMIC AIR CAST. TOILET TRANSFERS MIN ASSIST. SHOWER TRANSFERS MIN ASSIST. UBD MIN. LBD MOD-MAX ASSIST. UBB CTG. LBB MIN ASSIST.  ELOS - ONE WEEK    February 26-Ambulation improving.  Hand strength improving.  Patient would like to look at earlier discharge.  Reviewed therapies will be less intensive as an outpatient.  Her  has been participating in therapies and patient/ will continue with home exercise program in addition to outpatient PT and OT.  Will look at discharge home tomorrow.  -referral to [facility].  Referral made today and outpatient therapy is scheduled to begin on March 3.   Pt will receive only one therapy session (OT) next week due to limited openings. Her regular schedule will be PT and OT on Tuesdays and Thursdays beginning March 10.       Adolfo Valle MD  02/26/20

## 2020-02-26 NOTE — PROGRESS NOTES
Inpatient Rehabilitation Plan of Care Note    Plan of Care  Care Plan Reviewed - Updates as Follows    Body Systems    [RN] Integumentary(Active)  Current Status(02/26/2020): Abdominal incision healed.ileostomy bag in place.  both changed per WOCN  Weekly Goal(03/04/2020): No S&S infection noted. Skin is intact.  Discharge Goal: No S&S infection noted Skin is intact.    Performed Intervention(s)  Daily skin inspection      Psychosocial    [RN] Coping/Adjustment(Active)  Current Status(02/26/2020): Supportive family,  very helpful and assists  patient with care.  Weekly Goal(03/04/2020): Identify progress in functional status  Discharge Goal: Demonstrate healthy coping strategies    Performed Intervention(s)  Verbalize needs and concerns  calm enviroment  Therapeutic environmental set-up  Family support      Safety    [RN] Potential for Injury(Active)  Current Status(02/26/2020): No unsafe behaviors. Weakness of lower extremities  Weekly Goal(03/04/2020): Use call light 100%  Discharge Goal: Pt/family aware of risk of fall and safety in the home setting    Performed Intervention(s)  Bed alarm, wc alarm  Items within reach  Safety rounds  Environmental set-up to reduce risk  Uses call light appropriately      Sphincter Control    [RN] Bladder Management(Active)  Current Status(02/26/2020): pt has been continent, using bedpan.She is reluctant  to use BSC. does wear brief.  Weekly Goal(03/04/2020): Continent bladder 100%  Discharge Goal: Continent bladder 100%    [RN] Bowel Management(Active)  Current Status(02/26/2020): Has ileostomy since 12/20/19.  aware of care  for ileostomy.  Weekly Goal(03/04/2020): maintain education of ileostomy with pt and family  Discharge Goal: Independent with ileostomy care    Performed Intervention(s)  Monitor intake and output  Encourage appropriate diet  wound ostomy nurse to continue to see pt for ostomy care.    Signed by: Madison Fenton RN

## 2020-02-26 NOTE — DISCHARGE INSTRUCTIONS
No driving.    Referral to [facility].   outpatient therapy is scheduled to begin on March 3.   Pt will receive only one therapy session (OT) next week due to limited openings. Her regular schedule will be PT and OT on Tuesdays and Thursdays beginning March 10.

## 2020-02-26 NOTE — PROGRESS NOTES
Inpatient Rehabilitation Plan of Care Note    Plan of Care  Care Plan Reviewed - Updates as Follows    Psychosocial    Performed Intervention(s)  Verbalize needs and concerns  calm enviroment  Therapeutic environmental set-up  Family support      Safety    Performed Intervention(s)  Bed alarm, wc alarm  Items within reach  Safety rounds  Environmental set-up to reduce risk  Uses call light appropriately      Sphincter Control    Performed Intervention(s)  Monitor intake and output  Encourage appropriate diet  wound ostomy nurse to continue to see pt for ostomy care.      Body Systems    Performed Intervention(s)  Daily skin inspection    Signed by: Lenin Telles RN

## 2020-02-26 NOTE — PROGRESS NOTES
SECTION GG      Self Care Performance Discharge:   Oral Hygiene: [place] sets up or cleans up; patient completes activity. [place]  assists only prior to or following the activity.   Toileting Hygiene: : [place] does less than half the effort. [place] lifts, holds  or supports trunk or limbs but provides less than half the effort.   Shower/Bathe Self: [place] does less than half the effort. [place] lifts, holds  or supports trunk or limbs but provides less than half the effort.   Upper Body Dressing: [place] provides verbal cues and/or touching/steadying  and/or contact guard assistance as patient completes activity.   Lower Body Dressing: [place] does less than half the effort. [place] lifts, holds  or supports trunk or limbs but provides less than half the effort.   Putting On/Taking Off Footwear: [place] does more than half the effort. [place]  lifts or holds trunk or limbs and provides more than half the effort.    Mobility Toilet Transfer Discharge: [place] provides verbal cues or  touching/steadying assistance as patient completes activity.    Signed by: Reina Perera, OT

## 2020-02-27 VITALS
BODY MASS INDEX: 20.86 KG/M2 | HEART RATE: 70 BPM | SYSTOLIC BLOOD PRESSURE: 114 MMHG | HEIGHT: 65 IN | RESPIRATION RATE: 16 BRPM | TEMPERATURE: 97 F | WEIGHT: 125.22 LBS | OXYGEN SATURATION: 99 % | DIASTOLIC BLOOD PRESSURE: 64 MMHG

## 2020-02-27 PROCEDURE — 97110 THERAPEUTIC EXERCISES: CPT

## 2020-02-27 RX ADMIN — PANTOPRAZOLE SODIUM 40 MG: 40 TABLET, DELAYED RELEASE ORAL at 06:15

## 2020-02-27 RX ADMIN — GABAPENTIN 300 MG: 300 CAPSULE ORAL at 07:53

## 2020-02-27 RX ADMIN — Medication 1 TABLET: at 07:54

## 2020-02-27 RX ADMIN — CHOLECALCIFEROL TAB 125 MCG (5000 UNIT) 5000 UNITS: 125 TAB at 07:54

## 2020-02-27 RX ADMIN — Medication 18 MG: at 07:54

## 2020-02-27 RX ADMIN — SODIUM BICARBONATE 1300 MG: 650 TABLET ORAL at 07:54

## 2020-02-27 RX ADMIN — LOPERAMIDE HYDROCHLORIDE 2 MG: 2 CAPSULE ORAL at 06:15

## 2020-02-27 NOTE — PROGRESS NOTES
Inpatient Rehabilitation Plan of Care Note    Plan of Care  Care Plan Reviewed - No updates at this time.    Psychosocial    Performed Intervention(s)  Verbalize needs and concerns  calm enviroment  Therapeutic environmental set-up  Family support      Safety    Performed Intervention(s)  Bed alarm, wc alarm  Items within reach  Safety rounds  Environmental set-up to reduce risk  Uses call light appropriately      Sphincter Control    Performed Intervention(s)  Monitor intake and output  Encourage appropriate diet  wound ostomy nurse to continue to see pt for ostomy care.      Body Systems    Performed Intervention(s)  Daily skin inspection    Signed by: Lenin Telles RN

## 2020-02-27 NOTE — PROGRESS NOTES
SECTION GG      Mobility Performance Discharge:     Roll Left and Right: Patient completed the activities by him/herself with no  assistance from a helper.   Sit to Lying: Patient completed the activities by him/herself with no  assistance from a helper.   Lying to Sitting on Side of Bed: Patient completed the activities by  him/herself with no assistance from a helper.   Sit to Stand: Sargeant provides verbal cues and/or touching/steadying and/or  contact guard assistance as patient completes activity. Assistance may be  provided throughout the activity or intermittently.   Chair/Bed to Chair Transfer: Sargeant provides verbal cues and/or  touching/steadying and/or contact guard assistance as patient completes  activity. Assistance may be provided throughout the activity or intermittently.   Car Transfer: Sargeant provides verbal cues and/or touching/steadying and/or  contact guard assistance as patient completes activity. Assistance may be  provided throughout the activity or intermittently.   Walk 10 Feet:   Sargeant provides verbal cues and/or touching/steadying and/or  contact guard assistance as patient completes activity. Assistance may be  provided throughout the activity or intermittently.  Walk 50 Feet with 2 Turns:   Sargeant provides verbal cues and/or  touching/steadying and/or contact guard assistance as patient completes  activity. Assistance may be provided throughout the activity or intermittently.  Walk 150 Feet:   Sargeant provides verbal cues and/or touching/steadying and/or  contact guard assistance as patient completes activity. Assistance may be  provided throughout the activity or intermittently.  Walking 10 Feet on Uneven Surfaces:   Sargeant provides verbal cues and/or  touching/steadying and/or contact guard assistance as patient completes  activity. Assistance may be provided throughout the activity or intermittently.  1 Step Over Curb or Up/Down Stair:   Sargeant provides verbal cues  and/or  touching/steadying and/or contact guard assistance as patient completes  activity. Assistance may be provided throughout the activity or intermittently.  4 Steps Up and Down, With/Without Rail:   [place] provides verbal cues and/or  touching/steadying and/or contact guard assistance as patient completes  activity. Assistance may be provided throughout the activity or intermittently.  12 Steps Up and Down, With/Without Rail:   Not attempted due to medical or  safety concerns.  Picking up an Object:   Not attempted due to medical or safety concerns. Uses  Wheelchair and/or Scooter: No    Signed by: Chelsie Cline, PT

## 2020-02-27 NOTE — PLAN OF CARE
Problem: Patient Care Overview  Goal: Plan of Care Review  Outcome: Ongoing (interventions implemented as appropriate)  Flowsheets (Taken 2/27/2020 0158)  Outcome Summary: Pt. is pleasant, and cooperative. A&OX4. Takes Meds whole with water. Complained of any pain to bilateral hands. Gabapentin 300 mg PO Scheduled given at 2152, and then controlled pain well. Uses bedpan at night. Continent of urinary. Ileostomy working and tolerated well. Got loose brown BM. Uses call light for assistance. No unsafe behavior to note at this time. Discharged scheduled in the morning around 11 AM. Day shift RN called Wound RN to see pt. today.   Progress, Functional Goals: demonstrating adequate progress  Plan of Care Reviewed With: patient  IRF Plan of Care Review: progress ongoing, continue

## 2020-02-27 NOTE — DISCHARGE SUMMARY
[facility] - REHABILITATION UNIT    MOISES RAMIREZ  1945    Admit: Jan 24, 2020  Discharge:  Feb 27, 2020    Chief Complaint: GBS    HPI:  75 yo female with Chrohns, who had had a recent colostomy and was sent home. Was getting HH for wound care and ostomy changes. Developed weakness, numbness/tingling. Was admitted to [facility], Was seen by Neurology and diagnosed with GBS. Received 5 days of plasmapheresis. CSF showed increased IgG synthesis index. She did have a metabolic acidosis, hyopnatremia. Fluid restriced to 1 liter. NACL tabs ordered. Renal to follow.   Was on heparin for dvt proph, but her hemoglobin has declined so it was held.  Protonix started here last night.   Her colorectal surgeon saw her today- plan to continue packing with alginate. If ostomy output is greater than 1 liter in 24 hours to start immodium.   She does have a history of Cervical stenosis s/p C4-7 ACDF, WENDY reports it is not contributing to this event.      ROS  Is eating and sleeping ok. Colostomy is working. Bladder is working. No chest pain, sob, no headache. No nausea. Strength is improving. Numbness and tingling improving. Still having a hard time gripping, ok with built up utensils. Mood stable. They were learning how to do colostomy. Rest neg.     PMH  Crohn's s/p recent colostomy  Carpal tunnel  Hyperlipidemia  Cervical stenosis s/p ACDF  Barett's esophagus  Skin cancer  Hiatal hernia  RSD upper limb  Vit B-12 and D def                 Past Surgical History:   Procedure Laterality Date   • CARPAL TUNNEL RELEASE Right 09/2012   • CERVICAL FUSION N/A 01/25/2012     C- 5,6,7 fused, DR. MISAEL NORTH AT [place]   • CHOLECYSTECTOMY N/A 04/12/2018     LAPAROSCOPIC, DR. VENICE SALDAÑA AT [place]   • COLON RESECTION N/A 12/9/2019     Procedure: laparoscopic to open right hemicolectomy;  Surgeon: Dorcas Albrecht MD;  Location: [facility];  Service: General   • COLON RESECTION N/A 12/20/2019      Procedure: EXPLORATORY LAPAROTOMY WITH SMALL BOWEL RESECTION;  Surgeon: Dorcas Albrecht MD;  Location: [facility] OR;  Service: General   • COLONOSCOPY N/A 8/16/2019     ANAL SKIN TAGS, ANASTAMOSIS ULCERATED, INDURATED, AND NODULAR, INTERNAL HEMORRHOIDS, ANASTAMOSIS STRICTURE, DR. YANG BIRD AT [facility]   • COLONOSCOPY N/A 04/20/2010     PROCTITIS, RECTAL STENOSIS, ACTIVE CROHNS DISEASE AT [facility], RIGHT COLON, DR.GERARD AGRAWAL AT [place]   • ENDOSCOPY N/A 8/16/2019     GRADE A ESOPHAGITIS, SLIDING HIATAL HERNIA, EROSIVE GASTRITIS, Z LINE IRREGULAR, DR. YANG BIRD AT [facility]   • EXPLORATORY LAPAROTOMY N/A 12/15/2019     Procedure: LAPAROTOMY EXPLORATORY AND WASHOUT, RESECTION OF ANASTOMOSIS WITH END ILEOSTOMY;  Surgeon: Dorcas Albrecht MD;  Location: [facility] MAIN OR;  Service: General   • EYE SURGERY       • SMALL INTESTINE SURGERY N/A 10/01/2001     ILEOCOLECTOMY      Soc  Lives with  who will be there with her  SS home, 3 steps in   No tob, occ etoh     Fam  brest cancer sister and gmother  No colon cancer, [facility]'s    Hospital Course:  Problem list -     73 yo female with GBS s/p treatment with plasmapharesis at [facility] and then with flare during acute rehab stay treated with course of IVIG with improvement.     GBS  -Completed her plasmapharesis with improvement   -   PT/OT for ADLS, strength, mobility, txs, gait.   -January 27: On gabapentin 300mg QID. Will continue to monitor and will titrate up as needed.  -Jan 29 -  Feels strength is somewhat better.  Worked on gait with rolling walker yesterday 15 feet. Today utilized ankle weights to decrease ataxic movements with gait. Transfers mod max assist. Bed mobility CTG.  - Feb 6 - Patient complains of increased numbness in her knees.  She is noted to have increased weakness with her hip extensors and possibly in the left quadricep.  Her ambulation distance is less and takes more effort with a shorter stride.  On  examination she also appears weaker in her hands and ankles.  Discussed having neurology see her to assess for possibly IVIG or another round of plasmapheresis.  -Feb 7 - increased weakness noted in BUE and BLE and more difficulty with mobility - Neurology starting patient on course of IVIG   -February 10-shows good response on IVIG-tolerating.  Notes improvement in her strength in the upper extremities and lower extremities as well as performance with mobility and self-care.  -February 11-continues to show improvement on IVIG  Feb 12-functional improvement after IVIG with improvement in her handgrip, feeding, transfers, and ambulation as well as improvement in her strength.  February 14-strength noticeably improved in the bilateral upper extremities and bilateral lower extremities.  Ambulating further.  February 20-reviewed need to follow-up with [facility] as an outpatient for nerve conduction study/EMG and if show CIDP the possibility of monthly IVIG injections at home.  She states that they previously had mentioned to her the possible option of the home IVIG injections.  Feb 24 - Nerve conduction study/ electromyography scheduled for March 24, 2020 at [facility]     Crohn's  -s/p recent colostomy- wound nurse to see and education for home management.   -If more than 1 liter output a day, needs Immodium-per Dr. Albrecht colorectal surgeon.   -patient on scheduled Imodium tid     Hyponatremia  -On salt tabs and fluid restriction. Renal following  -January 27: Na 130. Continue salt tabs and fluid restriction per renal. Will continue to monitor  -January 30: Na 133. Continue sodium bicarbonate and fluid restriction per renal.  -Feb 3- Na improved to 138  -February 4-fluid restriction discontinued.  -February 5-sodium 137  -February 10-sodium 140.  Sodium chloride tablets were discontinued on February 8.  Continues on sodium bicarb 1300 mg 3 times a day.     Anemia  -January 27: Hgb/Hct 7.6/24.3- stable. No signs of  active bleeding. Will order fecal occult and reticulocyte count. Will continue to monitor.  -Jan 28 - hemoccult positive  -Jan 29 - Stool heme +.  HGB stable at 7.5. She had hypotension and tachycardia yesterday 87/64 and 110) , better today (99/63 and 88).  Reviewed option of transfusion PRBCs. She wished to hold on transfusion unless absolutely necessary. Discussed if HGB < 7, would recommend definitely transfuse.  -January 30: Repeat CBC scheduled for tomorrow. Will continue to monitor.    -January 31: Hgb 7.2. Will transfuse 1 unit of PRBCs today. Ordered anemia studies. Spoke with Dr. Albrecht and if iron supplementation is required she recommends IV iron for better absorption.  -February 1: Hemoglobin improved 8.8.  IV iron infusion ordered by nephrology  -Feb 3 - HGB improved 9.5. Not tolerate IV iron infusion due to burning in vein, does not wish to have placed a more proximal IV. She had tow infusions. She will get EZ Melt Iron from outside to take by mouth; on discussion with colorectal surgery last week she should be able to absorb PO iron.  February 5-hemoglobin 8.5  February 10-hemoglobin 8.4  February 12-hemoglobin 7.3-As she has the midline in, will plan to resume the last 3 of the iron infusions that she did not get previously with the peripheral IV.  Will start tomorrow scheduled every other day for total of 3 doses.  Recheck hemoglobin on Friday.  February 14-hemoglobin 8.1.  She had a temperature last evening possibly related to the iron infusion.  She tolerated the infusion and would like to have it on consecutive days ( rather than every other day as was ordered to give her a break).  February 19-hemoglobin 7.5-stable     DVT prophylaxis  -SCDS for now.   -January 27: Heparin has been on hold due to HGB trending down. Following results of fecal occult and reticulocyte count will consider restarting Heparin for DVT prophylaxis.   -January 28 - hemoccult positive.      Vitamin D deficiency-vitamin D  22.4  on January 29.    Feb 1 - Patient does not wish to take vitamin D p.o. through the hospital supply.  Wishes to have her vitamin D brought in from home.     Vitamin B12 replacement-sublingual from home     RUE antecubital fossa phlebitis/cellulitis-February 3-no sign of infection. K-PAD.   February 4-Right antecubital fossa phlebitis with cord palpable/tender with surrounding erythema consistent with cellulitis. Allergy to Keflex - throat swelled. Will start Doxycycline 100 mg bid x 7 days, continue K-pad.   February 5-She continues to have pain and redness and swelling of the right antecubital fossa and distal upper arm.  Reviewed continue with doxycycline which was started yesterday but if there is no show improvement will look to adjust antibiotic tomorrow.  White blood cell count is 7.22 with normal differential..  Feb 7 - area improved today on doxycycline.  February 10-further improvement.    Now admit for comprehensive acute inpatient rehabilitation .  This would be an interdisciplinary program with physical therapy 1.5 hour,  occupational therapy 1.5 hour,   5 days a week.  Rehabilitation nursing for carryover, monitoring of  neurologic   status, bowel and bladder, and skin  Ongoing physician follow-up.  Weekly team conferences.   Goal is for home with  outpatient   therapies.  Barrier to discharge:  Impaired mobility   - worked on  Strength/balance /dexterity  to overcome.        TEAM CONF - JAN 28 - FLAT AFFECT. SUPINE SIT MIN ASSIST. TRANSFERS MAX 2. NONAMBULATORY. UBB MIN. LBB MAX. UBD MOD.  LBD DEP. ABD WOUND CARE. STOOL HEMOCCULT POSITIVE.  ELOS - 5 WEEKS.      TEAM CONF - FEB 4 - BED SBA. TRANSFERS MIN-MOD. GAIT 30 FEET MIN 2 FIDEL WALKER. UBD MOD. LBD DEP. ON 1200 CC FLUID RESTRICTION. EATING OKAY.  ABD WOUND CLOSING. OSTOMY DEVICE STAYING ON.  CONTINENT. NEEDS TO GET UP TO BSC.   ELOS- 4 WEEKS.      February 10-she had shown a decline in her mobility and self-care with flare of Guillain-Barré  syndrome but has shown response on IVIG.  Improving.  Most recently in physical therapy and Occupational Therapy-toilet transfers moderate assist, shower transfer to be assessed, bathing minimum assist upper body and maximum assist lower body, dressing moderate assist upper body independent lower body.  Grooming minimum assist.  Toileting dependent.  Eating with minimal assist to set up with built up utensils.  Bed mobility contact-guard.  Ambulated 25 feet moderate assist of 2 with Aircast bilateral ankles.     TEAM CONF - FEB 11 - She feels IVIG has been helpful.  She notes improvement in the strength in her hands and in her legs.  Easier transfers.  Walk better.  She had had a decline in her ability to do to box and blocks but that improved yesterday.  Tolerating IVIG.   describes her performance as 180 degree change from Friday.  In physical therapy and Occupational Therapy-toilet transfers moderate assist, shower transfer to be assessed, bathing minimum assist upper body and maximum assist lower body, dressing moderate assist upper body independent lower body.  Grooming minimum assist.  Toileting dependent.  Eating with minimal assist to set up with built up utensils.  Bed mobility contact-guard.  Ambulated 25 feet moderate assist of 2 with Aircast bilateral ankles.   Continent bladder. Abd wound improving.   ELOS - 3 WEEKS     TEAM CONF - FEB 18 - BED SUP. TRANSFERS MIN. GAIT 120 FEET RW MIN OF 2. TOILET TRANSFER MIN-MOD. TOILETING MAX ASSIST. UBD MIN. LBD MAX-DEP. UBB MIN. .LBB MOD ASSIST. CONTINENT BLADDER. OSTOMY. ABD WOUND IMPROVED. GABAPENTIN FOR PAIN.    ELOS - 2 WEEKS     TEAM CONF - FEB 25 - BED SUP. TRANSFERS CTG-MIN. GAIT 160 FEET RW CTG, LEFT DYNAMIC AIR CAST. TOILET TRANSFERS MIN ASSIST. SHOWER TRANSFERS MIN ASSIST. UBD MIN. LBD MOD-MAX ASSIST. UBB CTG. LBB MIN ASSIST.  ELOS - ONE WEEK     February 26-Ambulation improving.  Hand strength improving.  Patient would like to look at earlier  discharge.  Reviewed therapies will be less intensive as an outpatient.  Her  has been participating in therapies and patient/ will continue with home exercise program in addition to outpatient PT and OT.  Will look at discharge home tomorrow.  -referral to [facility].  Referral made today and outpatient therapy is scheduled to begin on March 3.   Pt will receive only one therapy session (OT) next week due to limited openings. Her regular schedule will be PT and OT on Tuesdays and Thursdays beginning March 10.     Physical Exam:   General: Awake, alert, NAD  HEENT: normocepahlic, atraumatic   Heart: RRR, no m/r/g  Lungs: CTAB, no wheezing, rales, or rhonchi  Abdomen: soft, non-tender, non-distended, colostomy , incision dressed  Strength:   Bilateral shoulder flexion 4+/5, elbow flexion 5/5, finger flexion 5/5, right hand intrinsic 3/5, left hand intrinsic 3+/5, bilateral hip flexion 5/5, knee extension 5/5, ankle dorsiflexion 4+/5.        Results Review:                     Results from last 7 days   Lab Units 02/24/20  0650   WBC 10*3/mm3 4.58   HEMOGLOBIN g/dL 8.1*   HEMATOCRIT % 26.0*   PLATELETS 10*3/mm3 378            Results from last 7 days   Lab Units 02/24/20  0650 02/21/20  0539   SODIUM mmol/L 139 139   POTASSIUM mmol/L 3.4* 3.4*   CHLORIDE mmol/L 100 100   CO2 mmol/L 27.1 27.3   BUN mg/dL 6* 3*   CREATININE mg/dL 0.78 0.65   CALCIUM mg/dL 8.5* 8.3*   GLUCOSE mg/dL 95 92        Name Relationship Specialty Phone Fax Address Order              Dorcas Albrecht MD   Colon and Rectal Surgery [phone] [address] [facility] [address]  [place] [address]     Next Steps: Follow up      Instructions: appointment on march 17,2020 @ 10:20 am      Doyle Booth MD   Neurology [phone] [facility]  [address]  [place] [address]     Next Steps: Follow up      Instructions: appointment on march 19,2020 @ 9:00 am       Armando Valentine MD  PCP - General Internal Medicine [phone] [address]  [place] [address]     Next Steps: Follow up      Instructions: appointment on march 10,2020 @ 8:30 am      Adolfo Valle MD   Physical Medicine and Rehabilitation [phone] [address] [facility] [address]  [place] [facility] [address]     Next Steps: Follow up on 3/25/2020      Instructions: 4:00      Per 's report-Nerve conduction study/ electromyography scheduled for March 24,2020 at [facility]           Next Steps: Follow up           Discharge Medications      New Medications      Instructions Start Date   acetaminophen 325 MG tablet  Commonly known as:  TYLENOL   650 mg, Oral, Every 6 Hours PRN      Cyanocobalamin 2500 MCG chewable tablet  Replaces:  Vitamin B-12 1000 MCG sublingual tablet   2,500 mcg, Sublingual, Weekly   Start Date:  February 28, 2020     gabapentin 300 MG capsule  Commonly known as:  NEURONTIN   300 mg, Oral, 4 Times Daily      Iron 18 MG tablet controlled-release   18 mg, Sublingual, 2 Times Daily      loperamide 2 MG capsule  Commonly known as:  IMODIUM   2 mg, Oral, 3 Times Daily Before Meals      MULTIVITAMIN ADULT tablet   1 tablet, Sublingual, Daily      sodium bicarbonate 650 MG tablet   1,300 mg, Oral, 3 Times Daily      Vitamin D-3 125 MCG (5000 UT) tablet  Replaces:  vitamin D3 125 MCG (5000 UT) capsule capsule   5,000 Units, Sublingual, Daily         Continue These Medications      Instructions Start Date   omeprazole 20 MG capsule  Commonly known as:  priLOSEC   20 mg, Oral, Daily         Stop These Medications    meclizine 25 MG tablet  Commonly known as:  ANTIVERT     metoclopramide 5 MG tablet  Commonly known as:  REGLAN     ondansetron 4 MG tablet  Commonly known as:  ZOFRAN     promethazine 12.5 MG tablet  Commonly known as:  PHENERGAN     simvastatin 20 MG tablet  Commonly known as:  ZOCOR     traMADol 50 MG tablet  Commonly known as:   ULTRAM     Vitamin B-12 1000 MCG sublingual tablet  Replaced by:  Cyanocobalamin 2500 MCG chewable tablet     vitamin D3 125 MCG (5000 UT) capsule capsule  Replaced by:  Vitamin D-3 125 MCG (5000 UT) tablet            No driving.    Referral to [facility].   outpatient therapy is scheduled to begin on March 3.   Pt will receive only one therapy session (OT) next week due to limited openings. Her regular schedule will be PT and OT on Tuesdays and Thursdays beginning March 10.       >30 on discharge

## 2020-02-27 NOTE — NURSING NOTE
02/27/20 1100   Ileostomy RUQ   Placement Date/Time: 12/15/19 1407   Inserted by: DR PRAKASH  Location: RUQ   Stomal Appliance 1 piece;Intact;Changed   Stoma Appearance round;moist;pink   Peristomal Assessment Clean;Intact   Accessories/Skin Care convex wafer;cleansed with water;skin barrier ring   Stoma Function stool   Stool Color brown   Stool Consistency loose   Treatment Bag change   CWOCN for ileostomy pouch change prior to discharge.  Patient with home supplies provided.  Midline incision has granulated; no longer needs wound care.  Keep clean only.

## 2020-02-27 NOTE — PLAN OF CARE
Problem: Patient Care Overview  Goal: Plan of Care Review  Outcome: Outcome(s) achieved  Flowsheets (Taken 2/27/2020 1133)  Outcome Summary: Pt. discharged to home today with .  Outpatient therapies to start next week.  Ileostomy pouch changed today per ostomy RN.  Progress, Functional Goals: demonstrating adequate progress  Plan of Care Reviewed With: patient; spouse  IRF Plan of Care Review: progress ongoing, continue

## 2020-02-27 NOTE — THERAPY DISCHARGE NOTE
Inpatient Rehabilitation - Physical Therapy Treatment Note/Discharge  [facility]     Patient Name: She López  : 1945  MRN: [id number]  Today's Date: 2020  Onset of Illness/Injury or Date of Surgery: 20          Admit Date: 2020    Visit Dx:    ICD-10-CM ICD-9-CM   1. General weakness R53.1 780.79   2. Neuropathic pain of hand, unspecified laterality M79.2 354.9     Patient Active Problem List   Diagnosis   • Crohn's disease of small intestine with other complication (CMS/HCC)   • Type 2 diabetes mellitus without complication (CMS/HCC)   • RSD upper limb   • Neuropathic pain of hand   • Hyperlipidemia   • Cervical myelopathy (CMS/HCC)   • Central pain syndrome   • Carpal tunnel syndrome   • Vitamin B 12 deficiency   • Guillain Barré syndrome (CMS/HCC)         PT IRF GOALS     Row Name 20 0900             Bed Mobility Goal 2 (PT-IRF)    Progress/Outcomes (Bed Mobility Goal 2, PT-IRF)  goal met  -         Transfer Goal 2 (PT-IRF)    Progress/Outcomes (Transfer Goal 2, PT-IRF)  goal met  -         Gait/Walking Locomotion Goal 2 (PT-IRF)    Progress/Outcomes (Gait/Walking Locomotion Goal 2, PT-IRF)  goal met  -         Caregiver Training Goal 2 (PT-IRF)    Progress/Outcomes (Caregiver Training Goal 2, PT-IRF)  goal met  -        User Key  (r) = Recorded By, (t) = Taken By, (c) = Cosigned By    Initials Name Provider Type     Chelsie Cline, PT Physical Therapist          Therapy Treatment    IRF Treatment Summary     Row Name 20 0912             Evaluation/Treatment Time and Intent    Subjective Information  no complaints  -      Existing Precautions/Restrictions  fall  -      Document Type  discharge treatment  -      Mode of Treatment  physical therapy;individual therapy  -      Patient/Family Observations  pt seated in  no acute distress, spouse present  -      Recorded by [] Chelsie Cline, PT      Row Name 20 0910             Caregiver  Training    Caregiver(s) to be Trained  spouse/significant  -      Comment, Caregiver Training Plan  spouse present this AM and completed stair navigation training, all receptive to PT recommendations  -LH      Recorded by [] Chelsie Cline, PT      Row Name 02/27/20 0910             Cognition/Psychosocial- PT/OT    Affect/Mental Status (Cognitive)  WNL  -      Orientation Status (Cognition)  oriented x 4  -LH      Follows Commands (Cognition)  WFL  -      Personal Safety Interventions  fall prevention program maintained;gait belt;supervised activity  -      Recorded by [] Chelsie Cline, PT      Row Name 02/27/20 0910             Bed Mobility Assessment/Treatment    Supine-Sit Clements (Bed Mobility)  conditional independence  -      Sit-Supine Clements (Bed Mobility)  conditional independence  -      Recorded by [] Chelsie Cline, PT      Row Name 02/27/20 0910             Sit-Stand Transfer    Sit-Stand Clements (Transfers)  contact guard;stand by assist  -      Assistive Device (Sit-Stand Transfers)  walker, front-wheeled  -      Recorded by [] Chelsie Cline, PT      Row Name 02/27/20 0910             Stand-Sit Transfer    Stand-Sit Clements (Transfers)  contact guard;stand by assist  -      Assistive Device (Stand-Sit Transfers)  walker, front-wheeled  -      Recorded by [] Chelsie Cline, PT      Row Name 02/27/20 0910             Gait/Stairs Assessment/Training    Negotiation (Stairs)  ascending technique;descending technique;stairs independence  -      Clements Level (Stairs)  contact guard;verbal cues  -      Handrail Location (Stairs)  left side (ascending)  -      Number of Steps (Stairs)  4x2  -      Ascending Technique (Stairs)  step-to-step  -      Descending Technique (Stairs)  step-to-step  -      Stairs, Safety Issues  weight-shifting ability decreased;sequencing ability decreased  -      Stairs, Impairments  strength decreased;impaired  balance  -      Comment (Gait/Stairs)  navigated stairs 1x with BUE support on 2 HR, navigated stairs 1x with BUE support on one HR on L ascending  -      Recorded by [] Chelsie Cline, PT      Row Name 02/27/20 0910             Pain Scale: Numbers Pre/Post-Treatment    Pain Scale: Numbers, Pretreatment  0/10 - no pain  -      Pain Scale: Numbers, Post-Treatment  0/10 - no pain  -      Recorded by [] Chelsie Cline, PT      Row Name 02/27/20 0910             Positioning and Restraints    Pre-Treatment Position  sitting in chair/recliner  -      Post Treatment Position  wheelchair  -      In Wheelchair  sitting;call light within reach;encouraged to call for assist;exit alarm on;with family/caregiver  -      Recorded by [] Chelsie Cline PT        User Key  (r) = Recorded By, (t) = Taken By, (c) = Cosigned By    Initials Name Effective Dates     Chelsie Cline PT 04/03/18 -           PT Recommendation and Plan  Planned Therapy Interventions (PT Eval): balance training, bed mobility training, gait training, home exercise program, patient/family education, strengthening, transfer training  Outcome Summary: pt currently presents w dx of GBS w CIDP, BUE and LE weakness, 4 extremity decreased proprioception/coordination and therefore is not a safe independent household ambulator with cane, nor walker. Pt requires a lightweight WC to safely and independently mobilize self in the home over multisurface including carpet.         Time Calculation:   PT Charges     Row Name 02/27/20 0915             Time Calculation    Start Time  0900  -      Stop Time  0915  -      Time Calculation (min)  15 min  -      PT Received On  02/27/20  -        User Key  (r) = Recorded By, (t) = Taken By, (c) = Cosigned By    Initials Name Provider Type     Chelsie Cline, PT Physical Therapist          Therapy Charges for Today     Code Description Service Date Service Provider Modifiers Qty    93278824251  PT THER  PROC EA 15 MIN 2/26/2020 Chelsie Cline, PT GP 2    45879250903 HC GAIT TRAINING EA 15 MIN 2/26/2020 Chelsie Cline, PT GP 1    86543869826 HC PT THERAPEUTIC ACT EA 15 MIN 2/26/2020 Chelsie Cline, PT GP 3    98103873299 HC PT THER PROC EA 15 MIN 2/27/2020 Chelsie Cline, PT GP 1               PT Discharge Summary  Reason for Discharge: All goals achieved  Outcomes Achieved: Able to achieve all goals within established timeline  Discharge Destination: Home with outpatient services    Chelsie Cline, PT  2/27/2020

## 2020-02-27 NOTE — PROGRESS NOTES
SECTION GG    Eating Performance Discharge: Granville sets up or cleans up; patient completes  activity. Granville assists only prior to or following the activity.    Signed by: Romina Cotton RN

## 2020-02-27 NOTE — PROGRESS NOTES
Inpatient Rehabilitation Plan of Care Note    Plan of Care  Care Plan Reviewed - Updates as Follows    Body Systems    [RN] Integumentary(Resolved)  Current Status(02/27/2020): Abdominal incision healed.ileostomy bag in place.  both changed per WOCN  Weekly Goal(03/04/2020): No S&S infection noted. Skin is intact.  Discharge Goal: No S&S infection noted Skin is intact.    Performed Intervention(s)  Daily skin inspection      Psychosocial    [RN] Coping/Adjustment(Resolved)  Current Status(02/27/2020): Supportive family,  very helpful and assists  patient with care.  Weekly Goal(03/04/2020): Identify progress in functional status  Discharge Goal: Demonstrate healthy coping strategies    Performed Intervention(s)  Verbalize needs and concerns  calm enviroment  Therapeutic environmental set-up  Family support      Safety    [RN] Potential for Injury(Resolved)  Current Status(02/27/2020): No unsafe behaviors. Weakness of lower extremities  Weekly Goal(03/04/2020): Use call light 100%  Discharge Goal: Pt/family aware of risk of fall and safety in the home setting    Performed Intervention(s)  Bed alarm, wc alarm  Items within reach  Safety rounds  Environmental set-up to reduce risk  Uses call light appropriately      Sphincter Control    [RN] Bladder Management(Resolved)  Current Status(02/27/2020): pt has been continent 100%  Weekly Goal(03/04/2020): Continent bladder 100%  Discharge Goal: Continent bladder 100%    [RN] Bowel Management(Resolved)  Current Status(02/27/2020): Has ileostomy since 12/20/19.  aware of care  for ileostomy.  Weekly Goal(03/04/2020): maintain education of ileostomy with pt and family  Discharge Goal: Independent with ileostomy care    Performed Intervention(s)  Monitor intake and output  Encourage appropriate diet  wound ostomy nurse to continue to see pt for ostomy care.    Signed by: Romina Cotton RN

## 2020-02-27 NOTE — PROGRESS NOTES
Occupational Therapy: Branch    Physical Therapy: Individual: 15 minutes.    Speech Language Pathology:  Branch    Signed by: hCelsie Cline PT

## 2020-03-17 ENCOUNTER — OFFICE VISIT (OUTPATIENT)
Dept: SURGERY | Facility: CLINIC | Age: 75
End: 2020-03-17

## 2020-03-17 VITALS
HEART RATE: 100 BPM | SYSTOLIC BLOOD PRESSURE: 126 MMHG | WEIGHT: 119.7 LBS | TEMPERATURE: 98.1 F | BODY MASS INDEX: 19.94 KG/M2 | OXYGEN SATURATION: 98 % | HEIGHT: 65 IN | DIASTOLIC BLOOD PRESSURE: 82 MMHG

## 2020-03-17 DIAGNOSIS — K56.699 SMALL BOWEL STRICTURE (HCC): Primary | ICD-10-CM

## 2020-03-17 DIAGNOSIS — K50.812 CROHN'S DISEASE OF BOTH SMALL AND LARGE INTESTINE WITH INTESTINAL OBSTRUCTION (HCC): ICD-10-CM

## 2020-03-17 PROBLEM — G61.0 GBS (GUILLAIN-BARRE SYNDROME) (HCC): Status: ACTIVE | Noted: 2020-01-21

## 2020-03-17 PROBLEM — E87.29 METABOLIC ACIDOSIS, INCREASED ANION GAP: Status: ACTIVE | Noted: 2020-01-16

## 2020-03-17 PROBLEM — Z93.2 ILEOSTOMY PRESENT (HCC): Status: ACTIVE | Noted: 2020-03-11

## 2020-03-17 PROCEDURE — 99212 OFFICE O/P EST SF 10 MIN: CPT | Performed by: COLON & RECTAL SURGERY

## 2020-03-17 RX ORDER — PROMETHAZINE HYDROCHLORIDE 12.5 MG/1
TABLET ORAL
COMMUNITY
End: 2020-09-22

## 2020-03-17 RX ORDER — ONDANSETRON 4 MG/1
TABLET, FILM COATED ORAL
COMMUNITY
End: 2020-09-22

## 2020-03-17 RX ORDER — METOCLOPRAMIDE 5 MG/1
TABLET ORAL
COMMUNITY
End: 2020-09-22

## 2020-03-17 RX ORDER — TRAMADOL HYDROCHLORIDE 50 MG/1
TABLET ORAL
COMMUNITY
End: 2020-09-22

## 2020-03-17 RX ORDER — MECLIZINE HYDROCHLORIDE 25 MG/1
TABLET ORAL
COMMUNITY
End: 2020-09-22

## 2020-03-17 RX ORDER — PANTOPRAZOLE SODIUM 40 MG/1
40 TABLET, DELAYED RELEASE ORAL DAILY
COMMUNITY
Start: 2020-01-24 | End: 2020-09-22

## 2020-03-17 NOTE — PROGRESS NOTES
"She López is a 74 y.o. female in for follow up of Small bowel stricture (CMS/HCC)    Crohn's disease of both small and large intestine with intestinal obstruction (CMS/HCC)  s/p laparoscopic to open right hemicolectomy 12/9/2019  s/p exploratory laparotomy, resection of anastomosis, and ileostomy, washout of abdomen 12/15/2019  s/p exploratory laparotomy with small bowel resection 12/20/2019    She was discharged from rehab 27 Feb.  She is still taking po Fe.  Stools are sometimes dark    She empties q4 hours at night, and 3 times during the day for a total of 7 empties per 24 hours  She is taking imodium 1 tab ac    She is walking with walker at home      Pulse 100   Temp 97.7 °F (36.5 °C) (Oral)   Ht 165.1 cm (65\")   Wt 54.3 kg (119 lb 11.2 oz)   LMP  (LMP Unknown)   SpO2 98%   Breastfeeding No   BMI 19.92 kg/m²   Body mass index is 19.92 kg/m².      PE:  Physical Exam   Constitutional: She appears well-developed. No distress.   in wheelchair   HENT:   Head: Normocephalic and atraumatic.   Abdominal:   -s/nt/nd  -midline vertical incision c/d/i  -stoma pink with piecey brown output at os, loose output bottom of bag   Musculoskeletal: Normal range of motion.   Neurological: She is alert.   Psychiatric: Thought content normal.       Assessment:   1. Small bowel stricture (CMS/HCC)    2. Crohn's disease of both small and large intestine with intestinal obstruction (CMS/HCC)    s/p laparoscopic to open right hemicolectomy 12/9/2019  s/p exploratory laparotomy, resection of anastomosis, and ileostomy, washout of abdomen 12/15/2019  s/p exploratory laparotomy with small bowel resection 12/20/2019    Plan:    To help slow down output, she should increase imodium to 2 tabs before each meal at at bedtime.  If she has insufficient improvement, can consider Lomotil.  She will call in 1 week with how her output is doing    Continue physical therapy and occupational therapy exercises at home.    RTC 1 " month      Scribed for Dorcas Albrecht MD by Nancy Magallanes PA-C  3/17/2020   This patient was evaluated by me, recommendations made, documentation reviewed, edited, and revised by me, Dorcas Albrecht MD

## 2020-04-01 ENCOUNTER — TELEPHONE (OUTPATIENT)
Dept: SURGERY | Facility: CLINIC | Age: 75
End: 2020-04-01

## 2020-04-01 DIAGNOSIS — K59.1 FUNCTIONAL DIARRHEA: Primary | ICD-10-CM

## 2020-04-01 RX ORDER — DIPHENOXYLATE HYDROCHLORIDE AND ATROPINE SULFATE 2.5; .025 MG/1; MG/1
TABLET ORAL
Qty: 120 TABLET | Refills: 0 | Status: SHIPPED | OUTPATIENT
Start: 2020-04-01

## 2020-04-01 NOTE — TELEPHONE ENCOUNTER
Patient's  Nathan called office & would like for you to call back regarding her surgery, she is bleeding from her rectum & she had an ileostomy put in.    She is not bleeding a lot he said, she just put in a mini pad.    She went to restroom and when she sat she felt like she had to have a BM, but knew she couldn't of have so when she wiped back there, there it was blood, not a lot but enough to see that she should not be bleeding from the back the  states.    Call #[phone].    Thank you.

## 2020-05-05 ENCOUNTER — OFFICE VISIT (OUTPATIENT)
Dept: SURGERY | Facility: CLINIC | Age: 75
End: 2020-05-05

## 2020-05-05 VITALS
TEMPERATURE: 98 F | BODY MASS INDEX: 20.83 KG/M2 | DIASTOLIC BLOOD PRESSURE: 70 MMHG | SYSTOLIC BLOOD PRESSURE: 110 MMHG | WEIGHT: 125 LBS | OXYGEN SATURATION: 95 % | HEART RATE: 72 BPM | HEIGHT: 65 IN

## 2020-05-05 DIAGNOSIS — K56.699 SMALL BOWEL STRICTURE (HCC): Primary | ICD-10-CM

## 2020-05-05 DIAGNOSIS — K50.812 CROHN'S DISEASE OF BOTH SMALL AND LARGE INTESTINE WITH INTESTINAL OBSTRUCTION (HCC): ICD-10-CM

## 2020-05-05 PROCEDURE — 99213 OFFICE O/P EST LOW 20 MIN: CPT | Performed by: COLON & RECTAL SURGERY

## 2020-05-05 RX ORDER — CIPROFLOXACIN 500 MG/1
500 TABLET, FILM COATED ORAL 2 TIMES DAILY
COMMUNITY
Start: 2020-04-25 | End: 2020-09-22

## 2020-05-05 RX ORDER — LOPERAMIDE HYDROCHLORIDE 2 MG/1
2 CAPSULE ORAL
Qty: 270 CAPSULE | Refills: 3 | Status: SHIPPED | OUTPATIENT
Start: 2020-05-05 | End: 2021-04-30

## 2020-05-05 NOTE — PROGRESS NOTES
"She López is a 74 y.o. female in for follow up of Small bowel stricture (CMS/HCC)    Crohn's disease of both small and large intestine with intestinal obstruction (CMS/HCC)  s/p laparoscopic to open right hemicolectomy 12/9/2019  s/p exploratory laparotomy, resection of anastomosis, and ileostomy, washout of abdomen 12/15/2019  s/p exploratory laparotomy with small bowel resection 12/20/2019    Pt states she is doing well.    Output is still liquid, but she can go 5 hours without emptying overnight.  She is taking both imodium and Lomotil.  She is not taking a fiber supplement.  She cannot change her own bag due to decreased fine motor skills secondary to GBS  Her PT was haled due to COVID-19 outbreak.  She is looking forward to hopefully resuming next month.    Her appetite is good.  She is not drinking any Boost    Her previous GI Dr. Goodman has moved to [facility].  She is asking about recommendations for a GI at [facility]    /70 (BP Location: Left arm, Patient Position: Sitting, Cuff Size: Adult)   Pulse 72   Temp 98 °F (36.7 °C) (Temporal)   Ht 165.1 cm (65\")   Wt 56.7 kg (125 lb)   LMP  (LMP Unknown)   SpO2 95%   Breastfeeding No   BMI 20.80 kg/m²   Body mass index is 20.8 kg/m².      PE:  Physical Exam   Constitutional: She appears well-developed. No distress.   Pt in wheelchair   HENT:   Head: Normocephalic and atraumatic.   Abdominal:   -s/nt/nd  -stoma pink with small amount thin brown output in bag   Musculoskeletal: Normal range of motion.   Neurological: She is alert.   Psychiatric: Thought content normal.       Assessment:   1. Small bowel stricture (CMS/HCC)    2. Crohn's disease of both small and large intestine with intestinal obstruction (CMS/HCC)    s/p laparoscopic to open right hemicolectomy 12/9/2019  s/p exploratory laparotomy, resection of anastomosis, and ileostomy, washout of abdomen 12/15/2019  s/p exploratory laparotomy with small bowel resection 12/20/2019    Plan:    She " should continue imodium and Lomotil for output consistency; will send in 1-year refills.  Also recommend that she start a daily fiber supplement to help bulk output.     Discussed would recommend Dr. Iyer for GI, as he is an IBD specialist.  Will send referral.    Discussed possible ileostomy closure in the future.  She would need to recover her strength prior to any abdominal surgery.  The longer she can wait to let the scar tissue soften, the better.  Would also need neurologist evaluation regarding her Guillain-[place] syndrome.    Call or come in if any questions or concerns      RTC prn    Scribed for Dorcas Albrecht MD by Nancy Magallanes PA-C  5/5/2020   This patient was evaluated by me, recommendations made, documentation reviewed, edited, and revised by me, Dorcas Albrecht MD

## 2020-09-22 ENCOUNTER — OFFICE VISIT (OUTPATIENT)
Dept: GASTROENTEROLOGY | Facility: CLINIC | Age: 75
End: 2020-09-22

## 2020-09-22 VITALS
OXYGEN SATURATION: 98 % | BODY MASS INDEX: 21.69 KG/M2 | TEMPERATURE: 98 F | HEART RATE: 85 BPM | WEIGHT: 130.2 LBS | HEIGHT: 65 IN | SYSTOLIC BLOOD PRESSURE: 112 MMHG | RESPIRATION RATE: 16 BRPM | DIASTOLIC BLOOD PRESSURE: 70 MMHG

## 2020-09-22 DIAGNOSIS — K50.819 CROHN'S DISEASE OF SMALL AND LARGE INTESTINES WITH COMPLICATION (HCC): Primary | ICD-10-CM

## 2020-09-22 DIAGNOSIS — Z93.2 ILEOSTOMY IN PLACE (HCC): ICD-10-CM

## 2020-09-22 PROCEDURE — 99214 OFFICE O/P EST MOD 30 MIN: CPT | Performed by: INTERNAL MEDICINE

## 2020-09-22 NOTE — PROGRESS NOTES
Crohn's Disease      HPI  Patient is  A 75 year old female who presents today for evaluation.    She has a history of Crohn's disease of small and large intestine with intestinal obstruction. She underwent laparoscopic to open right hemicolectomy with exploratory lapartomy, resection of anastomosis, ileostomy, and washout of abdomen 12/2019. She was a previous patient of Dr. Goodman.    Her ileostomy is in place and there are plans for reversal. She presents today to establish care.    She reports prior treatment for Crohn's disease included Humira, Entyvio, Stelara, Entocort and prednisone with no improvement. Prior treatment with Asacol, Pentasa, and sulfasalazine. She had a resection in 2001 due to Crohn's disease. She was on Humira from 1740-6336. She was on Entyvio starting July 2019 and then Stelara starting September 2019.    She was then referred to Dr. Albrecht due to persistent symptoms. She reports the initial plan was for a resection, but due to the severity of disease more extensive surgery was needed. 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 She reports the ostomy was placed with the final surgery. She reports she required wound vac to heal the incision.    She reports Humira was stopped due to insurance no longer covering it. She was off therapy from 2011 to 2019. She was started back on Humira in 2019 and failed to improve and it was discovered she had developed antibodies.    Her most recent colonoscopy was performed August 2019 and she reports she had scar tissue in her small bowel, which ultimately prompted the surgery. She was experiencing obstructive symptoms with persistent nausea and vomiting.    She reports she has had no vomiting since her surgery and is feeling better from a Crohn's disease standpoint.        Review of Systems   Constitutional: Negative for appetite change, chills, diaphoresis, fatigue, fever and unexpected weight change.   HENT:  Negative for dental problem, ear pain, mouth sores, rhinorrhea, sore throat and voice change.    Eyes: Negative for pain, redness and visual disturbance.   Respiratory: Negative for cough, chest tightness and wheezing.    Cardiovascular: Negative for chest pain, palpitations and leg swelling.   Endocrine: Negative for cold intolerance, heat intolerance, polydipsia, polyphagia and polyuria.   Genitourinary: Negative for dysuria, frequency, hematuria and urgency.   Musculoskeletal: Positive for arthralgias. Negative for back pain, joint swelling, myalgias and neck pain.   Skin: Negative for rash.   Allergic/Immunologic: Negative for environmental allergies, food allergies and immunocompromised state.   Neurological: Negative for dizziness, seizures, weakness, numbness and headaches.   Hematological: Does not bruise/bleed easily.   Psychiatric/Behavioral: Negative for sleep disturbance. The patient is not nervous/anxious.         I have reviewed and confirmed the accuracy of the HPI and ROS as documented by the APRN MULU Harvey     Problem List:    Patient Active Problem List   Diagnosis   • Crohn's disease of small intestine with other complication (CMS/HCC)   • Type 2 diabetes mellitus without complication (CMS/HCC)   • RSD upper limb   • Neuropathic pain of hand   • Hyperlipidemia   • Cervical myelopathy (CMS/HCC)   • Central pain syndrome   • Carpal tunnel syndrome   • Vitamin B 12 deficiency   • GBS (Guillain-Fairbury syndrome) (CMS/HCC)   • Ileostomy present (CMS/HCC)   • Metabolic acidosis, increased anion gap       Medical History:    Past Medical History:   Diagnosis Date   • Arthritis    • Wynn esophagus    • Cancer (CMS/HCC)     KERATOACANTHOMA TYPE OF SCC   • Cervical myelopathy (CMS/HCC) 08/15/2014   • Crohn's disease (CMS/HCC) 2000    FOLLOWED BY DR. YANG BIRD   • Hemorrhoids    • Hiatal hernia    • Hiatal hernia    • History of carpal tunnel syndrome    • History of transfusion    •  Hyperlipidemia    • Muscle weakness     RIGHT HAND   • Pelvic abscess in female 2001   • PONV (postoperative nausea and vomiting)    • RSD upper limb 08/2014   • Vitamin B 12 deficiency    • Vitamin D deficiency         Social History:    Social History     Socioeconomic History   • Marital status:      Spouse name: Not on file   • Number of children: Not on file   • Years of education: Not on file   • Highest education level: Not on file   Tobacco Use   • Smoking status: Never Smoker   • Smokeless tobacco: Never Used   Substance and Sexual Activity   • Alcohol use: Yes     Comment: MODERATE AMT   • Drug use: No   • Sexual activity: Defer     Birth control/protection: Post-menopausal       Family History:   Family History   Problem Relation Age of Onset   • Breast cancer Sister    • Cancer Sister    • Breast cancer Maternal Grandmother    • Parkinsonism Mother    • Heart disease Father    • Colon cancer Neg Hx    • Colon polyps Neg Hx    • Malig Hyperthermia Neg Hx        Surgical History:   Past Surgical History:   Procedure Laterality Date   • CARPAL TUNNEL RELEASE Right 09/2012   • CERVICAL FUSION N/A 01/25/2012    C- 5,6,7 fused, DR. MISAEL NORTH AT [place]   • CHOLECYSTECTOMY N/A 04/12/2018    LAPAROSCOPIC, DR. VENICE SALDAÑA AT [place]   • COLON RESECTION N/A 12/9/2019    Procedure: laparoscopic to open right hemicolectomy;  Surgeon: Dorcas Albrecht MD;  Location: [facility];  Service: General   • COLON RESECTION N/A 12/20/2019    Procedure: EXPLORATORY LAPAROTOMY WITH SMALL BOWEL RESECTION;  Surgeon: Dorcas Albrecht MD;  Location: [facility];  Service: General   • COLONOSCOPY N/A 8/16/2019    ANAL SKIN TAGS, ANASTAMOSIS ULCERATED, INDURATED, AND NODULAR, INTERNAL HEMORRHOIDS, ANASTAMOSIS STRICTURE, DR. YANG BIRD AT [facility]   • COLONOSCOPY N/A 04/20/2010    PROCTITIS, RECTAL STENOSIS, ACTIVE CROHNS DISEASE AT [facility], RIGHT COLON, DR.GERARD AGRAWAL AT [place]   • ENDOSCOPY N/A  8/16/2019    GRADE A ESOPHAGITIS, SLIDING HIATAL HERNIA, EROSIVE GASTRITIS, Z LINE IRREGULAR, DR. YANG BIRD AT [facility]   • EXPLORATORY LAPAROTOMY N/A 12/15/2019    Procedure: LAPAROTOMY EXPLORATORY AND WASHOUT, RESECTION OF ANASTOMOSIS WITH END ILEOSTOMY;  Surgeon: Dorcas Albrecht MD;  Location: [facility];  Service: General   • EYE SURGERY     • SMALL INTESTINE SURGERY N/A 10/01/2001    ILEOCOLECTOMY         Current Outpatient Medications:   •  Multiple Vitamins-Minerals (MULTIVITAMIN ADULT) tablet, Place 1 tablet under the tongue Daily., Disp: , Rfl:   •  acetaminophen (TYLENOL) 325 MG tablet, Take 2 tablets by mouth Every 6 (Six) Hours As Needed for Mild Pain  (Temp > 100.5)., Disp: , Rfl:   •  Cholecalciferol (VITAMIN D-3) 125 MCG (5000 UT) tablet, Place 5,000 Units under the tongue Daily., Disp: 30 tablet, Rfl:   •  Cyanocobalamin 2500 MCG chewable tablet, Place 2,500 mcg under the tongue 1 (One) Time Per Week., Disp: , Rfl:   •  diphenoxylate-atropine (Lomotil) 2.5-0.025 MG per tablet, 1-2 pills every q4 hr (2 hours after taking imodium), Disp: 120 tablet, Rfl: 0  •  Ferrous Fumarate (IRON) 18 MG tablet controlled-release, Place 1 tablet under the tongue 2 (Two) Times a Day., Disp: , Rfl:   •  loperamide (IMODIUM) 2 MG capsule, Take 1 capsule by mouth 3 (Three) Times a Day Before Meals for 360 days., Disp: 270 capsule, Rfl: 3    Allergies:   Allergies   Allergen Reactions   • Asacol [Mesalamine] GI Intolerance     Abdominal pain, fever   • Duloxetine Nausea Only   • Influenza Vaccines Cough     FEVER   • Keflex [Cephalexin] Swelling   • Other Swelling     DURING EYE SURGERY HAD REACTION TO THE CLEANSER USED DURING SURGERY AROUND LEFT EYE (EYE SWELLING)--NAME OF CLEANSING AGENT UNKNOWN        The following portions of the patient's history were reviewed and updated as appropriate: allergies, current medications, past family history, past medical history, past social history, past surgical history  and problem list.    Vitals:    09/22/20 1342   BP: 112/70   Pulse: 85   Resp: 16   Temp: 98 °F (36.7 °C)   SpO2: 98%         09/22/20  1342   Weight: 59.1 kg (130 lb 3.2 oz)     Body mass index is 21.67 kg/m².      PHYSICAL EXAM:  Physical Exam  Vitals signs reviewed.   Constitutional:       Appearance: Normal appearance.   HENT:      Nose: No nasal deformity.   Eyes:      General: No scleral icterus.  Neck:      Comments: Trachea midline.  Cardiovascular:      Rate and Rhythm: Normal rate and regular rhythm.   Pulmonary:      Effort: No respiratory distress.      Breath sounds: Normal breath sounds.   Abdominal:      General: Bowel sounds are normal. There is no distension.      Palpations: Abdomen is soft. There is no mass.      Tenderness: There is no abdominal tenderness.      Comments: Right lower quadrant ostomy clean dry and intact normal stool well-healed surgical scars throughout the abdomen of secondary intention   Lymphadenopathy:      Comments: No periumbilical lymphadenopathy.   Skin:     General: Skin is warm.   Neurological:      Mental Status: She is alert.           Assessment/ Plan  She was seen today for crohn's disease.    Diagnoses and all orders for this visit:    Crohn's disease of small and large intestines with complication (CMS/HCC)    Ileostomy in place (CMS/[facility])         No follow-ups on file.    There are no Patient Instructions on file for this visit.      Discussion:  Discussed with patient and her  today, firstly, we need to give her body time to heal. I discussed with patient today secondly, at some point, I would recommend resuming biologic therapy to prevent a recurrence. If patient plans to wait a couple months, I would recommend we wait until after surgery to start therapy. If she decides to wait for 9-12 months to have her surgery, I would recommend we start therapy.    Thirdly, I discussed the only therapy I would not use would be Humira. I feel Stelara and Entyvio  would be options as I feel she likely was already too far strictured for these to be successful and would not consider these to be drug failures. Patient has questions about Imuran and may be interested in this therapy.    After our discussion, patient would like to wait until after her surgery to resume therapy. We will follow up in 6 weeks.    Documentation by Abena HARDWICK acting as a scribe in the following sections on behalf of the billable provider: HPI, ROS, assessment, & plan.

## 2020-11-19 ENCOUNTER — OFFICE VISIT (OUTPATIENT)
Dept: GASTROENTEROLOGY | Facility: CLINIC | Age: 75
End: 2020-11-19

## 2020-11-19 VITALS
HEART RATE: 85 BPM | HEIGHT: 65 IN | RESPIRATION RATE: 16 BRPM | BODY MASS INDEX: 21.29 KG/M2 | WEIGHT: 127.8 LBS | DIASTOLIC BLOOD PRESSURE: 70 MMHG | OXYGEN SATURATION: 99 % | SYSTOLIC BLOOD PRESSURE: 116 MMHG | TEMPERATURE: 96.4 F

## 2020-11-19 DIAGNOSIS — Z90.49 HISTORY OF BOWEL RESECTION: ICD-10-CM

## 2020-11-19 DIAGNOSIS — Z93.2 ILEOSTOMY PRESENT (HCC): ICD-10-CM

## 2020-11-19 DIAGNOSIS — K50.018 CROHN'S DISEASE OF SMALL INTESTINE WITH OTHER COMPLICATION (HCC): Primary | ICD-10-CM

## 2020-11-19 PROCEDURE — 99215 OFFICE O/P EST HI 40 MIN: CPT | Performed by: NURSE PRACTITIONER

## 2020-11-19 NOTE — PATIENT INSTRUCTIONS
We will discuss today's office visit with Dr. Iyer and contact you regarding recommendations at [phone].

## 2020-11-19 NOTE — PROGRESS NOTES
"Chief Complaint   Patient presents with   • Crohn's Disease       HPI  75-year-old female presents today for follow-up.  Her  is with her during today's office visit.  Initial consult September 22, 2020 with Dr. Iyer.  She has a history of Crohn's disease of the small and large intestine with intestinal obstruction.  She underwent laparoscopic to open right hemicolectomy with exploratory laparotomy, resection of anastomosis, ileostomy and washout of the abdomen December 2019.  She was a previous patient of Dr. Vazquez.      Patient presents today for a \"Crohn's flare\".  Symptoms started approximately 2.5 weeks ago with increased ostomy output that was liquid, fatigue, nausea and a low-grade fever of 99.8.  Symptoms lasted for 7 days.  She contacted her primary care provider and was given a short prednisone taper of 15 mg x 5 days, 10 mg x 5 days and 5 mg x 5 days.  After 2 days of taking steroids symptoms improved.  She is now back to her baseline.  She is no longer having low-grade fevers and ostomy output is stable.    In December she will have been diagnosed with Guillain-Barré for 12 months.  She is still having lower extremity numbness and sensory changes.  She is hoping to avoid treatment for Crohn's disease for 1 year as she has been told that Guillain-Barré can improve after diagnosis of 12 months.    She denies abdominal pain, cramping or bloating.  Ostomy output is normal without melena or hematochezia.  She denies nausea or vomiting.    Previous treatments for Crohn's include adalimumab, vedolizumab, Ustekinumab, Entocort and prednisone with no improvement.  Also Asacol, Pentasa and sulfasalazine. Her first resection was 2001 due to Crohn's disease. She was on adalimumab from 2470-8617.  She was on vedolizumab July 2019 and started ustekinumab September 2019.  Adalimumab was discontinued as her insurance would no longer cover the medication.  She was off of therapy from 9158-3189.  Adalimumab was " restarted in 2019 and patient failed to improve and it was discovered she had developed antibodies.    Prior to surgery she was experiencing obstructive symptoms with persistent nausea and vomiting and abdominal pain.    Given persistent symptoms she was referred to Dr. Albrecht 2019.  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    Review of Systems   Constitutional: Negative for appetite change, chills, diaphoresis, fatigue, fever and unexpected weight change.   HENT: Negative for dental problem, ear pain, mouth sores, rhinorrhea, sore throat and voice change.    Eyes: Negative for pain, redness and visual disturbance.   Respiratory: Negative for cough, chest tightness and wheezing.    Cardiovascular: Negative for chest pain, palpitations and leg swelling.   Endocrine: Negative for cold intolerance, heat intolerance, polydipsia, polyphagia and polyuria.   Genitourinary: Negative for dysuria, frequency, hematuria and urgency.   Musculoskeletal: Negative for arthralgias, back pain, joint swelling, myalgias and neck pain.   Skin: Negative for rash.   Allergic/Immunologic: Negative for environmental allergies, food allergies and immunocompromised state.   Neurological: Negative for dizziness, seizures, weakness, numbness and headaches.   Hematological: Does not bruise/bleed easily.   Psychiatric/Behavioral: Negative for sleep disturbance. The patient is not nervous/anxious.         Problem List:    Patient Active Problem List   Diagnosis   • Crohn's disease of small intestine with other complication (CMS/HCC)   • Type 2 diabetes mellitus without complication (CMS/HCC)   • RSD upper limb   • Neuropathic pain of hand   • Hyperlipidemia   • Cervical myelopathy (CMS/HCC)   • Central pain syndrome   • Carpal tunnel syndrome   • Vitamin B 12 deficiency   • GBS (Guillain-[place] syndrome) (CMS/HCC)   • Ileostomy present (CMS/HCC)   • Metabolic acidosis, increased anion gap       Medical  History:    Past Medical History:   Diagnosis Date   • Arthritis    • Wynn esophagus    • Cancer (CMS/HCC)     KERATOACANTHOMA TYPE OF SCC   • Cervical myelopathy (CMS/HCC) 08/15/2014   • Crohn's disease (CMS/HCC) 2000    FOLLOWED BY DR. YANG BIRD   • Hemorrhoids    • Hiatal hernia    • Hiatal hernia    • History of carpal tunnel syndrome    • History of transfusion    • Hyperlipidemia    • Muscle weakness     RIGHT HAND   • Pelvic abscess in female 2001   • PONV (postoperative nausea and vomiting)    • RSD upper limb 08/2014   • Vitamin B 12 deficiency    • Vitamin D deficiency         Social History:    Social History     Socioeconomic History   • Marital status:      Spouse name: Not on file   • Number of children: Not on file   • Years of education: Not on file   • Highest education level: Not on file   Tobacco Use   • Smoking status: Never Smoker   • Smokeless tobacco: Never Used   Substance and Sexual Activity   • Alcohol use: Yes     Comment: MODERATE AMT   • Drug use: No   • Sexual activity: Defer     Birth control/protection: Post-menopausal       Family History:   Family History   Problem Relation Age of Onset   • Breast cancer Sister    • Cancer Sister    • Breast cancer Maternal Grandmother    • Parkinsonism Mother    • Heart disease Father    • Colon cancer Neg Hx    • Colon polyps Neg Hx    • Malig Hyperthermia Neg Hx        Surgical History:   Past Surgical History:   Procedure Laterality Date   • CARPAL TUNNEL RELEASE Right 09/2012   • CERVICAL FUSION N/A 01/25/2012    C- 5,6,7 fused, DR. MISAEL NORTH AT [place]   • CHOLECYSTECTOMY N/A 04/12/2018    LAPAROSCOPIC, DR. VENICE SALDAÑA AT [place]   • COLON RESECTION N/A 12/9/2019    Procedure: laparoscopic to open right hemicolectomy;  Surgeon: Dorcas Albrecht MD;  Location: [facility];  Service: General   • COLON RESECTION N/A 12/20/2019    Procedure: EXPLORATORY LAPAROTOMY WITH SMALL BOWEL RESECTION;  Surgeon: Dorcas Albrecht MD;   Location: [facility];  Service: General   • COLONOSCOPY N/A 8/16/2019    ANAL SKIN TAGS, ANASTAMOSIS ULCERATED, INDURATED, AND NODULAR, INTERNAL HEMORRHOIDS, ANASTAMOSIS STRICTURE, DR. YANG BIRD AT [facility]   • COLONOSCOPY N/A 04/20/2010    PROCTITIS, RECTAL STENOSIS, ACTIVE CROHNS DISEASE AT [facility], RIGHT COLON, DR.GERARD GARAWAL AT [place]   • ENDOSCOPY N/A 8/16/2019    GRADE A ESOPHAGITIS, SLIDING HIATAL HERNIA, EROSIVE GASTRITIS, Z LINE IRREGULAR, DR. YANG BIRD AT [facility]   • EXPLORATORY LAPAROTOMY N/A 12/15/2019    Procedure: LAPAROTOMY EXPLORATORY AND WASHOUT, RESECTION OF ANASTOMOSIS WITH END ILEOSTOMY;  Surgeon: Dorcas Albrecht MD;  Location: [facility];  Service: General   • EYE SURGERY     • SMALL INTESTINE SURGERY N/A 10/01/2001    ILEOCOLECTOMY         Current Outpatient Medications:   •  acetaminophen (TYLENOL) 325 MG tablet, Take 2 tablets by mouth Every 6 (Six) Hours As Needed for Mild Pain  (Temp > 100.5)., Disp: , Rfl:   •  Cholecalciferol (VITAMIN D-3) 125 MCG (5000 UT) tablet, Place 5,000 Units under the tongue Daily., Disp: 30 tablet, Rfl:   •  Cyanocobalamin 2500 MCG chewable tablet, Place 2,500 mcg under the tongue 1 (One) Time Per Week., Disp: , Rfl:   •  diphenoxylate-atropine (Lomotil) 2.5-0.025 MG per tablet, 1-2 pills every q4 hr (2 hours after taking imodium), Disp: 120 tablet, Rfl: 0  •  Ferrous Fumarate (IRON) 18 MG tablet controlled-release, Place 1 tablet under the tongue 2 (Two) Times a Day., Disp: , Rfl:   •  loperamide (IMODIUM) 2 MG capsule, Take 1 capsule by mouth 3 (Three) Times a Day Before Meals for 360 days., Disp: 270 capsule, Rfl: 3  •  Multiple Vitamins-Minerals (MULTIVITAMIN ADULT) tablet, Place 1 tablet under the tongue Daily., Disp: , Rfl:     Allergies:  Asacol [mesalamine], Duloxetine, Influenza vaccines, Keflex [cephalexin], and Other    The following portions of the patient's history were reviewed and updated as appropriate: allergies,  current medications, past family history, past medical history, past social history, past surgical history and problem list.    Vitals:    11/19/20 0952   BP: 116/70   Pulse: 85   Resp: 16   Temp: 96.4 °F (35.8 °C)   SpO2: 99%         11/19/20 0952   Weight: 58 kg (127 lb 12.8 oz)     Body mass index is 21.27 kg/m².    Physical Exam  Vitals signs reviewed.   Constitutional:       General: She is not in acute distress.     Appearance: She is normal weight. She is not ill-appearing, toxic-appearing or diaphoretic.   Eyes:      General: No scleral icterus.  Cardiovascular:      Pulses: Normal pulses.   Pulmonary:      Effort: Pulmonary effort is normal. No respiratory distress.      Breath sounds: Normal breath sounds.   Abdominal:      General: Abdomen is flat. There is no distension.      Palpations: Abdomen is soft. There is no mass.      Tenderness: There is no abdominal tenderness. There is no right CVA tenderness, left CVA tenderness, guarding or rebound.      Comments: Ostomy present right abdomen, stoma pink, brown liquid stool noted in collection bag   Skin:     General: Skin is warm and dry.      Coloration: Skin is not jaundiced.   Neurological:      Mental Status: She is alert.           Assessment/ Plan  Diagnoses and all orders for this visit:    1. Crohn's disease of small intestine with other complication (CMS/HCC) (Primary)    2. Ileostomy present (CMS/HCC)         No follow-ups on file.    Patient Instructions   We will discuss today's office visit with Dr. Iyer and contact you regarding recommendations at [phone].       Discussion:  At her last visit with Dr. Iyer he recommended postoperative healing and at some point resuming biologic therapy to prevent/decrease severity of recurrence.  Given antibodies adalimumab is not a consideration but utilizing vedolizumab or ustekinumab are valid treatment options as prior use should not be considered a failure as patient had already strictured with  "disease to advanced for either of the medications to provide improvement.  She did report vomiting and sickness after both of the induction doses of vedolizumab and ustekinumab.  If we proceed with treatment, to consider pretreatment with Benadryl and Tylenol.  Also would recommend antibody testing after she has had her second treatment as discussed during today's visit.    Also discussed safety profile, mechanism of actions and risks and benefits of each treatment option.    Patient would like to avoid biologic therapy if possible for 1 year given new diagnosis of Guillain-Barré as she is hoping this will resolve however she is concerned about recent \"Crohn flare\".  This improved after 2 days of prednisone.  Discussed possibility of active inflammatory bowel disease with surgical recurrence as she is at high risk for recurrence as well as other causes including viral infection.  To consider imaging of the abdomen and pelvis and based on response to consider treatment or postponing treatment until January 2021.    We will discuss today's office visit with Dr. Iyer and contact patient with additional recommendations.  Will contact patient at [phone].    Addendum.  Discussed with Dr. Arnold.  He recommends CT enterography for further evaluation.  Patient informed.  She was also given the name of Entyvio and Stelara to discuss with her neurologist regarding Guillain-Barré.  Would like to make sure there is no contraindication or concern for use of 1 of those biologic agents for treatment of inflammatory bowel disease.  Patient has a follow-up with her neurologist the first week of December.  She will arrange for CT enterography after that appointment and then we will discuss results after imaging has resulted.    Patient verbalized agreement and understanding, all questions answered.  "

## 2020-12-18 ENCOUNTER — HOSPITAL ENCOUNTER (OUTPATIENT)
Dept: CT IMAGING | Facility: HOSPITAL | Age: 75
Discharge: HOME | End: 2020-12-18
Admitting: NURSE PRACTITIONER

## 2020-12-18 DIAGNOSIS — Z90.49 HISTORY OF BOWEL RESECTION: ICD-10-CM

## 2020-12-18 DIAGNOSIS — K50.018 CROHN'S DISEASE OF SMALL INTESTINE WITH OTHER COMPLICATION (HCC): ICD-10-CM

## 2020-12-18 DIAGNOSIS — Z93.2 ILEOSTOMY PRESENT (HCC): ICD-10-CM

## 2020-12-18 LAB — CREAT BLDA-MCNC: 1.4 MG/DL (ref 0.6–1.3)

## 2020-12-18 PROCEDURE — 25010000002 IOPAMIDOL 61 % SOLUTION: Performed by: NURSE PRACTITIONER

## 2020-12-18 PROCEDURE — 82565 ASSAY OF CREATININE: CPT

## 2020-12-18 PROCEDURE — 74177 CT ABD & PELVIS W/CONTRAST: CPT

## 2020-12-18 RX ADMIN — IOPAMIDOL 95 ML: 612 INJECTION, SOLUTION INTRAVENOUS at 11:26

## 2020-12-22 DIAGNOSIS — R93.2 ABNORMAL CT SCAN, LIVER: Primary | ICD-10-CM

## 2021-08-25 ENCOUNTER — HOSPITAL ENCOUNTER (OUTPATIENT)
Dept: CT IMAGING | Facility: HOSPITAL | Age: 76
Discharge: HOME | End: 2021-08-25
Admitting: NURSE PRACTITIONER

## 2021-08-25 DIAGNOSIS — R93.2 ABNORMAL CT SCAN, LIVER: ICD-10-CM

## 2021-08-25 PROCEDURE — 74170 CT ABD WO CNTRST FLWD CNTRST: CPT

## 2021-08-25 PROCEDURE — 82565 ASSAY OF CREATININE: CPT

## 2021-08-25 PROCEDURE — 25010000002 IOPAMIDOL 61 % SOLUTION: Performed by: NURSE PRACTITIONER

## 2021-08-25 RX ADMIN — IOPAMIDOL 100 ML: 612 INJECTION, SOLUTION INTRAVENOUS at 10:59

## 2021-08-26 LAB — CREAT BLDA-MCNC: 1.3 MG/DL (ref 0.6–1.3)

## 2022-10-31 ENCOUNTER — TRANSCRIBE ORDERS (OUTPATIENT)
Dept: HOME HEALTH SERVICES | Facility: HOME HEALTHCARE | Age: 77
End: 2022-10-31

## 2022-10-31 ENCOUNTER — HOME HEALTH ADMISSION (OUTPATIENT)
Dept: HOME HEALTH SERVICES | Facility: HOME HEALTHCARE | Age: 77
End: 2022-10-31

## 2022-10-31 DIAGNOSIS — Z48.89 OTHER SPECIFIED AFTERCARE FOLLOWING SURGERY: Primary | ICD-10-CM

## 2023-08-07 NOTE — THERAPY TREATMENT NOTE
Inpatient Rehabilitation - Physical Therapy Treatment Note  [facility]     Patient Name: She López  : 1945  MRN: [id number]    Today's Date: 2020  Onset of Illness/Injury or Date of Surgery: 20              Admit Date: 2020      Visit Dx:      ICD-10-CM ICD-9-CM   1. General weakness R53.1 780.79       Patient Active Problem List   Diagnosis   • Crohn's disease of small intestine with other complication (CMS/HCC)   • Type 2 diabetes mellitus without complication (CMS/HCC)   • RSD upper limb   • Neuropathic pain of hand   • Hyperlipidemia   • Cervical myelopathy (CMS/HCC)   • Central pain syndrome   • Carpal tunnel syndrome   • Vitamin B 12 deficiency   • Guillain Barré syndrome (CMS/HCC)       Therapy Treatment    IRF Treatment Summary     Row Name 20 0800             Evaluation/Treatment Time and Intent    Subjective Information  no complaints  -LH,NM,LH2      Existing Precautions/Restrictions  fall  -LH,NM,LH2      Document Type  therapy note (daily note)  -LH,NM,LH2      Mode of Treatment  physical therapy  -LH,NM,LH2      Patient/Family Observations  Sitting in wc c . No acute distress.  -LH,NM,LH2      Recorded by [LH,NM,LH2] Chelsie Cline, PT (r) Jaxon Atkinson, PT Student (t) Chelsie Cline, PT (c)      Row Name 20 0800             Cognition/Psychosocial- PT/OT    Affect/Mental Status (Cognitive)  WFL  -LH,NM,LH2      Orientation Status (Cognition)  oriented x 4  -LH,NM,LH2      Follows Commands (Cognition)  WFL  -LH,NM,LH2      Personal Safety Interventions  fall prevention program maintained;gait belt;nonskid shoes/slippers when out of bed;supervised activity  -LH,NM,LH2      Recorded by [LH,NM,LH2] Chelsie Cline, PT (r) Jaxon Atkinson, PT Student (t) Chelsie Cline, PT (c)      Row Name 20 0800             Transfer Assessment/Treatment    Comment (Transfers)  Performed transfers from the w/c, car, and a low mat without armrests. Required Doreen  when pivoting and backing up c the rwx. VCfor technique c car transfers.  -LH,NM,LH2      Recorded by [LH,NM,LH2] Chelsie Cline, PT (r) Jaxon Atkinson, PT Student (t) Chelsie Cline, PT (c)      Row Name 02/20/20 0800             Bed-Chair Transfer    Bed-Chair [place] (Transfers)  minimum assist (75% patient effort);verbal cues  -LH,NM,LH2      Assistive Device (Bed-Chair Transfers)  wheelchair  -LH,NM,LH2      Recorded by [LH,NM,LH2] Chelsie Cline, PT (r) Jaxon Atkinson, PT Student (t) Chelsie Cline, PT (c)      Row Name 02/20/20 0800             Chair-Bed Transfer    Chair-Bed [place] (Transfers)  minimum assist (75% patient effort);verbal cues  -LH,NM,LH2      Assistive Device (Chair-Bed Transfers)  wheelchair  -LH,NM,LH2      Recorded by [LH,NM,LH2] Chelsie Cline, PT (r) Jaxon Atkinson, PT Student (t) Chelsie Cline, PT (c)      Row Name 02/20/20 0800             Sit-Stand Transfer    Sit-Stand [place] (Transfers)  minimum assist (75% patient effort);verbal cues  -LH,NM,LH2      Assistive Device (Sit-Stand Transfers)  walker, front-wheeled  -LH,NM,LH2      Recorded by [LH,NM,LH2] Chelsie Cline, PT (r) Jaxon Atkinson, PT Student (t) Chelsie Cline, PT (c)      Row Name 02/20/20 0800             Stand-Sit Transfer    Stand-Sit [place] (Transfers)  minimum assist (75% patient effort);verbal cues  -LH,NM,LH2      Assistive Device (Stand-Sit Transfers)  walker, front-wheeled  -LH,NM,LH2      Recorded by [LH,NM,LH2] Chelsie Cline, PT (r) Jaxon Atkinson, PT Student (t) Chelsie Cline, PT (c)      Row Name 02/20/20 0800             Stand Pivot/Stand Step Transfer    Stand Pivot/Stand Step [place]  minimum assist (75% patient effort);verbal cues  -LH,NM,LH2      Assistive Device (Stand Pivot Stand Step Transfer)  walker, front-wheeled  -LH,NM,LH2      Recorded by [LH,NM,LH2] Chelsie Cline, PT (r) Jaxon Atkinson PT Student (t) Chelsie Cline, PT (c)      Row Name 02/20/20 0827              Car Transfer    Type (Car Transfer)  stand pivot/stand step  -LH,NM,LH2      [place] Level (Car Transfer)  minimum assist (75% patient effort);verbal cues;nonverbal cues (demo/gesture)  -LH,NM,LH2      Assistive Device (Car Transfer)  walker, front-wheeled  -LH,NM,LH2      Recorded by [LH,NM,LH2] Chelsie Cline, PT (r) Jaxon Atkinson, PT Student (t) Chelsie Cline, PT (c)      Row Name 02/20/20 0800             Gait/Stairs Assessment/Training    [place] Level (Gait)  minimum assist (75% patient effort);verbal cues;nonverbal cues (demo/gesture)  -LH,NM,LH2      Assistive Device (Gait)  walker, front-wheeled;AFO L AFO  -LH,NM,LH2      Distance in Feet (Gait)  80x3  -LH,NM,LH2      Pattern (Gait)  step-through  -LH,NM,LH2      Deviations/Abnormal Patterns (Gait)  bilateral deviations;base of support, wide;ataxic  -LH,NM,LH2      Bilateral Gait Deviations  weight shift ability decreased  -LH,NM,LH2      Recorded by [LH,NM,LH2] Chelsie Cline, PT (r) Jaxon Atkinson, PT Student (t) Chelsie Cline, PT (c)      Row Name 02/20/20 0800             Pain Scale: Numbers Pre/Post-Treatment    Pain Scale: Numbers, Pretreatment  0/10 - no pain  -LH,NM,LH2      Pain Scale: Numbers, Post-Treatment  0/10 - no pain  -LH,NM,LH2      Recorded by [LH,NM,LH2] Chelsie Cline, PT (r) Jaxon Atkinson, PT Student (t) Chelsie Cline, PT (c)      Row Name 02/20/20 0800             Sitting Balance Activity    Activities Performed (Sitting, Balance Training)  sit to stand activity with arm support  -LH,NM,LH2      Support Needed (Sitting, Balance Training)  CGA;minimal external support for balance, 75% patient effort  -LH,NM,LH2      Comment (Sitting, Balance Training)  2 sets x 5 reps   -LH,NM,LH2      Recorded by [LH,NM,LH2] Chelsie Cline, PT (r) Jaxon Atkinson, PT Student (t) Chelsie Cline, PT (c)      Row Name 02/20/20 0800             Standing Balance Activity    Activities Performed (Standing, Balance Training)  standing reaching  outside base of support balloon tapping; kicking a ball in // bars   -ROCK COX,LH2      Support Needed for Balance (Standing, Balance Training)  CGA;minimal external support for balance, 75% patient effort;uses both upper extremities for support;balances without upper extremity support;uses one upper extremity part time for support  -ROCK COX,LH2      Progressive Balance Activity (Standing, Balance Training)  speed of movements increased during activity;upper extremity activities added during activity;base of support narrowed during activity began standing c a wide SAMUEL; required vc to narrow SAMUEL   -ROCK COX LH2      Restrictions (Standing, Balance Training)  One LOB when reaching for cones requiring Doreen to correct c BUE support on a rwx   -ROCK COX LH2      Transfers Skills, Training to Functional Activity (Standing, Balance Training)  beginning to transfer skills to functional activity  -ROCK COX LH2      Comment (Standing, Balance Training)  Standing c BUE support on a wx, pt reached for cones outside SAMUEL and across midline, progressing from BUE support to without support. Performed balloon batting c unilateral UE support and CGA, vc for weight distribution and SAMUEL. In // bars c BUE support, CGA, pt kicked a moving ball interchanging BLE   -ROCK COXLH2      Recorded by [ROCK COX,LH2] Chelsie Cline, PT (r) Jaxon Atkinson PT Student (t) Chelsie Cline, PT (c)      Row Name 02/20/20 0800             Lower Extremity Supine Therapeutic Exercise    Performed, Supine Lower Extremity (Therapeutic Exercise)  bridging (bilateral w/bolster);hip abduction/adduction;SLR (straight leg raise) bridge c ball squeeze, bridge c hip abd (red band)  -ROCK COXLH2      Device, Supine Lower Extremity (Therapeutic Exercise)  elastic bands/tubing red  -ROCK COXLH2      Exercise Type, Supine Lower Extremity (Therapeutic Exercise)  AROM (active range of motion)  -ROCK COX LH2      Sets/Reps Detail, Supine Lower Extremity (Therapeutic Exercise)  1/20  -ROCK COXLHVaishali       Recorded by [LH,NM,LH2] Chelsie Cline, PT (r) Jaxon Atkinson, PT Student (t) Chelise Cline, PT (c)      Row Name 02/20/20 0800             Lower Extremity Sidelying Therapeutic Exercise    Performed, Sidelying Lower Extremity (Therapeutic Exercise)  hip abduction clamshells  -LH,NM,LH2      Device, Sidelying Lower Extremity (Therapeutic Exercise)  elastic bands/tubing red  -LH,NM,LH2      Exercise Type, Sidelying Lower Extremity (Therapeutic Exercise)  AROM (active range of motion)  -LH,NM,LH2      Sets/Reps Detail, Sidelying Lower Extremity (Therapeutic Exercise)  1/15  -LH,NM,LH2      Recorded by [LH,NM,LH2] Chelsie Cline, PT (r) Jaxon Atkinson, PT Student (t) Chelsie Cline, PT (c)      Row Name 02/20/20 0800             Positioning and Restraints    Pre-Treatment Position  sitting in chair/recliner  -LH,NM,LH2      Post Treatment Position  wheelchair  -LH,NM,LH2      In Bed  call light within reach;encouraged to call for assist;exit alarm on;patient within staff view AM  -LH,NM,LH2      In Wheelchair  exit alarm on;with family/caregiver PM  -LH,NM,LH2      Recorded by [LH,NM,LH2] Chelsie Cline, PT (r) Jaxon Atkinson, PT Student (t) Chelsie Cline, PT (c)        User Key  (r) = Recorded By, (t) = Taken By, (c) = Cosigned By    Initials Name Effective Dates     Chelsie Cline, PT 04/03/18 -     Jaxon Arteaga, PT Student 01/03/20 -         Wound 12/09/19 1510 abdomen Incision (Active)   Dressing Appearance dry;intact 2/20/2020  7:50 AM   Closure SUJEY 2/20/2020  7:50 AM   Base dressing in place, unable to visualize 2/20/2020  7:50 AM   Periwound intact 2/20/2020  7:50 AM   Periwound Temperature warm 2/20/2020  7:50 AM   Periwound Skin Turgor soft 2/20/2020  7:50 AM   Drainage Amount none 2/20/2020  7:50 AM   Dressing Care, Wound gauze 2/20/2020  7:50 AM   Periwound Care, Wound dry periwound area maintained 2/20/2020  7:50 AM           PT Recommendation and Plan        Daily Summary of Progress  (PT)  Recommendations: Physical Therapy: discussed pt's status c MD this morning related to decline in functional mobility and strength in BLE. Per MD pt to start on IVIG                Time Calculation:     PT Charges     Row Name 02/20/20 1518 02/20/20 1137 02/20/20 0848       Time Calculation    Start Time  1330  -LH (r) NM (t) LH (c)  1100  -LH (r) NM (t) LH (c)  0800  -LH (r) NM (t) LH (c)    Stop Time  1400  -LH (r) NM (t) LH (c)  1130  -LH (r) NM (t) LH (c)  0830  -LH (r) NM (t) LH (c)    Time Calculation (min)  30 min  -LH (r) NM (t)  30 min  -LH (r) NM (t)  30 min  -LH (r) NM (t)    PT Received On  02/20/20  -LH (r) NM (t) LH (c)  02/20/20  -LH (r) NM (t) LH (c)  02/20/20  -LH (r) NM (t) LH (c)    PT - Next Appointment  02/21/20  -LH (r) NM (t) LH (c)  --  --      User Key  (r) = Recorded By, (t) = Taken By, (c) = Cosigned By    Initials Name Provider Type    Chelsie Guillen, PT Physical Therapist    NM Jaxon Atkinson, PT Student PT Student          Therapy Charges for Today     Code Description Service Date Service Provider Modifiers Qty    87443013303 HC GAIT TRAINING EA 15 MIN 2/19/2020 Jaxon Atkinson, PT Student GP 1    [id number]  PT THER PROC EA 15 MIN 2/19/2020 Jaxon Atkinson, PT Student GP 1    [id number]  PT THERAPEUTIC ACT EA 15 MIN 2/19/2020 Jaxon Atkinson, PT Student GP 2    [id number]  PT NEUROMUSC RE EDUCATION EA 15 MIN 2/20/2020 Jaxon Atkinson, PT Student GP 2    [id number] HC GAIT TRAINING EA 15 MIN 2/20/2020 Jaxon Atkinson, PT Student GP 1    [id number]  PT THERAPEUTIC ACT EA 15 MIN 2/20/2020 Jaxon Atkinson, PT Student GP 1    [id number] HC PT THER PROC EA 15 MIN 2/20/2020 Jaxon Atkinson, PT Student GP 2                   Jaxon Atkinson, PT Student  2/20/2020        .

## 2024-07-29 NOTE — PLAN OF CARE
Problem: Patient Care Overview  Goal: Plan of Care Review  Outcome: Ongoing (interventions implemented as appropriate)  Flowsheets (Taken 2/17/2020 4844)  Outcome Summary: Attended therapies and cooperative with staff.   visited today.  Takes medications with water.  Able to move all extremities but still has numbness and tingling.  Ostomy RN changed ileostomy pouch this afternoon.  Uses call light for assistance.  No safety issues noted.  Progress, Functional Goals: demonstrating adequate progress  Plan of Care Reviewed With: patient  IRF Plan of Care Review: progress ongoing, continue     Problem: Patient Care Overview  Goal: Individualization and Mutuality  Outcome: Ongoing (interventions implemented as appropriate)  Flowsheets (Taken 2/17/2020 1753)  Patient Specific Preferences: Likes to rest in bed when not in therapy.      Called [facility] and informed them that EOI will sign per previous msg, but nor in office till Wed. She vebalized understanding

## (undated) DEVICE — SPNG LAP 18X18IN LF STRL PK/5

## (undated) DEVICE — 3M™ IOBAN™ 2 ANTIMICROBIAL INCISE DRAPE 6648EZ: Brand: IOBAN™ 2

## (undated) DEVICE — THE BITE BLOCK MAXI, LATEX FREE STRAP IS USED TO PROTECT THE ENDOSCOPE INSERTION TUBE FROM BEING BITTEN BY THE PATIENT.

## (undated) DEVICE — ELECTRD BLD EXT EDGE 1P COAT 6.5IN STRL

## (undated) DEVICE — IRRIGATOR TOOMEY 70CC

## (undated) DEVICE — TTL1LYR 16FR10ML 100%SIL TMPST TR: Brand: MEDLINE

## (undated) DEVICE — LOU LAP SIGMOID COLON: Brand: MEDLINE INDUSTRIES, INC.

## (undated) DEVICE — JACKSON-PRATT 100CC BULB RESERVOIR: Brand: CARDINAL HEALTH

## (undated) DEVICE — BW-412T DISP COMBO CLEANING BRUSH: Brand: SINGLE USE COMBINATION CLEANING BRUSH

## (undated) DEVICE — ENSEAL TRIO TEMPERATURE CONTOLLED TISSUE SEALING TECHNOLOGY DISPOSABLE TISSUE SEALING DEVICE TAPTRONIC TRIGGER ACTIVATED POWER 3MM CURVED JAW: Brand: ENSEAL

## (undated) DEVICE — TROCAR: Brand: KII SLEEVE

## (undated) DEVICE — Device

## (undated) DEVICE — VIOLET BRAIDED (POLYGLACTIN 910), SYNTHETIC ABSORBABLE SUTURE: Brand: COATED VICRYL

## (undated) DEVICE — COLOSTOMY/ILEOSTOMY KIT, FLEXWEAR: Brand: NEW IMAGE

## (undated) DEVICE — KT CANSTR VAC WND W/ISOLYSER SENSATRAC 500CC 10CS

## (undated) DEVICE — FRCP BX RADJAW4 NDL 2.8 240CM LG OG BX40

## (undated) DEVICE — COVER,MAYO STAND,STERILE: Brand: MEDLINE

## (undated) DEVICE — BARRIER, ABSORBABLE, ADHESION: Brand: SEPRAFILM® PROCEDURE PACK

## (undated) DEVICE — GLV SURG SENSICARE PI PF LF 7 GRN STRL

## (undated) DEVICE — MEDI-VAC YANKAUER SUCTION HANDLE W/BULBOUS TIP: Brand: CARDINAL HEALTH

## (undated) DEVICE — KT DRSNG VAC SIMPLACE MD 5PK

## (undated) DEVICE — VIAL FORMALIN CAP 10P 40ML

## (undated) DEVICE — SUCTION CANISTER, 3000CC,SAFELINER: Brand: DEROYAL

## (undated) DEVICE — ENDOPATH PNEUMONEEDLE INSUFFLATION NEEDLES WITH LUER LOCK CONNECTORS 120MM: Brand: ENDOPATH

## (undated) DEVICE — POOLE SUCTION HANDLE: Brand: CARDINAL HEALTH

## (undated) DEVICE — ENDOSCOPE KLEENSPEC ILLUM SYS 19 25CM

## (undated) DEVICE — APPL CHLORAPREP W/TINT 26ML ORNG

## (undated) DEVICE — IRRIGATOR BULB ASEPTO 60CC STRL

## (undated) DEVICE — SUT MNCRYL PLS ANTIB UD 4/0 PS2 18IN

## (undated) DEVICE — SYR LL 3CC

## (undated) DEVICE — DISPOSABLE GRASPER CARTRIDGE: Brand: DIRECT DRIVE REPOSABLE GRASPERS

## (undated) DEVICE — TOWEL,OR,DSP,ST,BLUE,STD,4/PK,20PK/CS: Brand: MEDLINE

## (undated) DEVICE — TROCAR: Brand: KII OPTICAL ACCESS SYSTEM

## (undated) DEVICE — PK PROC MAJ 40

## (undated) DEVICE — DEV COND GAS LAP INSUFLOW W/LUER CONN

## (undated) DEVICE — VISUALIZATION SYSTEM: Brand: CLEARIFY

## (undated) DEVICE — Device: Brand: DEFENDO AIR/WATER/SUCTION AND BIOPSY VALVE

## (undated) DEVICE — SOL NACL 0.9PCT 1000ML

## (undated) DEVICE — 3M™ IOBAN™ 2 ANTIMICROBIAL INCISE DRAPE 6640EZ: Brand: IOBAN™ 2

## (undated) DEVICE — STERILE LATEX POWDER-FREE SURGICAL GLOVESWITH NITRILE COATING: Brand: PROTEXIS

## (undated) DEVICE — GLV SURG BIOGEL LTX PF 7

## (undated) DEVICE — NDL HYPO ECLPS SFTY 22G 1 1/2IN

## (undated) DEVICE — ANTIBACTERIAL UNDYED BRAIDED (POLYGLACTIN 910), SYNTHETIC ABSORBABLE SUTURE: Brand: COATED VICRYL

## (undated) DEVICE — CULT AER/ANAEROB FASTIDIOUS BACT

## (undated) DEVICE — WOUND RETRACTOR AND PROTECTOR: Brand: ALEXIS WOUND PROTECTOR-RETRACTOR

## (undated) DEVICE — GOWN ISOL W/THUMB UNIV BLU BX/15

## (undated) DEVICE — CONTAINER,SPECIMEN,OR STERILE,4OZ: Brand: MEDLINE

## (undated) DEVICE — GOWN SURG AERO CHROME XL

## (undated) DEVICE — JACKT LAB KNIT COLR LG BLU

## (undated) DEVICE — SPNG GZ WOVN 4X4IN 12PLY 10/BX STRL

## (undated) DEVICE — SYR LUERLOK 30CC

## (undated) DEVICE — GLV SURG SENSICARE PI LF PF 7.5 GRN STRL

## (undated) DEVICE — PENCL E/S HNDSWCH ROCKR CB

## (undated) DEVICE — SUT PDS 3/0 SH 27IN DYED Z316H

## (undated) DEVICE — ENDOCUT SCISSOR TIP, DISPOSABLE: Brand: RENEW

## (undated) DEVICE — SUT VIC 2/0 TIES 18IN J111T

## (undated) DEVICE — MASK,FACE,FLUID RESIST,SHLD,EARLOOP: Brand: MEDLINE

## (undated) DEVICE — STPLR SKIN VISISTAT WD 35CT

## (undated) DEVICE — SUT VIC 0 TIES 18IN J912G

## (undated) DEVICE — ENDOPATH XCEL BLADELESS TROCARS WITH STABILITY SLEEVES: Brand: ENDOPATH XCEL

## (undated) DEVICE — GLV SURG SENSICARE MICRO PF LF 6 STRL

## (undated) DEVICE — DRAPE,UTILITY,TAPE,15X26,STERILE: Brand: MEDLINE